# Patient Record
Sex: FEMALE | Race: WHITE | Employment: OTHER | URBAN - METROPOLITAN AREA
[De-identification: names, ages, dates, MRNs, and addresses within clinical notes are randomized per-mention and may not be internally consistent; named-entity substitution may affect disease eponyms.]

---

## 2017-01-05 ENCOUNTER — OFFICE VISIT (OUTPATIENT)
Dept: ORTHOPEDIC SURGERY | Age: 62
End: 2017-01-05

## 2017-01-05 VITALS
RESPIRATION RATE: 18 BRPM | OXYGEN SATURATION: 95 % | HEIGHT: 70 IN | DIASTOLIC BLOOD PRESSURE: 83 MMHG | TEMPERATURE: 97.7 F | BODY MASS INDEX: 31.07 KG/M2 | HEART RATE: 85 BPM | SYSTOLIC BLOOD PRESSURE: 142 MMHG | WEIGHT: 217 LBS

## 2017-01-05 DIAGNOSIS — M96.1 LUMBAR POST-LAMINECTOMY SYNDROME: Primary | ICD-10-CM

## 2017-01-05 RX ORDER — PREGABALIN 75 MG/1
CAPSULE ORAL
Qty: 360 CAP | Refills: 0 | Status: SHIPPED | OUTPATIENT
Start: 2017-01-05 | End: 2017-02-13 | Stop reason: ALTCHOICE

## 2017-01-05 RX ORDER — TRAMADOL HYDROCHLORIDE 50 MG/1
TABLET ORAL
Qty: 360 TAB | Refills: 0 | Status: SHIPPED | OUTPATIENT
Start: 2017-01-05 | End: 2017-02-13 | Stop reason: ALTCHOICE

## 2017-01-05 NOTE — PATIENT INSTRUCTIONS
Back Pain: Care Instructions  Your Care Instructions    Back pain has many possible causes. It is often related to problems with muscles and ligaments of the back. It may also be related to problems with the nerves, discs, or bones of the back. Moving, lifting, standing, sitting, or sleeping in an awkward way can strain the back. Sometimes you don't notice the injury until later. Arthritis is another common cause of back pain. Although it may hurt a lot, back pain usually improves on its own within several weeks. Most people recover in 12 weeks or less. Using good home treatment and being careful not to stress your back can help you feel better sooner. Follow-up care is a key part of your treatment and safety. Be sure to make and go to all appointments, and call your doctor if you are having problems. Its also a good idea to know your test results and keep a list of the medicines you take. How can you care for yourself at home? · Sit or lie in positions that are most comfortable and reduce your pain. Try one of these positions when you lie down:  ¨ Lie on your back with your knees bent and supported by large pillows. ¨ Lie on the floor with your legs on the seat of a sofa or chair. Josephus Phlegm on your side with your knees and hips bent and a pillow between your legs. ¨ Lie on your stomach if it does not make pain worse. · Do not sit up in bed, and avoid soft couches and twisted positions. Bed rest can help relieve pain at first, but it delays healing. Avoid bed rest after the first day of back pain. · Change positions every 30 minutes. If you must sit for long periods of time, take breaks from sitting. Get up and walk around, or lie in a comfortable position. · Try using a heating pad on a low or medium setting for 15 to 20 minutes every 2 or 3 hours. Try a warm shower in place of one session with the heating pad. · You can also try an ice pack for 10 to 15 minutes every 2 to 3 hours.  Put a thin cloth between the ice pack and your skin. · Take pain medicines exactly as directed. ¨ If the doctor gave you a prescription medicine for pain, take it as prescribed. ¨ If you are not taking a prescription pain medicine, ask your doctor if you can take an over-the-counter medicine. · Take short walks several times a day. You can start with 5 to 10 minutes, 3 or 4 times a day, and work up to longer walks. Walk on level surfaces and avoid hills and stairs until your back is better. · Return to work and other activities as soon as you can. Continued rest without activity is usually not good for your back. · To prevent future back pain, do exercises to stretch and strengthen your back and stomach. Learn how to use good posture, safe lifting techniques, and proper body mechanics. When should you call for help? Call your doctor now or seek immediate medical care if:  · You have new or worsening numbness in your legs. · You have new or worsening weakness in your legs. (This could make it hard to stand up.)  · You lose control of your bladder or bowels. Watch closely for changes in your health, and be sure to contact your doctor if:  · Your pain gets worse. · You are not getting better after 2 weeks. Where can you learn more? Go to http://june-yasmeen.info/. Enter F548 in the search box to learn more about \"Back Pain: Care Instructions. \"  Current as of: May 23, 2016  Content Version: 11.1  © 5754-0805 [employer]. Care instructions adapted under license by [employer] (which disclaims liability or warranty for this information). If you have questions about a medical condition or this instruction, always ask your healthcare professional. [address] any warranty or liability for your use of this information.

## 2017-01-05 NOTE — PROGRESS NOTES
Chief complaint/History of Present Illness:  Chief Complaint   Patient presents with    Back Pain     2 month follow up     HPI  Humaira Cotto is a  57 y.o.  female      HISTORY OF PRESENT ILLNESS:   The patient comes in today almost four months status post her spinal cord stimulator permanent implantation. She is doing well. She states the stimulator covers both legs, especially since she got it reprogrammed. She is trying to wean off of her Tramadol and Lyrica. She is down to 150 mg twice a day of Lyrica. She was on it four times a day. She was down to Tramadol twice a day. However, when she has a lot of activity or walking a lot, she ends up having to take it more like two to four times a day. She denies fever or bowel or bladder dysfunction. Since we last saw her, she has been diagnosed with rheumatoid arthritis and is seeing Dr. Bernardo Ramon [phone]. She has been started on Methotrexate and Folic Acid. She is not really sure if her pain comes from that or from her back. She denies fever or bowel or bladder dysfunction. PHYSICAL EXAM:  Ms. Eloy Johnson is a 67-year-old female. She is alert and oriented. She has a full weight bearing, non-antalgic gait, with 5/5 strength of the bilateral lower extremities and negative straight leg raise. At the site of her trial spinal cord stimulator in her back, there is a stitch still in there. I did remove that without any incident. There was just a tiny amount of bleeding. We put a Band-Aid over it. ASSESSENT/PLAN:  This is a patient three and a half months out from her permanent spinal cord stimulator. She is doing well. The stimulator helps her dramatically. She has cut downm on her Lyrica. She will continue to cut down on it. We are going to give her 75 mg to take two tablets twice a day and wean off as she is able to. The Tramadol, we will give her enough to take two to four times a day. Her  is appropriate.   We will see her back in three months or sooner if needed. Review of systems:    Past Medical History   Diagnosis Date    Allergic rhinitis     Asthma     Back pain     Back pain     Cervical radiculopathy     Chronic sinusitis 5/15/2012    Chronic sinusitis with recurrent bronchitis     DNS (deviated nasal septum)     Empyema     Esophagitis     Foraminal stenosis of cervical region      C4-C5    GERD (gastroesophageal reflux disease)      Dr Herlinda Harding Hx MRSA infection 8/11/2014    Hypertension     Hypertriglyceridemia     Insulin resistance 4/9/2012    Left knee pain     Leg pain, bilateral     Menopause      Age 51    MRSA (methicillin resistant Staphylococcus aureus) infection 2008     left lung    Restless leg syndrome     Spinal stenosis of cervical region      C4-C5 and C5-C6    TMJ syndrome     Wears glasses      and contacts     Past Surgical History   Procedure Laterality Date    Hx other surgical  2007     thoracentesis    Hx other surgical  2008     chest tube    Hx other surgical  1997     TMJ surgery    Hx tubal ligation      Pr colonoscopy flx dx w/collj spec when pfrmd  6-18-08     normal/duntemann    Hx other surgical  2012     sinus surg 2012-Dr Pardo.  Hx orthopaedic  1/2015     bunion removal from right foot    Hx lumbar laminectomy  Nov 1995     [facility]    Hx lumbar laminectomy  Feb 1997     Rachel Capone    Hx cervical fusion  08/13/14     Social History     Social History    Marital status:      Spouse name: N/A    Number of children: N/A    Years of education: N/A     Occupational History    Not on file.      Social History Main Topics    Smoking status: Never Smoker    Smokeless tobacco: Never Used    Alcohol use 0.0 oz/week     0 Standard drinks or equivalent per week      Comment: rare use    Drug use: Yes     Special: Prescription, OTC    Sexual activity: Yes     Partners: Male     Other Topics Concern    Not on file     Social History Narrative Retired      Family History   Problem Relation Age of Onset    Hypertension Mother     Diabetes Mother    [facility] Arthritis-rheumatoid Sister     Other Sister      Lupus    Cancer Sister 35     CA, uterus    Other Brother      myocarditis    Heart Attack Brother     Heart Disease Brother     Heart Surgery Brother     Coronary Artery Disease Father     Hypertension Father     Cancer Sister 48     CA, breast    Breast Cancer Sister      Estrogen based    Diabetes Brother     Stroke Maternal Grandmother        Physical Exam:  Visit Vitals    /83    Pulse 85    Temp 97.7 °F (36.5 °C) (Oral)    Resp 18    Ht 5' 10\" (1.778 m)    Wt 217 lb (98.4 kg)    SpO2 95%    BMI 31.14 kg/m2     Pain Scale: 1/10     has been . reviewed and is appropriate    Pt has a consistent UDS in the record      Hilda was seen today for back pain. Diagnoses and all orders for this visit:    Lumbar post-laminectomy syndrome    Other orders  -     pregabalin (LYRICA) 75 mg capsule; 2 tabs po bid  Indications: NEUROPATHIC PAIN ASSOCIATED WITH SPINAL CORD INJURY  -     traMADol (ULTRAM) 50 mg tablet; 1 tab po bid to qid prn pain  Indications: PAIN            Follow-up Disposition:  Return in about 3 months (around 4/5/2017) for with [address] [place] [address].         We have informed [facility] to notify us for immediate appointment if she has any worsening neurogical symptoms or if an emergency situation presents, then call 911

## 2017-02-07 ENCOUNTER — TELEPHONE (OUTPATIENT)
Dept: INTERNAL MEDICINE CLINIC | Age: 62
End: 2017-02-07

## 2017-02-07 NOTE — TELEPHONE ENCOUNTER
Pt calling asking if she needs to have labs before upcoming visit?  If so can you enter and she will go to [facility]

## 2017-02-13 ENCOUNTER — OFFICE VISIT (OUTPATIENT)
Dept: INTERNAL MEDICINE CLINIC | Age: 62
End: 2017-02-13

## 2017-02-13 VITALS
TEMPERATURE: 98 F | OXYGEN SATURATION: 96 % | HEART RATE: 76 BPM | RESPIRATION RATE: 16 BRPM | DIASTOLIC BLOOD PRESSURE: 90 MMHG | HEIGHT: 70 IN | BODY MASS INDEX: 30.95 KG/M2 | SYSTOLIC BLOOD PRESSURE: 123 MMHG | WEIGHT: 216.2 LBS

## 2017-02-13 DIAGNOSIS — K21.9 GASTROESOPHAGEAL REFLUX DISEASE, ESOPHAGITIS PRESENCE NOT SPECIFIED: Chronic | ICD-10-CM

## 2017-02-13 DIAGNOSIS — R51.9 HEADACHE, UNSPECIFIED HEADACHE TYPE: Primary | ICD-10-CM

## 2017-02-13 DIAGNOSIS — R73.03 PREDIABETES: ICD-10-CM

## 2017-02-13 DIAGNOSIS — I10 ESSENTIAL HYPERTENSION: ICD-10-CM

## 2017-02-13 NOTE — PATIENT INSTRUCTIONS
There are no preventive care reminders to display for this patient. High Blood Pressure: Care Instructions  Your Care Instructions  If your blood pressure is usually above 140/90, you have high blood pressure, or hypertension. That means the top number is 140 or higher or the bottom number is 90 or higher, or both. Despite what a lot of people think, high blood pressure usually doesn't cause headaches or make you feel dizzy or lightheaded. It usually has no symptoms. But it does increase your risk for heart attack, stroke, and kidney or eye damage. The higher your blood pressure, the more your risk increases. Your doctor will give you a goal for your blood pressure. Your goal will be based on your health and your age. An example of a goal is to keep your blood pressure below 140/90. Lifestyle changes, such as eating healthy and being active, are always important to help lower blood pressure. You might also take medicine to reach your blood pressure goal.  Follow-up care is a key part of your treatment and safety. Be sure to make and go to all appointments, and call your doctor if you are having problems. It's also a good idea to know your test results and keep a list of the medicines you take. How can you care for yourself at home? Medical treatment  · If you stop taking your medicine, your blood pressure will go back up. You may take one or more types of medicine to lower your blood pressure. Be safe with medicines. Take your medicine exactly as prescribed. Call your doctor if you think you are having a problem with your medicine. · Talk to your doctor before you start taking aspirin every day. Aspirin can help certain people lower their risk of a heart attack or stroke. But taking aspirin isn't right for everyone, because it can cause serious bleeding. · See your doctor regularly. You may need to see the doctor more often at first or until your blood pressure comes down.   · If you are taking blood pressure medicine, talk to your doctor before you take decongestants or anti-inflammatory medicine, such as ibuprofen. Some of these medicines can raise blood pressure. · Learn how to check your blood pressure at home. Lifestyle changes  · Stay at a healthy weight. This is especially important if you put on weight around the waist. Losing even 10 pounds can help you lower your blood pressure. · If your doctor recommends it, get more exercise. Walking is a good choice. Bit by bit, increase the amount you walk every day. Try for at least 30 minutes on most days of the week. You also may want to swim, bike, or do other activities. · Avoid or limit alcohol. Talk to your doctor about whether you can drink any alcohol. · Try to limit how much sodium you eat to less than 2,300 milligrams (mg) a day. Your doctor may ask you to try to eat less than 1,500 mg a day. · Eat plenty of fruits (such as bananas and oranges), vegetables, legumes, whole grains, and low-fat dairy products. · Lower the amount of saturated fat in your diet. Saturated fat is found in animal products such as milk, cheese, and meat. Limiting these foods may help you lose weight and also lower your risk for heart disease. · Do not smoke. Smoking increases your risk for heart attack and stroke. If you need help quitting, talk to your doctor about stop-smoking programs and medicines. These can increase your chances of quitting for good. When should you call for help? Call 911 anytime you think you may need emergency care. This may mean having symptoms that suggest that your blood pressure is causing a serious heart or blood vessel problem. Your blood pressure may be over 180/110. For example, call 911 if:  · You have symptoms of a heart attack. These may include:  ¨ Chest pain or pressure, or a strange feeling in the chest.  ¨ Sweating. ¨ Shortness of breath. ¨ Nausea or vomiting.   ¨ Pain, pressure, or a strange feeling in the back, neck, jaw, or upper belly or in one or both shoulders or arms. ¨ Lightheadedness or sudden weakness. ¨ A fast or irregular heartbeat. · You have symptoms of a stroke. These may include:  ¨ Sudden numbness, tingling, weakness, or loss of movement in your face, arm, or leg, especially on only one side of your body. ¨ Sudden vision changes. ¨ Sudden trouble speaking. ¨ Sudden confusion or trouble understanding simple statements. ¨ Sudden problems with walking or balance. ¨ A sudden, severe headache that is different from past headaches. · You have severe back or belly pain. Do not wait until your blood pressure comes down on its own. Get help right away. Call your doctor now or seek immediate care if:  · Your blood pressure is much higher than normal (such as 180/110 or higher), but you don't have symptoms. · You think high blood pressure is causing symptoms, such as:  ¨ Severe headache. ¨ Blurry vision. Watch closely for changes in your health, and be sure to contact your doctor if:  · Your blood pressure measures 140/90 or higher at least 2 times. That means the top number is 140 or higher or the bottom number is 90 or higher, or both. · You think you may be having side effects from your blood pressure medicine. · Your blood pressure is usually normal, but it goes above normal at least 2 times. Where can you learn more? Go to http://june-yasmeen.info/. Enter R129 in the search box to learn more about \"High Blood Pressure: Care Instructions. \"  Current as of: August 8, 2016  Content Version: 11.1  © 1736-3052 [employer]. Care instructions adapted under license by [employer] (which disclaims liability or warranty for this information). If you have questions about a medical condition or this instruction, always ask your healthcare professional. [place] [address] any warranty or liability for your use of this information.

## 2017-02-13 NOTE — MR AVS SNAPSHOT
Visit Information Date & Time Provider Department Dept. Phone Encounter #  
 2/13/2017  9:30 AM Pamela Muñoz MD Internist of [address] [id number] Follow-up Instructions Return in about 3 months (around 5/13/2017) for BP check. Your Appointments 4/3/2017  9:50 AM  
Follow Up with Britta Green NP  
[facility] ([facility]) Appt Note: 3 month back fu $40  
 [address] [facility] [address]  
[phone]  
  
   
 [address] [facility] [address] 5/15/2017  9:00 AM  
Office Visit with Pamela Muñoz MD  
[facility] Appt Note: 3 mth f/u  
 [address] [facility] [place], [address] [address] [address]  
  
   
 [address] [facility], [address] [facility] Upcoming Health Maintenance Date Due  
 BREAST CANCER SCRN MAMMOGRAM 8/16/2017 COLONOSCOPY 6/18/2018 PAP AKA CERVICAL CYTOLOGY 7/7/2018 DTaP/Tdap/Td series (2 - Td) 7/28/2026 Allergies as of 2/13/2017  Review Complete On: 2/13/2017 By: Seth Bryan Severity Noted Reaction Type Reactions Sulfasalazine High 11/17/2016    Other (comments) Could not taste anything, lightheadedness Other Plant, Animal, Environmental    Unable to Consolidated Doc MOLD Tape [Adhesive]  07/31/2014    Other (comments) Blisters, use paper tape only Patient states tegaderm and paper tape are okay Current Immunizations  Reviewed on 7/28/2016 Name Date Hepatitis B Vaccine 1/1/2009 Influenza Vaccine 10/22/2015, 10/9/2014 Influenza Vaccine Whole 1/4/2013 Pneumococcal Polysaccharide (PPSV-23) 7/28/2016 Td 8/1/2011 Tdap 7/28/2016 Not reviewed this visit Vitals BP Pulse Temp Resp Height(growth percentile) Weight(growth percentile)  123/90 (BP 1 Location: Left arm, BP Patient Position: Sitting) 76 98 °F (36.7 °C) (Oral) 16 5' 10\" (1.778 m) 216 lb 3.2 oz (98.1 kg) SpO2 BMI OB Status Smoking Status 96% 31.02 kg/m2 Postmenopausal Never Smoker BMI and BSA Data Body Mass Index Body Surface Area 31.02 kg/m 2 2.2 m 2 Preferred Pharmacy Pharmacy Name Phone [facility] [address] [phone] Your Updated Medication List  
  
   
This list is accurate as of: 2/13/17 10:27 AM.  Always use your most recent med list.  
  
  
  
  
 albuterol 90 mcg/actuation inhaler Commonly known as:  PROVENTIL HFA, VENTOLIN HFA, PROAIR HFA Take 2 Puffs by inhalation every six (6) hours as needed. allergy injection  
by SubCUTAneous route every thirty (30) days. ascorbic acid (vitamin C) 500 mg tablet Commonly known as:  VITAMIN C Take 1 Tab by mouth daily. calcium carbonate 200 mg calcium (500 mg) Chew Commonly known as:  TUMS Take 1 Tab by mouth four (4) times daily. cholecalciferol (VITAMIN D3) 5,000 unit Tab tablet Commonly known as:  VITAMIN D3 Take 5,000 Units by mouth daily. clobetasol 0.05 % ointment Commonly known as:  Barbara Leventhal Apply  to affected area two (2) times a week. cyanocobalamin 1,000 mcg tablet Commonly known as:  VITAMIN B-12 Take 1 Tab by mouth daily. DEXILANT 60 mg Cpdb Generic drug:  Dexlansoprazole Take 1 Cap by mouth Daily (before dinner). ESTRACE 0.01 % (0.1 mg/gram) vaginal cream  
Generic drug:  estradiol  
insert 1/2 gram vaginally two times a week  
  
 ferrous sulfate 325 mg (65 mg iron) tablet Take 1 Tab by mouth Daily (before breakfast). folic acid 1 mg tablet Commonly known as:  FOLVITE  
take 1 tablet by mouth once daily  
  
 losartan 50 mg tablet Commonly known as:  COZAAR  
TAKE 1 TABLET DAILY  
  
 methotrexate 2.5 mg tablet Commonly known as:  Jessica Damme Take 2.5 mg by mouth. 5 tablets weekly multivitamin tablet Commonly known as:  ONE A DAY Take 1 Tab by mouth daily. Omega-3-DHA-EPA-Fish Oil 1,000 mg (120 mg-180 mg) Cap Take  by mouth daily. SINGULAIR 10 mg tablet Generic drug:  montelukast  
Take 10 mg by mouth daily. SYMBICORT 160-4.5 mcg/actuation HFA inhaler Generic drug:  budesonide-formoterol Take 2 Puffs by inhalation two (2) times a day. triamcinolone 55 mcg nasal inhaler Commonly known as:  NASACORT AQ  
2 Sprays by Both Nostrils route daily. Indications: PERENNIAL ALLERGIC RHINITIS  
  
 TYLENOL 325 mg tablet Generic drug:  acetaminophen Take  by mouth every four (4) hours as needed for Pain. ZyrTEC 10 mg Cap Generic drug:  Cetirizine Take 10 mg by mouth daily. Follow-up Instructions Return in about 3 months (around 5/13/2017) for BP check. Patient Instructions There are no preventive care reminders to display for this patient. High Blood Pressure: Care Instructions Your Care Instructions If your blood pressure is usually above 140/90, you have high blood pressure, or hypertension. That means the top number is 140 or higher or the bottom number is 90 or higher, or both. Despite what a lot of people think, high blood pressure usually doesn't cause headaches or make you feel dizzy or lightheaded. It usually has no symptoms. But it does increase your risk for heart attack, stroke, and kidney or eye damage. The higher your blood pressure, the more your risk increases. Your doctor will give you a goal for your blood pressure. Your goal will be based on your health and your age. An example of a goal is to keep your blood pressure below 140/90. Lifestyle changes, such as eating healthy and being active, are always important to help lower blood pressure. You might also take medicine to reach your blood pressure goal. 
Follow-up care is a key part of your treatment and safety.  Be sure to make and go to all appointments, and call your doctor if you are having problems. It's also a good idea to know your test results and keep a list of the medicines you take. How can you care for yourself at home? Medical treatment · If you stop taking your medicine, your blood pressure will go back up. You may take one or more types of medicine to lower your blood pressure. Be safe with medicines. Take your medicine exactly as prescribed. Call your doctor if you think you are having a problem with your medicine. · Talk to your doctor before you start taking aspirin every day. Aspirin can help certain people lower their risk of a heart attack or stroke. But taking aspirin isn't right for everyone, because it can cause serious bleeding. · See your doctor regularly. You may need to see the doctor more often at first or until your blood pressure comes down. · If you are taking blood pressure medicine, talk to your doctor before you take decongestants or anti-inflammatory medicine, such as ibuprofen. Some of these medicines can raise blood pressure. · Learn how to check your blood pressure at home. Lifestyle changes · Stay at a healthy weight. This is especially important if you put on weight around the waist. Losing even 10 pounds can help you lower your blood pressure. · If your doctor recommends it, get more exercise. Walking is a good choice. Bit by bit, increase the amount you walk every day. Try for at least 30 minutes on most days of the week. You also may want to swim, bike, or do other activities. · Avoid or limit alcohol. Talk to your doctor about whether you can drink any alcohol. · Try to limit how much sodium you eat to less than 2,300 milligrams (mg) a day. Your doctor may ask you to try to eat less than 1,500 mg a day. · Eat plenty of fruits (such as bananas and oranges), vegetables, legumes, whole grains, and low-fat dairy products. · Lower the amount of saturated fat in your diet. Saturated fat is found in animal products such as milk, cheese, and meat. Limiting these foods may help you lose weight and also lower your risk for heart disease. · Do not smoke. Smoking increases your risk for heart attack and stroke. If you need help quitting, talk to your doctor about stop-smoking programs and medicines. These can increase your chances of quitting for good. When should you call for help? Call 911 anytime you think you may need emergency care. This may mean having symptoms that suggest that your blood pressure is causing a serious heart or blood vessel problem. Your blood pressure may be over 180/110. For example, call 911 if: 
· You have symptoms of a heart attack. These may include: ¨ Chest pain or pressure, or a strange feeling in the chest. 
¨ Sweating. ¨ Shortness of breath. ¨ Nausea or vomiting. ¨ Pain, pressure, or a strange feeling in the back, neck, jaw, or upper belly or in one or both shoulders or arms. ¨ Lightheadedness or sudden weakness. ¨ A fast or irregular heartbeat. · You have symptoms of a stroke. These may include: 
¨ Sudden numbness, tingling, weakness, or loss of movement in your face, arm, or leg, especially on only one side of your body. ¨ Sudden vision changes. ¨ Sudden trouble speaking. ¨ Sudden confusion or trouble understanding simple statements. ¨ Sudden problems with walking or balance. ¨ A sudden, severe headache that is different from past headaches. · You have severe back or belly pain. Do not wait until your blood pressure comes down on its own. Get help right away. Call your doctor now or seek immediate care if: 
· Your blood pressure is much higher than normal (such as 180/110 or higher), but you don't have symptoms. · You think high blood pressure is causing symptoms, such as: ¨ Severe headache. ¨ Blurry vision.  
Watch closely for changes in your health, and be sure to contact your doctor if: 
· Your blood pressure measures 140/90 or higher at least 2 times. That means the top number is 140 or higher or the bottom number is 90 or higher, or both. · You think you may be having side effects from your blood pressure medicine. · Your blood pressure is usually normal, but it goes above normal at least 2 times. Where can you learn more? Go to http://june-yasmeen.info/. Enter U872 in the search box to learn more about \"High Blood Pressure: Care Instructions. \" Current as of: August 8, 2016 Content Version: 11.1 © 7004-6406 [employer]. Care instructions adapted under license by [employer] (which disclaims liability or warranty for this information). If you have questions about a medical condition or this instruction, always ask your healthcare professional. [address] any warranty or liability for your use of this information. Introducing \[facility]\"" & HEALTH SERVICES! Dear Marquita Apple: Thank you for requesting a [employer] account. Our records indicate that you already have an active [employer] account. You can access your account anytime at [email or web address] Did you know that you can access your hospital and ER discharge instructions at any time in [employer]? You can also review all of your test results from your hospital stay or ER visit. Additional Information If you have questions, please visit the Frequently Asked Questions section of the [employer] website at [email or web address] Remember, [employer] is NOT to be used for urgent needs. For medical emergencies, dial 911. Now available from your iPhone and Android! Please provide this summary of care documentation to your next provider. Your primary care clinician is listed as Jose Crawford. If you have any questions after today's visit, please call [phone].

## 2017-02-13 NOTE — PROGRESS NOTES
INTERNISTS [facility]:  2/15/2017, MRN: [id number]      Nicolette Wang is a 57 y.o. female and presents to clinic for Hypertension; GERD; and Labs (done 11-14-16 to discuss)    Subjective:   Pt is a 62yo female with h/o HTN, GERD (followed by ), DJD, lumbar post laminectomy syndrome s/p spinal cord stimulator surgery (followed by Roddy Cohen), cervical spinal stenosis, prediabetes, asthma (followed by , Pulmonology), HLD, chronic sinusitis, vitamin B12 deficiency, and rheumatoid arthritis (Followed by ), vitamin D deficiency. 1. HTN:   - Chronic, present >6 months   - On losartan, no refills are needed. No adverse side effects.   - BPs have been borderline. SBP is 90 today    BP Readings from Last 3 Encounters:   02/13/17 123/90   01/05/17 142/83   12/02/16 130/88       2. GERD:   - Chronic, present > 6 months   - On dexilant, no adverse side effects. No refills are needed. - Followed by     3.  Headache:   - Started last week   - Left sided neck/headache pain   - No vision/hearing changes   - Improved with NSAIDs OTC   - Pain is constant   - Pain radiates to her left eye   - No phonophobia/photophobia      Patient Active Problem List    Diagnosis Date Noted    Arthritis, lumbar spine 04/22/2016    Moderate persistent asthma without complication 12/47/6754    Lumbar radiculopathy 01/19/2016    Therapeutic drug monitoring 12/10/2015    Lumbar post-laminectomy syndrome 12/10/2015    S/P cervical spinal fusion 08/13/2014    Cervical stenosis of spine 08/11/2014    GERD (gastroesophageal reflux disease) 08/11/2014    Hx MRSA infection 08/11/2014    Prediabetes 06/30/2013    Asthma 12/28/2012     Class: Chronic    Hypertriglyceridemia 12/16/2012    DNS (deviated nasal septum)     Chronic sinusitis 05/15/2012    Hypercalcemia 04/09/2012    Insulin resistance 04/09/2012    HTN (hypertension) 06/28/2010    Iron deficiency anemia 06/28/2010    Vitamin B12 deficiency 06/28/2010    Vitamin D deficiency 06/28/2010       Current Outpatient Prescriptions   Medication Sig Dispense Refill    cyanocobalamin (VITAMIN B-12) 1,000 mcg tablet Take 1 Tab by mouth daily. 90 Tab 3    ferrous sulfate 325 mg (65 mg iron) tablet Take 1 Tab by mouth Daily (before breakfast). 90 Tab 3    ascorbic acid, vitamin C, (VITAMIN C) 500 mg tablet Take 1 Tab by mouth daily. (Patient taking differently: Take 1,000 mg by mouth daily.) 90 Tab 3    folic acid (FOLVITE) 1 mg tablet take 1 tablet by mouth once daily  0    methotrexate (RHEUMATREX) 2.5 mg tablet Take 2.5 mg by mouth. 5 tablets weekly      ESTRACE 0.01 % (0.1 mg/gram) vaginal cream insert 1/2 gram vaginally two times a week  0    acetaminophen (TYLENOL) 325 mg tablet Take  by mouth every four (4) hours as needed for Pain.  clobetasol (TEMOVATE) 0.05 % ointment Apply  to affected area two (2) times a week.  0    losartan (COZAAR) 50 mg tablet TAKE 1 TABLET DAILY 90 Tab 3    triamcinolone (NASACORT AQ) 55 mcg nasal inhaler 2 Sprays by Both Nostrils route daily. Indications: PERENNIAL ALLERGIC RHINITIS      cholecalciferol, VITAMIN D3, (VITAMIN D3) 5,000 unit tab tablet Take 5,000 Units by mouth daily.  Omega-3-DHA-EPA-Fish Oil 1,000 (120-180) mg cap Take  by mouth daily.  Dexlansoprazole (DEXILANT) 60 mg CpDM Take 1 Cap by mouth Daily (before dinner).  calcium carbonate (TUMS) 200 mg calcium (500 mg) Chew Take 1 Tab by mouth four (4) times daily.  multivitamin (ONE A DAY) tablet Take 1 Tab by mouth daily.  Cetirizine (ZYRTEC) 10 mg Cap Take 10 mg by mouth daily.  montelukast (SINGULAIR) 10 mg tablet Take 10 mg by mouth daily.  allergy injection by SubCUTAneous route every thirty (30) days.  budesonide-formoterol (SYMBICORT) 160-4.5 mcg/Actuation HFA inhaler Take 2 Puffs by inhalation two (2) times a day.       albuterol (PROVENTIL HFA, VENTOLIN HFA) 90 mcg/Actuation inhaler Take 2 Puffs by inhalation every six (6) hours as needed. Allergies   Allergen Reactions    Sulfasalazine Other (comments)     Could not taste anything, lightheadedness    Other Plant, Animal, Environmental Unable to [employer] Tape [Adhesive] Other (comments)     Blisters, use paper tape only  Patient states tegaderm and paper tape are okay       Past Medical History   Diagnosis Date    Allergic rhinitis     Asthma     Back pain     Back pain     Cervical radiculopathy     Chronic sinusitis 5/15/2012    Chronic sinusitis with recurrent bronchitis     DNS (deviated nasal septum)     Empyema     Esophagitis     Foraminal stenosis of cervical region      C4-C5    GERD (gastroesophageal reflux disease)      Dr Angel Olguin Hx MRSA infection 8/11/2014    Hypertension     Hypertriglyceridemia     Insulin resistance 4/9/2012    Left knee pain     Leg pain, bilateral     Menopause      Age 51    MRSA (methicillin resistant Staphylococcus aureus) infection 2008     left lung    Restless leg syndrome     Spinal stenosis of cervical region      C4-C5 and C5-C6    TMJ syndrome     Wears glasses      and contacts       Past Surgical History   Procedure Laterality Date    Hx other surgical  2007     thoracentesis    Hx other surgical  2008     chest tube    Hx other surgical  1997     TMJ surgery    Hx tubal ligation      Pr colonoscopy flx dx w/collj spec when pfrmd  6-18-08     normal/duntemann    Hx other surgical  2012     sinus surg 2012-Dr Pardo.     Hx orthopaedic  1/2015     bunion removal from right foot    Hx lumbar laminectomy  Nov 1995     [facility]    Hx lumbar laminectomy  Feb 1997     Chuck Nevarez    Hx cervical fusion  08/13/14       Family History   Problem Relation Age of Onset    Hypertension Mother     Diabetes Mother    [facility] Arthritis-rheumatoid Sister     Other Sister      Lupus    Cancer Sister 35     CA, uterus    Other Brother      myocarditis    Heart Attack Brother     Heart Disease Brother     Heart Surgery Brother     Coronary Artery Disease Father     Hypertension Father     Cancer Sister 48     CA, breast    Breast Cancer Sister      Estrogen based    Diabetes Brother     Stroke Maternal Grandmother        Social History   Substance Use Topics    Smoking status: Never Smoker    Smokeless tobacco: Never Used    Alcohol use 0.0 oz/week     0 Standard drinks or equivalent per week      Comment: rare use       ROS   Review of Systems   Constitutional: Negative for chills and fever. HENT: Negative for ear pain, hearing loss and sore throat. Eyes: Negative for blurred vision and pain. Respiratory: Negative for cough and shortness of breath. Cardiovascular: Negative for chest pain. Gastrointestinal: Negative for abdominal pain. Genitourinary: Negative for dysuria. Musculoskeletal: Positive for neck pain. Negative for joint pain and myalgias. Skin: Negative for rash. Neurological: Positive for tingling (chronic, unchanged) and headaches. Negative for focal weakness. Endo/Heme/Allergies: Positive for environmental allergies. Does not bruise/bleed easily. Psychiatric/Behavioral: Negative for substance abuse. Objective     Vitals:    02/13/17 0948   BP: 123/90   Pulse: 76   Resp: 16   Temp: 98 °F (36.7 °C)   TempSrc: Oral   SpO2: 96%   Weight: 216 lb 3.2 oz (98.1 kg)   Height: 5' 10\" (1.778 m)   PainSc:   2   PainLoc: Head       Physical Exam   Constitutional: She is oriented to person, place, and time and well-developed, well-nourished, and in no distress. HENT:   Head: Normocephalic and atraumatic. Right Ear: External ear normal.   Left Ear: External ear normal.   Nose: Nose normal.   Mouth/Throat: Oropharynx is clear and moist. No oropharyngeal exudate. Base of pt's skull is slightly TTP   Eyes: Conjunctivae and EOM are normal. Pupils are equal, round, and reactive to light. Right eye exhibits no discharge. Left eye exhibits no discharge.  No scleral icterus. Neck: Neck supple. Cardiovascular: Normal rate, regular rhythm, normal heart sounds and intact distal pulses. Exam reveals no gallop and no friction rub. No murmur heard. Pulmonary/Chest: Effort normal and breath sounds normal. No respiratory distress. She has no wheezes. She has no rales. Abdominal: Soft. Bowel sounds are normal. She exhibits no distension. There is no tenderness. There is no rebound and no guarding. Musculoskeletal: She exhibits no edema or tenderness (BUE are NTTP). Lymphadenopathy:     She has no cervical adenopathy. Neurological: She is alert and oriented to person, place, and time. She exhibits normal muscle tone. Gait normal.   Skin: Skin is warm and dry. No erythema. Psychiatric: Affect normal.   Nursing note and vitals reviewed. LABS   Data Review:   Lab Results   Component Value Date/Time    WBC 5.5 11/14/2016 07:33 AM    HGB 12.5 11/14/2016 07:33 AM    HCT 40.3 11/14/2016 07:33 AM    PLATELET 060 21/31/9778 07:33 AM    MCV 86.1 11/14/2016 07:33 AM       Lab Results   Component Value Date/Time    Sodium 145 11/14/2016 07:33 AM    Potassium 4.6 11/14/2016 07:33 AM    Chloride 111 11/14/2016 07:33 AM    CO2 27 11/14/2016 07:33 AM    Anion gap 7 11/14/2016 07:33 AM    Glucose 108 11/14/2016 07:33 AM    BUN 9 11/14/2016 07:33 AM    Creatinine 0.83 11/14/2016 07:33 AM    BUN/Creatinine ratio 11 11/14/2016 07:33 AM    GFR est AA >60 11/14/2016 07:33 AM    GFR est non-AA >60 11/14/2016 07:33 AM    Calcium 9.3 11/14/2016 07:33 AM       Lab Results   Component Value Date/Time    Cholesterol, total 144 11/14/2016 07:33 AM    HDL Cholesterol 40 11/14/2016 07:33 AM    LDL, calculated 80.2 11/14/2016 07:33 AM    VLDL, calculated 23.8 11/14/2016 07:33 AM    Triglyceride 119 11/14/2016 07:33 AM    CHOL/HDL Ratio 3.6 11/14/2016 07:33 AM       Lab Results   Component Value Date/Time    Hemoglobin A1c 5.8 11/14/2016 07:33 AM       Assessment/Plan:   1.  Essential hypertension: BP is borderline.  - C/w rx as prescribed. 2. Prediabetes  - Pt counseled on lifestyle modification. 3. Gastroesophageal reflux disease, esophagitis presence not specified  - C/w Dexilant    4. Headache, unspecified headache type  - Discussed referral to Neurology team but pt declined at this time. - Observation. There are no preventive care reminders to display for this patient. Lab review: labs are reviewed in the EHR    I have discussed the diagnosis with the patient and the intended plan as seen in the above orders. The patient has received an after-visit summary and questions were answered concerning future plans. I have discussed medication side effects and warnings with the patient as well. I have reviewed the plan of care with the patient, accepted their input and they are in agreement with the treatment goals. All questions were answered. The patient understands the plan of care. Handouts provided today with above information. Pt instructed if symptoms worsen to call the office or report to the ED for continued care. Greater than 50% of the visit time was spent in counseling and/or coordination of care. Follow-up Disposition:  Return in about 3 months (around 5/13/2017) for BP check.     Windle Babinski, MD

## 2017-02-13 NOTE — PROGRESS NOTES
Chief Complaint   Patient presents with    Hypertension    GERD    Labs     done 11-14-16 to discuss     1. Have you been to the ER, urgent care clinic since your last visit? Hospitalized since your last visit? No    2. Have you seen or consulted any other health care providers outside of the [address] [place] [address] since your last visit? Include any pap smears or colon screening.  No

## 2017-02-15 PROBLEM — M06.9 RHEUMATOID ARTHRITIS INVOLVING MULTIPLE SITES (HCC): Status: ACTIVE | Noted: 2017-02-15

## 2017-03-03 ENCOUNTER — HOSPITAL ENCOUNTER (EMERGENCY)
Age: 62
Discharge: HOME | End: 2017-03-03
Attending: EMERGENCY MEDICINE
Payer: COMMERCIAL

## 2017-03-03 VITALS
BODY MASS INDEX: 32.93 KG/M2 | WEIGHT: 230 LBS | OXYGEN SATURATION: 97 % | SYSTOLIC BLOOD PRESSURE: 135 MMHG | TEMPERATURE: 98.3 F | HEART RATE: 79 BPM | DIASTOLIC BLOOD PRESSURE: 84 MMHG | HEIGHT: 70 IN | RESPIRATION RATE: 18 BRPM

## 2017-03-03 DIAGNOSIS — S61.452A DOG BITE OF LEFT HAND, INITIAL ENCOUNTER: Primary | ICD-10-CM
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
## 2017-03-03 PROCEDURE — 90715 TDAP VACCINE 7 YRS/> IM: CPT | Performed by: PHYSICIAN ASSISTANT

## 2017-03-03 PROCEDURE — 74011250636 HC RX REV CODE- 250/636: Performed by: PHYSICIAN ASSISTANT

## 2017-03-03 PROCEDURE — 99281 EMR DPT VST MAYX REQ PHY/QHP: CPT

## 2017-03-03 PROCEDURE — 90471 IMMUNIZATION ADMIN: CPT

## 2017-03-03 RX ORDER — IBUPROFEN 800 MG/1
800 TABLET ORAL
Qty: 20 TAB | Refills: 0 | Status: SHIPPED | OUTPATIENT
Start: 2017-03-03 | End: 2017-03-10

## 2017-03-03 RX ORDER — AMOXICILLIN AND CLAVULANATE POTASSIUM 875; 125 MG/1; MG/1
1 TABLET, FILM COATED ORAL 2 TIMES DAILY
Qty: 14 TAB | Refills: 0 | Status: SHIPPED | OUTPATIENT
Start: 2017-03-03 | End: 2017-03-10

## 2017-03-03 RX ADMIN — TETANUS TOXOID, REDUCED DIPHTHERIA TOXOID AND ACELLULAR PERTUSSIS VACCINE, ADSORBED 0.5 ML: 5; 2.5; 8; 8; 2.5 SUSPENSION INTRAMUSCULAR at 16:21

## 2017-03-03 NOTE — ED NOTES
Jasbir Cervantes is a 57 y.o. female that was discharged in good condition. The patients diagnosis, condition and treatment were explained to  patient and aftercare instructions were given. The patient verbalized understanding. Patient armband removed and shredded.

## 2017-03-03 NOTE — DISCHARGE INSTRUCTIONS
Animal Bites: Care Instructions  Your Care Instructions  After an animal bite, the biggest concern is infection. The chance of infection depends on the type of animal that bit you, where on your body you were bitten, and your general health. Many animal bites are not closed with stitches, because this can increase the chance of infection. Your bite may take as little as 7 days or as long as several months to heal, depending on how bad it is. Taking good care of your wound at home will help it heal and reduce your chance of infection. The doctor has checked you carefully, but problems can develop later. If you notice any problems or new symptoms, get medical treatment right away. Follow-up care is a key part of your treatment and safety. Be sure to make and go to all appointments, and call your doctor if you are having problems. It's also a good idea to know your test results and keep a list of the medicines you take. How can you care for yourself at home? · If your doctor told you how to care for your wound, follow your doctor's instructions. If you did not get instructions, follow this general advice:  ¨ After 24 to 48 hours, gently wash the wound with clean water 2 times a day. Do not scrub or soak the wound. Don't use hydrogen peroxide or alcohol, which can slow healing. ¨ You may cover the wound with a thin layer of petroleum jelly, such as Vaseline, and a nonstick bandage. ¨ Apply more petroleum jelly and replace the bandage as needed. · After you shower, gently dry the wound with a clean towel. · If your doctor has closed the wound, cover the bandage with a plastic bag before you take a shower. · A small amount of skin redness and swelling around the wound edges and the stitches or staples is normal. Your wound may itch or feel irritated. Do not scratch or rub the wound.   · Ask your doctor if you can take an over-the-counter pain medicine, such as acetaminophen (Tylenol), ibuprofen (Advil, Motrin), or naproxen (Aleve). Read and follow all instructions on the label. · Do not take two or more pain medicines at the same time unless the doctor told you to. Many pain medicines have acetaminophen, which is Tylenol. Too much acetaminophen (Tylenol) can be harmful. · If your bite puts you at risk for rabies, you will get a series of shots over the next few weeks to prevent rabies. Your doctor will tell you when to get the shots. It is very important that you get the full cycle of shots. Follow your doctor's instructions exactly. · You may need a tetanus shot if you have not received one in the last 5 years. · If your doctor prescribed antibiotics, take them as directed. Do not stop taking them just because you feel better. You need to take the full course of antibiotics. When should you call for help? Call your doctor now or seek immediate medical care if:  · The skin near the bite turns cold or pale or it changes color. · You lose feeling in the area near the bite, or it feels numb or tingly. · You have trouble moving a limb near the bite. · You have symptoms of infection, such as:  ¨ Increased pain, swelling, warmth, or redness near the wound. ¨ Red streaks leading from the wound. ¨ Pus draining from the wound. ¨ A fever. · Blood soaks through the bandage. Oozing small amounts of blood is normal.  · Your pain is getting worse. Watch closely for changes in your health, and be sure to contact your doctor if you are not getting better as expected. Where can you learn more? Go to http://june-yasmeen.info/. Enter N554 in the search box to learn more about \"Animal Bites: Care Instructions. \"  Current as of: May 27, 2016  Content Version: 11.1  © 1043-1428 [employer]. Care instructions adapted under license by [employer] (which disclaims liability or warranty for this information).  If you have questions about a medical condition or this instruction, always ask your healthcare professional. [place] [address] any warranty or liability for your use of this information.

## 2017-03-03 NOTE — ED PROVIDER NOTES
Patient is a 57 y.o. female presenting with dog bite. The history is provided by the patient. Dog Bite    The incident occurred yesterday. The incident occurred at home. There is an injury to the left hand (Pt is right handed). The patient is experiencing no pain. It is unlikely that a foreign body is present. Pertinent negatives include no chest pain, no numbness, no visual disturbance, no abdominal pain, no nausea, no vomiting, no headaches, no inability to bear weight, no neck pain, no focal weakness, no decreased responsiveness, no light-headedness, no loss of consciousness, no seizures, no tingling, no weakness, no cough, no difficulty breathing and no memory loss. There have been no prior injuries to these areas. She is right-handed. Her tetanus status is out of date. Patient reports the bite was accidental while playing with her own dog yesterday, since then has noticed some mild redness and swelling around single puncture wound to top of left hand, denies numbness, tingling, pus drainage, bleeding, fever, or chills. Reports her dog is up to date on all vaccinations including rabies.       Past Medical History:   Diagnosis Date    Allergic rhinitis     Asthma     Back pain     Back pain     Cervical radiculopathy     Chronic sinusitis 5/15/2012    Chronic sinusitis with recurrent bronchitis     DNS (deviated nasal septum)     Empyema     Esophagitis     Foraminal stenosis of cervical region     C4-C5    GERD (gastroesophageal reflux disease)     Dr Enedina Reveles Hx MRSA infection 8/11/2014    Hypertension     Hypertriglyceridemia     Insulin resistance 4/9/2012    Left knee pain     Leg pain, bilateral     Menopause     Age 51    MRSA (methicillin resistant Staphylococcus aureus) infection 2008    left lung    Restless leg syndrome     Spinal stenosis of cervical region     C4-C5 and C5-C6    TMJ syndrome     Wears glasses     and contacts       Past Surgical History:   Procedure Laterality Date    HX CERVICAL FUSION  08/13/14    HX LUMBAR LAMINECTOMY  Nov 1995    [address]    HX LUMBAR LAMINECTOMY  Feb 1997    Shavon Chandler  1/2015    bunion removal from right foot    HX OTHER SURGICAL  2007    thoracentesis    HX OTHER SURGICAL  2008    chest tube    HX OTHER SURGICAL  1997    TMJ surgery    HX OTHER SURGICAL  2012    sinus surg 2012-Dr Benoit Ask.  HX TUBAL LIGATION      MI COLONOSCOPY FLX DX W/COLLJ SPEC WHEN PFRMD  6-18-08    normal/duntemann         Family History:   Problem Relation Age of Onset    Hypertension Mother    [facility] Diabetes Mother    [facility] Arthritis-rheumatoid Sister     Other Sister      Lupus    Cancer Sister 35     CA, uterus    Other Brother      myocarditis    Heart Attack Brother     Heart Disease Brother     Heart Surgery Brother     Coronary Artery Disease Father     Hypertension Father     Cancer Sister 48     CA, breast    Breast Cancer Sister      Estrogen based    Diabetes Brother     Stroke Maternal Grandmother        Social History     Social History    Marital status:      Spouse name: N/A    Number of children: N/A    Years of education: N/A     Occupational History    Not on file. Social History Main Topics    Smoking status: Never Smoker    Smokeless tobacco: Never Used    Alcohol use 0.0 oz/week     0 Standard drinks or equivalent per week      Comment: rare use    Drug use: Yes     Special: Prescription, OTC    Sexual activity: Yes     Partners: Male     Other Topics Concern    Not on file     Social History Narrative    Retired          ALLERGIES: Sulfasalazine; Other plant, animal, environmental; and Tape [adhesive]    Review of Systems   Constitutional: Negative for chills, decreased responsiveness and fever. HENT: Negative for ear pain, rhinorrhea and sore throat. Eyes: Negative for pain, redness and visual disturbance. Respiratory: Negative for cough and shortness of breath. Cardiovascular: Negative for chest pain. Gastrointestinal: Negative for abdominal pain, constipation, diarrhea, nausea and vomiting. Genitourinary: Negative for dysuria. Musculoskeletal: Negative for joint swelling, myalgias and neck pain. Skin: Positive for color change and wound. Neurological: Negative for dizziness, tingling, focal weakness, seizures, loss of consciousness, weakness, light-headedness, numbness and headaches. Psychiatric/Behavioral: Negative. Negative for memory loss. Vitals:    03/03/17 1456   BP: 135/84   Pulse: 79   Resp: 18   Temp: 98.3 °F (36.8 °C)   SpO2: 97%   Weight: 104.3 kg (230 lb)   Height: 5' 10\" (1.778 m)            Physical Exam   Constitutional: She is oriented to person, place, and time. She appears well-developed and well-nourished. HENT:   Head: Normocephalic and atraumatic. Right Ear: Tympanic membrane, external ear and ear canal normal.   Left Ear: Tympanic membrane, external ear and ear canal normal.   Nose: Nose normal.   Mouth/Throat: Oropharynx is clear and moist and mucous membranes are normal.   Eyes: Conjunctivae and EOM are normal. Pupils are equal, round, and reactive to light. Neck: Normal range of motion. Cardiovascular: Normal rate, regular rhythm and normal heart sounds. Pulmonary/Chest: Effort normal and breath sounds normal.   Abdominal: Soft. Bowel sounds are normal. There is no tenderness. Musculoskeletal: Normal range of motion. Left wrist: Normal.        Left hand: She exhibits laceration and swelling. She exhibits normal range of motion, no tenderness, no bony tenderness, normal two-point discrimination, normal capillary refill and no deformity. Normal sensation noted. Normal strength noted. Hands:  Small single puncture wound to top of left hand as noted with [id number] area of erythema and mild swelling, but no induration or fluctuance or TTP, no drainage or bleeding from wound.    Neurological: She is alert and oriented to person, place, and time. Skin: Skin is warm and dry. Psychiatric: She has a normal mood and affect. Her behavior is normal. Judgment and thought content normal.   Nursing note and vitals reviewed. MDM  Number of Diagnoses or Management Options  Dog bite of left hand, initial encounter: new and requires workup  Diagnosis management comments: IMPRESSION AND MEDICAL DECISION MAKING:    Based upon the patient's presentation with noted HPI and PE, along with the work up done in the emergency department, I believe that the patient is having a dog bite. The wound is not actively bleeding, or gaping, do not feel it needs loose closure with suture in the ED. Will clean wound, apply bacitracin and non stick dressing. No evidence of fluctuance, erythema, or induration. Tetanus booster given in ED. Patient reports his dog is up to date on rabies vaccination, do not need to administer rabies series at this time. Will discharge pt to home with antibiotic prophylaxis for dog bite, and pain medication. Pt given return warning signs and symptoms. DIAGNOSIS:  1. Dog bite. SPECIFIC PATIENT INSTRUCTIONS FROM THE PHYSICIAN WHO TREATED YOU IN THE ER TODAY:  1. Return to ED if worsening pain, redness, warmth, discharge, changes, or concerns. 2. Finish all antibiotics as directed. 3. You may wash the laceration site, but otherwise keep it dry. 4. Take motrin as prescribed as needed for pain. 5. Follow up with your PCP or the one provided in 2 days for a wound check, or return to this ED for wound check. Reviewed workup results, any meds, and discharge instructions with patient and any family present. Answered all questions.          Amount and/or Complexity of Data Reviewed  Discussion of test results with the performing providers: yes  Decide to obtain previous medical records or to obtain history from someone other than the patient: yes  Obtain history from someone other than the patient: yes  Review and summarize past medical records: yes  Discuss the patient with other providers: yes    Risk of Complications, Morbidity, and/or Mortality  Presenting problems: low  Diagnostic procedures: low  Management options: low    Patient Progress  Patient progress: stable    ED Course       Procedures      Diagnosis:   1. Dog bite of left hand, initial encounter          Disposition: home    Follow-up Information     Follow up With Details Comments Contact Info    [facility] EMERGENCY DEPT  As needed, If symptoms worsen [address] Pinky Hernandez MD In 3 days  [address]/[place] [address]  [phone]            Patient's Medications   Start Taking    AMOXICILLIN-CLAVULANATE (AUGMENTIN) 875-125 MG PER TABLET    Take 1 Tab by mouth two (2) times a day for 7 days. Indications: DOG BITE WOUND    IBUPROFEN (MOTRIN) 800 MG TABLET    Take 1 Tab by mouth every six (6) hours as needed for Pain for up to 7 days. Continue Taking    ACETAMINOPHEN (TYLENOL) 325 MG TABLET    Take  by mouth every four (4) hours as needed for Pain. ALBUTEROL (PROVENTIL HFA, VENTOLIN HFA) 90 MCG/ACTUATION INHALER    Take 2 Puffs by inhalation every six (6) hours as needed. ALLERGY INJECTION    by SubCUTAneous route every thirty (30) days. ASCORBIC ACID, VITAMIN C, (VITAMIN C) 500 MG TABLET    Take 1 Tab by mouth daily. BUDESONIDE-FORMOTEROL (SYMBICORT) 160-4.5 MCG/ACTUATION HFA INHALER    Take 2 Puffs by inhalation two (2) times a day. CALCIUM CARBONATE (TUMS) 200 MG CALCIUM (500 MG) CHEW    Take 1 Tab by mouth four (4) times daily. CETIRIZINE (ZYRTEC) 10 MG CAP    Take 10 mg by mouth daily. CHOLECALCIFEROL, VITAMIN D3, (VITAMIN D3) 5,000 UNIT TAB TABLET    Take 5,000 Units by mouth daily. CLOBETASOL (TEMOVATE) 0.05 % OINTMENT    Apply  to affected area two (2) times a week.     CYANOCOBALAMIN (VITAMIN B-12) 1,000 MCG TABLET    Take 1 Tab by mouth daily. DEXLANSOPRAZOLE (DEXILANT) 60 MG CPDM    Take 1 Cap by mouth Daily (before dinner). ESTRACE 0.01 % (0.1 MG/GRAM) VAGINAL CREAM    insert 1/2 gram vaginally two times a week    FERROUS SULFATE 325 MG (65 MG IRON) TABLET    Take 1 Tab by mouth Daily (before breakfast). FOLIC ACID (FOLVITE) 1 MG TABLET    take 1 tablet by mouth once daily    LOSARTAN (COZAAR) 50 MG TABLET    TAKE 1 TABLET DAILY    METHOTREXATE (RHEUMATREX) 2.5 MG TABLET    Take 2.5 mg by mouth. 5 tablets weekly    MONTELUKAST (SINGULAIR) 10 MG TABLET    Take 10 mg by mouth daily. MULTIVITAMIN (ONE A DAY) TABLET    Take 1 Tab by mouth daily. OMEGA-3-DHA-EPA-FISH OIL 1,000 (120-180) MG CAP    Take  by mouth daily. TRIAMCINOLONE (NASACORT AQ) 55 MCG NASAL INHALER    2 Sprays by Both Nostrils route daily.  Indications: PERENNIAL ALLERGIC RHINITIS   These Medications have changed    No medications on file   Stop Taking    No medications on file

## 2017-04-03 ENCOUNTER — OFFICE VISIT (OUTPATIENT)
Dept: ORTHOPEDIC SURGERY | Age: 62
End: 2017-04-03

## 2017-04-03 VITALS
WEIGHT: 212 LBS | SYSTOLIC BLOOD PRESSURE: 152 MMHG | HEART RATE: 78 BPM | DIASTOLIC BLOOD PRESSURE: 96 MMHG | OXYGEN SATURATION: 99 % | BODY MASS INDEX: 30.35 KG/M2 | HEIGHT: 70 IN | RESPIRATION RATE: 18 BRPM

## 2017-04-03 DIAGNOSIS — M54.16 LUMBAR RADICULOPATHY: Primary | ICD-10-CM

## 2017-04-03 DIAGNOSIS — M47.816 ARTHRITIS, LUMBAR SPINE: ICD-10-CM

## 2017-04-03 RX ORDER — TRAMADOL HYDROCHLORIDE 50 MG/1
50 TABLET ORAL
COMMUNITY
End: 2017-04-27

## 2017-04-03 RX ORDER — PREGABALIN 75 MG/1
CAPSULE ORAL
COMMUNITY
Start: 2017-01-12 | End: 2017-04-27

## 2017-04-03 RX ORDER — CLOBETASOL PROPIONATE 0.05 MG/G
GEL TOPICAL
Refills: 0 | COMMUNITY
Start: 2017-03-06 | End: 2017-05-15 | Stop reason: ALTCHOICE

## 2017-04-03 RX ORDER — DEXAMETHASONE 0.5 MG/5ML
ELIXIR ORAL
Refills: 0 | COMMUNITY
Start: 2017-03-06 | End: 2017-05-15 | Stop reason: ALTCHOICE

## 2017-04-03 RX ORDER — PANTOPRAZOLE SODIUM 40 MG/1
40 GRANULE, DELAYED RELEASE ORAL
COMMUNITY
End: 2017-04-27 | Stop reason: ALTCHOICE

## 2017-04-03 RX ORDER — MOMETASONE FUROATE 50 UG/1
2 SPRAY, METERED NASAL
COMMUNITY
End: 2017-04-27

## 2017-04-03 RX ORDER — PREGABALIN 150 MG/1
150 CAPSULE ORAL
COMMUNITY
End: 2017-04-27

## 2017-04-03 RX ORDER — METHYLPREDNISOLONE 4 MG/1
TABLET ORAL
Refills: 0 | COMMUNITY
Start: 2017-03-31 | End: 2017-04-27

## 2017-04-03 RX ORDER — OXYCODONE AND ACETAMINOPHEN 7.5; 325 MG/1; MG/1
TABLET ORAL
Refills: 0 | COMMUNITY
Start: 2017-03-28 | End: 2017-05-15 | Stop reason: ALTCHOICE

## 2017-04-03 RX ORDER — ONDANSETRON 4 MG/1
TABLET, ORALLY DISINTEGRATING ORAL
Refills: 0 | COMMUNITY
Start: 2017-03-31 | End: 2017-05-15 | Stop reason: ALTCHOICE

## 2017-04-03 RX ORDER — AMOXICILLIN 500 MG/1
CAPSULE ORAL
Refills: 0 | COMMUNITY
Start: 2017-03-28 | End: 2017-04-27

## 2017-04-03 NOTE — PATIENT INSTRUCTIONS
Back Pain: Care Instructions  Your Care Instructions    Back pain has many possible causes. It is often related to problems with muscles and ligaments of the back. It may also be related to problems with the nerves, discs, or bones of the back. Moving, lifting, standing, sitting, or sleeping in an awkward way can strain the back. Sometimes you don't notice the injury until later. Arthritis is another common cause of back pain. Although it may hurt a lot, back pain usually improves on its own within several weeks. Most people recover in 12 weeks or less. Using good home treatment and being careful not to stress your back can help you feel better sooner. Follow-up care is a key part of your treatment and safety. Be sure to make and go to all appointments, and call your doctor if you are having problems. Its also a good idea to know your test results and keep a list of the medicines you take. How can you care for yourself at home? · Sit or lie in positions that are most comfortable and reduce your pain. Try one of these positions when you lie down:  ¨ Lie on your back with your knees bent and supported by large pillows. ¨ Lie on the floor with your legs on the seat of a sofa or chair. Gayl Feil on your side with your knees and hips bent and a pillow between your legs. ¨ Lie on your stomach if it does not make pain worse. · Do not sit up in bed, and avoid soft couches and twisted positions. Bed rest can help relieve pain at first, but it delays healing. Avoid bed rest after the first day of back pain. · Change positions every 30 minutes. If you must sit for long periods of time, take breaks from sitting. Get up and walk around, or lie in a comfortable position. · Try using a heating pad on a low or medium setting for 15 to 20 minutes every 2 or 3 hours. Try a warm shower in place of one session with the heating pad. · You can also try an ice pack for 10 to 15 minutes every 2 to 3 hours.  Put a thin cloth between the ice pack and your skin. · Take pain medicines exactly as directed. ¨ If the doctor gave you a prescription medicine for pain, take it as prescribed. ¨ If you are not taking a prescription pain medicine, ask your doctor if you can take an over-the-counter medicine. · Take short walks several times a day. You can start with 5 to 10 minutes, 3 or 4 times a day, and work up to longer walks. Walk on level surfaces and avoid hills and stairs until your back is better. · Return to work and other activities as soon as you can. Continued rest without activity is usually not good for your back. · To prevent future back pain, do exercises to stretch and strengthen your back and stomach. Learn how to use good posture, safe lifting techniques, and proper body mechanics. When should you call for help? Call your doctor now or seek immediate medical care if:  · You have new or worsening numbness in your legs. · You have new or worsening weakness in your legs. (This could make it hard to stand up.)  · You lose control of your bladder or bowels. Watch closely for changes in your health, and be sure to contact your doctor if:  · Your pain gets worse. · You are not getting better after 2 weeks. Where can you learn more? Go to http://june-yasmeen.info/. Enter N772 in the search box to learn more about \"Back Pain: Care Instructions. \"  Current as of: May 23, 2016  Content Version: 11.2  © 7381-1466 [employer]. Care instructions adapted under license by [employer] (which disclaims liability or warranty for this information). If you have questions about a medical condition or this instruction, always ask your healthcare professional. [address] any warranty or liability for your use of this information.

## 2017-04-03 NOTE — PROGRESS NOTES
Chief complaint/History of Present Illness:  Chief Complaint   Patient presents with    Back Pain     3 month follow up     HPI  Kayden Peña is a  57 y.o.  female      HISTORY OF PRESENT ILLNESS:  The patient comes in today for followup of her chronic low back pain. She had a spinal cord stimulator placed on September 19, 2016, that is doing well. It is covering both legs. She has had it reprogrammed since surgery. She feels like she is able to manually use the stimulator to control her pain. Since we last saw her, she has completely come off the Tramadol and the Lyrica. She does not feel like she needs it. However, last Thursday, she had oral surgery done and also had a little bump taken off her tongue, so it is very difficult to eat. She is getting a lot of pain from that. The dentist did give her a prescription for Norco, but she states she has not taken it the last few days. Since she has had the oral surgery, she has had to put her methotrexate on hold for her rheumatoid arthritis. Hopefully, once everything heals up, she will be able to go back to that remedy. PHYSICAL EXAM:  Ms. Maxine Grey is a 69-year-old female. She is alert and oriented. She has a full weight bearing, stiff but non-antalgic gait. She has 4/5 strength of her bilateral lower extremities and negative straight leg raise. ASSESSENT/PLAN:  This is a patient who had a spinal cord stimulator placed in September 2016. She is doing well with that. She is now off her Tramadol and Lyrica. She plans to stay off of that, so we will just see her back on an as-needed basis.         Review of systems:    Past Medical History:   Diagnosis Date    Allergic rhinitis     Asthma     Back pain     Back pain     Cervical radiculopathy     Chronic sinusitis 5/15/2012    Chronic sinusitis with recurrent bronchitis     DNS (deviated nasal septum)     Empyema     Esophagitis     Foraminal stenosis of cervical region C4-C5    GERD (gastroesophageal reflux disease)     Dr Marcel Tejeda Hx MRSA infection 8/11/2014    Hypertension     Hypertriglyceridemia     Insulin resistance 4/9/2012    Left knee pain     Leg pain, bilateral     Menopause     Age 51    MRSA (methicillin resistant Staphylococcus aureus) infection 2008    left lung    Restless leg syndrome     Spinal stenosis of cervical region     C4-C5 and C5-C6    TMJ syndrome     Wears glasses     and contacts     Past Surgical History:   Procedure Laterality Date    HX CERVICAL FUSION  08/13/14    HX LUMBAR LAMINECTOMY  Nov 1995    [facility]    HX LUMBAR LAMINECTOMY  Feb 1997    Lizbet Betters  1/2015    bunion removal from right foot    HX OTHER SURGICAL  2007    thoracentesis    HX OTHER SURGICAL  2008    chest tube    HX OTHER SURGICAL  1997    TMJ surgery    HX OTHER SURGICAL  2012    sinus surg 2012-Dr Liane Jeff.  HX TUBAL LIGATION      SC COLONOSCOPY FLX DX W/COLLJ SPEC WHEN PFRMD  6-18-08    normal/duntemann     Social History     Social History    Marital status:      Spouse name: N/A    Number of children: N/A    Years of education: N/A     Occupational History    Not on file.      Social History Main Topics    Smoking status: Never Smoker    Smokeless tobacco: Never Used    Alcohol use 0.0 oz/week     0 Standard drinks or equivalent per week      Comment: rare use    Drug use: Yes     Special: Prescription, OTC    Sexual activity: Yes     Partners: Male     Other Topics Concern    Not on file     Social History Narrative    Retired      Family History   Problem Relation Age of Onset    Hypertension Mother     Diabetes Mother    [facility] Arthritis-rheumatoid Sister     Other Sister      Lupus    Cancer Sister 35     CA, uterus    Other Brother      myocarditis    Heart Attack Brother     Heart Disease Brother     Heart Surgery Brother     Coronary Artery Disease Father     Hypertension Father     Cancer Sister 48     CA, breast    Breast Cancer Sister      Estrogen based    Diabetes Brother     Stroke Maternal Grandmother        Physical Exam:  Visit Vitals    BP (!) 152/96    Pulse 78    Resp 18    Ht 5' 10\" (1.778 m)    Wt 212 lb (96.2 kg)    SpO2 99%    BMI 30.42 kg/m2     Pain Scale: 3/10     has been . reviewed and is appropriate        Hilda was seen today for back pain. Diagnoses and all orders for this visit:    Lumbar radiculopathy    Arthritis, lumbar spine            Follow-up Disposition:  Return if symptoms worsen or fail to improve.         We have informed HARISHyl Halsted to notify us for immediate appointment if she has any worsening neurogical symptoms or if an emergency situation presents, then call [phone]

## 2017-04-24 DIAGNOSIS — I10 ESSENTIAL HYPERTENSION WITH GOAL BLOOD PRESSURE LESS THAN 140/90: ICD-10-CM

## 2017-04-24 RX ORDER — LOSARTAN POTASSIUM 50 MG/1
TABLET ORAL
Qty: 90 TAB | Refills: 3 | Status: SHIPPED | OUTPATIENT
Start: 2017-04-24 | End: 2017-05-15 | Stop reason: SDUPTHER

## 2017-04-27 ENCOUNTER — OFFICE VISIT (OUTPATIENT)
Dept: PULMONOLOGY | Age: 62
End: 2017-04-27

## 2017-04-27 VITALS
DIASTOLIC BLOOD PRESSURE: 90 MMHG | RESPIRATION RATE: 16 BRPM | BODY MASS INDEX: 30.64 KG/M2 | WEIGHT: 214 LBS | OXYGEN SATURATION: 97 % | SYSTOLIC BLOOD PRESSURE: 144 MMHG | TEMPERATURE: 99 F | HEART RATE: 73 BPM | HEIGHT: 70 IN

## 2017-04-27 DIAGNOSIS — J45.20 MILD INTERMITTENT ASTHMA WITHOUT COMPLICATION: Primary | ICD-10-CM

## 2017-04-27 DIAGNOSIS — K21.00 GASTROESOPHAGEAL REFLUX DISEASE WITH ESOPHAGITIS: ICD-10-CM

## 2017-04-27 DIAGNOSIS — J30.1 NON-SEASONAL ALLERGIC RHINITIS DUE TO POLLEN: ICD-10-CM

## 2017-04-27 NOTE — MR AVS SNAPSHOT
Visit Information Date & Time Provider Department Dept. Phone Encounter #  
 4/27/2017  9:30 AM Betzaida Guerra MD [facility] Cooper Carcamo [id number] Follow-up Instructions Return in about 6 months (around 10/27/2017). Your Appointments 5/15/2017  9:00 AM  
Office Visit with Emanuel Decker MD  
Internist of [address] [place] [address] Appt Note: 3 mth f/u  
 [address] [facility], [address] [address] [address] [place]  
  
   
 [address], [address]  
  
    
 8/3/2017 10:00 AM  
ANNUAL with Deangelo Roque MD  
[facility] OB [facility] ([facility]) Appt Note: annual/ mamm. [address], [place] [address] [facility] [address]  
[phone]  
  
   
 [address], [address] [facility] Upcoming Health Maintenance Date Due  
 BREAST CANCER SCRN MAMMOGRAM 8/16/2017 COLONOSCOPY 6/18/2018 PAP AKA CERVICAL CYTOLOGY 7/7/2018 DTaP/Tdap/Td series (2 - Td) 3/3/2027 Allergies as of 4/27/2017  Review Complete On: 4/27/2017 By: Anyi Tate RN Severity Noted Reaction Type Reactions Sulfasalazine High 11/17/2016    Other (comments) Could not taste anything, lightheadedness Other Plant, Animal, Environmental    Unable to Consolidated Doc MOLD Tape [Adhesive]  07/31/2014    Other (comments) Blisters, use paper tape only Patient states tegaderm and paper tape are okay Current Immunizations  Reviewed on 7/28/2016 Name Date Hepatitis B Vaccine 1/1/2009 Influenza Vaccine 10/22/2015, 10/9/2014 Influenza Vaccine Whole 1/4/2013 Pneumococcal Polysaccharide (PPSV-23) 7/28/2016 Td 8/1/2011 Tdap 3/3/2017  4:21 PM, 7/28/2016 Not reviewed this visit Vitals BP Pulse Temp Resp Height(growth percentile) Weight(growth percentile)  144/90 (BP 1 Location: Left arm, BP Patient Position: Sitting) 73 99 °F (37.2 °C) (Oral) 16 5' 10\" (1.778 m) 214 lb (97.1 kg) SpO2 BMI OB Status Smoking Status 97% 30.71 kg/m2 Postmenopausal Never Smoker Vitals History BMI and BSA Data Body Mass Index Body Surface Area 30.71 kg/m 2 2.19 m 2 Preferred Pharmacy Pharmacy Name Phone [facility] [address], [address] [place] [phone] Your Updated Medication List  
  
   
This list is accurate as of: 4/27/17  9:58 AM.  Always use your most recent med list.  
  
  
  
  
 albuterol 90 mcg/actuation inhaler Commonly known as:  PROVENTIL HFA, VENTOLIN HFA, PROAIR HFA Take 2 Puffs by inhalation every six (6) hours as needed. allergy injection  
by SubCUTAneous route every thirty (30) days. ascorbic acid (vitamin C) 500 mg tablet Commonly known as:  VITAMIN C Take 1 Tab by mouth daily. calcium carbonate 200 mg calcium (500 mg) Chew Commonly known as:  TUMS Take 1 Tab by mouth four (4) times daily. * clobetasol 0.05 % ointment Commonly known as:  Shari Riedel Apply  to affected area two (2) times a week. * clobetasol 0.05 % topical gel Commonly known as:  TEMOVATE  
  
 cyanocobalamin 1,000 mcg tablet Commonly known as:  VITAMIN B-12 Take 1 Tab by mouth daily. dexamethasone 0.5 mg/5 mL elixir Commonly known as:  DECADRON DEXILANT 60 mg Cpdb Generic drug:  Dexlansoprazole Take 1 Cap by mouth Daily (before dinner). ESTRACE 0.01 % (0.1 mg/gram) vaginal cream  
Generic drug:  estradiol  
insert 1/2 gram vaginally two times a week  
  
 ferrous sulfate 325 mg (65 mg iron) tablet Take 1 Tab by mouth Daily (before breakfast). folic acid 1 mg tablet Commonly known as:  FOLVITE  
take 1 tablet by mouth once daily  
  
 losartan 50 mg tablet Commonly known as:  COZAAR  
TAKE 1 TABLET DAILY  
  
 methotrexate 2.5 mg tablet Commonly known as:  Shellia Millet Take 2.5 mg by mouth. 5 tablets weekly  
  
 ondansetron 4 mg disintegrating tablet Commonly known as:  ZOFRAN ODT  
dissolve 1 tablet ON TONGUE every 6 hours if needed for nausea  
  
 oxyCODONE-acetaminophen 7.5-325 mg per tablet Commonly known as:  PERCOCET 7.5  
take 1 tablet by mouth every 4 to 6 hours if needed for pain SINGULAIR 10 mg tablet Generic drug:  montelukast  
Take 10 mg by mouth daily. SYMBICORT 160-4.5 mcg/actuation HFA inhaler Generic drug:  budesonide-formoterol Take 2 Puffs by inhalation two (2) times a day. triamcinolone 55 mcg nasal inhaler Commonly known as:  NASACORT AQ  
2 Sprays by Both Nostrils route daily. Indications: PERENNIAL ALLERGIC RHINITIS  
  
 TYLENOL 325 mg tablet Generic drug:  acetaminophen Take  by mouth every four (4) hours as needed for Pain. ZyrTEC 10 mg Cap Generic drug:  Cetirizine Take 10 mg by mouth daily. * Notice: This list has 2 medication(s) that are the same as other medications prescribed for you. Read the directions carefully, and ask your doctor or other care provider to review them with you. Follow-up Instructions Return in about 6 months (around 10/27/2017). Introducing [facility]! Dear Maria C Martinez: Thank you for requesting a Touchmedia account. Our records indicate that you already have an active Touchmedia account. You can access your account anytime at [email or web address] Did you know that you can access your hospital and ER discharge instructions at any time in Touchmedia? You can also review all of your test results from your hospital stay or ER visit. Additional Information If you have questions, please visit the Frequently Asked Questions section of the Touchmedia website at [email or web address] Remember, Touchmedia is NOT to be used for urgent needs. For medical emergencies, dial 911. Now available from your iPhone and Android! Please provide this summary of care documentation to your next provider. Your primary care clinician is listed as Leatha Coreas. If you have any questions after today's visit, please call [phone].

## 2017-04-27 NOTE — PROGRESS NOTES
[facility]   Pulmonary, Critical Care, and Sleep Medicine     Reason for Evaluation: follow up asthma    C/o increased SOB with walking recently. Has been of her methotrexate for oral surgery. Denies wheezing. Occasional sneezing. Feels better overall with no recent set backs. No cough, congestion or wheezing. She denied any hemoptysis, change in her appetite or weight. Has not needed any prednisone tapers. No Er visits    Allergies:  No known drug allergies. Current Medications:    1. Cetirizine 10 mg one tablet by mouth as needed. 2.  Ibuprofen over-the-counter as needed. 3.  Tramadol 50 mg every six hours as needed. 4.  Albuterol metered dose inhaler two puffs every six hours as needed. 5.  Symbicort 160/4.5 two puffs twice a day. 6.  Vitamin D one tablet daily. 7.  Cozaar 50 mg daily. 8.  Singulair 10 mg daily. 9.  Protonix 40 mg daily. 10.  Lyrica 150 mg capsule four times daily. Physical Examination:     Vital Signs:  Blood pressure 124/90, temperature 99.2, heart rate 71, respiratory rate 18, O2 saturation 98%,   HEENT:  Normocephalic, atraumatic. Pupils equal, round, reactive to light and accommodation. Sclerae white. Conjunctivae pale. Oral exam shows no thrush, but slightly erythematous pharyngeal mucous membranes without any exudate or mass. Nasal mucous membranes are without any erythema or edema. Neck:  Thick, supple, no JVD, normal thyroid, no adenopathy, no other masses palpable. Cardiovascular:  S1, S2, regular rate and rhythm without any murmurs, rubs or gallops. Chest:  Equal air entry without wheezing. There is no dullness to percussion. No tenderness on palpation. No rash on inspection   Abdomen:  Obese, bowel sounds normoactive, soft, nontender without any organomegaly. Tympanic to percussion. Extremities:  Without any clubbing, cyanosis or edema. No calf tenderness on palpation. Skin:  Dry, warm to touch.   No rash on inspection. Impression:     Asthma- better controlled. With no exacerbations over past  3 + years after sinus surgery. SOB- multifactorial  Recurrent sinusitis  She had in the interval nasal septal deviation repair surgery to correct any anatomical defect causing her frequent sinus and respiratory infections. Esophageal stricture with reflux    Recommendations: The patient will continue her albuterol, Symbicort and Singulair medications . Commended on compliance and excellent control  Health maintenance- will receive Influenza vaccination in fall  Environmental control  Periodic PFT, imaging. Education provided.   Florence Silverio MD

## 2017-05-15 ENCOUNTER — OFFICE VISIT (OUTPATIENT)
Dept: INTERNAL MEDICINE CLINIC | Age: 62
End: 2017-05-15

## 2017-05-15 VITALS
HEIGHT: 70 IN | DIASTOLIC BLOOD PRESSURE: 83 MMHG | SYSTOLIC BLOOD PRESSURE: 125 MMHG | WEIGHT: 211.2 LBS | HEART RATE: 93 BPM | BODY MASS INDEX: 30.24 KG/M2 | OXYGEN SATURATION: 96 % | RESPIRATION RATE: 18 BRPM | TEMPERATURE: 98 F

## 2017-05-15 DIAGNOSIS — R73.02 IMPAIRED GLUCOSE TOLERANCE: Primary | ICD-10-CM

## 2017-05-15 DIAGNOSIS — E78.1 HYPERTRIGLYCERIDEMIA: ICD-10-CM

## 2017-05-15 DIAGNOSIS — M06.9 RHEUMATOID ARTHRITIS INVOLVING MULTIPLE SITES, UNSPECIFIED RHEUMATOID FACTOR PRESENCE: ICD-10-CM

## 2017-05-15 DIAGNOSIS — J45.40 MODERATE PERSISTENT ASTHMA WITHOUT COMPLICATION: ICD-10-CM

## 2017-05-15 DIAGNOSIS — D50.9 IRON DEFICIENCY ANEMIA, UNSPECIFIED IRON DEFICIENCY ANEMIA TYPE: ICD-10-CM

## 2017-05-15 DIAGNOSIS — I10 ESSENTIAL HYPERTENSION WITH GOAL BLOOD PRESSURE LESS THAN 140/90: ICD-10-CM

## 2017-05-15 RX ORDER — LOSARTAN POTASSIUM 50 MG/1
TABLET ORAL
Qty: 90 TAB | Refills: 3 | Status: SHIPPED | COMMUNITY
Start: 2017-05-15 | End: 2017-05-15 | Stop reason: SDUPTHER

## 2017-05-15 RX ORDER — IBUPROFEN 200 MG
400 TABLET ORAL
COMMUNITY
End: 2017-08-07

## 2017-05-15 RX ORDER — LOSARTAN POTASSIUM 50 MG/1
TABLET ORAL
Qty: 30 TAB | Refills: 0 | Status: SHIPPED | OUTPATIENT
Start: 2017-05-15 | End: 2019-05-31 | Stop reason: SDUPTHER

## 2017-05-15 NOTE — PROGRESS NOTES
INTERNISTS OF [facility]:  5/16/2017, MRN: [id number]      Nicole Oconnor is a 57 y.o. female and presents to clinic for Hypertension and Medication Refill (mail order and a 30 day supply for local pharmacy refill printed)    Subjective:   Pt is a 62yo female with h/o HTN, GERD (followed by ), DJD, lumbar post laminectomy syndrome s/p spinal cord stimulator surgery (followed by Sylvie Valentin), cervical spinal stenosis, prediabetes, asthma (followed by Sanju Angulo, Pulmonology), HLD, chronic sinusitis, vitamin B12 deficiency, and rheumatoid arthritis (Followed by ), vitamin D deficiency. 1.  Hypertension: This is a chronic condition, present over 6 months. Patient takes losartan. She needs a refill on this medication. She has no adverse side effects in taking this medication. Patient is due for a urine protein screen next month. 2.  Hyperlipidemia and prediabetes: The patient will be due for a cholesterol and A1c check next month. She is on a restricted diet. She does not exercise regularly. 3.  Rheumatoid arthritis: Patient is followed regularly by Dr. Ezequiel Palacios. She gets her labs done every 3 months looking at her kidney function, liver function, and complete blood count. Patient states that her joints are much less painful after restarting her methotrexate. She had to stop it since her last appointment due to having a dental procedure done. 4.  Anemia: Patient has history of B12 and iron deficiency. She takes iron regularly. She tried to stop this in between appointments but started having dizziness. She restarted taking the iron. She reports no adverse side effects in taking iron. She has no shortness of breath. 5.  Asthma: The patient saw Dr. Ye Hyman since her last appointment. She reports improvement in her shortness of breath that she has been walking her dog, that she associates with her underlying mild asthma.   She no longer has to take her albuterol inhaler prior to walking her dog. She has seasonal allergies and occasionally will have some nasal congestion. No cough, sore throat, fever, or chills. The patient takes Symbicort as prescribed. She reports no adverse side effects from this medication. She rarely uses her albuterol inhaler since her last appointment. Patient Active Problem List    Diagnosis Date Noted    Rheumatoid arthritis involving multiple sites 02/15/2017    Arthritis, lumbar spine 04/22/2016    Moderate persistent asthma without complication 96/72/5647    Lumbar radiculopathy 01/19/2016    Lumbar post-laminectomy syndrome 12/10/2015    S/P cervical spinal fusion 08/13/2014    Cervical stenosis of spine 08/11/2014    GERD (gastroesophageal reflux disease) 08/11/2014    Hx MRSA infection 08/11/2014    Impaired glucose tolerance 06/30/2013    Asthma 12/28/2012     Class: Chronic    Hypertriglyceridemia 12/16/2012    DNS (deviated nasal septum)     Chronic sinusitis 05/15/2012    Hypercalcemia 04/09/2012    HTN (hypertension) 06/28/2010    Iron deficiency anemia 06/28/2010    Vitamin B12 deficiency 06/28/2010    Vitamin D deficiency 06/28/2010       Current Outpatient Prescriptions   Medication Sig Dispense Refill    ibuprofen (MOTRIN) 200 mg tablet Take 400 mg by mouth every six (6) hours as needed for Pain.  losartan (COZAAR) 50 mg tablet TAKE 1 TABLET DAILY 30 Tab 0    cyanocobalamin (VITAMIN B-12) 1,000 mcg tablet Take 1 Tab by mouth daily. 90 Tab 3    ferrous sulfate 325 mg (65 mg iron) tablet Take 1 Tab by mouth Daily (before breakfast). 90 Tab 3    ascorbic acid, vitamin C, (VITAMIN C) 500 mg tablet Take 1 Tab by mouth daily. (Patient taking differently: Take 1,000 mg by mouth daily.) 90 Tab 3    folic acid (FOLVITE) 1 mg tablet take 1 tablet by mouth once daily  0    methotrexate (RHEUMATREX) 2.5 mg tablet Take 2.5 mg by mouth.  5 tablets weekly      ESTRACE 0.01 % (0.1 mg/gram) vaginal cream insert 1/2 gram vaginally two times a week  0    acetaminophen (TYLENOL) 325 mg tablet Take  by mouth every four (4) hours as needed for Pain.  clobetasol (TEMOVATE) 0.05 % ointment Apply  to affected area two (2) times a week.  0    triamcinolone (NASACORT AQ) 55 mcg nasal inhaler 2 Sprays by Both Nostrils route daily. Indications: PERENNIAL ALLERGIC RHINITIS      Dexlansoprazole (DEXILANT) 60 mg CpDM Take 1 Cap by mouth Daily (before dinner).  calcium carbonate (TUMS) 200 mg calcium (500 mg) Chew Take 1 Tab by mouth four (4) times daily.  Cetirizine (ZYRTEC) 10 mg Cap Take 10 mg by mouth daily.  montelukast (SINGULAIR) 10 mg tablet Take 10 mg by mouth daily.  allergy injection by SubCUTAneous route every thirty (30) days.  budesonide-formoterol (SYMBICORT) 160-4.5 mcg/Actuation HFA inhaler Take 2 Puffs by inhalation two (2) times a day.  albuterol (PROVENTIL HFA, VENTOLIN HFA) 90 mcg/Actuation inhaler Take 2 Puffs by inhalation every six (6) hours as needed.          Allergies   Allergen Reactions    Sulfasalazine Other (comments)     Could not taste anything, lightheadedness    Adhesive Tape-Silicones Other (comments)    Other Plant, Animal, Environmental Unable to Obtain     MOLD    Tape [Adhesive] Other (comments)     Blisters, use paper tape only  Patient states tegaderm and paper tape are okay       Past Medical History:   Diagnosis Date    Allergic rhinitis     Asthma     Back pain     Back pain     Cervical radiculopathy     Chronic sinusitis 5/15/2012    Chronic sinusitis with recurrent bronchitis     DNS (deviated nasal septum)     Empyema     Esophagitis     Foraminal stenosis of cervical region     C4-C5    GERD (gastroesophageal reflux disease)     Dr Jamil Stark Hx MRSA infection 8/11/2014    Hypertension     Hypertriglyceridemia     Insulin resistance 4/9/2012    Left knee pain     Leg pain, bilateral     Menopause     Age 51    MRSA (methicillin resistant Staphylococcus aureus) infection 2008    left lung    Restless leg syndrome     Spinal stenosis of cervical region     C4-C5 and C5-C6    TMJ syndrome     Wears glasses     and contacts       Past Surgical History:   Procedure Laterality Date    HX CERVICAL FUSION  08/13/14    HX LUMBAR LAMINECTOMY  Nov 1995    [facility]    HX LUMBAR LAMINECTOMY  Feb 1997    [facility]  1/2015    bunion removal from right foot    HX OTHER SURGICAL  2007    thoracentesis    HX OTHER SURGICAL  2008    chest tube    HX OTHER SURGICAL  1997    TMJ surgery    HX OTHER SURGICAL  2012    sinus surg 2012-Dr Karen Sahu.  HX TUBAL LIGATION      NM COLONOSCOPY FLX DX W/COLLJ SPEC WHEN PFRMD  6-18-08    normal/duntemann       Family History   Problem Relation Age of Onset    Hypertension Mother    [facility] Diabetes Mother    [facility] Arthritis-rheumatoid Sister     Other Sister      Lupus    Cancer Sister 35     [place], uterus    Other Brother      myocarditis    Heart Attack Brother     Heart Disease Brother     Heart Surgery Brother     Coronary Artery Disease Father     Hypertension Father     Cancer Sister 48     [place], breast    Breast Cancer Sister      Estrogen based    Diabetes Brother     Stroke Maternal Grandmother        Social History   Substance Use Topics    Smoking status: Never Smoker    Smokeless tobacco: Never Used    Alcohol use 0.0 oz/week     0 Standard drinks or equivalent per week      Comment: rare use       ROS   Review of Systems   Constitutional: Negative for chills and fever. HENT: Negative for ear pain and sore throat. Eyes: Negative for blurred vision and pain. Respiratory: Negative for cough and shortness of breath (no SOB, even with walking her dog - as her asthma has improved since her last apt). Cardiovascular: Negative for chest pain. Gastrointestinal: Negative for abdominal pain. Genitourinary: Negative for dysuria. Musculoskeletal: Positive for joint pain.  Negative for myalgias. Skin: Negative for rash. Neurological: Negative for focal weakness and headaches. Endo/Heme/Allergies: Does not bruise/bleed easily. Psychiatric/Behavioral: Negative for substance abuse. Objective     Vitals:    05/15/17 0905   BP: 125/83   Pulse: 93   Resp: 18   Temp: 98 °F (36.7 °C)   TempSrc: Oral   SpO2: 96%   Weight: 211 lb 3.2 oz (95.8 kg)   Height: 5' 10\" (1.778 m)   PainSc:   2   PainLoc: Back       Physical Exam   Constitutional: She is oriented to person, place, and time and well-developed, well-nourished, and in no distress. HENT:   Head: Normocephalic and atraumatic. Right Ear: External ear normal.   Left Ear: External ear normal.   Nose: Nose normal.   Mouth/Throat: Oropharynx is clear and moist. No oropharyngeal exudate. Clear TMs   Eyes: Conjunctivae and EOM are normal. Pupils are equal, round, and reactive to light. Right eye exhibits no discharge. Left eye exhibits no discharge. No scleral icterus. Neck: Neck supple. Cardiovascular: Normal rate, regular rhythm, normal heart sounds and intact distal pulses. Exam reveals no gallop and no friction rub. No murmur heard. Pulmonary/Chest: Effort normal and breath sounds normal. No respiratory distress. She has no wheezes. She has no rales. Abdominal: Soft. Bowel sounds are normal. She exhibits no distension. There is no tenderness. There is no rebound and no guarding. No hepatomegaly   Musculoskeletal: She exhibits no edema or tenderness (BUE are NTTP). Lymphadenopathy:     She has no cervical adenopathy. Neurological: She is alert and oriented to person, place, and time. She exhibits normal muscle tone. Gait normal.   Skin: Skin is warm and dry. No erythema. Psychiatric: Affect normal.   Nursing note and vitals reviewed.       LABS   Data Review:   Lab Results   Component Value Date/Time    WBC 5.5 11/14/2016 07:33 AM    HGB 12.5 11/14/2016 07:33 AM    HCT 40.3 11/14/2016 07:33 AM    PLATELET 486 82/74/5311 07:33 AM    MCV 86.1 11/14/2016 07:33 AM       Lab Results   Component Value Date/Time    Sodium 145 11/14/2016 07:33 AM    Potassium 4.6 11/14/2016 07:33 AM    Chloride 111 11/14/2016 07:33 AM    CO2 27 11/14/2016 07:33 AM    Anion gap 7 11/14/2016 07:33 AM    Glucose 108 11/14/2016 07:33 AM    BUN 9 11/14/2016 07:33 AM    Creatinine 0.83 11/14/2016 07:33 AM    BUN/Creatinine ratio 11 11/14/2016 07:33 AM    GFR est AA >60 11/14/2016 07:33 AM    GFR est non-AA >60 11/14/2016 07:33 AM    Calcium 9.3 11/14/2016 07:33 AM       Lab Results   Component Value Date/Time    Cholesterol, total 144 11/14/2016 07:33 AM    HDL Cholesterol 40 11/14/2016 07:33 AM    LDL, calculated 80.2 11/14/2016 07:33 AM    VLDL, calculated 23.8 11/14/2016 07:33 AM    Triglyceride 119 11/14/2016 07:33 AM    CHOL/HDL Ratio 3.6 11/14/2016 07:33 AM       Lab Results   Component Value Date/Time    Hemoglobin A1c 5.8 11/14/2016 07:33 AM       Assessment/Plan:   1. Essential hypertension with goal blood pressure less than 140/90: BP is stable. -Refilling her losartan.  -We will check an A1c, BMP, urine protein screen. ORDERS:  - losartan (COZAAR) 50 mg tablet; TAKE 1 TABLET DAILY  Dispense: 30 Tab; Refill: 0  - HEMOGLOBIN A1C W/O EAG; Future  - METABOLIC PANEL, BASIC; Future  - MICROALBUMIN, UR, RAND W/ MICROALBUMIN/CREA RATIO; Future    2. Prediabetes and HLD:   -I encouraged patient to continue with lifestyle modification.  -We will check an A1c, lipid panel, and a BMP    ORDERS:  - HEMOGLOBIN A1C W/O EAG; Future  - METABOLIC PANEL, BASIC; Future    3. Rheumatoid Arthritis:   -I encouraged patient to continue following up with Dr. Monica Ferrell for treatment of underlying rheumatoid arthritis. -I discussed the need to continue with regular lab visit with Dr. Wilver Paula office to assess her kidney, liver, and blood counts, while on methotrexate.     4.  Health maintenance:  -Patient will be due for her next mammogram.  She has an appointment with the gynecologist in early August.  At that appointment, her mammogram can be ordered. 5.  Asthma: Stable. -Continue with medications as prescribed. Observation. 6.  Anemia: On methotrexate and folic acid rx. On iron rx for h/o iron deficiency.   -Continue with iron, vitamin C, and folic acid. Will monitor CBCs with her rheumatologist        Health Maintenance Due   Topic Date Due    BREAST CANCER SCRN MAMMOGRAM  08/16/2017         Lab review: orders written for new lab studies as appropriate; see orders. Labs in the EHR were reviewed. I have discussed the diagnosis with the patient and the intended plan as seen in the above orders. The patient has received an after-visit summary and questions were answered concerning future plans. I have discussed medication side effects and warnings with the patient as well. I have reviewed the plan of care with the patient, accepted their input and they are in agreement with the treatment goals. All questions were answered. The patient understands the plan of care. Handouts provided today with above information. Pt instructed if symptoms worsen to call the office or report to the ED for continued care. Greater than 50% of the visit time was spent in counseling and/or coordination of care. Follow-up Disposition:  Return in about 6 months (around 11/15/2017) for BP check.     Christo Jones MD

## 2017-05-15 NOTE — PATIENT INSTRUCTIONS

## 2017-05-15 NOTE — PROGRESS NOTES
Chief Complaint   Patient presents with    Hypertension    Medication Refill     mail order and a 30 day supply for local pharmacy refill printed     1. Have you been to the ER, urgent care clinic since your last visit? Hospitalized since your last visit? No    2. Have you seen or consulted any other health care providers outside of the [address] [place] [address] since your last visit? Include any pap smears or colon screening.  No

## 2017-05-15 NOTE — MR AVS SNAPSHOT
Visit Information Date & Time Provider Department Dept. Phone Encounter #  
 5/15/2017  9:00 AM Lance Cooper MD Internist of [address] [id number] Follow-up Instructions Return in about 6 months (around 11/15/2017) for BP check. Your Appointments 8/3/2017 10:00 AM  
ANNUAL with Jorge Sneed MD  
[facility] ([facility]) Appt Note: annual/ mamm. [address], [place] [address] [facility]  
  
   
 [address], [address] [address] [facility]  
  
    
 11/16/2017  9:00 AM  
Office Visit with Lance Cooper MD  
Internist of [address] [place] [address]) Appt Note: 6 month f/u  
 [address] [facility], [address] [address] [address] [facility]  
  
   
 [address], [address] Upcoming Health Maintenance Date Due  
 BREAST CANCER SCRN MAMMOGRAM 8/16/2017 COLONOSCOPY 6/18/2018 PAP AKA CERVICAL CYTOLOGY 7/7/2018 DTaP/Tdap/Td series (2 - Td) 3/3/2027 Allergies as of 5/15/2017  Review Complete On: 5/15/2017 By: Jalyn Feeling Severity Noted Reaction Type Reactions Sulfasalazine High 11/17/2016    Other (comments) Could not taste anything, lightheadedness Adhesive Tape-silicones  75/50/6640    Other (comments) Other Plant, Animal, Environmental    Unable to Consolidated Doc MOLD Tape [Adhesive]  07/31/2014    Other (comments) Blisters, use paper tape only Patient states tegaderm and paper tape are okay Current Immunizations  Reviewed on 7/28/2016 Name Date Hepatitis B Vaccine 1/1/2009 Influenza Vaccine 10/22/2015, 10/9/2014 Influenza Vaccine Whole 1/4/2013 Pneumococcal Polysaccharide (PPSV-23) 7/28/2016 Td 8/1/2011 Tdap 3/3/2017  4:21 PM, 7/28/2016 Not reviewed this visit You Were Diagnosed With   
  
 Codes Comments Prediabetes    -  Primary ICD-10-CM: R73.03 
ICD-9-CM: 790.29 Essential hypertension with goal blood pressure less than 140/90     ICD-10-CM: I10 
ICD-9-CM: 401.9 Vitals BP Pulse Temp Resp Height(growth percentile) Weight(growth percentile) 125/83 (BP 1 Location: Left arm, BP Patient Position: Sitting) 93 98 °F (36.7 °C) (Oral) 18 5' 10\" (1.778 m) 211 lb 3.2 oz (95.8 kg) SpO2 BMI OB Status Smoking Status 96% 30.3 kg/m2 Postmenopausal Never Smoker BMI and BSA Data Body Mass Index Body Surface Area  
 30.3 kg/m 2 2.18 m 2 Preferred Pharmacy Pharmacy Name Phone [facility] [address] [facility], [address] [facility] [phone] Your Updated Medication List  
  
   
This list is accurate as of: 5/15/17  9:34 AM.  Always use your most recent med list.  
  
  
  
  
 albuterol 90 mcg/actuation inhaler Commonly known as:  PROVENTIL HFA, VENTOLIN HFA, PROAIR HFA Take 2 Puffs by inhalation every six (6) hours as needed. allergy injection  
by SubCUTAneous route every thirty (30) days. ascorbic acid (vitamin C) 500 mg tablet Commonly known as:  VITAMIN C Take 1 Tab by mouth daily. calcium carbonate 200 mg calcium (500 mg) Chew Commonly known as:  TUMS Take 1 Tab by mouth four (4) times daily. clobetasol 0.05 % ointment Commonly known as:  Leonia Postin Apply  to affected area two (2) times a week. cyanocobalamin 1,000 mcg tablet Commonly known as:  VITAMIN B-12 Take 1 Tab by mouth daily. DEXILANT 60 mg Cpdb Generic drug:  Dexlansoprazole Take 1 Cap by mouth Daily (before dinner). ESTRACE 0.01 % (0.1 mg/gram) vaginal cream  
Generic drug:  estradiol  
insert 1/2 gram vaginally two times a week  
  
 ferrous sulfate 325 mg (65 mg iron) tablet Take 1 Tab by mouth Daily (before breakfast). folic acid 1 mg tablet Commonly known as:  FOLVITE  
take 1 tablet by mouth once daily  
  
 ibuprofen 200 mg tablet Commonly known as:  MOTRIN  
 Take 400 mg by mouth every six (6) hours as needed for Pain.  
  
 losartan 50 mg tablet Commonly known as:  COZAAR  
TAKE 1 TABLET DAILY  
  
 methotrexate 2.5 mg tablet Commonly known as:  Gillian Toña Take 2.5 mg by mouth. 5 tablets weekly SINGULAIR 10 mg tablet Generic drug:  montelukast  
Take 10 mg by mouth daily. SYMBICORT 160-4.5 mcg/actuation HFA inhaler Generic drug:  budesonide-formoterol Take 2 Puffs by inhalation two (2) times a day. triamcinolone 55 mcg nasal inhaler Commonly known as:  NASACORT AQ  
2 Sprays by Both Nostrils route daily. Indications: PERENNIAL ALLERGIC RHINITIS  
  
 TYLENOL 325 mg tablet Generic drug:  acetaminophen Take  by mouth every four (4) hours as needed for Pain. ZyrTEC 10 mg Cap Generic drug:  Cetirizine Take 10 mg by mouth daily. Prescriptions Sent to Pharmacy Refills  
 losartan (COZAAR) 50 mg tablet 0 Sig: TAKE 1 TABLET DAILY Class: Normal  
 Pharmacy: [address] #: [phone] Follow-up Instructions Return in about 6 months (around 11/15/2017) for BP check. To-Do List   
 Around 06/15/2017 Lab:  HEMOGLOBIN A1C W/O EAG Around 06/15/2017 Lab:  METABOLIC PANEL, BASIC   
  
 06/15/2017 Lab:  MICROALBUMIN, UR, RAND W/ MICROALBUMIN/CREA RATIO Patient Instructions High Blood Pressure: Care Instructions Your Care Instructions If your blood pressure is usually above 140/90, you have high blood pressure, or hypertension. That means the top number is 140 or higher or the bottom number is 90 or higher, or both. Despite what a lot of people think, high blood pressure usually doesn't cause headaches or make you feel dizzy or lightheaded. It usually has no symptoms. But it does increase your risk for heart attack, stroke, and kidney or eye damage.  The higher your blood pressure, the more your risk increases. Your doctor will give you a goal for your blood pressure. Your goal will be based on your health and your age. An example of a goal is to keep your blood pressure below 140/90. Lifestyle changes, such as eating healthy and being active, are always important to help lower blood pressure. You might also take medicine to reach your blood pressure goal. 
Follow-up care is a key part of your treatment and safety. Be sure to make and go to all appointments, and call your doctor if you are having problems. It's also a good idea to know your test results and keep a list of the medicines you take. How can you care for yourself at home? Medical treatment · If you stop taking your medicine, your blood pressure will go back up. You may take one or more types of medicine to lower your blood pressure. Be safe with medicines. Take your medicine exactly as prescribed. Call your doctor if you think you are having a problem with your medicine. · Talk to your doctor before you start taking aspirin every day. Aspirin can help certain people lower their risk of a heart attack or stroke. But taking aspirin isn't right for everyone, because it can cause serious bleeding. · See your doctor regularly. You may need to see the doctor more often at first or until your blood pressure comes down. · If you are taking blood pressure medicine, talk to your doctor before you take decongestants or anti-inflammatory medicine, such as ibuprofen. Some of these medicines can raise blood pressure. · Learn how to check your blood pressure at home. Lifestyle changes · Stay at a healthy weight. This is especially important if you put on weight around the waist. Losing even 10 pounds can help you lower your blood pressure. · If your doctor recommends it, get more exercise. Walking is a good choice. Bit by bit, increase the amount you walk every day. Try for at least 30 minutes on most days of the week.  You also may want to swim, bike, or do other activities. · Avoid or limit alcohol. Talk to your doctor about whether you can drink any alcohol. · Try to limit how much sodium you eat to less than 2,300 milligrams (mg) a day. Your doctor may ask you to try to eat less than 1,500 mg a day. · Eat plenty of fruits (such as bananas and oranges), vegetables, legumes, whole grains, and low-fat dairy products. · Lower the amount of saturated fat in your diet. Saturated fat is found in animal products such as milk, cheese, and meat. Limiting these foods may help you lose weight and also lower your risk for heart disease. · Do not smoke. Smoking increases your risk for heart attack and stroke. If you need help quitting, talk to your doctor about stop-smoking programs and medicines. These can increase your chances of quitting for good. When should you call for help? Call 911 anytime you think you may need emergency care. This may mean having symptoms that suggest that your blood pressure is causing a serious heart or blood vessel problem. Your blood pressure may be over 180/110. For example, call 911 if: 
· You have symptoms of a heart attack. These may include: ¨ Chest pain or pressure, or a strange feeling in the chest. 
¨ Sweating. ¨ Shortness of breath. ¨ Nausea or vomiting. ¨ Pain, pressure, or a strange feeling in the back, neck, jaw, or upper belly or in one or both shoulders or arms. ¨ Lightheadedness or sudden weakness. ¨ A fast or irregular heartbeat. · You have symptoms of a stroke. These may include: 
¨ Sudden numbness, tingling, weakness, or loss of movement in your face, arm, or leg, especially on only one side of your body. ¨ Sudden vision changes. ¨ Sudden trouble speaking. ¨ Sudden confusion or trouble understanding simple statements. ¨ Sudden problems with walking or balance. ¨ A sudden, severe headache that is different from past headaches. · You have severe back or belly pain. Do not wait until your blood pressure comes down on its own. Get help right away. Call your doctor now or seek immediate care if: 
· Your blood pressure is much higher than normal (such as 180/110 or higher), but you don't have symptoms. · You think high blood pressure is causing symptoms, such as: ¨ Severe headache. ¨ Blurry vision. Watch closely for changes in your health, and be sure to contact your doctor if: 
· Your blood pressure measures 140/90 or higher at least 2 times. That means the top number is 140 or higher or the bottom number is 90 or higher, or both. · You think you may be having side effects from your blood pressure medicine. · Your blood pressure is usually normal, but it goes above normal at least 2 times. Where can you learn more? Go to http://june-yasmeen.info/. Enter L451 in the search box to learn more about \"High Blood Pressure: Care Instructions. \" Current as of: August 8, 2016 Content Version: 11.2 © 8933-5321 [employer]. Care instructions adapted under license by [employer] (which disclaims liability or warranty for this information). If you have questions about a medical condition or this instruction, always ask your healthcare professional. [place] [address] any warranty or liability for your use of this information. Health Maintenance Due Topic Date Due  
 BREAST CANCER SCRN MAMMOGRAM  08/16/2017 Introducing [facility]! Dear Nathan Juarez: Thank you for requesting a [employer] account. Our records indicate that you already have an active [employer] account. You can access your account anytime at [email or web address] Did you know that you can access your hospital and ER discharge instructions at any time in [employer]? You can also review all of your test results from your hospital stay or ER visit. Additional Information If you have questions, please visit the Frequently Asked Questions section of the [employer] website at [email or web address] Remember, [employer] is NOT to be used for urgent needs. For medical emergencies, dial 911. Now available from your iPhone and Android! Please provide this summary of care documentation to your next provider. Your primary care clinician is listed as Larry [place]. If you have any questions after today's visit, please call [phone].

## 2017-06-27 ENCOUNTER — HOSPITAL ENCOUNTER (OUTPATIENT)
Dept: LAB | Age: 62
Discharge: HOME | End: 2017-06-27
Payer: COMMERCIAL

## 2017-06-27 DIAGNOSIS — E78.1 HYPERTRIGLYCERIDEMIA: ICD-10-CM

## 2017-06-27 DIAGNOSIS — I10 ESSENTIAL HYPERTENSION WITH GOAL BLOOD PRESSURE LESS THAN 140/90: ICD-10-CM

## 2017-06-27 DIAGNOSIS — R73.02 IMPAIRED GLUCOSE TOLERANCE: ICD-10-CM

## 2017-06-27 LAB
ANION GAP BLD CALC-SCNC: 9 MMOL/L (ref 3–18)
BUN SERPL-MCNC: 14 MG/DL (ref 7–18)
BUN/CREAT SERPL: 18 (ref 12–20)
CALCIUM SERPL-MCNC: 9.5 MG/DL (ref 8.5–10.1)
CHLORIDE SERPL-SCNC: 108 MMOL/L (ref 100–108)
CHOLEST SERPL-MCNC: 160 MG/DL
CO2 SERPL-SCNC: 24 MMOL/L (ref 21–32)
CREAT SERPL-MCNC: 0.8 MG/DL (ref 0.6–1.3)
GLUCOSE SERPL-MCNC: 113 MG/DL (ref 74–99)
HBA1C MFR BLD: 5.7 % (ref 4.2–5.6)
HDLC SERPL-MCNC: 40 MG/DL (ref 40–60)
HDLC SERPL: 4 {RATIO} (ref 0–5)
LDLC SERPL CALC-MCNC: 89 MG/DL (ref 0–100)
LIPID PROFILE,FLP: ABNORMAL
POTASSIUM SERPL-SCNC: 4.4 MMOL/L (ref 3.5–5.5)
SODIUM SERPL-SCNC: 141 MMOL/L (ref 136–145)
TRIGL SERPL-MCNC: 155 MG/DL (ref ?–150)
VLDLC SERPL CALC-MCNC: 31 MG/DL

## 2017-06-27 PROCEDURE — 83036 HEMOGLOBIN GLYCOSYLATED A1C: CPT | Performed by: INTERNAL MEDICINE

## 2017-06-27 PROCEDURE — 36415 COLL VENOUS BLD VENIPUNCTURE: CPT | Performed by: INTERNAL MEDICINE

## 2017-06-27 PROCEDURE — 80048 BASIC METABOLIC PNL TOTAL CA: CPT | Performed by: INTERNAL MEDICINE

## 2017-06-27 PROCEDURE — 80061 LIPID PANEL: CPT | Performed by: INTERNAL MEDICINE

## 2017-06-28 ENCOUNTER — HOSPITAL ENCOUNTER (OUTPATIENT)
Dept: LAB | Age: 62
Discharge: HOME | End: 2017-06-28
Payer: COMMERCIAL

## 2017-06-28 DIAGNOSIS — I10 ESSENTIAL HYPERTENSION WITH GOAL BLOOD PRESSURE LESS THAN 140/90: ICD-10-CM

## 2017-06-28 PROCEDURE — 82043 UR ALBUMIN QUANTITATIVE: CPT | Performed by: INTERNAL MEDICINE

## 2017-06-29 LAB
CREAT UR-MCNC: 107.97 MG/DL (ref 30–125)
MICROALBUMIN UR-MCNC: 0.2 MG/DL (ref 0–3)
MICROALBUMIN/CREAT UR-RTO: 2 MG/G (ref 0–30)

## 2017-07-25 ENCOUNTER — HOSPITAL ENCOUNTER (OUTPATIENT)
Dept: LAB | Age: 62
Discharge: HOME | End: 2017-07-25
Payer: COMMERCIAL

## 2017-07-25 LAB
ALT SERPL-CCNC: 28 U/L (ref 13–56)
AST SERPL W P-5'-P-CCNC: 19 U/L (ref 15–37)
BASOPHILS # BLD AUTO: 0 K/UL (ref 0–0.06)
BASOPHILS # BLD: 0 % (ref 0–2)
CREAT SERPL-MCNC: 0.8 MG/DL (ref 0.6–1.3)
CRP SERPL-MCNC: 0.6 MG/DL (ref 0–0.3)
DIFFERENTIAL METHOD BLD: ABNORMAL
EOSINOPHIL # BLD: 0.3 K/UL (ref 0–0.4)
EOSINOPHIL NFR BLD: 3 % (ref 0–5)
ERYTHROCYTE [DISTWIDTH] IN BLOOD BY AUTOMATED COUNT: 14.5 % (ref 11.6–14.5)
ERYTHROCYTE [SEDIMENTATION RATE] IN BLOOD: 11 MM/HR (ref 0–30)
HCT VFR BLD AUTO: 42.3 % (ref 35–45)
HGB BLD-MCNC: 13.7 G/DL (ref 12–16)
LYMPHOCYTES # BLD AUTO: 18 % (ref 21–52)
LYMPHOCYTES # BLD: 1.4 K/UL (ref 0.9–3.6)
MCH RBC QN AUTO: 30.4 PG (ref 24–34)
MCHC RBC AUTO-ENTMCNC: 32.4 G/DL (ref 31–37)
MCV RBC AUTO: 93.8 FL (ref 74–97)
MONOCYTES # BLD: 0.4 K/UL (ref 0.05–1.2)
MONOCYTES NFR BLD AUTO: 5 % (ref 3–10)
NEUTS SEG # BLD: 5.7 K/UL (ref 1.8–8)
NEUTS SEG NFR BLD AUTO: 74 % (ref 40–73)
PLATELET # BLD AUTO: 265 K/UL (ref 135–420)
PMV BLD AUTO: 9.7 FL (ref 9.2–11.8)
RBC # BLD AUTO: 4.51 M/UL (ref 4.2–5.3)
WBC # BLD AUTO: 7.8 K/UL (ref 4.6–13.2)

## 2017-07-25 PROCEDURE — 82565 ASSAY OF CREATININE: CPT | Performed by: INTERNAL MEDICINE

## 2017-07-25 PROCEDURE — 85652 RBC SED RATE AUTOMATED: CPT | Performed by: INTERNAL MEDICINE

## 2017-07-25 PROCEDURE — 84450 TRANSFERASE (AST) (SGOT): CPT | Performed by: INTERNAL MEDICINE

## 2017-07-25 PROCEDURE — 86140 C-REACTIVE PROTEIN: CPT | Performed by: INTERNAL MEDICINE

## 2017-07-25 PROCEDURE — 84460 ALANINE AMINO (ALT) (SGPT): CPT | Performed by: INTERNAL MEDICINE

## 2017-07-25 PROCEDURE — 85025 COMPLETE CBC W/AUTO DIFF WBC: CPT | Performed by: INTERNAL MEDICINE

## 2017-07-25 PROCEDURE — 36415 COLL VENOUS BLD VENIPUNCTURE: CPT | Performed by: INTERNAL MEDICINE

## 2017-08-07 ENCOUNTER — OFFICE VISIT (OUTPATIENT)
Dept: OBGYN CLINIC | Age: 62
End: 2017-08-07

## 2017-08-07 ENCOUNTER — HOSPITAL ENCOUNTER (OUTPATIENT)
Dept: LAB | Age: 62
Discharge: HOME | End: 2017-08-07
Payer: COMMERCIAL

## 2017-08-07 VITALS
HEART RATE: 90 BPM | BODY MASS INDEX: 29.2 KG/M2 | HEIGHT: 70 IN | WEIGHT: 204 LBS | SYSTOLIC BLOOD PRESSURE: 135 MMHG | DIASTOLIC BLOOD PRESSURE: 82 MMHG

## 2017-08-07 DIAGNOSIS — Z01.419 WELL WOMAN EXAM WITH ROUTINE GYNECOLOGICAL EXAM: Primary | ICD-10-CM

## 2017-08-07 DIAGNOSIS — L43.9 LICHEN PLANUS: ICD-10-CM

## 2017-08-07 DIAGNOSIS — L90.0 LICHEN SCLEROSUS ET ATROPHICUS: ICD-10-CM

## 2017-08-07 DIAGNOSIS — N95.2 ATROPHIC VAGINITIS: ICD-10-CM

## 2017-08-07 PROCEDURE — 87624 HPV HI-RISK TYP POOLED RSLT: CPT | Performed by: OBSTETRICS & GYNECOLOGY

## 2017-08-07 PROCEDURE — 88175 CYTOPATH C/V AUTO FLUID REDO: CPT | Performed by: OBSTETRICS & GYNECOLOGY

## 2017-08-07 RX ORDER — LOSARTAN POTASSIUM 50 MG/1
50 TABLET ORAL
COMMUNITY
End: 2017-08-07 | Stop reason: SDUPTHER

## 2017-08-07 RX ORDER — ESTRADIOL 0.1 MG/G
CREAM VAGINAL
Qty: 42.5 G | Refills: 2 | Status: SHIPPED | OUTPATIENT
Start: 2017-08-07 | End: 2018-08-14 | Stop reason: SDUPTHER

## 2017-08-07 RX ORDER — MONTELUKAST SODIUM 10 MG/1
10 TABLET ORAL
COMMUNITY
End: 2017-08-07 | Stop reason: SDUPTHER

## 2017-08-07 RX ORDER — ALBUTEROL SULFATE 90 UG/1
2 AEROSOL, METERED RESPIRATORY (INHALATION)
COMMUNITY
End: 2017-08-07 | Stop reason: SDUPTHER

## 2017-08-07 RX ORDER — BUDESONIDE AND FORMOTEROL FUMARATE DIHYDRATE 160; 4.5 UG/1; UG/1
2 AEROSOL RESPIRATORY (INHALATION)
COMMUNITY
End: 2017-08-07 | Stop reason: SDUPTHER

## 2017-08-07 RX ORDER — CLOBETASOL PROPIONATE 0.5 MG/G
OINTMENT TOPICAL 2 TIMES WEEKLY
Qty: 15 G | Refills: 1 | Status: SHIPPED | OUTPATIENT
Start: 2017-08-07 | End: 2018-06-10 | Stop reason: SDUPTHER

## 2017-08-07 RX ORDER — CETIRIZINE HCL 10 MG
10 TABLET ORAL
COMMUNITY
End: 2017-08-07 | Stop reason: SDUPTHER

## 2017-08-07 NOTE — MR AVS SNAPSHOT
Visit Information Date & Time Provider Department Dept. Phone Encounter #  
 8/7/2017 11:30 AM Susanne Dallas Frigus [id number] Follow-up Instructions Return as needed. Your Appointments 11/16/2017  9:00 AM  
Office Visit with Billie Trammell MD  
Internist of [address] [place] [address]) Appt Note: 6 month f/u  
 [address] [facility], [address] [id number] [address]  
  
   
 [address], [place] Upcoming Health Maintenance Date Due  
 BREAST CANCER SCRN MAMMOGRAM 8/16/2017 COLONOSCOPY 6/18/2018 PAP AKA CERVICAL CYTOLOGY 7/7/2018 Allergies as of 8/7/2017  Review Complete On: 8/7/2017 By: Susanne Dallas MD  
  
 Severity Noted Reaction Type Reactions Sulfasalazine High 11/17/2016    Other (comments) Could not taste anything, lightheadedness Adhesive Tape-silicones  40/87/3814    Other (comments) Other Plant, Animal, Environmental    Unable to Consolidated Doc MOLD Tape [Adhesive]  07/31/2014    Other (comments) Blisters, use paper tape only Patient states tegaderm and paper tape are okay Current Immunizations  Reviewed on 7/28/2016 Name Date Hepatitis B Vaccine 1/1/2009 Influenza Vaccine 10/22/2015, 10/9/2014 Influenza Vaccine Whole 1/4/2013 Pneumococcal Polysaccharide (PPSV-23) 7/28/2016 Td 8/1/2011 Tdap 3/3/2017  4:21 PM, 7/28/2016 Not reviewed this visit You Were Diagnosed With   
  
 Codes Comments Well woman exam with routine gynecological exam    -  Primary ICD-10-CM: Z99.017 ICD-9-CM: V72.31 Lichen sclerosus et atrophicus     ICD-10-CM: L90.0 ICD-9-CM: 701.0 Lichen planus     CHD-71-AI: L43.9 ICD-9-CM: 697.0 Atrophic vaginitis     ICD-10-CM: N95.2 ICD-9-CM: 627.3 Vitals BP Pulse Height(growth percentile) Weight(growth percentile) BMI OB Status 135/82 (BP 1 Location: Left arm, BP Patient Position: Sitting) 90 5' 10\" (1.778 m) 204 lb (92.5 kg) 29.27 kg/m2 Postmenopausal  
 Smoking Status Never Smoker BMI and BSA Data Body Mass Index Body Surface Area  
 29.27 kg/m 2 2.14 m 2 Preferred Pharmacy Pharmacy Name Phone  [address] [phone] Your Updated Medication List  
  
   
This list is accurate as of: 8/7/17 12:09 PM.  Always use your most recent med list.  
  
  
  
  
 albuterol 90 mcg/actuation inhaler Commonly known as:  PROVENTIL HFA, VENTOLIN HFA, PROAIR HFA Take 2 Puffs by inhalation every six (6) hours as needed. allergy injection  
by SubCUTAneous route every thirty (30) days. ascorbic acid (vitamin C) 500 mg tablet Commonly known as:  VITAMIN C Take 1 Tab by mouth daily. calcium carbonate 200 mg calcium (500 mg) Chew Commonly known as:  TUMS Take 1 Tab by mouth four (4) times daily. clobetasol 0.05 % ointment Commonly known as:  Carlronal Turcios Apply  to affected area two (2) times a week. cyanocobalamin 1,000 mcg tablet Commonly known as:  VITAMIN B-12 Take 1 Tab by mouth daily. DEXILANT 60 mg Cpdb Generic drug:  Dexlansoprazole Take 1 Cap by mouth Daily (before dinner). ESTRACE 0.01 % (0.1 mg/gram) vaginal cream  
Generic drug:  estradiol Place 1 gm in the vagina twice weekly  
  
 ferrous sulfate 325 mg (65 mg iron) tablet Take 1 Tab by mouth Daily (before breakfast). folic acid 1 mg tablet Commonly known as:  FOLVITE  
take 1 tablet by mouth once daily  
  
 losartan 50 mg tablet Commonly known as:  COZAAR  
TAKE 1 TABLET DAILY  
  
 methotrexate 2.5 mg tablet Commonly known as:  Norma Amrit Take 2.5 mg by mouth. 5 tablets weekly SINGULAIR 10 mg tablet Generic drug:  montelukast  
Take 10 mg by mouth daily. SYMBICORT 160-4.5 mcg/actuation HFA inhaler Generic drug:  budesonide-formoterol Take 2 Puffs by inhalation two (2) times a day. triamcinolone 55 mcg nasal inhaler Commonly known as:  NASACORT AQ  
2 Sprays by Both Nostrils route daily. Indications: PERENNIAL ALLERGIC RHINITIS  
  
 TYLENOL 325 mg tablet Generic drug:  acetaminophen Take  by mouth every four (4) hours as needed for Pain. ZyrTEC 10 mg Cap Generic drug:  Cetirizine Take 10 mg by mouth daily. Prescriptions Sent to Pharmacy Refills  
 clobetasol (TEMOVATE) 0.05 % ointment 1 Sig: Apply  to affected area two (2) times a week. Class: Normal  
 Pharmacy: [facility] [address] Ph #: [phone] Route: Topical  
 ESTRACE 0.01 % (0.1 mg/gram) vaginal cream 2 Sig: Place 1 gm in the vagina twice weekly Class: Normal  
 Pharmacy: [facility] [address] Ph #: [phone] Follow-up Instructions Return as needed. To-Do List   
 08/23/2017 11:15 AM  
  Appointment with ANDREIA QUINTANA 2 at [address] [place] [address] ([phone]) OUTSIDE FILMS  - Any outside films related to the study being scheduled should be brought with you on the day of the exam.  If this cannot be done there may be a delay in the reading of the study. MEDICATIONS  - Patient must bring a complete list of all medications currently taking to include prescriptions, over-the-counter meds, herbals, vitamins & any dietary supplements  GENERAL INSTRUCTIONS  - On the day of your exam do not use any bath powder, deodorant or lotions on the armpit area. -Tenderness of breasts may cause an increase of discomfort during procedure. If you are experiencing breast tenderness on the day of your appointment and would like to reschedule, please call [phone]. Patient Instructions Atrophic Vaginitis: Care Instructions Your Care Instructions Atrophic vaginitis is an irritation of the vagina. It's caused by thinning tissues and less moisture in the vaginal walls. It often happens during menopause when hormone levels change. Surgery to remove the ovaries also can cause it. Your doctor may do tests to rule out other causes. And you may get tests to measure your hormone levels. The problem is most often treated with the hormone estrogen. It comes in a cream, tablets, or a soft plastic ring that is placed in the vagina. Follow-up care is a key part of your treatment and safety. Be sure to make and go to all appointments, and call your doctor if you are having problems. It's also a good idea to know your test results and keep a list of the medicines you take. How can you care for yourself at home? · Use a water-based lubricant for your vagina if sex is dry or painful. Examples are Astroglide, Wet Lubricant Gel, and K-Y Jelly. · Talk with your doctor about using low-dose vaginal estrogen. It treats dryness and thinning tissue. · Do not douche. · Having sex improves blood flow to the vagina. This helps keep your tissue healthy. · Wipe from front to back after you use the toilet. This stops the spread of bacteria to your vagina. When should you call for help? Watch closely for changes in your health, and be sure to contact your doctor if: 
· You have unexpected vaginal bleeding. · You do not get better as expected. Where can you learn more? Go to http://june-yasmeen.info/. Enter R330 in the search box to learn more about \"Atrophic Vaginitis: Care Instructions. \" Current as of: October 13, 2016 Content Version: 11.3 © 4756-8009 [employer]. Care instructions adapted under license by [employer] (which disclaims liability or warranty for this information).  If you have questions about a medical condition or this instruction, always ask your healthcare professional. [employer] disclaims any warranty or liability for your use of this information. Lichen Sclerosus: Care Instructions Your Care Instructions Lichen sclerosus is a long-term (chronic) skin problem that causes thin, wrinkled white patches. The patches are itchy and painful. If the skin tears, bright red or purple spots may appear. In most cases, it occurs on the skin of the anus (the opening where stool leaves the body), the vulva (the area around the vagina), and the tip of the penis in men who haven't been circumcised. Doctors aren't sure what causes lichen sclerosus. It isn't caused by an infection, and it's not contagious. You can't spread it to others. If the skin patches are on the anus, vulva, or penis, they may need to be treated. If these areas aren't treated, the skin can thicken and scar. This can narrow the openings to the vagina and anus. The foreskin over the penis may tighten and shrink. If this happens, going to the bathroom and having sex can be painful. Lichen sclerosus is usually treated with strong prescription cream or ointment. The medicine stops the inflammation, but the scarring of the skin might not completely go away. Men with scarring from advanced cases on the tip of the penis may have surgery to remove the foreskin. Skin patches on any other part of the body usually go away on their own without treatment. You may have a small increased risk of skin cancer on the affected area. Your doctor will examine the skin at least once a year. Follow-up care is a key part of your treatment and safety. Be sure to make and go to all appointments, and call your doctor if you are having problems. It's also a good idea to know your test results and keep a list of the medicines you take. How can you care for yourself at home? · Be safe with medicines. If your doctor prescribed a cream, apply it exactly as directed. Call your doctor if you think you are having a problem with your medicine. · Put cold, wet cloths on the area to reduce itching. · Wear loose-fitting clothes. Avoid nylon and other fabric that holds moisture close to the skin. This may allow an infection to start. · If your doctor told you to use nonprescription moisturizing cream on your skin, read and follow the directions on the label. Care tips for women · Do not douche, unless your doctor tells you to. · Avoid hot baths. Don't use soaps or bath products to wash the area around your vulva. Rinse with water only, and gently pat the area dry. Care tips for men · Keep your penis clean. If you haven't been circumcised, gently pull the foreskin back to wash your penis with warm water. Make sure your penis is dry before you get dressed. When should you call for help? Call your doctor now or seek immediate medical care if: 
· You have symptoms of infection, such as: 
¨ Increased pain, swelling, warmth, or redness. ¨ Red streaks leading from the area. ¨ Pus draining from the area. ¨ A fever. Watch closely for changes in your health, and be sure to contact your doctor if: · The affected area grows or changes. · You do not get better as expected. Where can you learn more? Go to http://june-yasmeen.info/. Enter N001 in the search box to learn more about \"Lichen Sclerosus: Care Instructions. \" Current as of: October 13, 2016 Content Version: 11.3 © 2090-4126 [employer]. Care instructions adapted under license by [employer] (which disclaims liability or warranty for this information). If you have questions about a medical condition or this instruction, always ask your healthcare professional. [place] [address] any warranty or liability for your use of this information. Well Visit, Women 48 to 72: Care Instructions Your Care Instructions Physical exams can help you stay healthy.  Your doctor has checked your overall health and may have suggested ways to take good care of yourself. He or she also may have recommended tests. At home, you can help prevent illness with healthy eating, regular exercise, and other steps. Follow-up care is a key part of your treatment and safety. Be sure to make and go to all appointments, and call your doctor if you are having problems. It's also a good idea to know your test results and keep a list of the medicines you take. How can you care for yourself at home? · Reach and stay at a healthy weight. This will lower your risk for many problems, such as obesity, diabetes, heart disease, and high blood pressure. · Get at least 30 minutes of exercise on most days of the week. Walking is a good choice. You also may want to do other activities, such as running, swimming, cycling, or playing tennis or team sports. · Do not smoke. Smoking can make health problems worse. If you need help quitting, talk to your doctor about stop-smoking programs and medicines. These can increase your chances of quitting for good. · Protect your skin from too much sun. When you're outdoors from 10 a.m. to 4 p.m., stay in the shade or cover up with clothing and a hat with a wide brim. Wear sunglasses that block UV rays. Even when it's cloudy, put broad-spectrum sunscreen (SPF 30 or higher) on any exposed skin. · See a dentist one or two times a year for checkups and to have your teeth cleaned. · Wear a seat belt in the car. · Limit alcohol to 1 drink a day. Too much alcohol can cause health problems. Follow your doctor's advice about when to have certain tests. These tests can spot problems early. · Cholesterol. Your doctor will tell you how often to have this done based on your age, family history, or other things that can increase your risk for heart attack and stroke. · Blood pressure.  Have your blood pressure checked during a routine doctor visit. Your doctor will tell you how often to check your blood pressure based on your age, your blood pressure results, and other factors. · Mammogram. Ask your doctor how often you should have a mammogram, which is an X-ray of your breasts. A mammogram can spot breast cancer before it can be felt and when it is easiest to treat. · Pap test and pelvic exam. Ask your doctor how often you should have a Pap test. You may not need to have a Pap test as often as you used to. · Vision. Have your eyes checked every year or two or as often as your doctor suggests. Some experts recommend that you have yearly exams for glaucoma and other age-related eye problems starting at age 48. · Hearing. Tell your doctor if you notice any change in your hearing. You can have tests to find out how well you hear. · Diabetes. Ask your doctor whether you should have tests for diabetes. · Colon cancer. You should begin tests for colon cancer at age 48. You may have one of several tests. Your doctor will tell you how often to have tests based on your age and risk. Risks include whether you already had a precancerous polyp removed from your colon or whether your parents, sisters and brothers, or children have had colon cancer. · Thyroid disease. Talk to your doctor about whether to have your thyroid checked as part of a regular physical exam. Women have an increased chance of a thyroid problem. · Osteoporosis. You should begin tests for bone density at age 72. If you are younger than 72, ask your doctor whether you have factors that may increase your risk for this disease. You may want to have this test before age 72. · Heart attack and stroke risk. At least every 4 to 6 years, you should have your risk for heart attack and stroke assessed. Your doctor uses factors such as your age, blood pressure, cholesterol, and whether you smoke or have diabetes to show what your risk for a heart attack or stroke is over the next 10 years. When should you call for help? Watch closely for changes in your health, and be sure to contact your doctor if you have any problems or symptoms that concern you. Where can you learn more? Go to http://june-yasmeen.info/. Enter D165 in the search box to learn more about \"Well Visit, Women 50 to 72: Care Instructions. \" Current as of: July 19, 2016 Content Version: 11.3 © 8078-4761 [employer]. Care instructions adapted under license by [employer] (which disclaims liability or warranty for this information). If you have questions about a medical condition or this instruction, always ask your healthcare professional. [address] any warranty or liability for your use of this information. Introducing [address]! Dear Fartun Kim: Thank you for requesting a [employer] account. Our records indicate that you already have an active [employer] account. You can access your account anytime at [email or web address] Did you know that you can access your hospital and ER discharge instructions at any time in [employer]? You can also review all of your test results from your hospital stay or ER visit. Additional Information If you have questions, please visit the Frequently Asked Questions section of the [employer] website at [email or web address] Remember, [employer] is NOT to be used for urgent needs. For medical emergencies, dial 911. Now available from your iPhone and Android! Please provide this summary of care documentation to your next provider. Your primary care clinician is listed as David Swann. If you have any questions after today's visit, please call [phone].
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
## 2017-08-07 NOTE — PATIENT INSTRUCTIONS
Atrophic Vaginitis: Care Instructions  Your Care Instructions    Atrophic vaginitis is an irritation of the vagina. It's caused by thinning tissues and less moisture in the vaginal walls. It often happens during menopause when hormone levels change. Surgery to remove the ovaries also can cause it. Your doctor may do tests to rule out other causes. And you may get tests to measure your hormone levels. The problem is most often treated with the hormone estrogen. It comes in a cream, tablets, or a soft plastic ring that is placed in the vagina. Follow-up care is a key part of your treatment and safety. Be sure to make and go to all appointments, and call your doctor if you are having problems. It's also a good idea to know your test results and keep a list of the medicines you take. How can you care for yourself at home? · Use a water-based lubricant for your vagina if sex is dry or painful. Examples are Astroglide, Wet Lubricant Gel, and K-Y Jelly. · Talk with your doctor about using low-dose vaginal estrogen. It treats dryness and thinning tissue. · Do not douche. · Having sex improves blood flow to the vagina. This helps keep your tissue healthy. · Wipe from front to back after you use the toilet. This stops the spread of bacteria to your vagina. When should you call for help? Watch closely for changes in your health, and be sure to contact your doctor if:  · You have unexpected vaginal bleeding. · You do not get better as expected. Where can you learn more? Go to http://june-yasmeen.info/. Enter R330 in the search box to learn more about \"Atrophic Vaginitis: Care Instructions. \"  Current as of: October 13, 2016  Content Version: 11.3  © 8641-1909 [employer]. Care instructions adapted under license by [employer] (which disclaims liability or warranty for this information).  If you have questions about a medical condition or this instruction, always ask your healthcare professional. [address] any warranty or liability for your use of this information. Lichen Sclerosus: Care Instructions  Your Care Instructions  Lichen sclerosus is a long-term (chronic) skin problem that causes thin, wrinkled white patches. The patches are itchy and painful. If the skin tears, bright red or purple spots may appear. In most cases, it occurs on the skin of the anus (the opening where stool leaves the body), the vulva (the area around the vagina), and the tip of the penis in men who haven't been circumcised. Doctors aren't sure what causes lichen sclerosus. It isn't caused by an infection, and it's not contagious. You can't spread it to others. If the skin patches are on the anus, vulva, or penis, they may need to be treated. If these areas aren't treated, the skin can thicken and scar. This can narrow the openings to the vagina and anus. The foreskin over the penis may tighten and shrink. If this happens, going to the bathroom and having sex can be painful. Lichen sclerosus is usually treated with strong prescription cream or ointment. The medicine stops the inflammation, but the scarring of the skin might not completely go away. Men with scarring from advanced cases on the tip of the penis may have surgery to remove the foreskin. Skin patches on any other part of the body usually go away on their own without treatment. You may have a small increased risk of skin cancer on the affected area. Your doctor will examine the skin at least once a year. Follow-up care is a key part of your treatment and safety. Be sure to make and go to all appointments, and call your doctor if you are having problems. It's also a good idea to know your test results and keep a list of the medicines you take. How can you care for yourself at home? · Be safe with medicines. If your doctor prescribed a cream, apply it exactly as directed.  Call your doctor if you think you are having a problem with your medicine. · Put cold, wet cloths on the area to reduce itching. · Wear loose-fitting clothes. Avoid nylon and other fabric that holds moisture close to the skin. This may allow an infection to start. · If your doctor told you to use nonprescription moisturizing cream on your skin, read and follow the directions on the label. Care tips for women  · Do not douche, unless your doctor tells you to. · Avoid hot baths. Don't use soaps or bath products to wash the area around your vulva. Rinse with water only, and gently pat the area dry. Care tips for men  · Keep your penis clean. If you haven't been circumcised, gently pull the foreskin back to wash your penis with warm water. Make sure your penis is dry before you get dressed. When should you call for help? Call your doctor now or seek immediate medical care if:  · You have symptoms of infection, such as:  ¨ Increased pain, swelling, warmth, or redness. ¨ Red streaks leading from the area. ¨ Pus draining from the area. ¨ A fever. Watch closely for changes in your health, and be sure to contact your doctor if:  · The affected area grows or changes. · You do not get better as expected. Where can you learn more? Go to http://june-yasmeen.info/. Enter V936 in the search box to learn more about \"Lichen Sclerosus: Care Instructions. \"  Current as of: October 13, 2016  Content Version: 11.3  © 8240-6876 [employer]. Care instructions adapted under license by [employer] (which disclaims liability or warranty for this information). If you have questions about a medical condition or this instruction, always ask your healthcare professional. [place] [address] any warranty or liability for your use of this information. Well Visit, Women 48 to 72: Care Instructions  Your Care Instructions  Physical exams can help you stay healthy.  Your doctor has checked your overall health and may have suggested ways to take good care of yourself. He or she also may have recommended tests. At home, you can help prevent illness with healthy eating, regular exercise, and other steps. Follow-up care is a key part of your treatment and safety. Be sure to make and go to all appointments, and call your doctor if you are having problems. It's also a good idea to know your test results and keep a list of the medicines you take. How can you care for yourself at home? · Reach and stay at a healthy weight. This will lower your risk for many problems, such as obesity, diabetes, heart disease, and high blood pressure. · Get at least 30 minutes of exercise on most days of the week. Walking is a good choice. You also may want to do other activities, such as running, swimming, cycling, or playing tennis or team sports. · Do not smoke. Smoking can make health problems worse. If you need help quitting, talk to your doctor about stop-smoking programs and medicines. These can increase your chances of quitting for good. · Protect your skin from too much sun. When you're outdoors from 10 a.m. to 4 p.m., stay in the shade or cover up with clothing and a hat with a wide brim. Wear sunglasses that block UV rays. Even when it's cloudy, put broad-spectrum sunscreen (SPF 30 or higher) on any exposed skin. · See a dentist one or two times a year for checkups and to have your teeth cleaned. · Wear a seat belt in the car. · Limit alcohol to 1 drink a day. Too much alcohol can cause health problems. Follow your doctor's advice about when to have certain tests. These tests can spot problems early. · Cholesterol. Your doctor will tell you how often to have this done based on your age, family history, or other things that can increase your risk for heart attack and stroke. · Blood pressure. Have your blood pressure checked during a routine doctor visit.  Your doctor will tell you how often to check your blood pressure based on your age, your blood pressure results, and other factors. · Mammogram. Ask your doctor how often you should have a mammogram, which is an X-ray of your breasts. A mammogram can spot breast cancer before it can be felt and when it is easiest to treat. · Pap test and pelvic exam. Ask your doctor how often you should have a Pap test. You may not need to have a Pap test as often as you used to. · Vision. Have your eyes checked every year or two or as often as your doctor suggests. Some experts recommend that you have yearly exams for glaucoma and other age-related eye problems starting at age 48. · Hearing. Tell your doctor if you notice any change in your hearing. You can have tests to find out how well you hear. · Diabetes. Ask your doctor whether you should have tests for diabetes. · Colon cancer. You should begin tests for colon cancer at age 48. You may have one of several tests. Your doctor will tell you how often to have tests based on your age and risk. Risks include whether you already had a precancerous polyp removed from your colon or whether your parents, sisters and brothers, or children have had colon cancer. · Thyroid disease. Talk to your doctor about whether to have your thyroid checked as part of a regular physical exam. Women have an increased chance of a thyroid problem. · Osteoporosis. You should begin tests for bone density at age 72. If you are younger than 72, ask your doctor whether you have factors that may increase your risk for this disease. You may want to have this test before age 72. · Heart attack and stroke risk. At least every 4 to 6 years, you should have your risk for heart attack and stroke assessed. Your doctor uses factors such as your age, blood pressure, cholesterol, and whether you smoke or have diabetes to show what your risk for a heart attack or stroke is over the next 10 years. When should you call for help?   Watch closely for changes in your health, and be sure to contact your doctor if you have any problems or symptoms that concern you. Where can you learn more? Go to http://june-yasmeen.info/. Enter B689 in the search box to learn more about \"Well Visit, Women 50 to 72: Care Instructions. \"  Current as of: July 19, 2016  Content Version: 11.3  © 2592-2670 [employer]. Care instructions adapted under license by [employer] (which disclaims liability or warranty for this information). If you have questions about a medical condition or this instruction, always ask your healthcare professional. [place] [address] any warranty or liability for your use of this information.

## 2017-08-23 ENCOUNTER — HOSPITAL ENCOUNTER (OUTPATIENT)
Dept: MAMMOGRAPHY | Age: 62
Discharge: HOME | End: 2017-08-23
Attending: INTERNAL MEDICINE
Payer: COMMERCIAL

## 2017-08-23 DIAGNOSIS — Z12.31 VISIT FOR SCREENING MAMMOGRAM: ICD-10-CM

## 2017-08-23 PROCEDURE — 77063 BREAST TOMOSYNTHESIS BI: CPT

## 2017-11-16 ENCOUNTER — OFFICE VISIT (OUTPATIENT)
Dept: INTERNAL MEDICINE CLINIC | Age: 62
End: 2017-11-16

## 2017-11-16 VITALS
OXYGEN SATURATION: 99 % | SYSTOLIC BLOOD PRESSURE: 125 MMHG | BODY MASS INDEX: 29.41 KG/M2 | TEMPERATURE: 96.8 F | DIASTOLIC BLOOD PRESSURE: 83 MMHG | HEIGHT: 70 IN | WEIGHT: 205.4 LBS | HEART RATE: 83 BPM | RESPIRATION RATE: 18 BRPM

## 2017-11-16 DIAGNOSIS — R73.02 IMPAIRED GLUCOSE TOLERANCE: ICD-10-CM

## 2017-11-16 DIAGNOSIS — D50.9 IRON DEFICIENCY ANEMIA, UNSPECIFIED IRON DEFICIENCY ANEMIA TYPE: ICD-10-CM

## 2017-11-16 DIAGNOSIS — M06.9 RHEUMATOID ARTHRITIS INVOLVING MULTIPLE SITES, UNSPECIFIED RHEUMATOID FACTOR PRESENCE: ICD-10-CM

## 2017-11-16 DIAGNOSIS — E55.9 VITAMIN D DEFICIENCY: ICD-10-CM

## 2017-11-16 DIAGNOSIS — I10 ESSENTIAL HYPERTENSION: Primary | ICD-10-CM

## 2017-11-16 DIAGNOSIS — E78.1 HYPERTRIGLYCERIDEMIA: ICD-10-CM

## 2017-11-16 DIAGNOSIS — E53.8 VITAMIN B12 DEFICIENCY: ICD-10-CM

## 2017-11-16 PROBLEM — K22.70 BARRETT ESOPHAGUS: Status: ACTIVE | Noted: 2017-11-16

## 2017-11-16 RX ORDER — METHOTREXATE 20 MG/.4ML
20 INJECTION, SOLUTION SUBCUTANEOUS
COMMUNITY
Start: 2017-10-30 | End: 2018-08-14

## 2017-11-16 NOTE — PROGRESS NOTES
INTERNISTS OF [facility]:  11/16/2017, MRN: [id number]      Reesa Blizzard is a 60 y.o. female and presents to clinic for Hypertension (follow up); Cholesterol Problem (follow up); and Blood sugar problem (Pre Diabetes follow up)    Subjective:   Pt is a 58yo female with h/o HTN, GERD with Green's esophagus (followed by ), DJD, lumbar post laminectomy syndrome s/p spinal cord stimulator surgery (followed by Ben Banegas), cervical spinal stenosis, prediabetes, asthma (followed by , Pulmonology), HLD, chronic sinusitis, vitamin B12 deficiency, and rheumatoid arthritis (Followed by ), vitamin D deficiency. 1.  Hypertension and Obesity: This is a chronic condition, present for at least 6 months. Patient takes losartan and has not had a refill on this medication. She reports no adverse side effects of taking this medication. Her blood pressure today is 125/83. She admits to walking her dog 5 times a week. He tries to remain active. Her weight has been steadily decreasing. It is 205lbs today. The patient states that she is off tramadol and Lyrica. She reports that stopping these medications helped her to lose weight. The patient has a history of prediabetes and hypertriglyceridemia. Interestingly enough, her last triglyceride level was less than 200 earlier this year. Wt Readings from Last 3 Encounters:   11/16/17 205 lb 6.4 oz (93.2 kg)   08/07/17 204 lb (92.5 kg)   05/15/17 211 lb 3.2 oz (95.8 kg)     2. Rheumatoid arthritis and GERD: This is a chronic condition, present for at least 6 months. She was on generic methotrexate but was placed on branded Rasuvo to decrease presumed associated GERD symptoms. She was diagnosed with Green's esophagus in between appointments. She takes Dexilant faithfully. She reports no indigestion symptoms on this medication.   She is followed regularly by the GI team.    3.  Iron deficiency and Vitamin B12 anemia: Patient has a history of iron deficiency anemia for over 6 months. She takes iron supplementation with vitamin C. No vaginal bleeding. No hematochezia or melena. The patient is on methotrexate branded as described above. She is on folic acid. She also takes B12 supplementation given a long-standing history of B12 deficiency (over 3 months). 4.  Health maintenance: The patient also has a history of vitamin D deficiency. Patient Active Problem List    Diagnosis Date Noted    Rheumatoid arthritis involving multiple sites 02/15/2017    Arthritis, lumbar spine ([facility] [address] 04/22/2016    Moderate persistent asthma without complication 15/66/6888    Lumbar post-laminectomy syndrome 12/10/2015    S/P cervical spinal fusion 08/13/2014    Cervical stenosis of spine 08/11/2014    GERD (gastroesophageal reflux disease) 08/11/2014    Hx MRSA infection 08/11/2014    Impaired glucose tolerance 06/30/2013    Asthma 12/28/2012     Class: Chronic    Hypertriglyceridemia 12/16/2012    DNS (deviated nasal septum)     Chronic sinusitis 05/15/2012    HTN (hypertension) 06/28/2010    Iron deficiency anemia 06/28/2010    Vitamin B12 deficiency 06/28/2010    Vitamin D deficiency 06/28/2010       Current Outpatient Prescriptions   Medication Sig Dispense Refill    RASUVO, PF, 20 mg/0.4 mL atIn 20 mg by Injection route every seven (7) days.  clobetasol (TEMOVATE) 0.05 % ointment Apply  to affected area two (2) times a week. 15 g 1    ESTRACE 0.01 % (0.1 mg/gram) vaginal cream Place 1 gm in the vagina twice weekly 42.5 g 2    losartan (COZAAR) 50 mg tablet TAKE 1 TABLET DAILY 30 Tab 0    cyanocobalamin (VITAMIN B-12) 1,000 mcg tablet Take 1 Tab by mouth daily. 90 Tab 3    ferrous sulfate 325 mg (65 mg iron) tablet Take 1 Tab by mouth Daily (before breakfast). 90 Tab 3    ascorbic acid, vitamin C, (VITAMIN C) 500 mg tablet Take 1 Tab by mouth daily.  (Patient taking differently: Take 1,000 mg by mouth daily.) 90 Tab 3    folic acid (FOLVITE) 1 mg tablet take 1 tablet by mouth once daily  0    acetaminophen (TYLENOL) 325 mg tablet Take  by mouth every four (4) hours as needed for Pain.  triamcinolone (NASACORT AQ) 55 mcg nasal inhaler 2 Sprays by Both Nostrils route daily. Indications: PERENNIAL ALLERGIC RHINITIS      Dexlansoprazole (DEXILANT) 60 mg CpDM Take 1 Cap by mouth Daily (before dinner).  calcium carbonate (TUMS) 200 mg calcium (500 mg) Chew Take 1 Tab by mouth four (4) times daily.  Cetirizine (ZYRTEC) 10 mg Cap Take 10 mg by mouth daily.  montelukast (SINGULAIR) 10 mg tablet Take 10 mg by mouth daily.  allergy injection by SubCUTAneous route every thirty (30) days.  budesonide-formoterol (SYMBICORT) 160-4.5 mcg/Actuation HFA inhaler Take 2 Puffs by inhalation two (2) times a day.  albuterol (PROVENTIL HFA, VENTOLIN HFA) 90 mcg/Actuation inhaler Take 2 Puffs by inhalation every six (6) hours as needed.          Allergies   Allergen Reactions    Sulfasalazine Other (comments)     Could not taste anything, lightheadedness    Adhesive Tape-Silicones Other (comments)    Other Plant, Animal, Environmental Unable to Obtain     MOLD    Tape [Adhesive] Other (comments)     Blisters, use paper tape only  Patient states tegaderm and paper tape are okay       Past Medical History:   Diagnosis Date    Allergic rhinitis     Asthma     Back pain     Green's esophagus with esophagitis     Cervical radiculopathy     Chronic sinusitis 5/15/2012    Chronic sinusitis with recurrent bronchitis     DNS (deviated nasal septum)     Empyema     Foraminal stenosis of cervical region     C4-C5    GERD (gastroesophageal reflux disease)     Dr Schulz Ham    Hx MRSA infection 8/11/2014    Hypertension     Hypertriglyceridemia     Insulin resistance 4/9/2012    Left knee pain     Leg pain, bilateral     Lichen planus     Menopause     Age 51    MRSA (methicillin resistant Staphylococcus aureus) infection 2008    left lung    Restless leg syndrome     Rheumatoid arthritis ([facility] [address]     Spinal stenosis of cervical region     C4-C5 and C5-C6    TMJ syndrome     Wears glasses     and contacts       Past Surgical History:   Procedure Laterality Date    HX CERVICAL FUSION  08/13/14    HX LUMBAR LAMINECTOMY  Nov 1995    [facility]    HX LUMBAR LAMINECTOMY  Feb 1997    Ettie Dimes  1/2015    bunion removal from right foot    HX OTHER SURGICAL  2007    thoracentesis    HX OTHER SURGICAL  2008    chest tube    HX OTHER SURGICAL  1997    TMJ surgery    HX OTHER SURGICAL  2012    sinus surg 2012-Dr Leena Arreola.  HX TUBAL LIGATION      DE COLONOSCOPY FLX DX W/COLLJ SPEC WHEN PFRMD  6-18-08    normal/duntemann       Family History   Problem Relation Age of Onset    Hypertension Mother    Velta Ganser Diabetes Mother    Velta Ganser Arthritis-rheumatoid Sister     Other Sister      Lupus    Cancer Sister 35     CA, uterus    Other Brother      myocarditis    Heart Attack Brother     Heart Disease Brother     Heart Surgery Brother     Coronary Artery Disease Father     Hypertension Father     Cancer Sister 48     CA, breast    Breast Cancer Sister      Estrogen based    Diabetes Brother     Stroke Maternal Grandmother        Social History   Substance Use Topics    Smoking status: Never Smoker    Smokeless tobacco: Never Used    Alcohol use 0.0 oz/week     0 Standard drinks or equivalent per week      Comment: 0-6 per year       ROS   Review of Systems   Constitutional: Positive for weight loss. Negative for chills and fever. HENT: Negative for ear pain and sore throat. Eyes: Negative for blurred vision and pain. Respiratory: Negative for cough and shortness of breath. Cardiovascular: Negative for chest pain. Gastrointestinal: Negative for abdominal pain, blood in stool and melena. Genitourinary: Negative for dysuria and hematuria. Musculoskeletal: Positive for joint pain. Negative for myalgias. Skin: Negative for rash. Neurological: Negative for tingling, focal weakness and headaches. Endo/Heme/Allergies: Does not bruise/bleed easily. Psychiatric/Behavioral: Negative for substance abuse. Objective     Vitals:    11/16/17 0913   BP: 125/83   Pulse: 83   Resp: 18   Temp: 96.8 °F (36 °C)   TempSrc: Oral   SpO2: 99%   Weight: 205 lb 6.4 oz (93.2 kg)   Height: 5' 10\" (1.778 m)   PainSc:   3   PainLoc: Hip       Physical Exam   Constitutional: She is oriented to person, place, and time and well-developed, well-nourished, and in no distress. HENT:   Head: Normocephalic and atraumatic. Right Ear: External ear normal.   Left Ear: External ear normal.   Nose: Nose normal.   Mouth/Throat: Oropharynx is clear and moist. No oropharyngeal exudate. Clear TMs   Eyes: Conjunctivae and EOM are normal. Pupils are equal, round, and reactive to light. Right eye exhibits no discharge. Left eye exhibits no discharge. No scleral icterus. Neck: Neck supple. Cardiovascular: Normal rate, regular rhythm, normal heart sounds and intact distal pulses. Exam reveals no gallop and no friction rub. No murmur heard. Pulmonary/Chest: Effort normal and breath sounds normal. No respiratory distress. She has no wheezes. She has no rales. Abdominal: Soft. Bowel sounds are normal. She exhibits no distension. There is no tenderness. There is no rebound and no guarding. No hepatomegaly   Musculoskeletal: She exhibits no edema or tenderness (BUE are NTTP). No effusions are present   Lymphadenopathy:     She has no cervical adenopathy. Neurological: She is alert and oriented to person, place, and time. She exhibits normal muscle tone. Gait normal.   Skin: Skin is warm and dry. No erythema. Psychiatric: Affect normal.   Nursing note and vitals reviewed.       LABS   Data Review:   Lab Results   Component Value Date/Time    WBC 7.8 07/25/2017 10:49 AM    HGB 13.7 07/25/2017 10:49 AM    HCT 42.3 07/25/2017 10:49 AM PLATELET 462 11/69/4607 10:49 AM    MCV 93.8 07/25/2017 10:49 AM       Lab Results   Component Value Date/Time    Sodium 141 06/27/2017 08:34 AM    Potassium 4.4 06/27/2017 08:34 AM    Chloride 108 06/27/2017 08:34 AM    CO2 24 06/27/2017 08:34 AM    Anion gap 9 06/27/2017 08:34 AM    Glucose 113 06/27/2017 08:34 AM    BUN 14 06/27/2017 08:34 AM    Creatinine 0.80 07/25/2017 10:49 AM    BUN/Creatinine ratio 18 06/27/2017 08:34 AM    GFR est AA >60 07/25/2017 10:49 AM    GFR est non-AA >60 07/25/2017 10:49 AM    Calcium 9.5 06/27/2017 08:34 AM       Lab Results   Component Value Date/Time    Cholesterol, total 160 06/27/2017 08:34 AM    HDL Cholesterol 40 06/27/2017 08:34 AM    LDL, calculated 89 06/27/2017 08:34 AM    VLDL, calculated 31 06/27/2017 08:34 AM    Triglyceride 155 06/27/2017 08:34 AM    CHOL/HDL Ratio 4.0 06/27/2017 08:34 AM       Lab Results   Component Value Date/Time    Hemoglobin A1c 5.7 06/27/2017 08:34 AM       Assessment/Plan:   1. Essential hypertension, Prediabetes, and H/o Hypertriglyceridemia: Weight is declining with lifestyle changes and medication de-escalation.   -We will check a CMP, urine protein screen, A1c, and a lipid panel in 6 months.  -I encouraged patient to continue with lifestyle modification measures. She was given a [employer] handout on healthy ways to lose weight. I will recheck her weight at her follow-up appointment. I encouraged her to maintain a heart healthy low-fat diet and to incorporate more exercise into her daily routine.  -Continue with medication as prescribed. ORDERS:  - METABOLIC PANEL, COMPREHENSIVE; Future  - MICROALBUMIN, UR, RAND W/ MICROALBUMIN/CREA RATIO; Future  - HEMOGLOBIN A1C W/O EAG; Future  - LIPID PANEL; Future    2. Iron deficiency anemia: On iron rx.   -Continue with iron supplementation.  -Checking a CBC and iron labs in 6 months. ORDERS:  - CBC WITH AUTOMATED DIFF; Future  - IRON PROFILE; Future  - FERRITIN; Future    3. Vitamin B12 deficiency  -Checking a CBC and a B12 level in 6 months. Continue with supplementation. ORDERS:  - CBC WITH AUTOMATED DIFF; Future  - VITAMIN B12; Future    4. Vitamin D deficiency:   -Checking a CMP and a vitamin D level in 6 months. ORDERS:  - METABOLIC PANEL, COMPREHENSIVE; Future  - VITAMIN D, 25 HYDROXY; Future    5. Rheumatoid arthritis involving multiple sites: Stable. -Checking a CBC and a CMP. -Continue with medications as prescribed by the rheumatology team.    ORDERS:  - METABOLIC PANEL, COMPREHENSIVE; Future  - CBC WITH AUTOMATED DIFF; Future    6. GERD: Stable. +Has a history of Green's esophagus.  -Continue with Dexilant. Continue to follow-up with the GI team      There are no preventive care reminders to display for this patient. Lab review: labs are reviewed in the EHR and ordered as mentioned above    I have discussed the diagnosis with the patient and the intended plan as seen in the above orders. The patient has received an after-visit summary and questions were answered concerning future plans. I have discussed medication side effects and warnings with the patient as well. I have reviewed the plan of care with the patient, accepted their input and they are in agreement with the treatment goals. All questions were answered. The patient understands the plan of care. Handouts provided today with above information. Pt instructed if symptoms worsen to call the office or report to the ED for continued care. Greater than 50% of the visit time was spent in counseling and/or coordination of care. Follow-up Disposition:  Return in about 6 months (around 5/16/2018), or if symptoms worsen or fail to improve.     Linsey Evans MD

## 2017-11-16 NOTE — PATIENT INSTRUCTIONS
There are no preventive care reminders to display for this patient. Body Mass Index: Care Instructions  Your Care Instructions    Body mass index (BMI) can help you see if your weight is raising your risk for health problems. It uses a formula to compare how much you weigh with how tall you are. · A BMI lower than 18.5 is considered underweight. · A BMI between 18.5 and 24.9 is considered healthy. · A BMI between 25 and 29.9 is considered overweight. A BMI of 30 or higher is considered obese. If your BMI is in the normal range, it means that you have a lower risk for weight-related health problems. If your BMI is in the overweight or obese range, you may be at increased risk for weight-related health problems, such as high blood pressure, heart disease, stroke, arthritis or joint pain, and diabetes. If your BMI is in the underweight range, you may be at increased risk for health problems such as fatigue, lower protection (immunity) against illness, muscle loss, bone loss, hair loss, and hormone problems. BMI is just one measure of your risk for weight-related health problems. You may be at higher risk for health problems if you are not active, you eat an unhealthy diet, or you drink too much alcohol or use tobacco products. Follow-up care is a key part of your treatment and safety. Be sure to make and go to all appointments, and call your doctor if you are having problems. It's also a good idea to know your test results and keep a list of the medicines you take. How can you care for yourself at home? · Practice healthy eating habits. This includes eating plenty of fruits, vegetables, whole grains, lean protein, and low-fat dairy. · If your doctor recommends it, get more exercise. Walking is a good choice. Bit by bit, increase the amount you walk every day. Try for at least 30 minutes on most days of the week. · Do not smoke. Smoking can increase your risk for health problems.  If you need help quitting, talk to your doctor about stop-smoking programs and medicines. These can increase your chances of quitting for good. · Limit alcohol to 2 drinks a day for men and 1 drink a day for women. Too much alcohol can cause health problems. If you have a BMI higher than 25  · Your doctor may do other tests to check your risk for weight-related health problems. This may include measuring the distance around your waist. A waist measurement of more than 40 inches in men or 35 inches in women can increase the risk of weight-related health problems. · Talk with your doctor about steps you can take to stay healthy or improve your health. You may need to make lifestyle changes to lose weight and stay healthy, such as changing your diet and getting regular exercise. If you have a BMI lower than 18.5  · Your doctor may do other tests to check your risk for health problems. · Talk with your doctor about steps you can take to stay healthy or improve your health. You may need to make lifestyle changes to gain or maintain weight and stay healthy, such as getting more healthy foods in your diet and doing exercises to build muscle. Where can you learn more? Go to http://june-yasmeen.info/. Enter S176 in the search box to learn more about \"Body Mass Index: Care Instructions. \"  Current as of: October 13, 2016  Content Version: 11.4  © 1623-5594 [employer]. Care instructions adapted under license by [employer] (which disclaims liability or warranty for this information). If you have questions about a medical condition or this instruction, always ask your healthcare professional. [place] [address] any warranty or liability for your use of this information.

## 2017-11-16 NOTE — MR AVS SNAPSHOT
Visit Information Date & Time Provider Department Dept. Phone Encounter #  
 11/16/2017  9:00 AM Elsie Isabel MD [facility] [phone] [id number] Follow-up Instructions Return in about 6 months (around 5/16/2018), or if symptoms worsen or fail to improve. Your Appointments 12/1/2017 11:45 AM  
Follow Up with Maureen Miller MD  
[address] ([facility]) Appt Note: FU FROM 4/27/17  
 [address] [place] [address], [address] [address]  
[phone]  
  
   
 [address] [place] [address], [address] [facility] [address] [place] [address] 5/10/2018  8:35 AM  
LAB with [facility] NURSE VISIT [facility] ([facility]) Appt Note: lab  
 [address], [address] [address] [address] [place]  
  
   
 [address], [address] 5/17/2018  8:30 AM  
Office Visit with Elsie Isabel MD  
[facility] [address]) Appt Note: 6 month f/u  
 [address] [facility], [address] [address] [address] [place]  
  
   
 [address], [address] Upcoming Health Maintenance Date Due COLONOSCOPY 6/18/2018 BREAST CANCER SCRN MAMMOGRAM 8/23/2018 PAP AKA CERVICAL CYTOLOGY 8/7/2020 DTaP/Tdap/Td series (2 - Td) 3/3/2027 Allergies as of 11/16/2017  Review Complete On: 11/16/2017 By: Elsie Isabel MD  
  
 Severity Noted Reaction Type Reactions Sulfasalazine High 11/17/2016    Other (comments) Could not taste anything, lightheadedness Adhesive Tape-silicones  38/99/8145    Other (comments) Other Plant, Animal, Environmental    Unable to Consolidated Doc MOLD Tape [Adhesive]  07/31/2014    Other (comments) Blisters, use paper tape only Patient states tegaderm and paper tape are okay Current Immunizations  Reviewed on 7/28/2016 Name Date Hepatitis B Vaccine 1/1/2009 Influenza High Dose Vaccine PF 10/3/2017 Influenza Vaccine 10/22/2015, 10/9/2014 Influenza Vaccine Whole 1/4/2013 Pneumococcal Polysaccharide (PPSV-23) 7/28/2016 Td 8/1/2011 Tdap 3/3/2017  4:21 PM, 7/28/2016 Not reviewed this visit Vitals BP Pulse Temp Resp Height(growth percentile) Weight(growth percentile) 125/83 (BP 1 Location: Left arm, BP Patient Position: Sitting) 83 96.8 °F (36 °C) (Oral) 18 5' 10\" (1.778 m) 205 lb 6.4 oz (93.2 kg) SpO2 BMI OB Status Smoking Status 99% 29.47 kg/m2 Postmenopausal Never Smoker BMI and BSA Data Body Mass Index Body Surface Area  
 29.47 kg/m 2 2.15 m 2 Preferred Pharmacy Pharmacy Name Phone  [address] [phone] Your Updated Medication List  
  
   
This list is accurate as of: 11/16/17  9:46 AM.  Always use your most recent med list.  
  
  
  
  
 albuterol 90 mcg/actuation inhaler Commonly known as:  PROVENTIL HFA, VENTOLIN HFA, PROAIR HFA Take 2 Puffs by inhalation every six (6) hours as needed. allergy injection  
by SubCUTAneous route every thirty (30) days. ascorbic acid (vitamin C) 500 mg tablet Commonly known as:  VITAMIN C Take 1 Tab by mouth daily. calcium carbonate 200 mg calcium (500 mg) Chew Commonly known as:  TUMS Take 1 Tab by mouth four (4) times daily. clobetasol 0.05 % ointment Commonly known as:  Clari Sciara Apply  to affected area two (2) times a week. cyanocobalamin 1,000 mcg tablet Commonly known as:  VITAMIN B-12 Take 1 Tab by mouth daily. DEXILANT 60 mg Cpdb Generic drug:  Dexlansoprazole Take 1 Cap by mouth Daily (before dinner). ESTRACE 0.01 % (0.1 mg/gram) vaginal cream  
Generic drug:  estradiol Place 1 gm in the vagina twice weekly  
  
 ferrous sulfate 325 mg (65 mg iron) tablet Take 1 Tab by mouth Daily (before breakfast). folic acid 1 mg tablet Commonly known as:  FOLVITE  
take 1 tablet by mouth once daily  
  
 losartan 50 mg tablet Commonly known as:  COZAAR  
TAKE 1 TABLET DAILY RASUVO (PF) 20 mg/0.4 mL Atin Generic drug:  methotrexate (PF) 20 mg by Injection route every seven (7) days. SINGULAIR 10 mg tablet Generic drug:  montelukast  
Take 10 mg by mouth daily. SYMBICORT 160-4.5 mcg/actuation Hfaa Generic drug:  budesonide-formoterol Take 2 Puffs by inhalation two (2) times a day. triamcinolone 55 mcg nasal inhaler Commonly known as:  NASACORT AQ  
2 Sprays by Both Nostrils route daily. Indications: PERENNIAL ALLERGIC RHINITIS  
  
 TYLENOL 325 mg tablet Generic drug:  acetaminophen Take  by mouth every four (4) hours as needed for Pain. ZyrTEC 10 mg Cap Generic drug:  Cetirizine Take 10 mg by mouth daily. Follow-up Instructions Return in about 6 months (around 5/16/2018), or if symptoms worsen or fail to improve. Patient Instructions There are no preventive care reminders to display for this patient. Body Mass Index: Care Instructions Your Care Instructions Body mass index (BMI) can help you see if your weight is raising your risk for health problems. It uses a formula to compare how much you weigh with how tall you are. · A BMI lower than 18.5 is considered underweight. · A BMI between 18.5 and 24.9 is considered healthy. · A BMI between 25 and 29.9 is considered overweight. A BMI of 30 or higher is considered obese. If your BMI is in the normal range, it means that you have a lower risk for weight-related health problems. If your BMI is in the overweight or obese range, you may be at increased risk for weight-related health problems, such as high blood pressure, heart disease, stroke, arthritis or joint pain, and diabetes.  If your BMI is in the underweight range, you may be at increased risk for health problems such as fatigue, lower protection (immunity) against illness, muscle loss, bone loss, hair loss, and hormone problems. BMI is just one measure of your risk for weight-related health problems. You may be at higher risk for health problems if you are not active, you eat an unhealthy diet, or you drink too much alcohol or use tobacco products. Follow-up care is a key part of your treatment and safety. Be sure to make and go to all appointments, and call your doctor if you are having problems. It's also a good idea to know your test results and keep a list of the medicines you take. How can you care for yourself at home? · Practice healthy eating habits. This includes eating plenty of fruits, vegetables, whole grains, lean protein, and low-fat dairy. · If your doctor recommends it, get more exercise. Walking is a good choice. Bit by bit, increase the amount you walk every day. Try for at least 30 minutes on most days of the week. · Do not smoke. Smoking can increase your risk for health problems. If you need help quitting, talk to your doctor about stop-smoking programs and medicines. These can increase your chances of quitting for good. · Limit alcohol to 2 drinks a day for men and 1 drink a day for women. Too much alcohol can cause health problems. If you have a BMI higher than 25 · Your doctor may do other tests to check your risk for weight-related health problems. This may include measuring the distance around your waist. A waist measurement of more than 40 inches in men or 35 inches in women can increase the risk of weight-related health problems. · Talk with your doctor about steps you can take to stay healthy or improve your health. You may need to make lifestyle changes to lose weight and stay healthy, such as changing your diet and getting regular exercise. If you have a BMI lower than 18.5 · Your doctor may do other tests to check your risk for health problems.  
· Talk with your doctor about steps you can take to stay healthy or improve your health. You may need to make lifestyle changes to gain or maintain weight and stay healthy, such as getting more healthy foods in your diet and doing exercises to build muscle. Where can you learn more? Go to http://june-yasmeen.info/. Enter S176 in the search box to learn more about \"Body Mass Index: Care Instructions. \" Current as of: October 13, 2016 Content Version: 11.4 © 5944-0138 [employer]. Care instructions adapted under license by [employer] (which disclaims liability or warranty for this information). If you have questions about a medical condition or this instruction, always ask your healthcare professional. [address] any warranty or liability for your use of this information. Introducing [facility]! Dear Yassine Fulton: Thank you for requesting a weave energy account. Our records indicate that you already have an active weave energy account. You can access your account anytime at [email or web address] Did you know that you can access your hospital and ER discharge instructions at any time in weave energy? You can also review all of your test results from your hospital stay or ER visit. Additional Information If you have questions, please visit the Frequently Asked Questions section of the weave energy website at [email or web address] Remember, weave energy is NOT to be used for urgent needs. For medical emergencies, dial 911. Now available from your iPhone and Android! Please provide this summary of care documentation to your next provider. Your primary care clinician is listed as Gilmar Snider. If you have any questions after today's visit, please call [phone].

## 2017-11-16 NOTE — PROGRESS NOTES
Chief Complaint   Patient presents with    Hypertension     follow up    Cholesterol Problem     follow up    Blood sugar problem     Pre Diabetes follow up     1. Have you been to the ER, urgent care clinic since your last visit? Hospitalized since your last visit? No    2. Have you seen or consulted any other health care providers outside of the [address] [place] [address] since your last visit? Include any pap smears or colon screening.  No

## 2017-12-01 ENCOUNTER — OFFICE VISIT (OUTPATIENT)
Dept: PULMONOLOGY | Age: 62
End: 2017-12-01

## 2017-12-01 VITALS
SYSTOLIC BLOOD PRESSURE: 132 MMHG | HEART RATE: 71 BPM | RESPIRATION RATE: 16 BRPM | OXYGEN SATURATION: 97 % | HEIGHT: 70 IN | BODY MASS INDEX: 28.92 KG/M2 | TEMPERATURE: 98.8 F | WEIGHT: 202 LBS | DIASTOLIC BLOOD PRESSURE: 82 MMHG

## 2017-12-01 DIAGNOSIS — J30.1 CHRONIC SEASONAL ALLERGIC RHINITIS DUE TO POLLEN: ICD-10-CM

## 2017-12-01 DIAGNOSIS — J45.20 MILD INTERMITTENT ASTHMA WITHOUT COMPLICATION: Primary | ICD-10-CM

## 2017-12-01 DIAGNOSIS — K22.2 GERD WITH STRICTURE: ICD-10-CM

## 2017-12-01 DIAGNOSIS — K21.9 GERD WITH STRICTURE: ICD-10-CM

## 2017-12-01 RX ORDER — CHOLECALCIFEROL (VITAMIN D3) 125 MCG
220 CAPSULE ORAL
COMMUNITY
End: 2020-02-04

## 2017-12-01 NOTE — PROGRESS NOTES
[facility]   Pulmonary, Critical Care, and Sleep Medicine     Reason for Evaluation: follow up asthma  12/01/17   Feels good. No increase in any symptoms since last visit. Continues to use her Singulair, Symbicort  On Dexilant for Green's esophagus  Off Methotrexate and now on injectable biologic therapy ( RASUVO) for RA. Denies wheezing. Occasional sneezing. Feels better overall with no recent set backs. No cough, congestion or wheezing. She denied any hemoptysis, change in her appetite or weight. Has not needed any prednisone tapers. No Er visits    Allergies:  No known drug allergies. Current Outpatient Prescriptions:     naproxen sodium (ALEVE) 220 mg cap, Take  by mouth., Disp: , Rfl:     RASUVO, PF, 20 mg/0.4 mL atIn, 20 mg by Injection route every seven (7) days. , Disp: , Rfl:     clobetasol (TEMOVATE) 0.05 % ointment, Apply  to affected area two (2) times a week., Disp: 15 g, Rfl: 1    ESTRACE 0.01 % (0.1 mg/gram) vaginal cream, Place 1 gm in the vagina twice weekly, Disp: 42.5 g, Rfl: 2    losartan (COZAAR) 50 mg tablet, TAKE 1 TABLET DAILY, Disp: 30 Tab, Rfl: 0    cyanocobalamin (VITAMIN B-12) 1,000 mcg tablet, Take 1 Tab by mouth daily. , Disp: 90 Tab, Rfl: 3    ferrous sulfate 325 mg (65 mg iron) tablet, Take 1 Tab by mouth Daily (before breakfast). , Disp: 90 Tab, Rfl: 3    ascorbic acid, vitamin C, (VITAMIN C) 500 mg tablet, Take 1 Tab by mouth daily. (Patient taking differently: Take 1,000 mg by mouth daily.), Disp: 90 Tab, Rfl: 3    folic acid (FOLVITE) 1 mg tablet, take 1 tablet by mouth once daily, Disp: , Rfl: 0    acetaminophen (TYLENOL) 325 mg tablet, Take  by mouth every four (4) hours as needed for Pain., Disp: , Rfl:     triamcinolone (NASACORT AQ) 55 mcg nasal inhaler, 2 Sprays by Both Nostrils route daily.  Indications: PERENNIAL ALLERGIC RHINITIS, Disp: , Rfl:     Dexlansoprazole (DEXILANT) 60 mg CpDM, Take 1 Cap by mouth Daily (before dinner). , Disp: , Rfl:     Cetirizine (ZYRTEC) 10 mg Cap, Take 10 mg by mouth daily. , Disp: , Rfl:     montelukast (SINGULAIR) 10 mg tablet, Take 10 mg by mouth daily. , Disp: , Rfl:     budesonide-formoterol (SYMBICORT) 160-4.5 mcg/Actuation HFA inhaler, Take 2 Puffs by inhalation two (2) times a day., Disp: , Rfl:     albuterol (PROVENTIL HFA, VENTOLIN HFA) 90 mcg/Actuation inhaler, Take 2 Puffs by inhalation every six (6) hours as needed. , Disp: , Rfl:     calcium carbonate (TUMS) 200 mg calcium (500 mg) Chew, Take 1 Tab by mouth four (4) times daily. , Disp: , Rfl:     allergy injection, by SubCUTAneous route every thirty (30) days. , Disp: , Rfl:        Physical Examination:       HEENT:  Normocephalic, atraumatic. Pupils equal, round, reactive to light and accommodation. Sclerae white. Conjunctivae pale. Oral exam shows no thrush, but slightly erythematous pharyngeal mucous membranes without any exudate or mass. Nasal mucous membranes are without any erythema or edema. Neck:  Thick, supple, no JVD, normal thyroid, no adenopathy, no other masses palpable. Cardiovascular:  S1, S2, regular rate and rhythm without any murmurs, rubs or gallops. Chest:  Equal air entry without wheezing. There is no dullness to percussion. No tenderness on palpation. No rash on inspection   Abdomen:  Obese, bowel sounds normoactive, soft, nontender without any organomegaly. Tympanic to percussion. Extremities:  Without any clubbing, cyanosis or edema. No calf tenderness on palpation. Skin:  Dry, warm to touch. No rash on inspection. Impression:     Asthma- better controlled. With no exacerbations over past  3 + years after sinus surgery. SOB- multifactorial  Recurrent sinusitis  She had in the interval nasal septal deviation repair surgery to correct any anatomical defect causing her frequent sinus and respiratory infections. Esophageal stricture with reflux.  Now diagnosed with Green's esophagus. On Dexilent  Rheumatoid arthritis- rheumatology following    Recommendations: The patient will continue her albuterol, Symbicort and Singulair medications . Continue trigger control- PPI for Green's, allergy immunotherapy and controller meds  Commended on compliance and excellent control  Health maintenance- up to date on vaccines  Environmental control  Periodic PFT, imaging. Education provided. Follow up in 6 months  .         Carley Oakley MD

## 2017-12-01 NOTE — MR AVS SNAPSHOT
Visit Information Date & Time Provider Department Dept. Phone Encounter #  
 12/1/2017 11:45 AM Shilo Lopez MD [facility] [id number] Follow-up Instructions Return in about 6 months (around 6/1/2018). Your Appointments 5/10/2018  8:35 AM  
LAB with [facility] NURSE VISIT [facility] [address] [place] [address] ([address]) Appt Note: lab  
 [address], [address] [address] [address] [place]  
  
   
 [address], [address] 5/17/2018  8:30 AM  
Office Visit with Sandeep Galeano MD  
[facility] of [address] [place] [address]) Appt Note: 6 month f/u  
 [address] [facility], [address] [address] [address] [place]  
  
   
 [address], [address] Upcoming Health Maintenance Date Due COLONOSCOPY 6/18/2018 BREAST CANCER SCRN MAMMOGRAM 8/23/2018 PAP AKA CERVICAL CYTOLOGY 8/7/2020 DTaP/Tdap/Td series (2 - Td) 3/3/2027 Allergies as of 12/1/2017  Review Complete On: 12/1/2017 By: Shilo Lopez MD  
  
 Severity Noted Reaction Type Reactions Sulfasalazine High 11/17/2016    Other (comments) Could not taste anything, lightheadedness Adhesive Tape-silicones  26/60/2651    Other (comments) Other Plant, Animal, Environmental    Unable to Consolidated Doc MOLD Tape [Adhesive]  07/31/2014    Other (comments) Blisters, use paper tape only Patient states tegaderm and paper tape are okay Current Immunizations  Reviewed on 7/28/2016 Name Date Hepatitis B Vaccine 1/1/2009 Influenza High Dose Vaccine PF 10/3/2017 Influenza Vaccine 10/22/2015, 10/9/2014 Influenza Vaccine Whole 1/4/2013 Pneumococcal Polysaccharide (PPSV-23) 7/28/2016 Td 8/1/2011 Tdap 3/3/2017  4:21 PM, 7/28/2016 Not reviewed this visit Vitals BP Pulse Temp Resp Height(growth percentile) Weight(growth percentile) 132/82 (BP 1 Location: Left arm, BP Patient Position: Sitting) 71 98.8 °F (37.1 °C) (Oral) 16 5' 10\" (1.778 m) 202 lb (91.6 kg) SpO2 BMI OB Status Smoking Status 97% 28.98 kg/m2 Postmenopausal Never Smoker Vitals History BMI and BSA Data Body Mass Index Body Surface Area  
 28.98 kg/m 2 2.13 m 2 Preferred Pharmacy Pharmacy Name Phone  [address] [phone] Your Updated Medication List  
  
   
This list is accurate as of: 12/1/17 12:27 PM.  Always use your most recent med list.  
  
  
  
  
 albuterol 90 mcg/actuation inhaler Commonly known as:  PROVENTIL HFA, VENTOLIN HFA, PROAIR HFA Take 2 Puffs by inhalation every six (6) hours as needed. ALEVE 220 mg Cap Generic drug:  naproxen sodium Take  by mouth. allergy injection  
by SubCUTAneous route every thirty (30) days. ascorbic acid (vitamin C) 500 mg tablet Commonly known as:  VITAMIN C Take 1 Tab by mouth daily. calcium carbonate 200 mg calcium (500 mg) Chew Commonly known as:  TUMS Take 1 Tab by mouth four (4) times daily. clobetasol 0.05 % ointment Commonly known as:  Lilibeth Jack Apply  to affected area two (2) times a week. cyanocobalamin 1,000 mcg tablet Commonly known as:  VITAMIN B-12 Take 1 Tab by mouth daily. DEXILANT 60 mg Cpdb Generic drug:  Dexlansoprazole Take 1 Cap by mouth Daily (before dinner). ESTRACE 0.01 % (0.1 mg/gram) vaginal cream  
Generic drug:  estradiol Place 1 gm in the vagina twice weekly  
  
 ferrous sulfate 325 mg (65 mg iron) tablet Take 1 Tab by mouth Daily (before breakfast). folic acid 1 mg tablet Commonly known as:  FOLVITE  
take 1 tablet by mouth once daily  
  
 losartan 50 mg tablet Commonly known as:  COZAAR  
TAKE 1 TABLET DAILY RASUVO (PF) 20 mg/0.4 mL Atin Generic drug:  methotrexate (PF) 20 mg by Injection route every seven (7) days. SINGULAIR 10 mg tablet Generic drug:  montelukast  
Take 10 mg by mouth daily. SYMBICORT 160-4.5 mcg/actuation Hfaa Generic drug:  budesonide-formoterol Take 2 Puffs by inhalation two (2) times a day. triamcinolone 55 mcg nasal inhaler Commonly known as:  NASACORT AQ  
2 Sprays by Both Nostrils route daily. Indications: PERENNIAL ALLERGIC RHINITIS  
  
 TYLENOL 325 mg tablet Generic drug:  acetaminophen Take  by mouth every four (4) hours as needed for Pain. ZyrTEC 10 mg Cap Generic drug:  Cetirizine Take 10 mg by mouth daily. Follow-up Instructions Return in about 6 months (around 6/1/2018). Introducing [facility]! Dear Parul Wall: Thank you for requesting a [employer] account. Our records indicate that you already have an active [employer] account. You can access your account anytime at [email or web address] [email or web address] Did you know that you can access your hospital and ER discharge instructions at any time in [employer]? You can also review all of your test results from your hospital stay or ER visit. Additional Information If you have questions, please visit the Frequently Asked Questions section of the [employer] website at [email or web address]/. Remember, [employer] is NOT to be used for urgent needs. For medical emergencies, dial 911. Now available from your iPhone and Android! Please provide this summary of care documentation to your next provider. Your primary care clinician is listed as Scot Leiva. If you have any questions after today's visit, please call [phone].

## 2017-12-21 ENCOUNTER — OFFICE VISIT (OUTPATIENT)
Dept: PULMONOLOGY | Age: 62
End: 2017-12-21

## 2017-12-21 ENCOUNTER — TELEPHONE (OUTPATIENT)
Dept: PULMONOLOGY | Age: 62
End: 2017-12-21

## 2017-12-21 VITALS
TEMPERATURE: 99.3 F | DIASTOLIC BLOOD PRESSURE: 76 MMHG | HEIGHT: 70 IN | OXYGEN SATURATION: 95 % | RESPIRATION RATE: 16 BRPM | HEART RATE: 87 BPM | SYSTOLIC BLOOD PRESSURE: 140 MMHG | WEIGHT: 202 LBS | BODY MASS INDEX: 28.92 KG/M2

## 2017-12-21 DIAGNOSIS — J45.20 MILD INTERMITTENT ASTHMA WITHOUT COMPLICATION: ICD-10-CM

## 2017-12-21 DIAGNOSIS — J06.9 VIRAL UPPER RESPIRATORY TRACT INFECTION: Primary | ICD-10-CM

## 2017-12-21 RX ORDER — PREDNISONE 10 MG/1
TABLET ORAL
Qty: 18 TAB | Refills: 0 | Status: SHIPPED | OUTPATIENT
Start: 2017-12-21 | End: 2018-05-17 | Stop reason: ALTCHOICE

## 2017-12-21 RX ORDER — AMOXICILLIN AND CLAVULANATE POTASSIUM 875; 125 MG/1; MG/1
1 TABLET, FILM COATED ORAL 2 TIMES DAILY
Qty: 20 TAB | Refills: 0 | Status: SHIPPED | OUTPATIENT
Start: 2017-12-21 | End: 2018-05-03

## 2017-12-21 NOTE — TELEPHONE ENCOUNTER
Pt c/o cough, congestion, HA, temp 100.4. Pt states she has a hx of sinus inf that turn into bronchitis.  Pt scheduled for sick visit today

## 2017-12-21 NOTE — PROGRESS NOTES
1. Have you been to the ER, urgent care clinic since your last visit? Hospitalized since your last visit? No    2. Have you seen or consulted any other health care providers outside of the [address] [place] [address] since your last visit? Include any pap smears or colon screening.  No

## 2017-12-21 NOTE — PROGRESS NOTES
[facility]   Pulmonary, Critical Care, and Sleep Medicine     Reason for Evaluation: follow up asthma  12/21/17   Got sick with Sorethroat, headache, runny nose and fever x2 days. Spouse was sick with similar complaints last week. Continues to use her Singulair, Symbicort  On Dexilant for Green's esophagus  Off Methotrexate and now on injectable biologic therapy ( RASUVO) for RA. Denies wheezing. Occasional sneezing. Feels better overall with no recent set backs. No cough, congestion or wheezing. She denied any hemoptysis, change in her appetite or weight. Has not needed any prednisone tapers. No Er visits    Allergies:  No known drug allergies. Current Outpatient Prescriptions:     naproxen sodium (ALEVE) 220 mg cap, Take  by mouth., Disp: , Rfl:     RASUVO, PF, 20 mg/0.4 mL atIn, 20 mg by Injection route every seven (7) days. , Disp: , Rfl:     clobetasol (TEMOVATE) 0.05 % ointment, Apply  to affected area two (2) times a week., Disp: 15 g, Rfl: 1    ESTRACE 0.01 % (0.1 mg/gram) vaginal cream, Place 1 gm in the vagina twice weekly, Disp: 42.5 g, Rfl: 2    cyanocobalamin (VITAMIN B-12) 1,000 mcg tablet, Take 1 Tab by mouth daily. , Disp: 90 Tab, Rfl: 3    ferrous sulfate 325 mg (65 mg iron) tablet, Take 1 Tab by mouth Daily (before breakfast). , Disp: 90 Tab, Rfl: 3    ascorbic acid, vitamin C, (VITAMIN C) 500 mg tablet, Take 1 Tab by mouth daily. (Patient taking differently: Take 1,000 mg by mouth daily.), Disp: 90 Tab, Rfl: 3    folic acid (FOLVITE) 1 mg tablet, take 1 tablet by mouth once daily, Disp: , Rfl: 0    acetaminophen (TYLENOL) 325 mg tablet, Take  by mouth every four (4) hours as needed for Pain., Disp: , Rfl:     triamcinolone (NASACORT AQ) 55 mcg nasal inhaler, 2 Sprays by Both Nostrils route daily. Indications: PERENNIAL ALLERGIC RHINITIS, Disp: , Rfl:     Dexlansoprazole (DEXILANT) 60 mg CpDM, Take 1 Cap by mouth Daily (before dinner). , Disp: , Rfl:    calcium carbonate (TUMS) 200 mg calcium (500 mg) Chew, Take 1 Tab by mouth four (4) times daily. , Disp: , Rfl:     Cetirizine (ZYRTEC) 10 mg Cap, Take 10 mg by mouth daily. , Disp: , Rfl:     montelukast (SINGULAIR) 10 mg tablet, Take 10 mg by mouth daily. , Disp: , Rfl:     allergy injection, by SubCUTAneous route every thirty (30) days. , Disp: , Rfl:     budesonide-formoterol (SYMBICORT) 160-4.5 mcg/Actuation HFA inhaler, Take 2 Puffs by inhalation two (2) times a day., Disp: , Rfl:     albuterol (PROVENTIL HFA, VENTOLIN HFA) 90 mcg/Actuation inhaler, Take 2 Puffs by inhalation every six (6) hours as needed. , Disp: , Rfl:     losartan (COZAAR) 50 mg tablet, TAKE 1 TABLET DAILY, Disp: 30 Tab, Rfl: 0       Physical Examination:       HEENT:    Normocephalic, atraumatic. Pupils equal, round, reactive to light and accommodation. Sclerae white. Conjunctivae pale. Oral exam shows no thrush, but slightly erythematous pharyngeal mucous membranes without any exudate or mass. Nasal mucous membranes are without any erythema or edema. Neck:  Thick, supple, no JVD, normal thyroid, no adenopathy, no other masses palpable. Cardiovascular:  S1, S2, regular rate and rhythm without any murmurs, rubs or gallops. Chest:  Equal air entry without wheezing. There is no dullness to percussion. No tenderness on palpation. No rash on inspection   Abdomen:  Obese, bowel sounds normoactive, soft, nontender without any organomegaly. Tympanic to percussion. Extremities:  Without any clubbing, cyanosis or edema. No calf tenderness on palpation. Skin:  Dry, warm to touch. No rash on inspection. Impression:   Acute URI with fever, sore throat and  Headaches, also symptoms of coryza. Asthma- better controlled. With no exacerbations over past  3 + years after sinus surgery.   SOB- multifactorial  Recurrent sinusitis  She had in the interval nasal septal deviation repair surgery to correct any anatomical defect causing her frequent sinus and respiratory infections. Esophageal stricture with reflux. Now diagnosed with Green's esophagus. On Dexilent  Rheumatoid arthritis- rheumatology following    Recommendations:    Acute interventions- hydration, tylenol for fever and asthma action plan. Prednisone taper and Augmentin prescription provide if symptoms worsen  The patient will continue her albuterol, Symbicort and Singulair medications . Continue trigger control- PPI for Green's, allergy immunotherapy and controller meds  Commended on compliance and excellent control  Health maintenance- up to date on vaccines  Environmental control  Periodic PFT, imaging. Education provided. Follow up in 6 months  .         Delroy Aldana MD

## 2018-01-12 RX ORDER — ASCORBIC ACID 500 MG
1000 TABLET ORAL DAILY
Qty: 180 TAB | Refills: 1 | Status: SHIPPED | OUTPATIENT
Start: 2018-01-12 | End: 2018-10-18 | Stop reason: SDUPTHER

## 2018-03-02 RX ORDER — LANOLIN ALCOHOL/MO/W.PET/CERES
CREAM (GRAM) TOPICAL
Qty: 90 TAB | Refills: 3 | Status: SHIPPED | OUTPATIENT
Start: 2018-03-02 | End: 2019-03-26 | Stop reason: SDUPTHER

## 2018-04-07 DIAGNOSIS — I10 ESSENTIAL HYPERTENSION WITH GOAL BLOOD PRESSURE LESS THAN 140/90: ICD-10-CM

## 2018-04-09 RX ORDER — LOSARTAN POTASSIUM 50 MG/1
TABLET ORAL
Qty: 90 TAB | Refills: 3 | Status: SHIPPED | OUTPATIENT
Start: 2018-04-09 | End: 2018-05-17 | Stop reason: SDUPTHER

## 2018-05-03 ENCOUNTER — OFFICE VISIT (OUTPATIENT)
Dept: ORTHOPEDIC SURGERY | Age: 63
End: 2018-05-03

## 2018-05-03 VITALS
HEART RATE: 79 BPM | DIASTOLIC BLOOD PRESSURE: 80 MMHG | BODY MASS INDEX: 28.37 KG/M2 | RESPIRATION RATE: 16 BRPM | SYSTOLIC BLOOD PRESSURE: 117 MMHG | HEIGHT: 70 IN | WEIGHT: 198.2 LBS | OXYGEN SATURATION: 98 % | TEMPERATURE: 97.5 F

## 2018-05-03 DIAGNOSIS — M25.561 RIGHT KNEE PAIN, UNSPECIFIED CHRONICITY: Primary | ICD-10-CM

## 2018-05-03 DIAGNOSIS — M17.11 PRIMARY OSTEOARTHRITIS OF RIGHT KNEE: ICD-10-CM

## 2018-05-03 RX ORDER — BETAMETHASONE SODIUM PHOSPHATE AND BETAMETHASONE ACETATE 3; 3 MG/ML; MG/ML
6 INJECTION, SUSPENSION INTRA-ARTICULAR; INTRALESIONAL; INTRAMUSCULAR; SOFT TISSUE ONCE
Qty: 1 ML | Refills: 0
Start: 2018-05-03 | End: 2018-05-03

## 2018-05-03 RX ORDER — IBUPROFEN 200 MG
200 TABLET ORAL
COMMUNITY
End: 2020-02-04

## 2018-05-03 NOTE — PROGRESS NOTES
Patient: Emily Mustafa                MRN: [id number]       SSN: [id number]  YOB: 1955        AGE: 60 y.o. SEX: female  Body mass index is 28.44 kg/(m^2). PCP: Marti Antonio MD  05/03/18  HISTORY: I had the pleasure of reviewing Ms. Franklin Bauer. It has been a number of years. She has known rheumatoid arthritis and arthritis of the right hip, as well as the right knee, both knees, actually. For the last three weeks, the right knee has been quite sore. She was on a trip up to [place]. It was worse afterwards. She has become fairly knock-kneed, i.e. valgus. She has pain at night and pain with activities of daily living. She does take steroids from time to time for her lung issues. PHYSICAL EXAMINATION:  On examination today, she is walking with an antalgic gait mainly owing to the right knee but also a little bit to the right hip. She is in a good 20° of valgus or so and a couple degrees fixed flexion deformity. The quadriceps are intact. The calf is nontender. Rukhsana's sign is negative. She actually flexes her knee fairly well. The left hip rotates well. The right hip is moderately stiff, which reproduces some groin discomfort. There is fairly significant neuropathy in a stocking distribution distally. The calf is nontender. Rukhsana's sign is negative. RADIOGRAPHS:  Review of her x-rays, AP, tunnel, lateral, and skyline of the knees, confirms very severe end-staged arthritis really of both knees, a little worse on the right than on the left. X-ray of the hips shows moderately advanced arthritis. IMPRESSION:  My overall impression is severe rheumatoid arthritis, especially of the right knee and right hip. PROCEDURE:  Under aseptic conditions and after informed, written consent with a time out, the right knee was injected with 1 cc of the Celestone preparation, i.e. 6 mg, which was well tolerated.     PLAN:  She is going to return to see us in a few weeks time. We will take an AP of the pelvis at that time. I think she is heading towards a knee replacement when she would like, and we may be able to get away with an intraarticular injection for the right hip depending on her symptomatology. It has been an absolute pleasure to share in her care. She is a very tough lady. REVIEW OF SYSTEMS:      CON: negative for weight loss, fever  EYE: negative for double vision  ENT: negative for hoarseness  RS:   negative for Tb  GI:    negative for blood in stool  :  negative for blood in urine  Other systems reviewed and noted below.           Past Medical History:   Diagnosis Date    Allergic rhinitis     Asthma     Back pain     Green's esophagus with esophagitis     Cervical radiculopathy     Chronic sinusitis 5/15/2012    Chronic sinusitis with recurrent bronchitis     DNS (deviated nasal septum)     Empyema     Foraminal stenosis of cervical region     C4-C5    GERD (gastroesophageal reflux disease)     Dr Cinthya Downs Hx MRSA infection 8/11/2014    Hypertension     Hypertriglyceridemia     Insulin resistance 4/9/2012    Left knee pain     Leg pain, bilateral     Lichen planus     Menopause     Age 51    MRSA (methicillin resistant Staphylococcus aureus) infection 2008    left lung    Restless leg syndrome     Rheumatoid arthritis ([address]     Spinal stenosis of cervical region     C4-C5 and C5-C6    TMJ syndrome     Wears glasses     and contacts       Family History   Problem Relation Age of Onset    Hypertension Mother    Lydia Luis Diabetes Mother    Lydia Luis Arthritis-rheumatoid Sister     Other Sister      Lupus    Cancer Sister 35     CA, uterus    Other Brother      myocarditis    Heart Attack Brother     Heart Disease Brother     Heart Surgery Brother     Coronary Artery Disease Father     Hypertension Father     Cancer Sister 48     CA, breast    Breast Cancer Sister      Estrogen based    Diabetes Brother     Stroke Maternal Grandmother        Current Outpatient Prescriptions   Medication Sig Dispense Refill    ibuprofen (MOTRIN) 200 mg tablet Take  by mouth.  losartan (COZAAR) 50 mg tablet TAKE 1 TABLET DAILY 90 Tab 3    ferrous sulfate 325 mg (65 mg iron) tablet take 1 tablet by mouth once daily before BREAKFAST 90 Tab 3    cyanocobalamin 1,000 mcg tablet take 1 tablet by mouth once daily 90 Tab 3    ascorbic acid, vitamin C, (VITAMIN C) 500 mg tablet Take 2 Tabs by mouth daily. 180 Tab 1    predniSONE (DELTASONE) 10 mg tablet 30 mg po dailyx 3 days,20 mg po daily x 3 days,10 mg po daily x 3 days 18 Tab 0    naproxen sodium (ALEVE) 220 mg cap Take  by mouth.  RASUVO, PF, 20 mg/0.4 mL atIn 20 mg by Injection route every seven (7) days.  clobetasol (TEMOVATE) 0.05 % ointment Apply  to affected area two (2) times a week. 15 g 1    ESTRACE 0.01 % (0.1 mg/gram) vaginal cream Place 1 gm in the vagina twice weekly 42.5 g 2    losartan (COZAAR) 50 mg tablet TAKE 1 TABLET DAILY 30 Tab 0    folic acid (FOLVITE) 1 mg tablet take 1 tablet by mouth once daily  0    acetaminophen (TYLENOL) 325 mg tablet Take  by mouth every four (4) hours as needed for Pain.  triamcinolone (NASACORT AQ) 55 mcg nasal inhaler 2 Sprays by Both Nostrils route daily. Indications: PERENNIAL ALLERGIC RHINITIS      Dexlansoprazole (DEXILANT) 60 mg CpDM Take 1 Cap by mouth Daily (before dinner).  calcium carbonate (TUMS) 200 mg calcium (500 mg) Chew Take 1 Tab by mouth four (4) times daily.  Cetirizine (ZYRTEC) 10 mg Cap Take 10 mg by mouth daily.  montelukast (SINGULAIR) 10 mg tablet Take 10 mg by mouth daily.  allergy injection by SubCUTAneous route every thirty (30) days.  budesonide-formoterol (SYMBICORT) 160-4.5 mcg/Actuation HFA inhaler Take 2 Puffs by inhalation two (2) times a day.       albuterol (PROVENTIL HFA, VENTOLIN HFA) 90 mcg/Actuation inhaler Take 2 Puffs by inhalation every six (6) hours as needed. Allergies   Allergen Reactions    Sulfasalazine Other (comments)     Could not taste anything, lightheadedness    Adhesive Tape-Silicones Other (comments)    Other Plant, Animal, Environmental Unable to [address] Other (comments)     Blisters, use paper tape only  Patient states tegaderm and paper tape are okay       Past Surgical History:   Procedure Laterality Date    HX CERVICAL FUSION  08/13/14    HX LUMBAR LAMINECTOMY  Nov 1995    [facility]    HX LUMBAR LAMINECTOMY  Feb 1997    Halie Perry  1/2015    bunion removal from right foot    HX OTHER SURGICAL  2007    thoracentesis    HX OTHER SURGICAL  2008    chest tube    HX OTHER SURGICAL  1997    TMJ surgery    HX OTHER SURGICAL  2012    sinus surg 2012-Dr Latoya Grossman.  HX TUBAL LIGATION      CO COLONOSCOPY FLX DX W/COLLJ SPEC WHEN PFRMD  6-18-08    normal/duntemann       Social History     Social History    Marital status:      Spouse name: N/A    Number of children: N/A    Years of education: N/A     Occupational History    Not on file. Social History Main Topics    Smoking status: Never Smoker    Smokeless tobacco: Never Used    Alcohol use 0.0 oz/week     0 Standard drinks or equivalent per week      Comment: 0-6 per year    Drug use: Yes     Special: Prescription, OTC    Sexual activity: Yes     Partners: Male     Other Topics Concern    Not on file     Social History Narrative    Retired        Visit Vitals    /80    Pulse 79    Temp 97.5 °F (36.4 °C) (Oral)    Resp 16    Ht 5' 10\" (1.778 m)    Wt 198 lb 3.2 oz (89.9 kg)    SpO2 98%    BMI 28.44 kg/m2         PHYSICAL EXAMINATION:  GENERAL: Alert and oriented x3, in no acute distress, well-developed, well-nourished, afebrile. HEART: No JVD. EYES: No scleral icterus   NECK: No significant lymphadenopathy   LUNGS: No respiratory compromise or indrawing  ABDOMEN: Soft, non-tender, non-distended. Electronically signed by:  Fabiola Quinn MD

## 2018-05-10 ENCOUNTER — HOSPITAL ENCOUNTER (OUTPATIENT)
Dept: LAB | Age: 63
Discharge: HOME | End: 2018-05-10
Payer: COMMERCIAL

## 2018-05-10 LAB
ALBUMIN SERPL-MCNC: 4 G/DL (ref 3.4–5)
ALBUMIN/GLOB SERPL: 1.2 {RATIO} (ref 0.8–1.7)
ALP SERPL-CCNC: 89 U/L (ref 45–117)
ALT SERPL-CCNC: 20 U/L (ref 13–56)
ANION GAP SERPL CALC-SCNC: 9 MMOL/L (ref 3–18)
AST SERPL-CCNC: 16 U/L (ref 15–37)
BASOPHILS # BLD: 0 K/UL (ref 0–0.06)
BASOPHILS NFR BLD: 0 % (ref 0–2)
BILIRUB SERPL-MCNC: 0.5 MG/DL (ref 0.2–1)
BUN SERPL-MCNC: 12 MG/DL (ref 7–18)
BUN/CREAT SERPL: 17 (ref 12–20)
CALCIUM SERPL-MCNC: 9 MG/DL (ref 8.5–10.1)
CHLORIDE SERPL-SCNC: 111 MMOL/L (ref 100–108)
CHOLEST SERPL-MCNC: 153 MG/DL
CO2 SERPL-SCNC: 24 MMOL/L (ref 21–32)
CREAT SERPL-MCNC: 0.7 MG/DL (ref 0.6–1.3)
DIFFERENTIAL METHOD BLD: ABNORMAL
EOSINOPHIL # BLD: 0.2 K/UL (ref 0–0.4)
EOSINOPHIL NFR BLD: 3 % (ref 0–5)
ERYTHROCYTE [DISTWIDTH] IN BLOOD BY AUTOMATED COUNT: 14.8 % (ref 11.6–14.5)
FERRITIN SERPL-MCNC: 50 NG/ML (ref 8–388)
GLOBULIN SER CALC-MCNC: 3.3 G/DL (ref 2–4)
GLUCOSE SERPL-MCNC: 110 MG/DL (ref 74–99)
HBA1C MFR BLD: 5.6 % (ref 4.2–5.6)
HCT VFR BLD AUTO: 45.3 % (ref 35–45)
HDLC SERPL-MCNC: 43 MG/DL (ref 40–60)
HDLC SERPL: 3.6 {RATIO} (ref 0–5)
HGB BLD-MCNC: 14.2 G/DL (ref 12–16)
IRON SATN MFR SERPL: 18 %
IRON SERPL-MCNC: 60 UG/DL (ref 50–175)
LDLC SERPL CALC-MCNC: 78.8 MG/DL (ref 0–100)
LIPID PROFILE,FLP: ABNORMAL
LYMPHOCYTES # BLD: 1.2 K/UL (ref 0.9–3.6)
LYMPHOCYTES NFR BLD: 17 % (ref 21–52)
MCH RBC QN AUTO: 30.4 PG (ref 24–34)
MCHC RBC AUTO-ENTMCNC: 31.3 G/DL (ref 31–37)
MCV RBC AUTO: 97 FL (ref 74–97)
MONOCYTES # BLD: 0.6 K/UL (ref 0.05–1.2)
MONOCYTES NFR BLD: 9 % (ref 3–10)
NEUTS SEG # BLD: 4.9 K/UL (ref 1.8–8)
NEUTS SEG NFR BLD: 71 % (ref 40–73)
PLATELET # BLD AUTO: 272 K/UL (ref 135–420)
PMV BLD AUTO: 10 FL (ref 9.2–11.8)
POTASSIUM SERPL-SCNC: 4.1 MMOL/L (ref 3.5–5.5)
PROT SERPL-MCNC: 7.3 G/DL (ref 6.4–8.2)
RBC # BLD AUTO: 4.67 M/UL (ref 4.2–5.3)
SODIUM SERPL-SCNC: 144 MMOL/L (ref 136–145)
TIBC SERPL-MCNC: 338 UG/DL (ref 250–450)
TRIGL SERPL-MCNC: 156 MG/DL (ref ?–150)
VIT B12 SERPL-MCNC: 649 PG/ML (ref 211–911)
VLDLC SERPL CALC-MCNC: 31.2 MG/DL
WBC # BLD AUTO: 7 K/UL (ref 4.6–13.2)

## 2018-05-10 PROCEDURE — 82728 ASSAY OF FERRITIN: CPT | Performed by: INTERNAL MEDICINE

## 2018-05-10 PROCEDURE — 85025 COMPLETE CBC W/AUTO DIFF WBC: CPT | Performed by: INTERNAL MEDICINE

## 2018-05-10 PROCEDURE — 82306 VITAMIN D 25 HYDROXY: CPT | Performed by: INTERNAL MEDICINE

## 2018-05-10 PROCEDURE — 82607 VITAMIN B-12: CPT | Performed by: INTERNAL MEDICINE

## 2018-05-10 PROCEDURE — 80061 LIPID PANEL: CPT | Performed by: INTERNAL MEDICINE

## 2018-05-10 PROCEDURE — 83540 ASSAY OF IRON: CPT | Performed by: INTERNAL MEDICINE

## 2018-05-10 PROCEDURE — 82043 UR ALBUMIN QUANTITATIVE: CPT | Performed by: INTERNAL MEDICINE

## 2018-05-10 PROCEDURE — 36415 COLL VENOUS BLD VENIPUNCTURE: CPT | Performed by: INTERNAL MEDICINE

## 2018-05-10 PROCEDURE — 80053 COMPREHEN METABOLIC PANEL: CPT | Performed by: INTERNAL MEDICINE

## 2018-05-10 PROCEDURE — 83036 HEMOGLOBIN GLYCOSYLATED A1C: CPT | Performed by: INTERNAL MEDICINE

## 2018-05-11 LAB
25(OH)D3 SERPL-MCNC: 20 NG/ML (ref 30–100)
CREAT UR-MCNC: 106.09 MG/DL (ref 30–125)
MICROALBUMIN UR-MCNC: 0.5 MG/DL (ref 0–3)
MICROALBUMIN/CREAT UR-RTO: 5 MG/G (ref 0–30)

## 2018-05-16 NOTE — PROGRESS NOTES
Her vitamin D level is low at 20. Her 10 year CVD risk per the ACC/AHA risk assessment is 4.9%. Statin rx is thus not warranted. Meanwhile, her kidney and liver function are normal. There is no evidence of diabetes/prediabetes! Her B12 level is normal. Her CBC does not show any significant abnormalities. I will discuss this with her at her f/u apt tomorrow.     Dr. Rosita Bardales  [facility] [address], [address] [place] [address] Str.  Phone: [phone]  Fax: [phone]

## 2018-05-17 ENCOUNTER — DOCUMENTATION ONLY (OUTPATIENT)
Dept: INTERNAL MEDICINE CLINIC | Age: 63
End: 2018-05-17

## 2018-05-17 ENCOUNTER — HOSPITAL ENCOUNTER (OUTPATIENT)
Dept: GENERAL RADIOLOGY | Age: 63
Discharge: HOME | End: 2018-05-17
Payer: COMMERCIAL

## 2018-05-17 ENCOUNTER — OFFICE VISIT (OUTPATIENT)
Dept: INTERNAL MEDICINE CLINIC | Age: 63
End: 2018-05-17

## 2018-05-17 VITALS
SYSTOLIC BLOOD PRESSURE: 124 MMHG | HEIGHT: 70 IN | HEART RATE: 87 BPM | OXYGEN SATURATION: 94 % | RESPIRATION RATE: 14 BRPM | WEIGHT: 195.8 LBS | BODY MASS INDEX: 28.03 KG/M2 | DIASTOLIC BLOOD PRESSURE: 87 MMHG | TEMPERATURE: 98 F

## 2018-05-17 DIAGNOSIS — M25.511 ACUTE PAIN OF RIGHT SHOULDER: Primary | ICD-10-CM

## 2018-05-17 DIAGNOSIS — Z12.31 ENCOUNTER FOR SCREENING MAMMOGRAM FOR BREAST CANCER: ICD-10-CM

## 2018-05-17 DIAGNOSIS — M48.02 CERVICAL STENOSIS OF SPINE: ICD-10-CM

## 2018-05-17 DIAGNOSIS — I10 ESSENTIAL HYPERTENSION: ICD-10-CM

## 2018-05-17 DIAGNOSIS — E78.1 HYPERTRIGLYCERIDEMIA: ICD-10-CM

## 2018-05-17 DIAGNOSIS — M06.9 RHEUMATOID ARTHRITIS INVOLVING MULTIPLE SITES, UNSPECIFIED RHEUMATOID FACTOR PRESENCE: ICD-10-CM

## 2018-05-17 DIAGNOSIS — M25.511 ACUTE PAIN OF RIGHT SHOULDER: ICD-10-CM

## 2018-05-17 DIAGNOSIS — E55.9 VITAMIN D DEFICIENCY: ICD-10-CM

## 2018-05-17 DIAGNOSIS — R73.02 IMPAIRED GLUCOSE TOLERANCE: ICD-10-CM

## 2018-05-17 PROBLEM — L90.0 LICHEN SCLEROSUS ET ATROPHICUS: Status: ACTIVE | Noted: 2018-05-17

## 2018-05-17 PROCEDURE — 73030 X-RAY EXAM OF SHOULDER: CPT

## 2018-05-17 PROCEDURE — 72050 X-RAY EXAM NECK SPINE 4/5VWS: CPT

## 2018-05-17 RX ORDER — METHYLPREDNISOLONE 4 MG/1
TABLET ORAL
Qty: 1 DOSE PACK | Refills: 0 | Status: SHIPPED | OUTPATIENT
Start: 2018-05-17 | End: 2018-08-14

## 2018-05-17 RX ORDER — ERGOCALCIFEROL 1.25 MG/1
50000 CAPSULE ORAL
Qty: 12 CAP | Refills: 1 | Status: CANCELLED | OUTPATIENT
Start: 2018-05-17

## 2018-05-17 NOTE — PROGRESS NOTES
[facility] OF [facility]:  5/17/2018, MRN: [id number]      Juliette Chiang is a 60 y.o. female and presents to clinic for Hypertension (6 month follow up); Cholesterol Problem (follow up); Labs (done 5-10-18 to discuss); and Arm Pain (x 6 weeks right arm pain)    Subjective:   Pt is a 60yo female with h/o HTN, GERD with Green's esophagus (followed by ), DJD, lumbar post laminectomy syndrome s/p spinal cord stimulator surgery (followed by Zelda Morales), cervical spinal stenosis, prediabetes, asthma (followed by , Pulmonology), HLD, lichens sclerosus, chronic sinusitis, vitamin B12 deficiency, and rheumatoid arthritis (Followed by ), vitamin D deficiency. 1. Health maintenance:  -Her colonoscopy will be due after July 18, 2018.  -Her mammogram will be due after August 23, 2018. 2.  Vitamin D deficiency: Her most recent vitamin D level is 20. She is taking over-the-counter vitamin D.    3.  Hypertension: Present for at least 6 months. On losartan. She reports no adverse side effects of taking this medication. Her BP is 124/87 today. Her weight is 195lbs. 4.  Rheumatoid arthritis, Right Shoulder Pain, and Neck Pain: She is followed by the rheumatology team and takes Rasuvo/methotrexate. She is also on folic acid. She reports pain in the following jts: right shoulder pain, neck pain, and hand/wrist jts. She reports a 6 wk h/o right shoulder pain. Pain is brought on with ROM along her right shoulder. No trauma hx. No alleviating factors are known. No relief with ibuprofen. Pain can be so severe that it will wake her up from sleep. She reports some pain along her right neck. Sometimes the pain will radiate down her RUE. She has a history of cervical disc disease and cervical spinal stenosis. She is followed in the past by Dr. Ramiro Goodson. Her last imaging studies have been done of her cervical spine with MRI at least 3 years ago.   She occasionally has paresthesias along her right upper extremity. Paresthesias are off and on. Occasionally she will drop objects. 5.  Asthma and Allergic Rhinitis: Present for at least a year. Followed by Dr. Patrick Delcid with the pulmonology team.  She also has underlying allergic rhinitis, present for at least a year. She is on Singulair, Nasacort, Zyrtec, and Symbicort, and albuterol as needed. She reports no adverse effects of taking his medications. She is using her albuterol rarely. No tobacco use. Patient Active Problem List    Diagnosis Date Noted    Lichen sclerosus et atrophicus 05/17/2018    Green esophagus 11/16/2017    Rheumatoid arthritis involving multiple sites 02/15/2017    Arthritis, lumbar spine ([facility] [address] 04/22/2016    Moderate persistent asthma without complication 56/98/2204    Lumbar post-laminectomy syndrome 12/10/2015    S/P cervical spinal fusion 08/13/2014    Cervical stenosis of spine 08/11/2014    GERD (gastroesophageal reflux disease) 08/11/2014    Hx MRSA infection 08/11/2014    Impaired glucose tolerance 06/30/2013    Asthma 12/28/2012     Class: Chronic    Hypertriglyceridemia 12/16/2012    DNS (deviated nasal septum)     Chronic sinusitis 05/15/2012    HTN (hypertension) 06/28/2010    Iron deficiency anemia 06/28/2010    Vitamin B12 deficiency 06/28/2010    Vitamin D deficiency 06/28/2010       Current Outpatient Prescriptions   Medication Sig Dispense Refill    methylPREDNISolone (MEDROL DOSEPACK) 4 mg tablet Take as directed by the package instructions 1 Dose Pack 0    ibuprofen (MOTRIN) 200 mg tablet Take  by mouth.  ferrous sulfate 325 mg (65 mg iron) tablet take 1 tablet by mouth once daily before BREAKFAST 90 Tab 3    cyanocobalamin 1,000 mcg tablet take 1 tablet by mouth once daily 90 Tab 3    ascorbic acid, vitamin C, (VITAMIN C) 500 mg tablet Take 2 Tabs by mouth daily. 180 Tab 1    naproxen sodium (ALEVE) 220 mg cap Take 220 mg by mouth daily as needed.       RASUVO, PF, 20 mg/0.4 mL atIn 20 mg by Injection route every seven (7) days.  clobetasol (TEMOVATE) 0.05 % ointment Apply  to affected area two (2) times a week. 15 g 1    ESTRACE 0.01 % (0.1 mg/gram) vaginal cream Place 1 gm in the vagina twice weekly 42.5 g 2    losartan (COZAAR) 50 mg tablet TAKE 1 TABLET DAILY 30 Tab 0    folic acid (FOLVITE) 1 mg tablet take 1 tablet by mouth once daily  0    acetaminophen (TYLENOL) 325 mg tablet Take  by mouth every four (4) hours as needed for Pain.  triamcinolone (NASACORT AQ) 55 mcg nasal inhaler 2 Sprays by Both Nostrils route daily. Indications: PERENNIAL ALLERGIC RHINITIS      Dexlansoprazole (DEXILANT) 60 mg CpDM Take 1 Cap by mouth Daily (before dinner).  calcium carbonate (TUMS) 200 mg calcium (500 mg) Chew Take 1 Tab by mouth four (4) times daily.  Cetirizine (ZYRTEC) 10 mg Cap Take 10 mg by mouth daily.  montelukast (SINGULAIR) 10 mg tablet Take 10 mg by mouth daily.  allergy injection by SubCUTAneous route every thirty (30) days.  budesonide-formoterol (SYMBICORT) 160-4.5 mcg/Actuation HFA inhaler Take 2 Puffs by inhalation two (2) times a day.  albuterol (PROVENTIL HFA, VENTOLIN HFA) 90 mcg/Actuation inhaler Take 2 Puffs by inhalation every six (6) hours as needed.          Allergies   Allergen Reactions    Sulfasalazine Other (comments)     Could not taste anything, lightheadedness    Adhesive Tape-Silicones Other (comments)    Other Plant, Animal, Environmental Unable to Obtain     MOLD    Tape [Adhesive] Other (comments)     Blisters, use paper tape only  Patient states tegaderm and paper tape are okay       Past Medical History:   Diagnosis Date    Allergic rhinitis     Asthma     Back pain     Green's esophagus with esophagitis     Cervical radiculopathy     Chronic sinusitis 5/15/2012    Chronic sinusitis with recurrent bronchitis     DNS (deviated nasal septum)     Empyema     Foraminal stenosis of cervical region     C4-C5    GERD (gastroesophageal reflux disease)     Dr Amarilis Whitten Hx MRSA infection 8/11/2014    Hypertension     Hypertriglyceridemia     Insulin resistance 4/9/2012    Left knee pain     Leg pain, bilateral     Lichen planus     Menopause     Age 51    MRSA (methicillin resistant Staphylococcus aureus) infection 2008    left lung    Restless leg syndrome     Rheumatoid arthritis ([address]     Spinal stenosis of cervical region     C4-C5 and C5-C6    TMJ syndrome     Wears glasses     and contacts       Past Surgical History:   Procedure Laterality Date    HX CERVICAL FUSION  08/13/14    HX LUMBAR LAMINECTOMY  Nov 1995    [facility]    HX LUMBAR LAMINECTOMY  Feb 1997    [facility]  1/2015    bunion removal from right foot    HX OTHER SURGICAL  2007    thoracentesis    HX OTHER SURGICAL  2008    chest tube    HX OTHER SURGICAL  1997    TMJ surgery    HX OTHER SURGICAL  2012    sinus surg 2012-Dr Leonidas Marin.  HX TUBAL LIGATION      DC COLONOSCOPY FLX DX W/COLLJ SPEC WHEN PFRMD  6-18-08    normal/duntemann       Family History   Problem Relation Age of Onset    Hypertension Mother    [facility] Diabetes Mother    [facility] Arthritis-rheumatoid Sister     Other Sister      Lupus    Cancer Sister 35     [place], uterus    Other Brother      myocarditis    Heart Attack Brother     Heart Disease Brother     Heart Surgery Brother     Coronary Artery Disease Father     Hypertension Father     Cancer Sister 48     [place], breast    Breast Cancer Sister      Estrogen based    Diabetes Brother     Stroke Maternal Grandmother        Social History   Substance Use Topics    Smoking status: Never Smoker    Smokeless tobacco: Never Used    Alcohol use 0.0 oz/week     0 Standard drinks or equivalent per week      Comment: 0-6 per year       ROS   Review of Systems   Constitutional: Negative for chills and fever. HENT: Negative for ear pain and sore throat. Eyes: Negative for blurred vision and pain.    Respiratory: Negative for cough and shortness of breath. Cardiovascular: Negative for chest pain. Gastrointestinal: Negative for abdominal pain, blood in stool and melena. Genitourinary: Negative for dysuria and hematuria. Musculoskeletal: Positive for joint pain, myalgias and neck pain. Skin: Negative for rash. Neurological: Positive for tingling. Negative for focal weakness and headaches. Endo/Heme/Allergies: Does not bruise/bleed easily. Psychiatric/Behavioral: Negative for substance abuse. Objective     Vitals:    05/17/18 0830   BP: 124/87   Pulse: 87   Resp: 14   Temp: 98 °F (36.7 °C)   TempSrc: Oral   SpO2: 94%   Weight: 195 lb 12.8 oz (88.8 kg)   Height: 5' 10\" (1.778 m)   PainSc:   3   PainLoc: Back       Physical Exam   Constitutional: She is oriented to person, place, and time and well-developed, well-nourished, and in no distress. HENT:   Head: Normocephalic and atraumatic. Right Ear: External ear normal.   Left Ear: External ear normal.   Nose: Nose normal.   Mouth/Throat: Oropharynx is clear and moist. No oropharyngeal exudate. Eyes: Conjunctivae and EOM are normal. Pupils are equal, round, and reactive to light. Right eye exhibits no discharge. Left eye exhibits no discharge. No scleral icterus. Neck: Neck supple. Cardiovascular: Normal rate, regular rhythm, normal heart sounds and intact distal pulses. Exam reveals no gallop and no friction rub. No murmur heard. Pulmonary/Chest: Effort normal and breath sounds normal. No respiratory distress. She has no wheezes. She has no rales. Abdominal: Soft. Bowel sounds are normal. She exhibits no distension. There is no tenderness. There is no rebound and no guarding. Musculoskeletal: She exhibits no edema (BUE) or tenderness (BUE are NTTP except along b/l first digit MCP jts, b/l wrist jts). She has some discomfort with some of the range of motion exercises along her right shoulder.   She has some tenderness palpation along her trapezius muscle on the right side. Her spinous processes along her cervical spine are nontender to palpation. Lymphadenopathy:     She has no cervical adenopathy. Neurological: She is alert and oriented to person, place, and time. She exhibits normal muscle tone. Gait normal.   5 out of 5 bilateral  strength. Skin: Skin is warm and dry. No erythema. Psychiatric: Affect normal.   Nursing note and vitals reviewed. LABS   Data Review:   Lab Results   Component Value Date/Time    WBC 7.0 05/10/2018 09:16 AM    HGB 14.2 05/10/2018 09:16 AM    HCT 45.3 (H) 05/10/2018 09:16 AM    PLATELET 615 66/25/7080 09:16 AM    MCV 97.0 05/10/2018 09:16 AM       Lab Results   Component Value Date/Time    Sodium 144 05/10/2018 09:16 AM    Potassium 4.1 05/10/2018 09:16 AM    Chloride 111 (H) 05/10/2018 09:16 AM    CO2 24 05/10/2018 09:16 AM    Anion gap 9 05/10/2018 09:16 AM    Glucose 110 (H) 05/10/2018 09:16 AM    BUN 12 05/10/2018 09:16 AM    Creatinine 0.70 05/10/2018 09:16 AM    BUN/Creatinine ratio 17 05/10/2018 09:16 AM    GFR est AA >60 05/10/2018 09:16 AM    GFR est non-AA >60 05/10/2018 09:16 AM    Calcium 9.0 05/10/2018 09:16 AM       Lab Results   Component Value Date/Time    Cholesterol, total 153 05/10/2018 09:16 AM    HDL Cholesterol 43 05/10/2018 09:16 AM    LDL, calculated 78.8 05/10/2018 09:16 AM    VLDL, calculated 31.2 05/10/2018 09:16 AM    Triglyceride 156 (H) 05/10/2018 09:16 AM    CHOL/HDL Ratio 3.6 05/10/2018 09:16 AM       Lab Results   Component Value Date/Time    Hemoglobin A1c 5.6 05/10/2018 09:16 AM       Assessment/Plan:   1. Impaired glucose tolerance, HLD, and HTN: Stable. -Continue with medication as prescribed.  -I encouraged her to maintain a heart healthy diet, limiting her caloric intake as a means of reducing her weight and cardiovascular risk. I will recheck her weight at her follow-up appointment.     2. Vitamin D deficiency: Level is 20.  -The patient does not want to take prescription strength vitamin D.  Prescription strength vitamin D made her feel constipated in the past.  She will try over-the-counter vitamin D.    3. Acute pain of right shoulder: Pain is likely multifactorial. She likely has a rotator cuff tendinopathy but no complete tear (given her good ROM on PE today). She also likely has DJD. There is no effusion present so I do not suspect that her RA is causing the majority of her pain symptoms here. +H/o RA. -Ordering a right shoulder x-ray series.  -Placing a referral to orthopedics. Pt may likely benefit from a steroid injection  - C/w Rheumatology team f/u  -Ordering a small course of steroids for multiple issues, including her history of cervical spinal stenosis. ORDERS:  - XR SHOULDER RT AP/LAT MIN 2 V; Future  - REFERRAL TO ORTHOPEDIC SURGERY  - methylPREDNISolone (MEDROL DOSEPACK) 4 mg tablet; Take as directed by the package instructions  Dispense: 1 Dose Pack; Refill: 0    4. Cervical stenosis of spine Hx: S/p fusion surgery.  -I encouraged her to schedule an appointment with Neo Evans.  -Ordering a cervical spine series.  -Ordering a small course of steroids for symptomatic relief. ORDERS:  - XR SPINE CERV 4 OR 5 V; Future  - methylPREDNISolone (MEDROL DOSEPACK) 4 mg tablet; Take as directed by the package instructions  Dispense: 1 Dose Pack; Refill: 0    5. Health Maintenance:  -Ordering a mammogram to screen for breast cancer.  -The patient declined getting a colonoscopy until her follow-up appointment in 6 months. ORDERS:  - [facility] MAMMO BI SCREENING INCL CAD; Future      Health Maintenance Due   Topic Date Due    BREAST CANCER SCRN MAMMOGRAM  08/23/2018     Lab review: labs are reviewed in the EHR    I have discussed the diagnosis with the patient and the intended plan as seen in the above orders. The patient has received an after-visit summary and questions were answered concerning future plans.   I have discussed medication side effects and warnings with the patient as well. I have reviewed the plan of care with the patient, accepted their input and they are in agreement with the treatment goals. All questions were answered. The patient understands the plan of care. Handouts provided today with above information. Pt instructed if symptoms worsen to call the office or report to the ED for continued care. Greater than 50% of the visit time was spent in counseling and/or coordination of care. Follow-up Disposition:  Return in about 6 months (around 11/17/2018) for BP check.     Lisette Rowan MD

## 2018-05-17 NOTE — PROGRESS NOTES
Chief Complaint   Patient presents with    Hypertension     6 month follow up    Cholesterol Problem     follow up    Labs     done 5-10-18 to discuss    Arm Pain     x 6 weeks right arm pain     1. Have you been to the ER, urgent care clinic since your last visit? Hospitalized since your last visit? No    2. Have you seen or consulted any other health care providers outside of the [facility] since your last visit? Include any pap smears or colon screening.  No

## 2018-05-17 NOTE — PATIENT INSTRUCTIONS
Health Maintenance Due   Topic Date Due    COLONOSCOPY  06/18/2018    BREAST CANCER SCRN MAMMOGRAM  08/23/2018

## 2018-05-17 NOTE — MR AVS SNAPSHOT
[address] [facility] [address] 
 
 
 [address], [address] [facility] [address] 
[phone] Patient: Yue Saunders MRN: [id number] :1955 Visit Information Date & Time Provider Department Dept. Phone Encounter #  
 2018  8:30 AM Mee Casey MD [facility] [phone] [id number] Follow-up Instructions Return in about 6 months (around 2018) for BP check. Your Appointments 2018  8:15 AM  
Follow Up with Noel Hall MD  
[facility] Appt Note: rt knee 4wk fu  
 [address], [address] [facility] [address]  
[phone] [address] [facility], [address]  
  
    
 2018  8:00 AM  
Office Visit with Mee Casey MD  
[facility]) Appt Note: 6 month f/u  
 [address] [facility], [address] [address] [address]  
  
   
 [address], [address] Upcoming Health Maintenance Date Due COLONOSCOPY 2018 BREAST CANCER SCRN MAMMOGRAM 2018 PAP AKA CERVICAL CYTOLOGY 2020 DTaP/Tdap/Td series (2 - Td) 3/3/2027 Allergies as of 2018  Review Complete On: 2018 By: Basia John Severity Noted Reaction Type Reactions Sulfasalazine High 2016    Other (comments) Could not taste anything, lightheadedness Adhesive Tape-silicones      Other (comments) Other Plant, Animal, Environmental    Unable to Consolidated Doc MOLD Tape [Adhesive]  2014    Other (comments) Blisters, use paper tape only Patient states tegaderm and paper tape are okay Current Immunizations  Reviewed on 2016 Name Date Hepatitis B Vaccine 2009 Influenza High Dose Vaccine PF 10/3/2017 Influenza Vaccine 10/22/2015, 10/9/2014 Influenza Vaccine Whole 2013 Pneumococcal Polysaccharide (PPSV-23) 7/28/2016 Td 8/1/2011 Tdap 3/3/2017  4:21 PM, 7/28/2016 Not reviewed this visit You Were Diagnosed With   
  
 Codes Comments Essential hypertension    -  Primary ICD-10-CM: I10 
ICD-9-CM: 401.9 Impaired glucose tolerance     ICD-10-CM: R73.02 
ICD-9-CM: 790.22 Hypertriglyceridemia     ICD-10-CM: E78.1 ICD-9-CM: 272.1 Vitamin D deficiency     ICD-10-CM: E55.9 ICD-9-CM: 268.9 Acute pain of right shoulder     ICD-10-CM: M25.511 ICD-9-CM: 719.41 Cervical stenosis of spine     ICD-10-CM: M48.02 
ICD-9-CM: 723.0 Encounter for screening mammogram for breast cancer     ICD-10-CM: Z12.31 
ICD-9-CM: V76.12 Rheumatoid arthritis involving multiple sites, unspecified rheumatoid factor presence ([facility]     ICD-10-CM: M06.9 ICD-9-CM: 714.0 Vitals BP Pulse Temp Resp Height(growth percentile) Weight(growth percentile) 124/87 (BP 1 Location: Left arm, BP Patient Position: Sitting) 87 98 °F (36.7 °C) (Oral) 14 5' 10\" (1.778 m) 195 lb 12.8 oz (88.8 kg) SpO2 BMI OB Status Smoking Status 94% 28.09 kg/m2 Postmenopausal Never Smoker BMI and BSA Data Body Mass Index Body Surface Area 28.09 kg/m 2 2.09 m 2 Preferred Pharmacy Pharmacy Name Phone [facility], [address] [place] [address] [phone] Your Updated Medication List  
  
   
This list is accurate as of 5/17/18  9:04 AM.  Always use your most recent med list.  
  
  
  
  
 albuterol 90 mcg/actuation inhaler Commonly known as:  PROVENTIL HFA, VENTOLIN HFA, PROAIR HFA Take 2 Puffs by inhalation every six (6) hours as needed. ALEVE 220 mg Cap Generic drug:  naproxen sodium Take 220 mg by mouth daily as needed. allergy injection  
by SubCUTAneous route every thirty (30) days. ascorbic acid (vitamin C) 500 mg tablet Commonly known as:  VITAMIN C Take 2 Tabs by mouth daily. calcium carbonate 200 mg calcium (500 mg) Chew Commonly known as:  TUMS Take 1 Tab by mouth four (4) times daily. clobetasol 0.05 % ointment Commonly known as:  Brenda Bharath Apply  to affected area two (2) times a week. cyanocobalamin 1,000 mcg tablet  
take 1 tablet by mouth once daily DEXILANT 60 mg Cpdb Generic drug:  Dexlansoprazole Take 1 Cap by mouth Daily (before dinner). ESTRACE 0.01 % (0.1 mg/gram) vaginal cream  
Generic drug:  estradiol Place 1 gm in the vagina twice weekly  
  
 ferrous sulfate 325 mg (65 mg iron) tablet  
take 1 tablet by mouth once daily before BREAKFAST  
  
 folic acid 1 mg tablet Commonly known as:  FOLVITE  
take 1 tablet by mouth once daily  
  
 ibuprofen 200 mg tablet Commonly known as:  MOTRIN Take  by mouth.  
  
 losartan 50 mg tablet Commonly known as:  COZAAR  
TAKE 1 TABLET DAILY  
  
 methylPREDNISolone 4 mg tablet Commonly known as:  Malini Dumont Take as directed by the package instructions RASUVO (PF) 20 mg/0.4 mL Atin Generic drug:  methotrexate (PF) 20 mg by Injection route every seven (7) days. SINGULAIR 10 mg tablet Generic drug:  montelukast  
Take 10 mg by mouth daily. SYMBICORT 160-4.5 mcg/actuation Hfaa Generic drug:  budesonide-formoterol Take 2 Puffs by inhalation two (2) times a day. triamcinolone 55 mcg nasal inhaler Commonly known as:  NASACORT AQ  
2 Sprays by Both Nostrils route daily. Indications: PERENNIAL ALLERGIC RHINITIS  
  
 TYLENOL 325 mg tablet Generic drug:  acetaminophen Take  by mouth every four (4) hours as needed for Pain. ZyrTEC 10 mg Cap Generic drug:  Cetirizine Take 10 mg by mouth daily. Prescriptions Sent to Pharmacy Refills  
 methylPREDNISolone (MEDROL DOSEPACK) 4 mg tablet 0 Sig: Take as directed by the package instructions  Class: Normal  
 Pharmacy: [id number], [address] [facility] #: [phone] We Performed the Following REFERRAL TO ORTHOPEDIC SURGERY [REF62 Custom] Follow-up Instructions Return in about 6 months (around 11/17/2018) for BP check. To-Do List   
 05/17/2018 Imaging:  XR SHOULDER RT AP/LAT MIN 2 V   
  
 05/17/2018 Imaging:  XR SPINE CERV 4 OR 5 V   
  
 08/27/2018 Imaging:  [facility] MAMMO BI SCREENING INCL CAD Referral Information Referral ID Referred By Referred To  
  
 [id number] Cely Meredith MD   
   [address] [facility] [address] Phone: [phone] Fax: [phone] Visits Status Start Date End Date 1 New Request 5/17/18 5/17/19 If your referral has a status of pending review or denied, additional information will be sent to support the outcome of this decision. Patient Instructions Health Maintenance Due Topic Date Due  
 COLONOSCOPY  06/18/2018  BREAST CANCER SCRN MAMMOGRAM  08/23/2018 [facility]! Dear Irene Foster: Thank you for requesting a [employer] account. Our records indicate that you already have an active [employer] account. You can access your account anytime at [email or web address] Did you know that you can access your hospital and ER discharge instructions at any time in [employer]? You can also review all of your test results from your hospital stay or ER visit. Additional Information If you have questions, please visit the Frequently Asked Questions section of the [employer] website at [email or web address] Remember, [employer] is NOT to be used for urgent needs. For medical emergencies, dial 911. Now available from your iPhone and Android! Please provide this summary of care documentation to your next provider. Your primary care clinician is listed as Murphy Yee. If you have any questions after today's visit, please call [phone].

## 2018-05-18 NOTE — PROGRESS NOTES
Please let her know that her xrays show underlying facet arthropathy along her cervical spine but such findings are not consistent with the source of her pain reported at her apt yesterday. Her shoulder xray shows severe right AC joint arthritis and this is likely the source of her pain that she reported yesterday. She needs to f/u with [facility], who I referred her to yesterday for evaluation of right shoulder arthritis.     Dr. Chelsey Zuniga  [facility] [address], [address] [place] [address] Str.  Phone: [phone]  Fax: [phone]

## 2018-05-29 ENCOUNTER — OFFICE VISIT (OUTPATIENT)
Dept: ORTHOPEDIC SURGERY | Age: 63
End: 2018-05-29

## 2018-05-29 VITALS
RESPIRATION RATE: 16 BRPM | DIASTOLIC BLOOD PRESSURE: 83 MMHG | WEIGHT: 197.2 LBS | OXYGEN SATURATION: 96 % | HEIGHT: 70 IN | TEMPERATURE: 96.8 F | HEART RATE: 96 BPM | SYSTOLIC BLOOD PRESSURE: 138 MMHG | BODY MASS INDEX: 28.23 KG/M2

## 2018-05-29 DIAGNOSIS — M54.2 NECK PAIN: ICD-10-CM

## 2018-05-29 DIAGNOSIS — M54.12 CERVICAL RADICULOPATHY: ICD-10-CM

## 2018-05-29 DIAGNOSIS — M75.51 SUBACROMIAL BURSITIS OF RIGHT SHOULDER JOINT: Primary | ICD-10-CM

## 2018-05-29 DIAGNOSIS — M50.90 CERVICAL DISC DISEASE: ICD-10-CM

## 2018-05-29 RX ORDER — METHOTREXATE 25 MG/ML
INJECTION INTRA-ARTERIAL; INTRAMUSCULAR; INTRATHECAL; INTRAVENOUS
COMMUNITY
End: 2019-05-31 | Stop reason: SDUPTHER

## 2018-05-29 RX ORDER — METAXALONE 800 MG/1
800 TABLET ORAL 3 TIMES DAILY
Qty: 60 TAB | Refills: 0 | Status: SHIPPED | OUTPATIENT
Start: 2018-05-29 | End: 2018-08-14

## 2018-05-29 RX ORDER — TRIAMCINOLONE ACETONIDE 40 MG/ML
40 INJECTION, SUSPENSION INTRA-ARTICULAR; INTRAMUSCULAR ONCE
Qty: 1 ML | Refills: 0
Start: 2018-05-29 | End: 2018-05-29

## 2018-05-29 NOTE — PROGRESS NOTES
Ly Frias  1955   Chief Complaint   Patient presents with    Shoulder Pain     R SHOULDER PAIN         HISTORY OF PRESENT ILLNESS  Ly Frias is a 60 y.o. female who presents today for evaluation of right shoulder pain. Patient was referred by Dr. Hema Rodriguez. she rates her pain 1/10 today. Pain has been present for about one month. H/o of neck surgery by Dr. Lj Teague in August 2017. Cannot recall an injury. Describes pain radiating down her arm. She states that she has pain with pulling the covers over herself in the bicep area. Patient describes the pain as aching and radiating that is Intermittent in nature. Symptoms are worse with certain movements of the arm, vacuuming, Activity and is better with  Rest. Associated symptoms include numbness, tingling. Since problem started, it: has worsened. Pain does wake patient up at night. Has taken no meds for the problem. She has RA and takes methotrexate. Has tried following treatments: Injections:NO; Brace:NO;  Therapy:NO; Cane/Crutch:NO       Allergies   Allergen Reactions    Sulfasalazine Other (comments)     Could not taste anything, lightheadedness    Adhesive Tape-Silicones Other (comments)    Other Plant, Animal, Environmental Unable to Obtain     MOLD    Tape [Adhesive] Other (comments)     Blisters, use paper tape only  Patient states tegaderm and paper tape are okay        Past Medical History:   Diagnosis Date    Allergic rhinitis     Asthma     Back pain     Green's esophagus with esophagitis     Cervical radiculopathy     Chronic sinusitis 5/15/2012    Chronic sinusitis with recurrent bronchitis     DNS (deviated nasal septum)     Empyema     Foraminal stenosis of cervical region     C4-C5    GERD (gastroesophageal reflux disease)     Dr Radha Rios    Hx MRSA infection 8/11/2014    Hypertension     Hypertriglyceridemia     Insulin resistance 4/9/2012    Left knee pain     Leg pain, bilateral     Lichen planus     Menopause     Age 51    MRSA (methicillin resistant Staphylococcus aureus) infection 2008    left lung    Restless leg syndrome     Rheumatoid arthritis ([address]     Spinal stenosis of cervical region     C4-C5 and C5-C6    TMJ syndrome     Wears glasses     and contacts      Social History     Social History    Marital status:      Spouse name: N/A    Number of children: N/A    Years of education: N/A     Occupational History    Not on file. Social History Main Topics    Smoking status: Never Smoker    Smokeless tobacco: Never Used    Alcohol use 0.0 oz/week     0 Standard drinks or equivalent per week      Comment: 0-6 per year    Drug use: Yes     Special: Prescription, OTC    Sexual activity: Yes     Partners: Male     Other Topics Concern    Not on file     Social History Narrative    Retired       Past Surgical History:   Procedure Laterality Date    HX CERVICAL FUSION  08/13/14    HX LUMBAR LAMINECTOMY  Nov 1995    [facility]    HX LUMBAR LAMINECTOMY  Feb 1997    [facility]  1/2015    bunion removal from right foot    HX OTHER SURGICAL  2007    thoracentesis    HX OTHER SURGICAL  2008    chest tube    HX OTHER SURGICAL  1997    TMJ surgery    HX OTHER SURGICAL  2012    sinus surg 2012-Dr Lizeth Gaspar.     HX TUBAL LIGATION      [place] COLONOSCOPY FLX DX W/COLLJ SPEC WHEN PFRMD  6-18-08    normal/duntemann      Family History   Problem Relation Age of Onset    Hypertension Mother    Aide Davis Diabetes Mother    Aide Davis Arthritis-rheumatoid Sister     Other Sister      Lupus    Cancer Sister 35     CA, uterus    Other Brother      myocarditis    Heart Attack Brother     Heart Disease Brother     Heart Surgery Brother     Coronary Artery Disease Father     Hypertension Father     Cancer Sister 48     CA, breast    Breast Cancer Sister      Estrogen based    Diabetes Brother     Stroke Maternal Grandmother       Current Outpatient Prescriptions Medication Sig    methotrexate, PF, 25 mg/mL injection once.  metaxalone (SKELAXIN) 800 mg tablet Take 1 Tab by mouth three (3) times daily.  triamcinolone acetonide (KENALOG) 40 mg/mL injection 1 mL by IntraMUSCular route once for 1 dose.  ibuprofen (MOTRIN) 200 mg tablet Take  by mouth.  ferrous sulfate 325 mg (65 mg iron) tablet take 1 tablet by mouth once daily before BREAKFAST    cyanocobalamin 1,000 mcg tablet take 1 tablet by mouth once daily    ascorbic acid, vitamin C, (VITAMIN C) 500 mg tablet Take 2 Tabs by mouth daily.  naproxen sodium (ALEVE) 220 mg cap Take 220 mg by mouth daily as needed.  clobetasol (TEMOVATE) 0.05 % ointment Apply  to affected area two (2) times a week.  ESTRACE 0.01 % (0.1 mg/gram) vaginal cream Place 1 gm in the vagina twice weekly    losartan (COZAAR) 50 mg tablet TAKE 1 TABLET DAILY    folic acid (FOLVITE) 1 mg tablet take 1 tablet by mouth once daily    triamcinolone (NASACORT AQ) 55 mcg nasal inhaler 2 Sprays by Both Nostrils route daily. Indications: PERENNIAL ALLERGIC RHINITIS    Dexlansoprazole (DEXILANT) 60 mg CpDM Take 1 Cap by mouth Daily (before dinner).  calcium carbonate (TUMS) 200 mg calcium (500 mg) Chew Take 1 Tab by mouth four (4) times daily.  Cetirizine (ZYRTEC) 10 mg Cap Take 10 mg by mouth daily.  montelukast (SINGULAIR) 10 mg tablet Take 10 mg by mouth daily.  allergy injection by SubCUTAneous route every thirty (30) days.  budesonide-formoterol (SYMBICORT) 160-4.5 mcg/Actuation HFA inhaler Take 2 Puffs by inhalation two (2) times a day.  albuterol (PROVENTIL HFA, VENTOLIN HFA) 90 mcg/Actuation inhaler Take 2 Puffs by inhalation every six (6) hours as needed.  methylPREDNISolone (MEDROL DOSEPACK) 4 mg tablet Take as directed by the package instructions    RASUVO, PF, 20 mg/0.4 mL atIn 20 mg by Injection route every seven (7) days.     acetaminophen (TYLENOL) 325 mg tablet Take  by mouth every four (4) hours as needed for Pain. No current facility-administered medications for this visit. REVIEW OF SYSTEM   Patient denies: Weight loss, Fever/Chills, HA, Visual changes, Fatigue, Chest pain, SOB, Abdominal pain, N/V/D/C, Blood in stool or urine, Edema. Pertinent positive as above in HPI. All others were negative    PHYSICAL EXAM:   Visit Vitals    /83    Pulse 96    Temp 96.8 °F (36 °C) (Oral)    Resp 16    Ht 5' 10\" (1.778 m)    Wt 197 lb 3.2 oz (89.4 kg)    SpO2 96%    BMI 28.3 kg/m2     The patient is a well-developed, well-nourished female   in no acute distress. The patient is alert and oriented times three. The patient is alert and oriented times three. Mood and affect are normal.  LYMPHATIC: lymph nodes are not enlarged and are within normal limits  SKIN: normal in color and non tender to palpation. There are no bruises or abrasions noted. NEUROLOGICAL: Motor sensory exam is within normal limits. Reflexes are equal bilaterally. There is normal sensation to pinprick and light touch  MUSCULOSKELETAL:  Examination Right shoulder   Skin Intact   AC joint tenderness -   Biceps tenderness -   Forward flexion/Elevation    Active abduction    Glenohumeral abduction 90   External rotation ROM 45   Internal rotation ROM 30   Apprehension -   Willards Relocation -   Jerk -   Load and Shift -   Obriens -   Speeds -   Impingement sign +   Supraspinatus/Empty Can -, 5/5   External Rotation Strength -, 5/5   Lift Off/Belly Press -, 5/5   Neurovascular Intact       PROCEDURE: Right shoulder Injection with Ultrasound Guidance      After sterile prep, 6 cc of Xylocaine and 1 cc of Kenalog were injected into the right shoulder. Ultrasound images captured using [employer] Ultrasound machine and scanned into patient's chart.        [facility] - [facility]  OFFICE PROCEDURE PROGRESS NOTE        Chart reviewed for the following:  Krystal Kunz M.D, have reviewed the History, Physical and updated the Allergic reactions for Kevin Hernandez performed immediately prior to start of procedure:  IFrances M.D, have performed the following reviews on [employer] prior to the start of the procedure:            * Patient was identified by name and date of birth   * Agreement on procedure being performed was verified  * Risks and Benefits explained to the patient  * Procedure site verified and marked as necessary  * Patient was positioned for comfort  * Consent was signed and verified     Time: 8:47 AM     Date of procedure: 5/29/2018    Procedure performed by:  Frances Moy M.D    Provider assisted by: (see medication administration)    How tolerated by patient: tolerated the procedure well with no complications    Comments: none      IMAGING:   XR of the right shoulder dated 5/17/18 was reviewed and read:   IMPRESSION:  1. No acute pathology appreciated in the right shoulder. 2.  Mild glenohumeral osteoarthritis and moderate to severe right AC joint  osteoarthritis. XR of the cervical spine dated 5/17/18 was reviewed and read: Impression:  1. Completed cervical fusion spanning C4-C5-C6 segment with anteriorly located  fusion devices. 2.  Degenerative changes in the form of facet arthropathy. Facet arthropathy is  most pronounced on the left side at C3-4 producing foraminal narrowing. Mild  uncovertebral osteophyte on the left side at C3. IMPRESSION:      ICD-10-CM ICD-9-CM    1. Subacromial bursitis M75.50 726.19 TRIAMCINOLONE ACETONIDE INJ      triamcinolone acetonide (KENALOG) 40 mg/mL injection      US GUIDE INJ/ASP/ARTHRO LG JNT/BURSA   2.  Neck pain M54.2 723.1 metaxalone (SKELAXIN) 800 mg tablet      REFERRAL TO SPINE SURGERY   3. Cervical radiculopathy M54.12 723.4 metaxalone (SKELAXIN) 800 mg tablet      REFERRAL TO SPINE SURGERY   4. Cervical disc disease M50.90 722.91 metaxalone (SKELAXIN) 800 mg tablet      REFERRAL TO SPINE SURGERY        PLAN:  1. Patient's symptoms appear to be coming from her neck with a shoulder component. Patient would like a copy of her office note sent to Dr. Alicia Leyva. Risk factors include: htn  2. No ultrasound exam indicated today  3. Yes cortisone injection indicated today R SHOULDER, US  4. No Physical/Occupational Therapy indicated today  5. No diagnostic test indicated today:   6. No durable medical equipment indicated today  7. Yes referral indicated today [place]  8. Yes medications indicated today: SKELAXIN  9. No Narcotic indicated today       RTC 4 weeks  Follow-up Disposition: Not on File  Office note will be sent to referring provider. (Dr. Margaret Freitas and Dr. Alicia Leyva)    Scribed by [facility]) as dictated by Iron Guerra MD    I, Dr. Iron Guerra, confirm that all documentation is accurate.     Iron Guerra M.D.   [facility]

## 2018-05-30 ENCOUNTER — OFFICE VISIT (OUTPATIENT)
Dept: ORTHOPEDIC SURGERY | Age: 63
End: 2018-05-30

## 2018-05-30 ENCOUNTER — TELEPHONE (OUTPATIENT)
Dept: ORTHOPEDIC SURGERY | Age: 63
End: 2018-05-30

## 2018-05-30 VITALS
WEIGHT: 195 LBS | DIASTOLIC BLOOD PRESSURE: 81 MMHG | HEIGHT: 70 IN | BODY MASS INDEX: 27.92 KG/M2 | OXYGEN SATURATION: 97 % | HEART RATE: 70 BPM | SYSTOLIC BLOOD PRESSURE: 141 MMHG

## 2018-05-30 DIAGNOSIS — M54.12 RIGHT CERVICAL RADICULOPATHY: Primary | ICD-10-CM

## 2018-05-30 RX ORDER — GABAPENTIN 300 MG/1
CAPSULE ORAL
Qty: 60 CAP | Refills: 1 | Status: SHIPPED | OUTPATIENT
Start: 2018-05-30 | End: 2018-08-14

## 2018-05-30 NOTE — TELEPHONE ENCOUNTER
Patient checked with her ins and she can go any where for the CT Scan. She wants to go to [facility].  Please call patient to schedule the date of the CT at [phone]

## 2018-05-30 NOTE — PATIENT INSTRUCTIONS
Pinched Nerve in the Neck: Care Instructions  Your Care Instructions  A pinched nerve in the neck happens when a vertebra or disc in the upper part of your spine is damaged. This damage can happen because of an injury. Or it can just happen with age. The changes caused by the damage may put pressure on a nearby nerve root, pinching it. This causes symptoms such as sharp pain in your neck, shoulder, arm, hand, or back. You may also have tingling or numbness. Sometimes it makes your arm weaker. The symptoms are usually worse when you turn your head or strain your neck. For many people, the symptoms get better over time and finally go away. Early treatment usually includes medicines for pain and swelling. Sometimes physical therapy and special exercises may help. Follow-up care is a key part of your treatment and safety. Be sure to make and go to all appointments, and call your doctor if you are having problems. It's also a good idea to know your test results and keep a list of the medicines you take. How can you care for yourself at home? · Be safe with medicines. Read and follow all instructions on the label. ¨ If the doctor gave you a prescription medicine for pain, take it as prescribed. ¨ If you are not taking a prescription pain medicine, ask your doctor if you can take an over-the-counter medicine. · Try using a heating pad on a low or medium setting for 15 to 20 minutes every 2 or 3 hours. Try a warm shower in place of one session with the heating pad. You can also buy single-use heat wraps that last up to 8 hours. · You can also try an ice pack for 10 to 15 minutes every 2 to 3 hours. There isn't strong evidence that either heat or ice will help. But you can try them to see if they help you. · Don't spend too long in one position. Take short breaks to move around and change positions. · Wear a seat belt and shoulder harness when you are in a car.   · Sleep with a pillow under your head and neck that keeps your neck straight. · If you were given a neck brace (cervical collar) to limit neck motion, wear it as instructed for as many days as your doctor tells you to. Do not wear it longer than you were told to. Wearing a brace for too long can lead to neck stiffness and can weaken the neck muscles. · Follow your doctor's instructions for gentle neck-stretching exercises. · Do not smoke. Smoking can slow healing of your discs. If you need help quitting, talk to your doctor about stop-smoking programs and medicines. These can increase your chances of quitting for good. · Avoid strenuous work or exercise until your doctor says it is okay. When should you call for help? Call 911 anytime you think you may need emergency care. For example, call if:  ? · You are unable to move an arm or a leg at all. ?Call your doctor now or seek immediate medical care if:  ? · You have new or worse symptoms in your arms, legs, chest, belly, or buttocks. Symptoms may include:  ¨ Numbness or tingling. ¨ Weakness. ¨ Pain. ? · You lose bladder or bowel control. ? Watch closely for changes in your health, and be sure to contact your doctor if:  ? · You are not getting better as expected. Where can you learn more? Go to http://june-yasmeen.info/. Enter N847 in the search box to learn more about \"Pinched Nerve in the Neck: Care Instructions. \"  Current as of: March 21, 2017  Content Version: 11.4  © 4386-6817 [employer]. Care instructions adapted under license by [employer] (which disclaims liability or warranty for this information). If you have questions about a medical condition or this instruction, always ask your healthcare professional. [address] any warranty or liability for your use of this information.

## 2018-05-30 NOTE — PROGRESS NOTES
Nisreen Gallardo [facility] [address].  [address], [address]  [facility], [address]  Phone: [phone]  Fax: [phone]        Rip Watson  : 1955  PCP: Mary Kate Gutierrez MD    PROGRESS NOTE      ASSESSMENT AND PLAN    Diagnoses and all orders for this visit:    1. Right cervical radiculopathy  -     gabapentin (NEURONTIN) 300 mg capsule; Take 1 to 2 tabs po QHS as needed for nerve pain  -     CT SPINE CERV WO CONT; Future    1. Advised to continue HEP. 2. Cervical spine MRI. 3. Trial of Gabapentin. 4. Given information on cervical radiculopathy. Follow-up Disposition:  Return for MRI/CT f/u. HISTORY OF PRESENT ILLNESS  Hilda Parsons is a 60 y.o. female. Pt was last seen in the office by CHULA Lopez 2017 for back pain. She has been doing well with her back pain ad SCS-placed  for lumbar neuritis. She had one incident when her battery was not charged but is paying more attention to this now. Pt reports pain does not radiate into the BLE. She has some paresthesias in her feet. She was referred back to the office at the request of Dr. Mary White for RUE pain. She was seen by Dr. Mary White yesterday for her R shoulder pain but he felt she was having more nerve pain than shoulder pain. Hx ACDF C4/5/6  . She reports a month of duration of pain. She reports sudden onset of pain without injury. She states that when her pain started she was just walking through [address] with spouse. She does not get much neck pain but will get intermittent pain in the R side of her neck at times. This pain does not bother her much. She states that the last time she vacuumed, she experienced shooting pain into her entire RUE that night. She has numbness/diminished sensation in her hand. Her pain will interrupt her sleep at night. Pt denies saddle paresthesias. Pt states she has been using MDP with some relief. Had R shoulder cortisone injection by Dr. Mary White yesterday. She has not picked up Skelaxin from the pharmacy. Denies persistent fevers, chills, weight changes, neurogenic bowel or bladder symptoms. Pt denies recent ED visits or hospitalizations.  reviewed. Has SCS placed, does well with this. Is seeing Dr. Jean-Cladue Feliz for R knee OA. She notes that she has RA and has been off of Methotrexate. H/o of neck surgery by Dr. Lucero Lemon in 2014. XR of the cervical spine dated 5/17/18 at Dr. Jojo Kothari office:  Impression:  1.  Completed cervical fusion spanning C4-C5-C6 segment with anteriorly located  fusion devices. 2.  Degenerative changes in the form of facet arthropathy.  Facet arthropathy is  most pronounced on the left side at C3-4 producing foraminal narrowing.  Mild  uncovertebral osteophyte on the left side at C3. Pain Assessment  5/30/2018   Location of Pain Back   Location Modifiers -   Severity of Pain 2   Quality of Pain Aching; Sharp   Quality of Pain Comment -   Duration of Pain -   Frequency of Pain Intermittent   Aggravating Factors (No Data)   Aggravating Factors Comment pain is off and on    Limiting Behavior -   Relieving Factors (No Data)   Relieving Factors Comment pain medication   Result of Injury -       PAST MEDICAL HISTORY   Past Medical History:   Diagnosis Date    Allergic rhinitis     Asthma     Back pain     Green's esophagus with esophagitis     Cervical radiculopathy     Chronic sinusitis 5/15/2012    Chronic sinusitis with recurrent bronchitis     DNS (deviated nasal septum)     Empyema     Foraminal stenosis of cervical region     C4-C5    GERD (gastroesophageal reflux disease)     Dr Nik Stephens    Hx MRSA infection 8/11/2014    Hypertension     Hypertriglyceridemia     Insulin resistance 4/9/2012    Left knee pain     Leg pain, bilateral     Lichen planus     Menopause     Age 51    MRSA (methicillin resistant Staphylococcus aureus) infection 2008    left lung    Restless leg syndrome     Rheumatoid arthritis ([facility] [address]  Spinal cord stimulator status 2016    Spinal stenosis of cervical region     C4-C5 and C5-C6    TMJ syndrome     Wears glasses     and contacts       Past Surgical History:   Procedure Laterality Date    HX CERVICAL FUSION  08/13/14    HX LUMBAR LAMINECTOMY  Nov 1995    [facility]    HX LUMBAR LAMINECTOMY  Feb 1997    [facility]  1/2015    bunion removal from right foot    HX OTHER SURGICAL  2007    thoracentesis    HX OTHER SURGICAL  2008    chest tube    HX OTHER SURGICAL  1997    TMJ surgery    HX OTHER SURGICAL  2012    sinus surg 2012-Dr Jen Luis.  HX OTHER SURGICAL  2016    spinal cord stimulator place for lumbar neuritis, good benefit    HX TUBAL LIGATION      MS COLONOSCOPY FLX DX W/COLLJ SPEC WHEN PFRMD  6-18-08    normal/duntemann   . MEDICATIONS      Current Outpatient Prescriptions   Medication Sig Dispense Refill    gabapentin (NEURONTIN) 300 mg capsule Take 1 to 2 tabs po QHS as needed for nerve pain 60 Cap 1    methotrexate, PF, 25 mg/mL injection once.  metaxalone (SKELAXIN) 800 mg tablet Take 1 Tab by mouth three (3) times daily. 60 Tab 0    ibuprofen (MOTRIN) 200 mg tablet Take  by mouth.  ferrous sulfate 325 mg (65 mg iron) tablet take 1 tablet by mouth once daily before BREAKFAST 90 Tab 3    cyanocobalamin 1,000 mcg tablet take 1 tablet by mouth once daily 90 Tab 3    ascorbic acid, vitamin C, (VITAMIN C) 500 mg tablet Take 2 Tabs by mouth daily. 180 Tab 1    naproxen sodium (ALEVE) 220 mg cap Take 220 mg by mouth daily as needed.  RASUVO, PF, 20 mg/0.4 mL atIn 20 mg by Injection route every seven (7) days.  clobetasol (TEMOVATE) 0.05 % ointment Apply  to affected area two (2) times a week.  15 g 1    ESTRACE 0.01 % (0.1 mg/gram) vaginal cream Place 1 gm in the vagina twice weekly 42.5 g 2    losartan (COZAAR) 50 mg tablet TAKE 1 TABLET DAILY 30 Tab 0    folic acid (FOLVITE) 1 mg tablet take 1 tablet by mouth once daily  0    triamcinolone (NASACORT AQ) 55 mcg nasal inhaler 2 Sprays by Both Nostrils route daily. Indications: PERENNIAL ALLERGIC RHINITIS      Dexlansoprazole (DEXILANT) 60 mg CpDM Take 1 Cap by mouth Daily (before dinner).  calcium carbonate (TUMS) 200 mg calcium (500 mg) Chew Take 1 Tab by mouth four (4) times daily.  Cetirizine (ZYRTEC) 10 mg Cap Take 10 mg by mouth daily.  montelukast (SINGULAIR) 10 mg tablet Take 10 mg by mouth daily.  allergy injection by SubCUTAneous route every thirty (30) days.  budesonide-formoterol (SYMBICORT) 160-4.5 mcg/Actuation HFA inhaler Take 2 Puffs by inhalation two (2) times a day.  albuterol (PROVENTIL HFA, VENTOLIN HFA) 90 mcg/Actuation inhaler Take 2 Puffs by inhalation every six (6) hours as needed.  methylPREDNISolone (MEDROL DOSEPACK) 4 mg tablet Take as directed by the package instructions 1 Dose Pack 0    acetaminophen (TYLENOL) 325 mg tablet Take  by mouth every four (4) hours as needed for Pain. ALLERGIES    Allergies   Allergen Reactions    Sulfasalazine Other (comments)     Could not taste anything, lightheadedness    Adhesive Tape-Silicones Other (comments)    Other Plant, Animal, Environmental Unable to Obtain     MOLD    Tape [Adhesive] Other (comments)     Blisters, use paper tape only  Patient states tegaderm and paper tape are okay          SOCIAL HISTORY    Social History     Social History    Marital status:      Spouse name: N/A    Number of children: N/A    Years of education: N/A     Occupational History    Not on file.      Social History Main Topics    Smoking status: Never Smoker    Smokeless tobacco: Never Used    Alcohol use 0.0 oz/week     0 Standard drinks or equivalent per week      Comment: 0-6 per year    Drug use: Yes     Special: Prescription, OTC    Sexual activity: Yes     Partners: Male     Other Topics Concern    Not on file     Social History Narrative    Retired      Social History Narrative    Retired       Problem Relation Age of Onset    Hypertension Mother     Diabetes Mother    [facility] Arthritis-rheumatoid Sister     Other Sister      Lupus    Cancer Sister 35     CA, uterus    Other Brother      myocarditis    Heart Attack Brother     Heart Disease Brother     Heart Surgery Brother     Coronary Artery Disease Father     Hypertension Father     Cancer Sister 48     CA, breast    Breast Cancer Sister      Estrogen based    Diabetes Brother     Stroke Maternal Grandmother        REVIEW OF SYSTEMS  Review of Systems   Constitutional: Negative for chills, fever and weight loss. Respiratory: Negative for shortness of breath. Cardiovascular: Negative for chest pain. Gastrointestinal: Negative for constipation. Negative for fecal incontinence   Genitourinary: Negative for dysuria. Negative for urinary incontinence   Musculoskeletal:        Per HPI   Skin: Negative for rash. Neurological: Positive for tingling and focal weakness. Negative for dizziness, tremors and headaches. Endo/Heme/Allergies: Does not bruise/bleed easily. Psychiatric/Behavioral: The patient does not have insomnia. PHYSICAL EXAMINATION  Visit Vitals    /81    Pulse 70    Ht 5' 10\" (1.778 m)    Wt 195 lb (88.5 kg)    LMP  (Exact Date)    SpO2 97%    BMI 27.98 kg/m2         Accompanied by self. Constitutional:  Well developed, well nourished, in no acute distress. Psychiatric: Affect and mood are appropriate. Integumentary: No rashes or abrasions noted on exposed areas. Cardiovascular/Peripheral Vascular: Intact l pulses. No peripheral edema is noted. Lymphatic:  No evidence of lymphedema. No cervical lymphadenopathy. SPINE/MUSCULOSKELETAL EXAM    Cervical spine:  Neck is midline. Normal muscle tone. No focal atrophy is noted. Tenderness to palpation R medial scapular border and upper trapezius. Negative Spurling's sign. Negative Tinel's sign. Negative Romano's sign. Diminished sensation to light touch distally RUE. MOTOR:      Biceps  Triceps Deltoids Wrist Ext Wrist Flex Hand Intrin   Right +4/5 +4/5 +4/5 +4/5 +4/5 +4/5   Left +4/5 +4/5 +4/5 +4/5 +4/5 +4/5       Ambulation without assistive device. FWB. No clonus. Written by Courtney Lobo, as dictated by Jeanette Butcher MD.    I, Dr. Jeanette Butcher MD, confirm that all documentation is accurate. Ms. Radha Chaves may have a reminder for a \"due or due soon\" health maintenance. I have asked that she contact her primary care provider for follow-up on this health maintenance.

## 2018-05-30 NOTE — PROGRESS NOTES
Verbal order entered per Dr. Ian Hernández as documented on blue sheet:Gabapentin 300mg take 1 to 2 tabs po QHS prn nerve pain. Disp 60 with 1 refill.  MRI C-spine due to right UE radiculopathy x 2mos, hx sx, hx RA

## 2018-05-30 NOTE — TELEPHONE ENCOUNTER
Looks like Dr. Veronica Hall wants a CS MRI, give this one to Angelia. I know she doesn't get CC messages. Mary Ceja

## 2018-05-30 NOTE — MR AVS SNAPSHOT
[address] [facility] [address] [facility] [address] 
[phone] Patient: Delroy Hammans MRN: [id number] :1955 Visit Information Date & Time Provider Department Dept. Phone Encounter #  
 2018 10:50 AM Cristian Rodriguez MD  [facility] [phone] [id number] Follow-up Instructions Return for MRI/CT f/u. Your Appointments 2018  8:15 AM  
Follow Up with Ryan Lima MD  
[facility] - [address] ([address] [place] [address]) Appt Note: rt knee 4wk fu  
 [address], [facility] [address] [facility]  
[phone] [address] [facility]  
  
    
 2018  8:00 AM  
Follow Up with Raquel Hartman MD  
[address] [place] [address], [facility] - [facility] 1 ([address] [place] [address]) Appt Note: RIGHT SHOULDER AND RIGHT ARM PAIN  
 [address], [facility] 100 [address] [facility]  
[phone] [address] [facility]  
  
    
 2018  8:00 AM  
Office Visit with Jefferson Bustamante MD  
[facility] [address] [place] [address]) Appt Note: 6 month f/u  
 [address] [facility], [address] [address] [address]  
  
   
 [address], [facility] Upcoming Health Maintenance Date Due  
 BREAST CANCER SCRN MAMMOGRAM 2018 COLONOSCOPY 10/31/2018* PAP AKA CERVICAL CYTOLOGY 2020 DTaP/Tdap/Td series (2 - Td) 3/3/2027 *Topic was postponed. The date shown is not the original due date. Allergies as of 2018  Review Complete On: 2018 By: Angella Whitt LPN Severity Noted Reaction Type Reactions Sulfasalazine High 2016    Other (comments) Could not taste anything, lightheadedness Adhesive Tape-silicones      Other (comments) Other Plant, Animal, Environmental    Unable to Consolidated Doc MOLD Tape [Adhesive]  07/31/2014    Other (comments) Blisters, use paper tape only Patient states tegaderm and paper tape are okay Current Immunizations  Reviewed on 7/28/2016 Name Date Hepatitis B Vaccine 1/1/2009 Influenza High Dose Vaccine PF 10/3/2017 Influenza Vaccine 10/22/2015, 10/9/2014 Influenza Vaccine Whole 1/4/2013 Pneumococcal Polysaccharide (PPSV-23) 7/28/2016 Td 8/1/2011 Tdap 3/3/2017  4:21 PM, 7/28/2016 Not reviewed this visit You Were Diagnosed With   
  
 Codes Comments Right cervical radiculopathy    -  Primary ICD-10-CM: M54.12 
ICD-9-CM: 723.4 Vitals BP Pulse Height(growth percentile) Weight(growth percentile) LMP SpO2  
 141/81 70 5' 10\" (1.778 m) 195 lb (88.5 kg) (Exact Date) 97% BMI OB Status Smoking Status 27.98 kg/m2 Postmenopausal Never Smoker BMI and BSA Data Body Mass Index Body Surface Area  
 27.98 kg/m 2 2.09 m 2 Preferred Pharmacy Pharmacy Name Phone [facility] [address] [facility], [address] [facility] [phone] Your Updated Medication List  
  
   
This list is accurate as of 5/30/18 12:33 PM.  Always use your most recent med list.  
  
  
  
  
 albuterol 90 mcg/actuation inhaler Commonly known as:  PROVENTIL HFA, VENTOLIN HFA, PROAIR HFA Take 2 Puffs by inhalation every six (6) hours as needed. ALEVE 220 mg Cap Generic drug:  naproxen sodium Take 220 mg by mouth daily as needed. allergy injection  
by SubCUTAneous route every thirty (30) days. ascorbic acid (vitamin C) 500 mg tablet Commonly known as:  VITAMIN C Take 2 Tabs by mouth daily. calcium carbonate 200 mg calcium (500 mg) Chew Commonly known as:  TUMS Take 1 Tab by mouth four (4) times daily. clobetasol 0.05 % ointment Commonly known as:  Ulises Dang Apply  to affected area two (2) times a week. cyanocobalamin 1,000 mcg tablet  
take 1 tablet by mouth once daily DEXILANT 60 mg Cpdb Generic drug:  Dexlansoprazole Take 1 Cap by mouth Daily (before dinner). ESTRACE 0.01 % (0.1 mg/gram) vaginal cream  
Generic drug:  estradiol Place 1 gm in the vagina twice weekly  
  
 ferrous sulfate 325 mg (65 mg iron) tablet  
take 1 tablet by mouth once daily before BREAKFAST  
  
 folic acid 1 mg tablet Commonly known as:  FOLVITE  
take 1 tablet by mouth once daily  
  
 ibuprofen 200 mg tablet Commonly known as:  MOTRIN Take  by mouth.  
  
 losartan 50 mg tablet Commonly known as:  COZAAR  
TAKE 1 TABLET DAILY  
  
 metaxalone 800 mg tablet Commonly known as:  SKELAXIN Take 1 Tab by mouth three (3) times daily. methotrexate (PF) 25 mg/mL injection  
once. methylPREDNISolone 4 mg tablet Commonly known as:  Blinda Creed Take as directed by the package instructions RASUVO (PF) 20 mg/0.4 mL Atin Generic drug:  methotrexate (PF) 20 mg by Injection route every seven (7) days. SINGULAIR 10 mg tablet Generic drug:  montelukast  
Take 10 mg by mouth daily. SYMBICORT 160-4.5 mcg/actuation Hfaa Generic drug:  budesonide-formoterol Take 2 Puffs by inhalation two (2) times a day. triamcinolone 55 mcg nasal inhaler Commonly known as:  NASACORT AQ  
2 Sprays by Both Nostrils route daily. Indications: PERENNIAL ALLERGIC RHINITIS  
  
 TYLENOL 325 mg tablet Generic drug:  acetaminophen Take  by mouth every four (4) hours as needed for Pain. ZyrTEC 10 mg Cap Generic drug:  Cetirizine Take 10 mg by mouth daily. Follow-up Instructions Return for MRI/CT f/u. Introducing [facility]! Dear Fatmata Szymanski: Thank you for requesting a [employer] account. Our records indicate that you already have an active [employer] account.   You can access your account anytime at [email or web address]. [employer] Did you know that you can access your hospital and ER discharge instructions at any time in [employer]? You can also review all of your test results from your hospital stay or ER visit. Additional Information If you have questions, please visit the Frequently Asked Questions section of the [employer] website at [email or web address]. [employer] Remember, [employer] is NOT to be used for urgent needs. For medical emergencies, dial 911. Now available from your iPhone and Android! Please provide this summary of care documentation to your next provider. Your primary care clinician is listed as Rafael Menchaca. If you have any questions after today's visit, please call [phone].

## 2018-05-30 NOTE — TELEPHONE ENCOUNTER
MRI C-spine was entered under OV today with Dr. Joanne Genao. Order placed on referral coordinator Taita desk, to contact patient for scheduling. No further action necessary at this time.

## 2018-06-01 ENCOUNTER — OFFICE VISIT (OUTPATIENT)
Dept: ORTHOPEDIC SURGERY | Age: 63
End: 2018-06-01

## 2018-06-01 VITALS
SYSTOLIC BLOOD PRESSURE: 139 MMHG | OXYGEN SATURATION: 98 % | TEMPERATURE: 98.8 F | WEIGHT: 194 LBS | HEIGHT: 70 IN | DIASTOLIC BLOOD PRESSURE: 82 MMHG | HEART RATE: 96 BPM | RESPIRATION RATE: 16 BRPM | BODY MASS INDEX: 27.77 KG/M2

## 2018-06-01 DIAGNOSIS — M25.552 PAIN OF BOTH HIP JOINTS: Primary | ICD-10-CM

## 2018-06-01 DIAGNOSIS — M25.551 PAIN OF BOTH HIP JOINTS: Primary | ICD-10-CM

## 2018-06-01 NOTE — PROGRESS NOTES
Patient: Miguel Angel Plata                MRN: [id number]       SSN: [id number]  YOB: 1955        AGE: 60 y.o. SEX: female  Body mass index is 27.84 kg/(m^2). PCP: Alonzo Briseno MD  06/01/18    HISTORY:  Ms. Hamida Jara returns in follow-up. She has known rheumatoid arthritis and she has problems with the back as well, and both hips have moderately advanced arthritis. The right knee is worse than the left and she is in valgus, approximately almost  20 degrees, not quite scissoring. She is taking some trips and doing some home renovations, so timing for surgery is not right. The pain is moderate, aching in nature, some days worse than others. PHYSICAL EXAMINATION:  She stands in valgus, about 15 to 20 degrees and a mildly antalgic gait owing to that affected extremity. The hips were examined. She does have some stiffness with internal rotation, only about 10 degrees, after which it reproduces some groin pain but it is mild to moderate, a little bit worse on the right than on the left. The knee itself, a couple of degrees  of flexion deformity. She bends quite well. Calf nontender. Homans sign is negative. The low back is only mildly tender today. RADIOGRAPHS:  Review of  x-rays:  AP pelvis confirms moderately advanced arthritis. AP, tunnel, and skyline of the knees confirms severe end-stage arthritis, especially of the right knee. PLAN:  I think for her, the next operation for her joint should be the right knee, and having said that, we will keep a close eye on the hips as well. Certainly, if the arthritis of the hips advance, we can do the hip first but I think that her first surgery should be a knee replacement when the timing is correct. I have assured her there is no rush for surgery.   It is when she is feeling up to it but I would recommend it in the next year or so.    cc:  Dr. Geoffrey Mosley:      CON: negative for weight loss, fever  EYE: negative for double vision  ENT: negative for hoarseness  RS:   negative for Tb  GI:    negative for blood in stool  :  negative for blood in urine  Other systems reviewed and noted below. Past Medical History:   Diagnosis Date    Allergic rhinitis     Asthma     Back pain     Green's esophagus with esophagitis     Cervical radiculopathy     Chronic sinusitis 5/15/2012    Chronic sinusitis with recurrent bronchitis     DNS (deviated nasal septum)     Empyema     Foraminal stenosis of cervical region     C4-C5    GERD (gastroesophageal reflux disease)     Dr Sammy Morales Hx MRSA infection 8/11/2014    Hypertension     Hypertriglyceridemia     Insulin resistance 4/9/2012    Left knee pain     Leg pain, bilateral     Lichen planus     Menopause     Age 51    MRSA (methicillin resistant Staphylococcus aureus) infection 2008    left lung    Restless leg syndrome     Rheumatoid arthritis ([address]     Spinal cord stimulator status 2016    Spinal stenosis of cervical region     C4-C5 and C5-C6    TMJ syndrome     Wears glasses     and contacts       Family History   Problem Relation Age of Onset    Hypertension Mother    [facility] Diabetes Mother    [facility] Arthritis-rheumatoid Sister     Other Sister      Lupus    Cancer Sister 35     CA, uterus    Other Brother      myocarditis    Heart Attack Brother     Heart Disease Brother     Heart Surgery Brother     Coronary Artery Disease Father     Hypertension Father     Cancer Sister 48     CA, breast    Breast Cancer Sister      Estrogen based    Diabetes Brother     Stroke Maternal Grandmother        Current Outpatient Prescriptions   Medication Sig Dispense Refill    cholecalciferol, vitamin D3, (VITAMIN D3 PO) Take  by mouth.  gabapentin (NEURONTIN) 300 mg capsule Take 1 to 2 tabs po QHS as needed for nerve pain 60 Cap 1    methotrexate, PF, 25 mg/mL injection once.  ibuprofen (MOTRIN) 200 mg tablet Take  by mouth.  ferrous sulfate 325 mg (65 mg iron) tablet take 1 tablet by mouth once daily before BREAKFAST 90 Tab 3    cyanocobalamin 1,000 mcg tablet take 1 tablet by mouth once daily 90 Tab 3    ascorbic acid, vitamin C, (VITAMIN C) 500 mg tablet Take 2 Tabs by mouth daily. 180 Tab 1    naproxen sodium (ALEVE) 220 mg cap Take 220 mg by mouth daily as needed.  clobetasol (TEMOVATE) 0.05 % ointment Apply  to affected area two (2) times a week. 15 g 1    ESTRACE 0.01 % (0.1 mg/gram) vaginal cream Place 1 gm in the vagina twice weekly 42.5 g 2    losartan (COZAAR) 50 mg tablet TAKE 1 TABLET DAILY 30 Tab 0    folic acid (FOLVITE) 1 mg tablet take 1 tablet by mouth once daily  0    triamcinolone (NASACORT AQ) 55 mcg nasal inhaler 2 Sprays by Both Nostrils route daily. Indications: PERENNIAL ALLERGIC RHINITIS      Dexlansoprazole (DEXILANT) 60 mg CpDM Take 1 Cap by mouth Daily (before dinner).  Cetirizine (ZYRTEC) 10 mg Cap Take 10 mg by mouth daily.  montelukast (SINGULAIR) 10 mg tablet Take 10 mg by mouth daily.  allergy injection by SubCUTAneous route every thirty (30) days.  budesonide-formoterol (SYMBICORT) 160-4.5 mcg/Actuation HFA inhaler Take 2 Puffs by inhalation two (2) times a day.  albuterol (PROVENTIL HFA, VENTOLIN HFA) 90 mcg/Actuation inhaler Take 2 Puffs by inhalation every six (6) hours as needed.  metaxalone (SKELAXIN) 800 mg tablet Take 1 Tab by mouth three (3) times daily. 60 Tab 0    methylPREDNISolone (MEDROL DOSEPACK) 4 mg tablet Take as directed by the package instructions 1 Dose Pack 0    RASUVO, PF, 20 mg/0.4 mL atIn 20 mg by Injection route every seven (7) days.  acetaminophen (TYLENOL) 325 mg tablet Take  by mouth every four (4) hours as needed for Pain.  calcium carbonate (TUMS) 200 mg calcium (500 mg) Chew Take 1 Tab by mouth four (4) times daily.          Allergies   Allergen Reactions    Sulfasalazine Other (comments)     Could not taste anything, lightheadedness    Adhesive Tape-Silicones Other (comments)    Other Plant, Animal, Environmental Unable to Obtain     MOLD    Tape [Adhesive] Other (comments)     Blisters, use paper tape only  Patient states tegaderm and paper tape are okay       Past Surgical History:   Procedure Laterality Date    HX CERVICAL FUSION  08/13/14    HX LUMBAR LAMINECTOMY  Nov 1995    [facility]    HX LUMBAR LAMINECTOMY  Feb 1997    Aundrea Grow  1/2015    bunion removal from right foot    HX OTHER SURGICAL  2007    thoracentesis    HX OTHER SURGICAL  2008    chest tube    HX OTHER SURGICAL  1997    TMJ surgery    HX OTHER SURGICAL  2012    sinus surg 2012-Dr Juan Tillman.  HX OTHER SURGICAL  2016    spinal cord stimulator place for lumbar neuritis, good benefit    HX TUBAL LIGATION      AL COLONOSCOPY FLX DX W/COLLJ SPEC WHEN PFRMD  6-18-08    normal/duntemann       Social History     Social History    Marital status:      Spouse name: N/A    Number of children: N/A    Years of education: N/A     Occupational History    Not on file. Social History Main Topics    Smoking status: Never Smoker    Smokeless tobacco: Never Used    Alcohol use 0.0 oz/week     0 Standard drinks or equivalent per week      Comment: 0-6 per year    Drug use: Yes     Special: Prescription, OTC    Sexual activity: Yes     Partners: Male     Other Topics Concern    Not on file     Social History Narrative    Retired        Visit Vitals    /82    Pulse 96    Temp 98.8 °F (37.1 °C) (Oral)    Resp 16    Ht 5' 10\" (1.778 m)    Wt 194 lb (88 kg)    SpO2 98%    BMI 27.84 kg/m2         PHYSICAL EXAMINATION:  GENERAL: Alert and oriented x3, in no acute distress, well-developed, well-nourished, afebrile. HEART: No JVD. EYES: No scleral icterus   NECK: No significant lymphadenopathy   LUNGS: No respiratory compromise or indrawing  ABDOMEN: Soft, non-tender, non-distended.            Electronically signed by:  Erick Byers MD

## 2018-06-02 ENCOUNTER — HOSPITAL ENCOUNTER (OUTPATIENT)
Dept: CT IMAGING | Age: 63
Discharge: HOME | End: 2018-06-02
Attending: PHYSICAL MEDICINE & REHABILITATION
Payer: COMMERCIAL

## 2018-06-02 DIAGNOSIS — M54.12 RIGHT CERVICAL RADICULOPATHY: ICD-10-CM

## 2018-06-02 PROCEDURE — 72125 CT NECK SPINE W/O DYE: CPT

## 2018-06-19 ENCOUNTER — OFFICE VISIT (OUTPATIENT)
Dept: ORTHOPEDIC SURGERY | Age: 63
End: 2018-06-19

## 2018-06-19 VITALS
HEART RATE: 82 BPM | DIASTOLIC BLOOD PRESSURE: 79 MMHG | SYSTOLIC BLOOD PRESSURE: 124 MMHG | TEMPERATURE: 96.6 F | OXYGEN SATURATION: 98 % | RESPIRATION RATE: 16 BRPM | BODY MASS INDEX: 28.06 KG/M2 | WEIGHT: 196 LBS | HEIGHT: 70 IN

## 2018-06-19 DIAGNOSIS — M54.12 RIGHT CERVICAL RADICULOPATHY: Primary | ICD-10-CM

## 2018-06-19 NOTE — PROGRESS NOTES
Sanjuana Avila  1955   Chief Complaint   Patient presents with    Shoulder Pain     Right shoulder pain    Arm Pain     Right arm pain        HISTORY OF PRESENT ILLNESS  Sanjuana Avila is a 60 y.o. female who presents today for reevaluation of shoulder pain. Patient rates pain as 1/10 today. Patient states she feels a little better. Patient states night pain when using arm during day. Patient notes using the arm to bake aggravates the pain. The more active the patient is the worse the pain. The patient states achy pain in armpit, breast, shoulder blade. At last OV, patient had a cortisone injection which provided minimal relief. H/o of neck surgery by Dr. Stefan Tate in August 2017. Cannot recall an injury. Patient denies any fever, chills, chest pain, shortness of breath or calf pain. There are no new illness or injuries to report since last seen in the office. There are no changes to medications, allergies, family or social history. PHYSICAL EXAM:   Visit Vitals    /79 (BP 1 Location: Left arm, BP Patient Position: Sitting)    Pulse 82    Temp 96.6 °F (35.9 °C) (Oral)    Resp 16    Ht 5' 10\" (1.778 m)    Wt 196 lb (88.9 kg)    SpO2 98%    BMI 28.12 kg/m2     The patient is a well-developed, well-nourished female   in no acute distress. The patient is alert and oriented times three. The patient is alert and oriented times three. Mood and affect are normal.  LYMPHATIC: lymph nodes are not enlarged and are within normal limits  SKIN: normal in color and non tender to palpation. There are no bruises or abrasions noted. NEUROLOGICAL: Motor sensory exam is within normal limits. Reflexes are equal bilaterally.  There is normal sensation to pinprick and light touch  MUSCULOSKELETAL:  Examination Right shoulder   Skin Intact   AC joint tenderness -   Biceps tenderness -   Forward flexion/Elevation    Active abduction    Glenohumeral abduction 90   External rotation ROM 45   Internal rotation ROM 30   Apprehension -   Willards Relocation -   Jerk -   Load and Shift -   Obriens -   Speeds -   Impingement sign +   Supraspinatus/Empty Can -, 5/5   External Rotation Strength -, 5/5   Lift Off/Belly Press -, 5/5   Neurovascular Intact        IMAGING: CR of right shoulder dated 6/2/18 was reviewed and read: IMPRESSION:  1. Completed fusion across C4-C5-C6 segment. Intact surgical hardware. No  abnormal lucency around the hardware. 2.  Possible disc protrusions or disc-osteophyte protrusions at C2-3 and C3-4. Possible central canal narrowing at C3-4. Varying degrees of foraminal  narrowing. XR of the right shoulder dated 5/17/18 was reviewed and read:   IMPRESSION:  1.  No acute pathology appreciated in the right shoulder. 2.  Mild glenohumeral osteoarthritis and moderate to severe right AC joint  osteoarthritis.     XR of the cervical spine dated 5/17/18 was reviewed and read: Impression:  1.  Completed cervical fusion spanning C4-C5-C6 segment with anteriorly located  fusion devices. 2.  Degenerative changes in the form of facet arthropathy.  Facet arthropathy is  most pronounced on the left side at C3-4 producing foraminal narrowing.  Mild  uncovertebral osteophyte on the left side at C3. IMPRESSION:      ICD-10-CM ICD-9-CM    1. Right cervical radiculopathy M54.12 723.4         PLAN:   1. Patient received limited relief from the injection in the shoulder. I believe the pain is coming from the neck so I want the patient to see the spine center as soon as possible. Risk factors include: HTN  2. No ultrasound exam indicated today  3. No cortisone injection indicated today   4. No Physical/Occupational Therapy indicated today  5. No diagnostic test indicated today:   6. No durable medical equipment indicated today  7. No referral indicated today   8. No medications indicated today:   9.  No Narcotic indicated today       RTC prn  Follow-up Disposition: Not on File    Scribed by Teodora Remy ([facility]) as dictated by MD BETO Seals, Dr. Laura Singer, confirm that all documentation is accurate.     Laura Singer M.D.   [facility]

## 2018-06-20 ENCOUNTER — OFFICE VISIT (OUTPATIENT)
Dept: ORTHOPEDIC SURGERY | Age: 63
End: 2018-06-20

## 2018-06-20 VITALS
TEMPERATURE: 98.3 F | OXYGEN SATURATION: 98 % | HEART RATE: 81 BPM | BODY MASS INDEX: 28.06 KG/M2 | HEIGHT: 70 IN | SYSTOLIC BLOOD PRESSURE: 117 MMHG | DIASTOLIC BLOOD PRESSURE: 72 MMHG | WEIGHT: 196 LBS

## 2018-06-20 DIAGNOSIS — M19.011 ARTHRITIS OF RIGHT ACROMIOCLAVICULAR JOINT: ICD-10-CM

## 2018-06-20 DIAGNOSIS — Z98.1 HISTORY OF FUSION OF CERVICAL SPINE: ICD-10-CM

## 2018-06-20 DIAGNOSIS — M06.9 RHEUMATOID ARTHRITIS, INVOLVING UNSPECIFIED SITE, UNSPECIFIED RHEUMATOID FACTOR PRESENCE: ICD-10-CM

## 2018-06-20 NOTE — MR AVS SNAPSHOT
[address] [facility] [place] 
 
 
 [address] [facility] [address] 
[phone] Patient: Randolph Yo MRN: [id number] :1955 Visit Information Date & Time Provider Department Dept. Phone Encounter #  
 2018  9:15 AM Cristian Rodriguez MD [facility] [phone] [id number] Follow-up Instructions Return if symptoms worsen or fail to improve. Your Appointments 2018 10:00 AM  
ANNUAL with Burt Santos MD  
[facility] OB [facility]YN ([facility]) Appt Note: annual  
 [address] [place] [address] [facility] [address]  
[phone]  
  
   
 [address] [place] [address],[address] [address]  
  
    
 2018  7:30 AM  
Follow Up with Edwin Henderson MD  
[address] [place] [address], [address] [facility] - \[facility]\"" ([facility]) Appt Note: rt knee 3 mo fu  
 [address] [facility] [address], [address] [facility]  
[phone] [address] [place] [address], [facility] [address]  
  
    
 2018  9:25 AM  
LAB with [facility] NURSE VISIT [facility] [address] [place] [address] ([facility]) Appt Note: labs [address], [address] [place] [address] [address] [place]  
  
   
 [address], [address]  
  
    
 2018  8:00 AM  
Office Visit with Momo Garcia MD  
[facility] [address] [place] [address]) Appt Note: 6 month f/u  
 [address] [facility], [address] [place] [address] [address] [place]  
  
   
 [address], [address] Upcoming Health Maintenance Date Due  
 BREAST CANCER SCRN MAMMOGRAM 2018 COLONOSCOPY 10/31/2018* PAP AKA CERVICAL CYTOLOGY 2020 DTaP/Tdap/Td series (2 - Td) 3/3/2027 *Topic was postponed. The date shown is not the original due date. Allergies as of 2018  Review Complete On: 2018 By: Nicole De Jesus Severity Noted Reaction Type Reactions Sulfasalazine High 11/17/2016    Other (comments) Could not taste anything, lightheadedness Adhesive Tape-silicones  28/10/9441    Other (comments) Other Plant, Animal, Environmental    Unable to Consolidated Doc MOLD Tape [Adhesive]  07/31/2014    Other (comments) Blisters, use paper tape only Patient states tegaderm and paper tape are okay Current Immunizations  Reviewed on 7/28/2016 Name Date Hepatitis B Vaccine 1/1/2009 Influenza High Dose Vaccine PF 10/3/2017 Influenza Vaccine 10/22/2015, 10/9/2014 Influenza Vaccine Whole 1/4/2013 Pneumococcal Polysaccharide (PPSV-23) 7/28/2016 Td 8/1/2011 Tdap 3/3/2017  4:21 PM, 7/28/2016 Not reviewed this visit You Were Diagnosed With   
  
 Codes Comments Acute pain of both shoulders    -  Primary ICD-10-CM: M25.511, M25.512 ICD-9-CM: 719.41 Arthritis of right acromioclavicular joint     ICD-10-CM: M19.011 
ICD-9-CM: 716.91 History of fusion of cervical spine     ICD-10-CM: Z98.1 ICD-9-CM: V45.4 Rheumatoid arthritis, involving unspecified site, unspecified rheumatoid factor presence ([facility]     ICD-10-CM: M06.9 ICD-9-CM: 714.0 Vitals BP Pulse Temp Height(growth percentile) Weight(growth percentile) SpO2  
 117/72 81 98.3 °F (36.8 °C) (Oral) 5' 10\" (1.778 m) 196 lb (88.9 kg) 98% BMI OB Status Smoking Status 28.12 kg/m2 Postmenopausal Never Smoker BMI and BSA Data Body Mass Index Body Surface Area  
 28.12 kg/m 2 2.1 m 2 Preferred Pharmacy Pharmacy Name Phone  [address] [phone] Your Updated Medication List  
  
   
This list is accurate as of 6/20/18 10:15 AM.  Always use your most recent med list.  
  
  
  
  
 albuterol 90 mcg/actuation inhaler Commonly known as:  PROVENTIL HFA, VENTOLIN HFA, PROAIR HFA Take 2 Puffs by inhalation every six (6) hours as needed. ALEVE 220 mg Cap Generic drug:  naproxen sodium Take 220 mg by mouth daily as needed. allergy injection  
by SubCUTAneous route every thirty (30) days. ascorbic acid (vitamin C) 500 mg tablet Commonly known as:  VITAMIN C Take 2 Tabs by mouth daily. calcium carbonate 200 mg calcium (500 mg) Chew Commonly known as:  TUMS Take 1 Tab by mouth four (4) times daily. clobetasol 0.05 % ointment Commonly known as:  TEMOVATE  
APPLY TO THE AFFECTED AREA 2 TIMES A WEEK  
  
 cyanocobalamin 1,000 mcg tablet  
take 1 tablet by mouth once daily DEXILANT 60 mg Cpdb Generic drug:  Dexlansoprazole Take 1 Cap by mouth Daily (before dinner). ESTRACE 0.01 % (0.1 mg/gram) vaginal cream  
Generic drug:  estradiol Place 1 gm in the vagina twice weekly  
  
 ferrous sulfate 325 mg (65 mg iron) tablet  
take 1 tablet by mouth once daily before BREAKFAST  
  
 folic acid 1 mg tablet Commonly known as:  FOLVITE  
take 1 tablet by mouth once daily  
  
 gabapentin 300 mg capsule Commonly known as:  NEURONTIN Take 1 to 2 tabs po QHS as needed for nerve pain  
  
 ibuprofen 200 mg tablet Commonly known as:  MOTRIN Take  by mouth.  
  
 losartan 50 mg tablet Commonly known as:  COZAAR  
TAKE 1 TABLET DAILY  
  
 metaxalone 800 mg tablet Commonly known as:  SKELAXIN Take 1 Tab by mouth three (3) times daily. methotrexate (PF) 25 mg/mL injection  
once. methylPREDNISolone 4 mg tablet Commonly known as:  Colan Necessary Take as directed by the package instructions RASUVO (PF) 20 mg/0.4 mL Atin Generic drug:  methotrexate (PF) 20 mg by Injection route every seven (7) days. SINGULAIR 10 mg tablet Generic drug:  montelukast  
Take 10 mg by mouth daily. SYMBICORT 160-4.5 mcg/actuation Hfaa Generic drug:  budesonide-formoterol Take 2 Puffs by inhalation two (2) times a day. triamcinolone 55 mcg nasal inhaler Commonly known as:  NASACORT AQ  
2 Sprays by Both Nostrils route daily. Indications: PERENNIAL ALLERGIC RHINITIS  
  
 TYLENOL 325 mg tablet Generic drug:  acetaminophen Take  by mouth every four (4) hours as needed for Pain. VITAMIN D3 PO Take  by mouth. ZyrTEC 10 mg Cap Generic drug:  Cetirizine Take 10 mg by mouth daily. We Performed the Following REFERRAL TO PHYSICAL THERAPY [LBM69 Custom] Comments:  
 B/l shoulder pain, neck pain, eval for possible dry needling Follow-up Instructions Return if symptoms worsen or fail to improve. To-Do List   
 08/27/2018 8:30 AM  
  Appointment with ANDREIA QUINTANA at [address] [place] [address] ([phone]) PAYMENT  For Non-Medicare patients - $15.00 will be collected from you at the time of your exam.  You will be billed $35.00 from the reading [facility]. OUTSIDE FILMS  - Any outside films related to the study being scheduled should be brought with you on the day of the exam.  If this cannot be done there may be a delay in the reading of the study. MEDICATIONS  - Patient must bring a complete list of all medications currently taking to include prescriptions, over-the-counter meds, herbals, vitamins & any dietary supplements  GENERAL INSTRUCTIONS  - On the day of your exam do not use any bath powder, deodorant or lotions on the armpit area. -Tenderness of breasts may cause an increase of discomfort during procedure. If you are experiencing breast tenderness on the day of your appointment and would like to reschedule, please call [phone]. Referral Information Referral ID Referred By Referred To  
  
 [id number] Nicole AARON [facility]   
   [address] [facility], [address] [place], [address] [address] Phone: [phone] Visits Status Start Date End Date 1 New Request 6/20/18 6/20/19 If your referral has a status of pending review or denied, additional information will be sent to support the outcome of this decision. Patient Instructions Neck Pain: Care Instructions Your Care Instructions You can have neck pain anywhere from the bottom of your head to the top of your shoulders. It can spread to the upper back or arms. Injuries, painting a ceiling, sleeping with your neck twisted, staying in one position for too long, and many other activities can cause neck pain. Most neck pain gets better with home care. Your doctor may recommend medicine to relieve pain or relax your muscles. He or she may suggest exercise and physical therapy to increase flexibility and relieve stress. You may need to wear a special (cervical) collar to support your neck for a day or two. Follow-up care is a key part of your treatment and safety. Be sure to make and go to all appointments, and call your doctor if you are having problems. It's also a good idea to know your test results and keep a list of the medicines you take. How can you care for yourself at home? · Try using a heating pad on a low or medium setting for 15 to 20 minutes every 2 or 3 hours. Try a warm shower in place of one session with the heating pad. · You can also try an ice pack for 10 to 15 minutes every 2 to 3 hours. Put a thin cloth between the ice and your skin. · Take pain medicines exactly as directed. ¨ If the doctor gave you a prescription medicine for pain, take it as prescribed. ¨ If you are not taking a prescription pain medicine, ask your doctor if you can take an over-the-counter medicine. · If your doctor recommends a cervical collar, wear it exactly as directed. When should you call for help? Call your doctor now or seek immediate medical care if: 
? · You have new or worsening numbness in your arms, buttocks or legs. ? · You have new or worsening weakness in your arms or legs.  (This could make it hard to stand up.) ? · You lose control of your bladder or bowels. ? Watch closely for changes in your health, and be sure to contact your doctor if: 
? · Your neck pain is getting worse. ? · You are not getting better after 1 week. ? · You do not get better as expected. Where can you learn more? Go to http://june-yasmeen.info/. Enter [id number] in the search box to learn more about \"Neck Pain: Care Instructions. \" Current as of: March 21, 2017 Content Version: 11.4 © 4314-5241 [employer]. Care instructions adapted under license by [employer] (which disclaims liability or warranty for this information). If you have questions about a medical condition or this instruction, always ask your healthcare professional. [address] any warranty or liability for your use of this information. Introducing [facility]! Dear Zoila Post: Thank you for requesting a [employer] account. Our records indicate that you already have an active [employer] account. You can access your account anytime at [email or web address] Did you know that you can access your hospital and ER discharge instructions at any time in [employer]? You can also review all of your test results from your hospital stay or ER visit. Additional Information If you have questions, please visit the Frequently Asked Questions section of the [employer] website at [email or web address] Remember, [employer] is NOT to be used for urgent needs. For medical emergencies, dial 911. Now available from your iPhone and Android! Please provide this summary of care documentation to your next provider. Your primary care clinician is listed as Chelsey Zuniga. If you have any questions after today's visit, please call [phone].

## 2018-06-20 NOTE — PROGRESS NOTES
Verbal order entered per Dr. Beau Thompson as documented on blue sheet:  -physical therapy, b/l shoulder pain, neck pain, eval for possible dry needling

## 2018-06-20 NOTE — PATIENT INSTRUCTIONS
Neck Pain: Care Instructions  Your Care Instructions    You can have neck pain anywhere from the bottom of your head to the top of your shoulders. It can spread to the upper back or arms. Injuries, painting a ceiling, sleeping with your neck twisted, staying in one position for too long, and many other activities can cause neck pain. Most neck pain gets better with home care. Your doctor may recommend medicine to relieve pain or relax your muscles. He or she may suggest exercise and physical therapy to increase flexibility and relieve stress. You may need to wear a special (cervical) collar to support your neck for a day or two. Follow-up care is a key part of your treatment and safety. Be sure to make and go to all appointments, and call your doctor if you are having problems. It's also a good idea to know your test results and keep a list of the medicines you take. How can you care for yourself at home? · Try using a heating pad on a low or medium setting for 15 to 20 minutes every 2 or 3 hours. Try a warm shower in place of one session with the heating pad. · You can also try an ice pack for 10 to 15 minutes every 2 to 3 hours. Put a thin cloth between the ice and your skin. · Take pain medicines exactly as directed. ¨ If the doctor gave you a prescription medicine for pain, take it as prescribed. ¨ If you are not taking a prescription pain medicine, ask your doctor if you can take an over-the-counter medicine. · If your doctor recommends a cervical collar, wear it exactly as directed. When should you call for help? Call your doctor now or seek immediate medical care if:  ? · You have new or worsening numbness in your arms, buttocks or legs. ? · You have new or worsening weakness in your arms or legs. (This could make it hard to stand up.)   ? · You lose control of your bladder or bowels. ? Watch closely for changes in your health, and be sure to contact your doctor if:  ? · Your neck pain is getting worse. ? · You are not getting better after 1 week. ? · You do not get better as expected. Where can you learn more? Go to http://june-yasmeen.info/. Enter [id number] in the search box to learn more about \"Neck Pain: Care Instructions. \"  Current as of: March 21, 2017  Content Version: 11.4  © 3983-0974 [employer]. Care instructions adapted under license by [employer] (which disclaims liability or warranty for this information). If you have questions about a medical condition or this instruction, always ask your healthcare professional. [address] any warranty or liability for your use of this information.

## 2018-06-20 NOTE — PROGRESS NOTES
[facility] Gallardo [facility] [address]  [address], [address]  [place]  Phone: [phone]  Fax: [phone]        Antonietta Perez  : 1955  PCP: Dao Capone MD    PROGRESS NOTE      ASSESSMENT AND PLAN    Diagnoses and all orders for this visit:    1. Acute pain of both shoulders  -     REFERRAL TO PHYSICAL THERAPY    2. Arthritis of right acromioclavicular joint  -     REFERRAL TO PHYSICAL THERAPY    3. History of fusion of cervical spine  -     REFERRAL TO PHYSICAL THERAPY    4. Rheumatoid arthritis, involving unspecified site, unspecified rheumatoid factor presence (HCC)  -     REFERRAL TO PHYSICAL THERAPY    1. Advised to continue HEP. 2. Referral to physical therapy for neck and shoulder pain. Possible dry needling. 3. No indications for surgery at this time. 4. Avoid any overhead activities. 5. Given information on neck pain. Follow-up Disposition:  Return if symptoms worsen or fail to improve. HISTORY OF PRESENT ILLNESS  Hilda De Leon is a 60 y.o. female. RHD w/RA and prior ACDF. Pt presents to the office for a f/u visit for neck pain. Last visit pt was sent to have a cervical spine CT. Images reviewed with the pt. Deg changes, no terri stenosis, no pannus. She reports Gabapentin causes her somnolence. Pt continues to have neck pain. She states that she is having pain more into her shoulder. She does get pain into her RUE at times. This is more of an ache. She denies any rotator cuff issues. She does get some nocturnal paresthesias in the RUE. Pt does not have weakness in BUE. She is now back on routine Methotrexate. She was temporarily off of this for other medical issues. Denies persistent fevers, chills, weight changes, neurogenic bowel or bladder symptoms. Pt denies recent ED visits or hospitalizations.  reviewed. She is being seen by Dr. Cristina Mc for her shoulder and Dr. Markos Redding for her hips.  She did not have an injection in her shoulder last visit with Dr. Sal Arreola. She feels that when she does have injections in her shoulder, they do help her pain. She has not had dry needling. Pain Assessment  6/20/2018   Location of Pain Neck   Location Modifiers Superior   Severity of Pain 2   Quality of Pain Aching   Quality of Pain Comment itching   Duration of Pain A few hours   Frequency of Pain Intermittent   Aggravating Factors (No Data)   Aggravating Factors Comment abduction   Limiting Behavior -   Relieving Factors Rest;Ice   Relieving Factors Comment medication   Result of Injury -           CT Results (most recent):    Results from Hospital Encounter encounter on 06/02/18   CT SPINE CERV WO CONT   Narrative PROCEDURE:  CT Cervical Spine without Contrast    INDICATION:  Right-sided cervical radiculopathy for 2 months. History of  rheumatoid arthritis            COMPARISON:  None. TECHNIQUE:  Helical volumetric sections of the cervical spine are obtained  without IV or intrathecal contrast injection. Additional sagittal and coronal  reformatted images are also reviewed in bone window. - Radiation dose:  659 mGy-cm.  - Note:  Radiation dose optimization techniques are utilized as appropriate to  the exam, with combination of automated exposure control, adjustment of the mA  and/or kV according to patient's size (Including appropriate matching for  site-specific examinations), or use of iterative reconstruction technique. FINDINGS:     A completed cervical fusion is demonstrated spanning C4-C5-C6 with a bone plate  and apparent vertebral screws. Bone plug disc spacers at C4-5 and C5-6 appear  well incorporated with mature appearing bridging bones across the disc spaces. No abnormal lucency is demonstrated around the surgical hardware. There is suggestion of minimal decrease in disc height at C2-3 with small  posterior endplate osteophyte.   There is a suggestion of possible bulging or  protruding disc-osteophyte along the right paracentral aspect at C2-3. No  significant central canal stenosis. No significant foraminal narrowing. At C3-4, there is suggestion of protruding disc-osteophyte. Moderate left-sided  facet hypertrophy. The combination of these processes produce at least moderate  left neural foraminal narrowing. Mild central canal narrowing is also  suggested. The C4-5 and C5-6 fused level demonstrates no evidence for significant foraminal  narrowing. The C4-5 level demonstrates hypertrophied fused facet on the right  side. The C5-6 disc level shows broad bulging bony ridge, posterior centrally. Despite this, there is no convincing evidence for significant central canal  narrowing. Minimal foraminal narrowing on the right side at C4-5. Minimal  narrowing both sides at C5-6. A broad-based disc bulge is suggested at C6-7. No significant foraminal  narrowing. Right-sided mild facet hypertrophy. No significant central canal or foraminal stenosis is detected at C7-T1. Impression IMPRESSION:          1. Completed fusion across C4-C5-C6 segment. Intact surgical hardware. No  abnormal lucency around the hardware. 2.  Possible disc protrusions or disc-osteophyte protrusions at C2-3 and C3-4. Possible central canal narrowing at C3-4. Varying degrees of foraminal  narrowing.              PAST MEDICAL HISTORY   Past Medical History:   Diagnosis Date    Allergic rhinitis     Asthma     Back pain     Green's esophagus with esophagitis     Cervical radiculopathy     Chronic sinusitis 5/15/2012    Chronic sinusitis with recurrent bronchitis     DNS (deviated nasal septum)     Empyema     Foraminal stenosis of cervical region     C4-C5    GERD (gastroesophageal reflux disease)     Dr Phi Ortega Hx MRSA infection 8/11/2014    Hypertension     Hypertriglyceridemia     Insulin resistance 4/9/2012    Left knee pain     Leg pain, bilateral     Lichen planus  Menopause     Age 51    MRSA (methicillin resistant Staphylococcus aureus) infection 2008    left lung    Restless leg syndrome     Rheumatoid arthritis ([address]     Spinal cord stimulator status 2016    Spinal stenosis of cervical region     C4-C5 and C5-C6    TMJ syndrome     Wears glasses     and contacts       Past Surgical History:   Procedure Laterality Date    HX CERVICAL FUSION  08/13/14    HX LUMBAR LAMINECTOMY  Nov 1995    [facility]    HX LUMBAR LAMINECTOMY  Feb 1997    Iam Bonilla  1/2015    bunion removal from right foot    HX OTHER SURGICAL  2007    thoracentesis    HX OTHER SURGICAL  2008    chest tube    HX OTHER SURGICAL  1997    TMJ surgery    HX OTHER SURGICAL  2012    sinus surg 2012-Dr Wilder Olivares.  HX OTHER SURGICAL  2016    spinal cord stimulator place for lumbar neuritis, good benefit    HX TUBAL LIGATION      KS COLONOSCOPY FLX DX W/COLLJ SPEC WHEN PFRMD  6-18-08    normal/duntemann   . MEDICATIONS    Current Outpatient Prescriptions   Medication Sig Dispense Refill    clobetasol (TEMOVATE) 0.05 % ointment APPLY TO THE AFFECTED AREA 2 TIMES A WEEK 15 g 0    cholecalciferol, vitamin D3, (VITAMIN D3 PO) Take  by mouth.  gabapentin (NEURONTIN) 300 mg capsule Take 1 to 2 tabs po QHS as needed for nerve pain 60 Cap 1    methotrexate, PF, 25 mg/mL injection once.  metaxalone (SKELAXIN) 800 mg tablet Take 1 Tab by mouth three (3) times daily. 60 Tab 0    methylPREDNISolone (MEDROL DOSEPACK) 4 mg tablet Take as directed by the package instructions 1 Dose Pack 0    ibuprofen (MOTRIN) 200 mg tablet Take  by mouth.  ferrous sulfate 325 mg (65 mg iron) tablet take 1 tablet by mouth once daily before BREAKFAST 90 Tab 3    cyanocobalamin 1,000 mcg tablet take 1 tablet by mouth once daily 90 Tab 3    ascorbic acid, vitamin C, (VITAMIN C) 500 mg tablet Take 2 Tabs by mouth daily.  180 Tab 1    naproxen sodium (ALEVE) 220 mg cap Take 220 mg by mouth daily as needed.  RASUVO, PF, 20 mg/0.4 mL atIn 20 mg by Injection route every seven (7) days.  ESTRACE 0.01 % (0.1 mg/gram) vaginal cream Place 1 gm in the vagina twice weekly 42.5 g 2    losartan (COZAAR) 50 mg tablet TAKE 1 TABLET DAILY 30 Tab 0    folic acid (FOLVITE) 1 mg tablet take 1 tablet by mouth once daily  0    acetaminophen (TYLENOL) 325 mg tablet Take  by mouth every four (4) hours as needed for Pain.  triamcinolone (NASACORT AQ) 55 mcg nasal inhaler 2 Sprays by Both Nostrils route daily. Indications: PERENNIAL ALLERGIC RHINITIS      Dexlansoprazole (DEXILANT) 60 mg CpDM Take 1 Cap by mouth Daily (before dinner).  calcium carbonate (TUMS) 200 mg calcium (500 mg) Chew Take 1 Tab by mouth four (4) times daily.  Cetirizine (ZYRTEC) 10 mg Cap Take 10 mg by mouth daily.  montelukast (SINGULAIR) 10 mg tablet Take 10 mg by mouth daily.  allergy injection by SubCUTAneous route every thirty (30) days.  budesonide-formoterol (SYMBICORT) 160-4.5 mcg/Actuation HFA inhaler Take 2 Puffs by inhalation two (2) times a day.  albuterol (PROVENTIL HFA, VENTOLIN HFA) 90 mcg/Actuation inhaler Take 2 Puffs by inhalation every six (6) hours as needed. ALLERGIES  Allergies   Allergen Reactions    Sulfasalazine Other (comments)     Could not taste anything, lightheadedness    Adhesive Tape-Silicones Other (comments)    Other Plant, Animal, Environmental Unable to Obtain     MOLD    Tape [Adhesive] Other (comments)     Blisters, use paper tape only  Patient states tegaderm and paper tape are okay          SOCIAL HISTORY    Social History     Social History    Marital status:      Spouse name: N/A    Number of children: N/A    Years of education: N/A     Occupational History    Not on file.      Social History Main Topics    Smoking status: Never Smoker    Smokeless tobacco: Never Used    Alcohol use 0.0 oz/week     0 Standard drinks or equivalent per week Comment: 0-6 per year    Drug use: Yes     Special: Prescription, OTC    Sexual activity: Yes     Partners: Male     Other Topics Concern    Not on file     Social History Narrative    Retired        FAMILY HISTORY  Family History   Problem Relation Age of Onset    Hypertension Mother     Diabetes Mother    Parvin Duran Arthritis-rheumatoid Sister     Other Sister      Lupus    Cancer Sister 35     CA, uterus    Other Brother      myocarditis    Heart Attack Brother     Heart Disease Brother     Heart Surgery Brother     Coronary Artery Disease Father     Hypertension Father     Cancer Sister 48     CA, breast    Breast Cancer Sister      Estrogen based    Diabetes Brother     Stroke Maternal Grandmother        REVIEW OF SYSTEMS  Review of Systems   Constitutional: Negative for chills, fever and weight loss. Respiratory: Negative for shortness of breath. Cardiovascular: Negative for chest pain. Gastrointestinal: Negative for constipation. Negative for fecal incontinence   Genitourinary: Negative for dysuria. Negative for urinary incontinence   Musculoskeletal:        Per HPI   Skin: Negative for rash. Neurological: Positive for tingling. Negative for dizziness, tremors, focal weakness and headaches. Endo/Heme/Allergies: Does not bruise/bleed easily. Psychiatric/Behavioral: The patient does not have insomnia. PHYSICAL EXAMINATION  Visit Vitals    /72    Pulse 81    Temp 98.3 °F (36.8 °C) (Oral)    Ht 5' 10\" (1.778 m)    Wt 196 lb (88.9 kg)    SpO2 98%    BMI 28.12 kg/m2         Accompanied by self. Constitutional:  Well developed, well nourished, in no acute distress. Psychiatric: Affect and mood are appropriate. Integumentary: No rashes or abrasions noted on exposed areas. Cardiovascular/Peripheral Vascular: Intact l pulses. No peripheral edema is noted. Lymphatic:  No evidence of lymphedema. No cervical lymphadenopathy. SPINE/MUSCULOSKELETAL EXAM    Cervical spine:  Neck is midline. Normal muscle tone. No focal atrophy is noted. Pain with R internal rotation of the shoulder. R anterior shoulder joint tenderness. + R Drop arm test. Trigger points noted R medial scapular border and R upper trapezius. Negative Spurling's sign. Negative Tinel's sign. Negative Romano's sign. Sensation grossly intact to light touch. MOTOR:      Biceps  Triceps Deltoids Wrist Ext Wrist Flex Hand Intrin   Right +4/5 +4/5 +4/5 +4/5 +4/5 +4/5   Left +4/5 +4/5 +4/5 +4/5 +4/5 +4/5     Ambulation without assistive device. FWB. Written by Bushra Galvez, as dictated by Albert Villalpando MD.    I, Dr. Albert Villalpando MD, confirm that all documentation is accurate. Ms. [facility] may have a reminder for a \"due or due soon\" health maintenance. I have asked that she contact her primary care provider for follow-up on this health maintenance.

## 2018-06-28 ENCOUNTER — HOSPITAL ENCOUNTER (OUTPATIENT)
Dept: PHYSICAL THERAPY | Age: 63
Discharge: HOME | End: 2018-06-28
Payer: COMMERCIAL

## 2018-06-28 PROCEDURE — 97110 THERAPEUTIC EXERCISES: CPT

## 2018-06-28 PROCEDURE — 97161 PT EVAL LOW COMPLEX 20 MIN: CPT

## 2018-06-28 PROCEDURE — 97112 NEUROMUSCULAR REEDUCATION: CPT

## 2018-06-28 NOTE — PROGRESS NOTES
PT DAILY TREATMENT NOTE/CERVICAL EVAL3-16    Patient Name: Lila Bynum  Date:2018  : 1955  [x]  Patient  Verified  Payor: BLUE CROSS / Plan: [employer] [facility] [place] / Product Type: PPO /    In BGO  Out time:905  Total Treatment Time (min): 40  Total Timed Codes (min): 24  1:1 Treatment Time ( only): 40   Visit #: 1 of 10    Treatment Area: Pain in right shoulder [M25.511]  Pain in left shoulder [M25.512]  Cervicalgia [M54.2]    SUBJECTIVE  Pain Level (0-10 scale): 2  [x]constant []int59 / 69ermittent []improving []worsening []no change since onset    Any medication changes, allergies to medications, adverse drug reactions, diagnosis change, or new procedure performed?: [x] No    [] Yes (see summary sheet for update)  Subjective functional status/changes:     Subjective:      Pt c/o Right shoulder pain that prevents functional mobility and normal reaching to the front and to the side. CT scan was negative. Pt has a Hx of Cx fusion at level 4-5, Tingling in right hand at times    Chief Complaint/: Right upper arm pain with movement and reaching    Worse with:  Raising arm    Better with:  rest    Onset: Early April    Mechanism of Injury: None noted    PMHx/Surgical Hx: RA, OA, HTN, Asthnma, Spinal Cord Stimulator    PLOF: Able to reach up and do normal things with right arm    Goal: Some improvment    FOTO:  14 visits    What type of work do you do? Retired    Living Situation:      What type of daily activities/hobbies?  Walk the dog, Make crafts, coloring group, Weeding and gardening    Limitations to Activity/PLOF: lifting light items, picking up drinks out of the refrigerator      Current Health Status:  Good    Barriers: []pain []financial []time []transportation []other    Motivation: High    Substance use: []Alcohol []Tobacco []other:     FABQ Score: []low []elevate    Cognition: A & O x 3    Other:    Risk For Falls:   []No  []low []elevate     Balance:  Walking/Standing SLS        OBJECTIVE/EXAMINATION  - TTP (B) RTC muscles,   - Fwd head posture  - Cx ROM: Rot Right   Left , SB Right   Left, Flx 43   , Ext 55  - MMT Shoulder Flx 4 Ext 5-, ADD 5, ABD 3 with pain            18 min [x]Eval                  []Re-Eval       10 min Therapeutic Exercise:  [x] See flow sheet :   Rationale: increase ROM, increase strength and improve coordination to improve the patients ability to tolerate increased activity levels    14 min Neuromuscular Re-education:  []  See flow sheet : KT I-Strip for Right shoulder for spacing at the anterior deltoid/infraspinatus full strength, Y-Strip for [address] joint support    Rationale: increase ROM, increase strength, improve coordination and Inhibit tissue tightness  to improve the patients ability to tolerate increased activity levels          With   [x] TE   [] TA   [] neuro   [] other: Patient Education: [x] Review HEP    [] Progressed/Changed HEP based on:   [] positioning   [] body mechanics   [] transfers   [] heat/ice application    [] other:      Other Objective/Functional Measures:   - Pt demo understanding of HEP           Pain Level (0-10 scale) post treatment: 2    ASSESSMENT/Changes in Function:      Patient will continue to benefit from skilled PT services to modify and progress therapeutic interventions, address functional mobility deficits, address ROM deficits, address strength deficits, analyze and address soft tissue restrictions and analyze and cue movement patterns to attain remaining goals. []  See Plan of Care  []  See progress note/recertification  []  See Discharge Summary         Progress towards goals / Updated goals:  1. Pt will be compliant and independent with HEP in order to facilitate PT sessions and aid with self management   Eval Status:  Initiated   Current Status:  2. Pt to tolerate 30 min or more of TE and/or Interventions w/o increased s/s   Eval Status:  Initiated   Current Status:    1.   Pt will report 50% improvement or better with involvement to show a significant increase in function   Eval Status:  Initiated   Current Status:  2. Pt will demonstrate normal shoulder Flx/ABD w/o difficulty or pain at 90* to allow normal reaching when doing her normal weeding and gardening   Eval Status:  Pain at 90* Flx/ABD   Current Status:  3. Pt will have no painful arch with overhead reaching to allow her to wash her hair w/o pain     Eval Status:  Pain with reaching up   Current Status:  4. Pt will Pt will have increased strength Right Shoulder Flx/ABD to 4+/5 w/o pain to tolerate lifting drinks and light items out of the refrigerator w/little to no pain      Eval Status:  MMT Shoulder Flx 4 Ext 5-, ADD 5, ABD 3 with pain   Current Status:  5.   Pt will improve FOTO score to 69 in 10 visits to show significant improvement for progress to good shoulder function   Eval Status: 59   Current Status:      PLAN  [x]  Upgrade activities as tolerated     [x]  Continue plan of care  []  Update interventions per flow sheet       []  Discharge due to:_  []  Other:_      Kamari Gifford, PT 6/28/2018  8:26 AM

## 2018-06-28 NOTE — PROGRESS NOTES
In [address] [place] [address] Square  [address] [place] [address], [address] [place] [address], [address] [place], [place] [address]y [address]  [phone] fax      Plan of Care/ Statement of Necessity for Physical Therapy Services    Patient name: Murphy Phelps Start of Care: 2018   Referral source: Karolyn Arita MD : 1955    Medical Diagnosis: Pain in right shoulder [M25.511]  Pain in left shoulder [M25.512]  Cervicalgia [M54.2]   Onset Date:Early April    Treatment Diagnosis: Muscle tightness/weakness (B) Shoulder/UT   Prior Hospitalization: see medical history Provider#: [id number]   Medications: Verified on Patient summary List    Comorbidities: RA, OA, HTN, Asthnma, Spinal Cord Stimulator   Prior Level of Function: Able to reach up and do normal things with right arm     The Plan of Care and following information is based on the information from the initial evaluation. Assessment/ key information: Patient is a 58 y.o. female referred to PT with the above Dx. Patient seen today for c/o Right shoulder pain that prevents functional mobility and normal reaching to the front and to the side. CT scan shows mild central canal narrowing. Pt has a Hx of Cx fusion at level 4-6 with Tingling in right hand at times. Patient presents to PT with decreased strength, decreased flexibility, and decreased mobility of Right shoulder. She has a (+) Right Cristian's test.  Patient s/s appear to be consistent w/ diagnosis. Patient demonstrates the potential to make functional gains within a reasonable time frame and will benefit from skilled PT to address impairments and improve functional mobility and strength for an improved quality of life.   Fall Risk Assessment: Patient demonstrates no Fall Risk   Evaluation Complexity History MEDIUM  Complexity : 1-2 comorbidities / personal factors will impact the outcome/ POC ; Examination MEDIUM Complexity : 3 Standardized tests and measures addressing body structure, function, activity limitation and / or participation in recreation  ;Presentation LOW Complexity : Stable, uncomplicated  ;Clinical Decision Making MEDIUM Complexity : FOTO score of 26-74  Overall Complexity Rating: LOW   Problem List: pain affecting function, decrease ROM, decrease strength, decrease ADL/ functional abilitiies, decrease activity tolerance, decrease flexibility/ joint mobility, decrease transfer abilities and other FOTO = 59   Treatment Plan may include any combination of the following: Therapeutic exercise, Therapeutic activities, Neuromuscular re-education, Physical agent/modality, Gait/balance training, Manual therapy, Patient education, Self Care training and Other: Dry Needling  Patient / Family readiness to learn indicated by: asking questions, trying to perform skills and interest  Persons(s) to be included in education: patient (P)  Barriers to Learning/Limitations: None  Patient Goal (s): To have some improvement  Patient Self Reported Health Status: good  Rehabilitation Potential: good    Short Term Goals: To be accomplished in 5 treatments:  1. Pt will be compliant and independent with HEP in order to facilitate PT sessions and aid with self management                        Eval Status:  Initiated                        Current Status:  2. Pt to tolerate 30 min or more of TE and/or Interventions w/o increased s/s                        Eval Status:  Initiated                        Current Status:      Long Term Goals: To be accomplished in 10 treatments:  Progress towards goals / Updated goals:  1. Pt will report 50% improvement or better with involvement to show a significant increase in function                        Eval Status:  Initiated                        Current Status:  2.   Pt will demonstrate normal shoulder Flx/ABD w/o difficulty or pain at 90* to allow normal reaching when doing her normal weeding and gardening                        Eval Status:  Pain at 90* Flx/ABD Current Status:  3. Pt will have no painful arch with overhead reaching to allow her to wash her hair w/o pain                          Eval Status:  Pain with reaching up                        Current Status:  4. Pt will Pt will have increased strength Right Shoulder Flx/ABD to 4+/5 w/o pain to tolerate lifting drinks and light items out of the refrigerator w/little to no pain                           Eval Status:  MMT Shoulder Flx 4 Ext 5-, ADD 5, ABD 3 with pain                        Current Status:  5. Pt will improve FOTO score to 69 in 10 visits to show significant improvement for progress to good shoulder function                        Eval Status: 59                        Current Status:  Frequency / Duration: Patient to be seen 2-3 times per week for 10 treatments. Patient/ Caregiver education and instruction: Diagnosis, prognosis, self care, activity modification and exercises   [x]  Plan of care has been reviewed with YEISON Espinoza, PT 6/28/2018 8:22 AM  ________________________________________________________________________    I certify that the above Therapy Services are being furnished while the patient is under my care. I agree with the treatment plan and certify that this therapy is necessary.     [de-identified] Signature:____________________  Date:____________Time: _________    Please sign and return to [facility] [address] [place] [address], [address] [place] [address] [address]  [phone] fax

## 2018-06-29 ENCOUNTER — HOSPITAL ENCOUNTER (OUTPATIENT)
Dept: PHYSICAL THERAPY | Age: 63
Discharge: HOME | End: 2018-06-29
Payer: COMMERCIAL

## 2018-06-29 PROCEDURE — 97110 THERAPEUTIC EXERCISES: CPT

## 2018-06-29 PROCEDURE — 97140 MANUAL THERAPY 1/> REGIONS: CPT

## 2018-06-29 NOTE — PROGRESS NOTES
PT DAILY TREATMENT NOTE - [facility] 3-16    Patient Name: Soo Davila  Date:2018  : 1955  [x]  Patient  Verified  Payor: DIANA MARISOL / Plan: Jenny Hanson / Product Type: PPO /    In time:1033  Out time:1210  Total Treatment Time (min): 76  Total Timed Codes (min): 66  1:1 Treatment Time ( only): 64   Visit #: 2 of 10    Treatment Area: Pain in right shoulder [M25.511]  Pain in left shoulder [M25.512]  Cervicalgia [M54.2]    SUBJECTIVE  Pain Level (0-10 scale): 2-3  Any medication changes, allergies to medications, adverse drug reactions, diagnosis change, or new procedure performed?: [x] No    [] Yes (see summary sheet for update)  Subjective functional status/changes:   [] No changes reported  Did the exercises    OBJECTIVE  Modality rationale: decrease inflammation, decrease pain and increase tissue extensibility to improve the patients ability to tolerate increased activity levels   Min Type Additional Details    [] Estim:  []Unatt       []IFC  []Premod                        []Other:  []w/ice   []w/heat  Position:  Location:    [x] Estim: []Att    []TENS instruct  []NMES                    []Other:  []w/US   []w/ice   []w/heat  Position:  Location:    []  Traction: [] Cervical       []Lumbar                       [] Prone          []Supine                       []Intermittent   []Continuous Lbs:  [] before manual  [] after manual    []  Ultrasound: []Continuous   [] Pulsed                           []1MHz   []3MHz Location:  W/cm2:    []  Iontophoresis with dexamethasone         Location: [] Take home patch   [] In clinic   10 [x]  Ice     []  heat  []  Ice massage  []  Laser   []  Anodyne Position: Prone  Location: Upper thoracic, (B) UT/Cx region    []  Laser with stim  []  Other: Position:  Location:    []  Vasopneumatic Device Pressure:       [] lo [] med [] hi   Temperature: [] lo [] med [] hi   [x] Skin assessment post-treatment:  [x]intact []redness- no adverse reaction []redness - adverse reaction:     12 min Therapeutic Exercise:  [x] See flow sheet :   Rationale: increase ROM, increase strength and improve coordination to improve the patients ability to tolerate increased activity levels  54 min Manual Therapy:  Dry needling, STM/TPR   Rationale: decrease pain, increase ROM, increase tissue extensibility and decrease trigger points to tolerate increased activity levels    Dry Needling Procedure Note    Procedure: An intramuscular manual therapy (dry needling) and a neuro-muscular re-education treatment was done to deactivate myofascial trigger points with a 30 gauge filament needle under aseptic technique. Indications:  [x] Myofascial pain and dysfunction [x] Muscled spasms  [] Myalgia/myositis   [x] Muscle cramps  [x] Muscle imbalances  [] Temporomandibular Dysfunction  [] Other:    Chart reviewed for the following:  Ivana PÉREZ PT, have reviewed the medical history, summary list and precautions/contraindications for [employer].   TIME OUT performed immediately prior to start of procedure:  Ivana PÉREZ PT, have performed the following reviews on [facility] prior to the start of the session:      [x] Verified patient identification by name and date of birth    [x] Agreement on all muscles being treated was verified   [x] Purpose of dry needling, side effects, possible complications, risks and benefits were explained to the patient   [x] Procedure site(s) verified  [x] Patient was positioned for comfort and draped for privacy  [x] Informed Consent was signed (initial visit) and verified verbally (subsequent visits)  [x] Patient was instructed on the need to report the use of blood thinners and/or immunosuppressant medications  [x] How to respond to possible adverse effects of treatment  [x] Self treatment of post needling soreness: ice, heat (moist heat, heat wraps) and stretching  [x] Opportunity was given to ask any questions, all questions were answered            Time:10:50  Date of procedure: 6/29/2018  Treatment: The following muscles were treated today with intramuscular dry needling  [] Left [] Right Masster  [] Left [] Right Temporalis  [] Left [] Right Zygomaticus Major / Minor  [] Left [] Right Lateral Pterygoid  [] Left [] Right Medial Pterygoid  [] Left [] Right Digastric Post / Anterior Belly  [] Left [] Right Sternocleidomastoid  [] Left [] Right Scalene Anterior / Medial / Posterior  [] Left [] Right Extra Laryngeal Muscles  [x] Left [x] Right Upper Trapezius  [] Left [] Right Middle Trapezius  [] Left [] Right Lower Trapezius  [] Left [] Right Oblique Capitis Inferior  [] Left [] Right Splenius Capitis / Cervicis  [] Left [] Right Semispinalis: Capitis / Cervicis  [] Left [] Right Multifidi / Rotatores Cervicis / Thoracic  [] Left [] Right Longissimus Thoracis / Illiocostalis  [x] Left [x] Right Levator Scapulae  [] Left [x] Right Supraspinatus / Infraspinatus  [] Left [] Right Teres Major / Minor  [] Left [] Right Rhomboids / Serratus posterior superior  [] Left [] Right Pectoralis Major / Minor  [] Left [] Right Serratus Anterior  [] Left [] Right Latissimus Dorsi  [] Left [] Right Subscapularis  [] Left [] Right Coracobrachialis  [] Left [] Right Biceps Brachii  [] Left [x] Right Deltoid: Anterior / Medial / Posterior  [] Left [] Right Brachialis  [] Left [] Right Triceps  [] Left [] Right Brachioradialis  [] Left [] Right Extensor Carpi Radialis Brevis / Extensor Carpi Radialis Longus    [] Left [] Right  Extensor digitorum  [] Left [] Right Supinator / Pronator Teres  [] Left [] Right Flexor Carpi Radialis/ Flexor Carpi Ulnaris   [] Left [] Right  Flexor Digitorum Superficialis/ Flexor Digitorum Profundus  [] Left [] Right Flexor Pollicis Longus / Flexor Pollicis Brevis / Palmaris Longus  [] Left [] Right Abductor Pollicis Longus / Abductor Pollicis Brevis  [] Left [] Right Opponens Pollicis / Adductor Pollicis  [] Left [] Right Dorsal / Palmar Interossei / Lumbricalis  [] Left [] Right Abductor Digiti Minimi / Opponens Digiti Minimi    Patient's response to today's treatment:  [x] Latent Twitch Response     [x] Muscle relaxation [x] Pain Relief  [x] Post needling soreness    [x] without complications  [x] Increased Range of Motion   [] Decreased headaches    [] Decreased Tinnitus  [x] Other: Active Twitch Response     Performed by: Db Vázquez, PT          With   [x] TE   [] TA   [] neuro   [] other: Patient Education: [x] Review HEP    [] Progressed/Changed HEP based on:   [] positioning   [] body mechanics   [] transfers   [] heat/ice application    [] other:      Other Objective/Functional Measures:   - Good tolerance with first full treatment  - Significant amount of trigger points (B) UT  - - Right with substantial twitch response  - Pt reports a different pain after treatment  - - Pain possibly due to post needle soreness       Pain Level (0-10 scale) post treatment: 2-3    ASSESSMENT/Changes in Function:      Patient will continue to benefit from skilled PT services to modify and progress therapeutic interventions, address functional mobility deficits, address ROM deficits, address strength deficits, analyze and address soft tissue restrictions and analyze and cue movement patterns to attain remaining goals. []  See Plan of Care  []  See progress note/recertification  []  See Discharge Summary         Progress towards goals / Updated goals:  Short Term Goals: To be accomplished in 5 treatments:  1.  Pt will be compliant and independent with HEP in order to facilitate PT sessions and aid with self management                        Eval Status:  Initiated                        Current Status:  2.  Pt to tolerate 30 min or more of TE and/or Interventions w/o increased s/s                        Eval Status:  Initiated                        Current Status:      Long Term Goals: To be accomplished in 10 treatments:  1.  Pt will report 50% improvement or better with involvement to show a significant increase in function                        Eval Status:  Initiated                        Current Status:  2.  Pt will demonstrate normal shoulder Flx/ABD w/o difficulty or pain at 90* to allow normal reaching when doing her normal weeding and gardening                        Eval Status:  Pain at 90* Flx/ABD                        Current Status:  3.  Pt will have no painful arch with overhead reaching to allow her to wash her hair w/o pain                          Eval Status:  Pain with reaching up                        Current Status:  4.  Pt will Pt will have increased strength Right Shoulder Flx/ABD to 4+/5 w/o pain to tolerate lifting drinks and light items out of the refrigerator w/little to no pain                           Eval Status:  MMT Shoulder Flx 4 Ext 5-, ADD 5, ABD 3 with pain                        Current Status:  5.  Pt will improve FOTO score to 69 in 10 visits to show significant improvement for progress to good shoulder function                        Eval Status: 59                        Current Status:    PLAN  [x]  Upgrade activities as tolerated     [x]  Continue plan of care  []  Update interventions per flow sheet       []  Discharge due to:_  []  Other:_      Kamari Gifford PT 6/29/2018  10:29 AM    Future Appointments  Date Time Provider Cooper Dhillon   6/29/2018 10:30 AM Kamari Gifford PT [facility]   7/2/2018 8:30 AM Celestina Baker PTA [facility]   7/6/2018 11:00 AM Kamari Gifford PT [facility]   7/9/2018 8:30 AM Celestina Baker PTA [facility]   7/12/2018 7:30 AM Kamari Gifford PT [facility]   7/16/2018 8:30 AM Celestina Baker PTA [facility]   7/18/2018 8:00 AM Kamari Gifford PT [facility]   7/23/2018 8:30 AM Celestina Baker [facility]   7/25/2018 10:00 AM Kamari Gifford, PT [facility]   7/30/2018 8:30 AM Celestina Baker PTA [facility]   8/1/2018 8:00 AM Venkat WASHBURN Liane Fears [facility]   8/13/2018 10:00 AM MD Mike Dyson [place]   8/27/2018 8:30 AM [facility]  [facility]   8/31/2018 7:30 AM Disha Soto MD [place] [id number]   11/12/2018 9:25 AM I[facility] NURSE VISIT [facility] MIGDALIA SCHED   11/20/2018 8:00 AM Anny Gonzalez MD [facility]

## 2018-07-02 ENCOUNTER — HOSPITAL ENCOUNTER (OUTPATIENT)
Dept: PHYSICAL THERAPY | Age: 63
Discharge: HOME | End: 2018-07-02
Payer: COMMERCIAL

## 2018-07-02 PROCEDURE — 97110 THERAPEUTIC EXERCISES: CPT

## 2018-07-02 PROCEDURE — 97140 MANUAL THERAPY 1/> REGIONS: CPT

## 2018-07-05 ENCOUNTER — APPOINTMENT (OUTPATIENT)
Dept: PHYSICAL THERAPY | Age: 63
End: 2018-07-05
Payer: COMMERCIAL

## 2018-07-06 ENCOUNTER — HOSPITAL ENCOUNTER (OUTPATIENT)
Dept: PHYSICAL THERAPY | Age: 63
Discharge: HOME | End: 2018-07-06
Payer: COMMERCIAL

## 2018-07-06 PROCEDURE — 97140 MANUAL THERAPY 1/> REGIONS: CPT

## 2018-07-06 NOTE — PROGRESS NOTES
PT DAILY TREATMENT NOTE - [facility] 3-16    Patient Name: Reesa Blizzard  Date:2018  : 1955  [x]  Patient  Verified  Payor: BLUE CROSS / Plan: [employer] [facility] / Product Type: PPO /    In time:1100  Out time:1:10  Total Treatment Time (min): 69  Total Timed Codes (min): 69  1:1 Treatment Time ( only): 45   Visit #: 4 of 10    Treatment Area: Pain in right shoulder [M25.511]  Pain in left shoulder [M25.512]  Cervicalgia [M54.2]    SUBJECTIVE  Pain Level (0-10 scale): 2-3  Any medication changes, allergies to medications, adverse drug reactions, diagnosis change, or new procedure performed?: [x] No    [] Yes (see summary sheet for update)  Subjective functional status/changes:   [] No changes reported  Still a little sore, more in the right side    OBJECTIVE  24 min Therapeutic Exercise:  [x] See flow sheet :   Rationale: increase ROM, increase strength and improve coordination to improve the patients ability to perform increased ADL  45 min Manual Therapy:  Dry Needling   Rationale: decrease pain, increase ROM, increase tissue extensibility and decrease trigger points to perform increased ADL    Dry Needling Procedure Note    Procedure: An intramuscular manual therapy (dry needling) and a neuro-muscular re-education treatment was done to deactivate myofascial trigger points with a 30 gauge filament needle under aseptic technique. Indications:  [x] Myofascial pain and dysfunction [x] Muscled spasms  [] Myalgia/myositis   [x] Muscle cramps  [x] Muscle imbalances  [] Temporomandibular Dysfunction  [] Other:    Chart reviewed for the following:  Gray PÉREZ PT, have reviewed the medical history, summary list and precautions/contraindications for [employer].   TIME OUT performed immediately prior to start of procedure:  Gray PÉREZ PT, have performed the following reviews on [employer] prior to the start of the session:      [x] Verified patient identification by name and date of birth    [x] Agreement on all muscles being treated was verified   [x] Purpose of dry needling, side effects, possible complications, risks and benefits were explained to the patient   [x] Procedure site(s) verified  [x] Patient was positioned for comfort and draped for privacy  [x] Informed Consent was signed (initial visit) and verified verbally (subsequent visits)  [x] Patient was instructed on the need to report the use of blood thinners and/or immunosuppressant medications  [x] How to respond to possible adverse effects of treatment  [x] Self treatment of post needling soreness: ice, heat (moist heat, heat wraps) and stretching  [x] Opportunity was given to ask any questions, all questions were answered            Time: 1140  Date of procedure: 7/6/2018  Treatment: The following muscles were treated today with intramuscular dry needling  [] Left [] Right Masster  [] Left [] Right Temporalis  [] Left [] Right Zygomaticus Major / Minor  [] Left [] Right Lateral Pterygoid  [] Left [] Right Medial Pterygoid  [] Left [] Right Digastric Post / Anterior Belly  [] Left [] Right Sternocleidomastoid  [] Left [] Right Scalene Anterior / Medial / Posterior  [] Left [] Right Extra Laryngeal Muscles  [x] Left [x] Right Upper Trapezius  [] Left [] Right Middle Trapezius  [] Left [] Right Lower Trapezius  [] Left [] Right Oblique Capitis Inferior  [] Left [] Right Splenius Capitis / Cervicis  [] Left [] Right Semispinalis: Capitis / Cervicis  [] Left [] Right Multifidi / Rotatores Cervicis / Thoracic  [] Left [] Right Longissimus Thoracis / Illiocostalis  [x] Left [x] Right Levator Scapulae  [x] Left [x] Right Supraspinatus / Infraspinatus  [x] Left [x] Right Teres Major / Minor  [] Left [] Right Rhomboids / Serratus posterior superior  [] Left [] Right Pectoralis Major / Minor  [] Left [] Right Serratus Anterior  [] Left [] Right Latissimus Dorsi  [] Left [] Right Subscapularis  [] Left [] Right Coracobrachialis  [] Left [] Right Biceps Brachii  [] Left [x] Right Deltoid: Anterior / Medial / Posterior  [] Left [] Right Brachialis  [] Left [] Right Triceps  [] Left [] Right Brachioradialis  [] Left [] Right Extensor Carpi Radialis Brevis / Extensor Carpi Radialis Longus    [] Left [] Right  Extensor digitorum  [] Left [] Right Supinator / Pronator Teres  [] Left [] Right Flexor Carpi Radialis/ Flexor Carpi Ulnaris   [] Left [] Right  Flexor Digitorum Superficialis/ Flexor Digitorum Profundus  [] Left [] Right Flexor Pollicis Longus / Flexor Pollicis Brevis / Palmaris Longus  [] Left [] Right Abductor Pollicis Longus / Abductor Pollicis Brevis  [] Left [] Right Opponens Pollicis / Adductor Pollicis  [] Left [] Right Dorsal / Palmar Interossei / Lumbricalis  [] Left [] Right Abductor Digiti Minimi / Opponens Digiti Minimi    Patient's response to today's treatment:  [x] Latent Twitch Response     [x] Muscle relaxation [x] Pain Relief  [x] Post needling soreness    [x] without complications  [x] Increased Range of Motion   [] Decreased headaches    [] Decreased Tinnitus  [] Other:     Performed by: Gray Fallon, PT          With   [x] TE   [] TA   [] neuro   [x] other: Patient Education: [x] Review HEP    [] Progressed/Changed HEP based on:   [] positioning   [] body mechanics   [] transfers   [] heat/ice application    [x] other: Dry Needling     Other Objective/Functional Measures:   - Continued pain shoulder flx at 90* Right > Left   - Full Shoulder flx demonstrated (B)       Pain Level (0-10 scale) post treatment: 2    ASSESSMENT/Changes in Function:      Patient will continue to benefit from skilled PT services to modify and progress therapeutic interventions, address functional mobility deficits, address ROM deficits, address strength deficits, analyze and address soft tissue restrictions and analyze and cue movement patterns to attain remaining goals.      []  See Plan of Care  []  See progress note/recertification  []  See Discharge Summary         Progress towards goals / Updated goals:  1.  Pt will be compliant and independent with HEP in order to facilitate PT sessions and aid with self management                        Eval Status:  Initiated                        Current Status:  Pt reports compliance 7/6/18  2.  Pt to tolerate 30 min or more of TE and/or Interventions w/o increased s/s                        Eval Status:  Initiated                        Current Status: Met 7/6/18      1.  Pt will report 50% improvement or better with involvement to show a significant increase in function                        Eval Status:  Initiated                        Current Status:  2.  Pt will demonstrate normal shoulder Flx/ABD w/o difficulty or pain at 90* to allow normal reaching when doing her normal weeding and gardening                        Eval Status:  Pain at 90* Flx/ABD                        Current Status: Continued pain but at a lesser degree 7/6/18  3.  Pt will have no painful arch with overhead reaching to allow her to wash her hair w/o pain                          Eval Status:  Pain with reaching up                        Current Status: Progressing with decreased pain 7/6/18  4.  Pt will Pt will have increased strength Right Shoulder Flx/ABD to 4+/5 w/o pain to tolerate lifting drinks and light items out of the refrigerator w/little to no pain                           Eval Status:  MMT Shoulder Flx 4 Ext 5-, ADD 5, ABD 3 with pain                        Current Status:  5.  Pt will improve FOTO score to 69 in 10 visits to show significant improvement for progress to good shoulder function                        Eval Status: 59                        Current Status:  PLAN  [x]  Upgrade activities as tolerated     [x]  Continue plan of care  []  Update interventions per flow sheet       []  Discharge due to:_  []  Other:_      Db Vázquez, PT 7/6/2018  11:39 AM    Future Appointments  Date Time Provider Cooper Dhillon   7/9/2018 8:30 AM Martha Sinha, PTA [facility]   7/12/2018 7:30 AM Farnaz Zapien, PT [facility]   7/16/2018 8:30 AM Celestina Baker, PTA [facility]   7/18/2018 8:00 AM Farnaz Zapien, PT [facility]   7/23/2018 8:30 AM Celestina Baker, PTA [facility]   7/25/2018 10:00 AM [facility] PT [facility]   7/30/2018 8:30 AM Celestina Baker, PTA [facility]   8/1/2018 8:00 AM Farnaz Zapien, PT [facility]   8/13/2018 10:00 AM Santo Graham MD [facility] SCHED   8/27/2018 8:30 AM [facility] TYLER REYNALDO [facility]   8/31/2018 7:30 AM Disha Soto MD [facility] 69   11/12/2018 9:25 AM [facility] NURSE VISIT [facility] SCHED   11/20/2018 8:00 AM Anny Gonzalez MD [facility]

## 2018-07-09 ENCOUNTER — HOSPITAL ENCOUNTER (OUTPATIENT)
Dept: PHYSICAL THERAPY | Age: 63
Discharge: HOME | End: 2018-07-09
Payer: COMMERCIAL

## 2018-07-09 PROCEDURE — 97110 THERAPEUTIC EXERCISES: CPT

## 2018-07-09 PROCEDURE — 97140 MANUAL THERAPY 1/> REGIONS: CPT

## 2018-07-09 NOTE — PROGRESS NOTES
PT DAILY TREATMENT NOTE - [facility]     Patient Name: Dmitry Lujan  Date:2018  : 1955  [x]  Patient  Verified  Payor: BLUE CROSS / Plan: [facility] [place] / Product Type: PPO /    In time:8:40  Out time:9:22  Total Treatment Time (min): 28  Total Timed Codes (min): 28  1:1 Treatment Time ( only): 28  Visit #: 5 of 10    Treatment Area: Pain in right shoulder [M25.511]  Pain in left shoulder [M25.512]  Cervicalgia [M54.2]    SUBJECTIVE  Pain Level (0-10 scale): 1  Any medication changes, allergies to medications, adverse drug reactions, diagnosis change, or new procedure performed?: [x] No    [] Yes (see summary sheet for update)  Subjective functional status/changes:   [] No changes reported  Feeling  Better  Since  Needling.     OBJECTIVE    Modality rationale: decrease edema, decrease inflammation, decrease pain and increase tissue extensibility to improve the patients ability to perform ADL    Min Type Additional Details    [] Estim:  []Unatt       []IFC  []Premod                        []Other:  []w/ice   []w/heat  Position:  Location:    [] Estim: []Att    []TENS instruct  []NMES                    []Other:  []w/US   []w/ice   []w/heat  Position:  Location:    []  Traction: [] Cervical       []Lumbar                       [] Prone          []Supine                       []Intermittent   []Continuous Lbs:  [] before manual  [] after manual    []  Ultrasound: []Continuous   [] Pulsed                           []1MHz   []3MHz W/cm2:  Location:    []  Iontophoresis with dexamethasone         Location: [] Take home patch   [] In clinic    []  Ice     []  heat  []  Ice massage  []  Laser   []  Anodyne Position:  Location:    []  Laser with stim  []  Other:  Position:  Location:    []  Vasopneumatic Device Pressure:       [] lo [] med [] hi   Temperature: [] lo [] med [] hi   [x] Skin assessment post-treatment:  [x]intact []redness- no adverse reaction    []redness - adverse reaction: min []Eval                  []Re-Eval       18 min Therapeutic Exercise:  [x] See flow sheet :   Rationale: increase ROM and increase strength to improve the patients ability to perform ADL     min Therapeutic Activity:  []  See flow sheet :   Rationale:   to improve the patients ability to       min Neuromuscular Re-education:  []  See flow sheet :   Rationale:   to improve the patients ability to     10 min Manual Therapy:  STM/DTM  (B )UT (R)>(L)   Rationale: decrease pain, increase ROM, increase tissue extensibility and decrease edema  to perform ADL      min Gait Training:  ___ feet with ___ device on level surfaces with ___ level of assist   Rationale: With   [x] TE   [] TA   [] neuro   [] other: Patient Education: [x] Review HEP    [] Progressed/Changed HEP based on:   [] positioning   [] body mechanics   [] transfers   [] heat/ice application    [] other:      Other Objective/Functional Measures: FOTO: 60     Pain Level (0-10 scale) post treatment:     ASSESSMENT/Changes in Function: FOTO has improved  By  1 point. Fair  Response  To each there ex. Patient will continue to benefit from skilled PT services to address functional mobility deficits, address ROM deficits, address strength deficits, analyze and address soft tissue restrictions and instruct in home and community integration to attain remaining goals.      [x]  See Plan of Care  []  See progress note/recertification  []  See Discharge Summary         Progress towards goals / Updated goals:  1.  Pt will be compliant and independent with HEP in order to facilitate PT sessions and aid with self management                        Eval Status:  Initiated                        Current Status:  Pt reports compliance 7/6/18  2.  Pt to tolerate 30 min or more of TE and/or Interventions w/o increased s/s                        Eval Status:  Initiated                        BOIPZQZ Status: Met 7/6/18      1.  Pt will report 50% improvement or better with involvement to show a significant increase in function                        Eval Status:  Initiated                        Current Status:  2.  Pt will demonstrate normal shoulder Flx/ABD w/o difficulty or pain at 90* to allow normal reaching when doing her normal weeding and gardening                        Eval Status:  Pain at 90* Flx/ABD                        Current Status: Continued pain but at a lesser degree 7/6/18  3.  Pt will have no painful arch with overhead reaching to allow her to wash her hair w/o pain                          Eval Status:  Pain with reaching up                        Current Status: Progressing with decreased pain 7/6/18  4.  Pt will Pt will have increased strength Right Shoulder Flx/ABD to 4+/5 w/o pain to tolerate lifting drinks and light items out of the refrigerator w/little to no pain                           Eval Status:  MMT Shoulder Flx 4 Ext 5-, ADD 5, ABD 3 with pain                        Current Status:  5.  Pt will improve FOTO score to 69 in 10 visits to show significant improvement for progress to good shoulder function                        Eval Status: 59                        Current Status:    PLAN  []  Upgrade activities as tolerated     [x]  Continue plan of care  []  Update interventions per flow sheet       []  Discharge due to:_  []  Other:_      Celestina Baker PTA 7/9/2018  8:50 AM    Future Appointments  Date Time Provider Cooper Jacquie   7/12/2018 7:30 AM Heather Bowen PT [facility]   7/16/2018 8:30 AM Celestina Baker PTA [facility]   7/18/2018 8:00 AM Heather Bowen PT [facility]   7/23/2018 8:30 AM Celestina Baker PTA [facility]   7/25/2018 10:00 AM [facility] PT [facility] 1 [facility]   7/30/2018 8:30 AM Celestina Baker PTA [facility]   8/1/2018 8:00 AM Heather Bowen PT [facility]   8/13/2018 10:00 AM Can Bello MD [facility] MIGDAL[facility] SCHED   8/27/2018 8:30 AM [facility] T[facility] JACOBS  [facility]   8/31/2018 7:30 AM Merrick Esqueda WHEAT TOMMIE [facility] MD Jairo HARRISON 69   11/12/2018 9:25 AM [facility] NURSE VISIT [facility] MIGDALIA GUEVARA   11/20/2018 8:00 AM Kiley Jackson MD [facility]

## 2018-07-12 ENCOUNTER — HOSPITAL ENCOUNTER (OUTPATIENT)
Dept: PHYSICAL THERAPY | Age: 63
Discharge: HOME | End: 2018-07-12
Payer: COMMERCIAL

## 2018-07-12 PROCEDURE — 97110 THERAPEUTIC EXERCISES: CPT

## 2018-07-12 PROCEDURE — 97140 MANUAL THERAPY 1/> REGIONS: CPT

## 2018-07-12 NOTE — PROGRESS NOTES
PT DAILY TREATMENT NOTE - [facility] 3-16    Patient Name: Rudy Wheeler  Date:2018  : 1955  [x]  Patient  Verified  Payor: BLUE CROSS / [facility] / Product Type: PPO /    In [id number]  Out time: 0910  Total Treatment Time (min): 75  Total Timed Codes (min): 65  1:1 Treatment Time ( only): 54  Visit #: 6 of 10    Treatment Area: Pain in right shoulder [M25.511]  Pain in left shoulder [M25.512]  Cervicalgia [M54.2]    SUBJECTIVE  Pain Level (0-10 scale): 3  Any medication changes, allergies to medications, adverse drug reactions, diagnosis change, or new procedure performed?: [x] No    [] Yes (see summary sheet for update)  Subjective functional status/changes:   [] No changes reported  Did some work over the last couple of days and it hurst a little more    OBJECTIVE  29 min Therapeutic Exercise:  [x] See flow sheet :   Rationale: increase ROM, increase strength and improve coordination to improve the patients ability to perform increased ADL  36 min Manual Therapy:  Dry Needling   Rationale: decrease pain, increase ROM, increase tissue extensibility and decrease trigger points to perform increased ADL    Dry Needling Procedure Note    Procedure: An intramuscular manual therapy (dry needling) and a neuro-muscular re-education treatment was done to deactivate myofascial trigger points with a 30 gauge filament needle under aseptic technique. Indications:  [x] Myofascial pain and dysfunction [x] Muscled spasms  [] Myalgia/myositis   [x] Muscle cramps  [x] Muscle imbalances  [] Temporomandibular Dysfunction  [] Other:    Chart reviewed for the following:  I, Leoma Brittle, PT, have reviewed the medical history, summary list and precautions/contraindications for [facility].   TIME OUT performed immediately prior to start of procedure:  I, Leoma Brittle, PT, have performed the following reviews on [facility] prior to the start of the session:      [x] Verified patient identification by name and date of birth    [x] Agreement on all muscles being treated was verified   [x] Purpose of dry needling, side effects, possible complications, risks and benefits were explained to the patient   [x] Procedure site(s) verified  [x] Patient was positioned for comfort and draped for privacy  [x] Informed Consent was signed (initial visit) and verified verbally (subsequent visits)  [x] Patient was instructed on the need to report the use of blood thinners and/or immunosuppressant medications  [x] How to respond to possible adverse effects of treatment  [x] Self treatment of post needling soreness: ice, heat (moist heat, heat wraps) and stretching  [x] Opportunity was given to ask any questions, all questions were answered            Time: 3680  Date of procedure: 7/12/2018  Treatment: The following muscles were treated today with intramuscular dry needling  [] Left [] Right Masster  [] Left [] Right Temporalis  [] Left [] Right Zygomaticus Major / Minor  [] Left [] Right Lateral Pterygoid  [] Left [] Right Medial Pterygoid  [] Left [] Right Digastric Post / Anterior Belly  [] Left [] Right Sternocleidomastoid  [] Left [] Right Scalene Anterior / Medial / Posterior  [] Left [] Right Extra Laryngeal Muscles  [x] Left [x] Right Upper Trapezius  [] Left [] Right Middle Trapezius  [] Left [] Right Lower Trapezius  [] Left [] Right Oblique Capitis Inferior  [] Left [] Right Splenius Capitis / Cervicis  [] Left [] Right Semispinalis: Capitis / Cervicis  [] Left [] Right Multifidi / Rotatores Cervicis / Thoracic  [] Left [] Right Longissimus Thoracis / Illiocostalis  [x] Left [x] Right Levator Scapulae  [x] Left [x] Right Supraspinatus / Infraspinatus  [x] Left [x] Right Teres Major / Minor  [] Left [] Right Rhomboids / Serratus posterior superior  [] Left [] Right Pectoralis Major / Minor  [] Left [] Right Serratus Anterior  [] Left [] Right Latissimus Dorsi  [] Left [] Right Subscapularis  [] Left [] Right Coracobrachialis  [] Left [] Right Biceps Brachii  [] Left [x] Right Deltoid: Anterior / Medial / Posterior  [] Left [] Right Brachialis  [] Left [] Right Triceps  [] Left [] Right Brachioradialis  [] Left [] Right Extensor Carpi Radialis Brevis / Extensor Carpi Radialis Longus    [] Left [] Right  Extensor digitorum  [] Left [] Right Supinator / Pronator Teres  [] Left [] Right Flexor Carpi Radialis/ Flexor Carpi Ulnaris   [] Left [] Right  Flexor Digitorum Superficialis/ Flexor Digitorum Profundus  [] Left [] Right Flexor Pollicis Longus / Flexor Pollicis Brevis / Palmaris Longus  [] Left [] Right Abductor Pollicis Longus / Abductor Pollicis Brevis  [] Left [] Right Opponens Pollicis / Adductor Pollicis  [] Left [] Right Dorsal / Palmar Interossei / Lumbricalis  [] Left [] Right Abductor Digiti Minimi / Opponens Digiti Minimi    Patient's response to today's treatment:  [x] Latent Twitch Response     [x] Muscle relaxation [x] Pain Relief  [x] Post needling soreness    [x] without complications  [x] Increased Range of Motion   [] Decreased headaches    [] Decreased Tinnitus  [] Other:     Performed by: Luigi Willoughby, PT          With   [x] TE   [] TA   [] neuro   [x] other: Patient Education: [x] Review HEP    [] Progressed/Changed HEP based on:   [] positioning   [] body mechanics   [] transfers   [] heat/ice application    [x] other: Dry Needling     Other Objective/Functional Measures:   - Continued pain shoulder flx at 90* Right > Left   - Full Shoulder flx demonstrated (B)       Pain Level (0-10 scale) post treatment: 2    ASSESSMENT/Changes in Function:      Patient will continue to benefit from skilled PT services to modify and progress therapeutic interventions, address functional mobility deficits, address ROM deficits, address strength deficits, analyze and address soft tissue restrictions and analyze and cue movement patterns to attain remaining goals.      []  See Plan of Care  []  See progress note/recertification  []  See Discharge Summary         Progress towards goals / Updated goals:  1.  Pt will be compliant and independent with HEP in order to facilitate PT sessions and aid with self management                        Eval Status:  Initiated                        Current Status:  Pt reports compliance 7/6/18  2.  Pt to tolerate 30 min or more of TE and/or Interventions w/o increased s/s                        Eval Status:  Initiated                        Current Status: Met 7/6/18      1.  Pt will report 50% improvement or better with involvement to show a significant increase in function                        Eval Status:  Initiated                        Current Status:  2.  Pt will demonstrate normal shoulder Flx/ABD w/o difficulty or pain at 90* to allow normal reaching when doing her normal weeding and gardening                        Eval Status:  Pain at 90* Flx/ABD                        Current Status: Continued pain but at a lesser degree 7/6/18  3.  Pt will have no painful arch with overhead reaching to allow her to wash her hair w/o pain                          Eval Status:  Pain with reaching up                        Current Status: Progressing with decreased pain 7/6/18  4.  Pt will Pt will have increased strength Right Shoulder Flx/ABD to 4+/5 w/o pain to tolerate lifting drinks and light items out of the refrigerator w/little to no pain                           Eval Status:  MMT Shoulder Flx 4 Ext 5-, ADD 5, ABD 3 with pain                        Current Status:  5.  Pt will improve FOTO score to 69 in 10 visits to show significant improvement for progress to good shoulder function                        Eval Status: 59                        Current Status:  PLAN  [x]  Upgrade activities as tolerated     [x]  Continue plan of care  []  Update interventions per flow sheet       []  Discharge due to:_  []  Other:_      Cassandra Reyna, PT 7/12/2018  8:09 AM    Future Appointments  Date Time Provider Cooper Dhillon   7/16/2018 8:30 AM Celestina Baker, PTA [facility]   7/18/2018 8:00 AM Divina Espinoza, PT [facility]   7/23/2018 8:30 AM Celestina Baker, PTA [facility]   7/25/2018 10:00 AM [facility] PT [facility]   7/30/2018 8:30 AM Celestina Baker, PTA [facility]   8/1/2018 8:00 AM Divina Espinoza, PT [facility]   8/13/2018 10:00 AM Ran Ackerman MD [facility] SCHED   8/27/2018 8:30 AM [facility]O  [facility]   8/31/2018 7:30 AM Mary Blanton MD [place] [id number]   11/12/2018 9:25 AM [facility] NURSE VISIT [facility] SCHED   11/20/2018 8:00 AM Trice Mccall MD [facility]

## 2018-07-16 ENCOUNTER — HOSPITAL ENCOUNTER (OUTPATIENT)
Dept: PHYSICAL THERAPY | Age: 63
Discharge: HOME | End: 2018-07-16
Payer: COMMERCIAL

## 2018-07-16 PROCEDURE — 97110 THERAPEUTIC EXERCISES: CPT

## 2018-07-16 PROCEDURE — 97140 MANUAL THERAPY 1/> REGIONS: CPT

## 2018-07-16 NOTE — PROGRESS NOTES
PT DAILY TREATMENT NOTE - [facility]     Patient Name: Lito Mckeon  Date:2018  : 1955  [x]  Patient  Verified  Payor: BLUE CROSS / Plan: [employer] [facility] [place] / Product Type: PPO /    In time: 8:25  Out time:9:13  Total Treatment Time (min): 30  Total Timed Codes (min): 30  1:1 Treatment Time ( only): 30  Visit #: 7 of10    Treatment Area: Pain in right shoulder [M25.511]  Pain in left shoulder [M25.512]  Cervicalgia [M54.2]    SUBJECTIVE  Pain Level (0-10 scale): 1  Any medication changes, allergies to medications, adverse drug reactions, diagnosis change, or new procedure performed?: [x] No    [] Yes (see summary sheet for update)  Subjective functional status/changes:   [] No changes reported  I  Was  Watering  My  Plant  And my  Shoulder  Ached.     OBJECTIVE    Modality rationale: decrease edema, decrease inflammation, decrease pain and increase tissue extensibility to improve the patients ability to perform ADL    Min Type Additional Details    [] Estim:  []Unatt       []IFC  []Premod                        []Other:  []w/ice   []w/heat  Position:  Location:    [] Estim: []Att    []TENS instruct  []NMES                    []Other:  []w/US   []w/ice   []w/heat  Position:  Location:    []  Traction: [] Cervical       []Lumbar                       [] Prone          []Supine                       []Intermittent   []Continuous Lbs:  [] before manual  [] after manual    []  Ultrasound: []Continuous   [] Pulsed                           []1MHz   []3MHz W/cm2:  Location:    []  Iontophoresis with dexamethasone         Location: [] Take home patch   [] In clinic    []  Ice     []  heat  []  Ice massage  []  Laser   []  Anodyne Position:  Location:    []  Laser with stim  []  Other:  Position:  Location:    []  Vasopneumatic Device Pressure:       [] lo [] med [] hi   Temperature: [] lo [] med [] hi   [x] Skin assessment post-treatment:  [x]intact []redness- no adverse reaction    []redness - adverse reaction:      min []Eval                  []Re-Eval       20 min Therapeutic Exercise:  [x] See flow sheet :   Rationale: increase ROM and increase strength to improve the patients ability to perform ADL      min Therapeutic Activity:  []  See flow sheet :   Rationale:   to improve the patients ability to       min Neuromuscular Re-education:  []  See flow sheet :   Rationale:   to improve the patients ability to    10 min Manual Therapy:  STM/DTM (B) UT   Rationale: decrease pain, increase ROM, increase tissue extensibility and decrease edema  to perform ADL      min Gait Training:  ___ feet with ___ device on level surfaces with ___ level of assist   Rationale: With   [x] TE   [] TA   [] neuro   [] other: Patient Education: [x] Review HEP    [] Progressed/Changed HEP based on:   [] positioning   [] body mechanics   [] transfers   [] heat/ice application    [] other:      Other Objective/Functional Measures:  Tightness  Right  UT    Pain Level (0-10 scale) post treatment: .5    ASSESSMENT/Changes in Function:  Responded  Fairly  Well  To each there ex. Patient will continue to benefit from skilled PT services to address functional mobility deficits, address ROM deficits, address strength deficits, analyze and address soft tissue restrictions, analyze and cue movement patterns and instruct in home and community integration to attain remaining goals.      [x]  See Plan of Care  []  See progress note/recertification  []  See Discharge Summary         Progress towards goals / Updated goals:  1.  Pt will be compliant and independent with HEP in order to facilitate PT sessions and aid with self management                        Eval Status:  Initiated                        Current Status:  Pt reports compliance 7/6/18  2.  Pt to tolerate 30 min or more of TE and/or Interventions w/o increased s/s                        Eval Status:  Initiated                        Current Status: Met 7/6/18      1.  Pt will report 50% improvement or better with involvement to show a significant increase in function                        Eval Status:  Initiated                        LPPJJJU Status:  2.  Pt will demonstrate normal shoulder Flx/ABD w/o difficulty or pain at 90* to allow normal reaching when doing her normal weeding and gardening                        Eval Status:  Pain at 90* Flx/ABD                        Current Status: Continued pain but at a lesser degree 7/6/18  3.  Pt will have no painful arch with overhead reaching to allow her to wash her hair w/o pain                          Eval Status:  Pain with reaching up                        Current Status: Progressing with decreased pain 7/6/18  4.  Pt will Pt will have increased strength Right Shoulder Flx/ABD to 4+/5 w/o pain to tolerate lifting drinks and light items out of the refrigerator w/little to no pain                           Eval Status:  MMT Shoulder Flx 4 Ext 5-, ADD 5, ABD 3 with pain                        Current Status:  5.  Pt will improve FOTO score to 69 in 10 visits to show significant improvement for progress to good shoulder function                        Eval Status: 59                        Current Status:    PLAN  []  Upgrade activities as tolerated     [x]  Continue plan of care  []  Update interventions per flow sheet       []  Discharge due to:_  [x]  Other:_REASSESS MMT [place]      Celestina Baker PTA 7/16/2018  8:37 AM    Future Appointments  Date Time Provider Cooper Dhillon   7/18/2018 8:00 AM Farnaz Zapien, PT [facility]   7/23/2018 4:00 PM Celestina Baker PTA [facility]   7/25/2018 10:00 AM [facility]   7/30/2018 8:30 AM Celestina Baker PTA [facility]   8/1/2018 8:00 AM Farnaz Zapien PT [facility]   8/14/2018 2:30 PM Santo Graham MD [facility] SCHED   8/27/2018 8:30 AM [facility] REYNALDO [facility]   8/31/2018 7:30 AM [facility]   11/12/2018 9:25 AM [facility] NURSE VISIT [facility] MIGDALIA GUEVARA   11/20/2018 8:00 AM Laverne Franklin MD [facility]

## 2018-07-18 ENCOUNTER — HOSPITAL ENCOUNTER (OUTPATIENT)
Dept: PHYSICAL THERAPY | Age: 63
Discharge: HOME | End: 2018-07-18
Payer: COMMERCIAL

## 2018-07-18 PROCEDURE — 97110 THERAPEUTIC EXERCISES: CPT

## 2018-07-18 PROCEDURE — 97140 MANUAL THERAPY 1/> REGIONS: CPT

## 2018-07-18 NOTE — PROGRESS NOTES
PT DAILY TREATMENT NOTE - [employer] 3-16    Patient Name: Mandy Medel  Date:2018  : 1955  [x]  Patient  Verified  Payor: BLUE CROSS / Plan: [employer] [facility] [place] / Product Type: PPO /    In time:0800  Out time: 09  Total Treatment Time (min): 61  Total Timed Codes (min): 51  1:1 Treatment Time ( [address] only): 41  Visit #: 8 of 10    Treatment Area: Pain in right shoulder [M25.511]  Pain in left shoulder [M25.512]  Cervicalgia [M54.2]    SUBJECTIVE  Pain Level (0-10 scale): 3  Any medication changes, allergies to medications, adverse drug reactions, diagnosis change, or new procedure performed?: [x] No    [] Yes (see summary sheet for update)  Subjective functional status/changes:   [] No changes reported  Able to do more work with the right arm.   Pt reports 50-60% Improvement with overall involvement and it is not waking her up at 3 am.    OBJECTIVE  Modality rationale: decrease inflammation, decrease pain and increase tissue extensibility to improve the patients ability to tolerate increased activity levels   Min Type Additional Details    [] Estim:  []Unatt       []IFC  []Premod                        []Other:  []w/ice   []w/heat  Position:  Location:    [] Estim: []Att    []TENS instruct  []NMES                    []Other:  []w/US   []w/ice   []w/heat  Position:  Location:    []  Traction: [] Cervical       []Lumbar                       [] Prone          []Supine                       []Intermittent   []Continuous Lbs:  [] before manual  [] after manual    []  Ultrasound: []Continuous   [] Pulsed                           []1MHz   []3MHz Location:  W/cm2:    []  Iontophoresis with dexamethasone         Location: [] Take home patch   [] In clinic   10 [x]  Ice     []  heat  []  Ice massage  []  Laser   []  Anodyne Position: Prone  Location: Cx/Tx    []  Laser with stim  []  Other: Position:  Location:    []  Vasopneumatic Device Pressure:       [] lo [] med [] hi   Temperature: [] lo [] med [] hi   [] Skin assessment post-treatment:  []intact []redness- no adverse reaction    []redness - adverse reaction:     26 min Therapeutic Exercise:  [x] See flow sheet :   Rationale: increase ROM, increase strength and improve coordination to improve the patients ability to perform increased ADL  25 min Manual Therapy:  Dry Needling   Rationale: decrease pain, increase ROM, increase tissue extensibility and decrease trigger points to perform increased ADL    Dry Needling Procedure Note    Procedure: An intramuscular manual therapy (dry needling) and a neuro-muscular re-education treatment was done to deactivate myofascial trigger points with a 30 gauge filament needle under aseptic technique. Indications:  [x] Myofascial pain and dysfunction [x] Muscled spasms  [] Myalgia/myositis   [x] Muscle cramps  [x] Muscle imbalances  [] Temporomandibular Dysfunction  [] Other:    Chart reviewed for the following:  Bonnie PÉREZ, PT, have reviewed the medical history, summary list and precautions/contraindications for [employer].   TIME OUT performed immediately prior to start of procedure:  Bonnie PÉREZ PT, have performed the following reviews on [facility] prior to the start of the session:      [x] Verified patient identification by name and date of birth    [x] Agreement on all muscles being treated was verified   [x] Purpose of dry needling, side effects, possible complications, risks and benefits were explained to the patient   [x] Procedure site(s) verified  [x] Patient was positioned for comfort and draped for privacy  [x] Informed Consent was signed (initial visit) and verified verbally (subsequent visits)  [x] Patient was instructed on the need to report the use of blood thinners and/or immunosuppressant medications  [x] How to respond to possible adverse effects of treatment  [x] Self treatment of post needling soreness: ice, heat (moist heat, heat wraps) and stretching  [x] Opportunity was given to ask any questions, all questions were answered            Time: 0826  Date of procedure: 7/18/2018  Treatment: The following muscles were treated today with intramuscular dry needling  [] Left [] Right Masster  [] Left [] Right Temporalis  [] Left [] Right Zygomaticus Major / Minor  [] Left [] Right Lateral Pterygoid  [] Left [] Right Medial Pterygoid  [] Left [] Right Digastric Post / Anterior Belly  [] Left [] Right Sternocleidomastoid  [] Left [] Right Scalene Anterior / Medial / Posterior  [] Left [] Right Extra Laryngeal Muscles  [] Left [] Right Upper Trapezius  [] Left [] Right Middle Trapezius  [] Left [] Right Lower Trapezius  [] Left [] Right Oblique Capitis Inferior  [] Left [] Right Splenius Capitis / Cervicis  [] Left [] Right Semispinalis: Capitis / Cervicis  [] Left [] Right Multifidi / Rotatores Cervicis / Thoracic  [] Left [] Right Longissimus Thoracis / Illiocostalis  [] Left [] Right Levator Scapulae  [x] Left [x] Right Supraspinatus / Infraspinatus  [] Left [] Right Teres Major / Minor  [] Left [] Right Rhomboids / Serratus posterior superior  [] Left [] Right Pectoralis Major / Minor  [] Left [] Right Serratus Anterior  [] Left [] Right Latissimus Dorsi  [] Left [] Right Subscapularis  [] Left [] Right Coracobrachialis  [] Left [] Right Biceps Brachii  [] Left [] Right Deltoid: Anterior / Medial / Posterior  [] Left [] Right Brachialis  [] Left [] Right Triceps  [] Left [] Right Brachioradialis  [] Left [] Right Extensor Carpi Radialis Brevis / Extensor Carpi Radialis Longus    [] Left [] Right  Extensor digitorum  [] Left [] Right Supinator / Pronator Teres  [] Left [] Right Flexor Carpi Radialis/ Flexor Carpi Ulnaris   [] Left [] Right  Flexor Digitorum Superficialis/ Flexor Digitorum Profundus  [] Left [] Right Flexor Pollicis Longus / Flexor Pollicis Brevis / Palmaris Longus  [] Left [] Right Abductor Pollicis Longus / Abductor Pollicis Brevis  [] Left [] Right Opponens Pollicis / Adductor Pollicis  [] Left [] Right Dorsal / Palmar Interossei / Lumbricalis  [] Left [] Right Abductor Digiti Minimi / Opponens Digiti Minimi    Patient's response to today's treatment:  [x] Latent Twitch Response     [x] Muscle relaxation [x] Pain Relief  [x] Post needling soreness    [x] without complications  [x] Increased Range of Motion   [] Decreased headaches    [] Decreased Tinnitus  [] Other:     Performed by: Liane Sena, PT          With   [x] TE   [] TA   [] neuro   [x] other: Patient Education: [x] Review HEP    [] Progressed/Changed HEP based on:   [] positioning   [] body mechanics   [] transfers   [] heat/ice application    [x] other: Dry Needling     Other Objective/Functional Measures:   - Increased ease of motion  - Decreased TrP (B) UT  - Infraspinatus TrP w/pain referred to lateral arm with needling  - MMT w/some apprehension      AROM PROM Strength Pain? ?    Right Left Right Left Right Left yes   Shoulder Flx   3 5-      Shoulder Ext   4+ 5      Shoulder ABD   4 4+      Shoulder ADD   5- 5      Shoulder IR          Shoulder ER                 Pain Level (0-10 scale) post treatment: 1ASSESSMENT/Changes in Function:      Patient will continue to benefit from skilled PT services to modify and progress therapeutic interventions, address functional mobility deficits, address ROM deficits, address strength deficits, analyze and address soft tissue restrictions and analyze and cue movement patterns to attain remaining goals.      []  See Plan of Care  []  See progress note/recertification  []  See Discharge Summary         Progress towards goals / Updated goals:  1.  Pt will be compliant and independent with HEP in order to facilitate PT sessions and aid with self management                        Eval Status:  Initiated                        Current Status:  Pt reports compliance 7/6/18  2.  Pt to tolerate 30 min or more of TE and/or Interventions w/o increased s/s                        Eval Status:  Initiated                        MRWHKVM Status: Met 7/6/18      1.  Pt will report 50% improvement or better with involvement to show a significant increase in function                        Eval Status:  Initiated                        TPWSWSV Status:  2.  Pt will demonstrate normal shoulder Flx/ABD w/o difficulty or pain at 90* to allow normal reaching when doing her normal weeding and gardening                        Eval Status:  Pain at 90* Flx/ABD                        Current Status: Continued pain but at a lesser degree 7/18/18  3.  Pt will have no painful arch with overhead reaching to allow her to wash her hair w/o pain                          Eval Status:  Pain with reaching up                        Current Status: Progressing with decreased pain 7/6/18  4.  Pt will Pt will have increased strength Right Shoulder Flx/ABD to 4+/5 w/o pain to tolerate lifting drinks and light items out of the refrigerator w/little to no pain                           Eval Status:  MMT Shoulder Flx 4 Ext 5-, ADD 5, ABD 3 with pain                        Current Status: MMT Shoulder Flx 3+/pain Ext 4+, ADD 4+, ABD 4 7/18/19   5.  Pt will improve FOTO score to 69 in 10 visits to show significant improvement for progress to good shoulder function                        Eval Status: 59                        Current Status: Progressing at 60 7/9/18    PLAN  [x]  Upgrade activities as tolerated     [x]  Continue plan of care  []  Update interventions per flow sheet       []  Discharge due to:_  []  Other:_      Juvenal Santos, PT 7/18/2018  8:49 AM    Future Appointments  Date Time Provider Cooper Jacquie   7/23/2018 4:00 PM Celestina Menendez PTA [facility]   7/25/2018 10:00 AM [facility]   7/30/2018 8:30 AM Celestina Baker PTA [facility]   8/1/2018 8:00 AM Juvenal Santos PT [facility]   8/14/2018 2:30 PM MD Mike Morrison [place]   8/27/2018 8:30 AM [facility] REYNALDO  [facility]   [facility]/31/2018 7:30 [facility] [place] [facility]   11/12/2018 9:25 AM [facility] NURSE VISIT [facility] MIGDALIA SCHED   11/20/2018 8:00 AM Laverne Franklin MD [facility]

## 2018-07-23 ENCOUNTER — HOSPITAL ENCOUNTER (OUTPATIENT)
Dept: PHYSICAL THERAPY | Age: 63
Discharge: HOME | End: 2018-07-23
Payer: COMMERCIAL

## 2018-07-23 PROCEDURE — 97110 THERAPEUTIC EXERCISES: CPT

## 2018-07-23 NOTE — PROGRESS NOTES
PT DAILY TREATMENT NOTE - [facility]     Patient Name: Nicolette Wang  Date:2018  : 1955  [x]  Patient  Verified  Payor: BLUE CROSS / Plan: [employer] [facility] [place] / Product Type: PPO /    In time:3:55  Out time:4:32  Total Treatment Time (min): 27  Total Timed Codes (min): 27  1:1 Treatment Time ( only): 27  Visit #: 9 of 10    Treatment Area: Pain in right shoulder [M25.511]  Pain in left shoulder [M25.512]  Cervicalgia [M54.2]    SUBJECTIVE  Pain Level (0-10 scale): 0  Any medication changes, allergies to medications, adverse drug reactions, diagnosis change, or new procedure performed?: [x] No    [] Yes (see summary sheet for update)  Subjective functional status/changes:   [] No changes reported  What  Venkat  Did  Last  Time  Really  Helped.     OBJECTIVE    Modality rationale: decrease edema, decrease inflammation, decrease pain and increase tissue extensibility to improve the patients ability to perform ADL    Min Type Additional Details    [] Estim:  []Unatt       []IFC  []Premod                        []Other:  []w/ice   []w/heat  Position:  Location:    [] Estim: []Att    []TENS instruct  []NMES                    []Other:  []w/US   []w/ice   []w/heat  Position:  Location:    []  Traction: [] Cervical       []Lumbar                       [] Prone          []Supine                       []Intermittent   []Continuous Lbs:  [] before manual  [] after manual    []  Ultrasound: []Continuous   [] Pulsed                           []1MHz   []3MHz W/cm2:  Location:    []  Iontophoresis with dexamethasone         Location: [] Take home patch   [] In clinic    []  Ice     []  heat  []  Ice massage  []  Laser   []  Anodyne Position:  Location:    []  Laser with stim  []  Other:  Position:  Location:    []  Vasopneumatic Device Pressure:       [] lo [] med [] hi   Temperature: [] lo [] med [] hi   [x] Skin assessment post-treatment:  [x]intact []redness- no adverse reaction    []redness - adverse reaction:      min []Eval                  []Re-Eval       27 min Therapeutic Exercise:  [x] See flow sheet :   Rationale: increase ROM and increase strength to improve the patients ability to perform ADL     min Therapeutic Activity:  []  See flow sheet :   Rationale:   to improve the patients ability to       min Neuromuscular Re-education:  []  See flow sheet :   Rationale:   to improve the patients ability to      min Manual Therapy:     Rationale: decrease pain, increase ROM, increase tissue extensibility and decrease edema  to perform  ADL      min Gait Training:  ___ feet with ___ device on level surfaces with ___ level of assist   Rationale: With   [x] TE   [] TA   [] neuro   [] other: Patient Education: [x] Review HEP    [] Progressed/Changed HEP based on:   [] positioning   [] body mechanics   [] transfers   [] heat/ice application    [] other:      Other Objective/Functional Measures:  Completed  Each there ex  Fairly  Well. Pain Level (0-10 scale) post treatment:0    ASSESSMENT/Changes in Function: Benefited  With treatment. Patient will continue to benefit from skilled PT services to address functional mobility deficits, address ROM deficits, address strength deficits, analyze and address soft tissue restrictions, analyze and cue movement patterns and instruct in home and community integration to attain remaining goals.      [x]  See Plan of Care  []  See progress note/recertification  []  See Discharge Summary         Progress towards goals / Updated goals:  1.  Pt will be compliant and independent with HEP in order to facilitate PT sessions and aid with self management                        Eval Status:  Initiated                        Current Status:  Pt reports compliance 7/6/18  2.  Pt to tolerate 30 min or more of TE and/or Interventions w/o increased s/s                        Eval Status:  Initiated                        OSUMJQD Status: Met 7/6/18      1.  Pt will report 50% improvement or better with involvement to show a significant increase in function                        Eval Status:  Initiated                        Current Status:  2.  Pt will demonstrate normal shoulder Flx/ABD w/o difficulty or pain at 90* to allow normal reaching when doing her normal weeding and gardening                        Eval Status:  Pain at 90* Flx/ABD                        Current Status: Continued pain but at a lesser degree 7/18/18  3.  Pt will have no painful arch with overhead reaching to allow her to wash her hair w/o pain                          Eval Status:  Pain with reaching up                        Current Status: Progressing with decreased pain 7/6/18  4.  Pt will Pt will have increased strength Right Shoulder Flx/ABD to 4+/5 w/o pain to tolerate lifting drinks and light items out of the refrigerator w/little to no pain                           Eval Status:  MMT Shoulder Flx 4 Ext 5-, ADD 5, ABD 3 with pain                        Current Status: MMT Shoulder Flx 3+/pain Ext 4+, ADD 4+, ABD 4 7/18/19   5.  Pt will improve FOTO score to 69 in 10 visits to show significant improvement for progress to good shoulder function                        Eval Status: 59                        Current Status: Progressing at 60 7/9/18    PLAN  []  Upgrade activities as tolerated     []  Continue plan of care  []  Update interventions per flow sheet       []  Discharge due to:_  []  Other:_      Celestina Baker PTA 7/23/2018  4:26 PM    Future Appointments  Date Time Provider Cooper Dhillon   7/25/2018 10:00 AM [facility] [place] [id number] [facility]   7/30/2018 8:30 AM Celestina Baker PTA [facility]   8/1/2018 8:00 AM Louis Galindo PT [facility]   8/14/2018 2:30 PM Jose Colin MD [facility] MIGDALIA SCHED   8/27/2018 8:30 AM [facility]   8/31/2018 7:30 AM Quincy Aceves MD [facility] [place] [address]   11/12/2018 9:25 AM [facility] NURSE VISIT IO MIGDALIA SCHED   11/20/2018 8:00 AM Barbara VICKERS Estrada Sunshine MD [facility]

## 2018-07-30 ENCOUNTER — HOSPITAL ENCOUNTER (OUTPATIENT)
Dept: PHYSICAL THERAPY | Age: 63
Discharge: HOME | End: 2018-07-30
Payer: COMMERCIAL

## 2018-07-30 PROCEDURE — 97110 THERAPEUTIC EXERCISES: CPT

## 2018-07-30 PROCEDURE — 97140 MANUAL THERAPY 1/> REGIONS: CPT

## 2018-08-01 ENCOUNTER — HOSPITAL ENCOUNTER (OUTPATIENT)
Dept: PHYSICAL THERAPY | Age: 63
Discharge: HOME | End: 2018-08-01
Payer: COMMERCIAL

## 2018-08-01 PROCEDURE — 97110 THERAPEUTIC EXERCISES: CPT

## 2018-08-01 PROCEDURE — 97140 MANUAL THERAPY 1/> REGIONS: CPT

## 2018-08-01 NOTE — PROGRESS NOTES
PT DISCHARGE DAILY NOTE AND [id number]    Date:2018  Patient name: Paras Guallpa Start of Care: 2018   Referral source: Kale Giron MD : [id number]                          Medical Diagnosis: Pain in right shoulder [M25.511]  Pain in left shoulder [M25.512]  Cervicalgia [M54.2] Onset Date:Early April                          Treatment Diagnosis: Muscle tightness/weakness (B) Shoulder/UT   Prior Hospitalization: see medical history Provider#: [id number]   Medications: Verified on Patient summary List    Comorbidities: RA, OA, HTN, Asthnma, Spinal Cord Stimulator   Prior Level of Function: Able to reach up and do normal things with right arm      Visits from Start of Care: 11    Missed Visits: 0    Reporting Period : 18 to 18    [x]  Patient  Verified  Payor: BLUE CROSS / Plan: [employer] [facility] [employer] / Product Type: PPO /    In GIDS:0944  Out time:0909  Total Treatment Time (min): 61  Total Timed Codes (min): 61  1:1 Treatment Time ( only): 61   Visit #: 1 of 1     Treatment Area: Pain in right shoulder [M25.511]  Pain in left shoulder [M25.512]  Cervicalgia [M54.2]     SUBJECTIVE  Pain Level (0-10 scale): 0.5  Any medication changes, allergies to medications, adverse drug reactions, diagnosis change, or new procedure performed?: [x] No    [] Yes (see summary sheet for update)  Subjective functional status/changes:   [] No changes reported  Some pain at the right shoulder blade. Feeling better overall. The other pain and activity is much much better. Able to reach and lift better.   Reports 90-95% improvement.     OBJECTIVE  26 min Therapeutic Exercise:  [] See flow sheet : DC Assessment   Rationale: increase ROM, increase strength and improve coordination to improve the patients ability to perform increased ADL  35 min Manual Therapy:  See Dry Needling below   Rationale: decrease pain, increase ROM, increase tissue extensibility and decrease trigger points to Return to performing normal ADL     Dry Needling Procedure Note     Procedure: An intramuscular manual therapy (dry needling) and a neuro-muscular re-education treatment was done to deactivate myofascial trigger points with a 30 gauge filament needle under aseptic technique.     Indications:  [x] Myofascial pain and dysfunction                 [] Muscled spasms  [] Myalgia/myositis                                                              [] Muscle cramps  [] Muscle imbalances                                    [] Temporomandibular Dysfunction  [] Other:     Chart reviewed for the following:  ILico, PT, have reviewed the medical history, summary list and precautions/contraindications for [facility].   TIME OUT performed immediately prior to start of procedure:  Lico PÉREZ, PT, have performed the following reviews on Mily San prior to the start of the session:      [x] Verified patient identification by name and date of birth    [x] Agreement on all muscles being treated was verified   [x] Purpose of dry needling, side effects, possible complications, risks and benefits were explained to the patient   [x] Procedure site(s) verified  [x] Patient was positioned for comfort and draped for privacy  [x] Informed Consent was signed (initial visit) and verified verbally (subsequent visits)  [x] Patient was instructed on the need to report the use of blood thinners and/or immunosuppressant medications  [x] How to respond to possible adverse effects of treatment  [x] Self treatment of post needling soreness: ice, heat (moist heat, heat wraps) and stretching  [x] Opportunity was given to ask any questions, all questions were answered     Time: 0818  Date of procedure: 8/1/2018  Treatment: The following muscles were treated today with intramuscular dry needling  [] Left [] Right                 Masster  [] Left [] Right                 Temporalis  [] Left [] Right                 Zygomaticus Major / Minor  [] Left [] Right                 Lateral Pterygoid  [] Left [] Right                 Medial Pterygoid  [] Left [] Right                 Digastric Post / Anterior Belly  [] Left [] Right                 Sternocleidomastoid  [] Left [] Right                 Scalene Anterior / Medial / Posterior  [] Left [] Right                 Extra Laryngeal Muscles  [] Left [] Right                 Upper Trapezius  [] Left [] Right                 Middle Trapezius  [] Left [] Right                 Lower Trapezius  [] Left [] Right                 Oblique Capitis Inferior  [] Left [] Right                 Splenius Capitis / Cervicis  [] Left [] Right                 Semispinalis: Capitis / Cervicis  [] Left [] Right                 Multifidi / Rotatores Cervicis / Thoracic  [] Left [] Right                 Longissimus Thoracis / Illiocostalis  [] Left [] Right                 Levator Scapulae  [] Left [x] Right                 Supraspinatus / Infraspinatus  [] Left [] Right                 Teres Major / Minor  [] Left [] Right                 Rhomboids / Serratus posterior superior  [] Left [] Right                 Pectoralis Major / Minor  [] Left [] Right                 Serratus Anterior  [] Left [] Right                 Latissimus Dorsi  [] Left [] Right                 Subscapularis  [] Left [] Right                 Coracobrachialis  [] Left [] Right                 Biceps Brachii  [] Left [] Right                 Deltoid: Anterior / Medial / Posterior  [] Left [] Right                 Brachialis  [] Left [] Right                 Triceps  [] Left [] Right                 Brachioradialis  [] Left [] Right                 Extensor Carpi Radialis Brevis / Extensor Carpi Radialis Longus    [] Left [] Right             Extensor digitorum  [] Left [] Right                 Supinator / Pronator Teres  [] Left [] Right                 Flexor Carpi Radialis/ Flexor Carpi Ulnaris   [] Left [] Right                 Flexor Digitorum Superficialis/ Flexor Digitorum Profundus  [] Left [] Right                 Flexor Pollicis Longus / Flexor Pollicis Brevis / Palmaris Longus  [] Left [] Right                 Abductor Pollicis Longus / Abductor Pollicis Brevis  [] Left [] Right                 Opponens Pollicis / Adductor Pollicis  [] Left [] Right                 Dorsal / Palmar Interossei / Lumbricalis  [] Left [] Right                 Abductor Digiti Minimi / Opponens Digiti Minimi     Patient's response to today's treatment:  [x] Latent Twitch Response                                         [x] Muscle relaxation                [] Pain Relief  [] Post needling soreness                                          [] without complications  [] Increased Range of Motion             [] Decreased headaches                       [] Decreased Tinnitus  [] Other:      Performed by: Liane Sena PT     With   [x] TE   [] TA   [] neuro   [] other: Patient Education: [x] Review HEP    [] Progressed/Changed HEP based on:   [] positioning   [] body mechanics   [] transfers   [] heat/ice application    [] other:      Other Objective/Functional Measures:   - TTP with muscle tightness at right infraspinatus  - Right Shoulder AROM full  - MMT Right Shoulder Flx 4, ABD 4+, Ext 5 ADD 5  - FOTO = 71                   Pain Level (0-10 scale) post treatment: 0Summary of Care:  Progress towards goals / Updated goals:  1.  Pt will be compliant and independent with HEP in order to facilitate PT sessions and aid with self management                        Eval Status:  Initiated                        Current Status:  Pt reports compliance 7/6/18  2.  Pt to tolerate 30 min or more of TE and/or Interventions w/o increased s/s                        Eval Status:  Initiated                        Current Status: Met 7/6/18      1.  Pt will report 50% improvement or better with involvement to show a significant increase in function                        Eval Status:  Initiated                        ARTCAAS Status:  Met at 90-95%  2.  Pt will demonstrate normal shoulder Flx/ABD w/o difficulty or pain at 90* to allow normal reaching when doing her normal weeding and gardening                        Eval Status:  Pain at 90* Flx/ABD                        Current Status: Full ROM w/o pain in shoulder at 90* 8/1/18  3.  Pt will have no painful arch with overhead reaching to allow her to wash her hair w/o pain                          Eval Status:  Pain with reaching up                        Current Status: Met with no painful arch today 8/1/18  4.  Pt will Pt will have increased strength Right Shoulder Flx/ABD to 4+/5 w/o pain to tolerate lifting drinks and light items out of the refrigerator w/little to no pain                           Eval Status:  MMT Shoulder Flx 4 Ext 5-, ADD 5, ABD 3 with pain                        Current Status: Progressing at MMT Right Shoulder Flx 4, ABD 4+, Ext 5 ADD 5 8/1/18  5.  Pt will improve FOTO score to 69 in 10 visits to show significant improvement for progress to good shoulder function                        Eval Status: 59                        Current Status: Progressing at 64 8/1/18    ASSESSMENT/Changes in Function: Patient has shown good progress with this treatment program. Pain as of last visit was 0/10. Patient has shown decreased pain and increased strength and mobility. Patient reports 90-95% improvement with overall involvement. FOTO score is 64. All STG/LTGs achieved as identified above.     Fall Risk Assessment: Patient demonstrates no Fall Risk       Thank you for this referral!     PLAN  [x]Discontinue therapy: [x]Patient has reached or is progressing toward set goals      []Patient is non-compliant or has abdicated      []Due to lack of appreciable progress towards set goals    Xavi Poole, PT 8/1/2018  2:20 PM

## 2018-08-14 ENCOUNTER — OFFICE VISIT (OUTPATIENT)
Dept: OBGYN CLINIC | Age: 63
End: 2018-08-14

## 2018-08-14 VITALS
TEMPERATURE: 97.4 F | HEART RATE: 85 BPM | SYSTOLIC BLOOD PRESSURE: 141 MMHG | WEIGHT: 189 LBS | DIASTOLIC BLOOD PRESSURE: 88 MMHG | BODY MASS INDEX: 27.06 KG/M2 | HEIGHT: 70 IN | OXYGEN SATURATION: 98 %

## 2018-08-14 DIAGNOSIS — N95.2 ATROPHIC VAGINITIS: ICD-10-CM

## 2018-08-14 DIAGNOSIS — L43.9 LICHEN PLANUS: ICD-10-CM

## 2018-08-14 DIAGNOSIS — L90.0 LICHEN SCLEROSUS ET ATROPHICUS: ICD-10-CM

## 2018-08-14 DIAGNOSIS — Z01.419 WELL WOMAN EXAM WITH ROUTINE GYNECOLOGICAL EXAM: Primary | ICD-10-CM

## 2018-08-14 RX ORDER — ESTRADIOL 0.1 MG/G
CREAM VAGINAL
Qty: 42.5 G | Refills: 2 | Status: SHIPPED | OUTPATIENT
Start: 2018-08-14 | End: 2022-07-21

## 2018-08-14 RX ORDER — CLOBETASOL PROPIONATE 0.5 MG/G
OINTMENT TOPICAL
Qty: 45 G | Refills: 0 | Status: SHIPPED | OUTPATIENT
Start: 2018-08-16 | End: 2019-09-11 | Stop reason: SDUPTHER

## 2018-08-14 NOTE — PROGRESS NOTES
Name: Lizette Gayle MRN: [id number]    YOB: 1955  Age: 58 y.o.   Sex: female        Chief Complaint   Patient presents with    Annual Exam       HPI  Denies depression  Does eat a well balanced diet  Does exercise daily walking the dog  Denies domestic abuse  Last tdap   Mammogram scheduled   Colonoscopy 2018    OB History      Para Term  AB Living    3 2 2  1 2    SAB TAB Ectopic Molar Multiple Live Births    1             Obstetric Comments      Patient is postmenopausal.  History of sexually transmitted infections never  Last pap 2017, pap neg, HR HPV neg           History   Sexual Activity    Sexual activity: Yes    Partners: Male       Past Medical History:   Diagnosis Date    Adjacent segment disease C3/4 w/deg changes, poss stenosis, CT '18 2018    Allergic rhinitis     Asthma     Back pain     Green's esophagus with esophagitis     Cervical radiculopathy     Chronic sinusitis 5/15/2012    DNS (deviated nasal septum)     Empyema     Foraminal stenosis of cervical region     C4-C5    GERD (gastroesophageal reflux disease)     Dr Painting Elders Hx MRSA infection 2014    Hypertension     Hypertriglyceridemia     Insulin resistance 2012    Left knee pain     Lichen planus     Menopause     Age 51    MRSA (methicillin resistant Staphylococcus aureus) infection 2008    left lung    Restless leg syndrome     Rheumatoid arthritis ([address]     Spinal cord stimulator status 2016    Spinal stenosis of cervical region     C4-C5 and C5-C6    TMJ syndrome     Wears glasses     and contacts       Past Surgical History:   Procedure Laterality Date    HX CERVICAL FUSION  14    HX LUMBAR LAMINECTOMY  1995    [facility]    HX LUMBAR LAMINECTOMY  1997    [facility]bron  2015    bunion removal from right foot    HX OTHER SURGICAL      thoracentesis    HX OTHER SURGICAL      chest tube    HX OTHER SURGICAL 1997    TMJ surgery    HX OTHER SURGICAL  2012    sinus surg 2012-Dr Bobbi Biswas.  HX OTHER SURGICAL  2016    spinal cord stimulator place for lumbar neuritis, good benefit    HX TUBAL LIGATION      OH COLONOSCOPY FLX DX W/COLLJ SPEC WHEN PFRMD  6-18-08    normal/duntemann       Allergies   Allergen Reactions    Sulfasalazine Other (comments)     Could not taste anything, lightheadedness    Adhesive Tape-Silicones Other (comments)    Other Plant, Animal, Environmental Unable to Consolidated Doc     MOLD    Tape [Adhesive] Other (comments)     Blisters, use paper tape only  Patient states tegaderm and paper tape are okay       Current Outpatient Prescriptions on File Prior to Visit   Medication Sig Dispense Refill    clobetasol (TEMOVATE) 0.05 % ointment APPLY TO THE AFFECTED AREA 2 TIMES A WEEK 15 g 0    cholecalciferol, vitamin D3, (VITAMIN D3 PO) Take  by mouth.  methotrexate, PF, 25 mg/mL injection once.  ibuprofen (MOTRIN) 200 mg tablet Take  by mouth.  ferrous sulfate 325 mg (65 mg iron) tablet take 1 tablet by mouth once daily before BREAKFAST 90 Tab 3    cyanocobalamin 1,000 mcg tablet take 1 tablet by mouth once daily 90 Tab 3    ascorbic acid, vitamin C, (VITAMIN C) 500 mg tablet Take 2 Tabs by mouth daily. 180 Tab 1    naproxen sodium (ALEVE) 220 mg cap Take 220 mg by mouth daily as needed.  ESTRACE 0.01 % (0.1 mg/gram) vaginal cream Place 1 gm in the vagina twice weekly 42.5 g 2    losartan (COZAAR) 50 mg tablet TAKE 1 TABLET DAILY 30 Tab 0    folic acid (FOLVITE) 1 mg tablet take 1 tablet by mouth once daily  0    acetaminophen (TYLENOL) 325 mg tablet Take  by mouth every four (4) hours as needed for Pain.  triamcinolone (NASACORT AQ) 55 mcg nasal inhaler 2 Sprays by Both Nostrils route daily. Indications: PERENNIAL ALLERGIC RHINITIS      Dexlansoprazole (DEXILANT) 60 mg CpDM Take 1 Cap by mouth Daily (before dinner).       calcium carbonate (TUMS) 200 mg calcium (500 mg) Chew Take 1 Tab by mouth four (4) times daily.  Cetirizine (ZYRTEC) 10 mg Cap Take 10 mg by mouth daily.  montelukast (SINGULAIR) 10 mg tablet Take 10 mg by mouth daily.  allergy injection by SubCUTAneous route every thirty (30) days.  budesonide-formoterol (SYMBICORT) 160-4.5 mcg/Actuation HFA inhaler Take 2 Puffs by inhalation two (2) times a day.  albuterol (PROVENTIL HFA, VENTOLIN HFA) 90 mcg/Actuation inhaler Take 2 Puffs by inhalation every six (6) hours as needed. No current facility-administered medications on file prior to visit. Social History     Social History    Marital status:      Spouse name: N/A    Number of children: N/A    Years of education: N/A     Occupational History    Not on file. Social History Main Topics    Smoking status: Never Smoker    Smokeless tobacco: Never Used    Alcohol use 0.0 oz/week     0 Standard drinks or equivalent per week      Comment: 0-6 per year    Drug use: Yes     Special: Prescription, OTC    Sexual activity: Yes     Partners: Male     Other Topics Concern    Not on file     Social History Narrative    Retired        Family History   Problem Relation Age of Onset    Hypertension Mother     Diabetes Mother    [facility] Arthritis-rheumatoid Sister     Other Sister      Lupus    Cancer Sister 35     CA, uterus    Other Brother      myocarditis    Heart Attack Brother     Heart Disease Brother     Heart Surgery Brother     Coronary Artery Disease Father     Hypertension Father     Cancer Sister 48     CA, breast    Breast Cancer Sister      Estrogen based    Diabetes Brother     Stroke Maternal Grandmother        Review of Systems   Constitutional: Negative. Negative for chills and fever. HENT: Negative. Negative for congestion and sore throat. Respiratory: Negative. Negative for cough and shortness of breath. Cardiovascular: Negative. Negative for chest pain.    Gastrointestinal: Negative. Negative for abdominal pain, constipation, diarrhea, nausea and vomiting. Genitourinary: Negative. Negative for dysuria, frequency and urgency. Musculoskeletal: Negative. Negative for back pain and joint pain. Skin: Negative. Negative for itching and rash. Neurological: Negative. Negative for dizziness and headaches. Psychiatric/Behavioral: Negative. Negative for depression and suicidal ideas. All other systems reviewed and are negative. Visit Vitals    /88    Pulse 85    Temp 97.4 °F (36.3 °C) (Oral)    Ht 5' 10\" (1.778 m)    Wt 189 lb (85.7 kg)    SpO2 98%    BMI 27.12 kg/m2       GENERAL:  Well developed, well nourished, in no distress  NEURO/PSYCHE: Grossly intact, normal mood and affect  HEENT: Normal cephalic, atraumatic, good dentition, neck supple. No thyromegaly  BREASTS: breasts appear normal, no suspicious masses, no skin or nipple changes   CV: regular rate and rhythm  LUNGS: clear to auscultation bilaterally, no wheezes, rhonchi or rales, good air entry with normal effort  ABDOMEN: + BS, soft without tenderness, no guarding, rebound or masses  EXTREMITIES: no edema or erythema noted  SKIN:  Warm, dry, no lesions  LYMPHATICS: No supraclavicular, axillary or inguinal nodes noted    PELVIC EXAM:  LABIA MAJORA: no masses, tenderness or lesions  LABIA MINORA: absent on R, slight outline on L  CLITORIS: absent  URETHRA: normal appearing, no masses or tenderness  BLADDER: no fullness or tenderness  VAGINA: pink appearing vagina with physiologic discharge, no lesions   PERINEUM: hypopigmentation noted on R side  CERVIX: No CMT or lesions  UTERUS: small, mobile, nontender  ADNEXA: nontender and no masses      ICD-10-CM ICD-9-CM   1. Well woman exam with routine gynecological exam Z01.419 V72.31   2. Lichen sclerosus et atrophicus L90.0 701.0   3. Lichen planus O55.3 190.5   4. Atrophic vaginitis N95.2 627.3       1.  Well woman exam with routine gynecological exam  Reviewed diet, weight loss, exercise, and domestic abuse. Encouraged condom use every time. Mammogram, colonoscopy. Reviewed tetanus vaccine. All of her questions were discussed. 2. Lichen sclerosus et atrophicus  Stable, continue twice weekly testing  - clobetasol (TEMOVATE) 0.05 % ointment; Apply  to affected area two (2) days a week. Dispense: 45 g; Refill: 0    3. Lichen planus  Stable, no signs in the vulvar or vaginal area    4.  Atrophic vaginitis  Doing well, refilled  - ESTRACE 0.01 % (0.1 mg/gram) vaginal cream; Place 1 gm in the vagina twice weekly  Dispense: 42.5 g; Refill: 2      F/U PRN

## 2018-08-14 NOTE — PATIENT INSTRUCTIONS
Lichen Sclerosus: Care Instructions  Your Care Instructions  Lichen sclerosus is a long-term (chronic) skin problem that causes thin, wrinkled white patches. The patches are itchy and painful. If the skin tears, bright red or purple spots may appear. In most cases, it occurs on the skin of the anus (the opening where stool leaves the body), the vulva (the area around the vagina), and the tip of the penis in men who haven't been circumcised. Doctors aren't sure what causes lichen sclerosus. It isn't caused by an infection, and it's not contagious. You can't spread it to others. If the skin patches are on the anus, vulva, or penis, they may need to be treated. If these areas aren't treated, the skin can thicken and scar. This can narrow the openings to the vagina and anus. The foreskin over the penis may tighten and shrink. If this happens, going to the bathroom and having sex can be painful. Lichen sclerosus is usually treated with strong prescription cream or ointment. The medicine stops the inflammation, but the scarring of the skin might not completely go away. Men with scarring from advanced cases on the tip of the penis may have surgery to remove the foreskin. Skin patches on any other part of the body usually go away on their own without treatment. You may have a small increased risk of skin cancer on the affected area. Your doctor will examine the skin at least once a year. Follow-up care is a key part of your treatment and safety. Be sure to make and go to all appointments, and call your doctor if you are having problems. It's also a good idea to know your test results and keep a list of the medicines you take. How can you care for yourself at home? · Be safe with medicines. If your doctor prescribed a cream, apply it exactly as directed. Call your doctor if you think you are having a problem with your medicine. · Put cold, wet cloths on the area to reduce itching. · Wear loose-fitting clothes. Avoid nylon and other fabric that holds moisture close to the skin. This may allow an infection to start. · If your doctor told you to use nonprescription moisturizing cream on your skin, read and follow the directions on the label. Care tips for women  · Do not douche, unless your doctor tells you to. · Avoid hot baths. Don't use soaps or bath products to wash the area around your vulva. Rinse with water only, and gently pat the area dry. Care tips for men  · Keep your penis clean. If you haven't been circumcised, gently pull the foreskin back to wash your penis with warm water. Make sure your penis is dry before you get dressed. When should you call for help? Call your doctor now or seek immediate medical care if:    · You have symptoms of infection, such as:  ¨ Increased pain, swelling, warmth, or redness. ¨ Red streaks leading from the area. ¨ Pus draining from the area. ¨ A fever.    Watch closely for changes in your health, and be sure to contact your doctor if:    · The affected area grows or changes.     · You do not get better as expected. Where can you learn more? Go to http://june-yasmeen.info/. Enter A315 in the search box to learn more about \"Lichen Sclerosus: Care Instructions. \"  Current as of: October 5, 2017  Content Version: 11.7  © 8027-0229 [employer]. Care instructions adapted under license by [employer] (which disclaims liability or warranty for this information). If you have questions about a medical condition or this instruction, always ask your healthcare professional. [place] [address] any warranty or liability for your use of this information. Well Visit, Women 48 to 72: Care Instructions  Your Care Instructions    Physical exams can help you stay healthy. Your doctor has checked your overall health and may have suggested ways to take good care of yourself. He or she also may have recommended tests.  At home, you can help prevent illness with healthy eating, regular exercise, and other steps. Follow-up care is a key part of your treatment and safety. Be sure to make and go to all appointments, and call your doctor if you are having problems. It's also a good idea to know your test results and keep a list of the medicines you take. How can you care for yourself at home? · Reach and stay at a healthy weight. This will lower your risk for many problems, such as obesity, diabetes, heart disease, and high blood pressure. · Get at least 30 minutes of exercise on most days of the week. Walking is a good choice. You also may want to do other activities, such as running, swimming, cycling, or playing tennis or team sports. · Do not smoke. Smoking can make health problems worse. If you need help quitting, talk to your doctor about stop-smoking programs and medicines. These can increase your chances of quitting for good. · Protect your skin from too much sun. When you're outdoors from 10 a.m. to 4 p.m., stay in the shade or cover up with clothing and a hat with a wide brim. Wear sunglasses that block UV rays. Even when it's cloudy, put broad-spectrum sunscreen (SPF 30 or higher) on any exposed skin. · See a dentist one or two times a year for checkups and to have your teeth cleaned. · Wear a seat belt in the car. · Limit alcohol to 1 drink a day. Too much alcohol can cause health problems. Follow your doctor's advice about when to have certain tests. These tests can spot problems early. · Cholesterol. Your doctor will tell you how often to have this done based on your age, family history, or other things that can increase your risk for heart attack and stroke. · Blood pressure. Have your blood pressure checked during a routine doctor visit. Your doctor will tell you how often to check your blood pressure based on your age, your blood pressure results, and other factors.   · Mammogram. Ask your doctor how often you should have a mammogram, which is an X-ray of your breasts. A mammogram can spot breast cancer before it can be felt and when it is easiest to treat. · Pap test and pelvic exam. Ask your doctor how often you should have a Pap test. You may not need to have a Pap test as often as you used to. · Vision. Have your eyes checked every year or two or as often as your doctor suggests. Some experts recommend that you have yearly exams for glaucoma and other age-related eye problems starting at age 48. · Hearing. Tell your doctor if you notice any change in your hearing. You can have tests to find out how well you hear. · Diabetes. Ask your doctor whether you should have tests for diabetes. · Colon cancer. You should begin tests for colon cancer at age 48. You may have one of several tests. Your doctor will tell you how often to have tests based on your age and risk. Risks include whether you already had a precancerous polyp removed from your colon or whether your parents, sisters and brothers, or children have had colon cancer. · Thyroid disease. Talk to your doctor about whether to have your thyroid checked as part of a regular physical exam. Women have an increased chance of a thyroid problem. · Osteoporosis. You should begin tests for bone density at age 72. If you are younger than 72, ask your doctor whether you have factors that may increase your risk for this disease. You may want to have this test before age 72. · Heart attack and stroke risk. At least every 4 to 6 years, you should have your risk for heart attack and stroke assessed. Your doctor uses factors such as your age, blood pressure, cholesterol, and whether you smoke or have diabetes to show what your risk for a heart attack or stroke is over the next 10 years. When should you call for help? Watch closely for changes in your health, and be sure to contact your doctor if you have any problems or symptoms that concern you. Where can you learn more?   Go to http://monica.info/. Enter U558 in the search box to learn more about \"Well Visit, Women 50 to 72: Care Instructions. \"  Current as of: May 16, 2017  Content Version: 11.7  © 0096-3088 [employer]. Care instructions adapted under license by Net[employer] (which disclaims liability or warranty for this information). If you have questions about a medical condition or this instruction, always ask your healthcare professional. [place] [address] any warranty or liability for your use of this information.

## 2018-08-14 NOTE — MR AVS SNAPSHOT
Radha Kulkarni 
 
 
 . [facility] [address], [address] [facility] [address] 
[phone] Patient: Soo Davila MRN: [id number] :1955 Visit Information Date & Time Provider Department Dept. Phone Encounter #  
 2018  2:30 PM Sujata Cardozo MD [facility] [phone] Follow-up Instructions Return as needed. Your Appointments 2018  7:30 AM  
Follow Up with Tammi Castanead MD  
[facility] ([address]) Appt Note: rt knee 3 mo fu  
 [address] [facility], [address] [facility]  
[phone] [address] [place] [address], [address]  
  
    
 2018  9:25 AM  
LAB with [facility] NURSE VISIT [facility] ([address] [facility]) Appt Note: labs [address], [address] [place] [address] [address]  
  
   
 [address], [address]  
  
    
 2018  8:00 AM  
Office Visit with Xochilt Fonseca MD  
[facility] [address]) Appt Note: 6 month f/u  
 [address] [facility], [address] [address] [address]  
  
   
 [address], [address] Upcoming Health Maintenance Date Due  
 BREAST CANCER SCRN MAMMOGRAM 2018 PAP AKA CERVICAL CYTOLOGY 2020 COLONOSCOPY 2021 Allergies as of 2018  Review Complete On: 2018 By: Sujata Cardozo MD  
  
 Severity Noted Reaction Type Reactions Sulfasalazine High 2016    Other (comments) Could not taste anything, lightheadedness Adhesive Tape-silicones      Other (comments) Other Plant, Animal, Environmental    Unable to Consolidated Doc MOLD Tape [Adhesive]  2014    Other (comments) Blisters, use paper tape only Patient states tegaderm and paper tape are okay Current Immunizations  Reviewed on 2016 Name Date Hepatitis B Vaccine 1/1/2009 Influenza High Dose Vaccine PF 10/3/2017 Influenza Vaccine 10/22/2015, 10/9/2014 Influenza Vaccine Whole 1/4/2013 Pneumococcal Polysaccharide (PPSV-23) 7/28/2016 Td 8/1/2011 Tdap 3/3/2017  4:21 PM, 7/28/2016 Not reviewed this visit You Were Diagnosed With   
  
 Codes Comments Well woman exam with routine gynecological exam    -  Primary ICD-10-CM: H97.019 ICD-9-CM: V72.31 Lichen sclerosus et atrophicus     ICD-10-CM: L90.0 ICD-9-CM: 701.0 Lichen planus     ZSV-89-HW: L43.9 ICD-9-CM: 697.0 Atrophic vaginitis     ICD-10-CM: N95.2 ICD-9-CM: 627.3 Vitals BP Pulse Temp Height(growth percentile) Weight(growth percentile) SpO2  
 141/88 85 97.4 °F (36.3 °C) (Oral) 5' 10\" (1.778 m) 189 lb (85.7 kg) 98% BMI OB Status Smoking Status 27.12 kg/m2 Postmenopausal Never Smoker BMI and BSA Data Body Mass Index Body Surface Area  
 27.12 kg/m 2 2.06 m 2 Preferred Pharmacy Pharmacy Name Phone  [facility] [phone] Your Updated Medication List  
  
   
This list is accurate as of 8/14/18  3:02 PM.  Always use your most recent med list.  
  
  
  
  
 albuterol 90 mcg/actuation inhaler Commonly known as:  PROVENTIL HFA, VENTOLIN HFA, PROAIR HFA Take 2 Puffs by inhalation every six (6) hours as needed. ALEVE 220 mg Cap Generic drug:  naproxen sodium Take 220 mg by mouth daily as needed. allergy injection  
by SubCUTAneous route every thirty (30) days. ascorbic acid (vitamin C) 500 mg tablet Commonly known as:  VITAMIN C Take 2 Tabs by mouth daily. calcium carbonate 200 mg calcium (500 mg) Chew Commonly known as:  TUMS Take 1 Tab by mouth four (4) times daily. clobetasol 0.05 % ointment Commonly known as:  Marcha Sous Apply  to affected area two (2) days a week. Start taking on:  8/16/2018 cyanocobalamin 1,000 mcg tablet  
take 1 tablet by mouth once daily DEXILANT 60 mg Cpdb Generic drug:  Dexlansoprazole Take 1 Cap by mouth Daily (before dinner). ESTRACE 0.01 % (0.1 mg/gram) vaginal cream  
Generic drug:  estradiol Place 1 gm in the vagina twice weekly  
  
 ferrous sulfate 325 mg (65 mg iron) tablet  
take 1 tablet by mouth once daily before BREAKFAST  
  
 folic acid 1 mg tablet Commonly known as:  FOLVITE  
take 1 tablet by mouth once daily  
  
 ibuprofen 200 mg tablet Commonly known as:  MOTRIN Take  by mouth.  
  
 losartan 50 mg tablet Commonly known as:  COZAAR  
TAKE 1 TABLET DAILY  
  
 methotrexate (PF) 25 mg/mL injection  
once. SINGULAIR 10 mg tablet Generic drug:  montelukast  
Take 10 mg by mouth daily. SYMBICORT 160-4.5 mcg/actuation Hfaa Generic drug:  budesonide-formoterol Take 2 Puffs by inhalation two (2) times a day. triamcinolone 55 mcg nasal inhaler Commonly known as:  NASACORT AQ  
2 Sprays by Both Nostrils route daily. Indications: PERENNIAL ALLERGIC RHINITIS  
  
 TYLENOL 325 mg tablet Generic drug:  acetaminophen Take  by mouth every four (4) hours as needed for Pain. VITAMIN D3 PO Take  by mouth. ZyrTEC 10 mg Cap Generic drug:  Cetirizine Take 10 mg by mouth daily. Prescriptions Sent to Pharmacy Refills  
 clobetasol (TEMOVATE) 0.05 % ointment 0 Starting on: 8/16/2018 Sig: Apply  to affected area two (2) days a week. Class: Normal  
 Pharmacy: [address] Ph #: [phone] Route: Topical  
 ESTRACE 0.01 % (0.1 mg/gram) vaginal cream 2 Sig: Place 1 gm in the vagina twice weekly Class: Normal  
 Pharmacy: [place] [address] Ph #: [phone] Follow-up Instructions Return as needed.   
  
To-Do List   
 08/27/2018 8:30 AM  
 Appointment with ANDREIA QUINTANA at [address] [place] [address] ([phone]) PAYMENT  For Non-Medicare patients - $15.00 will be collected from you at the time of your exam.  You will be billed $35.00 from the reading [facility]. OUTSIDE FILMS  - Any outside films related to the study being scheduled should be brought with you on the day of the exam.  If this cannot be done there may be a delay in the reading of the study. MEDICATIONS  - Patient must bring a complete list of all medications currently taking to include prescriptions, over-the-counter meds, herbals, vitamins & any dietary supplements  GENERAL INSTRUCTIONS  - On the day of your exam do not use any bath powder, deodorant or lotions on the armpit area. -Tenderness of breasts may cause an increase of discomfort during procedure. If you are experiencing breast tenderness on the day of your appointment and would like to reschedule, please call [phone]. Patient Instructions Lichen Sclerosus: Care Instructions Your Care Instructions Lichen sclerosus is a long-term (chronic) skin problem that causes thin, wrinkled white patches. The patches are itchy and painful. If the skin tears, bright red or purple spots may appear. In most cases, it occurs on the skin of the anus (the opening where stool leaves the body), the vulva (the area around the vagina), and the tip of the penis in men who haven't been circumcised. Doctors aren't sure what causes lichen sclerosus. It isn't caused by an infection, and it's not contagious. You can't spread it to others. If the skin patches are on the anus, vulva, or penis, they may need to be treated. If these areas aren't treated, the skin can thicken and scar. This can narrow the openings to the vagina and anus. The foreskin over the penis may tighten and shrink. If this happens, going to the bathroom and having sex can be painful. Lichen sclerosus is usually treated with strong prescription cream or ointment. The medicine stops the inflammation, but the scarring of the skin might not completely go away. Men with scarring from advanced cases on the tip of the penis may have surgery to remove the foreskin. Skin patches on any other part of the body usually go away on their own without treatment. You may have a small increased risk of skin cancer on the affected area. Your doctor will examine the skin at least once a year. Follow-up care is a key part of your treatment and safety. Be sure to make and go to all appointments, and call your doctor if you are having problems. It's also a good idea to know your test results and keep a list of the medicines you take. How can you care for yourself at home? · Be safe with medicines. If your doctor prescribed a cream, apply it exactly as directed. Call your doctor if you think you are having a problem with your medicine. · Put cold, wet cloths on the area to reduce itching. · Wear loose-fitting clothes. Avoid nylon and other fabric that holds moisture close to the skin. This may allow an infection to start. · If your doctor told you to use nonprescription moisturizing cream on your skin, read and follow the directions on the label. Care tips for women · Do not douche, unless your doctor tells you to. · Avoid hot baths. Don't use soaps or bath products to wash the area around your vulva. Rinse with water only, and gently pat the area dry. Care tips for men · Keep your penis clean. If you haven't been circumcised, gently pull the foreskin back to wash your penis with warm water. Make sure your penis is dry before you get dressed. When should you call for help? Call your doctor now or seek immediate medical care if: 
  · You have symptoms of infection, such as: 
¨ Increased pain, swelling, warmth, or redness. ¨ Red streaks leading from the area. ¨ Pus draining from the area. ¨ A fever.  Watch closely for changes in your health, and be sure to contact your doctor if: 
  · The affected area grows or changes.  
  · You do not get better as expected. Where can you learn more? Go to http://june-yasmeen.info/. Enter V146 in the search box to learn more about \"Lichen Sclerosus: Care Instructions. \" Current as of: October 5, 2017 Content Version: 11.7 © 4480-0043 [employer]. Care instructions adapted under license by Ka[employer]in001 (which disclaims liability or warranty for this information). If you have questions about a medical condition or this instruction, always ask your healthcare professional. [place] [address] any warranty or liability for your use of this information. Well Visit, Women 48 to 72: Care Instructions Your Care Instructions Physical exams can help you stay healthy. Your doctor has checked your overall health and may have suggested ways to take good care of yourself. He or she also may have recommended tests. At home, you can help prevent illness with healthy eating, regular exercise, and other steps. Follow-up care is a key part of your treatment and safety. Be sure to make and go to all appointments, and call your doctor if you are having problems. It's also a good idea to know your test results and keep a list of the medicines you take. How can you care for yourself at home? · Reach and stay at a healthy weight. This will lower your risk for many problems, such as obesity, diabetes, heart disease, and high blood pressure. · Get at least 30 minutes of exercise on most days of the week. Walking is a good choice. You also may want to do other activities, such as running, swimming, cycling, or playing tennis or team sports. · Do not smoke. Smoking can make health problems worse. If you need help quitting, talk to your doctor about stop-smoking programs and medicines. These can increase your chances of quitting for good. · Protect your skin from too much sun. When you're outdoors from 10 a.m. to 4 p.m., stay in the shade or cover up with clothing and a hat with a wide brim. Wear sunglasses that block UV rays. Even when it's cloudy, put broad-spectrum sunscreen (SPF 30 or higher) on any exposed skin. · See a dentist one or two times a year for checkups and to have your teeth cleaned. · Wear a seat belt in the car. · Limit alcohol to 1 drink a day. Too much alcohol can cause health problems. Follow your doctor's advice about when to have certain tests. These tests can spot problems early. · Cholesterol. Your doctor will tell you how often to have this done based on your age, family history, or other things that can increase your risk for heart attack and stroke. · Blood pressure. Have your blood pressure checked during a routine doctor visit. Your doctor will tell you how often to check your blood pressure based on your age, your blood pressure results, and other factors. · Mammogram. Ask your doctor how often you should have a mammogram, which is an X-ray of your breasts. A mammogram can spot breast cancer before it can be felt and when it is easiest to treat. · Pap test and pelvic exam. Ask your doctor how often you should have a Pap test. You may not need to have a Pap test as often as you used to. · Vision. Have your eyes checked every year or two or as often as your doctor suggests. Some experts recommend that you have yearly exams for glaucoma and other age-related eye problems starting at age 48. · Hearing. Tell your doctor if you notice any change in your hearing. You can have tests to find out how well you hear. · Diabetes. Ask your doctor whether you should have tests for diabetes. · Colon cancer. You should begin tests for colon cancer at age 48. You may have one of several tests.  Your doctor will tell you how often to have tests based on your age and risk. Risks include whether you already had a precancerous polyp removed from your colon or whether your parents, sisters and brothers, or children have had colon cancer. · Thyroid disease. Talk to your doctor about whether to have your thyroid checked as part of a regular physical exam. Women have an increased chance of a thyroid problem. · Osteoporosis. You should begin tests for bone density at age 72. If you are younger than 72, ask your doctor whether you have factors that may increase your risk for this disease. You may want to have this test before age 72. · Heart attack and stroke risk. At least every 4 to 6 years, you should have your risk for heart attack and stroke assessed. Your doctor uses factors such as your age, blood pressure, cholesterol, and whether you smoke or have diabetes to show what your risk for a heart attack or stroke is over the next 10 years. When should you call for help? Watch closely for changes in your health, and be sure to contact your doctor if you have any problems or symptoms that concern you. Where can you learn more? Go to http://june-yasmeen.info/. Enter F742 in the search box to learn more about \"Well Visit, Women 50 to 72: Care Instructions. \" Current as of: May 16, 2017 Content Version: 11.7 © 4481-9657 [employer]orporated. Care instructions adapted under license by Twiigg (which disclaims liability or warranty for this information). If you have questions about a medical condition or this instruction, always ask your healthcare professional. [place] [address] any warranty or liability for your use of this information. Introducing [facility]! Dear Kale Lauren: Thank you for requesting a SpectraRep account. Our records indicate that you already have an active SpectraRep account. You can access your account anytime at [email or web address] Did you know that you can access your hospital and ER discharge instructions at any time in [facility]? You can also review all of your test results from your hospital stay or ER visit. Additional Information If you have questions, please visit the Frequently Asked Questions section of the [employer] website at [email or web address]/. Remember, [employer] is NOT to be used for urgent needs. For medical emergencies, dial 911. Now available from your iPhone and Android! Please provide this summary of care documentation to your next provider. Your primary care clinician is listed as Barbara Ramirez. If you have any questions after today's visit, please call [phone].

## 2018-08-16 ENCOUNTER — TELEPHONE (OUTPATIENT)
Dept: OBGYN CLINIC | Age: 63
End: 2018-08-16

## 2018-08-16 NOTE — TELEPHONE ENCOUNTER
[facility] called asking that the prescription for Estrace be changed to Estradol Vaginal Cream 0.01% for a savings of $125.00.    Dr. Mikala Morocho will be made aware of the request.  This medication has a co-pay of $15.00    [facility] Record # [id number]  7 [phone]

## 2018-08-27 ENCOUNTER — HOSPITAL ENCOUNTER (OUTPATIENT)
Dept: MAMMOGRAPHY | Age: 63
Discharge: HOME | End: 2018-08-27
Attending: INTERNAL MEDICINE
Payer: COMMERCIAL

## 2018-08-27 ENCOUNTER — TELEPHONE (OUTPATIENT)
Dept: INTERNAL MEDICINE CLINIC | Age: 63
End: 2018-08-27

## 2018-08-27 DIAGNOSIS — Z12.31 VISIT FOR SCREENING MAMMOGRAM: ICD-10-CM

## 2018-08-27 PROCEDURE — 77063 BREAST TOMOSYNTHESIS BI: CPT

## 2018-08-27 NOTE — TELEPHONE ENCOUNTER
----- Message from Annamaria Marion MD sent at 8/27/2018 10:30 AM EDT -----  Please let her know that her mammogram shows no suspicious findings for breast cancer.      Dr. Blondie Koyanagi  [facility] [address].  Phone: [phone]  Fax: [phone]

## 2018-09-18 ENCOUNTER — OFFICE VISIT (OUTPATIENT)
Dept: ORTHOPEDIC SURGERY | Facility: CLINIC | Age: 63
End: 2018-09-18

## 2018-09-18 VITALS
DIASTOLIC BLOOD PRESSURE: 87 MMHG | HEART RATE: 75 BPM | OXYGEN SATURATION: 96 % | HEIGHT: 70 IN | TEMPERATURE: 98.3 F | WEIGHT: 189 LBS | BODY MASS INDEX: 27.06 KG/M2 | SYSTOLIC BLOOD PRESSURE: 148 MMHG

## 2018-09-18 DIAGNOSIS — M17.11 PRIMARY OSTEOARTHRITIS OF RIGHT KNEE: ICD-10-CM

## 2018-09-18 DIAGNOSIS — G89.29 CHRONIC RIGHT SHOULDER PAIN: Primary | ICD-10-CM

## 2018-09-18 DIAGNOSIS — M19.011 ARTHRITIS OF RIGHT ACROMIOCLAVICULAR JOINT: ICD-10-CM

## 2018-09-18 DIAGNOSIS — M25.511 CHRONIC RIGHT SHOULDER PAIN: Primary | ICD-10-CM

## 2018-09-18 DIAGNOSIS — M70.61 TROCHANTERIC BURSITIS, RIGHT HIP: ICD-10-CM

## 2018-09-18 RX ORDER — BETAMETHASONE SODIUM PHOSPHATE AND BETAMETHASONE ACETATE 3; 3 MG/ML; MG/ML
6 INJECTION, SUSPENSION INTRA-ARTICULAR; INTRALESIONAL; INTRAMUSCULAR; SOFT TISSUE ONCE
Qty: 1 ML | Refills: 0
Start: 2018-09-18 | End: 2018-09-18
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
## 2018-09-27 ENCOUNTER — HOSPITAL ENCOUNTER (OUTPATIENT)
Dept: GENERAL RADIOLOGY | Age: 63
Discharge: HOME | End: 2018-09-27
Attending: ORTHOPAEDIC SURGERY
Payer: COMMERCIAL

## 2018-09-27 ENCOUNTER — HOSPITAL ENCOUNTER (OUTPATIENT)
Dept: CT IMAGING | Age: 63
Discharge: HOME | End: 2018-09-27
Attending: ORTHOPAEDIC SURGERY
Payer: COMMERCIAL

## 2018-09-27 DIAGNOSIS — M25.511 CHRONIC RIGHT SHOULDER PAIN: ICD-10-CM

## 2018-09-27 DIAGNOSIS — G89.29 CHRONIC RIGHT SHOULDER PAIN: ICD-10-CM

## 2018-09-27 DIAGNOSIS — M19.011 ARTHRITIS OF RIGHT ACROMIOCLAVICULAR JOINT: ICD-10-CM

## 2018-09-27 PROCEDURE — 77030014113 XR INJ SHOULDER RT PRE CT/MRI ARTHRO

## 2018-09-27 PROCEDURE — 73201 CT UPPER EXTREMITY W/DYE: CPT

## 2018-09-27 PROCEDURE — 74011636320 HC RX REV CODE- 636/320: Performed by: ORTHOPAEDIC SURGERY

## 2018-09-27 PROCEDURE — 74011250636 HC RX REV CODE- 250/636: Performed by: ORTHOPAEDIC SURGERY

## 2018-09-27 PROCEDURE — 74011000250 HC RX REV CODE- 250: Performed by: ORTHOPAEDIC SURGERY

## 2018-09-27 RX ORDER — LIDOCAINE HYDROCHLORIDE 10 MG/ML
30 INJECTION, SOLUTION EPIDURAL; INFILTRATION; INTRACAUDAL; PERINEURAL
Status: COMPLETED | OUTPATIENT
Start: 2018-09-27 | End: 2018-09-27

## 2018-09-27 RX ORDER — SODIUM CHLORIDE 9 MG/ML
20 INJECTION INTRAMUSCULAR; INTRAVENOUS; SUBCUTANEOUS
Status: COMPLETED | OUTPATIENT
Start: 2018-09-27 | End: 2018-09-27

## 2018-09-27 RX ADMIN — LIDOCAINE HYDROCHLORIDE 10 ML: 10 INJECTION, SOLUTION EPIDURAL; INFILTRATION; INTRACAUDAL; PERINEURAL at 13:00

## 2018-09-27 RX ADMIN — SODIUM CHLORIDE 10 ML: 9 INJECTION, SOLUTION INTRAMUSCULAR; INTRAVENOUS; SUBCUTANEOUS at 13:00

## 2018-09-27 RX ADMIN — IOPAMIDOL 12 ML: 408 INJECTION, SOLUTION INTRATHECAL at 13:00

## 2018-10-08 ENCOUNTER — TELEPHONE (OUTPATIENT)
Dept: ORTHOPEDIC SURGERY | Facility: CLINIC | Age: 63
End: 2018-10-08

## 2018-10-08 NOTE — TELEPHONE ENCOUNTER
Pt is calling to find out if she should make a MRI follow up with Dr. Deja Talavera or if Dr. Deja Talavera would like to refer her to some one else. Please advise pt at [phone].

## 2018-10-08 NOTE — TELEPHONE ENCOUNTER
Called and spoke to patient. Advised that Reena Soto would like her to see Dr Jailene Sin. Call transferred to [facility] at the  to schedule appt.

## 2018-10-15 ENCOUNTER — OFFICE VISIT (OUTPATIENT)
Dept: ORTHOPEDIC SURGERY | Facility: CLINIC | Age: 63
End: 2018-10-15

## 2018-10-15 VITALS
SYSTOLIC BLOOD PRESSURE: 131 MMHG | OXYGEN SATURATION: 99 % | HEART RATE: 74 BPM | HEIGHT: 70 IN | WEIGHT: 186 LBS | TEMPERATURE: 96.5 F | DIASTOLIC BLOOD PRESSURE: 65 MMHG | RESPIRATION RATE: 16 BRPM | BODY MASS INDEX: 26.63 KG/M2

## 2018-10-15 DIAGNOSIS — G89.29 CHRONIC RIGHT SHOULDER PAIN: Primary | ICD-10-CM

## 2018-10-15 DIAGNOSIS — M19.011 PRIMARY OSTEOARTHRITIS OF RIGHT SHOULDER: ICD-10-CM

## 2018-10-15 DIAGNOSIS — M75.121 COMPLETE TEAR OF RIGHT ROTATOR CUFF: ICD-10-CM

## 2018-10-15 DIAGNOSIS — M25.511 CHRONIC RIGHT SHOULDER PAIN: Primary | ICD-10-CM

## 2018-10-15 NOTE — PROGRESS NOTES
Patient: Gill Habermann                MRN: [id number]       SSN: [id number]  YOB: 1955        AGE: 58 y.o. SEX: female  Body mass index is 26.69 kg/(m^2). PCP: Nadia Licea MD  10/15/18    Chief Complaint: Right shoulder pain    HISTORY OF PRESENT ILLNESS:  Hilda is a 41-year-old right-hand-dominant female who comes in today with right shoulder pain. Pain for the last 6 months. No known  injury. The shoulder pain is 2/10 on the pain scale. She has tried physical therapy as well as an injection into the [facility] joint which were unsuccessful in alleviating her pain. She also has been seen by the spine service for further workup of her neck as she has a history of the cervical spine fusion. They ordered a CT arthrogram as she cannot have MRIs of the right shoulder which revealed a full-thickness rotator cuff tear. She saw Dr. Max Guerra who referred her to see me. She has pain when she reaches above overhead. The pain radiates in the subdeltoid region. No complaints of numbness or tingling.         Past Medical History:   Diagnosis Date    Adjacent segment disease C3/4 w/deg changes, poss stenosis, CT '18 6/22/2018    Allergic rhinitis     Asthma     Back pain     Green's esophagus with esophagitis     Cervical radiculopathy     Chronic sinusitis 5/15/2012    DNS (deviated nasal septum)     Empyema     Foraminal stenosis of cervical region     C4-C5    GERD (gastroesophageal reflux disease)     Dr Ana Packer    Hx MRSA infection 8/11/2014    Hypertension     Hypertriglyceridemia     Insulin resistance 4/9/2012    Left knee pain     Lichen planus     Menopause     Age 51    MRSA (methicillin resistant Staphylococcus aureus) infection 2008    left lung    Restless leg syndrome     Rheumatoid arthritis ([address]     Spinal cord stimulator status 2016    Spinal stenosis of cervical region     C4-C5 and C5-C6    TMJ syndrome     Wears glasses     and contacts Family History   Problem Relation Age of Onset    Hypertension Mother    [facility] Diabetes Mother    [facility] Arthritis-rheumatoid Sister     Other Sister      Lupus    Cancer Sister 35     CA, uterus    Other Brother      myocarditis    Heart Attack Brother     Heart Disease Brother     Heart Surgery Brother     Coronary Artery Disease Father     Hypertension Father     Cancer Sister 48     CA, breast    Breast Cancer Sister      Estrogen based    Diabetes Brother     Stroke Maternal Grandmother        Current Outpatient Prescriptions   Medication Sig Dispense Refill    clobetasol (TEMOVATE) 0.05 % ointment Apply  to affected area two (2) days a week. 45 g 0    ESTRACE 0.01 % (0.1 mg/gram) vaginal cream Place 1 gm in the vagina twice weekly 42.5 g 2    cholecalciferol, vitamin D3, (VITAMIN D3 PO) Take  by mouth.  methotrexate, PF, 25 mg/mL injection once.  ibuprofen (MOTRIN) 200 mg tablet Take  by mouth.  ferrous sulfate 325 mg (65 mg iron) tablet take 1 tablet by mouth once daily before BREAKFAST 90 Tab 3    cyanocobalamin 1,000 mcg tablet take 1 tablet by mouth once daily 90 Tab 3    ascorbic acid, vitamin C, (VITAMIN C) 500 mg tablet Take 2 Tabs by mouth daily. 180 Tab 1    naproxen sodium (ALEVE) 220 mg cap Take 220 mg by mouth daily as needed.  losartan (COZAAR) 50 mg tablet TAKE 1 TABLET DAILY 30 Tab 0    acetaminophen (TYLENOL) 325 mg tablet Take  by mouth every four (4) hours as needed for Pain.  triamcinolone (NASACORT AQ) 55 mcg nasal inhaler 2 Sprays by Both Nostrils route daily. Indications: PERENNIAL ALLERGIC RHINITIS      Dexlansoprazole (DEXILANT) 60 mg CpDM Take 1 Cap by mouth Daily (before dinner).  calcium carbonate (TUMS) 200 mg calcium (500 mg) Chew Take 1 Tab by mouth four (4) times daily.  Cetirizine (ZYRTEC) 10 mg Cap Take 10 mg by mouth daily.  montelukast (SINGULAIR) 10 mg tablet Take 10 mg by mouth daily.       allergy injection by SubCUTAneous route every thirty (30) days.  budesonide-formoterol (SYMBICORT) 160-4.5 mcg/Actuation HFA inhaler Take 2 Puffs by inhalation two (2) times a day.  albuterol (PROVENTIL HFA, VENTOLIN HFA) 90 mcg/Actuation inhaler Take 2 Puffs by inhalation every six (6) hours as needed.  folic acid (FOLVITE) 1 mg tablet take 1 tablet by mouth once daily  0       Allergies   Allergen Reactions    Sulfasalazine Other (comments)     Could not taste anything, lightheadedness    Adhesive Tape-Silicones Other (comments)    Other Plant, Animal, Environmental Unable to Obtain     MOLD    Tape [Adhesive] Other (comments)     Blisters, use paper tape only  Patient states tegaderm and paper tape are okay       Past Surgical History:   Procedure Laterality Date    HX CERVICAL FUSION  08/13/14    HX LUMBAR LAMINECTOMY  Nov 1995    [facility]    HX LUMBAR LAMINECTOMY  Feb 1997    Tamia Dangelo  1/2015    bunion removal from right foot    HX OTHER SURGICAL  2007    thoracentesis    HX OTHER SURGICAL  2008    chest tube    HX OTHER SURGICAL  1997    TMJ surgery    HX OTHER SURGICAL  2012    sinus surg 2012-Dr Rajat Roman.  HX OTHER SURGICAL  2016    spinal cord stimulator place for lumbar neuritis, good benefit    HX TUBAL LIGATION      [place] COLONOSCOPY FLX DX W/COLLJ SPEC WHEN PFRMD  6-18-08    normal/duntemann       Social History     Social History    Marital status:      Spouse name: N/A    Number of children: N/A    Years of education: N/A     Occupational History    Not on file.      Social History Main Topics    Smoking status: Never Smoker    Smokeless tobacco: Never Used    Alcohol use 0.0 oz/week     0 Standard drinks or equivalent per week      Comment: 0-6 per year    Drug use: Yes     Special: Prescription, OTC    Sexual activity: Yes     Partners: Male     Other Topics Concern    Not on file     Social History Narrative    Retired        REVIEW OF SYSTEMS:      CON: negative for recent weight loss/gain, fever, or chills  EYE: negative for double or blurry vision  ENT: negative for hoarseness  RS:   negative for cough, URI, SOB  CV:  negative for chest pain, palpitations  GI:    negative for blood in stool, nausea/vomiting  :  negative for blood in urine  MS: As per HPI  Other systems reviewed and noted below. PHYSICAL EXAMINATION:  Visit Vitals    /65 (BP 1 Location: Left arm, BP Patient Position: Sitting)    Pulse 74    Temp 96.5 °F (35.8 °C) (Oral)    Resp 16    Ht 5' 10\" (1.778 m)    Wt 186 lb (84.4 kg)    SpO2 99%    BMI 26.69 kg/m2     Body mass index is 26.69 kg/(m^2). GENERAL: Alert and oriented x3, in no acute distress, well-developed, well-nourished. HEENT: Normocephalic, atraumatic. RESP: Non labored breathing with equal chest rise on inspiration. CV: Well perfused extremities. No cyanosis or clubbing noted. ABDOMEN: Soft, non-tender, non-distended. PHYSICAL EXAMINATION:  Physical exam of the right shoulder with full active shoulder range of motion. No warmth, erythema, or effusion. Tender to palpation over the [facility] joint. Pain with cross body adduction. Pain and weakness with supraspinatus rotator cuff testing. No weakness with belly press or resisted external rotation with the arm at the side. Pain with bicep stress testing. Neurovascularly intact distally. IMAGING:  X-rays of the right shoulder taken in the office today show AC joint arthrosis. No significant arthritic changes of the glenohumeral joint are noted on imaging today. CT arthrogram of the right shoulder reveals a full-thickness supraspinatus rotator cuff tear with retraction. It is not a perfect study for it, but minimal atrophy is noted. ASSESSMENT AND PLAN:  Madi Dugan is a 69-year-old female with a right shoulder rotator cuff tear. I discussed with her treatment options. She tried physical therapy.   She has tried an injection which was unsuccessful in alleviating her pain. I think she is a good candidate for an arthroscopic rotator cuff repair with [facility] joint resection. I discussed this with her today at length. She would like to consider the surgery in the next few months. We gave her the information of the office to set up the surgery. She will contact us when she is ready to schedule.           Electronically signed by: Shell Nguyen MD

## 2018-10-18 RX ORDER — ACETAMINOPHEN 160 MG/5ML
SUSPENSION, ORAL (FINAL DOSE FORM) ORAL
Qty: 180 TAB | Refills: 1 | Status: SHIPPED | OUTPATIENT
Start: 2018-10-18 | End: 2019-04-20 | Stop reason: SDUPTHER

## 2018-11-15 ENCOUNTER — LAB ONLY (OUTPATIENT)
Dept: INTERNAL MEDICINE CLINIC | Age: 63
End: 2018-11-15

## 2018-11-15 ENCOUNTER — HOSPITAL ENCOUNTER (OUTPATIENT)
Dept: LAB | Age: 63
Discharge: HOME | End: 2018-11-15
Payer: COMMERCIAL

## 2018-11-15 DIAGNOSIS — D50.9 IRON DEFICIENCY ANEMIA, UNSPECIFIED IRON DEFICIENCY ANEMIA TYPE: ICD-10-CM

## 2018-11-15 DIAGNOSIS — E53.8 VITAMIN B12 DEFICIENCY: ICD-10-CM

## 2018-11-15 DIAGNOSIS — E55.9 VITAMIN D DEFICIENCY: ICD-10-CM

## 2018-11-15 DIAGNOSIS — M06.9 RHEUMATOID ARTHRITIS INVOLVING MULTIPLE SITES, UNSPECIFIED RHEUMATOID FACTOR PRESENCE: ICD-10-CM

## 2018-11-15 DIAGNOSIS — E78.1 HYPERTRIGLYCERIDEMIA: ICD-10-CM

## 2018-11-15 DIAGNOSIS — I10 ESSENTIAL HYPERTENSION: ICD-10-CM

## 2018-11-15 DIAGNOSIS — E53.8 VITAMIN B12 DEFICIENCY: Primary | ICD-10-CM

## 2018-11-15 LAB
ALBUMIN SERPL-MCNC: 4 G/DL (ref 3.4–5)
ALBUMIN/GLOB SERPL: 1.3 {RATIO} (ref 0.8–1.7)
ALP SERPL-CCNC: 81 U/L (ref 45–117)
ALT SERPL-CCNC: 30 U/L (ref 13–56)
ANION GAP SERPL CALC-SCNC: 8 MMOL/L (ref 3–18)
AST SERPL-CCNC: 25 U/L (ref 15–37)
BASOPHILS # BLD: 0.1 K/UL (ref 0–0.1)
BASOPHILS NFR BLD: 1 % (ref 0–2)
BILIRUB SERPL-MCNC: 0.6 MG/DL (ref 0.2–1)
BUN SERPL-MCNC: 13 MG/DL (ref 7–18)
BUN/CREAT SERPL: 16 (ref 12–20)
CALCIUM SERPL-MCNC: 8.8 MG/DL (ref 8.5–10.1)
CHLORIDE SERPL-SCNC: 111 MMOL/L (ref 100–108)
CHOLEST SERPL-MCNC: 149 MG/DL
CO2 SERPL-SCNC: 23 MMOL/L (ref 21–32)
CREAT SERPL-MCNC: 0.8 MG/DL (ref 0.6–1.3)
DIFFERENTIAL METHOD BLD: NORMAL
EOSINOPHIL # BLD: 0.2 K/UL (ref 0–0.4)
EOSINOPHIL NFR BLD: 3 % (ref 0–5)
ERYTHROCYTE [DISTWIDTH] IN BLOOD BY AUTOMATED COUNT: 14.5 % (ref 11.6–14.5)
EST. AVERAGE GLUCOSE BLD GHB EST-MCNC: 120 MG/DL
GLOBULIN SER CALC-MCNC: 3.1 G/DL (ref 2–4)
GLUCOSE SERPL-MCNC: 85 MG/DL (ref 74–99)
HBA1C MFR BLD: 5.8 % (ref 4.2–5.6)
HCT VFR BLD AUTO: 42.7 % (ref 35–45)
HDLC SERPL-MCNC: 47 MG/DL (ref 40–60)
HDLC SERPL: 3.2 {RATIO} (ref 0–5)
HGB BLD-MCNC: 13.6 G/DL (ref 12–16)
IRON SATN MFR SERPL: 19 %
IRON SERPL-MCNC: 60 UG/DL (ref 50–175)
LDLC SERPL CALC-MCNC: 86.2 MG/DL (ref 0–100)
LIPID PROFILE,FLP: NORMAL
LYMPHOCYTES # BLD: 1.4 K/UL (ref 0.9–3.6)
LYMPHOCYTES NFR BLD: 24 % (ref 21–52)
MCH RBC QN AUTO: 30 PG (ref 24–34)
MCHC RBC AUTO-ENTMCNC: 31.9 G/DL (ref 31–37)
MCV RBC AUTO: 94.3 FL (ref 74–97)
MONOCYTES # BLD: 0.5 K/UL (ref 0.05–1.2)
MONOCYTES NFR BLD: 8 % (ref 3–10)
NEUTS SEG # BLD: 3.8 K/UL (ref 1.8–8)
NEUTS SEG NFR BLD: 64 % (ref 40–73)
PLATELET # BLD AUTO: 281 K/UL (ref 135–420)
PMV BLD AUTO: 9.5 FL (ref 9.2–11.8)
POTASSIUM SERPL-SCNC: 4.1 MMOL/L (ref 3.5–5.5)
PROT SERPL-MCNC: 7.1 G/DL (ref 6.4–8.2)
RBC # BLD AUTO: 4.53 M/UL (ref 4.2–5.3)
SODIUM SERPL-SCNC: 142 MMOL/L (ref 136–145)
TIBC SERPL-MCNC: 312 UG/DL (ref 250–450)
TRIGL SERPL-MCNC: 79 MG/DL (ref ?–150)
VIT B12 SERPL-MCNC: 906 PG/ML (ref 211–911)
VLDLC SERPL CALC-MCNC: 15.8 MG/DL
WBC # BLD AUTO: 6 K/UL (ref 4.6–13.2)

## 2018-11-15 PROCEDURE — 85025 COMPLETE CBC W/AUTO DIFF WBC: CPT

## 2018-11-15 PROCEDURE — 82607 VITAMIN B-12: CPT

## 2018-11-15 PROCEDURE — 80053 COMPREHEN METABOLIC PANEL: CPT

## 2018-11-15 PROCEDURE — 82306 VITAMIN D 25 HYDROXY: CPT

## 2018-11-15 PROCEDURE — 83036 HEMOGLOBIN GLYCOSYLATED A1C: CPT

## 2018-11-15 PROCEDURE — 80061 LIPID PANEL: CPT

## 2018-11-15 PROCEDURE — 82043 UR ALBUMIN QUANTITATIVE: CPT

## 2018-11-15 PROCEDURE — 83540 ASSAY OF IRON: CPT

## 2018-11-15 PROCEDURE — 36415 COLL VENOUS BLD VENIPUNCTURE: CPT

## 2018-11-16 ENCOUNTER — TELEPHONE (OUTPATIENT)
Dept: INTERNAL MEDICINE CLINIC | Age: 63
End: 2018-11-16

## 2018-11-16 LAB
25(OH)D3 SERPL-MCNC: 34.6 NG/ML (ref 30–100)
CREAT UR-MCNC: 173 MG/DL (ref 30–125)
MICROALBUMIN UR-MCNC: 1.6 MG/DL (ref 0–3)
MICROALBUMIN/CREAT UR-RTO: 9 MG/G (ref 0–30)

## 2018-11-16 NOTE — PROGRESS NOTES
Please let her know that her labs show no evidence of kidney disease. Her liver labs are normal. Her blood counts are normal. Her B12 level and iron labs are normal. Her vitamin D level is normal. Her A1C is up again to 5.8 - c/w known prediabetes. Her cholesterol is only 149. Her triglycerides are down to 79. Her HDL is 47. Her LDL is slightly higher at 86. She should c/w all rx as prescribed.     Dr. Cathy Cervantes  [facility] [address].  Phone: [phone]  Fax: [phone]

## 2018-11-16 NOTE — PROGRESS NOTES
I will let her know at her upcoming apt that her blood counts, liver function, and renal function labs are normal. Her A1C is up again to 5.8 - c/w known prediabetes. Her cholesterol is only 149. Her triglycerides are down to 79. Her HDL is 47. Her LDL is slightly higher at 86.  Her B12 level is normal.     Dr. Dylon Mena  [facility], [address] [place] [address], [address] [place] [address] Str.  Phone: [phone]  Fax: [phone]

## 2018-11-16 NOTE — TELEPHONE ENCOUNTER
----- Message from Emmett Barthel, MD sent at 11/16/2018 10:06 AM EST -----  Please let her know that her labs show no evidence of kidney disease. Her liver labs are normal. Her blood counts are normal. Her B12 level and iron labs are normal. Her vitamin D level is normal. Her A1C is up again to 5.8 - c/w known prediabetes. Her cholesterol is only 149. Her triglycerides are down to 79. Her HDL is 47. Her LDL is slightly higher at 86. She should c/w all rx as prescribed.     Dr. Phyllis Velasquez  [facility], [address] [place] [address], [facility]  Phone: [phone]  Fax: [phone]

## 2018-11-28 ENCOUNTER — OFFICE VISIT (OUTPATIENT)
Dept: INTERNAL MEDICINE CLINIC | Age: 63
End: 2018-11-28

## 2018-11-28 VITALS
TEMPERATURE: 97 F | BODY MASS INDEX: 26.11 KG/M2 | DIASTOLIC BLOOD PRESSURE: 85 MMHG | RESPIRATION RATE: 12 BRPM | HEIGHT: 70 IN | OXYGEN SATURATION: 98 % | WEIGHT: 182.4 LBS | HEART RATE: 69 BPM | SYSTOLIC BLOOD PRESSURE: 143 MMHG

## 2018-11-28 DIAGNOSIS — I10 ESSENTIAL HYPERTENSION: ICD-10-CM

## 2018-11-28 DIAGNOSIS — M06.9 RHEUMATOID ARTHRITIS INVOLVING MULTIPLE SITES, UNSPECIFIED RHEUMATOID FACTOR PRESENCE: ICD-10-CM

## 2018-11-28 DIAGNOSIS — R73.02 IMPAIRED GLUCOSE TOLERANCE: Primary | ICD-10-CM
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
## 2018-11-28 NOTE — PATIENT INSTRUCTIONS

## 2018-11-28 NOTE — PROGRESS NOTES
Kaycee Mike presents today for   Chief Complaint   Patient presents with    Follow-up    Hypertension    Labs     done on 11-15-18              Depression Screening:  PHQ over the last two weeks 10/15/2018   Little interest or pleasure in doing things Not at all   Feeling down, depressed, irritable, or hopeless Not at all   Total Score PHQ 2 0       Learning Assessment:  Learning Assessment 8/14/2018   PRIMARY LEARNER Patient   HIGHEST LEVEL OF EDUCATION - PRIMARY LEARNER  > 4 YEARS [address] LEARNER -   CO-LEARNER CAREGIVER -   PRIMARY LANGUAGE ENGLISH   LEARNER PREFERENCE PRIMARY OTHER (COMMENT)     -     -     -     -   ANSWERED BY patient     -   RELATIONSHIP SELF       Abuse Screening:  Abuse Screening Questionnaire 8/14/2018   Do you ever feel afraid of your partner? N   Are you in a relationship with someone who physically or mentally threatens you? N   Is it safe for you to go home? Y       Fall Risk  Fall Risk Assessment, last 12 mths 5/12/2015   Able to walk? Yes   Fall in past 12 months? Yes   Fall with injury? Yes   Number of falls in past 12 months 1   Fall Risk Score 2           Coordination of Care:  1. Have you been to the ER, urgent care clinic since your last visit? Hospitalized since your last visit? See provider note    2. Have you seen or consulted any other health care providers outside of the [address] [place] [address] since your last visit? Include any pap smears or colon screening. See provider note      Advance Directive:  1. Do you have an advance directive in place?  Patient Reply:YES        Per patient no changes to their ACP contact NO.

## 2019-01-10 DIAGNOSIS — M75.121 COMPLETE TEAR OF RIGHT ROTATOR CUFF: Primary | ICD-10-CM

## 2019-01-10 DIAGNOSIS — M19.011 PRIMARY OSTEOARTHRITIS OF RIGHT SHOULDER: ICD-10-CM

## 2019-01-10 DIAGNOSIS — Z01.812 PRE-OPERATIVE LABORATORY EXAMINATION: ICD-10-CM

## 2019-01-10 DIAGNOSIS — Z01.810 PREOP CARDIOVASCULAR EXAM: ICD-10-CM

## 2019-01-11 ENCOUNTER — HOSPITAL ENCOUNTER (OUTPATIENT)
Dept: LAB | Age: 64
Discharge: HOME | End: 2019-01-11
Payer: COMMERCIAL

## 2019-01-11 DIAGNOSIS — M75.121 COMPLETE TEAR OF RIGHT ROTATOR CUFF: ICD-10-CM

## 2019-01-11 DIAGNOSIS — M19.011 PRIMARY OSTEOARTHRITIS OF RIGHT SHOULDER: ICD-10-CM

## 2019-01-11 DIAGNOSIS — Z01.812 PRE-OPERATIVE LABORATORY EXAMINATION: ICD-10-CM

## 2019-01-11 DIAGNOSIS — Z01.810 PREOP CARDIOVASCULAR EXAM: ICD-10-CM

## 2019-01-11 LAB
ALBUMIN SERPL-MCNC: 4.2 G/DL (ref 3.4–5)
ALBUMIN/GLOB SERPL: 1.2 {RATIO} (ref 0.8–1.7)
ALP SERPL-CCNC: 95 U/L (ref 45–117)
ALT SERPL-CCNC: 23 U/L (ref 13–56)
ANION GAP SERPL CALC-SCNC: 7 MMOL/L (ref 3–18)
AST SERPL-CCNC: 12 U/L (ref 15–37)
BASOPHILS # BLD: 0 K/UL (ref 0–0.1)
BASOPHILS NFR BLD: 1 % (ref 0–2)
BILIRUB SERPL-MCNC: 0.6 MG/DL (ref 0.2–1)
BUN SERPL-MCNC: 15 MG/DL (ref 7–18)
BUN/CREAT SERPL: 21 (ref 12–20)
CALCIUM SERPL-MCNC: 8.7 MG/DL (ref 8.5–10.1)
CHLORIDE SERPL-SCNC: 109 MMOL/L (ref 100–108)
CO2 SERPL-SCNC: 27 MMOL/L (ref 21–32)
CREAT SERPL-MCNC: 0.72 MG/DL (ref 0.6–1.3)
DIFFERENTIAL METHOD BLD: ABNORMAL
EOSINOPHIL # BLD: 0.3 K/UL (ref 0–0.4)
EOSINOPHIL NFR BLD: 4 % (ref 0–5)
ERYTHROCYTE [DISTWIDTH] IN BLOOD BY AUTOMATED COUNT: 15.1 % (ref 11.6–14.5)
GLOBULIN SER CALC-MCNC: 3.4 G/DL (ref 2–4)
GLUCOSE SERPL-MCNC: 100 MG/DL (ref 74–99)
HCT VFR BLD AUTO: 44.7 % (ref 35–45)
HGB BLD-MCNC: 14.2 G/DL (ref 12–16)
LYMPHOCYTES # BLD: 1.4 K/UL (ref 0.9–3.6)
LYMPHOCYTES NFR BLD: 20 % (ref 21–52)
MCH RBC QN AUTO: 29.6 PG (ref 24–34)
MCHC RBC AUTO-ENTMCNC: 31.8 G/DL (ref 31–37)
MCV RBC AUTO: 93.1 FL (ref 74–97)
MONOCYTES # BLD: 0.7 K/UL (ref 0.05–1.2)
MONOCYTES NFR BLD: 10 % (ref 3–10)
NEUTS SEG # BLD: 4.4 K/UL (ref 1.8–8)
NEUTS SEG NFR BLD: 65 % (ref 40–73)
PLATELET # BLD AUTO: 263 K/UL (ref 135–420)
PMV BLD AUTO: 9.6 FL (ref 9.2–11.8)
POTASSIUM SERPL-SCNC: 4.1 MMOL/L (ref 3.5–5.5)
PROT SERPL-MCNC: 7.6 G/DL (ref 6.4–8.2)
RBC # BLD AUTO: 4.8 M/UL (ref 4.2–5.3)
SODIUM SERPL-SCNC: 143 MMOL/L (ref 136–145)
WBC # BLD AUTO: 6.7 K/UL (ref 4.6–13.2)

## 2019-01-11 PROCEDURE — 36415 COLL VENOUS BLD VENIPUNCTURE: CPT

## 2019-01-11 PROCEDURE — 85025 COMPLETE CBC W/AUTO DIFF WBC: CPT

## 2019-01-11 PROCEDURE — 80053 COMPREHEN METABOLIC PANEL: CPT

## 2019-01-11 PROCEDURE — 93005 ELECTROCARDIOGRAM TRACING: CPT

## 2019-01-12 LAB
ATRIAL RATE: 72 BPM
CALCULATED P AXIS, ECG09: 63 DEGREES
CALCULATED R AXIS, ECG10: -8 DEGREES
CALCULATED T AXIS, ECG11: 38 DEGREES
DIAGNOSIS, 93000: NORMAL
P-R INTERVAL, ECG05: 132 MS
Q-T INTERVAL, ECG07: 408 MS
QRS DURATION, ECG06: 92 MS
QTC CALCULATION (BEZET), ECG08: 446 MS
VENTRICULAR RATE, ECG03: 72 BPM

## 2019-01-15 ENCOUNTER — OFFICE VISIT (OUTPATIENT)
Dept: ORTHOPEDIC SURGERY | Facility: CLINIC | Age: 64
End: 2019-01-15

## 2019-01-15 VITALS
WEIGHT: 183.2 LBS | HEIGHT: 70 IN | OXYGEN SATURATION: 99 % | DIASTOLIC BLOOD PRESSURE: 74 MMHG | TEMPERATURE: 96.4 F | SYSTOLIC BLOOD PRESSURE: 132 MMHG | RESPIRATION RATE: 18 BRPM | BODY MASS INDEX: 26.23 KG/M2 | HEART RATE: 73 BPM

## 2019-01-15 DIAGNOSIS — G89.29 CHRONIC RIGHT SHOULDER PAIN: Primary | ICD-10-CM

## 2019-01-15 DIAGNOSIS — M75.121 COMPLETE TEAR OF RIGHT ROTATOR CUFF: ICD-10-CM

## 2019-01-15 DIAGNOSIS — M25.511 CHRONIC RIGHT SHOULDER PAIN: Primary | ICD-10-CM

## 2019-01-15 DIAGNOSIS — Z01.818 PRE-OP EXAM: ICD-10-CM

## 2019-01-15 NOTE — PROGRESS NOTES
HISTORY AND PHYSICAL          Patient: Angela Jennings                MRN: [id number]       SSN: [id number]  YOB: 1955          AGE: 58 y.o. SEX: female      Patient scheduled for:  RIGHT SHOULDER ARTHROSCOPIC ROTATOR CUFF REPAIR, DISTAL CLAVICLE RESECTION    Surgeon: Eryn Oconnor MD    ANESTHESIA TYPE:  General    HISTORY:     The patient was seen in the office today for a preoperative history and physical for an upcoming above listed surgery. The patient is a pleasant 58 y.o. female who has a history of right shoulder pain. Pain for the last 6 months. No known  injury. The shoulder pain is 2/10 on the pain scale. She has tried physical therapy as well as an injection into the [facility] joint which were unsuccessful in alleviating her pain. She also has been seen by the spine service for further workup of her neck as she has a history of the cervical spine fusion. They ordered a CT arthrogram as she cannot have MRIs of the right shoulder which revealed a full-thickness rotator cuff tear. She saw Dr. Claude Rogers who referred her to see me. She has pain when she reaches above overhead. The pain radiates in the subdeltoid region. No complaints of numbness or tingling. Due to the current findings, affected activity of daily living and continued pain and discomfort, surgical intervention is indicated. The alternatives, risks, and complications, including but not limited to infection, blood loss, need for blood transfusion, neurovascular damage, ry-incisional numbness, subcutaneous hematoma, bone fracture, anesthetic complications, DVT, PE, death, RSD, postoperative stiffness and pain, possible surgical scar, delayed healing and nonhealing, reflexive sympathetic dystrophy, damage to blood vessels and nerves, need for more surgery, MI, and stroke,  failure of hardware, gait disturbances,have been discussed. The patient understands and wishes to proceed with surgery.      PAST MEDICAL HISTORY:     Past Medical History:   Diagnosis Date    Adjacent segment disease C3/4 w/deg changes, poss stenosis, CT '18 6/22/2018    Allergic rhinitis     Asthma     Back pain     Green's esophagus with esophagitis     Cervical radiculopathy     Chronic sinusitis 5/15/2012    DNS (deviated nasal septum)     Empyema     Foraminal stenosis of cervical region     C4-C5    GERD (gastroesophageal reflux disease)     Dr Arias Mehoopany    Hx MRSA infection 8/11/2014    Hypertension     Hypertriglyceridemia     Insulin resistance 4/9/2012    Left knee pain     Lichen planus     Menopause     Age 51    MRSA (methicillin resistant Staphylococcus aureus) infection 2008    left lung    Restless leg syndrome     Rheumatoid arthritis ([facility] [address]     Spinal cord stimulator status 2016    Spinal stenosis of cervical region     C4-C5 and C5-C6    TMJ syndrome     Wears glasses     and contacts       CURRENT MEDICATIONS:     Current Outpatient Medications   Medication Sig Dispense Refill    VITAMIN C 500 mg tablet take 2 tablets by mouth daily 180 Tab 1    clobetasol (TEMOVATE) 0.05 % ointment Apply  to affected area two (2) days a week. 45 g 0    ESTRACE 0.01 % (0.1 mg/gram) vaginal cream Place 1 gm in the vagina twice weekly 42.5 g 2    cholecalciferol, vitamin D3, (VITAMIN D3 PO) Take  by mouth.  ibuprofen (MOTRIN) 200 mg tablet Take  by mouth.  ferrous sulfate 325 mg (65 mg iron) tablet take 1 tablet by mouth once daily before BREAKFAST 90 Tab 3    cyanocobalamin 1,000 mcg tablet take 1 tablet by mouth once daily 90 Tab 3    losartan (COZAAR) 50 mg tablet TAKE 1 TABLET DAILY 30 Tab 0    folic acid (FOLVITE) 1 mg tablet take 1 tablet by mouth once daily  0    acetaminophen (TYLENOL) 325 mg tablet Take  by mouth every four (4) hours as needed for Pain.  triamcinolone (NASACORT AQ) 55 mcg nasal inhaler 2 Sprays by Both Nostrils route daily.  Indications: PERENNIAL ALLERGIC RHINITIS  Dexlansoprazole (DEXILANT) 60 mg CpDM Take 1 Cap by mouth Daily (before dinner).  calcium carbonate (TUMS) 200 mg calcium (500 mg) Chew Take 1 Tab by mouth four (4) times daily.  Cetirizine (ZYRTEC) 10 mg Cap Take 10 mg by mouth daily.  allergy injection by SubCUTAneous route every thirty (30) days.  budesonide-formoterol (SYMBICORT) 160-4.5 mcg/Actuation HFA inhaler Take 2 Puffs by inhalation two (2) times a day.  methotrexate, PF, 25 mg/mL injection once.  naproxen sodium (ALEVE) 220 mg cap Take 220 mg by mouth daily as needed.  montelukast (SINGULAIR) 10 mg tablet Take 10 mg by mouth daily.  albuterol (PROVENTIL HFA, VENTOLIN HFA) 90 mcg/Actuation inhaler Take 2 Puffs by inhalation every six (6) hours as needed. ALLERGIES:     Allergies   Allergen Reactions    Sulfasalazine Other (comments)     Could not taste anything, lightheadedness    Adhesive Tape-Silicones Other (comments)    Other Plant, Animal, Environmental Unable to [address] Other (comments)     Blisters, use paper tape only  Patient states tegaderm and paper tape are okay         SURGICAL HISTORY:     Past Surgical History:   Procedure Laterality Date    HX CERVICAL FUSION  08/13/14    HX LUMBAR LAMINECTOMY  Nov 1995    [facility]    HX LUMBAR LAMINECTOMY  Feb 1997    [facility]  1/2015    bunion removal from right foot    HX OTHER SURGICAL  2007    thoracentesis    HX OTHER SURGICAL  2008    chest tube    HX OTHER SURGICAL  1997    TMJ surgery    HX OTHER SURGICAL  2012    sinus surg 2012-Dr Latoya Grossman.     HX OTHER SURGICAL  2016    spinal cord stimulator place for lumbar neuritis, good benefit    HX TUBAL LIGATION      MI COLONOSCOPY FLX DX W/COLLJ SPEC WHEN PFRMD  6-18-08    normal/duntemann       SOCIAL HISTORY:     Social History     Socioeconomic History    Marital status:      Spouse name: Not on file    Number of children: Not on file    Years of education: Not on file    Highest education level: Not on file   Tobacco Use    Smoking status: Never Smoker    Smokeless tobacco: Never Used   Substance and Sexual Activity    Alcohol use: Yes     Alcohol/week: 0.0 oz     Comment: 0-6 per year    Drug use: Yes     Types: Prescription, OTC    Sexual activity: Yes     Partners: Male   Social History Narrative    Retired        FAMILY HISTORY:     Family History   Problem Relation Age of Onset    Hypertension Mother     Diabetes Mother    Eirene.[place] Arthritis-rheumatoid Sister     Other Sister         Lupus    Cancer Sister 35        CA, uterus    Other Brother         myocarditis    Heart Attack Brother     Heart Disease Brother     Heart Surgery Brother     Coronary Artery Disease Father     Hypertension Father     Cancer Sister 48        CA, breast    Breast Cancer Sister         Estrogen based    Diabetes Brother     Stroke Maternal Grandmother        REVIEW OF SYSTEMS:     Negative for fevers, chills, chest pain, shortness of breath, weight loss, recent illness     General: Negative for fever and chills. No unexpected change in weight. Denies fatigue. No change in appetite. Skin: Negative for rash or itching. HEENT: Negative for congestion, sore throat, neck pain and neck stiffness. No change in vision or hearing. Hasn't noted any enlarged lymph nodes in the neck. Cardiovascular:  Negative for chest pain and palpitations. Has not noted pedal edema. Respiratory: Negative for cough, colds, sinus, hemoptysis, shortness of breath and wheezing. Gastrointestinal: Negative for nausea and vomiting, rectal bleeding, coffee ground emesis, abdominal pain, diarrhea and constipation. Genitourinary: Negative for dysuria, frequency urgency, or burning on micturition. No flank pain, no foul smelling urine, no difficulty with initiating urination. Hematological: Negative for bleeding or easy bruising.   Musculoskeletal: Negative  for arthralgias, back pain or neck pain. Neurological: Negative for dizziness, seizures or syncopal episodes. Denies headaches. Endocrine: Denies excessive thirst.  No heat/cold intolerance. Psychiatric: Negative for depression or insomnia. PHYSICAL EXAMINATION:     VITALS:   Visit Vitals  /74   Pulse 73   Temp 96.4 °F (35.8 °C) (Oral)   Resp 18   Ht 5' 10\" (1.778 m)   Wt 183 lb 3.2 oz (83.1 kg)   SpO2 99%   BMI 26.29 kg/m²     GEN:  Well developed, well nourished 58 y.o. female in no acute distress. HEENT: Normocephalic and atraumatic. Eyes: Conjunctivae and EOM are normal.Pupils are equal, round, and reactive to light. External ear normal appearance, external nose normal appearing. Mouth/Throat: Oropharynx is clear and moist, able to handle oral secretions w/out difficulty, airway patent  NECK: Supple. Normal ROM, No lymphadenopathy. Trachea is midline. No bruising, swelling or deformity  RESP: Clear to auscultation bilaterally. No wheezes, rales, rhonchi. Normal effort and breath sounds. No respiratory distress  CARDIO: Normal rate, regular rhythm and normal heart sounds. No MGR. ABDOMEN: Soft, non-tender, non-distended, normoactive bowel sounds in all four quadrants. There is no tenderness. There is no rebound and no guarding. BACK: No CVA or spinal tenderness  BREAST:  Deferred  PELVIC:    Deferred   RECTAL:  Deferred   :           Deferred  EXTREMITIES: EXAMINATION OF: right shoulder  right shoulder with full active shoulder range of motion. No warmth, erythema, or effusion. Tender to [facility]ation [facility]. Pain with cross body adduction. Pain and weakness with supraspinatus rotator cuff testing. No weakness with belly press or resisted external rotation with the arm at the side. Pain with bicep stress testing. Neurovascularly intact distally. NEUROVASCULAR: Sensation intact to light touch and strength grossly intact and symmetrical. No nystagmus.  Positive distal pulses and capillary refill. DVT ASSESSMENT:  There is not  calf tenderness. No evidence of DVT seen on physical exam.  MOTOR: In tact  PSYCH: Alert an oriented to person, place and time. Mood, memory, affect, behavior and judgment normal       RADIOGRAPHS & DIAGNOSTIC STUDIES:     MRI/xray reveals :     X-rays of the right shoulder taken in the office today show AC joint arthrosis. No significant arthritic changes of the glenohumeral joint are noted on imaging today.     CT arthrogram of the right shoulder reveals a full-thickness supraspinatus rotator cuff tear with retraction. It is not a perfect study for it, but minimal atrophy is noted. LABS:       @  CBC:   Lab Results   Component Value Date/Time    WBC 6.7 01/11/2019 07:44 AM    RBC 4.80 01/11/2019 07:44 AM    HGB 14.2 01/11/2019 07:44 AM    HCT 44.7 01/11/2019 07:44 AM    PLATELET 833 42/15/1386 07:44 AM    and CMP:   Lab Results   Component Value Date/Time    Glucose 100 (H) 01/11/2019 07:44 AM    Sodium 143 01/11/2019 07:44 AM    Potassium 4.1 01/11/2019 07:44 AM    Chloride 109 (H) 01/11/2019 07:44 AM    CO2 27 01/11/2019 07:44 AM    BUN 15 01/11/2019 07:44 AM    Creatinine 0.72 01/11/2019 07:44 AM    Calcium 8.7 01/11/2019 07:44 AM    Anion gap 7 01/11/2019 07:44 AM    BUN/Creatinine ratio 21 (H) 01/11/2019 07:44 AM    Alk. phosphatase 95 01/11/2019 07:44 AM    Protein, total 7.6 01/11/2019 07:44 AM    Albumin 4.2 01/11/2019 07:44 AM    Globulin 3.4 01/11/2019 07:44 AM    A-G Ratio 1.2 01/11/2019 07:44 AM   @    Preoperative labs were reviewed and are substantially within normal limits   EKG:     Normal sinus rhythm   Normal ECG   When compared with ECG of 22-AUG-2016 13:14,   No significant change was found       ASSESSMENT:       Encounter Diagnoses   Name Primary?     Chronic right shoulder pain Yes    Complete tear of right rotator cuff     Pre-op exam        PLAN:     Again, the alternatives, risks, and complications, as well as expected outcome were discussed. The patient understands and agrees to proceed with RIGHT SHOULDER ARTHROSCOPIC ROTATOR CUFF REPAIR, DISTAL CLAVICLE RESECTION. Patient given orders listed below:    No orders of the defined types were placed in this encounter.         Darrin Travis PA-C  1/15/2019  8:28 AM

## 2019-02-12 ENCOUNTER — OFFICE VISIT (OUTPATIENT)
Dept: ORTHOPEDIC SURGERY | Facility: CLINIC | Age: 64
End: 2019-02-12

## 2019-02-12 VITALS
WEIGHT: 183.8 LBS | SYSTOLIC BLOOD PRESSURE: 138 MMHG | TEMPERATURE: 97.4 F | RESPIRATION RATE: 18 BRPM | DIASTOLIC BLOOD PRESSURE: 81 MMHG | HEIGHT: 70 IN | OXYGEN SATURATION: 97 % | BODY MASS INDEX: 26.31 KG/M2 | HEART RATE: 89 BPM

## 2019-02-12 DIAGNOSIS — Z98.890 STATUS POST ARTHROSCOPY OF RIGHT SHOULDER: ICD-10-CM

## 2019-02-12 DIAGNOSIS — M19.011 ARTHRITIS OF RIGHT ACROMIOCLAVICULAR JOINT: ICD-10-CM

## 2019-02-12 DIAGNOSIS — M75.121 COMPLETE TEAR OF RIGHT ROTATOR CUFF: Primary | ICD-10-CM

## 2019-02-12 NOTE — PROGRESS NOTES
Please let her know that her mammogram shows no suspicious findings for breast cancer.      Dr. Freddy Babin  [facility], [address] [place] [address], [address] [place] [address] Str.  Phone: [phone]  Fax: [phone] room air

## 2019-02-12 NOTE — PROGRESS NOTES
Kamlesh Sheth  1955     HISTORY OF PRESENT ILLNESS  Hilda Amaral is a 58 y.o. female who presents today for evaluation s/p Right shoulder arthroscopic rotator cuff repair and distal clavicle excision on 1/31/19. Patient has not been going to PT. Describes pain as a 0/10. Has been taking ibuprofen for pain. She is doing well, she has a little soreness of her upper arm. Patient denies any fever, chills, chest pain, shortness of breath or calf pain. There are no new illness or injuries to report since last seen in the office. PHYSICAL EXAM:   Visit Vitals  /81   Pulse 89   Temp 97.4 °F (36.3 °C) (Oral)   Resp 18   Ht 5' 10\" (1.778 m)   Wt 183 lb 12.8 oz (83.4 kg)   SpO2 97%   BMI 26.37 kg/m²      The patient is a well-developed, well-nourished female in no acute distress. The patient is alert and oriented times three. The patient appears to be well groomed. Mood and affect are normal.  ORTHOPEDIC EXAM of right shoulder:  Inspection: swelling not present,  Bruising present  Incision, clean, dry, intact, sutures in place  Passive glenohumeral abduction 0-20 degrees in the sling  Stability: Stable  Strength: n/a  2+ distal pulses    IMPRESSION:  S/P Right shoulder arthroscopic rotator cuff repair and distal clavicle excision    PLAN:   Pt doing well post operatively  Incisions cleaned. Sutures removed at steri strips applied  Surgery was discussed at length today. Continue wearing sling until she is 4 weeks post op. At next visit we will d/c sling and give her a couple PROM exercises to do at home for 4 weeks. Stressed to patient that nothing causes an increase in pain.   RTC 2-3 weeks    Patient seen and evaluated by Dr. Hodan Garcia today who agrees with treatment plan    [facility] [address] and Spine Specialist

## 2019-02-20 DIAGNOSIS — I10 ESSENTIAL HYPERTENSION WITH GOAL BLOOD PRESSURE LESS THAN 140/90: ICD-10-CM

## 2019-02-21 RX ORDER — LOSARTAN POTASSIUM 50 MG/1
TABLET ORAL
Qty: 90 TAB | Refills: 3 | Status: SHIPPED | OUTPATIENT
Start: 2019-02-21 | End: 2020-09-30

## 2019-02-26 ENCOUNTER — OFFICE VISIT (OUTPATIENT)
Dept: ORTHOPEDIC SURGERY | Facility: CLINIC | Age: 64
End: 2019-02-26

## 2019-02-26 DIAGNOSIS — Z98.890 STATUS POST ARTHROSCOPY OF RIGHT SHOULDER: ICD-10-CM

## 2019-02-26 DIAGNOSIS — M75.121 COMPLETE TEAR OF RIGHT ROTATOR CUFF: Primary | ICD-10-CM

## 2019-02-26 NOTE — PROGRESS NOTES
Juliette Chiang  1955     HISTORY OF PRESENT ILLNESS  Hilda Coombs is a 58 y.o. female who presents today for evaluation s/p Right shoulder arthroscopic rotator cuff repair and distal clavicle excision on 1/31/19. Patient has not been going to PT. Describes pain as a 0/10. Has been taking ibuprofen for pain. She is doing well today. She has been compliant with her sling. Patient denies any fever, chills, chest pain, shortness of breath or calf pain. There are no new illness or injuries to report since last seen in the office. PHYSICAL EXAM:   There were no vitals taken for this visit. The patient is a well-developed, well-nourished female in no acute distress. The patient is alert and oriented times three. The patient appears to be well groomed. Mood and affect are normal.  ORTHOPEDIC EXAM of right shoulder:  Inspection: swelling not present,  Bruising present  Incision, clean, dry, intact, sutures in place  Passive glenohumeral abduction 0-40 degrees in the sling, 70 FF passive  Stability: Stable  Strength: n/a  2+ distal pulses    IMPRESSION:  S/P Right shoulder arthroscopic rotator cuff repair and distal clavicle excision    PLAN:   Pt doing well post operatively  Will D/C her sling on Friday. Pt given PROM exercises to work on at home. If she is stiff in 4 weeks we will do formal PT to work on ROM. Pt not given a refill of pain medication today. Stressed to patient that nothing causes an increase in pain.   RTC 4 weeks      ANNY Taveras [address] and Spine Specialist

## 2019-03-26 ENCOUNTER — OFFICE VISIT (OUTPATIENT)
Dept: ORTHOPEDIC SURGERY | Facility: CLINIC | Age: 64
End: 2019-03-26

## 2019-03-26 VITALS
DIASTOLIC BLOOD PRESSURE: 77 MMHG | BODY MASS INDEX: 26.69 KG/M2 | RESPIRATION RATE: 18 BRPM | OXYGEN SATURATION: 97 % | HEART RATE: 71 BPM | WEIGHT: 186.4 LBS | SYSTOLIC BLOOD PRESSURE: 130 MMHG | TEMPERATURE: 96.1 F | HEIGHT: 70 IN

## 2019-03-26 DIAGNOSIS — M75.121 COMPLETE TEAR OF RIGHT ROTATOR CUFF: Primary | ICD-10-CM

## 2019-03-26 DIAGNOSIS — Z98.890 STATUS POST ARTHROSCOPY OF RIGHT SHOULDER: ICD-10-CM

## 2019-03-26 NOTE — PROGRESS NOTES
Randolph Yo  1955     HISTORY OF PRESENT ILLNESS  Hilda Valerio is a 58 y.o. female who presents today for evaluation s/p Right shoulder arthroscopic rotator cuff repair and distal clavicle excision on 1/31/19. Patient has not been going to PT. Describes pain as a 0/10. Has been taking ibuprofen for pain. She is doing well today. She has been compliant with her exercises. Patient denies any fever, chills, chest pain, shortness of breath or calf pain. There are no new illness or injuries to report since last seen in the office. PHYSICAL EXAM:   Visit Vitals  /77   Pulse 71   Temp 96.1 °F (35.6 °C) (Oral)   Resp 18   Ht 5' 10\" (1.778 m)   Wt 186 lb 6.4 oz (84.6 kg)   SpO2 97%   BMI 26.75 kg/m²      The patient is a well-developed, well-nourished female in no acute distress. The patient is alert and oriented times three. The patient appears to be well groomed. Mood and affect are normal.  ORTHOPEDIC EXAM of right shoulder:  Inspection: swelling not present,  Bruising not present  Incision, clean, dry, intact, sutures in place  Passive glenohumeral abduction 0-80 degrees in the sling, 170 FF, 20 ER passive  Stability: Stable  Strength: n/a  2+ distal pulses    IMPRESSION:  S/P Right shoulder arthroscopic rotator cuff repair and distal clavicle excision    PLAN:   Pt doing well post operatively  Will start some PT working on ROM per protocol, her ROM looks great today. Pt not given a refill of pain medication today. Stressed to patient that nothing causes an increase in pain.   RTC 4 weeks    Patient seen and evaluated by Dr. Eleonora Kamara today who agrees with treatment plan      [facility] [address] and Spine Specialist

## 2019-03-28 RX ORDER — LANOLIN ALCOHOL/MO/W.PET/CERES
CREAM (GRAM) TOPICAL
Qty: 90 TAB | Refills: 3 | Status: SHIPPED | OUTPATIENT
Start: 2019-03-28 | End: 2020-04-03

## 2019-03-28 NOTE — ED TRIAGE NOTES
Pt c/o puncture wound on left hand area, pt claimed that she was playing with her dog and accidentally got punctured by the dog's tooth. Color consistent with ethnicity/race, warm, dry intact, resilient.

## 2019-04-02 ENCOUNTER — HOSPITAL ENCOUNTER (OUTPATIENT)
Dept: PHYSICAL THERAPY | Age: 64
Discharge: HOME | End: 2019-04-02
Payer: COMMERCIAL

## 2019-04-02 PROCEDURE — 97110 THERAPEUTIC EXERCISES: CPT

## 2019-04-02 PROCEDURE — 97162 PT EVAL MOD COMPLEX 30 MIN: CPT

## 2019-04-02 PROCEDURE — 97140 MANUAL THERAPY 1/> REGIONS: CPT

## 2019-04-02 NOTE — PROGRESS NOTES
[address]  [address] [place] [address], [address] [place] [address], [address] [place], [place] [address]  [phone] fax      Plan of Care/ Statement of Necessity for Physical Therapy Services    Patient name: Ema Dumont Start of Care: 2019   Referral source: Paul Meadows MD : 1955    Medical Diagnosis: S/P shoulder surgery [Z98.890]  Payor: BLUE CROSS / Plan:  [facility] / Product Type: PPO /  Onset Dates/p 19    Treatment Diagnosis: s/p right RTC repair and distal clavicle excision    Prior Hospitalization: see medical history Provider#: [id number]   Medications: Verified on Patient summary List    Comorbidities: Allergies, Arthritis, Asthma, Back pain, GI disease, HA, HTN, RA- diagnosed 3 years ago, Prior surgery-  Spinal Cord Stimulator- 2016, cervical fusion: 2014, Oral surgery 3x: , Bunionectomy- 2015, No   Prior Level of Function:  Right hand dominant. Patient was able to use right arm prior to surgery. Managed household independently and likes to garden/yardwork. The Plan of Care and following information is based on the information from the initial evaluation. Assessment/ key information:  Patient presents with right shoulder pain s/p right RTC repair and distal clavicle excision on 19. Patient denies any post-op complications. Patient denies an injury that led to surgery, just wear and tear over the years. Patient stated she has not had any formal PT since the surgery, but has been doing exercises at home provided by MD. Patient describes right shoulder pain as an intermittent sharp pain that is located at anterior shoulder and at upper arm. Patient stated that the sharp pain doesn't last very long. Patient denies numbness/tingling down arm.  Patient exhibits decreased right shoulder PROM with tightness noted at end feel, decreased scapular mobility, and increased tightness along right UT/levator scap and along vertebral border of scap. Patient would benefit from skilled PT to address above deficits and assist with return to PLOF. Evaluation Complexity History MEDIUM  Complexity : 1-2 comorbidities / personal factors will impact the outcome/ POC ; Examination MEDIUM Complexity : 3 Standardized tests and measures addressing body structure, function, activity limitation and / or participation in recreation  ;Presentation MEDIUM Complexity : Evolving with changing characteristics  ; Clinical Decision Making MEDIUM Complexity : FOTO score of 26-74  Overall Complexity Rating: MEDIUM  Problem List: pain affecting function, decrease ROM, decrease strength, decrease ADL/ functional abilitiies, decrease activity tolerance, decrease flexibility/ joint mobility and decrease transfer abilities   Treatment Plan may include any combination of the following: Therapeutic exercise, Therapeutic activities, Neuromuscular re-education, Physical agent/modality, Manual therapy, Patient education and Functional mobility training  Patient / Family readiness to learn indicated by: asking questions, trying to perform skills and interest  Persons(s) to be included in education: patient (P)  Barriers to Learning/Limitations: None  Patient Goal (s): regain full use of my arm and no pain  Patient Self Reported Health Status: good  Rehabilitation Potential: good    Short Term Goals: To be accomplished in 1 weeks:   1. Patient will be ind and compliant with HEP 1-2x/day to increase ease with ADLs. 2. Patient will improve right shoulder PROM to [facility] to progress to next phase of rehabilitation. Long Term Goals: To be accomplished in 4 weeks:   1. Patient will improve FOTO to at least 64 to demonstrate functional improvement. 2. Patient will improve right shoulder AAROM flex to 120deg, Once cleared by MD to progress to [facility], to increase ease with overhead activities.     3. Patient will improve right shoulder AROM flex and scaption to 115deg, once cleared by MD to progress to AROM, to increase with self care. Frequency / Duration: Patient to be seen 2 times per week for 4 weeks. Patient/ Caregiver education and instruction: Diagnosis, prognosis, exercises   [x]  Plan of care has been reviewed with YEISON Lorenz, PT 4/2/2019 10:07 AM  ________________________________________________________________________    I certify that the above Therapy Services are being furnished while the patient is under my care. I agree with the treatment plan and certify that this therapy is necessary.     Physician's Signature:____________Date:_________TIME:________    [employer], Date and Time must be completed for valid certification **      Please sign and return to In [address] [place] [address], [address] [place] [address], [address] [facility] [address]  [phone] fax

## 2019-04-02 NOTE — PROGRESS NOTES
PT DAILY TREATMENT NOTE/SHOULDER EVAL 10-18    Patient Name: Mila Simpson  Date:2019  : 1955  [x]  Patient  Verified  Payor: BLUE CROSS / Plan: [facility] / Product Type: PPO /    In time: 10:07  Out time:11:04  Total Treatment Time (min): 57  Visit #: 1 of 8    Medicare/BCBS Only   Total Timed Codes (min):  25 1:1 Treatment Time:  62       Treatment Area: S/P shoulder surgery [Z98.890]    SUBJECTIVE  Pain Level (0-10 scale): 1/10   []constant [x]intermittent []improving []worsening []no change since onset    Any medication changes, allergies to medications, adverse drug reactions, diagnosis change, or new procedure performed?: [x] No    [] Yes (see summary sheet for update)  Subjective functional status/changes:     PLOF: Right hand dominant. Patient was able to use right arm prior to surgery. Managed household independently and likes to garden/yardwork. Limitations to PLOF: decreased ROM, decreased strength   Mechanism of Injury: Patient presents with right shoulder pain s/p right RTC repair and distal clavicle excision on 19. Patient denies any post-op complications. Patient denies injury that led to surgery, just wear and tear over the years. Patient stated she has not had any formal PT since the surgery, but has been doing exercises at home given to her by the MD. Exercises include laying on her back and using left arm to hold and raise right arm, and ER at neutral with wand. Patient stated she hasn't worn a sling since 1 month following the surgery per MD ok. Patient stated that she is using her arm, but keeping her elbow at the side, she is not doing any reaching. Current symptoms/Complaints: Patient describes right shoulder pain as an intermittent sharp pain that is located at anterior shoulder and at upper arm. Patient stated that the sharp pain doesn't last very long. Patient denies numbness/tingling down arm.    Previous Treatment/Compliance: previous PT last year   PMHx/Surgical Hx: Spinal Cord Stimulator- Sept 2016, RA- diagnosed 3 years ago, cervical fusion: August 2014, Oral surgery 3x: 2016, Bunionectomy- Jan 2015, No pacemaker, no cardiac issues, no CA  Work Hx: not currently working. Retired. FABQ Score: []low []elevate  Cognition: A & O x 2    Other:    OBJECTIVE/EXAMINATION  Domestic Life: lives with    Activity/Recreational Limitations: limited by mobility and decreased strength   Mobility: limited       32 min [x]Eval                  []Re-Eval       15 min Therapeutic Exercise:  [x] See flow sheet : HEP creation and review    Rationale: increase ROM to improve the patients ability to perform ADLs. 10 min Manual Therapy:  Gentle STM to right UT/levator scap/mid trap; Gentle Scap mobs, PROM into Flex, Scaption, ER at 45deg in plane of scapula (maintained PROM within painfree range)    Rationale: decrease pain, increase ROM and increase tissue extensibility to increase ease with ADLs. With   [] TE   [] TA   [] neuro   [] other: Patient Education: [x] Review HEP    [] Progressed/Changed HEP based on:   [] positioning   [] body mechanics   [] transfers   [] heat/ice application    [] other:      Other Objective/Functional Measures:     Physical Therapy Evaluation - Shoulder    Posture: [] Poor    [x] Fair    [] Good    Describe: Forward head and rounded shoulders     ROM:  [] Unable to assess at this time                                           AROM                                                              PROM   Left Right  Left Right   Flexion NT  Flexion  130deg   Extension   Extension     Scaption/ABD   Scaptin/ABD  133deg   ER @ 0 Degrees   ER @ 45 Degrees  45deg   ER @ 90 Degrees   ER @ 90 Degrees     IR @ 90 Degrees   IR @ 90 Degrees       End Feel / Painful Arc: mild tightness noted at end feel. Patient reported mild discomfort at end range of motion so therapist stopped as soon as patient expressed beginning of discomfort.  No overpressure was applied. Strength:   [x] Unable to assess at this time                                                                            L (1-5) R (1-5) Pain   Flexors   [] Yes   [] No   Abductors   [] Yes   [] No   External Rotators   [] Yes   [] No   Internal Rotators   [] Yes   [] No   Supraspinatus   [] Yes   [] No   Serratus Anterior   [] Yes   [] No   Lower Trapezius   [] Yes   [] No   Elbow Flexion   [] Yes   [] No   Elbow Extension   [] Yes   [] No       Scapulohumoral Control / Rhythm: unable to assess   Able to eccentrically lower with good control? Left: [] Yes   [] No     Right: [] Yes   [] No    Accessory Motions:    Palpation  [] Min  [] Mod  [] Severe    Location: tightness located at Right UT/levator scap, and along vertebral border of scap. Incisions observed and appear to be healing well. [] Min  [] Mod  [] Severe    Location:  [] Min  [] Mod  [] Severe    Location:      Other Tests / Comments:        Pain Level (0-10 scale) post treatment: 1/10     ASSESSMENT/Changes in Function: See POC. Advised patient to perform HEP gently and to stop if pain increases. Therapist maintained PROM within a painfree range and no overpressure was applied. Therapist discussed with patient importance of maintaining precautions and protecting right shoulder. Patient reported no increase in pain at end of the session. Patient will continue to benefit from skilled PT services to modify and progress therapeutic interventions, address functional mobility deficits, address ROM deficits, address strength deficits, analyze and address soft tissue restrictions, analyze and cue movement patterns and assess and modify postural abnormalities to attain remaining goals. [x]  See Plan of Care  []  See progress note/recertification  []  See Discharge Summary         Progress towards goals / Updated goals:  Short Term Goals:  To be accomplished in 1 weeks:               1. Patient will be ind and compliant with HEP 1-2x/day to increase ease with ADLs. Eval: HEP established                2. Patient will improve right shoulder PROM to [facility] to progress to next phase of rehabilitation. Eval: right shoulder PROM Flex:130deg  Scap: 133deg, ER (at 45deg in plane of scapula): 45deg  Long Term Goals: To be accomplished in 4 weeks:               1. Patient will improve FOTO to at least 64 to demonstrate functional improvement. Eval: FOTO: 49               2. Patient will improve right shoulder AAROM flex to 120deg, Once cleared by MD to progress to [facility], to increase ease with overhead activities. Eval: Not assessed                 3. Patient will improve right shoulder AROM flex and scaption to 115deg, once cleared by MD to progress to AROM, to increase with self care.     Eval: Not assessed     PLAN  []  Upgrade activities as tolerated     [x]  Continue plan of care  []  Update interventions per flow sheet       []  Discharge due to:_  []  Other:_      Mamie Matos, PT 4/2/2019  10:08 AM

## 2019-04-05 ENCOUNTER — HOSPITAL ENCOUNTER (OUTPATIENT)
Dept: PHYSICAL THERAPY | Age: 64
Discharge: HOME | End: 2019-04-05
Payer: COMMERCIAL

## 2019-04-05 PROCEDURE — 97110 THERAPEUTIC EXERCISES: CPT

## 2019-04-05 PROCEDURE — 97140 MANUAL THERAPY 1/> REGIONS: CPT

## 2019-04-08 ENCOUNTER — HOSPITAL ENCOUNTER (OUTPATIENT)
Dept: PHYSICAL THERAPY | Age: 64
Discharge: HOME | End: 2019-04-08
Payer: COMMERCIAL

## 2019-04-08 PROCEDURE — 97110 THERAPEUTIC EXERCISES: CPT

## 2019-04-08 PROCEDURE — 97140 MANUAL THERAPY 1/> REGIONS: CPT

## 2019-04-08 NOTE — PROGRESS NOTES
PT DAILY TREATMENT NOTE 10-18    Patient Name: Jasbir Cervantes  Date:2019  : 1955  [x]  Patient  Verified  Payor: BLUE CROSS / Plan: [facility] / Product Type: PPO /    In time:126  Out time:210  Total Treatment Time (min): 36  Visit #: 3 of 8    Medicare/BCBS Only   Total Timed Codes (min):  26 1:1 Treatment Time:  26       Treatment Area: Right shoulder pain [M25.511]  S/P shoulder surgery [Z98.890]    SUBJECTIVE  Pain Level (0-10 scale): 2  Any medication changes, allergies to medications, adverse drug reactions, diagnosis change, or new procedure performed?: [x] No    [] Yes (see summary sheet for update)  Subjective functional status/changes:   [] No changes reported  It's about a 2.  I wore the sling for a month then he gave me some passive exercises    OBJECTIVE    Modality rationale: decrease pain and increase tissue extensibility to improve the patients ability to aid with increase tolerance to ADLS and activities   Min Type Additional Details    [] Estim:  []Unatt       []IFC  []Premod                        []Other:  []w/ice   []w/heat  Position:  Location:    [] Estim: []Att    []TENS instruct  []NMES                    []Other:  []w/US   []w/ice   []w/heat  Position:  Location:    []  Traction: [] Cervical       []Lumbar                       [] Prone          []Supine                       []Intermittent   []Continuous Lbs:  [] before manual  [] after manual    []  Ultrasound: []Continuous   [] Pulsed                           []1MHz   []3MHz W/cm2:  Location:    []  Iontophoresis with dexamethasone         Location: [] Take home patch   [] In clinic   10 [x]  Ice   post  []  heat  []  Ice massage  []  Laser   []  Anodyne Position:reclined  Location:right shoulder    []  Laser with stim  []  Other:  Position:  Location:    []  Vasopneumatic Device Pressure:       [] lo [] med [] hi   Temperature: [] lo [] med [] hi   [] Skin assessment post-treatment:  []intact []redness- no adverse reaction    []redness - adverse reaction:      min []Eval                  []Re-Eval       16 min Therapeutic Exercise:  [x] See flow sheet :   Rationale: increase ROM and improve coordination to improve the patients ability to aid with increase tolerance to ADLS and activities     min Therapeutic Activity:  []  See flow sheet :   Rationale:   to improve the patients ability to       min Neuromuscular Re-education:  []  See flow sheet :   Rationale:   to improve the patients ability to     10 min Manual Therapy:  Gentle LAD, Oscill, PROM all planes with gentle overstretch with ER   Rationale: decrease pain, increase ROM and increase tissue extensibility to aid with increase tolerance to ADLS and activities     min Gait Training:  ___ feet with ___ device on level surfaces with ___ level of assist   Rationale: With   [] TE   [] TA   [] neuro   [] other: Patient Education: [x] Review HEP    [] Progressed/Changed HEP based on:   [] positioning   [] body mechanics   [] transfers   [] heat/ice application    [x] other: increase hold time for wand ER stretch in N/ slight abd     Other Objective/Functional Measures: VC exercises and technique     Pain Level (0-10 scale) post treatment: 1    ASSESSMENT/Changes in Function: tolerated well. Patient will continue to benefit from skilled PT services to modify and progress therapeutic interventions, address functional mobility deficits, address ROM deficits, address strength deficits, analyze and address soft tissue restrictions, analyze and cue movement patterns, analyze and modify body mechanics/ergonomics, assess and modify postural abnormalities and instruct in home and community integration to attain remaining goals. [x]  See Plan of Care  []  See progress note/recertification  []  See Discharge Summary         Progress towards goals / Updated goals:   Short Term Goals: To be accomplished in 1 weeks:               1.  Patient will be ind and compliant with HEP 1-2x/day to increase ease with ADLs. CURRENT progressing 4/8/19               2. Patient will improve right shoulder PROM to [facility] to progress to next phase of rehabilitation. CURRENT progressing 4/8/19  Long Term Goals: To be accomplished in 4 weeks:               1. Patient will improve FOTO to at least 64 to demonstrate functional improvement. CURRENT               2. Patient will improve right shoulder AAROM flex to 120deg, Once cleared by MD to progress to [facility], to increase ease with overhead activities.     CURRENT               3. Patient will improve right shoulder AROM flex and scaption to 115deg, once cleared by MD to progress to AROM, to increase with self care.     CURRENT           PLAN  [x]  Upgrade activities as tolerated     [x]  Continue plan of care  []  Update interventions per flow sheet       []  Discharge due to:_  []  Other:_      Aleksandr Woodard PT 4/8/2019  1:31 PM    Future Appointments   Date Time Provider Cooper Dhillon   4/10/2019  9:30 AM Mima Gerard, PT [facility]   4/15/2019  2:30 PM Mima Gerard, PT [facility]   4/17/2019  8:30 AM Mima Gerard, PT [facility]   4/22/2019  2:00 PM Mima Gerard, PT [facility]   4/24/2019  8:40 AM Shanice De La Torre DO [facility]   4/24/2019  2:00 PM Mima Gerard, PT [facility]   4/29/2019  8:30 AM Mima Gerard, PT [facility]   5/1/2019  8:30 AM Mima Gerard, PT [facility]   5/13/2019 10:00 AM Chantale Smith MD [address]   5/31/2019  9:00 AM Adi Stone MD [facility]

## 2019-04-10 ENCOUNTER — HOSPITAL ENCOUNTER (OUTPATIENT)
Dept: PHYSICAL THERAPY | Age: 64
Discharge: HOME | End: 2019-04-10
Payer: COMMERCIAL

## 2019-04-10 PROCEDURE — 97110 THERAPEUTIC EXERCISES: CPT

## 2019-04-10 PROCEDURE — 97140 MANUAL THERAPY 1/> REGIONS: CPT

## 2019-04-10 NOTE — PROGRESS NOTES
PT DAILY TREATMENT NOTE 10-18    Patient Name: Parisa Benito  Date:4/10/2019  : 1955  [x]  Patient  Verified  Payor: BLUE CROSS / Plan: [employer] [facility] / Product Type: PPO /    In time:9:25  Out time:9:50  Total Treatment Time (min): 25  Visit #: 4 of 8    Medicare/BCBS Only   Total Timed Codes (min):  25 1:1 Treatment Time:  25       Treatment Area: Right shoulder pain [M25.511]  S/P shoulder surgery [Z98.890]    SUBJECTIVE  Pain Level (0-10 scale): 1  Any medication changes, allergies to medications, adverse drug reactions, diagnosis change, or new procedure performed?: [x] No    [] Yes (see summary sheet for update)  Subjective functional status/changes:   [] No changes reported  States it is feeling good today, she is doing her HEP regulalry     OBJECTIVE        15 min Therapeutic Exercise:  [x] See flow sheet :   Rationale: increase ROM, increase strength, improve coordination and increase proprioception to improve the patients ability to perform daily household chores. 10 min Manual Therapy:  PROM with stretch flexion/ abd/ IR/ ER, LAD with oscillation    Rationale: decrease pain, increase ROM and increase tissue extensibility to assist with ADLs      With   [] TE   [] TA   [] neuro   [] other: Patient Education: [x] Review HEP    [] Progressed/Changed HEP based on:   [] positioning   [] body mechanics   [] transfers   [] heat/ice application    [] other:      Other Objective/Functional Measures:   Able to perform all therex well with no difficulties/ pain  PROM ~120 flexion     Pain Level (0-10 scale) post treatment: 0    ASSESSMENT/Changes in Function: Patient continues to show improvements with signs/ symptoms however still demonstrates a decrease in strength, impaired ROM, and an increase in pain with functional activities.        Patient will continue to benefit from skilled PT services to modify and progress therapeutic interventions, address functional mobility deficits, address strength deficits, analyze and cue movement patterns and analyze and modify body mechanics/ergonomics to attain remaining goals. [x]  See Plan of Care  []  See progress note/recertification  []  See Discharge Summary         Progress towards goals / Updated goals:   Short Term Goals: To be accomplished in 1 weeks:               1. Patient will be ind and compliant with HEP 1-2x/day to increase ease with ADLs. CURRENT progressing 4/8/19               2. Patient will improve right shoulder PROM to [facility] to progress to next phase of rehabilitation. CURRENT progressing 4/8/19  Long Term Goals: To be accomplished in 4 weeks:               1. Patient will improve FOTO to at least 64 to demonstrate functional improvement. CURRENT               2. Patient will improve right shoulder AAROM flex to 120deg, Once cleared by MD to progress to [facility], to increase ease with overhead activities.                CURRENT               3. Patient will improve right shoulder AROM flex and scaption to 115deg, once cleared by MD to progress to AROM, to increase with self care.               CURRENT        PLAN  [x]  Upgrade activities as tolerated     [x]  Continue plan of care  []  Update interventions per flow sheet       []  Discharge due to:_  []  Other:_      Finn Dennison PT 4/10/2019  8:00 AM    Future Appointments   Date Time Provider Cooper Dhillon   4/10/2019  9:30 AM Geoffrey Asp, PT [facility]   4/15/2019  2:30 PM Geoffrey Asp, PT [facility]   4/17/2019  8:30 AM Geoffrey Asp, PT [facility]   4/22/2019  2:00 PM Geoffrey Asp, PT [facility]   4/24/2019  8:40 AM Lin Shaffer DO [facility]   4/24/2019  2:00 PM Geoffrey Asp, PT [facility]   4/29/2019  8:30 AM Geoffrey Asp, PT [facility]   5/1/2019  8:30 AM Geoffrey Asp, PT [facility]   5/31/2019  9:00 AM Joesph Mustafa MD [facility] [address]   6/4/2019  1:00 PM Clementine Boeck, MD [facility] [address]

## 2019-04-15 ENCOUNTER — HOSPITAL ENCOUNTER (OUTPATIENT)
Dept: PHYSICAL THERAPY | Age: 64
Discharge: HOME | End: 2019-04-15
Payer: COMMERCIAL

## 2019-04-15 PROCEDURE — 97140 MANUAL THERAPY 1/> REGIONS: CPT

## 2019-04-15 PROCEDURE — 97110 THERAPEUTIC EXERCISES: CPT

## 2019-04-15 NOTE — PROGRESS NOTES
PT DAILY TREATMENT NOTE 10-18    Patient Name: Paras Guallpa  Date:4/15/2019  : 1955  [x]  Patient  Verified  Payor: BLUE CROSS / Plan: [facility] / Product Type: PPO /    In time:2:30  Out time:2:53  Total Treatment Time (min): 23  Visit #: 5 of 8    Medicare/BCBS Only   Total Timed Codes (min):  23 1:1 Treatment Time:  23       Treatment Area: Right shoulder pain [M25.511]  S/P shoulder surgery [Z98.890]    SUBJECTIVE  Pain Level (0-10 scale): 1-2  Any medication changes, allergies to medications, adverse drug reactions, diagnosis change, or new procedure performed?: [x] No    [] Yes (see summary sheet for update)  Subjective functional status/changes:   [] No changes reported  States it is most painful at night     OBJECTIVE        13 min Therapeutic Exercise:  [x] See flow sheet :   Rationale: increase strength, improve coordination, improve balance and increase proprioception to improve the patients ability to assist with return to daily household chores. 10 min Manual Therapy:  PROM with gentle overpressure flexion/ abd/ IR/ ER, LAD with oscillation    Rationale: decrease pain, increase ROM and increase tissue extensibility to assist with tolerance to ADLs              With   [] TE   [] TA   [] neuro   [] other: Patient Education: [x] Review HEP    [] Progressed/Changed HEP based on:   [] positioning   [] body mechanics   [] transfers   [] heat/ice application    [] other:      Other Objective/Functional Measures:   Able to perform all therex well with no changes in symptoms  Added 1# to wrist curls/ wrist extension with arm supported with no pain/ discomfort     Pain Level (0-10 scale) post treatment: 0    ASSESSMENT/Changes in Function: Patient continues to show improvements with signs/ symptoms however still demonstrates a decrease in strength, impaired ROM,  and an increase in pain with functional activities.        Patient will continue to benefit from skilled PT services to modify and progress therapeutic interventions, address functional mobility deficits, address ROM deficits, address strength deficits, analyze and cue movement patterns and analyze and modify body mechanics/ergonomics to attain remaining goals. [x]  See Plan of Care  []  See progress note/recertification  []  See Discharge Summary         Progress towards goals / Updated goals:  Short Term Goals: To be accomplished in 1 weeks:               1. Patient will be ind and compliant with HEP 1-2x/day to increase ease with ADLs.             CURRENT progressing 4/8/19               2. Patient will improve right shoulder PROM to [facility] to progress to next phase of rehabilitation.               CURRENT progressing 4/8/19  Long Term Goals: To be accomplished in 4 weeks:               1. Patient will improve FOTO to at least 64 to demonstrate functional improvement.              CURRENT               2. Patient will improve right shoulder AAROM flex to 120deg, Once cleared by MD to progress to [facility], to increase ease with overhead activities.                CURRENT               3. Patient will improve right shoulder AROM flex and scaption to 115deg, once cleared by MD to progress to AROM, to increase with self care.               CURRENT      PLAN  [x]  Upgrade activities as tolerated     [x]  Continue plan of care  []  Update interventions per flow sheet       []  Discharge due to:_  []  Other:_      Cindy Sandhu PT 4/15/2019  7:44 AM    Future Appointments   Date Time Provider Cooper Dhillon   4/15/2019  2:30 PM [facility]   4/17/2019  8:30 AM ROBERT Johnson [facility]   4/22/2019  2:00 PM ROBERT JohnsonPTNY [facility]   4/24/2019  8:40 AM Alexis Calloway DO [facility]   4/24/2019  2:00 PM ROBERT Johnson [facility]   4/29/2019  8:30 AM ROBERT JohnsonPTNY [facility]   5/1/2019  8:30 AM ROBERT JohnsonPTNY [facility]   5/31/2019  9:00 AM MD [address] [facility]   6/4/2019  1:00 PM Chantale Smith MD [address]

## 2019-04-17 ENCOUNTER — HOSPITAL ENCOUNTER (OUTPATIENT)
Dept: PHYSICAL THERAPY | Age: 64
Discharge: HOME | End: 2019-04-17
Payer: COMMERCIAL

## 2019-04-17 PROCEDURE — 97110 THERAPEUTIC EXERCISES: CPT

## 2019-04-17 PROCEDURE — 97140 MANUAL THERAPY 1/> REGIONS: CPT

## 2019-04-17 NOTE — PROGRESS NOTES
PT DAILY TREATMENT NOTE 10-18    Patient Name: Jasbir Cervantes  Date:2019  : 1955  [x]  Patient  Verified  Payor: BLUE CROSS / Plan: [employer] [facility] [place] [place] / Product Type: PPO /    In time:8:29  Out time:8:55  Total Treatment Time (min): 26  Visit #: 6 of 8    Medicare/BCBS Only   Total Timed Codes (min):  26 1:1 Treatment Time:         Treatment Area: Right shoulder pain [M25.511]  S/P shoulder surgery [Z98.890]    SUBJECTIVE  Pain Level (0-10 scale): 2  Any medication changes, allergies to medications, adverse drug reactions, diagnosis change, or new procedure performed?: [x] No    [] Yes (see summary sheet for update)  Subjective functional status/changes:   [] No changes reported  States she had less pain the other day but was more sore when she woke up this morning    OBJECTIVE      16 min Therapeutic Exercise:  [x] See flow sheet :   Rationale: increase ROM, increase strength, improve coordination and increase proprioception to improve the patients ability to perform daily household chores. 10 min Manual Therapy:  PROM with gentle overpressure flexion, abd, IR, ER, LAD with oscillation    Rationale: decrease pain, increase ROM and increase tissue extensibility to assist with tolerance to ADLs              With   [] TE   [] TA   [] neuro   [] other: Patient Education: [x] Review HEP    [] Progressed/Changed HEP based on:   [] positioning   [] body mechanics   [] transfers   [] heat/ice application    [] other:      Other Objective/Functional Measures:   Able to perform all therex well, no changes in symptoms  Added weights to elbow flexion and wrist therex with no changes in symptoms (arm supported in lap)     Pain Level (0-10 scale) post treatment: 0    ASSESSMENT/Changes in Function: Patient continues to show improvements with signs/ symptoms however still demonstrates a decrease in strength, impaired ROM, and an increase in pain with functional activities.        Patient will continue to benefit from skilled PT services to modify and progress therapeutic interventions, address functional mobility deficits, address strength deficits, analyze and cue movement patterns and analyze and modify body mechanics/ergonomics to attain remaining goals. [x]  See Plan of Care  []  See progress note/recertification  []  See Discharge Summary         Progress towards goals / Updated goals:  Short Term Goals: To be accomplished in 1 weeks:               1. Patient will be ind and compliant with HEP 1-2x/day to increase ease with ADLs.             CURRENT progressing 4/8/19               2. Patient will improve right shoulder PROM to [facility] to progress to next phase of rehabilitation.               CURRENT progressing 4/8/19  Long Term Goals: To be accomplished in 4 weeks:               1. Patient will improve FOTO to at least 64 to demonstrate functional improvement.              CURRENT               2. Patient will improve right shoulder AAROM flex to 120deg, Once cleared by MD to progress to [facility], to increase ease with overhead activities.                CURRENT               3. Patient will improve right shoulder AROM flex and scaption to 115deg, once cleared by MD to progress to AROM, to increase with self care.               CURRENT        PLAN  [x]  Upgrade activities as tolerated     [x]  Continue plan of care  []  Update interventions per flow sheet       []  Discharge due to:_  []  Other:_      Gabi Gonzalez PT 4/17/2019  8:11 AM    Future Appointments   Date Time Provider Cooper Dhillon   4/17/2019  8:30 AM Lucian Prude, PT [facility]   4/22/2019  2:00 PM Lucian Prude, PT [facility]   4/24/2019  8:40 AM DO RADU [place] M[facility]AL[facility]ED   4/24/2019  2:00 PM Lucian Prude, PT [facility]   4/29/2019  8:30 AM Lucian Prude, PT [facility]   5/1/2019  8:30 AM Lucian Prude, PT [facility]   5/31/2019  9:00 AM Elijah Crespo MD [facility]   6/4/2019  1:00 PM Bronwyn Gottron, MD [address]

## 2019-04-20 RX ORDER — ACETAMINOPHEN 160 MG/5ML
SUSPENSION, ORAL (FINAL DOSE FORM) ORAL
Qty: 180 TAB | Refills: 1 | Status: SHIPPED | OUTPATIENT
Start: 2019-04-20 | End: 2019-11-02 | Stop reason: SDUPTHER

## 2019-04-22 ENCOUNTER — HOSPITAL ENCOUNTER (OUTPATIENT)
Dept: PHYSICAL THERAPY | Age: 64
Discharge: HOME | End: 2019-04-22
Payer: COMMERCIAL

## 2019-04-22 PROCEDURE — 97140 MANUAL THERAPY 1/> REGIONS: CPT

## 2019-04-22 PROCEDURE — 97110 THERAPEUTIC EXERCISES: CPT

## 2019-04-22 NOTE — PROGRESS NOTES
In [address] [place] [address], [address] [place] [address], [address] [place], [place] [address]y [address]  [phone] fax    Progress Note  Patient name: Venkata Johnson Start of Care: 2019   Referral source: Eliud Lincoln MD : 1955                Medical Diagnosis: S/P shoulder surgery [Z98.890]  Payor: BLUE CROSS / Plan: [employer] [facility] [place] / Product Type: PPO /  Onset Dates/p 19                Treatment Diagnosis: s/p right RTC repair and distal clavicle excision    Prior Hospitalization: see medical history Provider#: [id number]   Medications: Verified on Patient summary List    Comorbidities: Allergies, Arthritis, Asthma, Back pain, GI disease, HA, HTN, RA- diagnosed 3 years ago, Prior surgery-  Spinal Cord Stimulator- 2016, cervical fusion: 2014, Oral surgery 3x: , Bunionectomy- 2015, No   Prior Level of Function:  Right hand dominant. Patient was able to use right arm prior to surgery. Managed household independently and likes to garden/yardwork.     Visits from Start of Care: 7    Missed Visits: 0    Established Goals:         Excellent           Good         Limited           None  [] Increased ROM   [x]  []  []  []  [] Increased Strength  []  [x]  []  []  [] Increased Mobility  []  [x]  []  []   [] Decreased Pain   []  [x]  []  []      Key Functional Changes:   Updated Goals: :  Short Term Goals: To be accomplished in 1 weeks:  1. Patient will be ind and compliant with HEP 1-2x/day to increase ease with ADLs.             CURRENT MET               2. Patient will improve right shoulder PROM to [facility] to progress to next phase of rehabilitation.               CURRENT Progressing flexion WNL, abduction ~130, IR/ER WNL  Long Term Goals: To be accomplished in 20 treatments:  1. Patient will improve FOTO to at least 64 to demonstrate functional improvement.              CURRENT  2.  Patient will improve right shoulder AAROM flex to 120deg, Once cleared by MD to progress to [facility], to increase ease with overhead activities.             CURRENT Plan to progress once cleared by MD  3. Patient will improve right shoulder AROM flex and scaption to 115deg, once cleared by MD to progress to AROM, to increase with self care.               CURRENT Plan to progress once cleared by MD      ASSESSMENT/RECOMMENDATIONS:  Patient has shown significant improvements with strength, activity tolerance and pain control since beginning therapy. She is able to perform all therex well with proper form and tolerates manual therapy with no changes in symptoms. Patient will be 12 weeks post op on 4/25/19, plan to progress to AAROM/AROM once cleared by MD.    [x]Continue therapy per initial plan/protocol at a frequency of  2 x per week for 12 additional treatments to original POC. (20 total)  []Continue therapy with the following recommended changes:_____________________      _____________________________________________________________________  []Discontinue therapy progressing towards or have reached established goals  []Discontinue therapy due to lack of appreciable progress towards goals  []Discontinue therapy due to lack of attendance or compliance  []Await Physician's recommendations/decisions regarding therapy  []Other:________________________________________________________________    Thank you for this referral.   Monika Ferreira, PT 4/22/2019 2:31 PM  NOTE TO PHYSICIAN:  PLEASE COMPLETE THE ORDERS BELOW AND   FAX TO [facility]: [phone]  If you are unable to process this request in 24 hours please contact our office: [phone]    []  I have read the above report and request that my patient continue as recommended.   []  I have read the above report and request that my patient continue therapy with the following changes/special instructions:________________________________________  []I have read the above report and request that my patient be discharged from therapy.     [de-identified] Signature:____________Date:_________TIME:________    [facility], Date and Time must be completed for valid certification **

## 2019-04-22 NOTE — PROGRESS NOTES
PT DAILY TREATMENT NOTE 10-18    Patient Name: Olga Lidia Evans  Date:2019  : 1955  [x]  Patient  Verified  Payor: BLUE CROSS / Plan: [employer] [facility] / Product Type: PPO /    In time:2:00  Out time:2:26  Total Treatment Time (min): 26  Visit #: 7 of 8    Medicare/BCBS Only   Total Timed Codes (min):  26 1:1 Treatment Time:  26       Treatment Area: Right shoulder pain [M25.511]  S/P shoulder surgery [Z98.890]    SUBJECTIVE  Pain Level (0-10 scale): 1  Any medication changes, allergies to medications, adverse drug reactions, diagnosis change, or new procedure performed?: [x] No    [] Yes (see summary sheet for update)  Subjective functional status/changes:   [] No changes reported  States she has been doing well, not much pain. OBJECTIVE        16 min Therapeutic Exercise:  [x] See flow sheet :   Rationale: increase ROM, increase strength and increase proprioception to improve the patients ability to perform daily household chores. 10 min Manual Therapy:  PROM with overpressure flexion, abd, IR, ER, LAD with oscillation    Rationale: decrease pain, increase ROM and increase tissue extensibility to assist with overhead tasks such as cleaning/ cooking      With   [] TE   [] TA   [] neuro   [] other: Patient Education: [x] Review HEP    [] Progressed/Changed HEP based on:   [] positioning   [] body mechanics   [] transfers   [] heat/ice application    [] other:      Other Objective/Functional Measures:   Able to perform all therex well and with proper form  Plan to progress to AAROM/ AROM next session per protocol      Pain Level (0-10 scale) post treatment: 0    ASSESSMENT/Changes in Function: Patient continues to show improvements with signs/ symptoms however still demonstrates a decrease in strength, impaired ROM,  and an increase in pain with functional activities.        Patient will continue to benefit from skilled PT services to modify and progress therapeutic interventions, address functional mobility deficits, address strength deficits, analyze and cue movement patterns and analyze and modify body mechanics/ergonomics to attain remaining goals. [x]  See Plan of Care  []  See progress note/recertification  []  See Discharge Summary         Progress towards goals / Updated goals:  Short Term Goals: To be accomplished in 1 weeks:               1. Patient will be ind and compliant with HEP 1-2x/day to increase ease with ADLs.             CURRENT progressing 4/8/19               2. Patient will improve right shoulder PROM to [facility] to progress to next phase of rehabilitation.               CURRENT progressing 4/8/19  Long Term Goals: To be accomplished in 4 weeks:               1. Patient will improve FOTO to at least 64 to demonstrate functional improvement.              CURRENT               2. Patient will improve right shoulder AAROM flex to 120deg, Once cleared by MD to progress to [facility], to increase ease with overhead activities.                CURRENT               3. Patient will improve right shoulder AROM flex and scaption to 115deg, once cleared by MD to progress to AROM, to increase with self care.               CURRENT        PLAN  [x]  Upgrade activities as tolerated     [x]  Continue plan of care  []  Update interventions per flow sheet       []  Discharge due to:_  []  Other:_      Bartolo Sahni PT 4/22/2019  7:35 AM    Future Appointments   Date Time Provider Cooper Dhillon   4/22/2019  2:00 PM Maria E Vega PT [facility]   4/24/2019  8:40 AM Eun Morel DO [facility]   4/24/2019  2:00 PM Maria E Vega PT [facility]   4/29/2019  8:30 AM Maria E Vega PT [facility]   5/1/2019  8:30 AM Maria E Vega PT [facility]   5/31/2019  9:00 AM Beverly Chang MD [facility]   6/4/2019  1:00 PM Adithya Ames MD [address]

## 2019-04-23 ENCOUNTER — OFFICE VISIT (OUTPATIENT)
Dept: ORTHOPEDIC SURGERY | Facility: CLINIC | Age: 64
End: 2019-04-23

## 2019-04-23 VITALS
HEART RATE: 77 BPM | WEIGHT: 180 LBS | HEIGHT: 70 IN | SYSTOLIC BLOOD PRESSURE: 135 MMHG | OXYGEN SATURATION: 98 % | BODY MASS INDEX: 25.77 KG/M2 | DIASTOLIC BLOOD PRESSURE: 75 MMHG | RESPIRATION RATE: 16 BRPM | TEMPERATURE: 97.3 F

## 2019-04-23 DIAGNOSIS — M75.121 NONTRAUMATIC COMPLETE TEAR OF RIGHT ROTATOR CUFF: Primary | ICD-10-CM

## 2019-04-23 DIAGNOSIS — Z98.890 STATUS POST ARTHROSCOPY OF RIGHT SHOULDER: ICD-10-CM

## 2019-04-23 NOTE — PROGRESS NOTES
Jasbir Cervantes  1955     HISTORY OF PRESENT ILLNESS  Arlette Wynema Schirmer is a 58 y.o. female who presents today for evaluation s/p Right shoulder arthroscopic rotator cuff repair and distal clavicle excision on 1/31/19. Patient has been going to PT. Describes pain as a 2/10. Has been taking ibuprofen for pain. She is doing well today. She has been compliant with her exercises. Patient denies any fever, chills, chest pain, shortness of breath or calf pain. There are no new illness or injuries to report since last seen in the office. PHYSICAL EXAM:   Visit Vitals  /75 (BP 1 Location: Left arm, BP Patient Position: Sitting)   Pulse 77   Temp 97.3 °F (36.3 °C) (Oral)   Resp 16   Ht 5' 10\" (1.778 m)   Wt 180 lb (81.6 kg)   SpO2 98%   BMI 25.83 kg/m²      The patient is a well-developed, well-nourished female in no acute distress. The patient is alert and oriented times three. The patient appears to be well groomed. Mood and affect are normal.  ORTHOPEDIC EXAM of right shoulder:  Inspection: swelling not present,  Bruising not present  Incision well healed  Passive glenohumeral abduction 0-90 degrees in the sling, 180 FF, 40 ER passive  Stability: Stable  Strength: n/a  2+ distal pulses    IMPRESSION:  S/P Right shoulder arthroscopic rotator cuff repair and distal clavicle excision    PLAN:   Pt doing well post operatively  Will continue with PT may advance to AAROM and AROM per Dr. Tony Mendoza protocol. Pt not given a refill of pain medication today. Stressed to patient that nothing causes an increase in pain.   RTC 4 weeks    Patient seen and evaluated by Dr. Homer Teague today who agrees with treatment plan      [facility] [address] and Sp[facility] Specialist

## 2019-04-24 ENCOUNTER — HOSPITAL ENCOUNTER (OUTPATIENT)
Dept: PHYSICAL THERAPY | Age: 64
Discharge: HOME | End: 2019-04-24
Payer: COMMERCIAL

## 2019-04-24 PROCEDURE — 97110 THERAPEUTIC EXERCISES: CPT

## 2019-04-24 PROCEDURE — 97140 MANUAL THERAPY 1/> REGIONS: CPT

## 2019-04-24 NOTE — PROGRESS NOTES
PT DAILY TREATMENT NOTE 10-18    Patient Name: Olga Lidia Evans  Date:2019  : 1955  [x]  Patient  Verified  Payor: Elizabeth Barnard / Plan: [address] [facility] [place] / Product Type: PPO /    In time:2:00  Out time:2:34  Total Treatment Time (min): 34  Visit #: 8 of 20    Medicare/BCBS Only   Total Timed Codes (min):  34 1:1 Treatment Time:  34       Treatment Area: Right shoulder pain [M25.511]  S/P shoulder surgery [Z98.890]    SUBJECTIVE  Pain Level (0-10 scale): 1  Any medication changes, allergies to medications, adverse drug reactions, diagnosis change, or new procedure performed?: [x] No    [] Yes (see summary sheet for update)  Subjective functional status/changes:   [] No changes reported  She went to the doctor and reports it went well, allowed to perform AAROM/AROM as tolerated    OBJECTIVE        24 min Therapeutic Exercise:  [x] See flow sheet :   Rationale: increase ROM, increase strength and improve coordination to improve the patients ability to perform daily household chores. 10 min Manual Therapy:  PROM with gentle overpressure flexion, abd, IR, ER, LAD with oscillation   Rationale: decrease pain, increase ROM and increase tissue extensibility to assist with overhead tasks such as cleaning cabinets               With   [] TE   [] TA   [] neuro   [] other: Patient Education: [x] Review HEP    [] Progressed/Changed HEP based on:   [] positioning   [] body mechanics   [] transfers   [] heat/ice application    [] other:      Other Objective/Functional Measures:   Able to perform all therex well, no changes in symptoms  Added therex per flow sheet with proper form, minimal scapular compensation noted     Pain Level (0-10 scale) post treatment: \"sore    ASSESSMENT/Changes in Function: Patient continues to show improvements with signs/ symptoms however still demonstrates a decrease in strength, impaired ROM, and an increase in pain with functional activities.         Patient will continue to benefit from skilled PT services to modify and progress therapeutic interventions, address functional mobility deficits, address strength deficits, analyze and cue movement patterns and analyze and modify body mechanics/ergonomics to attain remaining goals. [x]  See Plan of Care  []  See progress note/recertification  []  See Discharge Summary         Progress towards goals / Updated goals:  Short Term Goals: To be accomplished in 1 weeks:  1. Patient will be ind and compliant with HEP 1-2x/day to increase ease with ADLs.             CURRENT MET               2. Patient will improve right shoulder PROM to [facility] to progress to next phase of rehabilitation.               CURRENT Progressing flexion WNL, abduction ~130, IR/ER WNL  Long Term Goals: To be accomplished in 20 treatments:  1. Patient will improve FOTO to at least 64 to demonstrate functional improvement.              CURRENT  2. Patient will improve right shoulder AAROM flex to 120deg, Once cleared by MD to progress to [facility], to increase ease with overhead activities.             CURRENT Plan to progress once cleared by MD  3.  Patient will improve right shoulder AROM flex and scaption to 115deg, once cleared by MD to progress to AROM, to increase with self care.               CURRENT Plan to progress once cleared by MD        PLAN  [x]  Upgrade activities as tolerated     [x]  Continue plan of care  []  Update interventions per flow sheet       []  Discharge due to:_  []  Other:_      Kit Hoff PT 4/24/2019  8:10 AM    Future Appointments   Date Time Provider Cooper Navarroisti   4/24/2019  2:00 PM Buddy Sweeney, PT [facility]   4/29/2019  8:30 AM Buddy Sweeney, PT [facility]   5/1/2019  8:30 AM Buddy Sweeney, PT [facility]   5/31/2019  9:00 AM Thiago Quinonez MD [facility]   6/4/2019  8:30 AM [facility]   6/4/2019  1:00 PM Rito Hernandez MD [address]

## 2019-04-29 ENCOUNTER — HOSPITAL ENCOUNTER (OUTPATIENT)
Dept: PHYSICAL THERAPY | Age: 64
Discharge: HOME | End: 2019-04-29
Payer: COMMERCIAL

## 2019-04-29 PROCEDURE — 97110 THERAPEUTIC EXERCISES: CPT

## 2019-04-29 PROCEDURE — 97140 MANUAL THERAPY 1/> REGIONS: CPT

## 2019-04-29 NOTE — PROGRESS NOTES
PT DAILY TREATMENT NOTE 10-18    Patient Name: Lila Bynum  Date:2019  : 1955  [x]  Patient  Verified  Payor: BLUE CROSS / Plan: [employer] [facility] / Product Type: PPO /    In time:8:30  Out time:9:04  Total Treatment Time (min): 34  Visit #: 9 of 20    Medicare/BCBS Only   Total Timed Codes (min):  34 1:1 Treatment Time:  34       Treatment Area: Right shoulder pain [M25.511]  S/P shoulder surgery [Z98.890]    SUBJECTIVE  Pain Level (0-10 scale): 0  Any medication changes, allergies to medications, adverse drug reactions, diagnosis change, or new procedure performed?: [x] No    [] Yes (see summary sheet for update)  Subjective functional status/changes:   [] No changes reported  States she is doing better, no longer has pain while sleeping and has been able to use her arm more comfortably for light activities    OBJECTIVE          24 min Therapeutic Exercise:  [x] See flow sheet :   Rationale: increase ROM, increase strength and improve coordination to improve the patients ability to assist with [place]el[place] to ADLs      10 min Manual Therapy:  PROM with overpressure flexion, abd, IR, ER, LAD with oscillation    Rationale: decrease pain, increase ROM and increase tissue extensibility to assist with reaching overhead to grab dishes              With   [] TE   [] TA   [] neuro   [] other: Patient Education: [x] Review HEP    [] Progressed/Changed HEP based on:   [] positioning   [] body mechanics   [] transfers   [] heat/ice application    [] other:      Other Objective/Functional Measures:   Able to perform all therex well with proper form  Demonstrates UT compensation with shoulder abduction AROM        Pain Level (0-10 scale) post treatment: 0    ASSESSMENT/Changes in Function: Patient continues to show improvements with signs/ symptoms however still demonstrates a decrease in strength, impaired ROM, and an increase in pain with functional activities.        Patient will continue to benefit from skilled PT services to modify and progress therapeutic interventions, address functional mobility deficits, address strength deficits, analyze and cue movement patterns and analyze and modify body mechanics/ergonomics to attain remaining goals. [x]  See Plan of Care  []  See progress note/recertification  []  See Discharge Summary         Progress towards goals / Updated goals:  Short Term Goals: To be accomplished in 1 weeks:  1. Patient will be ind and compliant with HEP 1-2x/day to increase ease with ADLs.             CURRENT MET               2. Patient will improve right shoulder PROM to [facility] to progress to next phase of rehabilitation.               CURRENT Progressing flexion WNL, abduction ~130, IR/ER WNL  Long Term Goals: To be accomplished in 20 treatments:  1. Patient will improve FOTO to at least 64 to demonstrate functional improvement.              CURRENT  2. Patient will improve right shoulder AAROM flex to 120deg, Once cleared by MD to progress to [facility], to increase ease with overhead activities.                CURRENT Plan to progress once cleared by MD  3. Patient will improve right shoulder AROM flex and scaption to 115deg, once cleared by MD to progress to AROM, to increase with self care.               CURRENT Plan to progress once cleared by MD           PLAN  [x]  Upgrade activities as tolerated     [x]  Continue plan of care  []  Update interventions per flow sheet       []  Discharge due to:_  []  Other:_      Monika Ferreira PT 4/29/2019  7:39 AM    Future Appointments   Date Time Provider Cooper Dhillon   4/29/2019  8:30 AM Mariangel Flores PT [facility]   5/1/2019  8:30 AM Mariangel Flores PT [facility]   5/6/2019  1:00 PM Salvador Fenton PTA [facility]   5/8/2019  9:00 AM Salvador Fenton PTA [facility]   5/13/2019  1:30 PM Salvador Fenton PTA [facility]   5/15/2019  9:00 AM Celestina Baker PTA [facility]   5/28/2019 10:00 AM Mariangel Flores PT [facility] 5/30/2019  8:00 AM Yadi Lovett, PT [facility]   5/31/2019  9:00 AM Yenny Garnica MD [facility]   6/4/2019  8:30 AM RICCI [facility]   6/4/2019  1:00 PM Marco Antonio Taveras MD [address]

## 2019-05-01 ENCOUNTER — HOSPITAL ENCOUNTER (OUTPATIENT)
Dept: PHYSICAL THERAPY | Age: 64
Discharge: HOME | End: 2019-05-01
Payer: COMMERCIAL

## 2019-05-01 PROCEDURE — 97110 THERAPEUTIC EXERCISES: CPT

## 2019-05-01 PROCEDURE — 97140 MANUAL THERAPY 1/> REGIONS: CPT

## 2019-05-01 NOTE — PROGRESS NOTES
PT DAILY TREATMENT NOTE 10-18    Patient Name: Jaqueline Grace  Date:2019  : 1955  [x]  Patient  Verified  Payor: BLUE CROSS / Plan: [employer] [facility] / Product Type: PPO /    In time:8:28  Out time:9:03  Total Treatment Time (min): 35  Visit #: 10 of 20    Medicare/BCBS Only   Total Timed Codes (min):  35 1:1 Treatment Time:  35       Treatment Area: Right shoulder pain [M25.511]  S/P shoulder surgery [Z98.890]    SUBJECTIVE  Pain Level (0-10 scale): 0  Any medication changes, allergies to medications, adverse drug reactions, diagnosis change, or new procedure performed?: [x] No    [] Yes (see summary sheet for update)  Subjective functional status/changes:   [] No changes reported  States she has been noticing herself using her right arm more often for tasks such as eating and brushing hair    OBJECTIVE            25 min Therapeutic Exercise:  [x] See flow sheet :   Rationale: increase ROM, increase strength and increase proprioception to improve the patients ability to perform daily household chores. 10 min Manual Therapy:  PROM with gentle overpressure flexion/ abd/ IR/ ER, GH joint mobs for abd, LAD with oscillation    Rationale: decrease pain, increase ROM and increase tissue extensibility to assist with tolerance to ADLs       With   [] TE   [] TA   [] neuro   [] other: Patient Education: [x] Review HEP    [] Progressed/Changed HEP based on:   [] positioning   [] body mechanics   [] transfers   [] heat/ice application    [] other:      Other Objective/Functional Measures:   Increased AROM/AAROM noted with all therex this session - UT compensation noted with active abduction during eng range motions       Pain Level (0-10 scale) post treatment: 0    ASSESSMENT/Changes in Function: Patient continues to show improvements with signs/ symptoms however still demonstrates a decrease in strength, impaired ROM,  and an increase in pain with functional activities.        Patient will continue to benefit from skilled PT services to modify and progress therapeutic interventions, address functional mobility deficits, address strength deficits, analyze and cue movement patterns and analyze and modify body mechanics/ergonomics to attain remaining goals. [x]  See Plan of Care  []  See progress note/recertification  []  See Discharge Summary         Progress towards goals / Updated goals:  Short Term Goals: To be accomplished in 1 weeks:  1. Patient will be ind and compliant with HEP 1-2x/day to increase ease with ADLs.             CURRENT MET               2. Patient will improve right shoulder PROM to [facility] to progress to next phase of rehabilitation.               CURRENT Progressing flexion WNL, abduction ~130, IR/ER WNL  Long Term Goals: To be accomplished in 20 treatments:  1. Patient will improve FOTO to at least 64 to demonstrate functional improvement.              CURRENT  2. Patient will improve right shoulder AAROM flex to 120deg, Once cleared by MD to progress to [facility], to increase ease with overhead activities.                CURRENT Plan to progress once cleared by MD  3. Patient will improve right shoulder AROM flex and scaption to 115deg, once cleared by MD to progress to AROM, to increase with self care.               CURRENT Plan to progress once cleared by MD        PLAN  [x]  Upgrade activities as tolerated     [x]  Continue plan of care  []  Update interventions per flow sheet       []  Discharge due to:_  []  Other:_      Yulisa Lindsay, PT 5/1/2019  8:15 AM    Future Appointments   Date Time Provider [place]   5/1/2019  8:30 AM Jazmin Peres, PT [facility]   5/6/2019  1:00 PM Alex Ramon PTA [facility]   5/8/2019  9:00 AM Celestina Baker PTA [facility]   5/13/2019  1:30 PM Alex Ramon PTA [facility]   5/15/2019  9:00 AM [facility]estina Baker [facility]   5/28/2019 10:00 AM Jazmin Peres PT [facility]   5/30/2019  8:00 AM Jazmin Peres, PT [facility]   5/31/2019  9:00 AM Aakash Orantes MD [facility]   6/4/2019  8:30 AM RICCI Barron [facility]   6/4/2019  1:00 PM Rogerio Abdullahi MD [address]

## 2019-05-06 ENCOUNTER — HOSPITAL ENCOUNTER (OUTPATIENT)
Dept: PHYSICAL THERAPY | Age: 64
Discharge: HOME | End: 2019-05-06
Payer: COMMERCIAL

## 2019-05-06 PROCEDURE — 97110 THERAPEUTIC EXERCISES: CPT

## 2019-05-06 PROCEDURE — 97140 MANUAL THERAPY 1/> REGIONS: CPT

## 2019-05-06 NOTE — PROGRESS NOTES
PT DAILY TREATMENT NOTE 10-18    Patient Name: Brianda Walters  Date:2019  : 1955  [x]  Patient  Verified  Payor: BLUE CROSS / Plan: [employer] [facility] / Product Type: PPO /    In time: 1:03 Out time:1:39  Total Treatment Time (min): 36  Visit #: 11 of 20    Medicare/BCBS Only   Total Timed Codes (min):  36 1:1 Treatment Time:  36       Treatment Area: Right shoulder pain [M25.511]  S/P shoulder surgery [Z98.890]    SUBJECTIVE  Pain Level (0-10 scale): 1  Any medication changes, allergies to medications, adverse drug reactions, diagnosis change, or new procedure performed?: [x] No    [] Yes (see summary sheet for update)  Subjective functional status/changes:   [] No changes reported  Not  Really  Any  Pain.     OBJECTIVE    Modality rationale: decrease edema, decrease inflammation, decrease pain and increase tissue extensibility to improve the patients ability to perform ADL    Min Type Additional Details    [] Estim:  []Unatt       []IFC  []Premod                        []Other:  []w/ice   []w/heat  Position:  Location:    [] Estim: []Att    []TENS instruct  []NMES                    []Other:  []w/US   []w/ice   []w/heat  Position:  Location:    []  Traction: [] Cervical       []Lumbar                       [] Prone          []Supine                       []Intermittent   []Continuous Lbs:  [] before manual  [] after manual    []  Ultrasound: []Continuous   [] Pulsed                           []1MHz   []3MHz W/cm2:  Location:    []  Iontophoresis with dexamethasone         Location: [] Take home patch   [] In clinic    []  Ice     []  heat  []  Ice massage  []  Laser   []  Anodyne Position:  Location:    []  Laser with stim  []  Other:  Position:  Location:    []  Vasopneumatic Device Pressure:       [] lo [] med [] hi   Temperature: [] lo [] med [] hi   [x] Skin assessment post-treatment:  [x]intact []redness- no adverse reaction    []redness - adverse reaction:      min []Eval []Re-Eval       26 min Therapeutic Exercise:  [] See flow sheet :   Rationale: increase ROM and increase strength to improve the patients ability to perform ADL     min Therapeutic Activity:  []  See flow sheet :   Rationale:   to improve the patients ability to       min Neuromuscular Re-education:  []  See flow sheet :   Rationale:   to improve the patients ability to     10 min Manual Therapy:  [address] [facility] [employer]  Mob  With distraction   Rationale: decrease pain, increase ROM, increase tissue extensibility and decrease edema  to perform ADL      min Gait Training:  ___ feet with ___ device on level surfaces with ___ level of assist   Rationale: With   [x] TE   [] TA   [] neuro   [] other: Patient Education: [x] Review HEP    [] Progressed/Changed HEP based on:   [] positioning   [] body mechanics   [] transfers   [] heat/ice application    [] other:      Other Objective/Functional Measures: increased  Rep  Per flow  Sheet/  Minimal  Tightness  ER     Pain Level (0-10 scale) post treatment: 0    ASSESSMENT/Changes in Function: Overall  Fair  Response to  Each there ex. Patient will continue to benefit from skilled PT services to address functional mobility deficits, address ROM deficits, address strength deficits, analyze and address soft tissue restrictions, analyze and cue movement patterns and instruct in home and community integration to attain remaining goals. [x]  See Plan of Care  []  See progress note/recertification  []  See Discharge Summary         Progress towards goals / Updated goals:  Short Term Goals: To be accomplished in 1 weeks:  1. Patient will be ind and compliant with HEP 1-2x/day to increase ease with ADLs.             CURRENT MET               2. Patient will improve right shoulder PROM to [facility] to progress to next phase of rehabilitation.               CURRENT Progressing flexion WNL, abduction ~130, IR/ER WNL  Long Term Goals: To be accomplished in 20 treatments:  1.  Patient will improve FOTO to at least 64 to demonstrate functional improvement.              CURRENT  2. Patient will improve right shoulder AAROM flex to 120deg, Once cleared by MD to progress to [facility], to increase ease with overhead activities.                CURRENT Plan to progress once cleared by MD  3. Patient will improve right shoulder AROM flex and scaption to 115deg, once cleared by MD to progress to AROM, to increase with self care.               CURRENT Plan to progress once cleared by MD        PLAN  []  Upgrade activities as tolerated     [x]  Continue plan of care  []  Update interventions per flow sheet       []  Discharge due to:_  []  Other:_      Sd Cuadra, YEISON 5/6/2019  1:07 PM    Future Appointments   Date Time Provider [place]   5/8/2019  9:00 AM Stone [place] Heading, PTA [facility]   5/13/2019  1:30 PM [place] Heading, PTA [facility]   5/15/2019  9:00 AM [place] Heading, PTA [facility]   5/17/2019 10:15 AM Christine Steinberg MD [facility]   5/28/2019 10:00 AM Ayana Monday, PT [facility]   5/30/2019  8:00 AM Ayana Monday, PT [facility]   5/31/2019  9:00 AM Amy Rodriguez MD [facility] [address]   6/4/2019  8:30 AM RICCI Schilling [facility]   6/4/2019  1:00 PM Socorro Cano MD [address]

## 2019-05-08 ENCOUNTER — HOSPITAL ENCOUNTER (OUTPATIENT)
Dept: PHYSICAL THERAPY | Age: 64
Discharge: HOME | End: 2019-05-08
Payer: COMMERCIAL

## 2019-05-08 PROCEDURE — 97140 MANUAL THERAPY 1/> REGIONS: CPT

## 2019-05-08 PROCEDURE — 97110 THERAPEUTIC EXERCISES: CPT

## 2019-05-08 NOTE — PROGRESS NOTES
PT DAILY TREATMENT NOTE 10-18    Patient Name: Jonny Baca  Date:2019  : 1955  [x]  Patient  Verified  Payor: BLUE CROSS / Plan: [employer] [facility] [place] / Product Type: PPO /    In time:8:59   Out time:9:51  Total Treatment Time (min): 46  Visit #: 12 of 20    Medicare/BCBS Only   Total Timed Codes (min):  36 1:1 Treatment Time:  36       Treatment Area: Right shoulder pain [M25.511]  S/P shoulder surgery [Z98.890]    SUBJECTIVE  Pain Level (0-10 scale): 2-3  Any medication changes, allergies to medications, adverse drug reactions, diagnosis change, or new procedure performed?: [x] No    [] Yes (see summary sheet for update)  Subjective functional status/changes:   [] No changes reported  Sore.     OBJECTIVE    Modality rationale: decrease edema, decrease inflammation, decrease pain and increase tissue extensibility to improve the patients ability to perform ADL    Min Type Additional Details    [] Estim:  []Unatt       []IFC  []Premod                        []Other:  []w/ice   []w/heat  Position:  Location:    [] Estim: []Att    []TENS instruct  []NMES                    []Other:  []w/US   []w/ice   []w/heat  Position:  Location:    []  Traction: [] Cervical       []Lumbar                       [] Prone          []Supine                       []Intermittent   []Continuous Lbs:  [] before manual  [] after manual    []  Ultrasound: []Continuous   [] Pulsed                           []1MHz   []3MHz W/cm2:  Location:    []  Iontophoresis with dexamethasone         Location: [] Take home patch   [] In clinic   10 [x]  Ice  post   []  heat  []  Ice massage  []  Laser   []  Anodyne Position:supine  Location:right  shoulder    []  Laser with stim  []  Other:  Position:  Location:    []  Vasopneumatic Device Pressure:       [] lo [] med [] hi   Temperature: [] lo [] med [] hi   [x] Skin assessment post-treatment:  [x]intact []redness- no adverse reaction    []redness - adverse reaction:      min []Eval                  []Re-Eval       26 min Therapeutic Exercise:  [x] See flow sheet :   Rationale: increase ROM and increase strength to improve the patients ability to perform ADL      min Therapeutic Activity:  []  See flow sheet :   Rationale:   to improve the patients ability to       min Neuromuscular Re-education:  []  See flow sheet :   Rationale:   to improve the patients ability to     10 min Manual Therapy:  [facility] [employer]  Mob with distraction   Rationale: decrease pain, increase ROM, increase tissue extensibility and decrease edema  to perform ADL     min Gait Training:  ___ feet with ___ device on level surfaces with ___ level of assist   Rationale: With   [x] TE   [] TA   [] neuro   [] other: Patient Education: [x] Review HEP    [] Progressed/Changed HEP based on:   [] positioning   [] body mechanics   [] transfers   [] heat/ice application    [] other:      Other Objective/Functional Measures:  Experienced  Minimal  tingling  At  Right  UE  During there ex. Pain Level (0-10 scale) post treatment: 0-1    ASSESSMENT/Changes in Function: Overall  Fair  Response  To each there ex. Patient will continue to benefit from skilled PT services to address functional mobility deficits, address ROM deficits, address strength deficits, analyze and address soft tissue restrictions, analyze and cue movement patterns and instruct in home and community integration to attain remaining goals. [x]  See Plan of Care  []  See progress note/recertification  []  See Discharge Summary         Progress towards goals / Updated goals:  Short Term Goals: To be accomplished in 1 weeks:  1. Patient will be ind and compliant with HEP 1-2x/day to increase ease with ADLs.             CURRENT MET               2.  Patient will improve right shoulder PROM to [facility] to progress to next phase of rehabilitation.               CURRENT Progressing flexion WNL, abduction ~130, IR/ER WNL  Long Term Goals: To be accomplished in 20 treatments:  1. Patient will improve FOTO to at least 64 to demonstrate functional improvement.              CURRENT  2. Patient will improve right shoulder AAROM flex to 120deg, Once cleared by MD to progress to [facility], to increase ease with overhead activities.                CURRENT Plan to progress once cleared by MD  3. Patient will improve right shoulder AROM flex and scaption to 115deg, once cleared by MD to progress to AROM, to increase with self care.               CURRENT Plan to progress once cleared by MD        PLAN  [x]  Upgrade activities as tolerated     []  Continue plan of care  []  Update interventions per flow sheet       []  Discharge due to:_  [x]  Other:_ REASSESS ROM  NV     Celestina Baker, YEISON 5/8/2019  8:53 AM    Future Appointments   Date Time Provider Cooper Dhillon   5/8/2019  9:00 AM Ivelisse Tomlinson PTA [facility]   5/13/2019  1:30 PM Ivelisse Tomlinson PTA [facility]   5/15/2019  9:00 AM Ivelisse Tomlinson PTA [facility]   5/17/2019 10:15 AM Sushma Salmon MD [facility]   5/28/2019 10:00 AM Dvaid Vitale, PT [facility]   5/30/2019  8:00 AM David Vitale, PT [facility]   5/31/2019  9:00 AM Daron Dye MD [facility] [address]   6/4/2019  8:30 AM RICCI Joseph [facility] MIG[facility]AL[facility] SCHED   6/4/2019  1:00 PM Andria Booth MD [address]

## 2019-05-13 ENCOUNTER — HOSPITAL ENCOUNTER (OUTPATIENT)
Dept: PHYSICAL THERAPY | Age: 64
Discharge: HOME | End: 2019-05-13
Payer: COMMERCIAL

## 2019-05-13 PROCEDURE — 97140 MANUAL THERAPY 1/> REGIONS: CPT

## 2019-05-13 PROCEDURE — 97110 THERAPEUTIC EXERCISES: CPT

## 2019-05-13 NOTE — PROGRESS NOTES
PT DAILY TREATMENT NOTE 10-18    Patient Name: Olga Lidia Evans  Date:2019  : 1955  [x]  Patient  Verified  Payor: BLUE CROSS / Plan: [employer] / Product Type: PPO /    In time: 1:35 Out time:2:16  Total Treatment Time (min): 37  Visit #: 13 of 20    Medicare/BCBS Only   Total Timed Codes (min):  37 1:1 Treatment Time:  37       Treatment Area: Right shoulder pain [M25.511]  S/P shoulder surgery [Z98.890]    SUBJECTIVE  Pain Level (0-10 scale): 1  Any medication changes, allergies to medications, adverse drug reactions, diagnosis change, or new procedure performed?: [x] No    [] Yes (see summary sheet for update)  Subjective functional status/changes:   [] No changes reported  Better, Not  Waking  Me  Up  At  Night.     OBJECTIVE    Modality rationale: decrease edema, decrease inflammation, decrease pain and increase tissue extensibility to improve the patients ability to perform ADL    Min Type Additional Details    [] Estim:  []Unatt       []IFC  []Premod                        []Other:  []w/ice   []w/heat  Position:  Location:    [] Estim: []Att    []TENS instruct  []NMES                    []Other:  []w/US   []w/ice   []w/heat  Position:  Location:    []  Traction: [] Cervical       []Lumbar                       [] Prone          []Supine                       []Intermittent   []Continuous Lbs:  [] before manual  [] after manual    []  Ultrasound: []Continuous   [] Pulsed                           []1MHz   []3MHz W/cm2:  Location:    []  Iontophoresis with dexamethasone         Location: [] Take home patch   [] In clinic    []  Ice     []  heat  []  Ice massage  []  Laser   []  Anodyne Position:  Location:    []  Laser with stim  []  Other:  Position:  Location:    []  Vasopneumatic Device Pressure:       [] lo [] med [] hi   Temperature: [] lo [] med [] hi   [x] Skin assessment post-treatment:  [x]intact []redness- no adverse reaction    []redness - adverse reaction:      min []Eval                  []Re-Eval       25 min Therapeutic Exercise:  [x] See flow sheet :   Rationale: increase ROM and increase strength to improve the patients ability to perform ADL     min Therapeutic Activity:  []  See flow sheet :   Rationale:   to improve the patients ability to       min Neuromuscular Re-education:  []  See flow sheet :   Rationale:   to improve the patients ability to     12 min Manual Therapy:  [facility] [employer]  Mob  With distraction   Rationale: decrease pain, increase ROM, increase tissue extensibility and decrease edema  to perform ADL      min Gait Training:  ___ feet with ___ device on level surfaces with ___ level of assist   Rationale: With   [x] TE   [] TA   [] neuro   [] other: Patient Education: [x] Review HEP    [] Progressed/Changed HEP based on:   [] positioning   [] body mechanics   [] transfers   [] heat/ice application    [x] other: Reviewed  Sleepers  stretch     Other Objective/Functional Measures: tight  End  Feel  ER     Pain Level (0-10 scale) post treatment: 0    ASSESSMENT/Changes in Function: completed   Each there ex  Fairly  Well. Patient will continue to benefit from skilled PT services to address functional mobility deficits, address ROM deficits, address strength deficits, analyze and address soft tissue restrictions and analyze and cue movement patterns to attain remaining goals. [x]  See Plan of Care  []  See progress note/recertification  []  See Discharge Summary         Progress towards goals / Updated goals:  Short Term Goals: To be accomplished in 1 weeks:  1. Patient will be ind and compliant with HEP 1-2x/day to increase ease with ADLs.             CURRENT MET               2. Patient will improve right shoulder PROM to [facility] to progress to next phase of rehabilitation.               CURRENT Progressing flexion WNL, abduction ~130, IR/ER WNL  Long Term Goals: To be accomplished in 20 treatments:  1.  Patient will improve FOTO to at least 64 to demonstrate functional improvement.              CURRENT  2. Patient will improve right shoulder AAROM flex to 120deg, Once cleared by MD to progress to [facility], to increase ease with overhead activities.                CURRENT Plan to progress once cleared by MD  3. Patient will improve right shoulder AROM flex and scaption to 115deg, once cleared by MD to progress to AROM, to increase with self care.               CURRENT Plan to progress once cleared by MD        PLAN  []  Upgrade activities as tolerated     []  Continue plan of care  []  Update interventions per flow sheet       []  Discharge due to:_  [x]  Other:_REASSESS ROM  NV      Celestina Baker PTA 5/13/2019  1:39 PM    Future Appointments   Date Time Provider Cooper Jacquie   5/15/2019  9:00 AM Darlene Jara PTA [facility]   5/17/2019 10:15 AM Norman Gould MD [place] [id number]   5/28/2019 10:00 AM Buddy Sweeney PT [facility]   5/30/2019  8:00 AM Buddy Sweeney PT [facility]   5/31/2019  9:00 AM Thiago Quinonez MD [facility]   6/4/2019  8:30 AM RICCI Shah [facility]   6/4/2019  1:00 PM Rito Hernandez MD [address]

## 2019-05-15 ENCOUNTER — HOSPITAL ENCOUNTER (OUTPATIENT)
Dept: PHYSICAL THERAPY | Age: 64
Discharge: HOME | End: 2019-05-15
Payer: COMMERCIAL

## 2019-05-15 PROCEDURE — 97110 THERAPEUTIC EXERCISES: CPT

## 2019-05-15 PROCEDURE — 97140 MANUAL THERAPY 1/> REGIONS: CPT

## 2019-05-15 NOTE — PROGRESS NOTES
PT DAILY TREATMENT NOTE 10-18    Patient Name: Venkata Johnson  Date:5/15/2019  : 1955  [x]  Patient  Verified  Payor: BLUE CROSS / Plan: [employer] [facility] [place] / Product Type: PPO /    In time:9:01  Out time:9:38  Total Treatment Time (min):34  Visit #: 14 of 20    Medicare/BCBS Only   Total Timed Codes (min):  34 1:1 Treatment Time:  34       Treatment Area: Right shoulder pain [M25.511]  S/P shoulder surgery [Z98.890]    SUBJECTIVE  Pain Level (0-10 scale): 1  Any medication changes, allergies to medications, adverse drug reactions, diagnosis change, or new procedure performed?: [x] No    [] Yes (see summary sheet for update)  Subjective functional status/changes:   [] No changes reported  Actually  I  Slept  Well  Last  Night.     OBJECTIVE    Modality rationale: decrease edema to improve the patients ability to perform ADL   Min Type Additional Details    [] Estim:  []Unatt       []IFC  []Premod                        []Other:  []w/ice   []w/heat  Position:  Location:    [] Estim: []Att    []TENS instruct  []NMES                    []Other:  []w/US   []w/ice   []w/heat  Position:  Location:    []  Traction: [] Cervical       []Lumbar                       [] Prone          []Supine                       []Intermittent   []Continuous Lbs:  [] before manual  [] after manual    []  Ultrasound: []Continuous   [] Pulsed                           []1MHz   []3MHz W/cm2:  Location:    []  Iontophoresis with dexamethasone         Location: [] Take home patch   [] In clinic    []  Ice     []  heat  []  Ice massage  []  Laser   []  Anodyne Position:  Location:    []  Laser with stim  []  Other:  Position:  Location:    []  Vasopneumatic Device Pressure:       [] lo [] med [] hi   Temperature: [] lo [] med [] hi   [x] Skin assessment post-treatment:  [x]intact []redness- no adverse reaction    []redness - adverse reaction:      min []Eval                  []Re-Eval       24 min Therapeutic Exercise:  [] See flow sheet :   Rationale: increase ROM to improve the patients ability to perform ADL      min Therapeutic Activity:  []  See flow sheet :   Rationale:   to improve the patients ability to       min Neuromuscular Re-education:  []  See flow sheet :   Rationale:   to improve the patients ability to     10 min Manual Therapy:  [facility]  [employer]  With distraction   Rationale: decrease pain, increase ROM, increase tissue extensibility and decrease edema  to perform ADL     min Gait Training:  ___ feet with ___ device on level surfaces with ___ level of assist   Rationale: With   [x] TE   [] TA   [] neuro   [] other: Patient Education: [x] Review HEP    [] Progressed/Changed HEP based on:   [] positioning   [] body mechanics   [] transfers   [] heat/ice application    [] other:      Other Objective/Functional Measures: Minimal  Tightness  Full  shoulder  flex     Pain Level (0-10 scale) post treatment: 0    ASSESSMENT/Changes in Function: Completed   Each there ex  Fairly  Well. Patient will continue to benefit from skilled PT services to address functional mobility deficits, address ROM deficits, address strength deficits, analyze and address soft tissue restrictions, analyze and cue movement patterns and instruct in home and community integration to attain remaining goals. [x]  See Plan of Care  []  See progress note/recertification  []  See Discharge Summary         Progress towards goals / Updated goals:   Short Term Goals: To be accomplished in 1 weeks:  1. Patient will be ind and compliant with HEP 1-2x/day to increase ease with ADLs.             CURRENT MET               2. Patient will improve right shoulder PROM to [facility] to progress to next phase of rehabilitation.               CURRENT Progressing flexion WNL, abduction ~130, IR/ER WNL  Long Term Goals: To be accomplished in 20 treatments:  1. Patient will improve FOTO to at least 64 to demonstrate functional improvement.              CURRENT  2. Patient will improve right shoulder AAROM flex to 120deg, Once cleared by MD to progress to [facility], to increase ease with overhead activities.                CURRENT full  ROM   Flex/ABD  5/15/19  3. Patient will improve right shoulder AROM flex and scaption to 115deg, once cleared by MD to progress to AROM, to increase with self care.               CURRENT Plan to progress once cleared by MD           PLAN        PLAN  []  Upgrade activities as tolerated     [x]  Continue plan of care  []  Update interventions per flow sheet       []  Discharge due to:_  []  Other:_      Chu Ricks, PTA 5/15/2019  9:07 AM    Future Appointments   Date Time Provider [facility]   5/17/2019 10:15 AM Gabriel Alanis MD [id number] [facility]   5/28/2019 10:00 AM Vivian Gross, PT [facility]   5/30/2019  8:00 AM Vivian Gross PT [facility]   5/31/2019  9:00 AM Ivelisse Abbott MD [facility]   6/4/2019  8:30 AM RICCI Hdz [facility]   6/4/2019  1:00 PM Kenna Faulkner MD [address]

## 2019-05-17 ENCOUNTER — OFFICE VISIT (OUTPATIENT)
Dept: ORTHOPEDIC SURGERY | Age: 64
End: 2019-05-17

## 2019-05-17 VITALS
WEIGHT: 180 LBS | HEART RATE: 98 BPM | OXYGEN SATURATION: 98 % | HEIGHT: 70 IN | TEMPERATURE: 97.6 F | BODY MASS INDEX: 25.77 KG/M2 | DIASTOLIC BLOOD PRESSURE: 84 MMHG | SYSTOLIC BLOOD PRESSURE: 134 MMHG

## 2019-05-17 DIAGNOSIS — M06.9 RHEUMATOID ARTHRITIS, INVOLVING UNSPECIFIED SITE, UNSPECIFIED RHEUMATOID FACTOR PRESENCE: ICD-10-CM

## 2019-05-17 DIAGNOSIS — M25.551 HIP PAIN, BILATERAL: ICD-10-CM

## 2019-05-17 DIAGNOSIS — M17.11 PRIMARY OSTEOARTHRITIS OF RIGHT KNEE: Primary | ICD-10-CM

## 2019-05-17 DIAGNOSIS — M25.561 CHRONIC PAIN OF RIGHT KNEE: ICD-10-CM

## 2019-05-17 DIAGNOSIS — G89.29 CHRONIC PAIN OF RIGHT KNEE: ICD-10-CM

## 2019-05-17 DIAGNOSIS — M25.552 HIP PAIN, BILATERAL: ICD-10-CM

## 2019-05-17 RX ORDER — BETAMETHASONE SODIUM PHOSPHATE AND BETAMETHASONE ACETATE 3; 3 MG/ML; MG/ML
6 INJECTION, SUSPENSION INTRA-ARTICULAR; INTRALESIONAL; INTRAMUSCULAR; SOFT TISSUE ONCE
Qty: 1 ML | Refills: 0
Start: 2019-05-17 | End: 2019-05-17

## 2019-05-17 NOTE — PROGRESS NOTES
Patient: Jasbir Cervantes                MRN: [id number]       SSN: [id number]  YOB: 1955        AGE: 58 y.o. SEX: female  Body mass index is 25.83 kg/m². PCP: Elijah Crespo MD  05/17/19    HISTORY:  The patient would like a right knee injection today. The pain is severe. She is also having increasing right hip pain. We obtained x-ray that shows advancing arthritis of the right hip but not severely so. She had a spinal cord stimulator. Shoulder is moving well. She wanted to talk about surgery. I had a lengthy discussion regarding risks and benefits but she does not want surgery, so we will hold off on that. I would be very happy to manage her non-operatively as long as she would like. PHYSICAL EXAMINATION:  Examination shows some stiffness of the right hip. The right knee is about 15 degrees valgus with a couple degree fixed flexion deformity. RADIOGRAPHS:   X-rays confirm severe end-stage arthritis of the right knee with ______ pattern. PLAN:  She will return 3 months. We can repeat the injection. I would be delighted to repair the knee when she would like. Alternatively, she is welcome to have injections every 3 months until she is ready for surgery. CC:  Prince Cunha ________, MD             REVIEW OF SYSTEMS:      CON: negative for weight loss, fever  EYE: negative for double vision  ENT: negative for hoarseness  RS:   negative for Tb  GI:    negative for blood in stool  :  negative for blood in urine  Other systems reviewed and noted below.           Past Medical History:   Diagnosis Date    Adjacent segment disease C3/4 w/deg changes, poss stenosis, CT '18 6/22/2018    Allergic rhinitis     Asthma     Back pain     Green's esophagus with esophagitis     Cervical radiculopathy     Chronic sinusitis 5/15/2012    DNS (deviated nasal septum)     Empyema     Foraminal stenosis of cervical region     C4-C5    GERD (gastroesophageal reflux disease)     Dr Delio Elise    Hx MRSA infection 8/11/2014    Hypertension     Hypertriglyceridemia     Insulin resistance 4/9/2012    Left knee pain     Lichen planus     Menopause     Age 51    MRSA (methicillin resistant Staphylococcus aureus) infection 2008    left lung    Restless leg syndrome     Rheumatoid arthritis ([facility] [address]     Spinal cord stimulator status 2016    Spinal stenosis of cervical region     C4-C5 and C5-C6    TMJ syndrome     Wears glasses     and contacts       Family History   Problem Relation Age of Onset    Hypertension Mother    Ribeiro Diabetes Mother    Ribeiro Arthritis-rheumatoid Sister     Other Sister         Lupus    Cancer Sister 35        CA, uterus    Other Brother         myocarditis    Heart Attack Brother     Heart Disease Brother     Heart Surgery Brother     Coronary Artery Disease Father     Hypertension Father     Cancer Sister 48        CA, breast    Breast Cancer Sister         Estrogen based    Diabetes Brother     Stroke Maternal Grandmother        Current Outpatient Medications   Medication Sig Dispense Refill    ferrous sulfate 325 mg (65 mg iron) tablet take 1 tablet by mouth once daily BEFORE BREAKFAST 90 Tab 1    VITAMIN C 500 mg tablet take 2 tablets by mouth daily 180 Tab 1    cyanocobalamin 1,000 mcg tablet take 1 tablet by mouth once daily 90 Tab 3    clobetasol (TEMOVATE) 0.05 % ointment Apply  to affected area two (2) days a week. 45 g 0    ESTRACE 0.01 % (0.1 mg/gram) vaginal cream Place 1 gm in the vagina twice weekly 42.5 g 2    cholecalciferol, vitamin D3, (VITAMIN D3 PO) Take  by mouth.  methotrexate, PF, 25 mg/mL injection once.  ibuprofen (MOTRIN) 200 mg tablet Take  by mouth.  naproxen sodium (ALEVE) 220 mg cap Take 220 mg by mouth daily as needed.       losartan (COZAAR) 50 mg tablet TAKE 1 TABLET DAILY 30 Tab 0    folic acid (FOLVITE) 1 mg tablet take 1 tablet by mouth once daily  0    acetaminophen (TYLENOL) 325 mg tablet Take  by mouth every four (4) hours as needed for Pain.  triamcinolone (NASACORT AQ) 55 mcg nasal inhaler 2 Sprays by Both Nostrils route daily. Indications: PERENNIAL ALLERGIC RHINITIS      Dexlansoprazole (DEXILANT) 60 mg CpDM Take 1 Cap by mouth Daily (before dinner).  calcium carbonate (TUMS) 200 mg calcium (500 mg) Chew Take 1 Tab by mouth four (4) times daily.  Cetirizine (ZYRTEC) 10 mg Cap Take 10 mg by mouth daily.  montelukast (SINGULAIR) 10 mg tablet Take 10 mg by mouth daily.  allergy injection by SubCUTAneous route every thirty (30) days.  budesonide-formoterol (SYMBICORT) 160-4.5 mcg/Actuation HFA inhaler Take 2 Puffs by inhalation two (2) times a day.  albuterol (PROVENTIL HFA, VENTOLIN HFA) 90 mcg/Actuation inhaler Take 2 Puffs by inhalation every six (6) hours as needed.  losartan (COZAAR) 50 mg tablet TAKE 1 TABLET DAILY 90 Tab 3       Allergies   Allergen Reactions    Sulfasalazine Other (comments)     Could not taste anything, lightheadedness    Adhesive Tape-Silicones Other (comments)    Other Plant, Animal, Environmental Unable to Obtain     MOLD    Tape [Adhesive] Other (comments)     Blisters, use paper tape only  Patient states tegaderm and paper tape are okay       Past Surgical History:   Procedure Laterality Date    HX CERVICAL FUSION  08/13/14    HX LUMBAR LAMINECTOMY  Nov 1995    [facility]    HX LUMBAR LAMINECTOMY  Feb 1997    Haylie Looney  1/2015    bunion removal from right foot    HX OTHER SURGICAL  2007    thoracentesis    HX OTHER SURGICAL  2008    chest tube    HX OTHER SURGICAL  1997    TMJ surgery    HX OTHER SURGICAL  2012    sinus surg 2012-Dr Vishal Morales.     HX OTHER SURGICAL  2016    spinal cord stimulator place for lumbar neuritis, good benefit    HX TUBAL LIGATION      KS ANESTH,SURGERY OF SHOULDER  01/31/2019    KS COLONOSCOPY FLX DX W/COLLJ SPEC WHEN PFRMD  6-18-08    normal/duntemann       Social History     Socioeconomic History    Marital status:      Spouse name: Not on file    Number of children: Not on file    Years of education: Not on file    Highest education level: Not on file   Occupational History    Not on file   Social Needs    Financial resource strain: Not on file    Food insecurity:     Worry: Not on file     Inability: Not on file    Transportation needs:     Medical: Not on file     Non-medical: Not on file   Tobacco Use    Smoking status: Never Smoker    Smokeless tobacco: Never Used   Substance and Sexual Activity    Alcohol use: Yes     Alcohol/week: 0.0 oz     Comment: 0-6 per year    Drug use: Yes     Types: Prescription, OTC    Sexual activity: Yes     Partners: Male   Lifestyle    Physical activity:     Days per week: Not on file     Minutes per session: Not on file    Stress: Not on file   Relationships    Social connections:     Talks on phone: Not on file     Gets together: Not on file     Attends Tenriism service: Not on file     Active member of club or organization: Not on file     Attends meetings of clubs or organizations: Not on file     Relationship status: Not on file    Intimate partner violence:     Fear of current or ex partner: Not on file     Emotionally abused: Not on file     Physically abused: Not on file     Forced sexual activity: Not on file   Other Topics Concern    Not on file   Social History Narrative    Retired        Visit Vitals  /84   Pulse 98   Temp 97.6 °F (36.4 °C) (Oral)   Ht 5' 10\" (1.778 m)   Wt 180 lb (81.6 kg)   SpO2 98%   BMI 25.83 kg/m²         PHYSICAL EXAMINATION:  GENERAL: Alert and oriented x3, in no acute distress, well-developed, well-nourished, afebrile. HEART: No JVD. EYES: No scleral icterus   NECK: No significant lymphadenopathy   LUNGS: No respiratory compromise or indrawing  ABDOMEN: Soft, non-tender, non-distended. Electronically signed by:  Minoo Pichardo MD

## 2019-05-28 ENCOUNTER — HOSPITAL ENCOUNTER (OUTPATIENT)
Dept: PHYSICAL THERAPY | Age: 64
Discharge: HOME | End: 2019-05-28
Payer: COMMERCIAL

## 2019-05-28 PROCEDURE — 97140 MANUAL THERAPY 1/> REGIONS: CPT

## 2019-05-28 PROCEDURE — 97110 THERAPEUTIC EXERCISES: CPT

## 2019-05-30 ENCOUNTER — HOSPITAL ENCOUNTER (OUTPATIENT)
Dept: PHYSICAL THERAPY | Age: 64
Discharge: HOME | End: 2019-05-30
Payer: COMMERCIAL

## 2019-05-30 PROCEDURE — 97110 THERAPEUTIC EXERCISES: CPT

## 2019-05-30 PROCEDURE — 97140 MANUAL THERAPY 1/> REGIONS: CPT

## 2019-05-30 NOTE — PROGRESS NOTES
In [address] [place] [address] Square  [address] [place] [address], [address] [place] [address], [address] [place], [place] [address]y [address]  [phone] fax    Progress Note  Patient name: Hilda Joseph Start of Care: 4/2/2019   Referral Alfredo Keita MD ONT: 9/66/4998                Medical Diagnosis: S/P shoulder surgery [Z98.890]  Payor: BLUE CROSS / Plan: [employer] [place], [place] [address]way / Product Type: PPO /  Onset Dates/p 01/31/19                Treatment Diagnosis: s/p right RTC repair and distal clavicle excision    Prior Hospitalization: see medical history Provider#: [id number]   Medications: Verified on Patient summary List    Comorbidities: Allergies, Arthritis, Asthma, Back pain, GI disease, HA, HTN, RA- diagnosed 3 years ago, Prior surgery-  Spinal Cord Stimulator- Sept 2016, cervical fusion: August 2014, Oral surgery 3x: 2016, Bunionectomy- Jan 2015, No   Prior Level of Function:  Right hand dominant. Patient was able to use right arm prior to surgery. Managed household independently and likes to garden/yardwork.     Visits from Start of Care: 16    Missed Visits: 0    Established Goals:         Excellent           Good         Limited           None  [] Increased ROM   []  [x]  []  []  [] Increased Strength  []  [x]  []  []  [] Increased Mobility  []  [x]  []  []   [] Decreased Pain   [x]  []  []  []      Key Functional Changes:   Short Term Goals: To be accomplished in 1 weeks:  1. Patient will be ind and compliant with HEP 1-2x/day to increase ease with ADLs.             CURRENT MET               2. Patient will improve right shoulder PROM to [facility] to progress to next phase of rehabilitation.               CURRENT Progressing flexion WNL, abduction ~130, IR/ER WNL  Long Term Goals: To be accomplished in 20 treatments:  1. Patient will improve FOTO to at least 64 to demonstrate functional improvement.              CURRENT MET 64  2.  Patient will improve right shoulder AAROM flex to 120deg, Once cleared by MD to progress to [facility], to increase ease with overhead activities.             CURRENT full  ROM   Flex/ABD  5/15/19  3. Patient will improve right shoulder AROM flex and scaption to 115deg, once cleared by MD to progress to AROM, to increase with self care.               CURRENT:  MET Right shoulder AROM: flex: 143deg, scap: 133deg (5/28/19)         Updated Goals:   Long Term Goals: To be accomplished in 26 treatments:  1. Patient will report 0-1/10 pain on average to aide with increase in activity tolerance and performance within therapy.                         Eval:                         Current:    2. Patient will increase right shoulder strength to 4+/5 - 5/5 to enable participation in daily functional activities.                       Eval:                         Current:    3. Patient will report at least 50% improvement to allow ease with ADLs and household chores.                         Eval:                        Current:    4. Patient will increase AROM right shoulder to WNL in order to allow full return to work demands. PN: Flexion/ scaption ~160-170 degrees    Current:          ASSESSMENT/RECOMMENDATIONS:  Patient has shown significant improvements with strength, activity tolerance and pain control since beginning therapy. She has met or is progressing towards all set goals as shown above. Patient has minimal pain throughout her daily functional routine, however continues to have difficulties with overhead tasks and lifting objects. Recommend continuation of PT to address remaining strength and mobility deficits at this time. Plan to progress per protocol with strengthening as tolerated. [x]Continue therapy per initial plan/protocol at a frequency of  2 x per week for 6 additional treatments to original POC (26 total).   []Continue therapy with the following recommended changes:_____________________      _____________________________________________________________________  []Discontinue therapy progressing towards or have reached established goals  []Discontinue therapy due to lack of appreciable progress towards goals  []Discontinue therapy due to lack of attendance or compliance  []Await Physician's recommendations/decisions regarding therapy  []Other:________________________________________________________________    Thank you for this referral.   Susan Johnson, PT 5/30/2019 7:02 AM  NOTE TO PHYSICIAN:  Leeanna [address]   FAX TO [facility]: [phone]  If you are unable to process this request in 24 hours please contact our office: [phone]    []  I have read the above report and request that my patient continue as recommended. []  I have read the above report and request that my patient continue therapy with the following changes/special instructions:________________________________________  []I have read the above report and request that my patient be discharged from therapy.     [de-identified] Signature:____________Date:_________TIME:________    [employer], Date and Time must be completed for valid certification **

## 2019-05-30 NOTE — PROGRESS NOTES
PT DAILY TREATMENT NOTE 10-18    Patient Name: Amada Oakes  Date:2019  : 1955  [x]  Patient  Verified  Payor: BLUE CROSS / Plan: [employer] [facility] / Product Type: PPO /    In time:8:03  Out time:8:36  Total Treatment Time (min): 33  Visit #: 16 of 20    Medicare/BCBS Only   Total Timed Codes (min):  33 1:1 Treatment Time:  25       Treatment Area: Right shoulder pain [M25.511]  S/P shoulder surgery [Z98.890]    SUBJECTIVE  Pain Level (0-10 scale): 0  Any medication changes, allergies to medications, adverse drug reactions, diagnosis change, or new procedure performed?: [x] No    [] Yes (see summary sheet for update)  Subjective functional status/changes:   [] No changes reported  States it is starting to feel really good, no real pain throughout the day. OBJECTIVE        25 min Therapeutic Exercise:  [x] See flow sheet :   Rationale: increase ROM, increase strength, improve coordination and increase proprioception to improve the patients ability to perform daily household chores. 8 min Manual Therapy:  PROM with gentle overpressure, flexion/ abd/ IR/ ER, GH joint mobs , LAD with oscillation    Rationale: decrease pain, increase ROM and increase tissue extensibility to assist with overhead tasks such as cleaning in cabinets              With   [] TE   [] TA   [] neuro   [] other: Patient Education: [x] Review HEP    [] Progressed/Changed HEP based on:   [] positioning   [] body mechanics   [] transfers   [] heat/ice application    [] other:      Other Objective/Functional Measures:   Able to perform all therex well with no changes in symptoms   AROM ~170 flexion/ scaption     Pain Level (0-10 scale) post treatment: 0    ASSESSMENT/Changes in Function: Patient continues to show improvements with signs/ symptoms however still demonstrates a decrease in strength, impaired ROM,  and an increase in pain with functional activities.        Patient will continue to benefit from skilled PT services to modify and progress therapeutic interventions, address functional mobility deficits, address ROM deficits, address strength deficits, analyze and cue movement patterns and analyze and modify body mechanics/ergonomics to attain remaining goals. [x]  See Plan of Care  []  See progress note/recertification  []  See Discharge Summary         Progress towards goals / Updated goals:  Short Term Goals: To be accomplished in 1 weeks:  1. Patient will be ind and compliant with HEP 1-2x/day to increase ease with ADLs.             CURRENT MET               2. Patient will improve right shoulder PROM to [facility] to progress to next phase of rehabilitation.               CURRENT Progressing flexion WNL, abduction ~130, IR/ER WNL  Long Term Goals: To be accomplished in 20 treatments:  1. Patient will improve FOTO to at least 64 to demonstrate functional improvement.              CURRENT  2. Patient will improve right shoulder AAROM flex to 120deg, Once cleared by MD to progress to [facility], to increase ease with overhead activities.                CURRENT full  ROM   Flex/ABD  5/15/19  3. Patient will improve right shoulder AROM flex and scaption to 115deg, once cleared by MD to progress to AROM, to increase with self care.               CURRENT:  MET Right shoulder AROM: flex: 143deg, scap: 133deg (5/28/19)                 PLAN  [x]  Upgrade activities as tolerated     [x]  Continue plan of care  []  Update interventions per flow sheet       []  Discharge due to:_  []  Other:_      Christy Washington, PT 5/30/2019  7:01 AM    Future Appointments   Date Time Provider [place]   5/30/2019  8:00 AM Felipe Ferguson PT [facility]   5/31/2019  9:00 AM Armida Reed MD [facility] [address]   6/3/2019 10:30 AM Felipe Ferguson PT [facility]   6/4/2019  8:30 AM RICCI Cadet [facility] SCHED   6/4/2019  1:00 PM Bronwyn Gottron, MD [address]   6/5/2019  9:30 AM Greg Hernandez PTA [facility] 6/10/2019  9:00 AM Martin Ellington PTA [facility]   6/12/2019  9:30 AM Martin Ellington PTA [facility]   8/23/2019 10:15 AM Jonas Carlisle MD [place] [id number]

## 2019-05-31 ENCOUNTER — OFFICE VISIT (OUTPATIENT)
Dept: INTERNAL MEDICINE CLINIC | Age: 64
End: 2019-05-31

## 2019-05-31 VITALS
HEIGHT: 70 IN | DIASTOLIC BLOOD PRESSURE: 78 MMHG | WEIGHT: 186.2 LBS | RESPIRATION RATE: 12 BRPM | HEART RATE: 83 BPM | BODY MASS INDEX: 26.66 KG/M2 | TEMPERATURE: 97.9 F | OXYGEN SATURATION: 98 % | SYSTOLIC BLOOD PRESSURE: 115 MMHG

## 2019-05-31 DIAGNOSIS — Z12.31 ENCOUNTER FOR SCREENING MAMMOGRAM FOR BREAST CANCER: ICD-10-CM

## 2019-05-31 DIAGNOSIS — R73.02 IMPAIRED GLUCOSE TOLERANCE: ICD-10-CM

## 2019-05-31 DIAGNOSIS — M06.9 RHEUMATOID ARTHRITIS INVOLVING MULTIPLE SITES, UNSPECIFIED RHEUMATOID FACTOR PRESENCE: ICD-10-CM

## 2019-05-31 DIAGNOSIS — E55.9 VITAMIN D DEFICIENCY: ICD-10-CM

## 2019-05-31 DIAGNOSIS — E53.8 VITAMIN B12 DEFICIENCY: ICD-10-CM

## 2019-05-31 DIAGNOSIS — J45.40 MODERATE PERSISTENT ASTHMA WITHOUT COMPLICATION: ICD-10-CM

## 2019-05-31 DIAGNOSIS — I10 ESSENTIAL HYPERTENSION: Primary | ICD-10-CM

## 2019-05-31 RX ORDER — METHOTREXATE 25 MG/ML
INJECTION, SOLUTION INTRA-ARTERIAL; INTRAMUSCULAR; INTRAVENOUS
COMMUNITY
Start: 2019-05-07 | End: 2020-02-04

## 2019-05-31 NOTE — PROGRESS NOTES
INTERNISTS OF [facility]:  5/31/2019, MRN: [id number]      Lila Bynum is a 58 y.o. female and presents to clinic for Hypertension (follow up  ROOM 2) and Blood sugar problem (follow up )    Subjective:   Pt is a 55yo female with h/o HTN, GERD with Green's esophagus (followed by ), DJD, lumbar post laminectomy syndrome s/p spinal cord stimulator surgery (followed by Juancarlos Nava), cervical spinal stenosis, prediabetes, asthma (followed by Abdias Peres, Pulmonology), HLD, lichens sclerosus, chronic sinusitis, vitamin B12 deficiency, and rheumatoid arthritis (Followed by ), vitamin D deficiency.     1. RA and OA:  Present for yrs. She is followed by Joseph Cazares and Rheumatology. She has chronic knee and hip pain. Her Orthopedic team wants her to have knee replacmeent surgery. Left hand/wrist pain is also present - which she attributes to OA and RA. S/p right shoulder rotator cuff repair surgery. She has good ROM along the affected jt. S/p PT. She has chronic lower back pain but pain is now at her baseline. No paresthesias. .     2. Asthma: Present for yrs. She used albuterol only once since her last apt. She is scheduled to see Pulmonology next wk. No SOB/cough sx. She continues to take Symbicort. 3. HTN: Present for >6 months. On losartan. No adverse side effects from this rx. No refills are needed. BP is 115/78. Her diet is unchanged. Her weight is 186lbs. No drug or tobacco use. 4. Vitamin Deficiency Hx: She has a h/o vitamin D and B12 deficiencies. She takes supplementation appropriately.            Patient Active Problem List    Diagnosis Date Noted    Adjacent segment disease C3/4 w/deg changes, poss stenosis, CT '18 06/22/2018    Arthritis of right acromioclavicular joint 06/20/2018    Right cervical radiculopathy 31/18/9170    Lichen sclerosus et atrophicus 05/17/2018    Green esophagus 11/16/2017    Rheumatoid arthritis involving multiple sites 02/15/2017    Arthritis, lumbar spine 04/22/2016    Moderate persistent asthma without complication 66/95/4149    Lumbar post-laminectomy syndrome 12/10/2015    S/P cervical spinal fusion 08/13/2014    Cervical stenosis of spine 08/11/2014    GERD (gastroesophageal reflux disease) 08/11/2014    Hx MRSA infection 08/11/2014    Impaired glucose tolerance 06/30/2013    Asthma 12/28/2012     Class: Chronic    Hypertriglyceridemia 12/16/2012    DNS (deviated nasal septum)     Chronic sinusitis 05/15/2012    HTN (hypertension) 06/28/2010    Iron deficiency anemia 06/28/2010    Vitamin B12 deficiency 06/28/2010    Vitamin D deficiency 06/28/2010       Current Outpatient Medications   Medication Sig Dispense Refill    methotrexate 25 mg/mL chemo injection by IntraMUSCular route every seven (7) days.  ferrous sulfate 325 mg (65 mg iron) tablet take 1 tablet by mouth once daily BEFORE BREAKFAST 90 Tab 1    VITAMIN C 500 mg tablet take 2 tablets by mouth daily 180 Tab 1    cyanocobalamin 1,000 mcg tablet take 1 tablet by mouth once daily 90 Tab 3    losartan (COZAAR) 50 mg tablet TAKE 1 TABLET DAILY 90 Tab 3    clobetasol (TEMOVATE) 0.05 % ointment Apply  to affected area two (2) days a week. 45 g 0    ESTRACE 0.01 % (0.1 mg/gram) vaginal cream Place 1 gm in the vagina twice weekly 42.5 g 2    cholecalciferol, vitamin D3, (VITAMIN D3 PO) Take  by mouth daily.  ibuprofen (MOTRIN) 200 mg tablet Take  by mouth.  naproxen sodium (ALEVE) 220 mg cap Take 220 mg by mouth daily as needed.  folic acid (FOLVITE) 1 mg tablet take 1 tablet by mouth once daily  0    acetaminophen (TYLENOL) 325 mg tablet Take  by mouth every four (4) hours as needed for Pain.  triamcinolone (NASACORT AQ) 55 mcg nasal inhaler 2 Sprays by Both Nostrils route daily. Indications: PERENNIAL ALLERGIC RHINITIS      Dexlansoprazole (DEXILANT) 60 mg CpDM Take 1 Cap by mouth Daily (before dinner).       calcium carbonate (TUMS) 200 mg calcium (500 mg) Chew Take 1 Tab by mouth four (4) times daily.  Cetirizine (ZYRTEC) 10 mg Cap Take 10 mg by mouth daily.  montelukast (SINGULAIR) 10 mg tablet Take 10 mg by mouth daily.  allergy injection by SubCUTAneous route every thirty (30) days.  budesonide-formoterol (SYMBICORT) 160-4.5 mcg/Actuation HFA inhaler Take 2 Puffs by inhalation two (2) times a day.  albuterol (PROVENTIL HFA, VENTOLIN HFA) 90 mcg/Actuation inhaler Take 2 Puffs by inhalation every six (6) hours as needed.          Allergies   Allergen Reactions    Adhesive Tape-Silicones Other (comments)    Other Plant, Animal, Environmental Unable to Obtain     MOLD    Sulfasalazine Other (comments)     Could not taste anything, lightheadedness    Tape [Adhesive] Other (comments)     Blisters, use paper tape only  Patient states tegaderm and paper tape are okay       Past Medical History:   Diagnosis Date    Adjacent segment disease C3/4 w/deg changes, poss stenosis, CT '18 6/22/2018    Allergic rhinitis     Asthma     Back pain     Green's esophagus with esophagitis     Cervical radiculopathy     Chronic sinusitis 5/15/2012    DNS (deviated nasal septum)     Empyema     Foraminal stenosis of cervical region     C4-C5    GERD (gastroesophageal reflux disease)     Dr Salinas Stable    Hx MRSA infection 8/11/2014    Hypertension     Hypertriglyceridemia     Insulin resistance 4/9/2012    Left knee pain     Lichen planus     Menopause     Age 51    MRSA (methicillin resistant Staphylococcus aureus) infection 2008    left lung    Restless leg syndrome     Rheumatoid arthritis ([facility] [address]     Spinal cord stimulator status 2016    Spinal stenosis of cervical region     C4-C5 and C5-C6    TMJ syndrome     Wears glasses     and contacts       Past Surgical History:   Procedure Laterality Date    HX CERVICAL FUSION  08/13/14    HX LUMBAR LAMINECTOMY  Nov 1995    [facility]    HX LUMBAR LAMINECTOMY  Feb 1997    [facility] 1/2015    bunion removal from right foot    HX OTHER SURGICAL  2007    thoracentesis    HX OTHER SURGICAL  2008    chest tube    HX OTHER SURGICAL  1997    TMJ surgery    HX OTHER SURGICAL  2012    sinus surg 2012-Dr Keny Mahmood.  HX OTHER SURGICAL  2016    spinal cord stimulator place for lumbar neuritis, good benefit    HX TUBAL LIGATION      NV ANESTH,SURGERY OF SHOULDER  01/31/2019    NV COLONOSCOPY FLX DX W/COLLJ SPEC WHEN PFRMD  6-18-08    normal/duntemann       Family History   Problem Relation Age of Onset    Hypertension Mother     Diabetes Mother    [facility] Arthritis-rheumatoid Sister     Other Sister         Lupus    Cancer Sister 35        [place], uterus    Other Brother         myocarditis    Heart Attack Brother     Heart Disease Brother     Heart Surgery Brother     Coronary Artery Disease Father     Hypertension Father     Cancer Sister 48        [place], breast    Breast Cancer Sister         Estrogen based    Diabetes Brother     Stroke Maternal Grandmother        Social History     Tobacco Use    Smoking status: Never Smoker    Smokeless tobacco: Never Used   Substance Use Topics    Alcohol use: Yes     Alcohol/week: 0.0 oz     Comment: 0-6 per year       ROS   Review of Systems   Constitutional: Negative for chills, fever and weight loss. HENT: Negative for ear pain and sore throat. Eyes: Negative for blurred vision and pain. Respiratory: Negative for cough and shortness of breath. Cardiovascular: Negative for chest pain. Gastrointestinal: Negative for abdominal pain, blood in stool and melena. Genitourinary: Negative for dysuria and hematuria. Musculoskeletal: Positive for back pain and joint pain. Negative for myalgias. Skin: Negative for rash. Neurological: Negative for focal weakness and headaches. Endo/Heme/Allergies: Does not bruise/bleed easily. Psychiatric/Behavioral: Negative for substance abuse.        Objective     Vitals:    05/31/19 0859   BP: 115/78 Pulse: 83   Resp: 12   Temp: 97.9 °F (36.6 °C)   TempSrc: Oral   SpO2: 98%   Weight: 186 lb 3.2 oz (84.5 kg)   Height: 5' 10\" (1.778 m)   PainSc:   1   PainLoc: Shoulder       Physical Exam   Constitutional: She is oriented to person, place, and time and well-developed, well-nourished, and in no distress. HENT:   Head: Normocephalic and atraumatic. Right Ear: External ear normal.   Left Ear: External ear normal.   Nose: Nose normal.   Mouth/Throat: Oropharynx is clear and moist. No oropharyngeal exudate. Eyes: Conjunctivae and EOM are normal. Right eye exhibits no discharge. Left eye exhibits no discharge. No scleral icterus. Neck: Neck supple. Cardiovascular: Normal rate, regular rhythm, normal heart sounds and intact distal pulses. Exam reveals no gallop and no friction rub. No murmur heard. Pulmonary/Chest: Effort normal and breath sounds normal. No respiratory distress. She has no wheezes. She has no rales. Abdominal: Soft. Bowel sounds are normal. She exhibits no distension. There is no tenderness. There is no rebound and no guarding. Musculoskeletal: She exhibits no edema or tenderness (Bue). She has good range of motion along bilateral shoulder joints. No effusions are present. Lymphadenopathy:     She has no cervical adenopathy. Neurological: She is alert and oriented to person, place, and time. She exhibits normal muscle tone. Gait normal.   Skin: Skin is warm and dry. No erythema. Psychiatric: Affect normal.   Nursing note and vitals reviewed.       LABS   Data Review:   Lab Results   Component Value Date/Time    WBC 6.7 01/11/2019 07:44 AM    HGB 14.2 01/11/2019 07:44 AM    HCT 44.7 01/11/2019 07:44 AM    PLATELET 558 79/33/0017 07:44 AM    MCV 93.1 01/11/2019 07:44 AM       Lab Results   Component Value Date/Time    Sodium 143 01/11/2019 07:44 AM    Potassium 4.1 01/11/2019 07:44 AM    Chloride 109 (H) 01/11/2019 07:44 AM    CO2 27 01/11/2019 07:44 AM    Anion gap 7 01/11/2019 07:44 AM    Glucose 100 (H) 01/11/2019 07:44 AM    BUN 15 01/11/2019 07:44 AM    Creatinine 0.72 01/11/2019 07:44 AM    BUN/Creatinine ratio 21 (H) 01/11/2019 07:44 AM    GFR est AA >60 01/11/2019 07:44 AM    GFR est non-AA >60 01/11/2019 07:44 AM    Calcium 8.7 01/11/2019 07:44 AM       Lab Results   Component Value Date/Time    Cholesterol, total 149 11/15/2018 10:50 AM    HDL Cholesterol 47 11/15/2018 10:50 AM    LDL, calculated 86.2 11/15/2018 10:50 AM    VLDL, calculated 15.8 11/15/2018 10:50 AM    Triglyceride 79 11/15/2018 10:50 AM    CHOL/HDL Ratio 3.2 11/15/2018 10:50 AM       Lab Results   Component Value Date/Time    Hemoglobin A1c 5.8 (H) 11/15/2018 10:50 AM       Assessment/Plan:   1. Health Maintenance:  - Ordering a mammogram for breast cancer screening. ORDERS:  - TYLER 3D REYNALDO W MAMMO BI SCREENING INCL CAD; Future    2. Rheumatoid Arthritis and OA:  -Continue to follow-up with rheumatology and orthopedics. -Activity as tolerated  -Continue with Rx as prescribed.  -Checking a CMP and a CBC just before her follow-up appointment. 3.  Hypertension: Stable. +H/o Prediabetes. -Continue with Rx as prescribed.  -Checking a urine protein screen, CMP, lipid panel, and A1c  - I encouraged her to reduce her weight by lowering her caloric intake and processed food intake. I will recheck her weight at her follow-up appointment. 4.  Asthma: Stable. -Continue with Rx as prescribed. -Continue follow-up with Pulmonology     5. Vitamin deficiencies:   -Continue with supplementation.  -Checking vitamin D and B12 levels. Health Maintenance Due   Topic Date Due    Shingrix Vaccine Age 49> (1 of 2) 06/21/2004    Bone Densitometry (Dexa) Screening  06/21/2019    BREAST CANCER SCRN MAMMOGRAM  08/27/2019     Lab review: labs are reviewed in the EHR    I have discussed the diagnosis with the patient and the intended plan as seen in the above orders.   The patient has received an after-visit summary and questions were answered concerning future plans. I have discussed medication side effects and warnings with the patient as well. I have reviewed the plan of care with the patient, accepted their input and they are in agreement with the treatment goals. All questions were answered. The patient understands the plan of care. Handouts provided today with above information. Pt instructed if symptoms worsen to call the office or report to the ED for continued care. Greater than 50% of the visit time was spent in counseling and/or coordination of care. Voice recognition was used to generate this report, which may have resulted in some phonetic based errors in grammar and contents. Even though attempts were made to correct all the mistakes, some may have been missed, and remained in the body of the document. Follow-up and Dispositions    · Return in about 6 months (around 11/30/2019).          Ru Perez MD

## 2019-05-31 NOTE — PROGRESS NOTES
Chief Complaint   Patient presents with    Hypertension     follow up  ROOM 2    Blood sugar problem     follow up        1. Have you been to the ER, urgent care clinic since your last visit? Hospitalized since your last visit? No    2. Have you seen or consulted any other health care providers outside of the [address] [place] [address] since your last visit? Include any pap smears or colon screening.  No      Health Maintenance Due   Topic Date Due    Shingrix Vaccine Age 49> (1 of 2) 06/21/2004    Bone Densitometry (Dexa) Screening  06/21/2019    BREAST CANCER SCRN MAMMOGRAM  08/27/2019

## 2019-05-31 NOTE — PATIENT INSTRUCTIONS
Health Maintenance Due   Topic Date Due    Shingrix Vaccine Age 49> (1 of 2) 06/21/2004    Bone Densitometry (Dexa) Screening  06/21/2019    BREAST CANCER SCRN MAMMOGRAM  08/27/2019          Body Mass Index: Care Instructions  Your Care Instructions    Body mass index (BMI) can help you see if your weight is raising your risk for health problems. It uses a formula to compare how much you weigh with how tall you are. · A BMI lower than 18.5 is considered underweight. · A BMI between 18.5 and 24.9 is considered healthy. · A BMI between 25 and 29.9 is considered overweight. A BMI of 30 or higher is considered obese. If your BMI is in the normal range, it means that you have a lower risk for weight-related health problems. If your BMI is in the overweight or obese range, you may be at increased risk for weight-related health problems, such as high blood pressure, heart disease, stroke, arthritis or joint pain, and diabetes. If your BMI is in the underweight range, you may be at increased risk for health problems such as fatigue, lower protection (immunity) against illness, muscle loss, bone loss, hair loss, and hormone problems. BMI is just one measure of your risk for weight-related health problems. You may be at higher risk for health problems if you are not active, you eat an unhealthy diet, or you drink too much alcohol or use tobacco products. Follow-up care is a key part of your treatment and safety. Be sure to make and go to all appointments, and call your doctor if you are having problems. It's also a good idea to know your test results and keep a list of the medicines you take. How can you care for yourself at home? · Practice healthy eating habits. This includes eating plenty of fruits, vegetables, whole grains, lean protein, and low-fat dairy. · If your doctor recommends it, get more exercise. Walking is a good choice. Bit by bit, increase the amount you walk every day.  Try for at least 30 minutes on most days of the week. · Do not smoke. Smoking can increase your risk for health problems. If you need help quitting, talk to your doctor about stop-smoking programs and medicines. These can increase your chances of quitting for good. · Limit alcohol to 2 drinks a day for men and 1 drink a day for women. Too much alcohol can cause health problems. If you have a BMI higher than 25  · Your doctor may do other tests to check your risk for weight-related health problems. This may include measuring the distance around your waist. A waist measurement of more than 40 inches in men or 35 inches in women can increase the risk of weight-related health problems. · Talk with your doctor about steps you can take to stay healthy or improve your health. You may need to make lifestyle changes to lose weight and stay healthy, such as changing your diet and getting regular exercise. If you have a BMI lower than 18.5  · Your doctor may do other tests to check your risk for health problems. · Talk with your doctor about steps you can take to stay healthy or improve your health. You may need to make lifestyle changes to gain or maintain weight and stay healthy, such as getting more healthy foods in your diet and doing exercises to build muscle. Where can you learn more? Go to http://june-yasmeen.info/. Enter S176 in the search box to learn more about \"Body Mass Index: Care Instructions. \"  Current as of: June 25, 2018  Content Version: 11.9  © 4588-3508 [employer]. Care instructions adapted under license by [employer] (which disclaims liability or warranty for this information). If you have questions about a medical condition or this instruction, always ask your healthcare professional. [address] any warranty or liability for your use of this information.

## 2019-06-03 ENCOUNTER — HOSPITAL ENCOUNTER (OUTPATIENT)
Dept: PHYSICAL THERAPY | Age: 64
Discharge: HOME | End: 2019-06-03
Payer: MEDICARE

## 2019-06-03 PROCEDURE — 97110 THERAPEUTIC EXERCISES: CPT

## 2019-06-03 PROCEDURE — 97140 MANUAL THERAPY 1/> REGIONS: CPT

## 2019-06-03 NOTE — PROGRESS NOTES
PT DAILY TREATMENT NOTE 10-18    Patient Name: Amada Okaes  Date:6/3/2019  : 1955  [x]  Patient  Verified  Payor: BLUE CROSS / Plan: [employer] [facility] [place] / Product Type: PPO /    In time:10:24  Out time:11:00  Total Treatment Time (min): 36  Visit #: 17 of 26    Medicare/BCBS Only   Total Timed Codes (min):  36 1:1 Treatment Time:  36       Treatment Area: Right shoulder pain [M25.511]  S/P shoulder surgery [Z98.890]    SUBJECTIVE  Pain Level (0-10 scale): 0  Any medication changes, allergies to medications, adverse drug reactions, diagnosis change, or new procedure performed?: [x] No    [] Yes (see summary sheet for update)  Subjective functional status/changes:   [] No changes reported  States she is doing well, has doctors appointment tomorrow, no pain today     OBJECTIVE         26 min Therapeutic Exercise:  [x] See flow sheet :   Rationale: increase strength, improve coordination and increase proprioception to improve the patients ability to perform daily household chores. 10 min Manual Therapy:  PROM with overpressure stretch flexion/ abd/ IR/ ER, LAD with oscillation, GH joint mobs   Rationale: decrease pain, increase ROM and increase tissue extensibility to assist with tolerance to ADLs         With   [] TE   [] TA   [] neuro   [] other: Patient Education: [x] Review HEP    [] Progressed/Changed HEP based on:   [] positioning   [] body mechanics   [] transfers   [] heat/ice application    [] other:      Other Objective/Functional Measures:   AROM WNL with notable UT compensation in abduction  After cueing to prevent back movement with AROM 4 way patient able to correct      Pain Level (0-10 scale) post treatment: 0    ASSESSMENT/Changes in Function: Patient continues to show improvements with signs/ symptoms however still demonstrates a decrease in strength, impaired ROM,  and an increase in pain with functional activities.        Patient will continue to benefit from skilled PT services to modify and progress therapeutic interventions, address functional mobility deficits, address ROM deficits, address strength deficits, analyze and cue movement patterns and analyze and modify body mechanics/ergonomics to attain remaining goals. [x]  See Plan of Care  []  See progress note/recertification  []  See Discharge Summary         Progress towards goals / Updated goals:  Long Term Goals: To be accomplished in 26 treatments:  1. Patient will report 0-1/10 pain on average to aide with increase in activity tolerance and performance within therapy.                         Eval:                         Current:    2. Patient will increase right shoulder strength to 4+/5 - 5/5 to enable participation in daily functional activities.                       Eval:                         Current:    3. Patient will report at least 50% improvement to allow ease with ADLs and household chores.                         Eval:                        Current:    4. Patient will increase AROM right shoulder to WNL in order to allow full return to work demands.                            PN: Flexion/ scaption ~160-170 degrees                          Current:         PLAN  [x]  Upgrade activities as tolerated     [x]  Continue plan of care  []  Update interventions per flow sheet       []  Discharge due to:_  []  Other:_      Eliza Porter, PT 6/3/2019  7:35 AM    Future Appointments   Date Time Provider [place]   6/3/2019 10:30 AM Abbi Vital, ROBERT [facility]   6/4/2019  8:30 AM RICCI Beltran [facility] MIGDALIA SCHED   6/4/2019  1:00 PM Bria Agudelo MD [address]   6/5/2019  9:30 AM Darshana Allen, PTA [facility]   6/10/2019  9:00 AM Darshana Redford, PTA [facility]   6/12/2019  9:30 AM Darshanaisai Allen, PTA [facility]   8/23/2019 10:15 AM Pilo Alegria MD [facility]   11/22/2019  8:55 AM [facility] NURSE VISIT [facility] MIGDALIA SCHED   12/3/2019  9:00 AM Miguel King MD [facility] [address]

## 2019-06-04 ENCOUNTER — OFFICE VISIT (OUTPATIENT)
Dept: PULMONOLOGY | Age: 64
End: 2019-06-04

## 2019-06-04 ENCOUNTER — OFFICE VISIT (OUTPATIENT)
Dept: ORTHOPEDIC SURGERY | Facility: CLINIC | Age: 64
End: 2019-06-04

## 2019-06-04 VITALS
OXYGEN SATURATION: 98 % | DIASTOLIC BLOOD PRESSURE: 65 MMHG | TEMPERATURE: 96.5 F | BODY MASS INDEX: 26.71 KG/M2 | WEIGHT: 186.6 LBS | RESPIRATION RATE: 16 BRPM | HEIGHT: 70 IN | SYSTOLIC BLOOD PRESSURE: 103 MMHG | HEART RATE: 74 BPM

## 2019-06-04 VITALS
TEMPERATURE: 97.7 F | DIASTOLIC BLOOD PRESSURE: 64 MMHG | HEIGHT: 70 IN | BODY MASS INDEX: 26.51 KG/M2 | RESPIRATION RATE: 18 BRPM | WEIGHT: 185.2 LBS | HEART RATE: 75 BPM | SYSTOLIC BLOOD PRESSURE: 122 MMHG | OXYGEN SATURATION: 96 %

## 2019-06-04 DIAGNOSIS — J32.9 CHRONIC SINUSITIS, UNSPECIFIED LOCATION: ICD-10-CM

## 2019-06-04 DIAGNOSIS — M06.9 RHEUMATOID ARTHRITIS INVOLVING MULTIPLE SITES, UNSPECIFIED RHEUMATOID FACTOR PRESENCE: ICD-10-CM

## 2019-06-04 DIAGNOSIS — J45.20 MILD INTERMITTENT ASTHMA WITHOUT COMPLICATION: Primary | Chronic | ICD-10-CM

## 2019-06-04 DIAGNOSIS — K22.70 BARRETT'S ESOPHAGUS WITHOUT DYSPLASIA: ICD-10-CM

## 2019-06-04 DIAGNOSIS — M75.121 NONTRAUMATIC COMPLETE TEAR OF RIGHT ROTATOR CUFF: Primary | ICD-10-CM

## 2019-06-04 DIAGNOSIS — Z98.890 STATUS POST ARTHROSCOPY OF RIGHT SHOULDER: ICD-10-CM

## 2019-06-04 NOTE — PROGRESS NOTES
[facility]   Pulmonary, Critical Care, and Sleep Medicine     Reason for Evaluation: follow up asthma    06/04/19   She has done well since her last visit and has not had any exacerbations    Continues to use her Singulair, Symbicort  Not needing much of rescue inhalers  On Dexilant for Green's esophagus  Back on methotrexate -injectable for RA. Denies wheezing. Occasional sneezing. Feels better overall with no recent set backs. No cough, congestion or wheezing. She denied any hemoptysis, change in her appetite or weight. Has not needed any prednisone tapers. No Er visits    Allergies:  No known drug allergies. Current Outpatient Medications:     methotrexate 25 mg/mL chemo injection, by IntraMUSCular route every seven (7) days. , Disp: , Rfl:     ferrous sulfate 325 mg (65 mg iron) tablet, take 1 tablet by mouth once daily BEFORE BREAKFAST, Disp: 90 Tab, Rfl: 1    VITAMIN C 500 mg tablet, take 2 tablets by mouth daily, Disp: 180 Tab, Rfl: 1    cyanocobalamin 1,000 mcg tablet, take 1 tablet by mouth once daily, Disp: 90 Tab, Rfl: 3    losartan (COZAAR) 50 mg tablet, TAKE 1 TABLET DAILY, Disp: 90 Tab, Rfl: 3    clobetasol (TEMOVATE) 0.05 % ointment, Apply  to affected area two (2) days a week., Disp: 45 g, Rfl: 0    ESTRACE 0.01 % (0.1 mg/gram) vaginal cream, Place 1 gm in the vagina twice weekly, Disp: 42.5 g, Rfl: 2    cholecalciferol, vitamin D3, (VITAMIN D3 PO), Take 1 Tab by mouth daily. , Disp: , Rfl:     ibuprofen (MOTRIN) 200 mg tablet, Take 200 mg by mouth every eight (8) hours as needed. , Disp: , Rfl:     naproxen sodium (ALEVE) 220 mg cap, Take 220 mg by mouth daily as needed. , Disp: , Rfl:     folic acid (FOLVITE) 1 mg tablet, take 1 tablet by mouth once daily, Disp: , Rfl: 0    acetaminophen (TYLENOL) 325 mg tablet, Take 325 mg by mouth every four (4) hours as needed for Pain., Disp: , Rfl:     triamcinolone (NASACORT AQ) 55 mcg nasal inhaler, 2 Sprays by Both Nostrils route daily. Indications: PERENNIAL ALLERGIC RHINITIS, Disp: , Rfl:     Dexlansoprazole (DEXILANT) 60 mg CpDM, Take 1 Cap by mouth Daily (before dinner). , Disp: , Rfl:     calcium carbonate (TUMS) 200 mg calcium (500 mg) Chew, Take 1 Tab by mouth four (4) times daily. , Disp: , Rfl:     Cetirizine (ZYRTEC) 10 mg Cap, Take 10 mg by mouth daily. , Disp: , Rfl:     montelukast (SINGULAIR) 10 mg tablet, Take 10 mg by mouth daily. , Disp: , Rfl:     allergy injection, by SubCUTAneous route every thirty (30) days. , Disp: , Rfl:     budesonide-formoterol (SYMBICORT) 160-4.5 mcg/Actuation HFA inhaler, Take 2 Puffs by inhalation two (2) times a day., Disp: , Rfl:     albuterol (PROVENTIL HFA, VENTOLIN HFA) 90 mcg/Actuation inhaler, Take 2 Puffs by inhalation every six (6) hours as needed. , Disp: , Rfl:        Physical Examination:       HEENT:    Normocephalic, atraumatic. Pupils equal, round, reactive to light and accommodation. Sclerae white. Conjunctivae pale. Oral exam shows no thrush,  without any exudate or mass. Nasal mucous membranes are without any erythema or edema. Neck:  Thick, supple, no JVD, normal thyroid, no adenopathy, no other masses palpable. Cardiovascular:  S1, S2, regular rate and rhythm without any murmurs, rubs or gallops. Chest:  Equal air entry without wheezing. There is no dullness to percussion. No tenderness on palpation. No rash on inspection   Abdomen:  Obese, bowel sounds normoactive, soft, nontender without any organomegaly. Tympanic to percussion. Extremities:  Without any clubbing, cyanosis or edema. No calf tenderness on palpation. Skin:  Dry, warm to touch. No rash on inspection. Impression:     Asthma- better controlled. With no exacerbations over past  3 + years after sinus surgery.   SOB- multifactorial  Recurrent sinusitis  She had in the interval nasal septal deviation repair surgery to correct any anatomical defect causing her frequent sinus and respiratory infections. Esophageal stricture with reflux. Now diagnosed with Green's esophagus. On Dexilent  Rheumatoid arthritis- rheumatology following    Recommendations:      Continue current maintenance therapy-Singulair and Symbicort  The patient will continue her as needed albuterol,    Continue trigger control- PPI for Green's, allergy immunotherapy and controller meds  Commended on compliance and excellent control  Health maintenance- up to date on vaccines  Environmental control  Periodic PFT, imaging. Education provided. Follow up in 6 months  .         Funmi Guzman MD

## 2019-06-04 NOTE — PROGRESS NOTES
Asia Peterson presents today for   Chief Complaint   Patient presents with    Asthma     follow up from 12/21/2017       Is someone accompanying this pt? No    Is the patient using any DME equipment during OV? No    -DME Company N/A    Depression Screening:  3 most recent PHQ Screens 6/4/2019   PHQ Not Done -   Little interest or pleasure in doing things Not at all   Feeling down, depressed, irritable, or hopeless Not at all   Total Score PHQ 2 0       Learning Assessment:  Learning Assessment 5/31/2019   PRIMARY LEARNER Patient   HIGHEST LEVEL OF EDUCATION - PRIMARY LEARNER  > 4 YEARS OF COLLEGE   BARRIERS PRIMARY LEARNER NONE   CO-LEARNER CAREGIVER No   PRIMARY LANGUAGE ENGLISH   LEARNER PREFERENCE PRIMARY DEMONSTRATION     -     -     -     -   ANSWERED BY patient     -   RELATIONSHIP SELF       Abuse Screening:  Abuse Screening Questionnaire 5/31/2019   Do you ever feel afraid of your partner? N   Are you in a relationship with someone who physically or mentally threatens you? N   Is it safe for you to go home? Y       Fall Risk  Fall Risk Assessment, last 12 mths 6/4/2019   Able to walk? Yes   Fall in past 12 months? No   Fall with injury? -   Number of falls in past 12 months -   Fall Risk Score -         Coordination of Care:  1. Have you been to the ER, urgent care clinic since your last visit? Hospitalized since your last visit? Yes. January 31, 2019-rotator cuff surgery    2. Have you seen or consulted any other health care providers outside of the [address] [place] [address] since your last visit? Include any pap smears or colon screening. Yes. Dr. Micheal Hassan, rheumatologist, Dr. Jeffory Homans, Leslie Juarez, Allergist    Medication variance in dosage/sig per patient as follows: None.

## 2019-06-04 NOTE — PROGRESS NOTES
Paras Guallpa  1955     HISTORY OF PRESENT ILLNESS  Hilda Bazan is a 58 y.o. female who presents today for evaluation s/p Right shoulder arthroscopic rotator cuff repair and distal clavicle excision on 1/31/19. Patient has been going to PT. Describes pain as a 1/10. Has been taking ibuprofen for pain. She is doing well today. She has been compliant with her exercises. Patient denies any fever, chills, chest pain, shortness of breath or calf pain. There are no new illness or injuries to report since last seen in the office. PHYSICAL EXAM:   Visit Vitals  /65   Pulse 74   Temp 96.5 °F (35.8 °C) (Oral)   Resp 16   Ht 5' 10\" (1.778 m)   Wt 186 lb 9.6 oz (84.6 kg)   SpO2 98%   BMI 26.77 kg/m²      The patient is a well-developed, well-nourished female in no acute distress. The patient is alert and oriented times three. The patient appears to be well groomed. Mood and affect are normal.  ORTHOPEDIC EXAM of right shoulder:  Inspection: swelling not present,  Bruising not present  Incision well healed  Passive glenohumeral abduction 0-90 degrees, 180 FF, 40 ER passive, 180 Active FF, IR to mid thoracic  Stability: Stable  Strength: 5/5  2+ distal pulses    IMPRESSION:  S/P Right shoulder arthroscopic rotator cuff repair and distal clavicle excision    PLAN:   Pt doing well post operatively  Will continue with PT working on ROM and strengthening. She looks great today. Pt not given a refill of pain medication today. Stressed to patient that nothing causes an increase in pain.   RTC 6 weeks      ANNY Ware [facility]

## 2019-06-05 ENCOUNTER — HOSPITAL ENCOUNTER (OUTPATIENT)
Dept: PHYSICAL THERAPY | Age: 64
Discharge: HOME | End: 2019-06-05
Payer: MEDICARE

## 2019-06-05 PROCEDURE — 97110 THERAPEUTIC EXERCISES: CPT

## 2019-06-05 PROCEDURE — 97140 MANUAL THERAPY 1/> REGIONS: CPT

## 2019-06-05 NOTE — PROGRESS NOTES
PT DAILY TREATMENT NOTE 10-18    Patient Name: Lila Bynum  Date:2019  : 1955  [x]  Patient  Verified  Payor: BLUE CROSS / Plan: [employer] [facility] [place] / Product Type: PPO /    In time: 9:30  Out time:10:23  Total Treatment Time (min): 51  Visit #: 18 of 26    Medicare/BCBS Only   Total Timed Codes (min):  41 1:1 Treatment Time:  41       Treatment Area: Right shoulder pain [M25.511]  S/P shoulder surgery [Z98.890]    SUBJECTIVE  Pain Level (0-10 scale): .5  Any medication changes, allergies to medications, adverse drug reactions, diagnosis change, or new procedure performed?: [x] No    [] Yes (see summary sheet for update)  Subjective functional status/changes:   [] No changes reported  It  Ached  Last  Night.     OBJECTIVE    Modality rationale: decrease edema, decrease inflammation, decrease pain and increase tissue extensibility to improve the patients ability to perform ADL   Min Type Additional Details    [] Estim:  []Unatt       []IFC  []Premod                        []Other:  []w/ice   []w/heat  Position:  Location:    [] Estim: []Att    []TENS instruct  []NMES                    []Other:  []w/US   []w/ice   []w/heat  Position:  Location:    []  Traction: [] Cervical       []Lumbar                       [] Prone          []Supine                       []Intermittent   []Continuous Lbs:  [] before manual  [] after manual    []  Ultrasound: []Continuous   [] Pulsed                           []1MHz   []3MHz W/cm2:  Location:    []  Iontophoresis with dexamethasone         Location: [] Take home patch   [] In clinic   10 [x]  Ice   post  []  heat  []  Ice massage  []  Laser   []  Anodyne Position:long  sit  Location:right  shoulder    []  Laser with stim  []  Other:  Position:  Location:    []  Vasopneumatic Device Pressure:       [] lo [] med [] hi   Temperature: [] lo [] med [] hi   [x] Skin assessment post-treatment:  [x]intact []redness- no adverse reaction    []redness - adverse reaction:      min []Eval                  []Re-Eval       31 min Therapeutic Exercise:  [x] See flow sheet :   Rationale: increase ROM and increase strength to improve the patients ability to perform ADL      min Therapeutic Activity:  []  See flow sheet :   Rationale:   to improve the patients ability to       min Neuromuscular Re-education:  []  See flow sheet :   Rationale:   to improve the patients ability to     10 min Manual Therapy:  [facility]  Mob with distraction   Rationale: decrease pain, increase ROM and increase tissue extensibility to perform ADL     min Gait Training:  ___ feet with ___ device on level surfaces with ___ level of assist   Rationale: With   [x] TE   [] TA   [] neuro   [] other: Patient Education: [x] Review HEP    [] Progressed/Changed HEP based on:   [] positioning   [] body mechanics   [] transfers   [] heat/ice application    [] other:      Other Objective/Functional Measures: Demo  Weakness  With [place] ER   Good  Shoulder  Mobility  With manual    Pain Level (0-10 scale) post treatment:0     ASSESSMENT/Changes in Function: Fair  Response  To  Remaining  There ex. Patient will continue to benefit from skilled PT services to address functional mobility deficits, address ROM deficits, address strength deficits, analyze and address soft tissue restrictions, analyze and cue movement patterns and instruct in home and community integration to attain remaining goals. [x]  See Plan of Care  []  See progress note/recertification  []  See Discharge Summary         Progress towards goals / Updated goals:  Long Term Goals: To be accomplished in 26 treatments:  1. Patient will report 0-1/10 pain on average to aide with increase in activity tolerance and performance within therapy.                         Eval:                         Current:  progressing  6/5/19  2.  Patient will increase right shoulder strength to 4+/5 - 5/5 to enable participation in daily functional activities.                       Eval:                         Current:    3.  Patient will report at least 50% improvement to allow ease with ADLs and household chores.                         Eval:                        Current:    4. Patient will increase AROM right shoulder to Natalee Edel order to allow full return to work demands.                           PN: Flexion/ scaption ~160-170 degrees                          Current:         PLAN  []  Upgrade activities as tolerated     [x]  Continue plan of care  []  Update interventions per flow sheet       []  Discharge due to:_  []  Other:_      Monique Tran PTA 6/5/2019  9:21 AM    Future Appointments   Date Time Provider Cooper Dhillon   6/5/2019  9:30 AM Sacha Ards, PTA [facility]   6/10/2019  9:00 AM Celestina Baker PTA [facility]   6/12/2019  9:30 AM Sacha Vega, PTA [facility]   7/16/2019  9:45 AM RICCI Glynn [facility] MIGDALIA SCHED   8/23/2019 10:15 AM Zaria Medley MD [facility]   11/22/2019  8:55 AM IOC NURSE VISIT IO MIGDALIA SCHED   12/3/2019  9:00 AM Betty Ferrera MD [address]

## 2019-06-10 ENCOUNTER — HOSPITAL ENCOUNTER (OUTPATIENT)
Dept: PHYSICAL THERAPY | Age: 64
Discharge: HOME | End: 2019-06-10
Payer: MEDICARE

## 2019-06-10 PROCEDURE — 97110 THERAPEUTIC EXERCISES: CPT

## 2019-06-10 PROCEDURE — 97140 MANUAL THERAPY 1/> REGIONS: CPT

## 2019-06-10 NOTE — PROGRESS NOTES
PT DAILY TREATMENT NOTE 10-18    Patient Name: Jasbir Cervantes  Date:6/10/2019  : 1955  [x]  Patient  Verified  Payor: [place] MED[place]ARE / Plan: [place] MEDICARE PART A & B / Product Type: Medicare /    In time: 8:49 Out time:9:24  Total Treatment Time (min): 38  Visit #: 19 of 26    Medicare/BCBS Only   Total Timed Codes (min):  38 1:1 Treatment Time:  38       Treatment Area: Right shoulder pain [M25.511]  S/P shoulder surgery [Z98.890]    SUBJECTIVE  Pain Level (0-10 scale): 1  Any medication changes, allergies to medications, adverse drug reactions, diagnosis change, or new procedure performed?: [x] No    [] Yes (see summary sheet for update)  Subjective functional status/changes:   [] No changes reported  \"Its  Sore  This  Morning. \"    OBJECTIVE    Modality rationale: decrease edema, decrease inflammation, decrease pain and increase tissue extensibility to improve the patients ability to perform ADL    Min Type Additional Details    [] Estim:  []Unatt       []IFC  []Premod                        []Other:  []w/ice   []w/heat  Position:  Location:    [] Estim: []Att    []TENS instruct  []NMES                    []Other:  []w/US   []w/ice   []w/heat  Position:  Location:    []  Traction: [] Cervical       []Lumbar                       [] Prone          []Supine                       []Intermittent   []Continuous Lbs:  [] before manual  [] after manual    []  Ultrasound: []Continuous   [] Pulsed                           []1MHz   []3MHz W/cm2:  Location:    []  Iontophoresis with dexamethasone         Location: [] Take home patch   [] In clinic    []  Ice     []  heat  []  Ice massage  []  Laser   []  Anodyne Position:  Location:    []  Laser with stim  []  Other:  Position:  Location:    []  Vasopneumatic Device Pressure:       [] lo [] med [] hi   Temperature: [] lo [] med [] hi   [x] Skin assessment post-treatment:  [x]intact []redness- no adverse reaction    []redness - adverse reaction:      min []Eval []Re-Eval       23 min Therapeutic Exercise:  [x] See flow sheet :   Rationale: increase ROM and increase strength to improve the patients ability to perform ADL     min Therapeutic Activity:  []  See flow sheet :   Rationale:   to improve the patients ability to       min Neuromuscular Re-education:  []  See flow sheet :   Rationale:   to improve the patients ability to     15 min Manual Therapy:  [facility]  Mob /CF  rig tUT   Rationale: decrease pain, increase ROM, increase tissue extensibility and decrease edema  to perform ADL      min Gait Training:  ___ feet with ___ device on level surfaces with ___ level of assist   Rationale: With   [x] TE   [] TA   [] neuro   [] other: Patient Education: [x] Review HEP    [] Progressed/Changed HEP based on:   [] positioning   [] body mechanics   [] transfers   [] heat/ice application    [] other:      Other Objective/Functional Measures:  Minimal  Tightness ER/  DC  Pulleys/wrist there ex    Pain Level (0-10 scale) post treatment: 0    ASSESSMENT/Changes in Function: Fair  Response  To each there ex. Patient will continue to benefit from skilled PT services to address functional mobility deficits, address ROM deficits, address strength deficits, analyze and address soft tissue restrictions, analyze and cue movement patterns and instruct in home and community integration to attain remaining goals. [x]  See Plan of Care  []  See progress note/recertification  []  See Discharge Summary         Progress towards goals / Updated goals:  Long Term Goals: To be accomplished in 26 treatments:  1. Patient will report 0-1/10 pain on average to aide with increase in activity tolerance and performance within therapy.                         Eval:                         Current:  progressing  6/5/19  2. Patient will increase right shoulder strength to 4+/5 - 5/5 to enable participation in daily functional activities.                          Eval:                       Current:    3.  Patient will report at least 50% improvement to allow ease with ADLs and household chores.                         Eval:                        Current:    4. Patient will increase AROM right shoulder to Chu Parkinson order to allow full return to work demands.                           PN: Flexion/ scaption ~160-170 degrees                          Current:         PLAN  []  Upgrade activities as tolerated     [x]  Continue plan of care  []  Update interventions per flow sheet       []  Discharge due to:_  [x]  Other:_ REASSESS ROM/MMT NV     Celestina Baker PTA 6/10/2019  8:51 AM    Future Appointments   Date Time Provider Cooper Dhillon   6/10/2019  9:00 AM Sebas Alexandra PTA [facility]   6/12/2019  9:30 AM Celestina Baker, PTA [facility]   6/17/2019  9:00 AM Celestina Baker, PTA [facility]   6/19/2019  4:00 PM Karson Marsh, PT [facility]   6/24/2019  8:00 AM Karson Marsh, PT [facility]   6/27/2019  8:30 AM Karson Marsh, PT [facility]   7/1/2019  8:00 AM Karson Marsh, PT [facility]   7/3/2019  2:30 PM Karson Marsh, PT [facility]   7/16/2019  9:45 AM RICCI Saha [facility] MIGDALIA SCHED   8/23/2019 10:15 AM MD [facility]   11/22/2019  8:55 AM [facility] NURSE VISIT [facility] MIGDALIA SCHED   12/3/2019  9:00 AM Adalid Noriega MD [facility]

## 2019-06-12 ENCOUNTER — HOSPITAL ENCOUNTER (OUTPATIENT)
Dept: PHYSICAL THERAPY | Age: 64
Discharge: HOME | End: 2019-06-12
Payer: MEDICARE

## 2019-06-12 PROCEDURE — 97140 MANUAL THERAPY 1/> REGIONS: CPT

## 2019-06-12 PROCEDURE — 97110 THERAPEUTIC EXERCISES: CPT

## 2019-06-12 NOTE — PROGRESS NOTES
PT DAILY TREATMENT NOTE 10-18    Patient Name: Brianda Walters  Date:2019  : 1955  [x]  Patient  Verified  Payor: [place] MEDICARE / Plan: [place] MEDICARE PART A & B / Product Type: Medicare /    In time:9:29  Out time: 10:12  Total Treatment Time (min): 45  Visit #: 20 of 26    Medicare/BCBS Only   Total Timed Codes (min):  35 1:1 Treatment Time:  35       Treatment Area: Right shoulder pain [M25.511]  S/P shoulder surgery [Z98.890]    SUBJECTIVE  Pain Level (0-10 scale): .5-1  Any medication changes, allergies to medications, adverse drug reactions, diagnosis change, or new procedure performed?: [x] No    [] Yes (see summary sheet for update)  Subjective functional status/changes:   [] No changes reported  \"  Not  Really  Too much pain. \"    OBJECTIVE    Modality rationale: decrease edema, decrease inflammation, decrease pain and increase tissue extensibility to improve the patients ability to perform ADL    Min Type Additional Details    [] Estim:  []Unatt       []IFC  []Premod                        []Other:  []w/ice   []w/heat  Position:  Location:    [] Estim: []Att    []TENS instruct  []NMES                    []Other:  []w/US   []w/ice   []w/heat  Position:  Location:    []  Traction: [] Cervical       []Lumbar                       [] Prone          []Supine                       []Intermittent   []Continuous Lbs:  [] before manual  [] after manual    []  Ultrasound: []Continuous   [] Pulsed                           []1MHz   []3MHz W/cm2:  Location:    []  Iontophoresis with dexamethasone         Location: [] Take home patch   [] In clinic   10 [x]  Ice post    []  heat  []  Ice massage  []  Laser   []  Anodyne Position: long  sit  Location:right  shoulder    []  Laser with stim  []  Other:  Position:  Location:    []  Vasopneumatic Device Pressure:       [] lo [] med [] hi   Temperature: [] lo [] med [] hi   [x] Skin assessment post-treatment:  [x]intact []redness- no adverse reaction []redness - adverse reaction:      min []Eval                  []Re-Eval       23 min Therapeutic Exercise:  [x] See flow sheet :   Rationale: increase ROM and increase strength to improve the patients ability to perform ADL      min Therapeutic Activity:  []  See flow sheet :   Rationale:   to improve the patients ability to       min Neuromuscular Re-education:  []  See flow sheet :   Rationale:   to improve the patients ability to     12 min Manual Therapy:  [address] [facility] [address]  [address] with distraction   Rationale: decrease pain, increase ROM, increase tissue extensibility and decrease edema  to perform ADL     min Gait Training:  ___ feet with ___ device on level surfaces with ___ level of assist   Rationale: With   [x] TE   [] TA   [] neuro   [] other: Patient Education: [x] Review HEP    [] Progressed/Changed HEP based on:   [] positioning   [] body mechanics   [] transfers   [] heat/ice application    [] other:      Other Objective/Functional Measures: tightness  ER    Pain Level (0-10 scale) post treatment: 0    ASSESSMENT/Changes in Function: discussed  With pt on performing  Towel  Stretch. Overall  Fair  Response  To each there ex. Patient will continue to benefit from skilled PT services to address functional mobility deficits, address ROM deficits, address strength deficits, analyze and address soft tissue restrictions, analyze and cue movement patterns and address imbalance/dizziness to attain remaining goals. [x]  See Plan of Care  []  See progress note/recertification  []  See Discharge Summary         Progress towards goals / Updated goals:  Long Term Goals: To be accomplished in 26 treatments:  1. Patient will report 0-1/10 pain on average to aide with increase in activity tolerance and performance within therapy.                         Eval:                         Current:  progressing  6/5/19  2.  Patient will increase right shoulder strength to 4+/5 - 5/5 to enable participation in daily functional activities.                       Eval:                         Current:    3.  Patient will report at least 50% improvement to allow ease with ADLs and household chores.                         Eval:                        Current:    4. Patient will increase AROM right shoulder to WNL in order to allow full return to work demands.                           PN: Flexion/ scaption ~160-170 degrees                          Current:         PLAN  []  Upgrade activities as tolerated     [x]  Continue plan of care  []  Update interventions per flow sheet       []  Discharge due to:_  []  Other:_      Celestina Baker PTA 6/12/2019  9:33 AM    Future Appointments   Date Time Provider Cooper Dhillon   6/17/2019  9:00 AM Celestina Baker PTA [facility]   6/19/2019  4:00 PM Gen Jacobs, PT [facility]   6/24/2019  8:00 AM Gen Jacobs, PT [facility]   6/27/2019  8:30 AM Gen Jacobs, PT [facility]   7/1/2019  8:00 AM Santana Fitzpatrick, PTA [facility]   7/3/2019  2:30 PM Santo Montes, PT [facility]   7/16/2019  9:45 AM RICCI Kwok [facility] MIGDALIA SCHED   8/23/2019 10:15 AM Irvin Osullivan MD [facility] [address]   8/30/2019  8:30 AM HBV TYLER REYNALDO RM [facility]   11/22/2019  8:55 AM IOC NURSE VISIT IO MIGDALIA SCHED   12/3/2019  9:00 AM Momo Rodriguez MD [facility]

## 2019-06-17 ENCOUNTER — HOSPITAL ENCOUNTER (OUTPATIENT)
Dept: PHYSICAL THERAPY | Age: 64
Discharge: HOME | End: 2019-06-17
Payer: MEDICARE

## 2019-06-17 PROCEDURE — 97110 THERAPEUTIC EXERCISES: CPT

## 2019-06-17 PROCEDURE — 97140 MANUAL THERAPY 1/> REGIONS: CPT

## 2019-06-17 NOTE — PROGRESS NOTES
PT DAILY TREATMENT NOTE 10-18    Patient Name: Rudy Wheeler  Date:2019  : 1955  [x]  Patient  Verified  Payor: Kali Gonzalez / Plan: VA MEDICARE PART A & B / Product Type: Medicare /    In time  9:00  Out time:9:29  Total Treatment Time (min):  Visit #: 21 of 26    Medicare/BCBS Only   Total Timed Codes (min):  29 1:1 Treatment Time:  29       Treatment Area: Right shoulder pain [M25.511]  S/P shoulder surgery [Z98.890]    SUBJECTIVE  Pain Level (0-10 scale): 0  Any medication changes, allergies to medications, adverse drug reactions, diagnosis change, or new procedure performed?: [x] No    [] Yes (see summary sheet for update)  Subjective functional status/changes:   [] No changes reported  \" No pain  Right  Now. \"    OBJECTIVE    Modality rationale: decrease edema, decrease inflammation, decrease pain and increase tissue extensibility to improve the patients ability to perform ADL    Min Type Additional Details    [] Estim:  []Unatt       []IFC  []Premod                        []Other:  []w/ice   []w/heat  Position:  Location:    [] Estim: []Att    []TENS instruct  []NMES                    []Other:  []w/US   []w/ice   []w/heat  Position:  Location:    []  Traction: [] Cervical       []Lumbar                       [] Prone          []Supine                       []Intermittent   []Continuous Lbs:  [] before manual  [] after manual    []  Ultrasound: []Continuous   [] Pulsed                           []1MHz   []3MHz W/cm2:  Location:    []  Iontophoresis with dexamethasone         Location: [] Take home patch   [] In clinic    []  Ice     []  heat  []  Ice massage  []  Laser   []  Anodyne Position:  Location:    []  Laser with stim  []  Other:  Position:  Location:    []  Vasopneumatic Device Pressure:       [] lo [] med [] hi   Temperature: [] lo [] med [] hi   [x] Skin assessment post-treatment:  [x]intact []redness- no adverse reaction    []redness - adverse reaction:      min []Eval []Re-Eval       21 min Therapeutic Exercise:  [x] See flow sheet :   Rationale: increase ROM and increase strength to improve the patients ability to perform ADL      min Therapeutic Activity:  []  See flow sheet :   Rationale:  to improve the patients ability to       min Neuromuscular Re-education:  []  See flow sheet :   Rationale:   to improve the patients ability to     8 min Manual Therapy:  [address] [facility] [address]  [address]  ER/IR   Rationale: decrease pain, increase ROM, increase tissue extensibility and decrease edema  to perform ADL     min Gait Training:  ___ feet with ___ device on level surfaces with ___ level of assist   Rationale: With   [x] TE   [] TA   [] neuro   [] other: Patient Education: [x] Review HEP    [] Progressed/Changed HEP based on:   [] positioning   [] body mechanics   [] transfers   [] heat/ice application    [] other:      Other Objective/Functional Measures:  AROM  Flex 170    ABD    160    Pain Level (0-10 scale) post treatment: 0    ASSESSMENT/Changes in Function:  Improved  In AROM  Since  Initial  eval.    Patient will continue to benefit from skilled PT services to address functional mobility deficits, address ROM deficits, address strength deficits, analyze and address soft tissue restrictions, analyze and cue movement patterns and instruct in home and community integration to attain remaining goals. [x]  See Plan of Care  []  See progress note/recertification  []  See Discharge Summary         Progress towards goals / Updated goals:  Long Term Goals: To be accomplished in 26 treatments:  1. Patient will report 0-1/10 pain on average to aide with increase in activity tolerance and performance within therapy.                         Eval:                         Current: 1  X day  6/5/19  2. Patient will increase right shoulder strength to 4+/5 - 5/5 to enable participation in daily functional activities.                       Eval:                         Current:    3. Patient will report at least 50% improvement to allow ease with ADLs and household chores.                         Eval:                        Current:    4. Patient will increase AROM right shoulder to [place] order to allow full return to work demands.                           PN: Flexion/ scaption ~160-170 degrees                          Current:  flex  170  abd  160   6/17/19        PLAN  []  Upgrade activities as tolerated     [x]  Continue plan of care  []  Update interventions per flow sheet       []  Discharge due to:_  [x]  Other:_ REASSESS MMT / issue  [address] PTA 6/17/2019  9:02 AM    Future Appointments   Date Time Provider Cooper Jacquie   6/19/2019  4:00 PM Earnest Soto, PT [facility]   6/24/2019  8:00 AM Earnest Soto, PT [facility]   6/27/2019  8:30 AM Earnest Soto, PT [facility]   7/1/2019  8:00 AM Diamond Hatfield, PTA [facility]   7/3/2019  2:30 PM Bernardo Fulton, PT [facility]   7/16/2019  9:45 AM RICCI Moody [facility] MIGDALIA SCHED   8/23/2019 10:15 [facility] [place] [facility]   8/30/2019  8:30 AM HBV TYLER REYNALDO RM [facility] HBV   11/22/2019  8:55 AM IOC NURSE VISIT [facility] MIGDALIA SCHED   12/3/2019  9:00 AM Memo Hancock MD [facility]

## 2019-06-19 ENCOUNTER — HOSPITAL ENCOUNTER (OUTPATIENT)
Dept: PHYSICAL THERAPY | Age: 64
Discharge: HOME | End: 2019-06-19
Payer: MEDICARE

## 2019-06-19 PROCEDURE — 97530 THERAPEUTIC ACTIVITIES: CPT

## 2019-06-19 PROCEDURE — 97110 THERAPEUTIC EXERCISES: CPT

## 2019-06-19 NOTE — PROGRESS NOTES
PT DAILY TREATMENT NOTE 10-18    Patient Name: Jaqueline Grace  Date:2019  : 1955  [x]  Patient  Verified  Payor: VA MEDICARE / Plan: VA MEDICARE PART A & B / Product Type: Medicare /    In time:4:01  Out time:4:32  Total Treatment Time (min): 31  Visit #: 22 of 26    Medicare/BCBS Only   Total Timed Codes (min):  31 1:1 Treatment Time:  31       Treatment Area: Right shoulder pain [M25.511]  S/P shoulder surgery [Z98.890]    SUBJECTIVE  Pain Level (0-10 scale): 0  Any medication changes, allergies to medications, adverse drug reactions, diagnosis change, or new procedure performed?: [x] No    [] Yes (see summary sheet for update)  Subjective functional status/changes:   [] No changes reported  States she is feeling no pain, after one session of therapy she used 4 # and reports that it was too heavy and she was sore    OBJECTIVE            16 min Therapeutic Exercise:  [x] See flow sheet :   Rationale: increase strength, improve coordination and increase proprioception to improve the patients ability to perform daily household chores.      10 min Therapeutic Activity:  [x]  See flow sheet :   Rationale: increase strength, improve coordination and increase proprioception  to improve the patients ability to assist with tolerance to ADLs         5 min Manual Therapy:  PROM with gentle overpressure flexion/ abd/ IR/ ER LAD with oscillation    Rationale: decrease pain, increase ROM and increase tissue extensibility to assist with overhead tasks such as cooking/ cleaning      With   [] TE   [] TA   [] neuro   [] other: Patient Education: [x] Review HEP    [] Progressed/Changed HEP based on:   [] positioning   [] body mechanics   [] transfers   [] heat/ice application    [] other:      Other Objective/Functional Measures:   Able to perform all therex per flow sheet  Requires cueing to prevent UT compensation with active shoulder abd/ flexion and scaption     Pain Level (0-10 scale) post treatment: 0    ASSESSMENT/Changes in Function: Patient continues to show improvements with signs/ symptoms however still demonstrates a decrease in strength, impaired ROM,  and an increase in pain with functional activities. Patient will continue to benefit from skilled PT services to modify and progress therapeutic interventions, address functional mobility deficits, address strength deficits, analyze and cue movement patterns and analyze and modify body mechanics/ergonomics to attain remaining goals. [x]  See Plan of Care  []  See progress note/recertification  []  See Discharge Summary         Progress towards goals / Updated goals:  Long Term Goals: To be accomplished in 26 treatments:  1. Patient will report 0-1/10 pain on average to aide with increase in activity tolerance and performance within therapy.                         Eval:                         Current: 1  X day  6/5/19  2. Patient will increase right shoulder strength to 4+/5 - 5/5 to enable participation in daily functional activities.                       Eval:                         Current:    3.  Patient will report at least 50% improvement to allow ease with ADLs and household chores.                         Eval:                        Current:    4. Patient will increase AROM right shoulder to [place] order to allow full return to work demands.                           PN: Flexion/ scaption ~160-170 degrees                          Current:  flex  170  abd  160   6/17/19           PLAN  [x]  Upgrade activities as tolerated     [x]  Continue plan of care  []  Update interventions per flow sheet       []  Discharge due to:_  []  Other:_      Christy Washington, PT 6/19/2019  1:07 PM    Future Appointments   Date Time Provider Cooper Dhillon   6/19/2019  4:00 PM Felipe Ferguson PT [facility]   6/24/2019  8:00 AM Felipe Ferguson PT [facility]   6/27/2019  8:30 AM Felipe Ferguson PT [facility]   7/1/2019  8:00 AM Celestina Baker, PTA [facility]   7/3/2019  2:30 PM Anders Flannery PT [facility]   7/16/2019  9:45 AM RICCI Bolivar [facility] MIGDALIA SCHED   8/23/2019 10:15 AM Marquis Oscar MD [facility] [address]   8/30/2019  8:30 AM [facility] REYNALDO  [facility] HBV   11/22/2019  8:55 AM IOC NURSE VISIT IO MIGDALIA SCHED   12/3/2019  9:00 AM Robby Cueva MD [facility]

## 2019-06-24 ENCOUNTER — HOSPITAL ENCOUNTER (OUTPATIENT)
Dept: PHYSICAL THERAPY | Age: 64
Discharge: HOME | End: 2019-06-24
Payer: MEDICARE

## 2019-06-24 PROCEDURE — 97110 THERAPEUTIC EXERCISES: CPT

## 2019-06-24 NOTE — PROGRESS NOTES
PT DAILY TREATMENT NOTE 10-18    Patient Name: Brianda Walters  Date:2019  : 1955  [x]  Patient  Verified  Payor: VA MEDICARE / Plan: VA MEDICARE PART A & B / Product Type: Medicare /    In time:7:59  Out time:8:25  Total Treatment Time (min): 26  Visit #: 23 of 26    Medicare/BCBS Only   Total Timed Codes (min):  26 1:1 Treatment Time:  26       Treatment Area: Right shoulder pain [M25.511]  S/P shoulder surgery [Z98.890]    SUBJECTIVE  Pain Level (0-10 scale): \"sore\"  Any medication changes, allergies to medications, adverse drug reactions, diagnosis change, or new procedure performed?: [x] No    [] Yes (see summary sheet for update)  Subjective functional status/changes:   [] No changes reported  States she was with her grandson over the weekend and was lifting him a lot, is now sore    OBJECTIVE          26 min Therapeutic Exercise:  [x] See flow sheet :   Rationale: increase strength, improve coordination and increase proprioception to improve the patients ability to perform daily household chores. With   [] TE   [] TA   [] neuro   [] other: Patient Education: [x] Review HEP    [] Progressed/Changed HEP based on:   [] positioning   [] body mechanics   [] transfers   [] heat/ice application    [] other:      Other Objective/Functional Measures:   Increased reps throughout exercises with proper form - reports of fatigue after all exercises completed  AROM full with UT compensation at end range abduction      Pain Level (0-10 scale) post treatment: 0    ASSESSMENT/Changes in Function: Patient continues to show improvements with signs/ symptoms however still demonstrates a decrease in strength, impaired ROM, and an increase in pain with functional activities.        Patient will continue to benefit from skilled PT services to modify and progress therapeutic interventions, address functional mobility deficits, address strength deficits, analyze and cue movement patterns and analyze and modify body mechanics/ergonomics to attain remaining goals. [x]  See Plan of Care  []  See progress note/recertification  []  See Discharge Summary         Progress towards goals / Updated goals:  Long Term Goals: To be accomplished in 26 treatments:  1. Patient will report 0-1/10 pain on average to aide with increase in activity tolerance and performance within therapy.                         Eval:                         Current: 1  X day  6/5/19  2. Patient will increase right shoulder strength to 4+/5 - 5/5 to enable participation in daily functional activities.                       Eval:                         Current:    3.  Patient will report at least 50% improvement to allow ease with ADLs and household chores.                         Eval:                        Current:    4. Patient will increase AROM right shoulder to [place] order to allow full return to work demands.                           PN: Flexion/ scaption ~160-170 degrees                          Current:  flex  170  abd  160   6/17/19        PLAN  [x]  Upgrade activities as tolerated     [x]  Continue plan of care  []  Update interventions per flow sheet       []  Discharge due to:_  []  Other:_      Jose Redding, PT 6/24/2019  7:37 AM    Future Appointments   Date Time Provider Cooper Dhillon   6/24/2019  8:00 AM Russel Thompson PT [facility]   6/27/2019  8:30 AM Russel Thompson PT [facility]   7/1/2019  8:00 AM Celestina Baker, PTA [facility]   7/3/2019  2:30 PM Bharati Higgins PT [facility]   7/16/2019  9:45 AM RICCI [facility] MIGDALIA SCHED   8/23/2019 10:15 AM MD Jairo Mcdowell [address]   8/30/2019  8:30 AM HBV TYLER REYNALDO RM [facility] HBV   11/22/2019  8:55 AM IOC NURSE VISIT IO MIGDALIA SCHED   12/3/2019  9:00 AM Jarred Leonard MD [facility]

## 2019-06-27 ENCOUNTER — HOSPITAL ENCOUNTER (OUTPATIENT)
Dept: PHYSICAL THERAPY | Age: 64
Discharge: HOME | End: 2019-06-27
Payer: MEDICARE

## 2019-06-27 PROCEDURE — 97110 THERAPEUTIC EXERCISES: CPT

## 2019-06-27 NOTE — PROGRESS NOTES
PT DAILY TREATMENT NOTE 10-18    Patient Name: Tg Delgado  Date:2019  : 1955  [x]  Patient  Verified  Payor: VA MEDICARE / Plan: VA MEDICARE PART A & B / Product Type: Medicare /    In time:8:30  Out time:9:00  Total Treatment Time (min): 30  Visit #: 24 of 26    Medicare/BCBS Only   Total Timed Codes (min):  30 1:1 Treatment Time:  30       Treatment Area: Right shoulder pain [M25.511]  S/P shoulder surgery [Z98.890]    SUBJECTIVE  Pain Level (0-10 scale): \"sore\"  Any medication changes, allergies to medications, adverse drug reactions, diagnosis change, or new procedure performed?: [x] No    [] Yes (see summary sheet for update)  Subjective functional status/changes:   [] No changes reported  States she has finally gotten back to normal life    OBJECTIVE        20 min Therapeutic Exercise:  [x] See flow sheet :   Rationale: increase strength, improve coordination and increase proprioception to improve the patients ability to perform daily household chores. 10 min Therapeutic Activity:  [x]  See flow sheet :   Rationale: increase strength, improve coordination and increase proprioception  to improve the patients ability to assist with tolerance to ADLs               With   [] TE   [] TA   [] neuro   [] other: Patient Education: [x] Review HEP    [] Progressed/Changed HEP based on:   [] positioning   [] body mechanics   [] transfers   [] heat/ice application    [] other:      Other Objective/Functional Measures:   Able to perform all therex per flow sheet- no difficulties throughout  Plan to D/C on visit 26     Pain Level (0-10 scale) post treatment: 0    ASSESSMENT/Changes in Function: Patient continues to show improvements with signs/ symptoms however still demonstrates a decrease in strength, impaired ROM, and an increase in pain with functional activities.        Patient will continue to benefit from skilled PT services to modify and progress therapeutic interventions, address functional mobility deficits, address strength deficits, analyze and cue movement patterns and analyze and modify body mechanics/ergonomics to attain remaining goals. [x]  See Plan of Care  []  See progress note/recertification  []  See Discharge Summary         Progress towards goals / Updated goals:  Long Term Goals: To be accomplished in 26 treatments:  1. Patient will report 0-1/10 pain on average to aide with increase in activity tolerance and performance within therapy.                         Eval:                         Current: 1  X day  6/5/19  2. Patient will increase right shoulder strength to 4+/5 - 5/5 to enable participation in daily functional activities.                       Eval:                         Current:    3.  Patient will report at least 50% improvement to allow ease with ADLs and household chores.                         Eval:                        Current:    4. Patient will increase AROM right shoulder to [address] [place] order to allow full return to work demands.                           PN: Flexion/ scaption ~160-170 degrees                          Current:  flex  170  abd  160   6/17/19           PLAN  [x]  Upgrade activities as tolerated     [x]  Continue plan of care  []  Update interventions per flow sheet       []  Discharge due to:_  []  Other:_      Zoltan Haney, PT 6/27/2019  7:33 AM    Future Appointments   Date Time Provider Cooper Jacquie   6/27/2019  8:30 AM Nasreen Amezcua, PT [facility]   7/1/2019  8:00 AM Celestina Baker, PTA [facility]   7/3/2019  2:30 PM Mikki Andrew, PT [facility]   7/16/2019  9:45 AM RICCI Robison [facility] MIGDALIA SCHED   8/23/2019 10:15 AM MD Jairo Troncoso [address]   8/30/2019  8:30 AM HBV TYLER REYNALDO RM [facility] HBV   11/22/2019  8:55 AM IOC NURSE VISIT [facility] MIGDALIA SCHED   12/3/2019  9:00 AM Dawna Guy MD [facility]

## 2019-06-27 NOTE — PROGRESS NOTES
[address], [address] [place] [address], [address] [place], [place] [address]y [address]  [phone] fax    Progress Note  Patient name: Hilda Cornejo Start of Care: 4/2/2019   Referral Sarina Perdue MD AFP: 1/50/7524                Medical Diagnosis: S/P shoulder surgery [Z98.890]  Payor: BLUE CROSS / Plan: VChar[employer] [place] [address] [place] / Product Type: PPO /  Onset Dates/p 01/31/19                Treatment Diagnosis: s/p right RTC repair and distal clavicle excision    Prior Hospitalization: see medical history Provider#: [id number]   Medications: Verified on Patient summary List    Comorbidities: Allergies, Arthritis, Asthma, Back pain, GI disease, HA, HTN, RA- diagnosed 3 years ago, Prior surgery-  Spinal Cord Stimulator- Sept 2016, cervical fusion: August 2014, Oral surgery 3x: 2016, Bunionectomy- Jan 2015, No   Prior Level of Function:  Right hand dominant. Patient was able to use right arm prior to surgery. Managed household independently and likes to garden/yardwork.     Visits from Start of Care: 24    Missed Visits: 0    Established Goals:         Excellent           Good         Limited           None  [] Increased ROM   [x]  []  []  []  [] Increased Strength  [x]  []  []  []  [] Increased Mobility  [x]  []  []  []   [] Decreased Pain   [x]  []  []  []      Key Functional Changes:   Updated Goals:  Long Term Goals: To be accomplished in 26 treatments:  1. Patient will report 0-1/10 pain on average to aide with increase in activity tolerance and performance within therapy.                         Eval:                         Current: MET daily  2. Patient will increase right shoulder strength to 4+/5 - 5/5 to enable participation in daily functional activities.                       Eval:                         Current: Progressing gradually increasing weights throughout therex as able  3.  Patient will report at least 50% improvement to allow ease with ADLs and household chores.                         Eval:                        VEVLJTQ:  Progressing reports significant improvements, no % given  4. Patient will increase AROM right shoulder to WNL in order to allow full return to work demands.                           PN: Flexion/ scaption ~160-170 degrees                          Current:  Progressing flex  170  abd  160 - compensation with abduction      ASSESSMENT/RECOMMENDATIONS:  Patient has shown improvements with strength, activity tolerance, AROM and pain control since beginning therapy. She has began gradual strengthening exercise with good tolerance/ proper form. Recommend continuation of PT at this time to address remaining deficits. [x]Continue therapy per initial plan/protocol at a frequency of  2 x per week for remaining 2 treatments from original [address] (26 total)  []Continue therapy with the following recommended changes:_____________________      _____________________________________________________________________  []Discontinue therapy progressing towards or have reached established goals  []Discontinue therapy due to lack of appreciable progress towards goals  []Discontinue therapy due to lack of attendance or compliance  []Await Physician's recommendations/decisions regarding therapy  []Other:________________________________________________________________    Thank you for this referral.   Leonora Parry, PT 6/27/2019 7:53 AM  NOTE TO PHYSICIAN:  PLEASE COMPLETE THE ORDERS BELOW AND   FAX TO [facility]: [phone]  If you are unable to process this request in 24 hours please contact our office: [phone]    []  I have read the above report and request that my patient continue as recommended.   []  I have read the above report and request that my patient continue therapy with the following changes/special instructions:________________________________________  []I have read the above report and request that my patient be discharged from therapy.     [de-identified] Signature:____________Date:_________TIME:________    [facility], Date and Time must be completed for valid certification **

## 2019-07-01 ENCOUNTER — HOSPITAL ENCOUNTER (OUTPATIENT)
Dept: PHYSICAL THERAPY | Age: 64
Discharge: HOME | End: 2019-07-01
Payer: MEDICARE

## 2019-07-01 PROCEDURE — 97110 THERAPEUTIC EXERCISES: CPT

## 2019-07-01 NOTE — PROGRESS NOTES
PT DAILY TREATMENT NOTE 10-18    Patient Name: Eleazar Mitchell  Date:2019  : 1955  [x]  Patient  Verified  Payor: VA MEDICARE / Plan: VA MEDICARE PART A & B / Product Type: Medicare /    In time: 8:00  Out time:8:33  Total Treatment Time (min):   Visit #: 25 of 26    Medicare/BCBS Only   Total Timed Codes (min):  33 1:1 Treatment Time:  33       Treatment Area: Right shoulder pain [M25.511]  S/P shoulder surgery [Z98.890]    SUBJECTIVE  Pain Level (0-10 scale): 0  Any medication changes, allergies to medications, adverse drug reactions, diagnosis change, or new procedure performed?: [x] No    [] Yes (see summary sheet for update)  Subjective functional status/changes:   [] No changes reported  \"  I  Think I met  All  My  Goals. \"    OBJECTIVE    Modality rationale: decrease edema, decrease inflammation, decrease pain and increase tissue extensibility to improve the patients ability to perform  ADL   Min Type Additional Details    [] Estim:  []Unatt       []IFC  []Premod                        []Other:  []w/ice   []w/heat  Position:  Location:    [] Estim: []Att    []TENS instruct  []NMES                    []Other:  []w/US   []w/ice   []w/heat  Position:  Location:    []  Traction: [] Cervical       []Lumbar                       [] Prone          []Supine                       []Intermittent   []Continuous Lbs:  [] before manual  [] after manual    []  Ultrasound: []Continuous   [] Pulsed                           []1MHz   []3MHz W/cm2:  Location:    []  Iontophoresis with dexamethasone         Location: [] Take home patch   [] In clinic    []  Ice     []  heat  []  Ice massage  []  Laser   []  Anodyne Position:  Location:    []  Laser with stim  []  Other:  Position:  Location:    []  Vasopneumatic Device Pressure:       [] lo [] med [] hi   Temperature: [] lo [] med [] hi   [x] Skin assessment post-treatment:  [x]intact []redness- no adverse reaction    []redness - adverse reaction:      min []Eval                  []Re-Eval       33 min Therapeutic Exercise:  [x] See flow sheet :   Rationale: increase ROM and increase strength to improve the patients ability to perform ADL      min Therapeutic Activity:  []  See flow sheet :   Rationale:   to improve the patients ability to       min Neuromuscular Re-education:  []  See flow sheet :   Rationale:   to improve the patients ability to *     min Manual Therapy:     Rationale:  to      min Gait Training:  ___ feet with ___ device on level surfaces with ___ level of assist   Rationale: With   [x] TE   [] TA   [] neuro   [] other: Patient Education: [x] Review HEP    [] Progressed/Changed HEP based on:   [] positioning   [] body mechanics   [] transfers   [] heat/ice application    [x] other: REASSESS GOALS/DC PROCEDURE     Other Objective/Functional Measures: AROM  ~160-170 degrees    FOTO:  70    Pain Level (0-10 scale) post treatment:    ASSESSMENT/Changes in Function: Mrs. Foster  Reports  95%  Improvement. Pain level  On average 1/10. Patient will continue to benefit from skilled PT services to address functional mobility deficits, address ROM deficits, address strength deficits, analyze and address soft tissue restrictions and instruct in home and community integration to attain remaining goals. [x]  See Plan of Care  []  See progress note/recertification  []  See Discharge Summary         Progress towards goals / Updated goals:  Long Term Goals: To be accomplished in 26 treatments:  1. Patient will report 0-1/10 pain on average to aide with increase in activity tolerance and performance within therapy.                         Eval:                         Current: MET daily  2. Patient will increase right shoulder strength to 4+/5 - 5/5 to enable participation in daily functional activities.                       Eval:                         Current: Progressing gradually increasing weights throughout therex as able  3.  Patient will report at least 50% improvement to allow ease with ADLs and household chores.                         Eval:                        Current: met 95%  7/1/19  4.  Patient will increase AROM right shoulder to [place] [employer] order to allow full return to work demands.                           PN: Flexion/ scaption ~160-170 degrees                          Current:  Progressing flex  170  abd  160 - compensation with abduction        PLAN  []  Upgrade activities as tolerated     []  Continue plan of care  []  Update interventions per flow sheet       []  Discharge due to:_  [x]  Other:_ Viral Rogers PTA 7/1/2019  8:04 AM    Future Appointments   Date Time Provider Cooper Dhillon   7/3/2019  2:30 PM Ed Wang PT [facility]   7/16/2019  9:45 AM RICCI Hui [facility] MIGDALIA SCHED   8/23/2019 10:15 AM [facility]   8/30/2019  8:30 AM HBV TYLER REYNALDO RM [facility]   11/22/2019  8:55 AM IOC NURSE VISIT [facility] MIGDALIA SCHED   12/3/2019  9:00 AM Juan Tubbs MD [facility]

## 2019-07-03 ENCOUNTER — APPOINTMENT (OUTPATIENT)
Dept: PHYSICAL THERAPY | Age: 64
End: 2019-07-03
Payer: MEDICARE

## 2019-07-16 ENCOUNTER — OFFICE VISIT (OUTPATIENT)
Dept: ORTHOPEDIC SURGERY | Facility: CLINIC | Age: 64
End: 2019-07-16

## 2019-07-16 VITALS
SYSTOLIC BLOOD PRESSURE: 137 MMHG | OXYGEN SATURATION: 97 % | HEIGHT: 70 IN | HEART RATE: 71 BPM | WEIGHT: 190 LBS | BODY MASS INDEX: 27.2 KG/M2 | RESPIRATION RATE: 16 BRPM | DIASTOLIC BLOOD PRESSURE: 85 MMHG | TEMPERATURE: 97.1 F

## 2019-07-16 DIAGNOSIS — M75.121 NONTRAUMATIC COMPLETE TEAR OF RIGHT ROTATOR CUFF: Primary | ICD-10-CM

## 2019-07-16 DIAGNOSIS — Z98.890 STATUS POST ARTHROSCOPY OF RIGHT SHOULDER: ICD-10-CM

## 2019-07-16 NOTE — PROGRESS NOTES
Jennifer Trinidad  1955     HISTORY OF PRESENT ILLNESS  Hilda Lentz is a 71 y.o. female who presents today for evaluation s/p Right shoulder arthroscopic rotator cuff repair and distal clavicle excision on 1/31/19. Patient has been going to PT. Describes pain as a 0/10. Has been taking ibuprofen for pain. She is doing well today. She has been compliant with her exercises. Patient denies any fever, chills, chest pain, shortness of breath or calf pain. There are no new illness or injuries to report since last seen in the office. PHYSICAL EXAM:   Visit Vitals  /85 (BP 1 Location: Left arm, BP Patient Position: Sitting)   Pulse 71   Temp 97.1 °F (36.2 °C) (Oral)   Resp 16   Ht 5' 10\" (1.778 m)   Wt 190 lb (86.2 kg)   SpO2 97%   BMI 27.26 kg/m²      The patient is a well-developed, well-nourished female in no acute distress. The patient is alert and oriented times three. The patient appears to be well groomed. Mood and affect are normal.  ORTHOPEDIC EXAM of right shoulder:  Inspection: swelling not present,  Bruising not present  Incision well healed  Passive glenohumeral abduction 0-90 degrees, 180 FF, 40 ER passive, 180 Active FF, IR to mid thoracic  Stability: Stable  Strength: 5/5  2+ distal pulses    IMPRESSION:  S/P Right shoulder arthroscopic rotator cuff repair and distal clavicle excision    PLAN:   Pt doing well post operatively  She looks great today. She will continue her home exercises and gradually increasing her activities. Pt not given a refill of pain medication today.   RTC PRN    Patient seen and evaluated by Dr. Nitin Ramirez today who agrees with treatment plan      [facility]ist

## 2019-07-29 NOTE — PROGRESS NOTES
In [address] [place] [address] Square      [facility], [address] [place] [address], [address]  [phone] fax    Discharge Summary    Patient name: Hilda Leon Start of Care: 4/2/2019   Referral Franklin Damon MD [facility]: 8/31/1666                Medical Diagnosis: S/P shoulder surgery [Z98.890]  Payor: BLUE CROSS / Plan: [employer] [place], [place] [address]way / Product Type: PPO /  Onset Dates/p 01/31/19                Treatment Diagnosis: s/p right RTC repair and distal clavicle excision    Prior Hospitalization: see medical history Provider#: [id number]   Medications: Verified on Patient summary List    Comorbidities: Allergies, Arthritis, Asthma, Back pain, GI disease, HA, HTN, RA- diagnosed 3 years ago, Prior surgery-  Spinal Cord Stimulator- Sept 2016, cervical fusion: August 2014, Oral surgery 3x: 2016, Bunionectomy- Jan 2015, No   Prior Level of Function:  Right hand dominant. Patient was able to use right arm prior to surgery. Managed household independently and likes to garden/yardwork.     Visits from Start of Care: 25    Missed Visits: 1  Reporting Period : 04/02/19 to 7/1/19    Summary of Care:  [address] be accomplished in 26 treatments:  1. Patient will report 0-1/10 pain on average to aide with increase in activity tolerance and performance within therapy.                         Eval:                         Current: MET daily  2. Patient will increase right shoulder strength to 4+/5 - 5/5 to enable participation in daily functional activities.                       Eval:                         Current: Progressing gradually increasing weights throughout therex as able  3. Patient will report at least 50% improvement to allow ease with ADLs and household chores.                         Eval:                        Current: met 95%  7/1/19  4. Patient will increase AROM right shoulder to WNL in order to allow full return to work demands.                         PN: Flexion/ scaption ~160-170 degrees                          Current:  Progressing flex  170  abd  160 - compensation with abduction    ASSESSMENT/RECOMMENDATIONS:  [x]Discontinue therapy progressing towards or have reached established goals  []Discontinue therapy due to lack of appreciable progress towards goals  []Discontinue therapy due to lack of attendance or compliance  []Other:     Thank you for this referral.     Sage Johnson, PT 7/29/2019 2:13 PM

## 2019-08-23 ENCOUNTER — OFFICE VISIT (OUTPATIENT)
Dept: ORTHOPEDIC SURGERY | Age: 64
End: 2019-08-23

## 2019-08-23 VITALS
DIASTOLIC BLOOD PRESSURE: 83 MMHG | TEMPERATURE: 97.7 F | WEIGHT: 190.8 LBS | HEART RATE: 75 BPM | RESPIRATION RATE: 15 BRPM | SYSTOLIC BLOOD PRESSURE: 145 MMHG | BODY MASS INDEX: 27.32 KG/M2 | OXYGEN SATURATION: 96 % | HEIGHT: 70 IN

## 2019-08-23 DIAGNOSIS — M16.11 PRIMARY OSTEOARTHRITIS OF RIGHT HIP: ICD-10-CM

## 2019-08-23 DIAGNOSIS — M17.11 PRIMARY OSTEOARTHRITIS OF RIGHT KNEE: Primary | ICD-10-CM

## 2019-08-23 DIAGNOSIS — Z78.0 POSTMENOPAUSE: ICD-10-CM

## 2019-08-23 NOTE — PROGRESS NOTES
1. Have you been to the ER, urgent care clinic since your last visit? Hospitalized since your last visit? No    2. Have you seen or consulted any other health care providers outside of the [address] [place] [address] since your last visit? Include any pap smears or colon screening.  No

## 2019-08-23 NOTE — PROGRESS NOTES
Patient: Kory Umanzor                MRN: [id number]       SSN: [id number]  YOB: 1955        AGE: 71 y.o. SEX: female  Body mass index is 27.38 kg/m². PCP: Natacha Moya MD  08/23/19    HISTORY:  I had the pleasure of reviewing the patient today. She is fed up and frustrated with her right knee. It is really driving her crazy. She also has fairly advanced arthritis involving the right hip, and we will get an intraarticular injection. The pain is moderate and some days severe. She has known rheumatoid. The patient is very well controlled. She just cannot walk the way she used to. She wants to spend time with her grandchildren and cannot do it. She is limited by a 5 minute level walking tolerance. Stairs are difficult. She has pain night and pain with activities of daily living. The instability of the right knee is also troublesome for her as well. She has gone into fairly severe valgus as well with it. A 12-point review of systems performed today. Not too much in the way of morning stiffness. All pertinent positives documented. All other systems were reviewed and are otherwise negative. PHYSICAL EXAMINATION:  On the examination today, she walks with an antalgic gait owing to the right side. She has positive movie theatre sign when she first arises. The right hip is moderately stiff but not terrible, and the knee itself is quite unstable and quite stiff. Calf nontender. Homans sign is negative. No cyanosis, peripheral edema or clubbing. PLAN:  She would like to have her right knee replaced first.  I think this is appropriate. We will arrange for an intraarticular injection in the right hip. Risks and benefits including, but not limited to, infection, DVT, pulmonary embolism, anesthetic complications, blood loss requiring transfusion, as well as instability, chronic pain and the importance of bending and straightening the knee prior to surgery. It has been a pleasure to share in her care. She is a very nice lady. I want to thank you for allowing me to share in her care. CC:  Estrella Murray MD             REVIEW OF SYSTEMS:      CON: negative for weight loss, fever  EYE: negative for double vision  ENT: negative for hoarseness  RS:   negative for Tb  GI:    negative for blood in stool  :  negative for blood in urine  Other systems reviewed and noted below.           Past Medical History:   Diagnosis Date    Adjacent segment disease C3/4 w/deg changes, poss stenosis, CT '18 6/22/2018    Allergic rhinitis     Asthma     Back pain     Green's esophagus with esophagitis     Cervical radiculopathy     Chronic sinusitis 5/15/2012    DNS (deviated nasal septum)     Empyema     Foraminal stenosis of cervical region     C4-C5    GERD (gastroesophageal reflux disease)     Dr Dennie Jericho    Hx MRSA infection 8/11/2014    Hypertension     Hypertriglyceridemia     Insulin resistance 4/9/2012    Left knee pain     Lichen planus     Menopause     Age 51    MRSA (methicillin resistant Staphylococcus aureus) infection 2008    left lung    Restless leg syndrome     Rheumatoid arthritis ([facility] [address]     Spinal cord stimulator status 2016    Spinal stenosis of cervical region     C4-C5 and C5-C6    TMJ syndrome     Wears glasses     and contacts       Family History   Problem Relation Age of Onset    Hypertension Mother    [facility] Diabetes Mother    [facility] Arthritis-rheumatoid Sister     Other Sister         Lupus    Cancer Sister 35        [place], uterus    Other Brother         myocarditis    Heart Attack Brother     Heart Disease Brother     Heart Surgery Brother     Coronary Artery Disease Father     Hypertension Father     Cancer Sister 48        [place], breast    Breast Cancer Sister         Estrogen based    Diabetes Brother     Stroke Maternal Grandmother        Current Outpatient Medications   Medication Sig Dispense Refill    methotrexate 25 mg/mL chemo injection by IntraMUSCular route every seven (7) days.  ferrous sulfate 325 mg (65 mg iron) tablet take 1 tablet by mouth once daily BEFORE BREAKFAST 90 Tab 1    VITAMIN C 500 mg tablet take 2 tablets by mouth daily 180 Tab 1    cyanocobalamin 1,000 mcg tablet take 1 tablet by mouth once daily 90 Tab 3    losartan (COZAAR) 50 mg tablet TAKE 1 TABLET DAILY 90 Tab 3    clobetasol (TEMOVATE) 0.05 % ointment Apply  to affected area two (2) days a week. 45 g 0    ESTRACE 0.01 % (0.1 mg/gram) vaginal cream Place 1 gm in the vagina twice weekly 42.5 g 2    cholecalciferol, vitamin D3, (VITAMIN D3 PO) Take 1 Tab by mouth daily.  ibuprofen (MOTRIN) 200 mg tablet Take 200 mg by mouth every eight (8) hours as needed.  naproxen sodium (ALEVE) 220 mg cap Take 220 mg by mouth daily as needed.  folic acid (FOLVITE) 1 mg tablet take 1 tablet by mouth once daily  0    acetaminophen (TYLENOL) 325 mg tablet Take 325 mg by mouth every four (4) hours as needed for Pain.  triamcinolone (NASACORT AQ) 55 mcg nasal inhaler 2 Sprays by Both Nostrils route daily. Indications: PERENNIAL ALLERGIC RHINITIS      Dexlansoprazole (DEXILANT) 60 mg CpDM Take 1 Cap by mouth Daily (before dinner).  calcium carbonate (TUMS) 200 mg calcium (500 mg) Chew Take 1 Tab by mouth four (4) times daily.  Cetirizine (ZYRTEC) 10 mg Cap Take 10 mg by mouth daily.  montelukast (SINGULAIR) 10 mg tablet Take 10 mg by mouth daily.  allergy injection by SubCUTAneous route every thirty (30) days.  budesonide-formoterol (SYMBICORT) 160-4.5 mcg/Actuation HFA inhaler Take 2 Puffs by inhalation two (2) times a day.  albuterol (PROVENTIL HFA, VENTOLIN HFA) 90 mcg/Actuation inhaler Take 2 Puffs by inhalation every six (6) hours as needed.          Allergies   Allergen Reactions    Adhesive Tape-Silicones Other (comments)    Other Plant, Animal, Environmental Unable to Obtain     MOLD    Sulfasalazine Other (comments)     Could not taste anything, lightheadedness    Tape [Adhesive] Other (comments)     Blisters, use paper tape only  Patient states tegaderm and paper tape are okay       Past Surgical History:   Procedure Laterality Date    HX CERVICAL FUSION  08/13/14    HX LUMBAR LAMINECTOMY  Nov 1995    [facility]    HX LUMBAR LAMINECTOMY  Feb 1997    [facility] [place] [facility]  1/2015    bunion removal from right foot    HX OTHER SURGICAL  2007    thoracentesis    HX OTHER SURGICAL  2008    chest tube    HX OTHER SURGICAL  1997    TMJ surgery    HX OTHER SURGICAL  2012    sinus surg 2012-Dr Boogie Diver.  HX OTHER SURGICAL  2016    spinal cord stimulator place for lumbar neuritis, good benefit    HX ROTATOR CUFF REPAIR Right 01/2019    HX TUBAL LIGATION      [place] ANESTH,SURGERY OF SHOULDER  01/31/2019    [place] COLONOSCOPY FLX DX W/COLLJ SPEC WHEN PFRMD  6-18-08    normal/duntemann       Social History     Socioeconomic History    Marital status:      Spouse name: Not on file    Number of children: Not on file    Years of education: Not on file    Highest education level: Not on file   Occupational History    Not on file   Social Needs    Financial resource strain: Not on file    Food insecurity:     Worry: Not on file     Inability: Not on file    Transportation needs:     Medical: Not on file     Non-medical: Not on file   Tobacco Use    Smoking status: Never Smoker    Smokeless tobacco: Never Used   Substance and Sexual Activity    Alcohol use:  Yes     Alcohol/week: 0.0 standard drinks     Comment: 0-6 per year    Drug use: Yes     Types: Prescription, OTC    Sexual activity: Yes     Partners: Male   Lifestyle    Physical activity:     Days per week: Not on file     Minutes per session: Not on file    Stress: Not on file   Relationships    Social connections:     Talks on phone: Not on file     Gets together: Not on file     Attends [employer] service: Not on file     Active member of club or organization: Not on file     Attends meetings of clubs or organizations: Not on file     Relationship status: Not on file    Intimate partner violence:     Fear of current or ex partner: Not on file     Emotionally abused: Not on file     Physically abused: Not on file     Forced sexual activity: Not on file   Other Topics Concern    Not on file   Social History Narrative    Retired        Visit Vitals  /83   Pulse 75   Temp 97.7 °F (36.5 °C) (Oral)   Resp 15   Ht 5' 10\" (1.778 m)   Wt 190 lb 12.8 oz (86.5 kg)   SpO2 96%   BMI 27.38 kg/m²         PHYSICAL EXAMINATION:  GENERAL: Alert and oriented x3, in no acute distress, well-developed, well-nourished, afebrile. HEART: No JVD. EYES: No scleral icterus   NECK: No significant lymphadenopathy   LUNGS: No respiratory compromise or indrawing  ABDOMEN: Soft, non-tender, non-distended. Electronically signed by:  Deborah Boo MD

## 2019-08-30 ENCOUNTER — HOSPITAL ENCOUNTER (OUTPATIENT)
Dept: MAMMOGRAPHY | Age: 64
Discharge: HOME | End: 2019-08-30
Attending: INTERNAL MEDICINE
Payer: MEDICARE

## 2019-08-30 DIAGNOSIS — Z12.31 ENCOUNTER FOR SCREENING MAMMOGRAM FOR BREAST CANCER: ICD-10-CM

## 2019-08-30 PROCEDURE — 77063 BREAST TOMOSYNTHESIS BI: CPT

## 2019-09-05 ENCOUNTER — HOSPITAL ENCOUNTER (OUTPATIENT)
Dept: GENERAL RADIOLOGY | Age: 64
Discharge: HOME | End: 2019-09-05
Attending: ORTHOPAEDIC SURGERY
Payer: MEDICARE

## 2019-09-05 DIAGNOSIS — M16.11 PRIMARY OSTEOARTHRITIS OF RIGHT HIP: ICD-10-CM

## 2019-09-05 PROCEDURE — 77002 NEEDLE LOCALIZATION BY XRAY: CPT

## 2019-09-05 PROCEDURE — 74011636320 HC RX REV CODE- 636/320: Performed by: ORTHOPAEDIC SURGERY

## 2019-09-05 PROCEDURE — 74011250636 HC RX REV CODE- 250/636: Performed by: ORTHOPAEDIC SURGERY

## 2019-09-05 RX ORDER — LIDOCAINE HYDROCHLORIDE 10 MG/ML
30 INJECTION, SOLUTION EPIDURAL; INFILTRATION; INTRACAUDAL; PERINEURAL
Status: COMPLETED | OUTPATIENT
Start: 2019-09-05 | End: 2019-09-05

## 2019-09-05 RX ORDER — METHYLPREDNISOLONE ACETATE 40 MG/ML
40 INJECTION, SUSPENSION INTRA-ARTICULAR; INTRALESIONAL; INTRAMUSCULAR; SOFT TISSUE
Status: COMPLETED | OUTPATIENT
Start: 2019-09-05 | End: 2019-09-05

## 2019-09-05 RX ADMIN — METHYLPREDNISOLONE ACETATE 40 MG: 40 INJECTION, SUSPENSION INTRA-ARTICULAR; INTRALESIONAL; INTRAMUSCULAR; SOFT TISSUE at 14:01

## 2019-09-05 RX ADMIN — IOPAMIDOL 5 ML: 408 INJECTION, SOLUTION INTRATHECAL at 14:00

## 2019-09-05 RX ADMIN — LIDOCAINE HYDROCHLORIDE 5 ML: 10 INJECTION, SOLUTION EPIDURAL; INFILTRATION; INTRACAUDAL; PERINEURAL at 14:00

## 2019-09-11 ENCOUNTER — OFFICE VISIT (OUTPATIENT)
Dept: OBGYN CLINIC | Age: 64
End: 2019-09-11

## 2019-09-11 VITALS
WEIGHT: 186.2 LBS | OXYGEN SATURATION: 97 % | BODY MASS INDEX: 26.66 KG/M2 | HEART RATE: 83 BPM | DIASTOLIC BLOOD PRESSURE: 74 MMHG | SYSTOLIC BLOOD PRESSURE: 117 MMHG | HEIGHT: 70 IN | RESPIRATION RATE: 20 BRPM

## 2019-09-11 DIAGNOSIS — L90.0 LICHEN SCLEROSUS ET ATROPHICUS: ICD-10-CM

## 2019-09-11 RX ORDER — CLOBETASOL PROPIONATE 0.5 MG/G
OINTMENT TOPICAL
Qty: 45 G | Refills: 3 | Status: SHIPPED | OUTPATIENT
Start: 2019-09-12 | End: 2020-12-02 | Stop reason: SDUPTHER

## 2019-09-11 NOTE — PROGRESS NOTES
Subjective:   71 y.o. female for Well Woman Check. She has no complaints today. She needs a refill on Clobetasol for lichen sclerosis. No LMP recorded. Patient is postmenopausal.    Social History: single partner, contraception - post menopausal status. Pertinent past medical hstory: no history of HTN, DVT, CAD, DM, liver disease, migraines or smoking. Patient Active Problem List   Diagnosis Code    HTN (hypertension) I10    Iron deficiency anemia D50.9    Vitamin B12 deficiency E53.8    Vitamin D deficiency E55.9    Chronic sinusitis J32.9    DNS (deviated nasal septum) J34.2    Hypertriglyceridemia E78.1    Asthma J45.909    Impaired glucose tolerance R73.02    Cervical stenosis of spine M48.02    GERD (gastroesophageal reflux disease) K21.9    Hx MRSA infection Z86.14    S/P cervical spinal fusion Z98.1    Lumbar post-laminectomy syndrome M96.1    Moderate persistent asthma without complication W23.54    Arthritis, lumbar spine M47.816    Rheumatoid arthritis involving multiple sites M06.9    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 45 g 3    methotrexate 25 mg/mL chemo injection by IntraMUSCular route every seven (7) days.       ferrous sulfate 325 mg (65 mg iron) tablet take 1 tablet by mouth once daily BEFORE BREAKFAST 90 Tab 1    VITAMIN C 500 mg tablet take 2 tablets by mouth daily 180 Tab 1    cyanocobalamin 1,000 mcg tablet take 1 tablet by mouth once daily 90 Tab 3    losartan (COZAAR) 50 mg tablet TAKE 1 TABLET DAILY 90 Tab 3    ESTRACE 0.01 % (0.1 mg/gram) vaginal cream Place 1 gm in the vagina twice weekly 42.5 g 2    cholecalciferol, vitamin D3, (VITAMIN D3 PO) Take 1 Tab by mouth daily.  ibuprofen (MOTRIN) 200 mg tablet Take 200 mg by mouth every eight (8) hours as needed.  naproxen sodium (ALEVE) 220 mg cap Take 220 mg by mouth daily as needed.  folic acid (FOLVITE) 1 mg tablet take 1 tablet by mouth once daily  0    acetaminophen (TYLENOL) 325 mg tablet Take 325 mg by mouth every four (4) hours as needed for Pain.  triamcinolone (NASACORT AQ) 55 mcg nasal inhaler 2 Sprays by Both Nostrils route daily. Indications: PERENNIAL ALLERGIC RHINITIS      Dexlansoprazole (DEXILANT) 60 mg CpDM Take 1 Cap by mouth Daily (before dinner).  calcium carbonate (TUMS) 200 mg calcium (500 mg) Chew Take 1 Tab by mouth four (4) times daily.  Cetirizine (ZYRTEC) 10 mg Cap Take 10 mg by mouth daily.  montelukast (SINGULAIR) 10 mg tablet Take 10 mg by mouth daily.  allergy injection by SubCUTAneous route every thirty (30) days.  budesonide-formoterol (SYMBICORT) 160-4.5 mcg/Actuation HFA inhaler Take 2 Puffs by inhalation two (2) times a day.  albuterol (PROVENTIL HFA, VENTOLIN HFA) 90 mcg/Actuation inhaler Take 2 Puffs by inhalation every six (6) hours as needed.        Allergies   Allergen Reactions    Adhesive Tape-Silicones Other (comments)    Other Plant, Animal, Environmental Unable to Obtain     MOLD    Sulfasalazine Other (comments)     Could not taste anything, lightheadedness    Tape [Adhesive] Other (comments)     Blisters, use paper tape only  Patient states tegaderm and paper tape are okay     Past Medical History:   Diagnosis Date    Adjacent segment disease C3/4 w/deg changes, poss stenosis, CT '18 6/22/2018    Allergic rhinitis     Asthma     Back pain     Green's esophagus with esophagitis     Cervical radiculopathy     Chronic sinusitis 5/15/2012    DNS (deviated nasal septum)     Empyema     Foraminal stenosis of cervical region     C4-C5    GERD (gastroesophageal reflux disease)     Dr Way Skill Hx MRSA infection 8/11/2014    Hypertension     Hypertriglyceridemia     Insulin resistance 4/9/2012    Left knee pain     Lichen planus     Menopause     Age 51    MRSA (methicillin resistant Staphylococcus aureus) infection 2008    left lung    Restless leg syndrome     Rheumatoid arthritis ([address]     Spinal cord stimulator status 2016    Spinal stenosis of cervical region     C4-C5 and C5-C6    TMJ syndrome     Wears glasses     and contacts     Past Surgical History:   Procedure Laterality Date    HX CERVICAL FUSION  08/13/14    HX LUMBAR LAMINECTOMY  Nov 1995    [facility]    HX LUMBAR LAMINECTOMY  Feb 1997    [facility]  1/2015    bunion removal from right foot    HX OTHER SURGICAL  2007    thoracentesis    HX OTHER SURGICAL  2008    chest tube    HX OTHER SURGICAL  1997    TMJ surgery    HX OTHER SURGICAL  2012    sinus surg 2012-Dr Arsh Kerns.     HX OTHER SURGICAL  2016    spinal cord stimulator place for lumbar neuritis, good benefit    HX ROTATOR CUFF REPAIR Right 01/2019    HX TUBAL LIGATION      [place] ANESTH,SURGERY OF SHOULDER  01/31/2019    [place] COLONOSCOPY FLX DX W/COLLJ SPEC WHEN PFRMD  6-18-08    normal/duntemann     Family History   Problem Relation Age of Onset    Hypertension Mother     Diabetes Mother    Nicole Bimjeffy Arthritis-rheumatoid Sister     Other Sister         Lupus    Cancer Sister 35        CA, uterus    Other Brother         myocarditis    Heart Attack Brother     Heart Disease Brother     Heart Surgery Brother     Coronary Artery Disease Father     Hypertension Father     Cancer Sister 48        CA, breast    Breast Cancer Sister         Estrogen based    Diabetes Brother     Stroke Maternal Grandmother      Social History     Tobacco Use    Smoking status: Never Smoker    Smokeless tobacco: Never Used   Substance Use Topics    Alcohol use: Never     Alcohol/week: 0.0 standard drinks     Frequency: Never     Comment: 0-6 per year        ROS:  Feeling well. No dyspnea or chest pain on exertion. No abdominal pain, change in bowel habits, black or bloody stools. No urinary tract symptoms. GYN ROS: no breast pain or new or enlarging lumps on self exam. No neurological complaints. Objective:     Visit Vitals  /74   Pulse 83   Resp 20   Ht 5' 10\" (1.778 m)   Wt 186 lb 3.2 oz (84.5 kg)   SpO2 97%   BMI 26.72 kg/m²     The patient appears well, alert, oriented x 3, in no distress. ENT normal.  Neck supple. No adenopathy or thyromegaly. LAKHWINDER. Lungs are clear, good air entry, no wheezes, rhonchi or rales. S1 and S2 normal, no murmurs, regular rate and rhythm. Abdomen soft without tenderness, guarding, mass or organomegaly. Extremities show no edema, normal peripheral pulses. Neurological is normal, no focal findings. BREAST EXAM: breasts appear normal, no suspicious masses, no skin or nipple changes or axillary nodes    PELVIC EXAM: VULVA: Noted loss of architecture. , VAGINA: normal appearing vagina with normal color and discharge, no lesions, CERVIX: normal appearing cervix without discharge or lesions, UTERUS: uterus is normal size, shape, consistency and nontender, ADNEXA: normal adnexa in size, nontender and no masses    Assessment/Plan:   well woman  pap smear up to date. DEXA scan ordered. Recommend completion. counseled on breast self exam, menopause and osteoporosis  return annually or prn    [id number]    1. Lichen sclerosus et atrophicus L90.0 701.0 clobetasol (TEMOVATE) 0.05 % ointment   .

## 2019-09-11 NOTE — PROGRESS NOTES
1. Have you been to the ER, urgent care clinic since your last visit? Hospitalized since your last visit? No    2. Have you seen or consulted any other health care providers outside of the [address] [place] [address] since your last visit? Include any pap smears or colon screening.  No

## 2019-10-02 ENCOUNTER — HOSPITAL ENCOUNTER (OUTPATIENT)
Dept: BONE DENSITY | Age: 64
Discharge: HOME | End: 2019-10-02
Attending: ORTHOPAEDIC SURGERY
Payer: MEDICARE

## 2019-10-02 DIAGNOSIS — Z78.0 POSTMENOPAUSE: ICD-10-CM

## 2019-10-02 PROCEDURE — 77080 DXA BONE DENSITY AXIAL: CPT

## 2019-10-07 DIAGNOSIS — M17.11 PRIMARY OSTEOARTHRITIS OF RIGHT KNEE: ICD-10-CM

## 2019-10-07 DIAGNOSIS — Z01.818 PREOP EXAMINATION: Primary | ICD-10-CM

## 2019-10-08 ENCOUNTER — TELEPHONE (OUTPATIENT)
Dept: INTERNAL MEDICINE CLINIC | Age: 64
End: 2019-10-08

## 2019-10-08 NOTE — TELEPHONE ENCOUNTER
Dr. Cristina Talley office asking for a med clearance on Nov 6,7 or 8th. Pt having a right total knee replacement. Is there a time you can see her?

## 2019-10-09 PROBLEM — M54.12 RIGHT CERVICAL RADICULOPATHY: Status: RESOLVED | Noted: 2018-05-30 | Resolved: 2019-10-09

## 2019-10-09 PROBLEM — M19.011 ARTHRITIS OF RIGHT ACROMIOCLAVICULAR JOINT: Status: RESOLVED | Noted: 2018-06-20 | Resolved: 2019-10-09

## 2019-10-09 NOTE — PROGRESS NOTES
Please let her know that her bone density study shows evidence of osteopenia. Please fax these results to her rheumatologist for review. She can continue to take vitamin D. She should have a follow-up study in 2 years.     Dr. Mitchell Ward  [facility] [address], [address]  Phone: [phone]  Fax: [phone]

## 2019-10-09 NOTE — TELEPHONE ENCOUNTER
Please add her to my schedule for November 6 at 1:00pm if she can make it in at that time for a preop clearance.     Dr. Nubia Lopez  [facility], [address] [place] [address], [facility] [address]  Phone: [phone]  Fax: [phone]

## 2019-10-10 ENCOUNTER — OFFICE VISIT (OUTPATIENT)
Dept: ORTHOPEDIC SURGERY | Facility: CLINIC | Age: 64
End: 2019-10-10

## 2019-10-10 ENCOUNTER — TELEPHONE (OUTPATIENT)
Dept: INTERNAL MEDICINE CLINIC | Age: 64
End: 2019-10-10

## 2019-10-10 VITALS
HEIGHT: 70 IN | DIASTOLIC BLOOD PRESSURE: 80 MMHG | OXYGEN SATURATION: 98 % | TEMPERATURE: 97.7 F | HEART RATE: 71 BPM | WEIGHT: 187.6 LBS | SYSTOLIC BLOOD PRESSURE: 119 MMHG | RESPIRATION RATE: 18 BRPM | BODY MASS INDEX: 26.86 KG/M2

## 2019-10-10 DIAGNOSIS — M17.11 PRIMARY OSTEOARTHRITIS OF RIGHT KNEE: Primary | ICD-10-CM

## 2019-10-10 DIAGNOSIS — M85.859 OSTEOPENIA OF NECK OF FEMUR, UNSPECIFIED LATERALITY: ICD-10-CM

## 2019-10-10 NOTE — PROGRESS NOTES
Patient: Gloria Nelson                MRN: [id number]       SSN: [id number]  YOB: 1955        AGE: 71 y.o. SEX: female  Body mass index is 26.92 kg/m². PCP: Archie Cuadra MD  10/10/19    HISTORY OF PRESENT ILLNESS:  I had the pleasure of reviewing Ms. Meg Hua today. As you know, she is dealing with rheumatoid arthritis a sick child. They are receiving some chemotherapy and also known endstage arthritis of the knees and also of the right hip. She is also complaining of some thumb pain as well, a little worse on the right than on the left. Sometimes it is difficult to open a jam jar. She is looking forward to having the knee replacement done on the right side. She is known valgus. We did an intraarticular injection for the right hip that has actually helped quite a bit. REVIEW OF SYSTEMS:  A 12-point review of systems, I think she has lost a little bit of weight. Pertinent positives updated on the chart. All other systems reviewed and are negative. PHYSICAL EXAMINATION:  On examination, she is a tough lady and actually walks fairly well with only a mildly antalgic gait and a positive movie-theater sign when she first arises from a chair with a fairly good gait pattern. She has arthritis at the base of the thumbs. Her metacarpal phalangeal joints actually do not look too bad today. There is no too much in the way of swelling or ulnar deviation. The left hip rotates fairly well. The right hip is very stiff with only a jog of rotation, which reproduces her groin discomfort. The knees themselves are in valgus. Both feet are warm and well perfused. No footdrop. RADIOGRAPHS:  Review of the x-rays confirm endstage arthritis really of both knees and the right hip. The right has moderately advanced arthritis right hip, severe both knees. IMPRESSION:  Risks and benefits again described regarding total joint replacement, and she elects to proceed.   Her bone density scan shows osteopenia, but not osteoporosis. I think it is safe to proceed. Eventually we will have her see Endocrinology as well. CC:  Medina Cohn M.D. REVIEW OF SYSTEMS:      CON: negative for weight loss, fever  EYE: negative for double vision  ENT: negative for hoarseness  RS:   negative for Tb  GI:    negative for blood in stool  :  negative for blood in urine  Other systems reviewed and noted below.           Past Medical History:   Diagnosis Date    Adjacent segment disease C3/4 w/deg changes, poss stenosis, CT '18 6/22/2018    Allergic rhinitis     Asthma     Back pain     Green's esophagus with esophagitis     Cervical radiculopathy     Chronic sinusitis 5/15/2012    DNS (deviated nasal septum)     Empyema     Foraminal stenosis of cervical region     C4-C5    GERD (gastroesophageal reflux disease)     Dr Keny Baird    Hx MRSA infection 8/11/2014    Hypertension     Hypertriglyceridemia     Insulin resistance 4/9/2012    Left knee pain     Lichen planus     Menopause     Age 51    MRSA (methicillin resistant Staphylococcus aureus) infection 2008    left lung    Restless leg syndrome     Rheumatoid arthritis ([facility] [address]     Spinal cord stimulator status 2016    Spinal stenosis of cervical region     C4-C5 and C5-C6    TMJ syndrome     Wears glasses     and contacts       Family History   Problem Relation Age of Onset    Hypertension Mother    Dafne Doe Diabetes Mother    Dafne Doe Arthritis-rheumatoid Sister     Other Sister         Lupus    Cancer Sister 35        CA, uterus    Other Brother         myocarditis    Heart Attack Brother     Heart Disease Brother     Heart Surgery Brother     Coronary Artery Disease Father     Hypertension Father     Cancer Sister 48        CA, breast    Breast Cancer Sister         Estrogen based    Diabetes Brother     Stroke Maternal Grandmother        Current Outpatient Medications   Medication Sig Dispense Refill    clobetasol (TEMOVATE) 0.05 % ointment Apply  to affected area two (2) days a week. 45 g 3    methotrexate 25 mg/mL chemo injection by IntraMUSCular route every seven (7) days.  ferrous sulfate 325 mg (65 mg iron) tablet take 1 tablet by mouth once daily BEFORE BREAKFAST 90 Tab 1    VITAMIN C 500 mg tablet take 2 tablets by mouth daily 180 Tab 1    cyanocobalamin 1,000 mcg tablet take 1 tablet by mouth once daily 90 Tab 3    losartan (COZAAR) 50 mg tablet TAKE 1 TABLET DAILY 90 Tab 3    ESTRACE 0.01 % (0.1 mg/gram) vaginal cream Place 1 gm in the vagina twice weekly 42.5 g 2    cholecalciferol, vitamin D3, (VITAMIN D3 PO) Take 1 Tab by mouth daily.  ibuprofen (MOTRIN) 200 mg tablet Take 200 mg by mouth every eight (8) hours as needed.  naproxen sodium (ALEVE) 220 mg cap Take 220 mg by mouth daily as needed.  folic acid (FOLVITE) 1 mg tablet take 1 tablet by mouth once daily  0    acetaminophen (TYLENOL) 325 mg tablet Take 325 mg by mouth every four (4) hours as needed for Pain.  triamcinolone (NASACORT AQ) 55 mcg nasal inhaler 2 Sprays by Both Nostrils route daily. Indications: PERENNIAL ALLERGIC RHINITIS      Dexlansoprazole (DEXILANT) 60 mg CpDM Take 1 Cap by mouth Daily (before dinner).  calcium carbonate (TUMS) 200 mg calcium (500 mg) Chew Take 1 Tab by mouth four (4) times daily.  Cetirizine (ZYRTEC) 10 mg Cap Take 10 mg by mouth daily.  montelukast (SINGULAIR) 10 mg tablet Take 10 mg by mouth daily.  allergy injection by SubCUTAneous route every thirty (30) days.  budesonide-formoterol (SYMBICORT) 160-4.5 mcg/Actuation HFA inhaler Take 2 Puffs by inhalation two (2) times a day.  albuterol (PROVENTIL HFA, VENTOLIN HFA) 90 mcg/Actuation inhaler Take 2 Puffs by inhalation every six (6) hours as needed.          Allergies   Allergen Reactions    Adhesive Tape-Silicones Other (comments)    Other Plant, Animal, Environmental Unable to [employer] Sulfasalazine Other (comments)     Could not taste anything, lightheadedness    Tape [Adhesive] Other (comments)     Blisters, use paper tape only  Patient states tegaderm and paper tape are okay       Past Surgical History:   Procedure Laterality Date    HX CERVICAL FUSION  08/13/14    HX LUMBAR LAMINECTOMY  Nov 1995    [facility]    HX LUMBAR LAMINECTOMY  Feb 1997    [facility]  1/2015    bunion removal from right foot    HX OTHER SURGICAL  2007    thoracentesis    HX OTHER SURGICAL  2008    chest tube    HX OTHER SURGICAL  1997    TMJ surgery    HX OTHER SURGICAL  2012    sinus surg 2012-Dr Bisi Oneill.     HX OTHER SURGICAL  2016    spinal cord stimulator place for lumbar neuritis, good benefit    HX ROTATOR CUFF REPAIR Right 01/2019    HX TUBAL LIGATION      [place] ANESTH,SURGERY OF SHOULDER  01/31/2019    NE COLONOSCOPY FLX DX W/COLLJ SPEC WHEN PFRMD  6-18-08    normal/duntemann       Social History     Socioeconomic History    Marital status:      Spouse name: Not on file    Number of children: Not on file    Years of education: Not on file    Highest education level: Not on file   Occupational History    Not on file   Social Needs    Financial resource strain: Not on file    Food insecurity:     Worry: Not on file     Inability: Not on file    Transportation needs:     Medical: Not on file     Non-medical: Not on file   Tobacco Use    Smoking status: Never Smoker    Smokeless tobacco: Never Used   Substance and Sexual Activity    Alcohol use: Never     Alcohol/week: 0.0 standard drinks     Frequency: Never     Comment: 0-6 per year    Drug use: Yes     Types: Prescription, OTC    Sexual activity: Yes     Partners: Male   Lifestyle    Physical activity:     Days per week: Not on file     Minutes per session: Not on file    Stress: Not on file   Relationships    Social connections:     Talks on phone: Not on file     Gets together: Not on file     Attends Rastafarian service: Not on file     Active member of club or organization: Not on file     Attends meetings of clubs or organizations: Not on file     Relationship status: Not on file    Intimate partner violence:     Fear of current or ex partner: Not on file     Emotionally abused: Not on file     Physically abused: Not on file     Forced sexual activity: Not on file   Other Topics Concern    Not on file   Social History Narrative    Retired        Visit Vitals  /80   Pulse 71   Temp 97.7 °F (36.5 °C) (Oral)   Resp 18   Ht 5' 10\" (1.778 m)   Wt 187 lb 9.6 oz (85.1 kg)   SpO2 98%   BMI 26.92 kg/m²         PHYSICAL EXAMINATION:  GENERAL: Alert and oriented x3, in no acute distress, well-developed, well-nourished, afebrile. HEART: No JVD. EYES: No scleral icterus   NECK: No significant lymphadenopathy   LUNGS: No respiratory compromise or indrawing  ABDOMEN: Soft, non-tender, non-distended. Electronically signed by:  Ricardo Wiseman MD

## 2019-10-10 NOTE — TELEPHONE ENCOUNTER
----- Message from Hal Alberto MD sent at 10/9/2019  4:29 PM EDT -----  Please let her know that her bone density study shows evidence of osteopenia. Please fax these results to her rheumatologist for review. She can continue to take vitamin D. She should have a follow-up study in 2 years.     Dr. Viky Guillaume  [facility] [address], [address] [place] [address] Str.  Phone: [phone]  Fax: [phone]

## 2019-10-11 NOTE — TELEPHONE ENCOUNTER
Chief Complaint   Patient presents with    Results     dexa     10-11-19 Patient reached and 2 identifiers were used: Full Name, and Date of Birth verified. The patient was informed, and understands the results will be faxed over to her Rheumatologist Dr Selam Emanuel fax [phone]/ phone [phone] today, confirmation received. The patient has no more questions at this time.

## 2019-10-24 ENCOUNTER — HOSPITAL ENCOUNTER (OUTPATIENT)
Dept: GENERAL RADIOLOGY | Age: 64
Discharge: HOME | End: 2019-10-24
Payer: MEDICARE

## 2019-10-24 DIAGNOSIS — M17.11 PRIMARY OSTEOARTHRITIS OF RIGHT KNEE: ICD-10-CM

## 2019-10-24 DIAGNOSIS — Z01.818 PREOP EXAMINATION: ICD-10-CM

## 2019-10-24 DIAGNOSIS — M79.675 CHRONIC PAIN OF TOE, LEFT: ICD-10-CM

## 2019-10-24 DIAGNOSIS — G89.29 CHRONIC PAIN OF TOE, LEFT: ICD-10-CM

## 2019-10-24 PROCEDURE — 73630 X-RAY EXAM OF FOOT: CPT

## 2019-11-02 RX ORDER — ACETAMINOPHEN 160 MG/5ML
SUSPENSION, ORAL (FINAL DOSE FORM) ORAL
Qty: 180 TAB | Refills: 1 | Status: SHIPPED | OUTPATIENT
Start: 2019-11-02 | End: 2019-12-03

## 2019-11-12 RX ORDER — LANOLIN ALCOHOL/MO/W.PET/CERES
CREAM (GRAM) TOPICAL
Qty: 90 TAB | Refills: 1 | Status: SHIPPED | OUTPATIENT
Start: 2019-11-12 | End: 2020-02-25

## 2019-11-22 ENCOUNTER — APPOINTMENT (OUTPATIENT)
Dept: INTERNAL MEDICINE CLINIC | Age: 64
End: 2019-11-22

## 2019-11-22 ENCOUNTER — HOSPITAL ENCOUNTER (OUTPATIENT)
Dept: LAB | Age: 64
Discharge: HOME | End: 2019-11-22
Payer: MEDICARE

## 2019-11-22 DIAGNOSIS — R73.02 IMPAIRED GLUCOSE TOLERANCE: ICD-10-CM

## 2019-11-22 DIAGNOSIS — M06.9 RHEUMATOID ARTHRITIS INVOLVING MULTIPLE SITES, UNSPECIFIED RHEUMATOID FACTOR PRESENCE: ICD-10-CM

## 2019-11-22 DIAGNOSIS — E55.9 VITAMIN D DEFICIENCY: ICD-10-CM

## 2019-11-22 DIAGNOSIS — I10 ESSENTIAL HYPERTENSION: ICD-10-CM

## 2019-11-22 DIAGNOSIS — E53.8 VITAMIN B12 DEFICIENCY: ICD-10-CM

## 2019-11-22 LAB
ALBUMIN SERPL-MCNC: 4.3 G/DL (ref 3.4–5)
ALBUMIN/GLOB SERPL: 1.2 {RATIO} (ref 0.8–1.7)
ALP SERPL-CCNC: 105 U/L (ref 45–117)
ALT SERPL-CCNC: 33 U/L (ref 13–56)
ANION GAP SERPL CALC-SCNC: 3 MMOL/L (ref 3–18)
AST SERPL-CCNC: 19 U/L (ref 10–38)
BASOPHILS # BLD: 0 K/UL (ref 0–0.1)
BASOPHILS NFR BLD: 1 % (ref 0–2)
BILIRUB SERPL-MCNC: 0.6 MG/DL (ref 0.2–1)
BUN SERPL-MCNC: 15 MG/DL (ref 7–18)
BUN/CREAT SERPL: 19 (ref 12–20)
CALCIUM SERPL-MCNC: 9.9 MG/DL (ref 8.5–10.1)
CHLORIDE SERPL-SCNC: 107 MMOL/L (ref 100–111)
CO2 SERPL-SCNC: 30 MMOL/L (ref 21–32)
CREAT SERPL-MCNC: 0.77 MG/DL (ref 0.6–1.3)
DIFFERENTIAL METHOD BLD: ABNORMAL
EOSINOPHIL # BLD: 0.2 K/UL (ref 0–0.4)
EOSINOPHIL NFR BLD: 3 % (ref 0–5)
ERYTHROCYTE [DISTWIDTH] IN BLOOD BY AUTOMATED COUNT: 14.8 % (ref 11.6–14.5)
GLOBULIN SER CALC-MCNC: 3.6 G/DL (ref 2–4)
GLUCOSE SERPL-MCNC: 101 MG/DL (ref 74–99)
HCT VFR BLD AUTO: 46.2 % (ref 35–45)
HGB BLD-MCNC: 14.2 G/DL (ref 12–16)
LYMPHOCYTES # BLD: 1.3 K/UL (ref 0.9–3.6)
LYMPHOCYTES NFR BLD: 22 % (ref 21–52)
MCH RBC QN AUTO: 31 PG (ref 24–34)
MCHC RBC AUTO-ENTMCNC: 30.7 G/DL (ref 31–37)
MCV RBC AUTO: 100.9 FL (ref 74–97)
MONOCYTES # BLD: 0.4 K/UL (ref 0.05–1.2)
MONOCYTES NFR BLD: 6 % (ref 3–10)
NEUTS SEG # BLD: 4.2 K/UL (ref 1.8–8)
NEUTS SEG NFR BLD: 68 % (ref 40–73)
PLATELET # BLD AUTO: 299 K/UL (ref 135–420)
PMV BLD AUTO: 9.6 FL (ref 9.2–11.8)
POTASSIUM SERPL-SCNC: 4.4 MMOL/L (ref 3.5–5.5)
PROT SERPL-MCNC: 7.9 G/DL (ref 6.4–8.2)
RBC # BLD AUTO: 4.58 M/UL (ref 4.2–5.3)
SODIUM SERPL-SCNC: 140 MMOL/L (ref 136–145)
WBC # BLD AUTO: 6.1 K/UL (ref 4.6–13.2)

## 2019-11-22 PROCEDURE — 82043 UR ALBUMIN QUANTITATIVE: CPT

## 2019-11-22 PROCEDURE — 82607 VITAMIN B-12: CPT

## 2019-11-22 PROCEDURE — 82306 VITAMIN D 25 HYDROXY: CPT

## 2019-11-22 PROCEDURE — 83036 HEMOGLOBIN GLYCOSYLATED A1C: CPT

## 2019-11-22 PROCEDURE — 36415 COLL VENOUS BLD VENIPUNCTURE: CPT

## 2019-11-22 PROCEDURE — 80053 COMPREHEN METABOLIC PANEL: CPT

## 2019-11-22 PROCEDURE — 85025 COMPLETE CBC W/AUTO DIFF WBC: CPT

## 2019-11-22 PROCEDURE — 80061 LIPID PANEL: CPT

## 2019-11-23 LAB
25(OH)D3 SERPL-MCNC: 38.8 NG/ML (ref 30–100)
CHOLEST SERPL-MCNC: 175 MG/DL
CREAT UR-MCNC: 87 MG/DL (ref 30–125)
HBA1C MFR BLD: 5.4 % (ref 4.2–5.6)
HDLC SERPL-MCNC: 42 MG/DL (ref 40–60)
HDLC SERPL: 4.2 {RATIO} (ref 0–5)
LDLC SERPL CALC-MCNC: 110.4 MG/DL (ref 0–100)
LIPID PROFILE,FLP: ABNORMAL
MICROALBUMIN UR-MCNC: 0.84 MG/DL (ref 0–3)
MICROALBUMIN/CREAT UR-RTO: 10 MG/G (ref 0–30)
TRIGL SERPL-MCNC: 113 MG/DL (ref ?–150)
VIT B12 SERPL-MCNC: 745 PG/ML (ref 211–911)
VLDLC SERPL CALC-MCNC: 22.6 MG/DL

## 2019-11-23 NOTE — PROGRESS NOTES
I will discuss results with her at her upcoming appointment. Her vitamin D level is normal at 38. Her total cholesterol is 175. Her triglycerides are 113. Her HDL is 42. Her LDL is up to 110 from 86 a year ago. Her B12 level is normal.  Her A1c is normal at 5.4.  5.8 a year ago. Kidney and liver function labs are unremarkable. Her CBC does not show any significant abnormalities.     Dr. Diana Alvarenga  [facility] [address], [address] [place] [address] Str.  Phone: [phone]  Fax: [phone]

## 2019-12-03 ENCOUNTER — OFFICE VISIT (OUTPATIENT)
Dept: INTERNAL MEDICINE CLINIC | Age: 64
End: 2019-12-03

## 2019-12-03 VITALS
DIASTOLIC BLOOD PRESSURE: 80 MMHG | HEART RATE: 85 BPM | WEIGHT: 190 LBS | RESPIRATION RATE: 15 BRPM | TEMPERATURE: 98.4 F | SYSTOLIC BLOOD PRESSURE: 127 MMHG | BODY MASS INDEX: 27.2 KG/M2 | HEIGHT: 70 IN | OXYGEN SATURATION: 97 %

## 2019-12-03 DIAGNOSIS — I10 ESSENTIAL HYPERTENSION: Primary | ICD-10-CM

## 2019-12-03 DIAGNOSIS — M25.561 CHRONIC PAIN OF RIGHT KNEE: ICD-10-CM

## 2019-12-03 DIAGNOSIS — K21.9 GASTROESOPHAGEAL REFLUX DISEASE, ESOPHAGITIS PRESENCE NOT SPECIFIED: ICD-10-CM

## 2019-12-03 DIAGNOSIS — M06.9 RHEUMATOID ARTHRITIS INVOLVING MULTIPLE SITES, UNSPECIFIED RHEUMATOID FACTOR PRESENCE: ICD-10-CM

## 2019-12-03 DIAGNOSIS — E78.1 HYPERTRIGLYCERIDEMIA: ICD-10-CM

## 2019-12-03 DIAGNOSIS — J45.40 MODERATE PERSISTENT ASTHMA WITHOUT COMPLICATION: ICD-10-CM

## 2019-12-03 DIAGNOSIS — E55.9 VITAMIN D DEFICIENCY: ICD-10-CM

## 2019-12-03 DIAGNOSIS — G89.29 CHRONIC PAIN OF RIGHT KNEE: ICD-10-CM

## 2019-12-03 DIAGNOSIS — R73.02 IMPAIRED GLUCOSE TOLERANCE: ICD-10-CM

## 2019-12-03 RX ORDER — ASCORBIC ACID 500 MG
500 TABLET ORAL DAILY
Qty: 90 TAB | Refills: 3 | Status: SHIPPED | OUTPATIENT
Start: 2019-12-03

## 2019-12-03 NOTE — PROGRESS NOTES
INTERNISTS OF [facility]:  12/3/2019, MRN: [id number]      Walker Lopez is a 71 y.o. female and presents to clinic for Hypertension (6 month follow up)    Subjective:   Pt is a 71 female with h/o HTN, GERD with Green's esophagus (followed by ), DJD, lumbar post laminectomy syndrome s/p spinal cord stimulator surgery (followed by Myriam Moe), cervical spinal stenosis, prediabetes (resolved), asthma (followed by John Vines, Pulmonology), HLD, lichens sclerosus, chronic sinusitis, vitamin B12 deficiency, and rheumatoid arthritis (Followed by ), vitamin D deficiency.     1. HTN: BP is 127/80 today. Her most recent labs show normal renal function. She is taking losartan. No adverse side effects or take this medication. 2. HLD: Present for over 6 months. She is not on any cholesterol-lowering medication. Her most recent labs show: Her total cholesterol is 175. Her triglycerides are 113. Her HDL is 42. Her LDL is up to 110 from 86 a year ago. She is not dieting regularly. Her A1C was measured and down to 5.4. It was 5.8 a year ago. 3. Vitamin D Deficiency: Her most recent level is 38. She takes over-the-counter vitamin D.    4. GERD: On Dexilant. No adverse side effects or take this medication. Sx are well controlled but occasionally she will have some heartburn sx when she eats certain foods. She takes tums OTC prn and sx are well controlled with this rx prn.     5. RA: On methotrexate. No adverse side effects of taking this medication. She reports pain along the following jts: right hip/knee/ankle. She has improvement in her pain sx with use of OTC omega 3 supplements. Sx are worse at night after cooking or doing yardwork during the day. Cold weather also worsens her pain. 6. Asthma: Present for over 6 months. Using Symbicort. She is also on Advair and continues to get allergy shots. Using Nasacort for allergy sx. She uses her albuterol on avg: rarely.   Cold weather and exertion triggers her sx. Since her last appointment, she met with Pulmonology. Per the last note, she is to follow-up with him this month. She sees  every 3 months. 7. Right Knee Pain: Since her last appointment, she met with Dr. Monalisa Schroeder who plans to do a right knee replacement given persistent worsening chronic right knee pain. Her surgery is scheduled for February 2020. Patient Active Problem List    Diagnosis Date Noted    Adjacent segment disease C3/4 w/deg changes, poss stenosis, CT '18 69/04/8006    Lichen sclerosus et atrophicus 05/17/2018    Green esophagus 11/16/2017    Rheumatoid arthritis involving multiple sites 02/15/2017    Arthritis, lumbar spine 04/22/2016    Moderate persistent asthma without complication 87/79/1057    Lumbar post-laminectomy syndrome 12/10/2015    S/P cervical spinal fusion 08/13/2014    Cervical stenosis of spine 08/11/2014    GERD (gastroesophageal reflux disease) 08/11/2014    Hx MRSA infection 08/11/2014    Impaired glucose tolerance 06/30/2013    Asthma 12/28/2012     Class: Chronic    Hypertriglyceridemia 12/16/2012    DNS (deviated nasal septum)     HTN (hypertension) 06/28/2010    Iron deficiency anemia 06/28/2010    Vitamin B12 deficiency 06/28/2010    Vitamin D deficiency 06/28/2010       Current Outpatient Medications   Medication Sig Dispense Refill    krill/om-3/dha/epa/phospho/ast (MAXIMUM RED KRILL OMEGA-3 PO) Take  by mouth.  ferrous sulfate 325 mg (65 mg iron) tablet take 1 tablet by mouth once daily before breakfast 90 Tab 1    VITAMIN C 500 mg tablet take 2 tablets by mouth once daily 180 Tab 1    clobetasol (TEMOVATE) 0.05 % ointment Apply  to affected area two (2) days a week. 45 g 3    methotrexate 25 mg/mL chemo injection by IntraMUSCular route every seven (7) days.       cyanocobalamin 1,000 mcg tablet take 1 tablet by mouth once daily 90 Tab 3    losartan (COZAAR) 50 mg tablet TAKE 1 TABLET DAILY 90 Tab 3    ESTRACE 0.01 % (0.1 mg/gram) vaginal cream Place 1 gm in the vagina twice weekly 42.5 g 2    cholecalciferol, vitamin D3, (VITAMIN D3 PO) Take 1 Tab by mouth daily.  ibuprofen (MOTRIN) 200 mg tablet Take 200 mg by mouth every eight (8) hours as needed.  naproxen sodium (ALEVE) 220 mg cap Take 220 mg by mouth daily as needed.  folic acid (FOLVITE) 1 mg tablet take 1 tablet by mouth once daily  0    acetaminophen (TYLENOL) 325 mg tablet Take 325 mg by mouth every four (4) hours as needed for Pain.  triamcinolone (NASACORT AQ) 55 mcg nasal inhaler 2 Sprays by Both Nostrils route daily. Indications: PERENNIAL ALLERGIC RHINITIS      Dexlansoprazole (DEXILANT) 60 mg CpDM Take 1 Cap by mouth Daily (before dinner).  calcium carbonate (TUMS) 200 mg calcium (500 mg) Chew Take 1 Tab by mouth four (4) times daily.  Cetirizine (ZYRTEC) 10 mg Cap Take 10 mg by mouth daily.  montelukast (SINGULAIR) 10 mg tablet Take 10 mg by mouth daily.  allergy injection by SubCUTAneous route every thirty (30) days.  budesonide-formoterol (SYMBICORT) 160-4.5 mcg/Actuation HFA inhaler Take 2 Puffs by inhalation two (2) times a day.  albuterol (PROVENTIL HFA, VENTOLIN HFA) 90 mcg/Actuation inhaler Take 2 Puffs by inhalation every six (6) hours as needed.          Allergies   Allergen Reactions    Adhesive Tape-Silicones Other (comments)    Other Plant, Animal, Environmental Unable to Obtain     MOLD    Sulfasalazine Other (comments)     Could not taste anything, lightheadedness    Tape [Adhesive] Other (comments)     Blisters, use paper tape only  Patient states tegaderm and paper tape are okay       Past Medical History:   Diagnosis Date    Adjacent segment disease C3/4 w/deg changes, poss stenosis, CT '18 6/22/2018    Allergic rhinitis     Asthma     Back pain     Green's esophagus with esophagitis     Cervical radiculopathy     Chronic sinusitis 5/15/2012    DNS (deviated nasal septum)     Empyema     Foraminal stenosis of cervical region     C4-C5    GERD (gastroesophageal reflux disease)     Dr Johana Sorto Hx MRSA infection 8/11/2014    Hypertension     Hypertriglyceridemia     Insulin resistance 4/9/2012    Left knee pain     Lichen planus     Menopause     Age 51    MRSA (methicillin resistant Staphylococcus aureus) infection 2008    left lung    Restless leg syndrome     Rheumatoid arthritis ([address]     Spinal cord stimulator status 2016    Spinal stenosis of cervical region     C4-C5 and C5-C6    TMJ syndrome     Wears glasses     and contacts       Past Surgical History:   Procedure Laterality Date    HX CERVICAL FUSION  08/13/14    HX LUMBAR LAMINECTOMY  Nov 1995    [facility]    HX LUMBAR LAMINECTOMY  Feb 1997    [facility]  1/2015    bunion removal from right foot    HX OTHER SURGICAL  2007    thoracentesis    HX OTHER SURGICAL  2008    chest tube    HX OTHER SURGICAL  1997    TMJ surgery    HX OTHER SURGICAL  2012    sinus surg 2012-Dr Latrice Lee.     HX OTHER SURGICAL  2016    spinal cord stimulator place for lumbar neuritis, good benefit    HX ROTATOR CUFF REPAIR Right 01/2019    HX TUBAL LIGATION      SD ANESTH,SURGERY OF SHOULDER  01/31/2019    SD COLONOSCOPY FLX DX W/COLLJ SPEC WHEN PFRMD  6-18-08    normal/duntemann       Family History   Problem Relation Age of Onset    Hypertension Mother     Diabetes Mother    24 \[facility] [place] [facility]\"" Arthritis-rheumatoid Sister     Other Sister         Lupus    Cancer Sister 35        [place], uterus    Other Brother         myocarditis    Heart Attack Brother     Heart Disease Brother     Heart Surgery Brother     Coronary Artery Disease Father     Hypertension Father     Cancer Sister 48        [place], breast    Breast Cancer Sister         Estrogen based    Diabetes Brother     Stroke Maternal Grandmother        Social History     Tobacco Use    Smoking status: Never Smoker    Smokeless tobacco: Never Used   Substance Use Topics    Alcohol use: Never     Alcohol/week: 0.0 standard drinks     Frequency: Never     Comment: 0-6 per year       ROS   Review of Systems   Constitutional: Negative for chills, fever and weight loss. HENT: Negative for ear pain and sore throat. Eyes: Negative for blurred vision and pain. Respiratory: Negative for cough and shortness of breath. Cardiovascular: Negative for chest pain. Gastrointestinal: Negative for abdominal pain. Genitourinary: Negative for dysuria and hematuria. Musculoskeletal: Positive for joint pain (right knee/hip/ankle pain). Negative for myalgias. Skin: Negative for rash. Neurological: Negative for tingling, focal weakness and headaches. Endo/Heme/Allergies: Does not bruise/bleed easily. Psychiatric/Behavioral: Negative for substance abuse. Objective     Vitals:    12/03/19 0859   BP: 127/80   Pulse: 85   Resp: 15   Temp: 98.4 °F (36.9 °C)   TempSrc: Oral   SpO2: 97%   Weight: 190 lb (86.2 kg)   Height: 5' 10\" (1.778 m)   PainSc:   0 - No pain       Physical Exam  Vitals signs and nursing note reviewed. HENT:      Head: Normocephalic and atraumatic. Right Ear: External ear normal.      Left Ear: External ear normal.      Nose: Nose normal.      Mouth/Throat:      Pharynx: No oropharyngeal exudate. Eyes:      General: No scleral icterus. Right eye: No discharge. Left eye: No discharge. Conjunctiva/sclera: Conjunctivae normal.   Neck:      Musculoskeletal: Neck supple. Cardiovascular:      Rate and Rhythm: Normal rate and regular rhythm. Heart sounds: Normal heart sounds. No murmur. No friction rub. No gallop. Pulmonary:      Effort: Pulmonary effort is normal. No respiratory distress. Breath sounds: Normal breath sounds. No wheezing or rales. Chest:      Chest wall: No tenderness. Abdominal:      General: Bowel sounds are normal. There is no distension. Palpations: Abdomen is soft. There is no mass. Tenderness: There is no tenderness. There is no guarding or rebound. Musculoskeletal:         General: No tenderness (Bue except along her wrists). Lymphadenopathy:      Cervical: No cervical adenopathy. Skin:     General: Skin is warm and dry. Findings: No erythema. Neurological:      Mental Status: She is alert and oriented to person, place, and time. Motor: No abnormal muscle tone. Gait: Gait is intact. Psychiatric:         Mood and Affect: Mood and affect normal.         LABS   Data Review:   Lab Results   Component Value Date/Time    WBC 6.1 11/22/2019 08:59 AM    HGB 14.2 11/22/2019 08:59 AM    HCT 46.2 (H) 11/22/2019 08:59 AM    PLATELET 739 14/90/2251 08:59 AM    .9 (H) 11/22/2019 08:59 AM       Lab Results   Component Value Date/Time    Sodium 140 11/22/2019 08:59 AM    Potassium 4.4 11/22/2019 08:59 AM    Chloride 107 11/22/2019 08:59 AM    CO2 30 11/22/2019 08:59 AM    Anion gap 3 11/22/2019 08:59 AM    Glucose 101 (H) 11/22/2019 08:59 AM    BUN 15 11/22/2019 08:59 AM    Creatinine 0.77 11/22/2019 08:59 AM    BUN/Creatinine ratio 19 11/22/2019 08:59 AM    GFR est AA >60 11/22/2019 08:59 AM    GFR est non-AA >60 11/22/2019 08:59 AM    Calcium 9.9 11/22/2019 08:59 AM       Lab Results   Component Value Date/Time    Cholesterol, total 175 11/22/2019 08:59 AM    HDL Cholesterol 42 11/22/2019 08:59 AM    LDL, calculated 110.4 (H) 11/22/2019 08:59 AM    VLDL, calculated 22.6 11/22/2019 08:59 AM    Triglyceride 113 11/22/2019 08:59 AM    CHOL/HDL Ratio 4.2 11/22/2019 08:59 AM       Lab Results   Component Value Date/Time    Hemoglobin A1c 5.4 11/22/2019 08:59 AM       Assessment/Plan:   1. Rheumatoid arthritis:  -Continue with Rx for Rheumatology recommendations. Continue to follow-up with them. 2.  Hypertension: Stable. -Continue with Rx as prescribed. -Return to clinic for recheck.     3. Vitamin D Deficiency: Her most recent level is normal.  -Continue with over-the-counter vitamin D.    4. HLD: LDL is up a little. -I encouraged her to exercise regularly and to limit her carbs/sweets. She admits to eating a lot of candy recently.  -I will recheck her weight at her follow-up appoint. 5.  GERD: Stable. -Continue with Rx as prescribed. Modification measures. 6.  Asthma: Stable. -Continue with Rx as prescribed. -Continue with immunotherapy with Dr. Ghanshyam Monroe. 7.  Right Knee Pain:  -Return to clinic next month for a preoperative clearance. Health Maintenance Due   Topic Date Due    MEDICARE YEARLY EXAM  06/04/2019    GLAUCOMA SCREENING Q2Y  06/21/2019     Lab review: labs are reviewed in the EHR    I have discussed the diagnosis with the patient and the intended plan as seen in the above orders. The patient has received an after-visit summary and questions were answered concerning future plans. I have discussed medication side effects and warnings with the patient as well. I have reviewed the plan of care with the patient, accepted their input and they are in agreement with the treatment goals. All questions were answered. The patient understands the plan of care. Handouts provided today with above information. Pt instructed if symptoms worsen to call the office or report to the ED for continued care. Greater than 50% of the visit time was spent in counseling and/or coordination of care. Voice recognition was used to generate this report, which may have resulted in some phonetic based errors in grammar and contents. Even though attempts were made to correct all the mistakes, some may have been missed, and remained in the body of the document. Follow-up and Dispositions    · Return for BP check.          Hal Alberto MD

## 2019-12-03 NOTE — PATIENT INSTRUCTIONS
Body Mass Index: Care Instructions  Your Care Instructions    Body mass index (BMI) can help you see if your weight is raising your risk for health problems. It uses a formula to compare how much you weigh with how tall you are. · A BMI lower than 18.5 is considered underweight. · A BMI between 18.5 and 24.9 is considered healthy. · A BMI between 25 and 29.9 is considered overweight. A BMI of 30 or higher is considered obese. If your BMI is in the normal range, it means that you have a lower risk for weight-related health problems. If your BMI is in the overweight or obese range, you may be at increased risk for weight-related health problems, such as high blood pressure, heart disease, stroke, arthritis or joint pain, and diabetes. If your BMI is in the underweight range, you may be at increased risk for health problems such as fatigue, lower protection (immunity) against illness, muscle loss, bone loss, hair loss, and hormone problems. BMI is just one measure of your risk for weight-related health problems. You may be at higher risk for health problems if you are not active, you eat an unhealthy diet, or you drink too much alcohol or use tobacco products. Follow-up care is a key part of your treatment and safety. Be sure to make and go to all appointments, and call your doctor if you are having problems. It's also a good idea to know your test results and keep a list of the medicines you take. How can you care for yourself at home? · Practice healthy eating habits. This includes eating plenty of fruits, vegetables, whole grains, lean protein, and low-fat dairy. · If your doctor recommends it, get more exercise. Walking is a good choice. Bit by bit, increase the amount you walk every day. Try for at least 30 minutes on most days of the week. · Do not smoke. Smoking can increase your risk for health problems. If you need help quitting, talk to your doctor about stop-smoking programs and medicines. These can increase your chances of quitting for good. · Limit alcohol to 2 drinks a day for men and 1 drink a day for women. Too much alcohol can cause health problems. If you have a BMI higher than 25  · Your doctor may do other tests to check your risk for weight-related health problems. This may include measuring the distance around your waist. A waist measurement of more than 40 inches in men or 35 inches in women can increase the risk of weight-related health problems. · Talk with your doctor about steps you can take to stay healthy or improve your health. You may need to make lifestyle changes to lose weight and stay healthy, such as changing your diet and getting regular exercise. If you have a BMI lower than 18.5  · Your doctor may do other tests to check your risk for health problems. · Talk with your doctor about steps you can take to stay healthy or improve your health. You may need to make lifestyle changes to gain or maintain weight and stay healthy, such as getting more healthy foods in your diet and doing exercises to build muscle. Where can you learn more? Go to http://june-yasmeen.info/. Enter S176 in the search box to learn more about \"Body Mass Index: Care Instructions. \"  Current as of: March 28, 2019  Content Version: 12.2  © 0866-4904 [employer]. Care instructions adapted under license by [employer] (which disclaims liability or warranty for this information). If you have questions about a medical condition or this instruction, always ask your healthcare professional. [address] any warranty or liability for your use of this information.

## 2019-12-03 NOTE — PROGRESS NOTES
Mario Gee presents today for   Chief Complaint   Patient presents with    Hypertension     6 month follow up              Depression Screening:  3 most recent PHQ Screens 10/10/2019   PHQ Not Done Patient Decline   Little interest or pleasure in doing things -   Feeling down, depressed, irritable, or hopeless -   Total Score PHQ 2 -       Learning Assessment:  Learning Assessment 5/31/2019   PRIMARY LEARNER Patient   HIGHEST LEVEL OF EDUCATION - PRIMARY LEARNER  > 4 YEARS OF COLLEGE   BARRIERS PRIMARY LEARNER NONE   CO-LEARNER CAREGIVER No   PRIMARY LANGUAGE ENGLISH   LEARNER PREFERENCE PRIMARY DEMONSTRATION     -     -     -     -   ANSWERED BY patient     -   RELATIONSHIP SELF       Abuse Screening:  Abuse Screening Questionnaire 5/31/2019   Do you ever feel afraid of your partner? N   Are you in a relationship with someone who physically or mentally threatens you? N   Is it safe for you to go home? Y       Fall Risk  Fall Risk Assessment, last 12 mths 10/10/2019   Able to walk? Yes   Fall in past 12 months? No   Fall with injury? -   Number of falls in past 12 months -   Fall Risk Score -           Coordination of Care:  1. Have you been to the ER, urgent care clinic since your last visit? Hospitalized since your last visit? no    2. Have you seen or consulted any other health care providers outside of the [address] [place] [address] since your last visit? Include any pap smears or colon screening. No      Advance Directive:  1. Do you have an advance directive in place?  Patient Reply:YES    2.  Per patient no changes to their ACP contact NO.

## 2019-12-10 DIAGNOSIS — I10 ESSENTIAL HYPERTENSION WITH GOAL BLOOD PRESSURE LESS THAN 140/90: ICD-10-CM

## 2019-12-11 RX ORDER — LOSARTAN POTASSIUM 50 MG/1
TABLET ORAL
Qty: 90 TAB | Refills: 3 | Status: SHIPPED | OUTPATIENT
Start: 2019-12-11 | End: 2020-02-06 | Stop reason: SDUPTHER

## 2020-01-20 ENCOUNTER — HOSPITAL ENCOUNTER (OUTPATIENT)
Dept: GENERAL RADIOLOGY | Age: 65
Discharge: HOME | End: 2020-01-20
Payer: MEDICARE

## 2020-01-20 ENCOUNTER — HOSPITAL ENCOUNTER (OUTPATIENT)
Dept: PREADMISSION TESTING | Age: 65
Discharge: HOME | End: 2020-01-20
Payer: MEDICARE

## 2020-01-20 DIAGNOSIS — M17.11 PRIMARY OSTEOARTHRITIS OF RIGHT KNEE: ICD-10-CM

## 2020-01-20 DIAGNOSIS — Z01.818 PREOP EXAMINATION: ICD-10-CM

## 2020-01-20 LAB
ABO + RH BLD: NORMAL
ALBUMIN SERPL-MCNC: 3.8 G/DL (ref 3.4–5)
ANION GAP SERPL CALC-SCNC: 6 MMOL/L (ref 3–18)
APPEARANCE UR: CLEAR
APTT PPP: 30.9 SEC (ref 23–36.4)
ATRIAL RATE: 70 BPM
BACTERIA URNS QL MICRO: ABNORMAL /HPF
BASOPHILS # BLD: 0 K/UL (ref 0–0.1)
BASOPHILS NFR BLD: 1 % (ref 0–2)
BILIRUB UR QL: NEGATIVE
BLOOD GROUP ANTIBODIES SERPL: NORMAL
BUN SERPL-MCNC: 13 MG/DL (ref 7–18)
BUN/CREAT SERPL: 20 (ref 12–20)
CALCIUM SERPL-MCNC: 9.1 MG/DL (ref 8.5–10.1)
CALCULATED P AXIS, ECG09: 65 DEGREES
CALCULATED R AXIS, ECG10: -2 DEGREES
CALCULATED T AXIS, ECG11: 25 DEGREES
CHLORIDE SERPL-SCNC: 109 MMOL/L (ref 100–111)
CO2 SERPL-SCNC: 27 MMOL/L (ref 21–32)
COLOR UR: YELLOW
CREAT SERPL-MCNC: 0.65 MG/DL (ref 0.6–1.3)
DIAGNOSIS, 93000: NORMAL
DIFFERENTIAL METHOD BLD: ABNORMAL
EOSINOPHIL # BLD: 0.2 K/UL (ref 0–0.4)
EOSINOPHIL NFR BLD: 4 % (ref 0–5)
EPITH CASTS URNS QL MICRO: ABNORMAL /LPF (ref 0–5)
ERYTHROCYTE [DISTWIDTH] IN BLOOD BY AUTOMATED COUNT: 14.7 % (ref 11.6–14.5)
EST. AVERAGE GLUCOSE BLD GHB EST-MCNC: 97 MG/DL
GLUCOSE SERPL-MCNC: 66 MG/DL (ref 74–99)
GLUCOSE UR STRIP.AUTO-MCNC: NEGATIVE MG/DL
HBA1C MFR BLD: 5 % (ref 4.2–5.6)
HCT VFR BLD AUTO: 41.6 % (ref 35–45)
HGB BLD-MCNC: 13.3 G/DL (ref 12–16)
HGB UR QL STRIP: NEGATIVE
INR PPP: 0.9 (ref 0.8–1.2)
KETONES UR QL STRIP.AUTO: NEGATIVE MG/DL
LEUKOCYTE ESTERASE UR QL STRIP.AUTO: ABNORMAL
LYMPHOCYTES # BLD: 0.9 K/UL (ref 0.9–3.6)
LYMPHOCYTES NFR BLD: 15 % (ref 21–52)
MCH RBC QN AUTO: 30.4 PG (ref 24–34)
MCHC RBC AUTO-ENTMCNC: 32 G/DL (ref 31–37)
MCV RBC AUTO: 95.2 FL (ref 74–97)
MONOCYTES # BLD: 0.3 K/UL (ref 0.05–1.2)
MONOCYTES NFR BLD: 5 % (ref 3–10)
NEUTS SEG # BLD: 4.4 K/UL (ref 1.8–8)
NEUTS SEG NFR BLD: 75 % (ref 40–73)
NITRITE UR QL STRIP.AUTO: NEGATIVE
P-R INTERVAL, ECG05: 136 MS
PH UR STRIP: 5.5 [PH] (ref 5–8)
PLATELET # BLD AUTO: 271 K/UL (ref 135–420)
PMV BLD AUTO: 9.4 FL (ref 9.2–11.8)
POTASSIUM SERPL-SCNC: 4.1 MMOL/L (ref 3.5–5.5)
PROT UR STRIP-MCNC: NEGATIVE MG/DL
PROTHROMBIN TIME: 12.4 SEC (ref 11.5–15.2)
Q-T INTERVAL, ECG07: 388 MS
QRS DURATION, ECG06: 84 MS
QTC CALCULATION (BEZET), ECG08: 419 MS
RBC # BLD AUTO: 4.37 M/UL (ref 4.2–5.3)
RBC #/AREA URNS HPF: NEGATIVE /HPF (ref 0–5)
SODIUM SERPL-SCNC: 142 MMOL/L (ref 136–145)
SP GR UR REFRACTOMETRY: 1.01 (ref 1–1.03)
SPECIMEN EXP DATE BLD: NORMAL
UROBILINOGEN UR QL STRIP.AUTO: 0.2 EU/DL (ref 0.2–1)
VENTRICULAR RATE, ECG03: 70 BPM
WBC # BLD AUTO: 5.7 K/UL (ref 4.6–13.2)
WBC URNS QL MICRO: ABNORMAL /HPF (ref 0–4)

## 2020-01-20 PROCEDURE — 87086 URINE CULTURE/COLONY COUNT: CPT

## 2020-01-20 PROCEDURE — 36415 COLL VENOUS BLD VENIPUNCTURE: CPT

## 2020-01-20 PROCEDURE — 85730 THROMBOPLASTIN TIME PARTIAL: CPT

## 2020-01-20 PROCEDURE — 85025 COMPLETE CBC W/AUTO DIFF WBC: CPT

## 2020-01-20 PROCEDURE — 71046 X-RAY EXAM CHEST 2 VIEWS: CPT

## 2020-01-20 PROCEDURE — 81001 URINALYSIS AUTO W/SCOPE: CPT

## 2020-01-20 PROCEDURE — 86900 BLOOD TYPING SEROLOGIC ABO: CPT

## 2020-01-20 PROCEDURE — 83036 HEMOGLOBIN GLYCOSYLATED A1C: CPT

## 2020-01-20 PROCEDURE — 80048 BASIC METABOLIC PNL TOTAL CA: CPT

## 2020-01-20 PROCEDURE — 82040 ASSAY OF SERUM ALBUMIN: CPT

## 2020-01-20 PROCEDURE — 93005 ELECTROCARDIOGRAM TRACING: CPT

## 2020-01-20 PROCEDURE — 85610 PROTHROMBIN TIME: CPT

## 2020-01-20 NOTE — PROGRESS NOTES
Alfreda Bhaveshs has decided with their surgeon to have a joint replacement to improve mobility and decrease pain. Joint Replacement Pre-Operative class was attended 1/20/2020. Topics discussed included surgery preparation, what to expect the day of surgery, medications (to include a multimodal approach to pain control and limiting narcotics), nutrition, glycemic control, respiratory therapy, physical and occupational therapy, and discharge planning. Discussed the importance of using these alternative pain management methods with the goal of using less opioid use after surgery and at home. A patient education notebook was provided and the opportunity was given to ask questions. The phone number of the Orthopaedic  was provided for any future questions or concerns.

## 2020-01-20 NOTE — PERIOP NOTES
Marty Willoughby was here today for her PAT appointment. Health assessment was completed and instructions given regarding NPO status, medications, Hibiclens washes, and removal of all jewelry and/or body piercing. Instructed patient not to remove the red Blood Bank armband that was placed on their arm when the Type and Screen was drawn. Instructed to bring walker to the hospital on the day of surgery so it can be properly adjusted by the physical therapist.  Randolph Godoy was given to ask questions and all questions were answered. Understanding of instructions was verbalized.

## 2020-01-21 LAB
BACTERIA SPEC CULT: NORMAL
SERVICE CMNT-IMP: NORMAL

## 2020-01-22 RX ORDER — NITROFURANTOIN 25; 75 MG/1; MG/1
100 CAPSULE ORAL 2 TIMES DAILY
Qty: 10 CAP | Refills: 0 | Status: SHIPPED | OUTPATIENT
Start: 2020-01-22 | End: 2020-02-06 | Stop reason: ALTCHOICE

## 2020-01-27 ENCOUNTER — OFFICE VISIT (OUTPATIENT)
Dept: ORTHOPEDIC SURGERY | Facility: CLINIC | Age: 65
End: 2020-01-27

## 2020-01-27 DIAGNOSIS — M17.11 OSTEOARTHRITIS OF RIGHT KNEE, UNSPECIFIED OSTEOARTHRITIS TYPE: Primary | ICD-10-CM

## 2020-01-29 ENCOUNTER — OFFICE VISIT (OUTPATIENT)
Dept: ORTHOPEDIC SURGERY | Facility: CLINIC | Age: 65
End: 2020-01-29

## 2020-01-29 VITALS
BODY MASS INDEX: 26.26 KG/M2 | HEIGHT: 70 IN | TEMPERATURE: 95.9 F | SYSTOLIC BLOOD PRESSURE: 123 MMHG | HEART RATE: 76 BPM | WEIGHT: 183.4 LBS | OXYGEN SATURATION: 99 % | DIASTOLIC BLOOD PRESSURE: 75 MMHG | RESPIRATION RATE: 18 BRPM

## 2020-01-29 DIAGNOSIS — Z01.818 PRE-OP EXAM: Primary | ICD-10-CM

## 2020-01-29 DIAGNOSIS — M17.11 OSTEOARTHRITIS OF RIGHT KNEE, UNSPECIFIED OSTEOARTHRITIS TYPE: ICD-10-CM

## 2020-01-29 RX ORDER — PREGABALIN 25 MG/1
75 CAPSULE ORAL ONCE
Status: CANCELLED | OUTPATIENT
Start: 2020-01-29 | End: 2020-01-29

## 2020-01-29 RX ORDER — ACETAMINOPHEN 325 MG/1
1000 TABLET ORAL ONCE
Status: CANCELLED | OUTPATIENT
Start: 2020-01-29 | End: 2020-01-29

## 2020-01-29 RX ORDER — WARFARIN 1 MG/1
10 TABLET ORAL ONCE
Status: CANCELLED | OUTPATIENT
Start: 2020-01-29 | End: 2020-01-29

## 2020-01-29 NOTE — H&P (VIEW-ONLY)
[address] [facility], [address] [place] [address] 
[phone] HISTORY & PHYSICAL Patient: Claudette Cruz                MRN: [id number]       SSN: [id number] YOB: 1955        AGE: 71 y.o. SEX: female Body mass index is 26.32 kg/m². PCP: No Bassett MD 
01/29/20 CC: right knee end stage OA Problem List Items Addressed This Visit None Visit Diagnoses Pre-op exam    -  Primary Osteoarthritis of right knee, unspecified osteoarthritis type HPI:  The patient is a pleasant 71 y.o. whom has end stage OA of their Right knee and has failed conservative treatment including but not limited to NSAIDS, cortisone injections, viscosupplementation, PT, and pain medicine. Due to the current findings and affected activities of daily living, surgical intervention is indicated. The alternatives, risks, complications, as well as expected outcome were discussed. These include but are not limited to infection, blood loss, need for blood transfusion, neurovascular damage, DVT, PE,  post-op stiffness and pain, leg length discrepancy, dislocation, anesthetic complications, prothesis longevity, need for more surgery, MI, stroke, and even death. The patient understands and wishes to proceed with surgery. Past Medical History:  
Diagnosis Date  Adjacent segment disease C3/4 w/deg changes, poss stenosis, CT '18 6/22/2018  Allergic rhinitis  Asthma  Back pain  Green's esophagus with esophagitis  Cervical radiculopathy  Chronic sinusitis 5/15/2012  DNS (deviated nasal septum)  Empyema  Foraminal stenosis of cervical region C4-C5  GERD (gastroesophageal reflux disease) Dr Parrish Scriver  Hx MRSA infection 8/11/2014  Hypertension  Hypertriglyceridemia  Insulin resistance 4/9/2012  Left knee pain  Lichen planus  Menopause Age 51  
 MRSA (methicillin resistant Staphylococcus aureus) infection 2008  
 left lung  Nausea & vomiting  Restless leg syndrome  Rheumatoid arthritis ([facility] [address]  Spinal cord stimulator status 2016  Spinal stenosis of cervical region C4-C5 and C5-C6  TMJ syndrome  Wears glasses   
 and contacts Current Outpatient Medications:  
  ascorbic acid, vitamin C, (VITAMIN C) 500 mg tablet, Take 1 Tab by mouth daily. , Disp: 90 Tab, Rfl: 3 
  ferrous sulfate 325 mg (65 mg iron) tablet, take 1 tablet by mouth once daily before breakfast, Disp: 90 Tab, Rfl: 1   clobetasol (TEMOVATE) 0.05 % ointment, Apply  to affected area two (2) days a week., Disp: 45 g, Rfl: 3   cyanocobalamin 1,000 mcg tablet, take 1 tablet by mouth once daily, Disp: 90 Tab, Rfl: 3 
  losartan (COZAAR) 50 mg tablet, TAKE 1 TABLET DAILY, Disp: 90 Tab, Rfl: 3 
  ESTRACE 0.01 % (0.1 mg/gram) vaginal cream, Place 1 gm in the vagina twice weekly, Disp: 42.5 g, Rfl: 2   cholecalciferol, vitamin D3, (VITAMIN D3 PO), Take 1 Tab by mouth daily. , Disp: , Rfl:  
  folic acid (FOLVITE) 1 mg tablet, take 1 tablet by mouth once daily, Disp: , Rfl: 0 
  triamcinolone (NASACORT AQ) 55 mcg nasal inhaler, 2 Sprays by Both Nostrils route daily. Indications: PERENNIAL ALLERGIC RHINITIS, Disp: , Rfl:  
  Dexlansoprazole (DEXILANT) 60 mg CpDM, Take 1 Cap by mouth Daily (before dinner). , Disp: , Rfl:  
  calcium carbonate (TUMS) 200 mg calcium (500 mg) Chew, Take 1 Tab by mouth four (4) times daily. , Disp: , Rfl:  
  Cetirizine (ZYRTEC) 10 mg Cap, Take 10 mg by mouth daily. , Disp: , Rfl:  
  montelukast (SINGULAIR) 10 mg tablet, Take 10 mg by mouth daily. , Disp: , Rfl:  
  allergy injection, by SubCUTAneous route every thirty (30) days. , Disp: , Rfl:  
  budesonide-formoterol (SYMBICORT) 160-4.5 mcg/Actuation HFA inhaler, Take 2 Puffs by inhalation two (2) times a day., Disp: , Rfl:  
   albuterol (PROVENTIL HFA, VENTOLIN HFA) 90 mcg/Actuation inhaler, Take 2 Puffs by inhalation every six (6) hours as needed. , Disp: , Rfl:  
  nitrofurantoin, macrocrystal-monohydrate, (MACROBID) 100 mg capsule, Take 1 Cap by mouth two (2) times a day., Disp: 10 Cap, Rfl: 0 
  losartan (COZAAR) 50 mg tablet, TAKE 1 TABLET DAILY, Disp: 90 Tab, Rfl: 3 
  krill/om-3/dha/epa/phospho/ast (MAXIMUM RED KRILL OMEGA-3 PO), Take  by mouth., Disp: , Rfl:  
  methotrexate 25 mg/mL chemo injection, by IntraMUSCular route every seven (7) days. , Disp: , Rfl:  
  ibuprofen (MOTRIN) 200 mg tablet, Take 200 mg by mouth every eight (8) hours as needed. , Disp: , Rfl:  
  naproxen sodium (ALEVE) 220 mg cap, Take 220 mg by mouth daily as needed. , Disp: , Rfl:  
  acetaminophen (TYLENOL) 325 mg tablet, Take 325 mg by mouth every four (4) hours as needed for Pain., Disp: , Rfl:  
 
Allergies Allergen Reactions  Adhesive Tape-Silicones Other (comments)  Other Plant, Animal, Environmental Unable to Consolidated Doc MOLD  Sulfasalazine Other (comments) Could not taste anything, lightheadedness  Tape [Adhesive] Other (comments) Blisters, use paper tape only Patient states tegaderm and paper tape are okay Social History Socioeconomic History  Marital status:  Spouse name: Not on file  Number of children: Not on file  Years of education: Not on file  Highest education level: Not on file Occupational History  Not on file Social Needs  Financial resource strain: Not on file  Food insecurity:  
  Worry: Not on file Inability: Not on file  Transportation needs:  
  Medical: Not on file Non-medical: Not on file Tobacco Use  Smoking status: Never Smoker  Smokeless tobacco: Never Used Substance and Sexual Activity  Alcohol use: Never Alcohol/week: 0.0 standard drinks Frequency: Never Comment: 0-6 per year  Drug use:  Yes  
 Types: Prescription, OTC  Sexual activity: Yes  
  Partners: Male Lifestyle  Physical activity:  
  Days per week: Not on file Minutes per session: Not on file  Stress: Not on file Relationships  Social connections:  
  Talks on phone: Not on file Gets together: Not on file Attends Cheondoism service: Not on file Active member of club or organization: Not on file Attends meetings of clubs or organizations: Not on file Relationship status: Not on file  Intimate partner violence:  
  Fear of current or ex partner: Not on file Emotionally abused: Not on file Physically abused: Not on file Forced sexual activity: Not on file Other Topics Concern  Not on file Social History Narrative Retired  Past Surgical History:  
Procedure Laterality Date  HX CERVICAL FUSION  08/13/14  HX HEENT TMJ surgery Antoin[place] Border LUMBAR LAMINECTOMY  Nov 1995 [address] [place] Border [facility]UMBAR [facility]   HX ORTHOPAEDIC  1/2015  
 bunion removal from right foot  HX OTHER SURGICAL  2007 thoracentesis  HX OTHER SURGICAL  2008  
 chest tube [address] TMJ surgery  HX OTHER SURGICAL  2012  
 sinus surg 2012-Dr Monika Do.  HX OTHER SURGICAL  2016  
 spinal cord stimulator place for lumbar neuritis, good benefit  HX ROTATOR CUFF REPAIR Right 01/2019  HX TUBAL LIGATION    
 AK ANESTH,SURGERY OF SHOULDER  01/31/2019  AK COLONOSCOPY FLX DX W/COLLJ SPEC WHEN PFRMD  6-18-08  
 normal/duntemann Family History:  Non-contributory. PE: 
Visit Vitals /75 Pulse 76 Temp 95.9 °F (35.5 °C) (Oral) Resp 18 Ht 5' 10\" (1.778 m) Wt 183 lb 6.4 oz (83.2 kg) SpO2 99% BMI 26.32 kg/m² A&O X3, NAD, well develop, well nourished Heart: S1-S2, rrr 
Lungs: CTA bilat Abd: soft, nt, nt, + bs in all quadrants Ext:  Pos distal pulses to DP, PT 
 
 
X-ray: right knee shows end stage OA 
 
 Labs: labs were reviewed and wnl.  ua pos, treated with macrobid A:  Right  knee end stage OA 
 
P:  At this point we will move forward with surgery. Again, the alternatives, risks, complications, as well as expected outcome were discussed and the patient wishes to proceed with surgery. Pt has been instructed to stop aspirin, nsaids, rheumatologic medications and blood thinners. They have also been instructed to continue on any heart and bp meds and to take them the morning of surgery with sips of water. The patient will require in-patient admission. Admission as an in-patient is reasonable and necessary due to increased risk of surgery due to the factors indicated as well as the possible need for prolonged in-hospital or skilled post-acute care in order to improve this patient's functional ability. Angel Chun

## 2020-01-29 NOTE — PATIENT INSTRUCTIONS
Patient: Asia Peterson                MRN: [id number]       SSN: [id number]  YOB: 1955        AGE: 71 y.o. SEX: female  Body mass index is 26.32 kg/m². 01/29/20    DO:  1:  Sit with the leg out straight 5-10 min every hour while awake. Keep the toes pointed       up towards the carina. 2.  Bend your knee 5-10 min every hour. Bend it to the point of pain and hold it for 5-10       Min. 3.  ICE your knee 20 min every hour    DO NOT:    1. Do not place anything under your knee to prop it up  2. Do not sit in the recliner chair.

## 2020-01-29 NOTE — H&P
[address] [facility], [address] [facility]  [phone]           HISTORY & PHYSICAL      Patient: Mandy Medel                MRN: [id number]       SSN: [id number]  YOB: 1955        AGE: 71 y.o. SEX: female  Body mass index is 26.32 kg/m². PCP: Sumaya Rizo MD  01/29/20      CC: right knee end stage OA  Problem List Items Addressed This Visit     None      Visit Diagnoses     Pre-op exam    -  Primary    Osteoarthritis of right knee, unspecified osteoarthritis type                HPI:  The patient is a pleasant 71 y.o. whom has end stage OA of their Right knee and has failed conservative treatment including but not limited to NSAIDS, cortisone injections, viscosupplementation, PT, and pain medicine. Due to the current findings and affected activities of daily living, surgical intervention is indicated. The alternatives, risks, complications, as well as expected outcome were discussed. These include but are not limited to infection, blood loss, need for blood transfusion, neurovascular damage, DVT, PE,  post-op stiffness and pain, leg length discrepancy, dislocation, anesthetic complications, prothesis longevity, need for more surgery, MI, stroke, and even death. The patient understands and wishes to proceed with surgery.       Past Medical History:   Diagnosis Date    Adjacent segment disease C3/4 w/deg changes, poss stenosis, CT '18 6/22/2018    Allergic rhinitis     Asthma     Back pain     Green's esophagus with esophagitis     Cervical radiculopathy     Chronic sinusitis 5/15/2012    DNS (deviated nasal septum)     Empyema     Foraminal stenosis of cervical region     C4-C5    GERD (gastroesophageal reflux disease)     Dr Albertina Steve    Hx MRSA infection 8/11/2014    Hypertension     Hypertriglyceridemia     Insulin resistance 4/9/2012    Left knee pain     Lichen planus     Menopause     Age 51  MRSA (methicillin resistant Staphylococcus aureus) infection 2008    left lung    Nausea & vomiting     Restless leg syndrome     Rheumat[facility] arthritis ([facility] [address]     Spinal cord stimulator status 2016    Spinal stenosis of cervical region     C4-C5 and C5-C6    TMJ syndrome     Wears glasses     and contacts         Current Outpatient Medications:     ascorbic acid, vitamin C, (VITAMIN C) 500 mg tablet, Take 1 Tab by mouth daily. , Disp: 90 Tab, Rfl: 3    ferrous sulfate 325 mg (65 mg iron) tablet, take 1 tablet by mouth once daily before breakfast, Disp: 90 Tab, Rfl: 1    clobetasol (TEMOVATE) 0.05 % ointment, Apply  to affected area two (2) days a week., Disp: 45 g, Rfl: 3    cyanocobalamin 1,000 mcg tablet, take 1 tablet by mouth once daily, Disp: 90 Tab, Rfl: 3    losartan (COZAAR) 50 mg tablet, TAKE 1 TABLET DAILY, Disp: 90 Tab, Rfl: 3    ESTRACE 0.01 % (0.1 mg/gram) vaginal cream, Place 1 gm in the vagina twice weekly, Disp: 42.5 g, Rfl: 2    cholecalciferol, vitamin D3, (VITAMIN D3 PO), Take 1 Tab by mouth daily. , Disp: , Rfl:     folic acid (FOLVITE) 1 mg tablet, take 1 tablet by mouth once daily, Disp: , Rfl: 0    triamcinolone (NASACORT AQ) 55 mcg nasal inhaler, 2 Sprays by Both Nostrils route daily. Indications: PERENNIAL ALLERGIC RHINITIS, Disp: , Rfl:     Dexlansoprazole (DEXILANT) 60 mg CpDM, Take 1 Cap by mouth Daily (before dinner). , Disp: , Rfl:     calcium carbonate (TUMS) 200 mg calcium (500 mg) Chew, Take 1 Tab by mouth four (4) times daily. , Disp: , Rfl:     Cetirizine (ZYRTEC) 10 mg Cap, Take 10 mg by mouth daily. , Disp: , Rfl:     montelukast (SINGULAIR) 10 mg tablet, Take 10 mg by mouth daily. , Disp: , Rfl:     allergy injection, by SubCUTAneous route every thirty (30) days. , Disp: , Rfl:     budesonide-formoterol (SYMBICORT) 160-4.5 mcg/Actuation HFA inhaler, Take 2 Puffs by inhalation two (2) times a day., Disp: , Rfl:     albuterol (PROVENTIL HFA, VENTOLIN HFA) 90 mcg/Actuation inhaler, Take 2 Puffs by inhalation every six (6) hours as needed. , Disp: , Rfl:     nitrofurantoin, macrocrystal-monohydrate, (MACROBID) 100 mg capsule, Take 1 Cap by mouth two (2) times a day., Disp: 10 Cap, Rfl: 0    losartan (COZAAR) 50 mg tablet, TAKE 1 TABLET DAILY, Disp: 90 Tab, Rfl: 3    krill/om-3/dha/epa/phospho/ast (MAXIMUM RED KRILL OMEGA-3 PO), Take  by mouth., Disp: , Rfl:     methotrexate 25 mg/mL chemo injection, by IntraMUSCular route every seven (7) days. , Disp: , Rfl:     ibuprofen (MOTRIN) 200 mg tablet, Take 200 mg by mouth every eight (8) hours as needed. , Disp: , Rfl:     naproxen sodium (ALEVE) 220 mg cap, Take 220 mg by mouth daily as needed. , Disp: , Rfl:     acetaminophen (TYLENOL) 325 mg tablet, Take 325 mg by mouth every four (4) hours as needed for Pain., Disp: , Rfl:     Allergies   Allergen Reactions    Adhesive Tape-Silicones Other (comments)    Other Plant, Animal, Environmental Unable to Obtain     MOLD    Sulfasalazine Other (comments)     Could not taste anything, lightheadedness    Tape [Adhesive] Other (comments)     Blisters, use paper tape only  Patient states tegaderm and paper tape are okay       Social History     Socioeconomic History    Marital status:      Spouse name: Not on file    Number of children: Not on file    Years of education: Not on file    Highest education level: Not on file   Occupational History    Not on file   Social Needs    Financial resource strain: Not on file    Food insecurity:     Worry: Not on file     Inability: Not on file    Transportation needs:     Medical: Not on file     Non-medical: Not on file   Tobacco Use    Smoking status: Never Smoker    Smokeless tobacco: Never Used   Substance and Sexual Activity    Alcohol use: Never     Alcohol/week: 0.0 standard drinks     Frequency: Never     Comment: 0-6 per year    Drug use: Yes     Types: Prescription, OTC    Sexual activity: Yes     Partners: Male   Lifestyle    Physical activity:     Days per week: Not on file     Minutes per session: Not on file    Stress: Not on file   Relationships    Social connections:     Talks on phone: Not on file     Gets together: Not on file     Attends Nondenominational service: Not on file     Active member of club or organization: Not on file     Attends meetings of clubs or organizations: Not on file     Relationship status: Not on file    Intimate partner violence:     Fear of current or ex partner: Not on file     Emotionally abused: Not on file     Physically abused: Not on file     Forced sexual activity: Not on file   Other Topics Concern    Not on file   Social History Narrative    Retired        Past Surgical History:   Procedure Laterality Date    HX CERVICAL FUSION  08/13/14   Scott Joshua      TMJ surgery    HX [facility]UMBAR [facility]AM[facility]  Nov [facility]    HX LUMBAR LAMINECTOMY  Feb 1997    [facility]  1/2015    bunion removal from right foot    HX OTHER SURGICAL  2007    thoracentesis    HX OTHER SURGICAL  2008    chest tube    HX OTHER SURGICAL  1997    TMJ surgery    HX OTHER SURGICAL  2012    sinus surg 2012-Dr Gil Britt.  HX OTHER SURGICAL  2016    spinal cord stimulator place for lumbar neuritis, good benefit    HX ROTATOR CUFF REPAIR Right 01/2019    HX TUBAL LIGATION      AL ANESTH,SURGERY OF SHOULDER  01/31/2019    AL COLONOSCOPY FLX DX W/COLLJ SPEC WHEN PFRMD  6-18-08    normal/duntemann       Family History:  Non-contributory.      PE:  Visit Vitals  /75   Pulse 76   Temp 95.9 °F (35.5 °C) (Oral)   Resp 18   Ht 5' 10\" (1.778 m)   Wt 183 lb 6.4 oz (83.2 kg)   SpO2 99%   BMI 26.32 kg/m²     A&O X3, NAD, well develop, well nourished  Heart: S1-S2, rrr  Lungs: CTA bilat  Abd: soft, nt, nt, + bs in all quadrants  Ext:  Pos distal pulses to DP, PT      X-ray: right knee shows end stage OA    Labs: labs were reviewed and wnl.  ua pos, treated with macrobid    A:  Right  knee end stage OA    P:  At this point we will move forward with surgery. Again, the alternatives, risks, complications, as well as expected outcome were discussed and the patient wishes to proceed with surgery. Pt has been instructed to stop aspirin, nsaids, rheumatologic medications and blood thinners. They have also been instructed to continue on any heart and bp meds and to take them the morning of surgery with sips of water. The patient will require in-patient admission. Admission as an in-patient is reasonable and necessary due to increased risk of surgery due to the factors indicated as well as the possible need for prolonged in-hospital or skilled post-acute care in order to improve this patient's functional ability.         Mary Cee

## 2020-01-30 ENCOUNTER — OFFICE VISIT (OUTPATIENT)
Dept: INTERNAL MEDICINE CLINIC | Age: 65
End: 2020-01-30

## 2020-01-30 VITALS
WEIGHT: 183 LBS | DIASTOLIC BLOOD PRESSURE: 71 MMHG | BODY MASS INDEX: 26.2 KG/M2 | HEART RATE: 82 BPM | TEMPERATURE: 98.1 F | RESPIRATION RATE: 14 BRPM | OXYGEN SATURATION: 96 % | HEIGHT: 70 IN | SYSTOLIC BLOOD PRESSURE: 111 MMHG

## 2020-01-30 DIAGNOSIS — M25.561 CHRONIC PAIN OF RIGHT KNEE: Primary | ICD-10-CM

## 2020-01-30 DIAGNOSIS — G89.29 CHRONIC PAIN OF RIGHT KNEE: Primary | ICD-10-CM

## 2020-01-30 NOTE — PROGRESS NOTES
[facility]NISTS OF [facility]:   Preoperative Evaluation    Date of Exam: 01/30/20    MRN: Janette Rowan  Is a 71 y.o.  female  who presents for preoperative evaluation and management. Chief Complaint   Patient presents with    Pre-op Exam     Scheduled for R TKA on 2-3-2020 with DR. Carlisle. All preop testing completed. Subjective:   Pt is a 71 female with h/o HTN, GERD with Green's esophagus (followed by ), DJD, lumbar post laminectomy syndrome s/p spinal cord stimulator surgery (followed by Alex Lopez), cervical spinal stenosis, prediabetes (resolved), asthma (followed by , Pulmonology), HLD, lichens sclerosus, chronic sinusitis, vitamin B12 deficiency, and rheumatoid arthritis (Followed by ), vitamin D deficiency.     Right Knee Pain: Followed by . Right knee pain worsened since her last apt. She is active doing aqua aerobics. Pain wakes her up from sleep. General Information:  Procedure/Surgery:right TKA  Date of Procedure/Surgery: 2/3/20  [facility]  Primary Physician: Chinmay Rangel MD  Surgery status: Elective  Surgery risk: Intermediate (head/neck, intraperitoneal, intrathoracic, orthopedic, and prostate    Cardiovascular Risk Factors:  1. Coronary revascularization within 5 years: no  2. Recurrent chest pain: no  3. Shortness of breath:  no  4. Recent coronary evaluation/stress test/angiogram:  no  5. Recent MI (less than 1 month ago):  no  6. Prior MI (by way of history or pathological waves):  no  7. Compensated CHF or h/o CHF:  no  8. Severe valvular disease:  no  9. Decompensated CHF:  no  10. High-grade atrioventricular block:  no  11. Arrhythmia:  no    11/29/16 Echocardiogram: Left ventricle: Size was normal. Systolic function was normal. Ejection fraction was estimated to be 65 %. There were no regional wall motion abnormalities.  Wall thickness was normal. Doppler parameters were consistent with abnormal left ventricular relaxation (grade 1 diastolic dysfunction). COMPARISONS: The previous study was not available for direct comparison. Comparison was made with the previous study of 16-Jan-2008. Other Risk Factors:  1. Diabetes hx:  no  2. H/o CVA:  no  3. Uncontrolled hypertension:  no  4, Advanced age:  yes  5. Low functional capacity:  no  6. Recent use of: No recent use of aspirin (ASA), NSAIDS or steroids  7. Tetanus up to date: tetanus status unknown to the patient  8. Anesthesia Complications: None  9. History of abnormal bleeding : None  10. History of Blood Transfusions: no  11. Health Care Directive or Living Will: yes  12.  Latex Allergy: no      Problem List:     Patient Active Problem List    Diagnosis Date Noted    Adjacent segment disease C3/4 w/deg changes, poss stenosis, CT '18 85/24/3356    Lichen sclerosus et atrophicus 05/17/2018    Green esophagus 11/16/2017    Rheumatoid arthritis involving multiple sites 02/15/2017    Arthritis, lumbar spine 04/22/2016    Moderate persistent asthma without complication 80/38/9272    Lumbar post-laminectomy syndrome 12/10/2015    S/P cervical spinal fusion 08/13/2014    Cervical stenosis of spine 08/11/2014    GERD (gastroesophageal reflux disease) 08/11/2014    Hx MRSA infection 08/11/2014    Impaired glucose tolerance 06/30/2013    Hypertriglyceridemia 12/16/2012    DNS (deviated nasal septum)     HTN (hypertension) 06/28/2010    Iron deficiency anemia 06/28/2010    Vitamin B12 deficiency 06/28/2010    Vitamin D deficiency 06/28/2010     Medical History:     Past Medical History:   Diagnosis Date    Adjacent segment disease C3/4 w/deg changes, poss stenosis, CT '18 6/22/2018    Allergic rhinitis     Anemia 2008    intermittant since then    Asthma     Back pain     Green's esophagus with esophagitis     Cervical radiculopathy     Chronic sinusitis 5/15/2012    DNS (deviated nasal septum)     Empyema  Foraminal stenosis of cervical region     C4-C5    GERD (gastroesophageal reflux disease)     Dr Jules Rash Hx MRSA infection 8/11/2014    Hypertension     Hypertriglyceridemia     Insulin resistance 4/9/2012    Left knee pain     Lichen planus     Menopause     Age 51    MRSA (methicillin resistant Staphylococcus aureus) infection 2008    left lung    Nausea & vomiting     Restless leg syndrome     Rheumatoid arthritis ([facility] [address]     Spinal cord stimulator status 2016    Spinal stenosis of cervical region     C4-C5 and C5-C6    TMJ syndrome     Wears glasses     and contacts     Allergies: Allergies   Allergen Reactions    Adhesive Tape-Silicones Other (comments)    Other Plant, Animal, Environmental Unable to Obtain     MOLD    Sulfasalazine Other (comments)     Could not taste anything, lightheadedness    Tape [Adhesive] Other (comments)     Blisters, use paper tape only  Patient states tegaderm and paper tape are okay      Medications:     Current Outpatient Medications   Medication Sig    ascorbic acid, vitamin C, (VITAMIN C) 500 mg tablet Take 1 Tab by mouth daily.  ferrous sulfate 325 mg (65 mg iron) tablet take 1 tablet by mouth once daily before breakfast    clobetasol (TEMOVATE) 0.05 % ointment Apply  to affected area two (2) days a week.  cyanocobalamin 1,000 mcg tablet take 1 tablet by mouth once daily    losartan (COZAAR) 50 mg tablet TAKE 1 TABLET DAILY    ESTRACE 0.01 % (0.1 mg/gram) vaginal cream Place 1 gm in the vagina twice weekly    cholecalciferol, vitamin D3, (VITAMIN D3 PO) Take 1 Tab by mouth daily.  folic acid (FOLVITE) 1 mg tablet take 1 tablet by mouth once daily    acetaminophen (TYLENOL) 325 mg tablet Take 325 mg by mouth every four (4) hours as needed for Pain.  triamcinolone (NASACORT AQ) 55 mcg nasal inhaler 2 Sprays by Both Nostrils route daily.  Indications: PERENNIAL ALLERGIC RHINITIS    Dexlansoprazole (DEXILANT) 60 mg CpDM Take 1 Cap by mouth Daily (before dinner).  calcium carbonate (TUMS) 200 mg calcium (500 mg) Chew Take 1 Tab by mouth four (4) times daily.  Cetirizine (ZYRTEC) 10 mg Cap Take 10 mg by mouth daily.  montelukast (SINGULAIR) 10 mg tablet Take 10 mg by mouth daily.  allergy injection by SubCUTAneous route every thirty (30) days.  budesonide-formoterol (SYMBICORT) 160-4.5 mcg/Actuation HFA inhaler Take 2 Puffs by inhalation two (2) times a day.  albuterol (PROVENTIL HFA, VENTOLIN HFA) 90 mcg/Actuation inhaler Take 2 Puffs by inhalation every six (6) hours as needed.  nitrofurantoin, macrocrystal-monohydrate, (MACROBID) 100 mg capsule Take 1 Cap by mouth two (2) times a day.  losartan (COZAAR) 50 mg tablet TAKE 1 TABLET DAILY    krill/om-3/dha/epa/phospho/ast (MAXIMUM RED KRILL OMEGA-3 PO) Take  by mouth.  methotrexate 25 mg/mL chemo injection by IntraMUSCular route every seven (7) days.  ibuprofen (MOTRIN) 200 mg tablet Take 200 mg by mouth every eight (8) hours as needed.  naproxen sodium (ALEVE) 220 mg cap Take 220 mg by mouth daily as needed. No current facility-administered medications for this visit. Surgical History:     Past Surgical History:   Procedure Laterality Date    HX CERVICAL FUSION  08/13/14   [facility]      TMJ surgery    HX LUMBAR LAMINECTOMY  Nov 1995    [facility]    HX LUMBAR LAMINECTOMY  Feb 1997    [facility]  1/2015    bunion removal from right foot    HX OTHER SURGICAL  2007    thoracentesis    HX OTHER SURGICAL  2008    chest tube    HX OTHER SURGICAL  1997    TMJ surgery    HX OTHER SURGICAL  2012    sinus surg 2012-Dr Polo Verdugo.     HX OTHER SURGICAL  2016    spinal cord stimulator place for lumbar neuritis, good benefit    HX ROTATOR CUFF REPAIR Right 01/2019    HX TUBAL LIGATION      DC ANESTH,SURGERY OF SHOULDER  01/31/2019    DC COLONOSCOPY FLX DX W/COLLJ SPEC WHEN PFRMD  6-18-08    normal/duntemann     Social History:     Social History Socioeconomic History    Marital status:      Spouse name: Not on file    Number of children: Not on file    Years of education: Not on file    Highest education level: Not on file   Tobacco Use    Smoking status: Never Smoker    Smokeless tobacco: Never Used   Substance and Sexual Activity    Alcohol use: No     Alcohol/week: 0.0 standard drinks     Frequency: Never     Comment: 0-6 per year    Drug use: Yes     Types: Prescription, OTC    Sexual activity: Yes     Partners: Male     Comment: Menopausal since 4   Social History Narrative    Retired          REVIEW OF SYSTEMS:  Review of Systems   Constitutional: Negative for chills and fever. HENT: Negative for ear pain and sore throat. Eyes: Negative for blurred vision and pain. Respiratory: Negative for cough and shortness of breath. Cardiovascular: Negative for chest pain. Gastrointestinal: Negative for abdominal pain, blood in stool and melena. Genitourinary: Negative for dysuria and hematuria. Musculoskeletal: Positive for joint pain. Negative for myalgias. Skin: Negative for rash. Neurological: Negative for tingling, focal weakness and headaches. Endo/Heme/Allergies: Does not bruise/bleed easily. Psychiatric/Behavioral: Negative for substance abuse. Objective:     Vitals:    01/30/20 1202   BP: 111/71   Pulse: 82   Resp: 14   Temp: 98.1 °F (36.7 °C)   TempSrc: Oral   SpO2: 96%   Weight: 183 lb (83 kg)   Height: 5' 10\" (1.778 m)   PainSc:   0 - No pain       Physical Exam  Vitals signs and nursing note reviewed. HENT:      Head: Normocephalic and atraumatic. Right Ear: External ear normal.      Left Ear: External ear normal.      Nose: Nose normal.      Mouth/Throat:      Pharynx: No oropharyngeal exudate or posterior oropharyngeal erythema. Eyes:      General: No scleral icterus. Right eye: No discharge. Left eye: No discharge.       Conjunctiva/sclera: Conjunctivae normal.   Neck:      Musculoskeletal: Neck supple. Cardiovascular:      Rate and Rhythm: Normal rate and regular rhythm. Heart sounds: Normal heart sounds. No murmur. No friction rub. No gallop. Pulmonary:      Effort: Pulmonary effort is normal. No respiratory distress. Breath sounds: Normal breath sounds. No wheezing or rales. Chest:      Chest wall: No tenderness. Abdominal:      General: Bowel sounds are normal. There is no distension. Palpations: Abdomen is soft. There is no mass. Tenderness: There is no abdominal tenderness. There is no guarding or rebound. Musculoskeletal:         General: No tenderness (Bue). Comments: Adequate range of motion along her right knee. Lymphadenopathy:      Cervical: No cervical adenopathy. Skin:     General: Skin is warm and dry. Findings: No erythema. Neurological:      Mental Status: She is alert and oriented to person, place, and time. Motor: No abnormal muscle tone. Gait: Gait is intact.  Gait normal.   Psychiatric:         Mood and Affect: Mood and affect normal.         DIAGNOSTICS:   Lab Results   Component Value Date/Time    Sodium 142 01/20/2020 07:19 AM    Potassium 4.1 01/20/2020 07:19 AM    Chloride 109 01/20/2020 07:19 AM    CO2 27 01/20/2020 07:19 AM    Anion gap 6 01/20/2020 07:19 AM    Glucose 66 (L) 01/20/2020 07:19 AM    BUN 13 01/20/2020 07:19 AM    Creatinine 0.65 01/20/2020 07:19 AM    BUN/Creatinine ratio 20 01/20/2020 07:19 AM    GFR est AA >60 01/20/2020 07:19 AM    GFR est non-AA >60 01/20/2020 07:19 AM    Calcium 9.1 01/20/2020 07:19 AM     Lab Results   Component Value Date/Time    WBC 5.7 01/20/2020 07:19 AM    HGB 13.3 01/20/2020 07:19 AM    HCT 41.6 01/20/2020 07:19 AM    PLATELET 161 56/97/8966 07:19 AM    MCV 95.2 01/20/2020 07:19 AM     Lab Results   Component Value Date/Time    Hemoglobin A1c 5.0 01/20/2020 07:19 AM       Assessment/Plan:   Right Knee Pain:   Preoperative Assessment: No contraindications to planned surgery   Orders/studies that need to be obtained prior to surgical clearance: medical clearance has been obtained    Pt is to undergo an intermediate risk procedure with a low cardiac risk based on current history. Labs and imaging within acceptable range. No contraindications to planned surgery. Discontinue NSAIDS 1 week prior to surgical procedure. Follow up as scheduled post operatively. I have discussed the plan of care with the patient. The patient has received an after-visit summary and questions were answered concerning future plans. I have discussed medication side effects and warnings with the patient as well. All questions were answered. The patient understands the plan of care. Handouts provided today with the above information. Pt instructed if symptoms worsen to call the office or report to the ED for continued care. Greater than 50% of the visit time was spent in counseling and/or coordination of care.       Dr. Annie Cifuentes  [facility] of [address].  Phone: [phone]  Fax: [phone]

## 2020-01-30 NOTE — PATIENT INSTRUCTIONS
Knee Pain or Injury: Care Instructions  Your Care Instructions    Injuries are a common cause of knee problems. Sudden (acute) injuries may be caused by a direct blow to the knee. They can also be caused by abnormal twisting, bending, or falling on the knee. Pain, bruising, or swelling may be severe, and may start within minutes of the injury. Overuse is another cause of knee pain. Other causes are climbing stairs, kneeling, and other activities that use the knee. Everyday wear and tear, especially as you get older, also can cause knee pain. Rest, along with home treatment, often relieves pain and allows your knee to heal. If you have a serious knee injury, you may need tests and treatment. Follow-up care is a key part of your treatment and safety. Be sure to make and go to all appointments, and call your doctor if you are having problems. It's also a good idea to know your test results and keep a list of the medicines you take. How can you care for yourself at home? · Be safe with medicines. Read and follow all instructions on the label. ? If the doctor gave you a prescription medicine for pain, take it as prescribed. ? If you are not taking a prescription pain medicine, ask your doctor if you can take an over-the-counter medicine. · Rest and protect your knee. Take a break from any activity that may cause pain. · Put ice or a cold pack on your knee for 10 to 20 minutes at a time. Put a thin cloth between the ice and your skin. · Prop up a sore knee on a pillow when you ice it or anytime you sit or lie down for the next 3 days. Try to keep it above the level of your heart. This will help reduce swelling. · If your knee is not swollen, you can put moist heat, a heating pad, or a warm cloth on your knee. · If your doctor recommends an elastic bandage, sleeve, or other type of support for your knee, wear it as directed.   · Follow your doctor's instructions about how much weight you can put on your leg. Use a cane, crutches, or a walker as instructed. · Follow your doctor's instructions about activity during your healing process. If you can do mild exercise, slowly increase your activity. · Reach and stay at a healthy weight. Extra weight can strain the joints, especially the knees and hips, and make the pain worse. Losing even a few pounds may help. When should you call for help? Call 911 anytime you think you may need emergency care. For example, call if:    · You have symptoms of a blood clot in your lung (called a pulmonary embolism). These may include:  ? Sudden chest pain. ? Trouble breathing. ? Coughing up blood.    Call your doctor now or seek immediate medical care if:    · You have severe or increasing pain.     · Your leg or foot turns cold or changes color.     · You cannot stand or put weight on your knee.     · Your knee looks twisted or bent out of shape.     · You cannot move your knee.     · You have signs of infection, such as:  ? Increased pain, swelling, warmth, or redness. ? Red streaks leading from the knee. ? Pus draining from a place on your knee. ? A fever.     · You have signs of a blood clot in your leg (called a deep vein thrombosis), such as:  ? Pain in your calf, back of the knee, thigh, or groin. ? Redness and swelling in your leg or groin.    Watch closely for changes in your health, and be sure to contact your doctor if:    · You have tingling, weakness, or numbness in your knee.     · You have any new symptoms, such as swelling.     · You have bruises from a knee injury that last longer than 2 weeks.     · You do not get better as expected. Where can you learn more? Go to http://june-yasmeen.info/. Enter K195 in the search box to learn more about \"Knee Pain or Injury: Care Instructions. \"  Current as of: June 26, 2019  Content Version: 12.2  © 0996-9464 [employer].  Care instructions adapted under license by [employer] (which disclaims liability or warranty for this information). If you have questions about a medical condition or this instruction, always ask your healthcare professional. [address] any warranty or liability for your use of this information.

## 2020-02-01 ENCOUNTER — ANESTHESIA EVENT (OUTPATIENT)
Dept: SURGERY | Age: 65
DRG: 470 | End: 2020-02-01
Payer: MEDICARE

## 2020-02-03 ENCOUNTER — HOME HEALTH ADMISSION (OUTPATIENT)
Dept: HOME HEALTH SERVICES | Facility: HOME HEALTH | Age: 65
End: 2020-02-03
Payer: MEDICARE

## 2020-02-03 ENCOUNTER — APPOINTMENT (OUTPATIENT)
Dept: GENERAL RADIOLOGY | Age: 65
DRG: 470 | End: 2020-02-03
Attending: ORTHOPAEDIC SURGERY
Payer: MEDICARE

## 2020-02-03 ENCOUNTER — HOSPITAL ENCOUNTER (INPATIENT)
Age: 65
LOS: 1 days | Discharge: HOME HEALTH | DRG: 470 | End: 2020-02-04
Attending: ORTHOPAEDIC SURGERY | Admitting: ORTHOPAEDIC SURGERY
Payer: MEDICARE

## 2020-02-03 ENCOUNTER — ANESTHESIA (OUTPATIENT)
Dept: SURGERY | Age: 65
DRG: 470 | End: 2020-02-03
Payer: MEDICARE

## 2020-02-03 DIAGNOSIS — M17.10 ARTHRITIS OF KNEE: Primary | ICD-10-CM

## 2020-02-03 PROCEDURE — 74011250637 HC RX REV CODE- 250/637: Performed by: ORTHOPAEDIC SURGERY

## 2020-02-03 PROCEDURE — 74011000250 HC RX REV CODE- 250: Performed by: ANESTHESIOLOGY

## 2020-02-03 PROCEDURE — 97161 PT EVAL LOW COMPLEX 20 MIN: CPT

## 2020-02-03 PROCEDURE — 74011250636 HC RX REV CODE- 250/636: Performed by: NURSE ANESTHETIST, CERTIFIED REGISTERED

## 2020-02-03 PROCEDURE — 76010000153 HC OR TIME 1.5 TO 2 HR: Performed by: ORTHOPAEDIC SURGERY

## 2020-02-03 PROCEDURE — C9290 INJ, BUPIVACAINE LIPOSOME: HCPCS | Performed by: ORTHOPAEDIC SURGERY

## 2020-02-03 PROCEDURE — 0SRC0J9 REPLACEMENT OF RIGHT KNEE JOINT WITH SYNTHETIC SUBSTITUTE, CEMENTED, OPEN APPROACH: ICD-10-PCS | Performed by: ORTHOPAEDIC SURGERY

## 2020-02-03 PROCEDURE — 65270000029 HC RM PRIVATE

## 2020-02-03 PROCEDURE — 77030003666 HC NDL SPINAL BD -A: Performed by: ORTHOPAEDIC SURGERY

## 2020-02-03 PROCEDURE — C1776 JOINT DEVICE (IMPLANTABLE): HCPCS | Performed by: ORTHOPAEDIC SURGERY

## 2020-02-03 PROCEDURE — C1713 ANCHOR/SCREW BN/BN,TIS/BN: HCPCS | Performed by: ORTHOPAEDIC SURGERY

## 2020-02-03 PROCEDURE — 74011000250 HC RX REV CODE- 250: Performed by: PHYSICIAN ASSISTANT

## 2020-02-03 PROCEDURE — 97116 GAIT TRAINING THERAPY: CPT

## 2020-02-03 PROCEDURE — 77030002922 HC SUT FBRWRE ARTH -B: Performed by: ORTHOPAEDIC SURGERY

## 2020-02-03 PROCEDURE — 76210000006 HC OR PH I REC 0.5 TO 1 HR: Performed by: ORTHOPAEDIC SURGERY

## 2020-02-03 PROCEDURE — 74011250636 HC RX REV CODE- 250/636: Performed by: ORTHOPAEDIC SURGERY

## 2020-02-03 PROCEDURE — 77030040922 HC BLNKT HYPOTHRM STRY -A: Performed by: ORTHOPAEDIC SURGERY

## 2020-02-03 PROCEDURE — 64450 NJX AA&/STRD OTHER PN/BRANCH: CPT | Performed by: ANESTHESIOLOGY

## 2020-02-03 PROCEDURE — 77030002933 HC SUT MCRYL J&J -A: Performed by: ORTHOPAEDIC SURGERY

## 2020-02-03 PROCEDURE — 74011000258 HC RX REV CODE- 258: Performed by: ORTHOPAEDIC SURGERY

## 2020-02-03 PROCEDURE — 77030013708 HC HNDPC SUC IRR PULS STRY –B: Performed by: ORTHOPAEDIC SURGERY

## 2020-02-03 PROCEDURE — 77030019605: Performed by: ORTHOPAEDIC SURGERY

## 2020-02-03 PROCEDURE — 77030031139 HC SUT VCRL2 J&J -A: Performed by: ORTHOPAEDIC SURGERY

## 2020-02-03 PROCEDURE — 77030000032 HC CUF TRNQT ZIMM -B: Performed by: ORTHOPAEDIC SURGERY

## 2020-02-03 PROCEDURE — 76942 ECHO GUIDE FOR BIOPSY: CPT | Performed by: ANESTHESIOLOGY

## 2020-02-03 PROCEDURE — 74011250636 HC RX REV CODE- 250/636: Performed by: PHYSICIAN ASSISTANT

## 2020-02-03 PROCEDURE — 77030019557 HC ELECTRD VES SEAL MEDT -F: Performed by: ORTHOPAEDIC SURGERY

## 2020-02-03 PROCEDURE — 77030010785: Performed by: ORTHOPAEDIC SURGERY

## 2020-02-03 PROCEDURE — 77030018836 HC SOL IRR NACL ICUM -A: Performed by: ORTHOPAEDIC SURGERY

## 2020-02-03 PROCEDURE — 73560 X-RAY EXAM OF KNEE 1 OR 2: CPT

## 2020-02-03 PROCEDURE — 74011000250 HC RX REV CODE- 250: Performed by: NURSE ANESTHETIST, CERTIFIED REGISTERED

## 2020-02-03 PROCEDURE — 74011000258 HC RX REV CODE- 258: Performed by: PHYSICIAN ASSISTANT

## 2020-02-03 PROCEDURE — 77030006835 HC BLD SAW SAG STRY -B: Performed by: ORTHOPAEDIC SURGERY

## 2020-02-03 PROCEDURE — 77030008462 HC STPLR SKN PROX J&J -A: Performed by: ORTHOPAEDIC SURGERY

## 2020-02-03 PROCEDURE — 74011250637 HC RX REV CODE- 250/637: Performed by: PHYSICIAN ASSISTANT

## 2020-02-03 PROCEDURE — 77030027138 HC INCENT SPIROMETER -A: Performed by: ORTHOPAEDIC SURGERY

## 2020-02-03 PROCEDURE — 77030006812 HC BLD SAW RECIP STRY -B: Performed by: ORTHOPAEDIC SURGERY

## 2020-02-03 PROCEDURE — 97110 THERAPEUTIC EXERCISES: CPT

## 2020-02-03 PROCEDURE — 77030003029 HC SUT VCRL J&J -B: Performed by: ORTHOPAEDIC SURGERY

## 2020-02-03 PROCEDURE — 74011250636 HC RX REV CODE- 250/636: Performed by: ANESTHESIOLOGY

## 2020-02-03 PROCEDURE — 77030012935 HC DRSG AQUACEL BMS -B: Performed by: ORTHOPAEDIC SURGERY

## 2020-02-03 PROCEDURE — 76060000034 HC ANESTHESIA 1.5 TO 2 HR: Performed by: ORTHOPAEDIC SURGERY

## 2020-02-03 PROCEDURE — 74011000250 HC RX REV CODE- 250: Performed by: ORTHOPAEDIC SURGERY

## 2020-02-03 DEVICE — PAT ASYM TRIATHLN X3 38X11MM -- TRIATHLON ASYMMETRIC X3: Type: IMPLANTABLE DEVICE | Site: KNEE | Status: FUNCTIONAL

## 2020-02-03 DEVICE — COMPNT FEM POST STBL 5 R --: Type: IMPLANTABLE DEVICE | Site: KNEE | Status: FUNCTIONAL

## 2020-02-03 DEVICE — BASEPLT TIB REV UNIV SZ 6 R/L -- TRIATHLON: Type: IMPLANTABLE DEVICE | Site: KNEE | Status: FUNCTIONAL

## 2020-02-03 DEVICE — CEMENT BNE 20ML 41GM FULL DOSE PMMA W/ TOBRA M VISC RADPQ: Type: IMPLANTABLE DEVICE | Site: KNEE | Status: FUNCTIONAL

## 2020-02-03 DEVICE — COMPONENT KNEE CEM X3 TRIATHLON: Type: IMPLANTABLE DEVICE | Site: KNEE | Status: FUNCTIONAL

## 2020-02-03 RX ORDER — BUDESONIDE 0.5 MG/2ML
500 INHALANT ORAL
Status: DISCONTINUED | OUTPATIENT
Start: 2020-02-03 | End: 2020-02-03

## 2020-02-03 RX ORDER — MIDAZOLAM HYDROCHLORIDE 1 MG/ML
2 INJECTION, SOLUTION INTRAMUSCULAR; INTRAVENOUS ONCE
Status: COMPLETED | OUTPATIENT
Start: 2020-02-03 | End: 2020-02-03

## 2020-02-03 RX ORDER — CELECOXIB 400 MG/1
400 CAPSULE ORAL
Status: COMPLETED | OUTPATIENT
Start: 2020-02-03 | End: 2020-02-03

## 2020-02-03 RX ORDER — ACETAMINOPHEN 500 MG
1000 TABLET ORAL ONCE
Status: COMPLETED | OUTPATIENT
Start: 2020-02-03 | End: 2020-02-03

## 2020-02-03 RX ORDER — ALBUTEROL SULFATE 0.83 MG/ML
2.5 SOLUTION RESPIRATORY (INHALATION)
Status: DISCONTINUED | OUTPATIENT
Start: 2020-02-03 | End: 2020-02-04 | Stop reason: HOSPADM

## 2020-02-03 RX ORDER — ASPIRIN 325 MG
325 TABLET, DELAYED RELEASE (ENTERIC COATED) ORAL 2 TIMES DAILY
Status: DISCONTINUED | OUTPATIENT
Start: 2020-02-04 | End: 2020-02-04 | Stop reason: HOSPADM

## 2020-02-03 RX ORDER — SODIUM CHLORIDE 0.9 % (FLUSH) 0.9 %
5-40 SYRINGE (ML) INJECTION AS NEEDED
Status: DISCONTINUED | OUTPATIENT
Start: 2020-02-03 | End: 2020-02-04 | Stop reason: HOSPADM

## 2020-02-03 RX ORDER — SODIUM CHLORIDE 9 MG/ML
100 INJECTION, SOLUTION INTRAVENOUS CONTINUOUS
Status: DISPENSED | OUTPATIENT
Start: 2020-02-03 | End: 2020-02-04

## 2020-02-03 RX ORDER — DOCUSATE SODIUM 100 MG/1
100 CAPSULE, LIQUID FILLED ORAL 2 TIMES DAILY
Qty: 60 CAP | Refills: 2 | Status: SHIPPED | OUTPATIENT
Start: 2020-02-03 | End: 2020-02-26

## 2020-02-03 RX ORDER — SODIUM CHLORIDE 0.9 % (FLUSH) 0.9 %
5-40 SYRINGE (ML) INJECTION AS NEEDED
Status: DISCONTINUED | OUTPATIENT
Start: 2020-02-03 | End: 2020-02-03 | Stop reason: HOSPADM

## 2020-02-03 RX ORDER — WARFARIN 10 MG/1
10 TABLET ORAL ONCE
Status: COMPLETED | OUTPATIENT
Start: 2020-02-03 | End: 2020-02-03

## 2020-02-03 RX ORDER — PREGABALIN 25 MG/1
25 CAPSULE ORAL 2 TIMES DAILY
Status: DISCONTINUED | OUTPATIENT
Start: 2020-02-03 | End: 2020-02-04 | Stop reason: HOSPADM

## 2020-02-03 RX ORDER — LOSARTAN POTASSIUM 50 MG/1
50 TABLET ORAL DAILY
Status: DISCONTINUED | OUTPATIENT
Start: 2020-02-03 | End: 2020-02-04 | Stop reason: HOSPADM

## 2020-02-03 RX ORDER — ROPIVACAINE HYDROCHLORIDE 2 MG/ML
30 INJECTION, SOLUTION EPIDURAL; INFILTRATION; PERINEURAL
Status: COMPLETED | OUTPATIENT
Start: 2020-02-03 | End: 2020-02-03

## 2020-02-03 RX ORDER — CEFAZOLIN SODIUM 2 G/50ML
2 SOLUTION INTRAVENOUS
Status: COMPLETED | OUTPATIENT
Start: 2020-02-03 | End: 2020-02-03

## 2020-02-03 RX ORDER — OXYCODONE HYDROCHLORIDE 5 MG/1
10-15 TABLET ORAL
Status: DISCONTINUED | OUTPATIENT
Start: 2020-02-03 | End: 2020-02-04 | Stop reason: HOSPADM

## 2020-02-03 RX ORDER — ONDANSETRON 2 MG/ML
INJECTION INTRAMUSCULAR; INTRAVENOUS AS NEEDED
Status: DISCONTINUED | OUTPATIENT
Start: 2020-02-03 | End: 2020-02-03 | Stop reason: HOSPADM

## 2020-02-03 RX ORDER — AMOXICILLIN 250 MG
1 CAPSULE ORAL 2 TIMES DAILY
Status: DISCONTINUED | OUTPATIENT
Start: 2020-02-03 | End: 2020-02-04 | Stop reason: HOSPADM

## 2020-02-03 RX ORDER — ACETAMINOPHEN 500 MG
1000 TABLET ORAL EVERY 6 HOURS
Status: DISCONTINUED | OUTPATIENT
Start: 2020-02-03 | End: 2020-02-04 | Stop reason: HOSPADM

## 2020-02-03 RX ORDER — FENTANYL CITRATE 50 UG/ML
INJECTION, SOLUTION INTRAMUSCULAR; INTRAVENOUS AS NEEDED
Status: DISCONTINUED | OUTPATIENT
Start: 2020-02-03 | End: 2020-02-03 | Stop reason: HOSPADM

## 2020-02-03 RX ORDER — CELECOXIB 200 MG/1
200 CAPSULE ORAL 2 TIMES DAILY
Qty: 60 CAP | Refills: 2 | Status: SHIPPED | OUTPATIENT
Start: 2020-02-03 | End: 2020-02-26

## 2020-02-03 RX ORDER — HYDROMORPHONE HYDROCHLORIDE 2 MG/ML
0.5 INJECTION, SOLUTION INTRAMUSCULAR; INTRAVENOUS; SUBCUTANEOUS
Status: DISCONTINUED | OUTPATIENT
Start: 2020-02-03 | End: 2020-02-03 | Stop reason: HOSPADM

## 2020-02-03 RX ORDER — BUDESONIDE 0.5 MG/2ML
500 INHALANT ORAL
Status: DISCONTINUED | OUTPATIENT
Start: 2020-02-03 | End: 2020-02-04 | Stop reason: HOSPADM

## 2020-02-03 RX ORDER — SODIUM CHLORIDE 0.9 % (FLUSH) 0.9 %
5-40 SYRINGE (ML) INJECTION EVERY 8 HOURS
Status: DISCONTINUED | OUTPATIENT
Start: 2020-02-03 | End: 2020-02-03 | Stop reason: HOSPADM

## 2020-02-03 RX ORDER — POVIDONE-IODINE 10 %
SOLUTION, NON-ORAL TOPICAL AS NEEDED
Status: DISCONTINUED | OUTPATIENT
Start: 2020-02-03 | End: 2020-02-03 | Stop reason: HOSPADM

## 2020-02-03 RX ORDER — ROCURONIUM BROMIDE 10 MG/ML
INJECTION, SOLUTION INTRAVENOUS AS NEEDED
Status: DISCONTINUED | OUTPATIENT
Start: 2020-02-03 | End: 2020-02-03 | Stop reason: HOSPADM

## 2020-02-03 RX ORDER — NITROFURANTOIN 25; 75 MG/1; MG/1
100 CAPSULE ORAL 2 TIMES DAILY
Status: DISCONTINUED | OUTPATIENT
Start: 2020-02-03 | End: 2020-02-03 | Stop reason: ALTCHOICE

## 2020-02-03 RX ORDER — ZOLPIDEM TARTRATE 5 MG/1
5 TABLET ORAL
Status: DISCONTINUED | OUTPATIENT
Start: 2020-02-03 | End: 2020-02-04 | Stop reason: HOSPADM

## 2020-02-03 RX ORDER — FENTANYL CITRATE 50 UG/ML
50 INJECTION, SOLUTION INTRAMUSCULAR; INTRAVENOUS AS NEEDED
Status: DISCONTINUED | OUTPATIENT
Start: 2020-02-03 | End: 2020-02-03 | Stop reason: HOSPADM

## 2020-02-03 RX ORDER — PANTOPRAZOLE SODIUM 40 MG/1
40 TABLET, DELAYED RELEASE ORAL
Status: DISCONTINUED | OUTPATIENT
Start: 2020-02-04 | End: 2020-02-04 | Stop reason: HOSPADM

## 2020-02-03 RX ORDER — NEOSTIGMINE METHYLSULFATE 1 MG/ML
INJECTION, SOLUTION INTRAVENOUS AS NEEDED
Status: DISCONTINUED | OUTPATIENT
Start: 2020-02-03 | End: 2020-02-03 | Stop reason: HOSPADM

## 2020-02-03 RX ORDER — CEPHALEXIN 500 MG/1
500 CAPSULE ORAL 4 TIMES DAILY
Qty: 8 CAP | Refills: 0 | Status: SHIPPED | OUTPATIENT
Start: 2020-02-03 | End: 2020-02-06 | Stop reason: ALTCHOICE

## 2020-02-03 RX ORDER — SODIUM CHLORIDE, SODIUM LACTATE, POTASSIUM CHLORIDE, CALCIUM CHLORIDE 600; 310; 30; 20 MG/100ML; MG/100ML; MG/100ML; MG/100ML
50 INJECTION, SOLUTION INTRAVENOUS CONTINUOUS
Status: DISCONTINUED | OUTPATIENT
Start: 2020-02-03 | End: 2020-02-03 | Stop reason: HOSPADM

## 2020-02-03 RX ORDER — HYDROCODONE BITARTRATE AND ACETAMINOPHEN 5; 325 MG/1; MG/1
1 TABLET ORAL ONCE
Status: DISCONTINUED | OUTPATIENT
Start: 2020-02-03 | End: 2020-02-03 | Stop reason: HOSPADM

## 2020-02-03 RX ORDER — ARFORMOTEROL TARTRATE 15 UG/2ML
15 SOLUTION RESPIRATORY (INHALATION)
Status: DISCONTINUED | OUTPATIENT
Start: 2020-02-03 | End: 2020-02-03

## 2020-02-03 RX ORDER — MONTELUKAST SODIUM 10 MG/1
10 TABLET ORAL DAILY
Status: DISCONTINUED | OUTPATIENT
Start: 2020-02-03 | End: 2020-02-04 | Stop reason: HOSPADM

## 2020-02-03 RX ORDER — ASPIRIN 325 MG
325 TABLET ORAL 2 TIMES DAILY
Qty: 60 TAB | Refills: 0 | Status: SHIPPED | OUTPATIENT
Start: 2020-02-03 | End: 2020-02-26

## 2020-02-03 RX ORDER — FLUMAZENIL 0.1 MG/ML
0.2 INJECTION INTRAVENOUS AS NEEDED
Status: DISCONTINUED | OUTPATIENT
Start: 2020-02-03 | End: 2020-02-04 | Stop reason: HOSPADM

## 2020-02-03 RX ORDER — PREGABALIN 75 MG/1
75 CAPSULE ORAL ONCE
Status: COMPLETED | OUTPATIENT
Start: 2020-02-03 | End: 2020-02-03

## 2020-02-03 RX ORDER — SODIUM CHLORIDE, SODIUM LACTATE, POTASSIUM CHLORIDE, CALCIUM CHLORIDE 600; 310; 30; 20 MG/100ML; MG/100ML; MG/100ML; MG/100ML
75 INJECTION, SOLUTION INTRAVENOUS CONTINUOUS
Status: DISCONTINUED | OUTPATIENT
Start: 2020-02-03 | End: 2020-02-03 | Stop reason: HOSPADM

## 2020-02-03 RX ORDER — SUCCINYLCHOLINE CHLORIDE 20 MG/ML
INJECTION INTRAMUSCULAR; INTRAVENOUS AS NEEDED
Status: DISCONTINUED | OUTPATIENT
Start: 2020-02-03 | End: 2020-02-03 | Stop reason: HOSPADM

## 2020-02-03 RX ORDER — CEFAZOLIN SODIUM 2 G/50ML
2 SOLUTION INTRAVENOUS EVERY 8 HOURS
Status: COMPLETED | OUTPATIENT
Start: 2020-02-03 | End: 2020-02-04

## 2020-02-03 RX ORDER — POLYMYXIN B 500000 [USP'U]/1
INJECTION, POWDER, LYOPHILIZED, FOR SOLUTION INTRAMUSCULAR; INTRATHECAL; INTRAVENOUS; OPHTHALMIC AS NEEDED
Status: DISCONTINUED | OUTPATIENT
Start: 2020-02-03 | End: 2020-02-03 | Stop reason: HOSPADM

## 2020-02-03 RX ORDER — LANOLIN ALCOHOL/MO/W.PET/CERES
1 CREAM (GRAM) TOPICAL 2 TIMES DAILY WITH MEALS
Status: DISCONTINUED | OUTPATIENT
Start: 2020-02-03 | End: 2020-02-04 | Stop reason: HOSPADM

## 2020-02-03 RX ORDER — ARFORMOTEROL TARTRATE 15 UG/2ML
15 SOLUTION RESPIRATORY (INHALATION)
Status: DISCONTINUED | OUTPATIENT
Start: 2020-02-03 | End: 2020-02-04 | Stop reason: HOSPADM

## 2020-02-03 RX ORDER — DEXAMETHASONE SODIUM PHOSPHATE 4 MG/ML
INJECTION, SOLUTION INTRA-ARTICULAR; INTRALESIONAL; INTRAMUSCULAR; INTRAVENOUS; SOFT TISSUE AS NEEDED
Status: DISCONTINUED | OUTPATIENT
Start: 2020-02-03 | End: 2020-02-03 | Stop reason: HOSPADM

## 2020-02-03 RX ORDER — CELECOXIB 100 MG/1
200 CAPSULE ORAL 2 TIMES DAILY
Status: DISCONTINUED | OUTPATIENT
Start: 2020-02-03 | End: 2020-02-04 | Stop reason: HOSPADM

## 2020-02-03 RX ORDER — OXYCODONE AND ACETAMINOPHEN 7.5; 325 MG/1; MG/1
1-2 TABLET ORAL
Qty: 56 TAB | Refills: 0 | Status: SHIPPED | OUTPATIENT
Start: 2020-02-03 | End: 2020-02-10

## 2020-02-03 RX ORDER — LIDOCAINE HYDROCHLORIDE 10 MG/ML
3 INJECTION, SOLUTION EPIDURAL; INFILTRATION; INTRACAUDAL; PERINEURAL ONCE
Status: COMPLETED | OUTPATIENT
Start: 2020-02-03 | End: 2020-02-03

## 2020-02-03 RX ORDER — SODIUM CHLORIDE 0.9 % (FLUSH) 0.9 %
5-40 SYRINGE (ML) INJECTION EVERY 8 HOURS
Status: DISCONTINUED | OUTPATIENT
Start: 2020-02-03 | End: 2020-02-04 | Stop reason: HOSPADM

## 2020-02-03 RX ORDER — VANCOMYCIN HYDROCHLORIDE 1 G/20ML
INJECTION, POWDER, LYOPHILIZED, FOR SOLUTION INTRAVENOUS AS NEEDED
Status: DISCONTINUED | OUTPATIENT
Start: 2020-02-03 | End: 2020-02-03 | Stop reason: HOSPADM

## 2020-02-03 RX ORDER — FENTANYL CITRATE 50 UG/ML
100 INJECTION, SOLUTION INTRAMUSCULAR; INTRAVENOUS ONCE
Status: COMPLETED | OUTPATIENT
Start: 2020-02-03 | End: 2020-02-03

## 2020-02-03 RX ORDER — ONDANSETRON 2 MG/ML
4 INJECTION INTRAMUSCULAR; INTRAVENOUS ONCE
Status: COMPLETED | OUTPATIENT
Start: 2020-02-03 | End: 2020-02-03

## 2020-02-03 RX ORDER — NALOXONE HYDROCHLORIDE 0.4 MG/ML
0.4 INJECTION, SOLUTION INTRAMUSCULAR; INTRAVENOUS; SUBCUTANEOUS AS NEEDED
Status: DISCONTINUED | OUTPATIENT
Start: 2020-02-03 | End: 2020-02-04 | Stop reason: HOSPADM

## 2020-02-03 RX ORDER — ONDANSETRON 2 MG/ML
4 INJECTION INTRAMUSCULAR; INTRAVENOUS
Status: DISCONTINUED | OUTPATIENT
Start: 2020-02-03 | End: 2020-02-04 | Stop reason: HOSPADM

## 2020-02-03 RX ORDER — ALBUTEROL SULFATE 90 UG/1
2 AEROSOL, METERED RESPIRATORY (INHALATION)
Status: DISCONTINUED | OUTPATIENT
Start: 2020-02-03 | End: 2020-02-03

## 2020-02-03 RX ORDER — GLYCOPYRROLATE 0.2 MG/ML
INJECTION INTRAMUSCULAR; INTRAVENOUS AS NEEDED
Status: DISCONTINUED | OUTPATIENT
Start: 2020-02-03 | End: 2020-02-03 | Stop reason: HOSPADM

## 2020-02-03 RX ORDER — LIDOCAINE HYDROCHLORIDE 20 MG/ML
INJECTION, SOLUTION EPIDURAL; INFILTRATION; INTRACAUDAL; PERINEURAL AS NEEDED
Status: DISCONTINUED | OUTPATIENT
Start: 2020-02-03 | End: 2020-02-03 | Stop reason: HOSPADM

## 2020-02-03 RX ORDER — PROPOFOL 10 MG/ML
INJECTION, EMULSION INTRAVENOUS AS NEEDED
Status: DISCONTINUED | OUTPATIENT
Start: 2020-02-03 | End: 2020-02-03 | Stop reason: HOSPADM

## 2020-02-03 RX ADMIN — FENTANYL CITRATE 50 MCG: 50 INJECTION INTRAMUSCULAR; INTRAVENOUS at 08:57

## 2020-02-03 RX ADMIN — MIDAZOLAM 2 MG: 1 INJECTION INTRAMUSCULAR; INTRAVENOUS at 07:15

## 2020-02-03 RX ADMIN — ONDANSETRON 4 MG: 2 INJECTION INTRAMUSCULAR; INTRAVENOUS at 08:56

## 2020-02-03 RX ADMIN — Medication 10 ML: at 21:38

## 2020-02-03 RX ADMIN — FERROUS SULFATE TAB 325 MG (65 MG ELEMENTAL FE) 325 MG: 325 (65 FE) TAB at 16:46

## 2020-02-03 RX ADMIN — PREGABALIN 25 MG: 25 CAPSULE ORAL at 18:27

## 2020-02-03 RX ADMIN — LIDOCAINE HYDROCHLORIDE 60 MG: 20 INJECTION, SOLUTION EPIDURAL; INFILTRATION; INTRACAUDAL; PERINEURAL at 07:29

## 2020-02-03 RX ADMIN — CELECOXIB 400 MG: 400 CAPSULE ORAL at 07:11

## 2020-02-03 RX ADMIN — ACETAMINOPHEN 1000 MG: 500 TABLET ORAL at 06:50

## 2020-02-03 RX ADMIN — WARFARIN SODIUM 10 MG: 10 TABLET ORAL at 06:50

## 2020-02-03 RX ADMIN — ACETAMINOPHEN 1000 MG: 500 TABLET ORAL at 11:45

## 2020-02-03 RX ADMIN — CELECOXIB 200 MG: 100 CAPSULE ORAL at 11:41

## 2020-02-03 RX ADMIN — FENTANYL CITRATE 100 MCG: 50 INJECTION, SOLUTION INTRAMUSCULAR; INTRAVENOUS at 07:15

## 2020-02-03 RX ADMIN — PROPOFOL 150 MG: 10 INJECTION, EMULSION INTRAVENOUS at 07:30

## 2020-02-03 RX ADMIN — GLYCOPYRROLATE 0.4 MG: 0.2 INJECTION INTRAMUSCULAR; INTRAVENOUS at 09:06

## 2020-02-03 RX ADMIN — SENNOSIDES AND DOCUSATE SODIUM 1 TABLET: 8.6; 5 TABLET ORAL at 11:41

## 2020-02-03 RX ADMIN — ROCURONIUM BROMIDE 30 MG: 10 INJECTION, SOLUTION INTRAVENOUS at 07:44

## 2020-02-03 RX ADMIN — ROPIVACAINE HYDROCHLORIDE 60 MG: 2 INJECTION, SOLUTION EPIDURAL; INFILTRATION at 07:17

## 2020-02-03 RX ADMIN — TRANEXAMIC ACID 1 G: 1 INJECTION, SOLUTION INTRAVENOUS at 07:39

## 2020-02-03 RX ADMIN — PREGABALIN 25 MG: 25 CAPSULE ORAL at 11:41

## 2020-02-03 RX ADMIN — ACETAMINOPHEN 1000 MG: 500 TABLET ORAL at 18:27

## 2020-02-03 RX ADMIN — CEFAZOLIN 2 G: 10 INJECTION, POWDER, FOR SOLUTION INTRAVENOUS at 07:39

## 2020-02-03 RX ADMIN — CELECOXIB 200 MG: 100 CAPSULE ORAL at 18:27

## 2020-02-03 RX ADMIN — SENNOSIDES AND DOCUSATE SODIUM 1 TABLET: 8.6; 5 TABLET ORAL at 18:27

## 2020-02-03 RX ADMIN — Medication 3 MG: at 09:06

## 2020-02-03 RX ADMIN — SODIUM CHLORIDE, SODIUM LACTATE, POTASSIUM CHLORIDE, AND CALCIUM CHLORIDE 75 ML/HR: 600; 310; 30; 20 INJECTION, SOLUTION INTRAVENOUS at 06:46

## 2020-02-03 RX ADMIN — CEFAZOLIN SODIUM 2 G: 2 SOLUTION INTRAVENOUS at 16:47

## 2020-02-03 RX ADMIN — ONDANSETRON 4 MG: 2 INJECTION INTRAMUSCULAR; INTRAVENOUS at 09:53

## 2020-02-03 RX ADMIN — DEXAMETHASONE SODIUM PHOSPHATE 4 MG: 4 INJECTION, SOLUTION INTRAMUSCULAR; INTRAVENOUS at 07:36

## 2020-02-03 RX ADMIN — FAMOTIDINE 20 MG: 10 INJECTION, SOLUTION INTRAVENOUS at 06:50

## 2020-02-03 RX ADMIN — LIDOCAINE HYDROCHLORIDE 2 ML: 10 INJECTION, SOLUTION EPIDURAL; INFILTRATION; INTRACAUDAL; PERINEURAL at 07:16

## 2020-02-03 RX ADMIN — FENTANYL CITRATE 100 MCG: 50 INJECTION INTRAMUSCULAR; INTRAVENOUS at 07:29

## 2020-02-03 RX ADMIN — PREGABALIN 75 MG: 75 CAPSULE ORAL at 06:50

## 2020-02-03 RX ADMIN — PROPOFOL 50 MG: 10 INJECTION, EMULSION INTRAVENOUS at 08:00

## 2020-02-03 RX ADMIN — SODIUM CHLORIDE 100 ML/HR: 900 INJECTION, SOLUTION INTRAVENOUS at 11:46

## 2020-02-03 RX ADMIN — SUCCINYLCHOLINE CHLORIDE 100 MG: 20 INJECTION, SOLUTION INTRAMUSCULAR; INTRAVENOUS at 07:31

## 2020-02-03 RX ADMIN — TRANEXAMIC ACID 1 G: 1 INJECTION, SOLUTION INTRAVENOUS at 08:55

## 2020-02-03 RX ADMIN — FENTANYL CITRATE 50 MCG: 50 INJECTION INTRAMUSCULAR; INTRAVENOUS at 08:00

## 2020-02-03 NOTE — PROGRESS NOTES
Problem: Mobility Impaired (Adult and Pediatric)  Goal: *Acute Goals and Plan of Care (Insert Text)  Description  Physical Therapy Goals  Initiated 2/3/2020 and to be accomplished within 7 day(s)  1. Patient will move from supine to sit and sit to supine , scoot up and down and roll side to side in bed with modified independence. 2.  Patient will transfer from bed to chair and chair to bed with modified independence using the least restrictive device. 3.  Patient will perform sit to stand with modified independence. 4.  Patient will ambulate with modified independence for >200 feet with the least restrictive device. 5.  Patient will ascend/descend 2 stairs with 1 handrail(s) with supervision/set-up. PLOF: Patient lives with  in 1 story home with 2STE with HR and has walker, SC, and elevated commode height. Patient was not using AD for mobility PTA. Outcome: Progressing Towards Goal   PHYSICAL THERAPY EVALUATION    Patient: Jonny Baca (71 y.o. female)  Date: 2/3/2020  Primary Diagnosis: Osteoarthritis of right knee, unspecified osteoarthritis type [M17.11]  Arthritis of knee [M17.10]  Procedure(s) (LRB):  RIGHT TOTAL KNEE ARTHROPLASTY (Right) Day of Surgery   Precautions: Fall, RLE WBAT    ASSESSMENT :  Patient is 70 yo F admitted to hospital for TKA and presents today alert and agreeable to therapy. Patient was educated on weight bearing status, role of therapy, TE (see below), and equipment in room including role of SCDs, towel roll, and ice sleeve. Patient demonstrated intact SLR and transferred to sitting EOB for objective assessment. Patient was given demo with instruction on sit <> stand transfer and gait training. Patient transferred to standing with supervision and ambulated 125ft with RW with step to gait pattern and negotiated 2 steps with Left HR.   At conclusion of session patient transferred to sitting in recliner and was left resting with call bell by the side, SCDs donned, and towel roll under ankle. Patient instructed to call for assistance if they needed to get up for any reason and denied need for further assistance. Patient demonstrates decreased strength, mobility, and endurance and will benefit from skilled intervention to address the above impairments. Patient is safe for home mobility and notified nursing. Patient's rehabilitation potential is considered to be Good  Factors which may influence rehabilitation potential include:   []         None noted  []         Mental ability/status  [x]         Medical condition  [x]         Home/family situation and support systems  [x]         Safety awareness  [x]         Pain tolerance/management  []         Other:      PLAN :  Recommendations and Planned Interventions:   [x]           Bed Mobility Training             [x]    Neuromuscular Re-Education  [x]           Transfer Training                   []    Orthotic/Prosthetic Training  [x]           Gait Training                          []    Modalities  [x]           Therapeutic Exercises           []    Edema Management/Control  [x]           Therapeutic Activities            [x]    Family Training/Education  [x]           Patient Education  []           Other (comment):    Frequency/Duration: Patient will be followed by physical therapy 1-2 times per day/4-7 days per week to address goals. Discharge Recommendations: Home Health  Further Equipment Recommendations for Discharge: rolling walker     SUBJECTIVE:   Patient stated I wasn't sure how much I would be able to do after surgery.     OBJECTIVE DATA SUMMARY:     Past Medical History:   Diagnosis Date    Adjacent segment disease C3/4 w/deg changes, poss stenosis, CT '18 6/22/2018    Allergic rhinitis     Anemia 2008    intermittant since then    Asthma     Back pain     Green's esophagus with esophagitis     Cervical radiculopathy     Chronic sinusitis 5/15/2012    DNS (deviated nasal septum)     Empyema Foraminal stenosis of cervical region     C4-C5    GERD (gastroesophageal reflux disease)     Dr Nuzhat Sewell    Hx MRSA infection 8/11/2014    Hypertension     Hypertriglyceridemia     Insulin resistance 4/9/2012    Left knee pain     Lichen planus     Menopause     Age 51    MRSA (methicillin resistant Staphylococcus aureus) infection 2008    left lung    Nausea & vomiting     Restless leg syndrome     Rheumatoid arthritis ([facility] [address]     Spinal cord stimulator status 2016    Spinal stenosis of cervical region     C4-C5 and C5-C6    TMJ syndrome     Wears glasses     and contacts     Past Surgical History:   Procedure Laterality Date    HX CERVICAL FUSION  08/13/14    HX HEENT      TMJ surgery    HX LUMBAR LAMINECTOMY  Nov 1995    [facility]    HX LUMBAR LAMINECTOMY  Feb 1997    [facility]    HX ORTHOPAEDIC  1/2015    bunion removal from right foot    HX OTHER SURGICAL  2007    thoracentesis    HX OTHER SURGICAL  2008    chest tube    HX OTHER SURGICAL  1997    TMJ surgery    HX OTHER SURGICAL  2012    sinus surg 2012-Dr Polo Verdugo.     HX OTHER SURGICAL  2016    spinal cord stimulator place for lumbar neuritis, good benefit    HX ROTATOR CUFF REPAIR Right 01/2019    HX TUBAL LIGATION      MA ANESTH,SURGERY OF SHOULDER  01/31/2019    MA COLONOSCOPY FLX DX W/COLLJ SPEC WHEN PFRMD  6-18-08    normal/[facility]     Barriers to Learning/Limitations: None  Compensate with: N/A  Home Situation:  Home Situation  Home Environment: Private residence  # Steps to Enter: 2  One/Two Story Residence: One story  Living Alone: No  Support Systems: Spouse/Significant Other/Partner  Patient Expects to be Discharged to[de-identified] Private residence  Current DME Used/Available at Home: Ronnie Lam, rollator, [address] chair, Raised toilet seat  Critical Behavior:   A&Ox4  Strength:    Strength: Generally decreased, functional(Right knee flex/ext 4/5, DF/PF 5/5, LLE 5/5)   Tone & Sensation:   Tone: Normal(BLE)   Sensation: Intact(BLE)   Range Of Motion:  AROM: Generally decreased, functional(Right knee 0-100 degrees)   PROM: Within functional limits(BLE)   Functional Mobility:  Bed Mobility:   Supine to Sit: Supervision   Scooting: Supervision  Transfers:  Sit to Stand: Supervision  Stand to Sit: Supervision   Balance:   Sitting: Intact  Standing: Impaired; With support  Standing - Static: Good  Standing - Dynamic : Fair  Ambulation/Gait Training:  Distance (ft): 125 Feet (ft)  Assistive Device: Walker, rolling  Ambulation - Level of Assistance: Supervision   Gait Abnormalities: Decreased step clearance   Base of Support: Narrowed   Speed/Samra: Slow  Step Length: Left shortened;Right shortened   Interventions: Verbal cues; Visual/Demos   Stairs:  Number of Stairs Trained: 4  Stairs - Level of Assistance: Supervision  Rail Use: Left     Therapeutic Exercises:       EXERCISE   Sets   Reps   Active Active Assist   Passive Self ROM   Comments   Ankle Pumps 1 20  [x] [] [] []    Quad Sets 1 10 [x] [] [] []    Hamstring Sets   [] [] [] []    Short Arc Quads   [] [] [] []    Heel Slides 1 5 [x] [] [] []    Straight Leg Raises 1 5 [x] [] [] []    Hip Abd/Add   [] [] [] []    Long Arc Quads   [] [] [] []    Seated Marching   [] [] [] []    Standing Marching   [] [] [] []       [] [] [] []       Pain:  Pain level pre-treatment: 4/10   Pain level post-treatment: 3/10   Intervention: mobility    Activity Tolerance:   Patient tolerated activity well and was left resting with call bell by her side. Please refer to the flowsheet for vital signs taken during this treatment. After treatment:   [x]         Patient left in no apparent distress sitting up in chair  [x]         Patient left in no apparent distress in bed  [x]         Call bell left within reach  [x]         Nursing notified  [x]         Caregiver present  []         Bed alarm activated  []         SCDs applied    COMMUNICATION/EDUCATION:   [x]         Role of Physical Therapy in the acute care setting.   [x]         Fall prevention education was provided and the patient/caregiver indicated understanding. [x]         Patient/family have participated as able in goal setting and plan of care. [x]         Patient/family agree to work toward stated goals and plan of care. [x]         Patient understands intent and goals of therapy, but is neutral about his/her participation. []         Patient is unable to participate in goal setting/plan of care: ongoing with therapy staff.  []         Other:     Thank you for this referral.  Artemio Guevara, PT   Time Calculation: 39 mins      Eval Complexity: History: LOW Complexity : Zero comorbidities / personal factors that will impact the outcome / POCExam:LOW Complexity : 1-2 Standardized tests and measures addressing body structure, function, activity limitation and / or participation in recreation  Presentation: LOW Complexity : Stable, uncomplicated  Clinical Decision Making:Low Complexity    Overall Complexity:LOW

## 2020-02-03 NOTE — ANESTHESIA POSTPROCEDURE EVALUATION
Procedure(s):  RIGHT TOTAL KNEE ARTHROPLASTY. general    Anesthesia Post Evaluation      Multimodal analgesia: multimodal analgesia used between 6 hours prior to anesthesia start to PACU discharge  Patient location during evaluation: bedside  Patient participation: complete - patient participated  Level of consciousness: awake  Pain management: adequate  Airway patency: patent  Anesthetic complications: no  Cardiovascular status: stable  Respiratory status: acceptable  Hydration status: acceptable  Post anesthesia nausea and vomiting:  controlled      Vitals Value Taken Time   /78 2/3/2020  9:49 AM   Temp 36.4 °C (97.5 °F) 2/3/2020  9:50 AM   Pulse 68 2/3/2020  9:57 AM   Resp 14 2/3/2020  9:57 AM   SpO2 95 % 2/3/2020  9:57 AM   Vitals shown include unvalidated device data.

## 2020-02-03 NOTE — PERIOP NOTES
TRANSFER - OUT REPORT:    Verbal report given to Brenda GARCIA(name) on Asia Peterson  being transferred to [address] [place] [address](unit) for routine post - op       Report consisted of patients Situation, Background, Assessment and   Recommendations(SBAR). Information from the following report(s) SBAR, Kardex, OR Summary, Procedure Summary, Intake/Output, MAR, Recent Results and Med Rec Status was reviewed with the receiving nurse. Lines:   Peripheral IV 02/03/20 Left Hand (Active)   Site Assessment Clean, dry, & intact 2/3/2020  9:19 AM   Phlebitis Assessment 0 2/3/2020  9:19 AM   Infiltration Assessment 0 2/3/2020  9:19 AM   Dressing Status Clean, dry, & intact 2/3/2020  9:19 AM   Dressing Type Transparent 2/3/2020  9:19 AM   Hub Color/Line Status Pink 2/3/2020  9:19 AM        Opportunity for questions and clarification was provided.       Patient transported with:   [employer]   Discharge prescriptions

## 2020-02-03 NOTE — PROGRESS NOTES
conducted a pre-surgery visit with Annmarie Pillai, who is a 71 y.o.,female. The  provided the following Interventions:  Initiated a relationship of care and support. Plan:  Chaplains will continue to follow and will provide pastoral care on an as needed/requested basis.  recommends bedside caregivers page  on duty if patient shows signs of acute spiritual or emotional distress.     [address]  [phone]

## 2020-02-03 NOTE — INTERVAL H&P NOTE
H&P Update:  Ena Smith was seen and examined. History and physical has been reviewed. The patient has been examined.  There have been no significant clinical changes since the completion of the originally dated History and Physical.

## 2020-02-03 NOTE — CONSULTS
Consult    PCP:  Momo Rodriguez MD    Requesting physicians: Dr. Grady Lester      Reason for consult: Management for hypertension, asthma and GERD    Hospital course:     Cyndee Perdomo is a 71 y.o.  female who is being seen for management of hypertension, GERD and asthma. Patient was admitted for total knee replacement on the right side. Patient is currently sitting up on chair without any problem. Denies any right knee pain. Denies any headaches or dizziness. No chest pain or shortness of breath or cough. No nausea or vomiting. No abdominal pain or bowel bladder disturbances. She states she takes Symbicort for her asthma. She takes losartan for hypertension. She takes Dexilant for GERD. Denies smoking cigarettes or drinking any alcohol. She has a cane and walker at home.     Past Medical History:   Diagnosis Date    Adjacent segment disease C3/4 w/deg changes, poss stenosis, CT '18 6/22/2018    Allergic rhinitis     Anemia 2008    intermittant since then    Asthma     Back pain     Green's esophagus with esophagitis     Cervical radiculopathy     Chronic sinusitis 5/15/2012    DNS (deviated nasal septum)     Empyema     Foraminal stenosis of cervical region     C4-C5    GERD (gastroesophageal reflux disease)     Dr Bryanna Melendez    Hx MRSA infection 8/11/2014    Hypertension     Hypertriglyceridemia     Insulin resistance 4/9/2012    Left knee pain     Lichen planus     Menopause     Age 51    MRSA (methicillin resistant Staphylococcus aureus) infection 2008    left lung    Nausea & vomiting     Restless leg syndrome     Rheumatoid arthritis ([facility] [address]     Spinal cord stimulator status 2016    Spinal stenosis of cervical region     C4-C5 and C5-C6    TMJ syndrome     Wears glasses     and contacts      Past Surgical History:   Procedure Laterality Date    HX CERVICAL FUSION  08/13/14    HX HEENT      TMJ surgery    HX LUMBAR LAMINECTOMY  Nov 1995    [facility]    HX LUMBAR L[place]  [place]     HX ORTHOPAEDIC  1/2015    bunion removal from right foot    HX OTHER SURGICAL  2007    thoracentesis    HX OTHER SURGICAL  2008    chest tube    HX OTHER SURGICAL  1997    TMJ surgery    HX OTHER SURGICAL  2012    sinus surg 2012-Dr Monika Do.  HX OTHER SURGICAL  2016    spinal cord stimulator place for lumbar neuritis, good benefit    HX ROTATOR CUFF REPAIR Right 01/2019    HX TUBAL LIGATION      SD ANESTH,SURGERY OF SHOULDER  01/31/2019    SD COLONOSCOPY FLX DX W/COLLJ SPEC WHEN PFRMD  6-18-08    normal/duntemann     Family History   Problem Relation Age of Onset    Hypertension Mother     Diabetes Mother    Ribeiro Arthritis-rheumatoid Sister     Other Sister         Lupus    Cancer Sister 35        CA, uterus    Diabetes Sister     Other Brother         myocarditis    Heart Attack Brother     Heart Disease Brother     Heart Surgery Brother     Coronary Artery Disease Father     Hypertension Father     Lung Disease Father     Cancer Sister 48        CA, breast    Breast Cancer Sister         Estrogen based    Diabetes Brother     Asthma Brother     Stroke Maternal Grandmother       Social History     Tobacco Use    Smoking status: Never Smoker    Smokeless tobacco: Never Used   Substance Use Topics    Alcohol use: No     Alcohol/week: 0.0 standard drinks     Frequency: Never     Comment: 0-6 per year       Prior to Admission Medications   Prescriptions Last Dose Informant Patient Reported? Taking? Cetirizine (ZYRTEC) 10 mg Cap 2/2/2020 at pm  Yes Yes   Sig: Take 10 mg by mouth daily. Dexlansoprazole (DEXILANT) 60 mg CpDM 2/2/2020 at pm  Yes Yes   Sig: Take 1 Cap by mouth Daily (before dinner). ESTRACE 0.01 % (0.1 mg/gram) vaginal cream 2/1/2020  No Yes   Sig: Place 1 gm in the vagina twice weekly   acetaminophen (TYLENOL) 325 mg tablet 2/1/2020  Yes Yes   Sig: Take 325 mg by mouth every four (4) hours as needed for Pain.    albuterol (PROVENTIL HFA, VENTOLIN HFA) 90 mcg/Actuation inhaler not recently  Yes Yes   Sig: Take 2 Puffs by inhalation every six (6) hours as needed. allergy injection 1/16/2020  Yes No   Sig: by SubCUTAneous route every thirty (30) days. ascorbic acid, vitamin C, (VITAMIN C) 500 mg tablet 2/2/2020 at Unknown time  No Yes   Sig: Take 1 Tab by mouth daily. budesonide-formoterol (SYMBICORT) 160-4.5 mcg/Actuation HFA inhaler 2/3/2020 at Unknown time  Yes Yes   Sig: Take 2 Puffs by inhalation two (2) times a day. calcium carbonate (TUMS) 200 mg calcium (500 mg) Chew 2/1/2020  Yes No   Sig: Take 1 Tab by mouth four (4) times daily. cholecalciferol, vitamin D3, (VITAMIN D3 PO) 2/2/2020 at Unknown time  Yes Yes   Sig: Take 1 Tab by mouth daily. clobetasol (TEMOVATE) 0.05 % ointment 2/1/2020  No Yes   Sig: Apply  to affected area two (2) days a week. cyanocobalamin 1,000 mcg tablet 2/2/2020 at am  No Yes   Sig: take 1 tablet by mouth once daily   ferrous sulfate 325 mg (65 mg iron) tablet 2/2/2020 at am  No Yes   Sig: take 1 tablet by mouth once daily before breakfast   folic acid (FOLVITE) 1 mg tablet 2/1/2020  Yes No   Sig: take 1 tablet by mouth once daily   ibuprofen (MOTRIN) 200 mg tablet 1/19/2020  Yes Yes   Sig: Take 200 mg by mouth every eight (8) hours as needed. krill/om-3/dha/epa/phospho/ast (MAXIMUM RED KRILL OMEGA-3 PO) 1/19/2020  Yes Yes   Sig: Take  by mouth.   losartan (COZAAR) 50 mg tablet 2/3/2020 at Unknown time  No Yes   Sig: TAKE 1 TABLET DAILY   losartan (COZAAR) 50 mg tablet already on list  No Yes   Sig: TAKE 1 TABLET DAILY   methotrexate 25 mg/mL chemo injection 1/17/2020  Yes Yes   Sig: by IntraMUSCular route every seven (7) days. montelukast (SINGULAIR) 10 mg tablet 2/2/2020 at pm  Yes Yes   Sig: Take 10 mg by mouth daily. multivit-min/iron/folic/lutein (CENTRUM SILVER WOMEN PO) 2/1/2020  Yes Yes   Sig: Take  by mouth daily.    naproxen sodium (ALEVE) 220 mg cap weeks ago  Yes Yes   Sig: Take 220 mg by mouth daily as needed. nitrofurantoin, macrocrystal-monohydrate, (MACROBID) 100 mg capsule completed  No No   Sig: Take 1 Cap by mouth two (2) times a day. triamcinolone (NASACORT AQ) 55 mcg nasal inhaler 2020  Yes Yes   Si Sprays by Both Nostrils route daily. Indications: PERENNIAL ALLERGIC RHINITIS      Facility-Administered Medications: None         Allergies   Allergen Reactions    Adhesive Tape-Silicones Other (comments)     Patient stated iv tape and tegaderm ok    Other Plant, Animal, Environmental Unable to [employer] Sulfasalazine Other (comments)     Could not taste anything, lightheadedness    Tape [Adhesive] Other (comments)     Blisters, use paper tape only  Patient states tegaderm and paper tape are okay    Chlorhexidine Towelette Itching and Other (comments)     \"stinging for a couple of hours\"    itching        Review of Systems:  A comprehensive review of systems was negative except for that written in the History of Present Illness. Objective: Intake and Output:     0701 -  1900  In: 6100 [P.O.:240; I.V.:800]  Out: -   No intake/output data recorded. Physical Exam:     /67 (BP 1 Location: Right arm, BP Patient Position: At rest)   Pulse 64   Temp 98.2 °F (36.8 °C)   Resp 16   Ht 5' 10\" (1.778 m)   Wt 82.7 kg (182 lb 6 oz)   SpO2 97%   BMI 26.17 kg/m²     General appearance - alert, well appearing, and in no distress  Eyes/head- sclera anicteric normocephalic  Nose - no nasal discharge.    Neck - supple, no JVD, trachea is midline  Chest -normal air entry noted in bases, no wheezes  Heart - S1 and S2 normal  Abdomen - soft, nontender, nondistended, Bowel sounds present  Neurological - alert, oriented, normal speech, no focal findings noted in both upper extremity and left lower extremity  Musculoskeletal -right knee dressing and drain in situ  Extremities - no pedal edema noted      Data Review:   No results found for this or any previous visit (from the past 24 hour(s)). Assessment:     Active Problems:    Arthritis of knee (2/3/2020)      1. Status post right total knee replacement  2. Hypertension  3. Chronic mild intermittent asthma without any exacerbation  4. Dyslipidemia  5. GERD  6. Degenerative disc and joint disease    Plan:     Continue current management  Continue Symbicort and as needed albuterol for asthma  Continue losartan for hypertension  Continue Protonix in place of Dexilant for GERD and we will also add Maalox as needed  Patient wishes to be a full code    Thank you for allowing to participate in clinical care of this patient.

## 2020-02-03 NOTE — PROGRESS NOTES

## 2020-02-03 NOTE — DISCHARGE SUMMARY
2/3/2020  5:54 AM    2/3/2020, 9:22 AM    Primary Dx:right Orthopedic / Rheumatologic: Total Knee Replacement  Secondary Dx: Etiological Diagnoses: none    HPI:  Pt has end stage OA and had failed conservative treatment. Due to the current findings and affected activity of daily living surgical intervention is indicated.   The alternatives, risks, complications as well as expected outcome were discussed, the patient understands and wishes to proceed with surgery    Past Medical History:   Diagnosis Date    Adjacent segment disease C3/4 w/deg changes, poss stenosis, CT '18 6/22/2018    Allergic rhinitis     Anemia 2008    intermittant since then    Asthma     Back pain     Green's esophagus with esophagitis     Cervical radiculopathy     Chronic sinusitis 5/15/2012    DNS (deviated nasal septum)     Empyema     Foraminal stenosis of cervical region     C4-C5    GERD (gastroesophageal reflux disease)     Dr Wong Smoker Hx MRSA infection 8/11/2014    Hypertension     Hypertriglyceridemia     Insulin resistance 4/9/2012    Left knee pain     Lichen planus     Menopause     Age 51    MRSA (methicillin resistant Staphylococcus aureus) infection 2008    left lung    Nausea & vomiting     Restless leg syndrome     Rheumatoid arthritis ([facility] [address]     Spinal cord stimulator status 2016    Spinal stenosis of cervical region     C4-C5 and C5-C6    TMJ syndrome     Wears glasses     and contacts         Current Facility-Administered Medications:     lactated Ringers infusion, 50 mL/hr, IntraVENous, CONTINUOUS, Chalo Ruffin CRNA    sodium chloride (NS) flush 5-40 mL, 5-40 mL, IntraVENous, Q8H, Mayur, Jainy, CRNA    sodium chloride (NS) flush 5-40 mL, 5-40 mL, IntraVENous, PRN, Chalo Ruffin CRNA    ondansetron [facility]) injection 4 mg, 4 mg, IntraVENous, ONCE, Chalo Ruffin CRNA    HYDROmorphone (DILAUDID) injection 0.5 mg, 0.5 mg, IntraVENous, Q20MIN PRN, Chalo Ruffin CRNA    fentaNYL citrate (PF) injection 50 mcg, 50 mcg, IntraVENous, PRN, Antonia Tristan, CRNA    HYDROcodone-acetaminophen ([facility]) 5-325 mg per tablet 1 Tab, 1 Tab, Oral, ONCE, Antonia Tristan CRNA    Facility-Administered Medications Ordered in Other Encounters:     fentaNYL citrate (PF) injection, , IntraVENous, PRN, Antoniaangelina Jaimeer, CRNA, 50 mcg at 02/03/20 0857    lidocaine (PF) (XYLOCAINE) 20 mg/mL (2 %) injection, , IntraVENous, PRN, Antonia Kun, CRNA, 60 mg at 02/03/20 0729    propofol (DIPRIVAN) 10 mg/mL injection, , IntraVENous, PRN, Antonia Kun, CRNA, 50 mg at 02/03/20 0800    succinylcholine (ANECTINE) injection, , IntraVENous, PRN, [place] Kun, CRNA, 100 mg at 02/03/20 0731    dexamethasone (DECADRON) 4 mg/mL injection, , , PRN, Antonia Kun, CRNA, 4 mg at 02/03/20 0736    rocuronium injection, , IntraVENous, PRN, [place] Kun, CRNA, 30 mg at 02/03/20 0744    ondansetron (ZOFRAN) injection, , IntraVENous, PRN, Antonia Kun, CRNA, 4 mg at 02/03/20 0856    glycopyrrolate (ROBINUL) injection, , IntraVENous, PRN, [place] Kun, CRNA, 0.4 mg at 02/03/20 0906    neostigmine (PROSTIGMINE) 1 mg/mL syringe, , IntraVENous, PRN, [place] Jaimeer, CRNA, 3 mg at 02/03/20 0906    Adhesive tape-silicones; Other plant, animal, environmental; Sulfasalazine; Tape [adhesive]; and Chlorhexidine towelette    Physical Exam:  General A&O x3 NAD, well developed, well nourished, normal affect  Heart: S1-S2, RRR  Lungs: CTA Bilat  Abd: soft NT, ND  Ext: n/v intact    Hospital Course:    Pt. Had rightOrthopedic / Rheumatologic: Total Knee Replacement    Post -op Course: The patient tolerated the procedure well. They were followed by internal medicine for help with medical management. Pt. Was place on Abx pre and post-op for prophylaxis against infection as well as coumadin pre and post-op for prophylaxis against DVT. Vitals signs remained stable, remained af. The wound wasclean, dry, no drainage. Pain was well controlled.   Pt. Had negative calf tenderness or swelling, no evidence for DVT. Patient had PT/OT consult for evaluation and treatment. CBC  Lab Results   Component Value Date/Time    WBC 5.7 01/20/2020 07:19 AM    RBC 4.37 01/20/2020 07:19 AM    HCT 41.6 01/20/2020 07:19 AM    MCV 95.2 01/20/2020 07:19 AM    MCH 30.4 01/20/2020 07:19 AM    MCHC 32.0 01/20/2020 07:19 AM    RDW 14.7 (H) 01/20/2020 07:19 AM     Coagulation  Lab Results   Component Value Date    INR 0.9 01/20/2020    APTT 30.9 01/20/2020      Basic Metabolic Profile  Lab Results   Component Value Date     01/20/2020    CO2 27 01/20/2020    BUN 13 01/20/2020       Discharge Meds:  Current Discharge Medication List      START taking these medications    Details   oxyCODONE-acetaminophen (PERCOCET) 7.5-325 mg per tablet Take 1-2 Tabs by mouth every four (4) hours as needed for Pain for up to 7 days. Max Daily Amount: 12 Tabs. Qty: 56 Tab, Refills: 0    Associated Diagnoses: Arthritis of knee      docusate sodium (COLACE) 100 mg capsule Take 1 Cap by mouth two (2) times a day for 90 days. Qty: 60 Cap, Refills: 2    Associated Diagnoses: Arthritis of knee      celecoxib (CELEBREX) 200 mg capsule Take 1 Cap by mouth two (2) times a day for 90 days. Qty: 60 Cap, Refills: 2    Associated Diagnoses: Arthritis of knee      aspirin (ASPIRIN) 325 mg tablet Take 1 Tab by mouth two (2) times a day. Qty: 60 Tab, Refills: 0    Associated Diagnoses: Arthritis of knee      cephALEXin (KEFLEX) 500 mg capsule Take 1 Cap by mouth four (4) times daily. Qty: 8 Cap, Refills: 0    Associated Diagnoses: Arthritis of knee         CONTINUE these medications which have NOT CHANGED    Details   multivit-min/iron/folic/lutein (CENTRUM SILVER WOMEN PO) Take  by mouth daily.       !! losartan (COZAAR) 50 mg tablet TAKE 1 TABLET DAILY  Qty: 90 Tab, Refills: 3    Associated Diagnoses: Essential hypertension with goal blood pressure less than 140/90      krill/om-3/dha/epa/phospho/ast (MAXIMUM RED KRILL OMEGA-3 PO) Take by mouth. ascorbic acid, vitamin C, (VITAMIN C) 500 mg tablet Take 1 Tab by mouth daily. Qty: 90 Tab, Refills: 3      ferrous sulfate 325 mg (65 mg iron) tablet take 1 tablet by mouth once daily before breakfast  Qty: 90 Tab, Refills: 1      clobetasol (TEMOVATE) 0.05 % ointment Apply  to affected area two (2) days a week. Qty: 45 g, Refills: 3    Associated Diagnoses: Lichen sclerosus et atrophicus      cyanocobalamin 1,000 mcg tablet take 1 tablet by mouth once daily  Qty: 90 Tab, Refills: 3      !! losartan (COZAAR) 50 mg tablet TAKE 1 TABLET DAILY  Qty: 90 Tab, Refills: 3    Associated Diagnoses: Essential hypertension with goal blood pressure less than 140/90      ESTRACE 0.01 % (0.1 mg/gram) vaginal cream Place 1 gm in the vagina twice weekly  Qty: 42.5 g, Refills: 2    Associated Diagnoses: Atrophic vaginitis      cholecalciferol, vitamin D3, (VITAMIN D3 PO) Take 1 Tab by mouth daily. Associated Diagnoses: Pain of both hip joints      acetaminophen (TYLENOL) 325 mg tablet Take 325 mg by mouth every four (4) hours as needed for Pain.      triamcinolone (NASACORT AQ) 55 mcg nasal inhaler 2 Sprays by Both Nostrils route daily. Indications: PERENNIAL ALLERGIC RHINITIS      Dexlansoprazole (DEXILANT) 60 mg CpDM Take 1 Cap by mouth Daily (before dinner). Cetirizine (ZYRTEC) 10 mg Cap Take 10 mg by mouth daily. montelukast (SINGULAIR) 10 mg tablet Take 10 mg by mouth daily. budesonide-formoterol (SYMBICORT) 160-4.5 mcg/Actuation HFA inhaler Take 2 Puffs by inhalation two (2) times a day. albuterol (PROVENTIL HFA, VENTOLIN HFA) 90 mcg/Actuation inhaler Take 2 Puffs by inhalation every six (6) hours as needed. nitrofurantoin, macrocrystal-monohydrate, (MACROBID) 100 mg capsule Take 1 Cap by mouth two (2) times a day.   Qty: 10 Cap, Refills: 0      folic acid (FOLVITE) 1 mg tablet take 1 tablet by mouth once daily  Refills: 0      calcium carbonate (TUMS) 200 mg calcium (500 mg) Chew Take 1 Tab by mouth four (4) times daily. allergy injection by SubCUTAneous route every thirty (30) days. !! - Potential duplicate medications found. Please discuss with provider. STOP taking these medications       methotrexate 25 mg/mL chemo injection Comments:   Reason for Stopping:         ibuprofen (MOTRIN) 200 mg tablet Comments:   Reason for Stopping:         naproxen sodium (ALEVE) 220 mg cap Comments:   Reason for Stopping:               Discharge Plan:  The patient will be d/c'd to home, total knee protocol, WBAT. She will have [facility] PT and nursing. Total joint protocol. Pt safe for homebound transfer, sp Total joint replacement. A walker, bedside commode, and shower chair will be utilized for ADL's. Follow up with Dr. Joe Leone in 10-12 days. Call with any questions or concerns.

## 2020-02-03 NOTE — HOME CARE
Received HH referral ,[address] will follow for SN,PT,Orestes Knee protocol; patient states she already has a RW ,shower chair ,toilet riser and cane , patient will be pending d/c tomorrow. JAYLAN ELLIOTT.

## 2020-02-03 NOTE — ANESTHESIA PREPROCEDURE EVALUATION
Relevant Problems   No relevant active problems       Anesthetic History     PONV          Review of Systems / Medical History  Patient summary reviewed and pertinent labs reviewed    Pulmonary            Asthma        Neuro/Psych   Within defined limits           Cardiovascular    Hypertension                   GI/Hepatic/Renal     GERD           Endo/Other        Arthritis     Other Findings              Physical Exam    Airway  Mallampati: II  TM Distance: 4 - 6 cm  Neck ROM: normal range of motion   Mouth opening: Normal     Cardiovascular    Rhythm: regular  Rate: normal         Dental    Dentition: Poor dentition     Pulmonary  Breath sounds clear to auscultation               Abdominal  GI exam deferred       Other Findings            Anesthetic Plan    ASA: 2  Anesthesia type: general      Post-op pain plan if not by surgeon: peripheral nerve block single    Induction: Intravenous  Anesthetic plan and risks discussed with: Patient

## 2020-02-03 NOTE — PROGRESS NOTES
The Plan for Transition of Care is related to the following treatment goals: Home with [address] [facility]    The Patient and/or patient representative her  were provided with a choice of provider and agrees with the discharge plan. [x] Yes [] No    Freedom of choice list was provided with basic dialogue that supports the patient's individualized plan of care/goals, treatment preferences and shares the quality data associated with the providers. [x] Yes [] No    Referral to [facility] was officially made, on patient's behalf.  will continue to monitor for discharge planning to ensure a safe discharge from [facility]. Uma Osorio MSW  Care Manager  Pager#: [phone]

## 2020-02-03 NOTE — OP NOTES
[address] [place] [address]   OPERATIVE REPORT    Name:  David Sher  MR#:   [id number]  :  1955  ACCOUNT #:  [de-identified]  DATE OF SERVICE:  2020    PREOPERATIVE DIAGNOSIS:  End-stage inflammatory arthritis of the right knee. POSTOPERATIVE DIAGNOSIS:  End-stage inflammatory arthritis of the right knee with valgus deformity. PROCEDURE PERFORMED:  Right total knee replacement using the Adeline Triathlon system, size 5 right posterior stabilized femoral component, size 6 tibia, size 6 12 tibial bearing insert posterior stabilized and a 38 asymmetric patella, also correction of valgus deformity and correction of mid flexion instability. SURGEON:  Richrd Schirmer, MD    FIRST ASSISTANT:  Yumiko Benavides. SECOND ASSISTANT:  Justyna Lopez. ANESTHESIOLOGIST:  José Doll MD    ANESTHESIA:  Preoperative femoral nerve block with light general.    COMPLICATIONS:  No complication. SPECIMENS REMOVED:  No specimen. IMPLANTS:  As above mentioned. ESTIMATED BLOOD LOSS:  Less than 50 mL. Yumiko Benavides was the first assistant who assisted with all phases of surgery commencing with patient positioning, patient prep, patient drape, leg positioning during surgery, retracting, assisting with the surgery itself, closure, dressing placement and transfer. DESCRIPTION OF OPERATIVE PROCEDURE:  After the anesthetic was successfully induced, it was confirmed the patient did receive her antibiotics and a time-out was performed, midline incision, knee debrided in usual fashion. Bone quality was relatively modest.  A modified gap balancing technique would be performed. All cuts checked for trueness and squareness and all soft tissue structures protected during the sawing process.   Femoral canal aspirated, lavaged and re-aspirated prior to instrumentation,  extra 2 off the distal femur to help correct the fixed flexion deformity and after preliminary femoral cuts, switched the attention to our tibia, used the external alignment guide with appropriate landmarks and resected enough to get a decent clean-up cut, we removed posterior osteophytes while protecting neurovascular structures and used the Aquamantys and Exparel cocktail. Modified gap balancing technique thus confirmed correct femoral rotation. We then did our femoral finishing followed by placement of the trial components to set our tibial rotation which was marked and later repunched. We then resurfaced the patella restoring patella fitness anatomically and using a rongeur to smooth out the edges with all the trial components in place, checked the overall alignment, range of motion, soft tissue balance, patellar tracking, stability, alignment, all of which we were delighted with. Fashioned a bone plug for the femoral canal.  Further control of hemostasis, cemented in the knee removing all extraneous cement and holding the knee in full extension, the cement was fully cured. Further pulse lavage, further trialing, I was happiest with the 12, locked it in place and again reduced the knee. Routine closure, fully flexed knee prior to closure of the skin. At the end of the case, instrument, sponge and needle counts were correct. No complications. The patient tolerated the procedure well.       Gil Britt MD [id number]  D:  02/03/2020 9:12  T:  02/03/2020 10:36  JOB #:  [id number]

## 2020-02-03 NOTE — PROGRESS NOTES
Mobility Intervention:       [] Pt dangled at edge of bed    [] Pt assisted OOB to bedside commode    [] Pt assisted OOB to chair    [] Pt ambulated to bathroom    [x] Patient was ambulated in room/hallway    Assistive Device Utilized:       [x] Rolling walker   [] Crutches   [] Straight Cane   [] Knee immobilizer   [] IV pole    After Rounding and Checking on Patient     [x] Patient left in no apparent distress sitting up in chair  [] Patient left in no apparent distress in bed  [x] Call bell left within reach  [x] SCDs on both legs & machine turned on  [x] Ice applied  [] RN notified  []  present  [] Bed alarm activated    Reason patient not mobilized:      [] Patient refused   [] Nausea/vomiting   [] Low blood pressure   [] Drowsy/lethargic    Pain Rating:     [] 1  [] 6  [] 2  [] 7  [] 3  [] 8  [] 4  [] 9  [] 5  [] 10    Left patient with call light, cell phone and personal belongings in reach for safety.

## 2020-02-03 NOTE — PHYSICIAN ADVISORY
[facility]     Physician Advisor Recommendation      The information in this document is a recommendation to be used for utilization review and utilization management purposes only. This recommendation is not an order. The recommendation is made based on the information reviewed at the time of the referral, is pursuant to the [facility] Conditions of Participation (42 CFR Part 482), and is neither a judgment nor an assessment with regard to the appropriateness or quality of clinical care. Nothing in this document may be used to limit, alter, or affect clinical services provided to the patient named below. The provider of services is ultimately responsible for the submission of a claim that has met all requirements for correct coding, billing, and reimbursement.       Letter of Status Determination:    Recommend Changing patient status from   INPATIENT to OBSERVATION      Pt Name:  Suki Shrestha   MR#  [id number]   [facility]#   [id number]   Room and Hospital  PACU/PL  @ [facility]   Hospitalization date  2/3/2020  5:54 AM   Current Attending Physician  Zee Varma MD   Principal diagnosis  <principal problem not specified>      Clinicals  71 y.o. female hospitalized with     RIGHT TOTAL KNEE ARTHROPLASTY     [place] MCG criteria   Does  NOT   apply    STATUS DETERMINATION  On the basis of review of available clinical data, documentation in the chart  It is our recommendation that the patient's status is changed from INPATIENT to OBSERVATION           The procedure for which the patient was admitted to the hospital is not in the Medicare INP only list. Furthermore, the Documentation in the medical record  does not supports the admitting physician's determination that the patient required inpatient care despite the lack of a 2-midnight expectation based upon complex medical factors including but not limited to: Current medical condition and needs , Patient's history, co-morbidities ,Risk of adverse events  and severity of signs and symptoms   Additional comments     Insurance  Payor: [employer] / Plan: [place] PART [place] & B / Product Type: Medicare /           Dana Jennings MD, [facility]   Physician Mercedes Contreras, [facility] [place] [address] MEDICARE PART A & B Phone:     Subscriber: [facility] Subscriber#: [id number]    Group#:  Precert#:         BLUE CROSS/[facility] Baljinder Rodas 49 Phone:     Subscriber: Cullen Porter Subscriber#: [id number]    Group#: [id number] Precert#:

## 2020-02-03 NOTE — BRIEF OP NOTE
BRIEF OPERATIVE NOTE    Date of Procedure: 2/3/2020   Preoperative Diagnosis: Osteoarthritis of right knee, unspecified osteoarthritis type [M17.11]  Postoperative Diagnosis: Osteoarthritis of right knee, unspecified osteoarthritis type [M17.11]    Procedure(s):  RIGHT TOTAL KNEE ARTHROPLASTY  Surgeon(s) and Role:     * Sukhwinder Leo MD - Primary         Surgical Assistant: Kristen Suazo    Surgical Staff:  Circ-1: Thomas Morrison  Physician Assistant: Estefania Baltazar PA-C  Scrub Tech-1: [facility]  [facility]  Event Time In Time Out   Incision Start 3358    Incision Close       Anesthesia: General   Estimated Blood Loss: 50ml  Specimens: * No specimens in log *   Findings: same   Complications: none  Implants:   Implant Name Type Inv.  Item Serial No.  Lot No. LRB No. Used Action   CEMENT BONE SIMPLEX P 1/PACK - [id number]  CEMENT BONE SIMPLEX P 1/PACK  [employer] HOW [id number] Right 2 Implanted   PAT ASYM TRIATHLN X3 [id number] -- TRIATHLON ASYMMETRIC X3 - [id number]  PAT ASYM TRIATHLN X3 [id number] -- TRIATHLON ASYMMETRIC X3  [employer] HOW [id number] Right 1 Implanted   BASEPLT TIB REV UNIV SZ 6 R/L -- TRIATHLON - [id number]  BASEPLT TIB REV UNIV SZ 6 R/L -- TRIATHLON  [employer] HOW [id number] Right 1 Implanted   COMPNT FEM POST STBL 5 R --  - [id number]  COMPNT FEM POST STBL 5 R --   [employer] HOW [id number] Right 1 Implanted   TRIATHLON X3 TIBIAL BEARING INSERT - PS    [employer] [id number] Right 1 Implanted immune

## 2020-02-04 VITALS
HEIGHT: 70 IN | RESPIRATION RATE: 16 BRPM | SYSTOLIC BLOOD PRESSURE: 131 MMHG | TEMPERATURE: 98.6 F | OXYGEN SATURATION: 97 % | HEART RATE: 67 BPM | BODY MASS INDEX: 26.11 KG/M2 | DIASTOLIC BLOOD PRESSURE: 69 MMHG | WEIGHT: 182.38 LBS

## 2020-02-04 PROCEDURE — 97535 SELF CARE MNGMENT TRAINING: CPT

## 2020-02-04 PROCEDURE — 97116 GAIT TRAINING THERAPY: CPT

## 2020-02-04 PROCEDURE — 74011250637 HC RX REV CODE- 250/637: Performed by: ORTHOPAEDIC SURGERY

## 2020-02-04 PROCEDURE — 74011000250 HC RX REV CODE- 250: Performed by: INTERNAL MEDICINE

## 2020-02-04 PROCEDURE — 74011250636 HC RX REV CODE- 250/636: Performed by: ORTHOPAEDIC SURGERY

## 2020-02-04 PROCEDURE — 97110 THERAPEUTIC EXERCISES: CPT

## 2020-02-04 PROCEDURE — 97165 OT EVAL LOW COMPLEX 30 MIN: CPT

## 2020-02-04 RX ADMIN — SENNOSIDES AND DOCUSATE SODIUM 1 TABLET: 8.6; 5 TABLET ORAL at 09:04

## 2020-02-04 RX ADMIN — Medication 10 ML: at 06:58

## 2020-02-04 RX ADMIN — CEFAZOLIN SODIUM 2 G: 2 SOLUTION INTRAVENOUS at 07:49

## 2020-02-04 RX ADMIN — MONTELUKAST 10 MG: 10 TABLET, FILM COATED ORAL at 09:04

## 2020-02-04 RX ADMIN — OXYCODONE HYDROCHLORIDE 10 MG: 5 TABLET ORAL at 06:57

## 2020-02-04 RX ADMIN — LOSARTAN POTASSIUM 50 MG: 50 TABLET, FILM COATED ORAL at 09:05

## 2020-02-04 RX ADMIN — OXYCODONE HYDROCHLORIDE 10 MG: 5 TABLET ORAL at 10:53

## 2020-02-04 RX ADMIN — CELECOXIB 200 MG: 100 CAPSULE ORAL at 10:52

## 2020-02-04 RX ADMIN — ACETAMINOPHEN 1000 MG: 500 TABLET ORAL at 06:50

## 2020-02-04 RX ADMIN — FERROUS SULFATE TAB 325 MG (65 MG ELEMENTAL FE) 325 MG: 325 (65 FE) TAB at 07:48

## 2020-02-04 RX ADMIN — PANTOPRAZOLE SODIUM 40 MG: 40 TABLET, DELAYED RELEASE ORAL at 06:57

## 2020-02-04 RX ADMIN — PREGABALIN 25 MG: 25 CAPSULE ORAL at 09:04

## 2020-02-04 RX ADMIN — ARFORMOTEROL TARTRATE 15 MCG: 15 SOLUTION RESPIRATORY (INHALATION) at 08:13

## 2020-02-04 RX ADMIN — ASPIRIN 325 MG: 325 TABLET, DELAYED RELEASE ORAL at 09:05

## 2020-02-04 RX ADMIN — ACETAMINOPHEN 1000 MG: 500 TABLET ORAL at 00:20

## 2020-02-04 RX ADMIN — CEFAZOLIN SODIUM 2 G: 2 SOLUTION INTRAVENOUS at 00:20

## 2020-02-04 RX ADMIN — BUDESONIDE 500 MCG: 0.5 INHALANT RESPIRATORY (INHALATION) at 08:08

## 2020-02-04 NOTE — PROGRESS NOTES
Hilda Foster Rounded on post total knee replacement. Patient and family educated: Activity:   OOB for all meals,   Walk every hour to prevent blood clots, help move better and lessen stiffness. Bend knee 10 x /hr  Do not put anything under knee. Towel roll under ankle. VTE prophylaxis:   Use SCD pumps except when walking. Ankle pumps 10 times an hour at hospital & home. Take blood thinner medication as ordered by surgeon. Do not skip a dose. Pain Control:  Pain medications side effects discussed. Wean off narcotics ASAP. Use Tylenol ( 3000 mg/24 hours) , ice, distraction, moving, & change position to help with pain. Rest between activity. Don't get nauseated. Eat a snack before taking pain medication    Do not get constipated: take stool softener/mild laxative daily while on narcotics. Incentive Spirometry:    Use of incentive spirometer 10 x/hr. Demonstration  1500 ml x 3  Wound Care: Dressing dry and intact. Educated patient on how to manage dressing and/or incision per MD protocol. Keep dressing and/or dry and intact. No lotions, powders, creams to surgical leg. .    Diet:   Eat for healing. Protein heals bone/muscle. Drink 8 glasses of water a day. Patient Safety:   Call light & belongings in reach. Call for help when want to walk or get OOB. Educational material given. Patient agreed to keep doing everything at home to prevent complications & have a successful recovery. Patient verbalizing the importance of using incentive spirometer, ankle pumping, walking frequently, doing exercised at home twice a day, taking their anticoagulant per physician instruction, taking medication and drinking water to prevent complication, and eating protein for healing. Patient  verbalized understand. Given the opportunity for asking questions.

## 2020-02-04 NOTE — PROGRESS NOTES
Problem: Mobility Impaired (Adult and Pediatric)  Goal: *Acute Goals and Plan of Care (Insert Text)  Description  Physical Therapy Goals  Initiated 2/3/2020 and to be accomplished within 7 day(s)  1. Patient will move from supine to sit and sit to supine , scoot up and down and roll side to side in bed with modified independence. 2.  Patient will transfer from bed to chair and chair to bed with modified independence using the least restrictive device. 3.  Patient will perform sit to stand with modified independence. 4.  Patient will ambulate with modified independence for >200 feet with the least restrictive device. 5.  Patient will ascend/descend 2 stairs with 1 handrail(s) with supervision/set-up. PLOF: Patient lives with  in 1 story home with 2STE with HR and has walker, SC, and elevated commode height. Patient was not using AD for mobility PTA. Outcome: Progressing Towards Goal   PHYSICAL THERAPY TREATMENT    Patient: Jasbir Cervantes (71 y.o. female)  Date: 2/4/2020  Diagnosis: Osteoarthritis of right knee, unspecified osteoarthritis type [M17.11]  Arthritis of knee [M17.10]   <principal problem not specified>  Procedure(s) (LRB):  RIGHT TOTAL KNEE ARTHROPLASTY (Right) 1 Day Post-Op  Precautions: Fall, WBAT      ASSESSMENT:  Pt found seated in recliner willing to participate w/ therapy. Pt able was able to make further progression w/ therapy this visit, improving in quality of mobility w/ cueing and displaying good carryover from previous tx. Pt also voiced that she feels great comfort w/ mobility and recovery as pt's family is LPTA. Before and during mobility, pt educated on importance of frequent motion via amb and there-ex to maintain and improve flexibility and mobility of right knee as pt displays good flexibility, however does display a shift to left LE when sitting.  Pt amb for a total of 350' this visit w/ use of RW, displaying a reciprocal, antalgic pattern, which improved w/ distance. Pt returned to room to recliner, voicing improvements in pain, and demonstrated/provided/performed there-ex w/ HEP. Pt voiced comfort w/ there-ex, displaying good ROM and little increase in pain. Pt provided ice w/ education on timing, and left in room w/ all needs in reach. From a PT standpoint, pt is safe to return and would benefit from HHPT progressing to OP PT. Nursing notified. 1-105  Progression toward goals: good   [x]      Improving appropriately and progressing toward goals  []      Improving slowly and progressing toward goals  []      Not making progress toward goals and plan of care will be adjusted     PLAN:  Patient continues to benefit from skilled intervention to address the above impairments. Continue treatment per established plan of care. Discharge Recommendations:  Home Health and Outpatient  Further Equipment Recommendations for Discharge: has RW (adjusted wheels)      SUBJECTIVE:   Patient stated I feel good!     OBJECTIVE DATA SUMMARY:   Critical Behavior:  Neurologic State: Alert  Orientation Level: Oriented X4  Cognition: Follows commands  Safety/Judgement: Fall prevention, Awareness of environment  Functional Mobility Training:  Transfers:  Sit to Stand: Modified independent  Stand to Sit: Modified independent  Balance:  Sitting: Intact  Standing: With support; Intact  Standing - Static: Good  Standing - Dynamic : Good   Range Of Motion:   AROM: Within functional limits  Ambulation/Gait Training:  Distance (ft): 350 Feet (ft)  Assistive Device: Walker, rolling  Ambulation - Level of Assistance: Supervision  Gait Abnormalities: Decreased step clearance; Antalgic  Base of Support: Narrowed  Speed/Samra: Pace decreased (<100 feet/min)  Step Length: Right shortened;Left shortened  Interventions: Visual/Demos; Safety awareness training;Manual cues  Therapeutic Exercises:       EXERCISE   Sets   Reps   Active Active Assist   Passive Self- assited ROM   Comments   Ankle Pumps 1 10 [x] [] [] []    Quad Sets 1 10 [x] [] [] []    Seated knee hangs  1 10 [x] [] [] []    Short Arc Quads   [] [] [] []    Heel Slides 1 10 [x] [] [] [x]    Straight Leg Raises 1 10 [x] [] [] []    Hip Abd   [] [] [] []    Long Arc Quads   [] [] [] []    Seated Marching   [] [] [] []    Seated Knee Flexion   [] [] [] []    Standing Marching   [] [] [] []        Pain:  Voiced no pain at rest, voiced slight increase w/ gait training and during stand to sit    Activity Tolerance:   good  Please refer to the flowsheet for vital signs taken during this treatment. After treatment:   [x] Patient left in no apparent distress sitting up in chair  [] Patient left in no apparent distress in bed  [x] Call bell left within reach  [x] Nursing notified  [] Caregiver present  [] Bed alarm activated  [] SCDs applied      COMMUNICATION/EDUCATION:   [x]         Role of Physical Therapy in the acute care setting. [x]         Fall prevention education was provided and the patient/caregiver indicated understanding. [x]         Patient/family have participated as able in working toward goals and plan of care. [x]         Patient/family agree to work toward stated goals and plan of care. []         Patient understands intent and goals of therapy, but is neutral about his/her participation.   []         Patient is unable to participate in stated goals/plan of care: ongoing with therapy staff.  []         Other:        Alec Dodd PTA   Time Calculation: 26 mins

## 2020-02-04 NOTE — PROGRESS NOTES
Discharge order noted for today. Pt has been accepted to [facility] agency. Met with patient who is agreeable to the transition plan today. Transport has been arranged through family. Patient's discharge summary and home health  orders have been forwarded to [facility] home health  agency via que. Updated bedside RN,  to the transition plan. Discharge information has been documented on the AVS.  Has all needed DME.        ANSHU Wu  Case Management  [phone]

## 2020-02-04 NOTE — DISCHARGE INSTRUCTIONS
DISCHARGE SUMMARY from Nurse    PATIENT INSTRUCTIONS:    After general anesthesia or intravenous sedation, for 24 hours or while taking prescription Narcotics:  · Limit your activities  · Do not drive and operate hazardous machinery  · Do not make important personal or business decisions  · Do  not drink alcoholic beverages  · If you have not urinated within 8 hours after discharge, please contact your surgeon on call. Report the following to your surgeon:  · Excessive pain, swelling, redness or odor of or around the surgical area  · Temperature over 100.5  · Nausea and vomiting lasting longer than 4 hours or if unable to take medications  · Any signs of decreased circulation or nerve impairment to extremity: change in color, persistent  numbness, tingling, coldness or increase pain  · Any questions    What to do at Home:  Recommended activity: No lifting, Driving, or Strenuous exercise until seen by Provider  If you experience any of the following symptoms fever, shortness of breath, chest pain, foul smelling wound drainage,severe pain not controlled by pain medicine please follow up with Provider    *  Please give a list of your current medications to your Primary Care Provider. *  Please update this list whenever your medications are discontinued, doses are      changed, or new medications (including over-the-counter products) are added. *  Please carry medication information at all times in case of emergency situations. These are general instructions for a healthy lifestyle:    No smoking/ No tobacco products/ Avoid exposure to second hand smoke  Surgeon General's Warning:  Quitting smoking now greatly reduces serious risk to your health.     Obesity, smoking, and sedentary lifestyle greatly increases your risk for illness    A healthy diet, regular physical exercise & weight monitoring are important for maintaining a healthy lifestyle    You may be retaining fluid if you have a history of heart failure or if you experience any of the following symptoms:  Weight gain of 3 pounds or more overnight or 5 pounds in a week, increased swelling in our hands or feet or shortness of breath while lying flat in bed. Please call your doctor as soon as you notice any of these symptoms; do not wait until your next office visit. The discharge information has been reviewed with the {PATIENT PARENT GUARDIAN:[id number]}. The {PATIENT PARENT GUARDIAN:[id number]} verbalized understanding. Discharge medications reviewed with the {[facility] meds reviewed GYBQ:29176} and appropriate educational materials and side effects teaching were provided.   ___________________________________________________________________________________________________________________________________

## 2020-02-04 NOTE — PROGRESS NOTES
Patient discharged to car via wheelchair with transport in stable condition. Arm bands removed and shredded per protocol.

## 2020-02-04 NOTE — ROUTINE PROCESS
2/4/2020  7:45 AM    End of Shift Note     Bedside and verbal shift change report given to Winsome Aquino 2, RN (On coming nurse) by Vane Huffman RN (Off going nurse).   Report included the following information:      --Procedure Summary     --MAR,     --Recent Results     --Med Rec Status    SBAR Recommendations:     Issues for Provider to address           Activity This Shift     [] Bed Rest Order   [] Refused   [] Dangled    [] TDWB         Ambulating:     [x] Bathroom     [] BSC     [x] Room/Hallway      Up in Chair for meals    []Yes [] No   Voiding       [x] Yes  [] No  Pressley          [] Yes  [] No  Incontinent [] Yes  [] No    DUE TO VOID POUR        [] Yes [] No  Purewick    [] Yes [] No  New Onset [] Yes [] No Straight Cath   []Yes  [] No  Condom Cath  [] Yes [] No  MD Called      [] Yes  [] No   Blood Sugars Managed []Yes [x] No    Bowels Moved [] Yes [x] No    Incontinent     [] Yes [] No Passed Gas [x]Yes [] No    New Onset  []Yes [] No        MD Called []Yes  [] No     CHG Bath Done     Before Surgery     After Surgery      [] Yes  [x] No  [] Yes  [x] No       Drain Removed [] Yes  [] No [x] N/A    Dressing Changed [] Yes   [x] No [] N/A      Nausea/Vomiting [] Yes   [x] No     Ice Packs Changed [x] Yes   [] No  [] N/A    Incentive Spirometer  [x] Yes  [] No      SCD Pumps On     Ankle Pumping  [x] Yes   [] No      [x] Yes   [] No        Telemetry Monitoring [] Yes   [x] No   Rhythm

## 2020-02-04 NOTE — PROGRESS NOTES
Bedside and Verbal shift change report given to Chance Banks RN (oncoming nurse) by Keon De Los Santos RN (offgoing nurse). Report included the following information SBAR and Kardex.

## 2020-02-04 NOTE — PROGRESS NOTES
OT order received and chart reviewed. Patient was seen for skilled OT and is safe for d/c home when medically stable. Full note to follow.            Thank you for this referral,  Dayanna Curtis MS, OTR/L

## 2020-02-04 NOTE — HOME CARE
Discharge order noted for today, [facility] will follow for SN,PT,Orestes Knee protocol ,pt states she has RW,shower chair,cane and a toilet riser; [facility] to [facility] central intake. JAYLAN ELLIOTT.

## 2020-02-04 NOTE — PROGRESS NOTES
Problem: Self Care Deficits Care Plan (Adult)  Goal: *Acute Goals and Plan of Care (Insert Text)  Outcome: Resolved/Met     OCCUPATIONAL THERAPY EVALUATION/DISCHARGE    Patient: Jaqueline Grace (71 y.o. female)  Date: 2/4/2020  Primary Diagnosis: Osteoarthritis of right knee, unspecified osteoarthritis type [M17.11]  Arthritis of knee [M17.10]  Procedure(s) (LRB):  RIGHT TOTAL KNEE ARTHROPLASTY (Right) 1 Day Post-Op   Precautions: Fall, WBAT  PLOF: Patient was independent with self-care and functional mobility PTA. Pt has a RW, cane, SC, grab bars, raised toilet, and reacher at home. ASSESSMENT AND RECOMMENDATIONS:  Pt cleared to participate in OT evaluation by RN. Upon entering the room, pt was seated in chair, alert, and agreeable to participate in OT evaluation. Patient educated on weight-bearing status, importance of ice, and safety around the house. Patient is modified independent - independent with basic self-care seated and standing, Supervision with functional mobility and modified independent with functional transfers using rolling walker. Based on the objective data described below, the patient presents with no deficits that impede pt function with ADLs, functional transfers, and functional mobility. At this time patient is safe to d/c home with family support. OT to d/c from caseload at this time. Skilled occupational therapy is not indicated at this time.   Discharge Recommendations: None  Further Equipment Recommendations for Discharge: Pt has all DME      SUBJECTIVE:   Patient stated my daughter is a PTA    OBJECTIVE DATA SUMMARY:     Past Medical History:   Diagnosis Date    Adjacent segment disease C3/4 w/deg changes, poss stenosis, CT '18 6/22/2018    Allergic rhinitis     Anemia 2008    intermittant since then    Asthma     Back pain     Green's esophagus with esophagitis     Cervical radiculopathy     Chronic sinusitis 5/15/2012    DNS (deviated nasal septum)     Empyema Foraminal stenosis of cervical region     C4-C5    GERD (gastroesophageal reflux disease)     Dr Arden Domingo    Hx MRSA infection 8/11/2014    Hypertension     Hypertriglyceridemia     Insulin resistance 4/9/2012    Left knee pain     Lichen planus     Menopause     Age 51    MRSA (methicillin resistant Staphylococcus aureus) infection 2008    left lung    Nausea & vomiting     Restless leg syndrome     Rheumatoid arthritis ([facility] [address]     Spinal cord stimulator status 2016    Spinal stenosis of cervical region     C4-C5 and C5-C6    TMJ syndrome     Wears glasses     and contacts     Past Surgical History:   Procedure Laterality Date    HX CERVICAL FUSION  08/13/14    HX HEENT      TMJ surgery    HX LUMBAR LAMINECTOMY  Nov 1995    [facility]    HX LUMBAR LAMINECTOMY  Feb 1997    Jacquiline     HX ORTHOPAEDIC  1/2015    bunion removal from right foot    HX OTHER SURGICAL  2007    thoracentesis    HX OTHER SURGICAL  2008    chest tube    HX OTHER SURGICAL  1997    TMJ surgery    HX OTHER SURGICAL  2012    sinus surg 2012-Dr Viral Lozoya.     HX OTHER SURGICAL  2016    spinal cord stimulator place for lumbar neuritis, good benefit    HX ROTATOR CUFF REPAIR Right 01/2019    HX TUBAL LIGATION      KS ANESTH,SURGERY OF SHOULDER  01/31/2019    KS COLONOSCOPY FLX DX W/COLLJ SPEC WHEN PFRMD  6-18-08    normal/duntemann     Barriers to Learning/Limitations: None  Compensate with: visual, verbal, tactile, kinesthetic cues/model    Home Situation:   Home Situation  Home Environment: Private residence  # Steps to Enter: 2  One/Two Story Residence: One story  Living Alone: No  Support Systems: Spouse/Significant Other/Partner  Patient Expects to be Discharged to[de-identified] Private residence  Current DME Used/Available at Home: Sundar Johnson, jeanna, [address] chair, Raised toilet seat  Tub or Shower Type: Tub/Shower combination  [x]     Right hand dominant   []     Left hand dominant    Cognitive/Behavioral Status:  Neurologic State: Alert  Orientation Level: Oriented X4  Cognition: Follows commands  Safety/Judgement: Fall prevention; Awareness of environment    Skin: Intact  Edema: None noted    Vision/Perceptual:    Acuity: Within Defined Limits;Able to read normal print without difficulty    Corrective Lenses: Glasses    Coordination: BUE  Fine Motor Skills-Upper: Left Intact; Right Intact    Gross Motor Skills-Upper: Left Intact; Right Intact    Balance:  Sitting: Intact  Standing: Impaired; With support  Standing - Static: Good  Standing - Dynamic : Fair    Strength: BUE  Strength: Within functional limits    Tone & Sensation: BUE  Tone: Normal  Sensation: Intact    Range of Motion: BUE  AROM: Within functional limits    Functional Mobility and Transfers for ADLs:    Transfers:  Sit to Stand: Modified independent  Stand to Sit: Modified independent   Toilet Transfer : Modified independent    ADL Assessment:  Feeding: Independent    Oral Facial Hygiene/Grooming: Independent    Bathing: Modified independent    Upper Body Dressing: Independent    Lower Body Dressing: Modified independent    Toileting: Independent    ADL Intervention:  Grooming  Grooming Assistance: Independent  Position Performed: Standing  Washing Face: Independent  Brushing Teeth: Independent    Upper Body Bathing  Bathing Assistance: Independent  Position Performed: Standing    Lower Body Bathing  Bathing Assistance: Modified independent    Upper Body Dressing Assistance  Dressing Assistance: Independent  Bra: Independent  Pullover Shirt: Independent    Lower Body Dressing Assistance  Dressing Assistance: Modified independent  Underpants: Modified independent; Compensatory technique training  Pants With Elastic Waist: Modified independent; Compensatory technique training  Socks: Modified independent  Position Performed: Seated in chair;Standing    Toileting  Toileting Assistance: Independent  Bladder Hygiene: Independent    Cognitive Retraining  Safety/Judgement: Fall prevention; Awareness of environment    Pain:  Pain level pre-treatment: 1/10   Pain level post-treatment: 2/10, incisional   Pain Intervention(s): Medication (see MAR)  Response to intervention:  See doc flow    Activity Tolerance:   Good. Patient able to stand approx 10 minutes for selfcare tasks at sink    Please refer to the flowsheet for vital signs taken during this treatment. After treatment:   [x]  Patient left in no apparent distress sitting up in chair  []  Patient left in no apparent distress in bed  [x]  Call bell left within reach  [x]  Nursing notified  []  Caregiver present  []  Bed alarm activated    COMMUNICATION/EDUCATION:   [x]      Role of Occupational Therapy in the acute care setting  [x]      Home safety education was provided and the patient/caregiver indicated understanding. [x]      Patient/family have participated as able and agree with findings and recommendations. []      Patient is unable to participate in plan of care at this time. Thank you for this referral.  Chris Marin OTR/L  Time Calculation: 33 mins      Eval Complexity: History: LOW Complexity : Brief history review ; Examination: LOW Complexity : 1-3 performance deficits relating to physical, cognitive , or psychosocial skils that result in activity limitations and / or participation restrictions ;    Decision Making:LOW Complexity : No comorbidities that affect functional and no verbal or physical assistance needed to complete eval tasks

## 2020-02-04 NOTE — PROGRESS NOTES
Patient given discharge instructions verbally and written. Patient states an understanding of all instructions and follow up appointments scheduled. IV was discontinued earlier, and small pressure dressing applied to site. Patient in stable condition. Patient requesting pain medication for the ride home.  at bedside. No distress noted.

## 2020-02-04 NOTE — PROGRESS NOTES
Ortho    Pt. Seen and evaluated. Doing well, up in chair, pain well controlled  Denies cp, sob, abd pain    Blood pressure 131/69, pulse 67, temperature 98.6 °F (37 °C), resp. rate 16, height 5' 10\" (1.778 m), weight 182 lb 6 oz (82.7 kg), SpO2 97 %.    rightKnee woundclean, dry, no drainage  Sensory intact to LT  Motor intact  nv intact  Neg calf tenderness    Labs:  CBC  @  CBC:   Lab Results   Component Value Date/Time    WBC 5.7 01/20/2020 07:19 AM    RBC 4.37 01/20/2020 07:19 AM    HGB 13.3 01/20/2020 07:19 AM    HCT 41.6 01/20/2020 07:19 AM    PLATELET 857 08/14/0559 07:19 AM     BMP:   Lab Results   Component Value Date/Time    Glucose 66 (L) 01/20/2020 07:19 AM    Sodium 142 01/20/2020 07:19 AM    Potassium 4.1 01/20/2020 07:19 AM    Chloride 109 01/20/2020 07:19 AM    CO2 27 01/20/2020 07:19 AM    BUN 13 01/20/2020 07:19 AM    Creatinine 0.65 01/20/2020 07:19 AM    Calcium 9.1 01/20/2020 07:19 AM   @  Coagulation  Lab Results   Component Value Date    INR 0.9 01/20/2020    APTT 30.9 01/20/2020      Basic Metabolic Profile  Lab Results   Component Value Date     01/20/2020    CO2 27 01/20/2020    BUN 13 01/20/2020       Assesment: rightOrthopedic / Rheumatologic: Total Knee Replacement  Past Medical History:   Diagnosis Date    Adjacent segment disease C3/4 w/deg changes, poss stenosis, CT '18 6/22/2018    Allergic rhinitis     Anemia 2008    intermittant since then    Asthma     Back pain     Green's esophagus with esophagitis     Cervical radiculopathy     Chronic sinusitis 5/15/2012    DNS (deviated nasal septum)     Empyema     Foraminal stenosis of cervical region     C4-C5    GERD (gastroesophageal reflux disease)     Dr Jules Rash Hx MRSA infection 8/11/2014    Hypertension     Hypertriglyceridemia     Insulin resistance 4/9/2012    Left knee pain     Lichen planus     Menopause     Age 51    MRSA (methicillin resistant Staphylococcus aureus) infection 2008    left lung  Nausea & vomiting     Restless leg syndrome     [facility] ([facility] [address]     Spinal cord stimulator status 2016    Spinal stenosis of cervical region     C4-C5 and C5-C6    TMJ syndrome     Wears glasses     and contacts     ASA: 2    Status post joint replacement pt with risk of bleeding, blood clots, and infection. Plan: aspirin, PT, DC to home if cleared by PT and ok with medicine.

## 2020-02-05 ENCOUNTER — PATIENT OUTREACH (OUTPATIENT)
Dept: INTERNAL MEDICINE CLINIC | Age: 65
End: 2020-02-05

## 2020-02-05 ENCOUNTER — HOME CARE VISIT (OUTPATIENT)
Dept: HOME HEALTH SERVICES | Facility: HOME HEALTH | Age: 65
End: 2020-02-05

## 2020-02-05 ENCOUNTER — HOME CARE VISIT (OUTPATIENT)
Dept: SCHEDULING | Facility: HOME HEALTH | Age: 65
End: 2020-02-05
Payer: MEDICARE

## 2020-02-05 PROCEDURE — 3331090001 HH PPS REVENUE CREDIT

## 2020-02-05 PROCEDURE — 400013 HH SOC

## 2020-02-05 PROCEDURE — A6213 FOAM DRG >16<=48 SQ IN W/BDR: HCPCS

## 2020-02-05 PROCEDURE — G0151 HHCP-SERV OF PT,EA 15 MIN: HCPCS

## 2020-02-05 PROCEDURE — 3331090002 HH PPS REVENUE DEBIT

## 2020-02-05 PROCEDURE — G0299 HHS/HOSPICE OF RN EA 15 MIN: HCPCS

## 2020-02-05 NOTE — PROGRESS NOTES
Hospital Discharge Follow-Up      Date/Time:  2/5/2020 10:33 AM    Patient was [facility] to [facility] 2/3/20 and discharged on 2/4/20 for right total knee arthoplasty. The physician discharge summary was available at the time of outreach. Patient was contacted within 1 business days of discharge. Top Challenges reviewed with the provider   Advance Care Planning:   Does patient have an Advance Directive:  reviewed and current          Was this a readmission? no   Patient stated reason for the readmission: N/A    Care Transition Nurse (CTN) contacted the patient by telephone to perform post hospital discharge assessment. Patient verbalized home health was at the home. Patient agreed to call at a lter time.

## 2020-02-06 ENCOUNTER — HOME CARE VISIT (OUTPATIENT)
Dept: HOME HEALTH SERVICES | Facility: HOME HEALTH | Age: 65
End: 2020-02-06
Payer: MEDICARE

## 2020-02-06 ENCOUNTER — HOME CARE VISIT (OUTPATIENT)
Dept: SCHEDULING | Facility: HOME HEALTH | Age: 65
End: 2020-02-06
Payer: MEDICARE

## 2020-02-06 ENCOUNTER — PATIENT OUTREACH (OUTPATIENT)
Dept: CASE MANAGEMENT | Age: 65
End: 2020-02-06

## 2020-02-06 VITALS
TEMPERATURE: 97 F | RESPIRATION RATE: 20 BRPM | HEART RATE: 78 BPM | SYSTOLIC BLOOD PRESSURE: 140 MMHG | OXYGEN SATURATION: 96 % | DIASTOLIC BLOOD PRESSURE: 86 MMHG

## 2020-02-06 VITALS
HEART RATE: 71 BPM | SYSTOLIC BLOOD PRESSURE: 130 MMHG | TEMPERATURE: 97.8 F | OXYGEN SATURATION: 98 % | DIASTOLIC BLOOD PRESSURE: 78 MMHG

## 2020-02-06 PROCEDURE — 3331090002 HH PPS REVENUE DEBIT

## 2020-02-06 PROCEDURE — G0157 HHC PT ASSISTANT EA 15: HCPCS

## 2020-02-06 PROCEDURE — 3331090001 HH PPS REVENUE CREDIT

## 2020-02-06 NOTE — PROGRESS NOTES
Hospital Discharge Follow-Up      Date/Time:  2020 11:42 AM    Patient was admitted to [facility] on 2/3/20 and discharged on 20 for right total knee arthoplasty. The physician discharge summary was available at the time of outreach. Patient was contacted within 2 business days of discharge. Top Challenges reviewed with the provider   Advance Care Planning:   Does patient have an Advance Directive:  reviewed and current          Was this a readmission? no   Patient stated reason for the readmission: N/A    Care Transition Nurse (CTN) contacted the patient by telephone to perform post hospital discharge assessment. Verified name and  with patient as identifiers. Provided introduction to self, and explanation of the CTN role. Patient received hospital discharge instructions. CTN reviewed discharge instructions and red flags with patient who verbalized understanding. Patient given an opportunity to ask questions and does not have any further questions or concerns at this time. The patient agrees to contact the PCP office for questions related to their healthcare. CTN provided contact information for future reference. Patients top risk factors for readmission:  None at this time    Home Health orders at discharge: Gustavo JONES [address]: [facility]  Date of initial visit: 20    [address] [facility] ordered at discharge: none  [address]: None  Durable Medical Equipment received: None    Medication(s):   New Medications at Discharge: Percocet, Colace, Celebrex, , Keflex  Changed Medications at Discharge: None  Discontinued Medications at Discharge: methotrexate chemo injection    Medication reconciliation was performed with patient, who verbalizes understanding of administration of home medications. There were no barriers to obtaining medications identified at this time.     Referral to Pharm D needed: no     Current Outpatient Medications   Medication Sig    multivit-min/iron/folic/lutein (CENTRUM SILVER WOMEN PO) Take 1 Tab by mouth daily.  oxyCODONE-acetaminophen (PERCOCET) 7.5-325 mg per tablet Take 1-2 Tabs by mouth every four (4) hours as needed for Pain for up to 7 days. Max Daily Amount: 12 Tabs.  docusate sodium (COLACE) 100 mg capsule Take 1 Cap by mouth two (2) times a day for 90 days.  celecoxib (CELEBREX) 200 mg capsule Take 1 Cap by mouth two (2) times a day for 90 days.  aspirin (ASPIRIN) 325 mg tablet Take 1 Tab by mouth two (2) times a day.  ascorbic acid, vitamin C, (VITAMIN C) 500 mg tablet Take 1 Tab by mouth daily.  ferrous sulfate 325 mg (65 mg iron) tablet take 1 tablet by mouth once daily before breakfast    clobetasol (TEMOVATE) 0.05 % ointment Apply  to affected area two (2) days a week.  cyanocobalamin 1,000 mcg tablet take 1 tablet by mouth once daily    losartan (COZAAR) 50 mg tablet TAKE 1 TABLET DAILY    ESTRACE 0.01 % (0.1 mg/gram) vaginal cream Place 1 gm in the vagina twice weekly    cholecalciferol, vitamin D3, (VITAMIN D3 PO) Take 1 Tab by mouth daily.  acetaminophen (TYLENOL) 325 mg tablet Take 325 mg by mouth every four (4) hours as needed for Pain.  triamcinolone (NASACORT AQ) 55 mcg nasal inhaler 2 Sprays by Both Nostrils route daily. Indications: PERENNIAL ALLERGIC RHINITIS    Dexlansoprazole (DEXILANT) 60 mg CpDM Take 1 Cap by mouth Daily (before dinner).  calcium carbonate (TUMS) 200 mg calcium (500 mg) Chew Take 1 Tab by mouth four (4) times daily.  Cetirizine (ZYRTEC) 10 mg Cap Take 10 mg by mouth daily.  montelukast (SINGULAIR) 10 mg tablet Take 10 mg by mouth daily.  allergy injection by SubCUTAneous route every thirty (30) days.  budesonide-formoterol (SYMBICORT) 160-4.5 mcg/Actuation HFA inhaler Take 2 Puffs by inhalation two (2) times a day.     albuterol (PROVENTIL HFA, VENTOLIN HFA) 90 mcg/Actuation inhaler Take 2 Puffs by inhalation every six (6) hours as needed.  krill/om-3/dha/epa/phospho/ast (MAXIMUM RED KRILL OMEGA-3 PO) Take 1 Caplet by mouth daily.  folic acid (FOLVITE) 1 mg tablet take 1 tablet by mouth once daily     No current facility-administered medications for this visit.         Medications Discontinued During This Encounter   Medication Reason    cephALEXin (KEFLEX) 500 mg capsule Therapy Completed    losartan (COZAAR) 50 mg tablet Duplicate Order    nitrofurantoin, macrocrystal-monohydrate, (MACROBID) 100 mg capsule Therapy Completed       [facility] follow up appointment(s):   Future Appointments   Date Time Provider Cooper Dhillon   2/7/2020 To Be Determined Blocker, [facility]   2/8/2020 To Be Determined [facility]   2/9/2020 To Be Determined RADHA [address]   2/9/2020 To Be Determined [facility]   2/10/2020 To Be Determined Honor Dony COX, PT [facility]   2/11/2020 To Be Determined [facility]   2/12/2020 To Be Determined Blocker, [facility]   2/13/2020 To Be Determined Blocker, [facility]   2/14/2020 To Be Determined Blocker [facility]   2/14/2020 To Be Determined Freddy Joseph LPN [facility]   2/15/2020 To Be Determined [facility]   2/16/2020 To Be Determined [facility]   2/17/2020 To Be Determined Honor Dony COX, PT [facility]   2/18/2020 To Be Determined Honor Dony COX, PT [facility]   2/18/2020 To Be Determined Freddy Joseph LPN [facility] HR [facility]   2/19/2020 To Be Determined Pedro Luis Seth, PT [facility] HR [facility]   2/20/2020  1:20 PM Jackelin Burns PA-C [facility]   2/21/2020  8:00 AM Beverly Chang MD [facility] [address]   2/22/2020 To Be Determined Mary Lazar RN [facility] HR [facility]   6/2/2020  9:00 AM Beverly Chang MD [address]      Non-[facility] follow up appointment(s): None    Goals Addressed                 This Visit's Progress     Attends follow-up appointments as directed. Ensure provider appt is scheduled within 7 days post-discharge; 2/6: Patient aware of upcoming appts   Confirm patient attended post-discharge provider appt   Complete post-visit call to confirm attendance and update care needs             Prevent complications post hospitalization.         Plan of Care:   CTN will monitor X 4 weeks   Review/educate common or potential \"red flags\" of condition worsening  Evaluate adherence to medications and priority barriers to resolve      Instruct on adherence to medications as ordered and assess for therapeutic response and side-effects; 2/6: Completed Keflex  Review the Home Visit or Home Health documentation for coordinating care and goals

## 2020-02-07 ENCOUNTER — PATIENT OUTREACH (OUTPATIENT)
Dept: INTERNAL MEDICINE CLINIC | Age: 65
End: 2020-02-07

## 2020-02-07 ENCOUNTER — TELEPHONE (OUTPATIENT)
Dept: INTERNAL MEDICINE CLINIC | Age: 65
End: 2020-02-07

## 2020-02-07 ENCOUNTER — HOME CARE VISIT (OUTPATIENT)
Dept: SCHEDULING | Facility: HOME HEALTH | Age: 65
End: 2020-02-07
Payer: MEDICARE

## 2020-02-07 VITALS
OXYGEN SATURATION: 95 % | SYSTOLIC BLOOD PRESSURE: 108 MMHG | HEART RATE: 71 BPM | DIASTOLIC BLOOD PRESSURE: 70 MMHG | TEMPERATURE: 97.7 F

## 2020-02-07 PROCEDURE — G0157 HHC PT ASSISTANT EA 15: HCPCS

## 2020-02-07 PROCEDURE — 3331090002 HH PPS REVENUE DEBIT

## 2020-02-07 PROCEDURE — 3331090001 HH PPS REVENUE CREDIT

## 2020-02-07 NOTE — PROGRESS NOTES
Patient reported she took Miralax X 4 doses (took one dose yesterday, then in the afternoon and then at bedtime. Also had does this AM.  Also taking Colace twice daily. Last BM normal 2/2 and small BM on 2/3. C/o pressure in stomach. Patient is staring to pass gas, patient reported she has been drinking plenty of fluids and is urinating without difficulty. Writer will notify PCP. Writer also suggested contacting Dr. Terra Zimmerman office to make aware. Writer forwarded chart to Trevor Hassan PA-C (ortho).

## 2020-02-08 ENCOUNTER — HOME CARE VISIT (OUTPATIENT)
Dept: SCHEDULING | Facility: HOME HEALTH | Age: 65
End: 2020-02-08
Payer: MEDICARE

## 2020-02-08 VITALS
TEMPERATURE: 97.3 F | HEART RATE: 69 BPM | OXYGEN SATURATION: 99 % | SYSTOLIC BLOOD PRESSURE: 126 MMHG | DIASTOLIC BLOOD PRESSURE: 70 MMHG

## 2020-02-08 VITALS — TEMPERATURE: 98 F | HEART RATE: 88 BPM | DIASTOLIC BLOOD PRESSURE: 75 MMHG | SYSTOLIC BLOOD PRESSURE: 115 MMHG

## 2020-02-08 PROCEDURE — 3331090002 HH PPS REVENUE DEBIT

## 2020-02-08 PROCEDURE — 3331090001 HH PPS REVENUE CREDIT

## 2020-02-08 PROCEDURE — G0157 HHC PT ASSISTANT EA 15: HCPCS

## 2020-02-09 ENCOUNTER — HOME CARE VISIT (OUTPATIENT)
Dept: SCHEDULING | Facility: HOME HEALTH | Age: 65
End: 2020-02-09
Payer: MEDICARE

## 2020-02-09 VITALS
SYSTOLIC BLOOD PRESSURE: 140 MMHG | TEMPERATURE: 97.4 F | OXYGEN SATURATION: 99 % | HEART RATE: 84 BPM | DIASTOLIC BLOOD PRESSURE: 87 MMHG

## 2020-02-09 VITALS
OXYGEN SATURATION: 98 % | SYSTOLIC BLOOD PRESSURE: 110 MMHG | TEMPERATURE: 97.9 F | RESPIRATION RATE: 16 BRPM | DIASTOLIC BLOOD PRESSURE: 70 MMHG | HEART RATE: 71 BPM

## 2020-02-09 PROCEDURE — G0299 HHS/HOSPICE OF RN EA 15 MIN: HCPCS

## 2020-02-09 PROCEDURE — 3331090001 HH PPS REVENUE CREDIT

## 2020-02-09 PROCEDURE — 3331090002 HH PPS REVENUE DEBIT

## 2020-02-09 PROCEDURE — G0157 HHC PT ASSISTANT EA 15: HCPCS

## 2020-02-10 ENCOUNTER — HOME CARE VISIT (OUTPATIENT)
Dept: HOME HEALTH SERVICES | Facility: HOME HEALTH | Age: 65
End: 2020-02-10
Payer: MEDICARE

## 2020-02-10 PROCEDURE — 3331090002 HH PPS REVENUE DEBIT

## 2020-02-10 PROCEDURE — 3331090001 HH PPS REVENUE CREDIT

## 2020-02-11 ENCOUNTER — HOME CARE VISIT (OUTPATIENT)
Dept: SCHEDULING | Facility: HOME HEALTH | Age: 65
End: 2020-02-11
Payer: MEDICARE

## 2020-02-11 VITALS
HEART RATE: 87 BPM | SYSTOLIC BLOOD PRESSURE: 142 MMHG | OXYGEN SATURATION: 98 % | DIASTOLIC BLOOD PRESSURE: 76 MMHG | TEMPERATURE: 97.1 F

## 2020-02-11 PROCEDURE — 3331090002 HH PPS REVENUE DEBIT

## 2020-02-11 PROCEDURE — 3331090001 HH PPS REVENUE CREDIT

## 2020-02-11 PROCEDURE — G0151 HHCP-SERV OF PT,EA 15 MIN: HCPCS

## 2020-02-12 ENCOUNTER — HOME CARE VISIT (OUTPATIENT)
Dept: SCHEDULING | Facility: HOME HEALTH | Age: 65
End: 2020-02-12
Payer: MEDICARE

## 2020-02-12 VITALS
HEART RATE: 75 BPM | DIASTOLIC BLOOD PRESSURE: 80 MMHG | OXYGEN SATURATION: 97 % | SYSTOLIC BLOOD PRESSURE: 135 MMHG | TEMPERATURE: 97.6 F

## 2020-02-12 PROCEDURE — 3331090001 HH PPS REVENUE CREDIT

## 2020-02-12 PROCEDURE — G0157 HHC PT ASSISTANT EA 15: HCPCS

## 2020-02-12 PROCEDURE — 3331090002 HH PPS REVENUE DEBIT

## 2020-02-13 ENCOUNTER — HOME CARE VISIT (OUTPATIENT)
Dept: HOME HEALTH SERVICES | Facility: HOME HEALTH | Age: 65
End: 2020-02-13
Payer: MEDICARE

## 2020-02-13 ENCOUNTER — HOME CARE VISIT (OUTPATIENT)
Dept: SCHEDULING | Facility: HOME HEALTH | Age: 65
End: 2020-02-13
Payer: MEDICARE

## 2020-02-13 PROCEDURE — 3331090001 HH PPS REVENUE CREDIT

## 2020-02-13 PROCEDURE — 3331090002 HH PPS REVENUE DEBIT

## 2020-02-13 PROCEDURE — G0157 HHC PT ASSISTANT EA 15: HCPCS

## 2020-02-14 ENCOUNTER — HOME CARE VISIT (OUTPATIENT)
Dept: HOME HEALTH SERVICES | Facility: HOME HEALTH | Age: 65
End: 2020-02-14
Payer: MEDICARE

## 2020-02-14 ENCOUNTER — HOME CARE VISIT (OUTPATIENT)
Dept: SCHEDULING | Facility: HOME HEALTH | Age: 65
End: 2020-02-14
Payer: MEDICARE

## 2020-02-14 VITALS
SYSTOLIC BLOOD PRESSURE: 118 MMHG | HEART RATE: 66 BPM | DIASTOLIC BLOOD PRESSURE: 78 MMHG | TEMPERATURE: 97.8 F | OXYGEN SATURATION: 99 %

## 2020-02-14 VITALS
DIASTOLIC BLOOD PRESSURE: 90 MMHG | OXYGEN SATURATION: 98 % | HEART RATE: 67 BPM | SYSTOLIC BLOOD PRESSURE: 135 MMHG | TEMPERATURE: 97.8 F

## 2020-02-14 PROCEDURE — G0157 HHC PT ASSISTANT EA 15: HCPCS

## 2020-02-14 PROCEDURE — 3331090001 HH PPS REVENUE CREDIT

## 2020-02-14 PROCEDURE — 3331090002 HH PPS REVENUE DEBIT

## 2020-02-15 ENCOUNTER — HOME CARE VISIT (OUTPATIENT)
Dept: SCHEDULING | Facility: HOME HEALTH | Age: 65
End: 2020-02-15
Payer: MEDICARE

## 2020-02-15 VITALS
DIASTOLIC BLOOD PRESSURE: 78 MMHG | HEART RATE: 82 BPM | TEMPERATURE: 98.1 F | SYSTOLIC BLOOD PRESSURE: 126 MMHG | OXYGEN SATURATION: 98 %

## 2020-02-15 PROCEDURE — G0157 HHC PT ASSISTANT EA 15: HCPCS

## 2020-02-15 PROCEDURE — 3331090001 HH PPS REVENUE CREDIT

## 2020-02-15 PROCEDURE — 3331090002 HH PPS REVENUE DEBIT

## 2020-02-16 ENCOUNTER — HOME CARE VISIT (OUTPATIENT)
Dept: SCHEDULING | Facility: HOME HEALTH | Age: 65
End: 2020-02-16
Payer: MEDICARE

## 2020-02-16 PROCEDURE — 3331090001 HH PPS REVENUE CREDIT

## 2020-02-16 PROCEDURE — 3331090002 HH PPS REVENUE DEBIT

## 2020-02-16 PROCEDURE — G0157 HHC PT ASSISTANT EA 15: HCPCS

## 2020-02-17 ENCOUNTER — HOME CARE VISIT (OUTPATIENT)
Dept: SCHEDULING | Facility: HOME HEALTH | Age: 65
End: 2020-02-17
Payer: MEDICARE

## 2020-02-17 VITALS
SYSTOLIC BLOOD PRESSURE: 138 MMHG | TEMPERATURE: 97.3 F | OXYGEN SATURATION: 98 % | DIASTOLIC BLOOD PRESSURE: 86 MMHG | HEART RATE: 77 BPM

## 2020-02-17 VITALS
OXYGEN SATURATION: 99 % | SYSTOLIC BLOOD PRESSURE: 130 MMHG | TEMPERATURE: 98 F | DIASTOLIC BLOOD PRESSURE: 78 MMHG | HEART RATE: 80 BPM

## 2020-02-17 PROCEDURE — G0151 HHCP-SERV OF PT,EA 15 MIN: HCPCS

## 2020-02-17 PROCEDURE — 3331090001 HH PPS REVENUE CREDIT

## 2020-02-17 PROCEDURE — 3331090002 HH PPS REVENUE DEBIT

## 2020-02-18 ENCOUNTER — HOME CARE VISIT (OUTPATIENT)
Dept: SCHEDULING | Facility: HOME HEALTH | Age: 65
End: 2020-02-18
Payer: MEDICARE

## 2020-02-18 PROCEDURE — 3331090001 HH PPS REVENUE CREDIT

## 2020-02-18 PROCEDURE — G0151 HHCP-SERV OF PT,EA 15 MIN: HCPCS

## 2020-02-18 PROCEDURE — 3331090002 HH PPS REVENUE DEBIT

## 2020-02-18 PROCEDURE — G0300 HHS/HOSPICE OF LPN EA 15 MIN: HCPCS

## 2020-02-19 ENCOUNTER — TELEPHONE (OUTPATIENT)
Dept: INTERNAL MEDICINE CLINIC | Age: 65
End: 2020-02-19

## 2020-02-19 VITALS
DIASTOLIC BLOOD PRESSURE: 78 MMHG | OXYGEN SATURATION: 98 % | TEMPERATURE: 97.8 F | SYSTOLIC BLOOD PRESSURE: 124 MMHG | HEART RATE: 72 BPM

## 2020-02-19 VITALS
RESPIRATION RATE: 16 BRPM | SYSTOLIC BLOOD PRESSURE: 144 MMHG | HEART RATE: 72 BPM | DIASTOLIC BLOOD PRESSURE: 82 MMHG | OXYGEN SATURATION: 98 %

## 2020-02-19 PROCEDURE — 3331090001 HH PPS REVENUE CREDIT

## 2020-02-19 PROCEDURE — 3331090002 HH PPS REVENUE DEBIT

## 2020-02-19 NOTE — TELEPHONE ENCOUNTER
Pt called to r/s her appt on 02/21/- for a post surgiery f/up- she just had a knee replacement surgery and is afraid if the weather is bad , is there another day after 02/21- she can be seen ?

## 2020-02-20 ENCOUNTER — OFFICE VISIT (OUTPATIENT)
Dept: ORTHOPEDIC SURGERY | Age: 65
End: 2020-02-20

## 2020-02-20 VITALS
BODY MASS INDEX: 26.83 KG/M2 | HEART RATE: 76 BPM | RESPIRATION RATE: 16 BRPM | OXYGEN SATURATION: 99 % | WEIGHT: 187.4 LBS | SYSTOLIC BLOOD PRESSURE: 136 MMHG | DIASTOLIC BLOOD PRESSURE: 86 MMHG | TEMPERATURE: 98.5 F | HEIGHT: 70 IN

## 2020-02-20 DIAGNOSIS — M25.561 RIGHT KNEE PAIN, UNSPECIFIED CHRONICITY: ICD-10-CM

## 2020-02-20 DIAGNOSIS — Z96.651 STATUS POST RIGHT KNEE REPLACEMENT: Primary | ICD-10-CM

## 2020-02-20 PROCEDURE — 3331090001 HH PPS REVENUE CREDIT

## 2020-02-20 PROCEDURE — 3331090002 HH PPS REVENUE DEBIT

## 2020-02-20 NOTE — PROGRESS NOTES
[address], [address] [facility]  [phone]           Patient: Mily San                MRN: [id number]       SSN: [id number]  YOB: 1955        AGE: 71 y.o. SEX: female  Body mass index is 26.89 kg/m². PCP: Merlinda Motes, MD  02/20/20      This office note has been dictated. REVIEW OF SYSTEMS:  Constitutional: Negative for fever, chills, weight loss and malaise/fatigue. HENT: Negative. Eyes: Negative. Respiratory: Negative. Cardiovascular: Negative. Gastrointestinal: No bowel incontinence or constipation. Genitourinary: No bladder incontinence or saddle anesthesia. Skin: Negative. Neurological: Negative. Endo/Heme/Allergies: Negative. Psychiatric/Behavioral: Negative. Musculoskeletal: As per HPI above. Past Medical History:   Diagnosis Date    Adjacent segment disease C3/4 w/deg changes, poss stenosis, CT '18 6/22/2018    Allergic rhinitis     Anemia 2008    intermittant since then    Asthma     Back pain     Green's esophagus with esophagitis     Cervical radiculopathy     Chronic sinusitis 5/15/2012    DNS (deviated nasal septum)     Empyema     Foraminal stenosis of cervical region     C4-C5    GERD (gastroesophageal reflux disease)     Dr Butch Atkinson    Hx MRSA infection 8/11/2014    Hypertension     Hypertriglyceridemia     Insulin resistance 4/9/2012    Left knee pain     Lichen planus     Menopause     Age 51    MRSA (methicillin resistant Staphylococcus aureus) infection 2008    left lung    Nausea & vomiting     Restless leg syndrome     Rheumatoid arthritis ([address]     Spinal cord stimulator status 2016    Spinal stenosis of cervical region     C4-C5 and C5-C6    TMJ syndrome     Wears glasses     and contacts         Current Outpatient Medications:     multivit-min/iron/folic/lutein (CENTRUM SILVER WOMEN PO), Take 1 Tab by mouth daily. , Disp: , Rfl:     docusate sodium (COLACE) 100 mg capsule, Take 1 Cap by mouth two (2) times a day for 90 days. , Disp: 60 Cap, Rfl: 2    celecoxib (CELEBREX) 200 mg capsule, Take 1 Cap by mouth two (2) times a day for 90 days. , Disp: 60 Cap, Rfl: 2    aspirin (ASPIRIN) 325 mg tablet, Take 1 Tab by mouth two (2) times a day., Disp: 60 Tab, Rfl: 0    krill/om-3/dha/epa/phospho/ast (MAXIMUM RED KRILL OMEGA-3 PO), Take 1 Caplet by mouth daily. , Disp: , Rfl:     ascorbic acid, vitamin C, (VITAMIN C) 500 mg tablet, Take 1 Tab by mouth daily. , Disp: 90 Tab, Rfl: 3    ferrous sulfate 325 mg (65 mg iron) tablet, take 1 tablet by mouth once daily before breakfast, Disp: 90 Tab, Rfl: 1    clobetasol (TEMOVATE) 0.05 % ointment, Apply  to affected area two (2) days a week., Disp: 45 g, Rfl: 3    cyanocobalamin 1,000 mcg tablet, take 1 tablet by mouth once daily, Disp: 90 Tab, Rfl: 3    losartan (COZAAR) 50 mg tablet, TAKE 1 TABLET DAILY, Disp: 90 Tab, Rfl: 3    ESTRACE 0.01 % (0.1 mg/gram) vaginal cream, Place 1 gm in the vagina twice weekly, Disp: 42.5 g, Rfl: 2    cholecalciferol, vitamin D3, (VITAMIN D3 PO), Take 1 Tab by mouth daily. , Disp: , Rfl:     folic acid (FOLVITE) 1 mg tablet, take 1 tablet by mouth once daily, Disp: , Rfl: 0    acetaminophen (TYLENOL) 325 mg tablet, Take 325 mg by mouth every four (4) hours as needed for Pain., Disp: , Rfl:     triamcinolone (NASACORT AQ) 55 mcg nasal inhaler, 2 Sprays by Both Nostrils route daily. Indications: PERENNIAL ALLERGIC RHINITIS, Disp: , Rfl:     Dexlansoprazole (DEXILANT) 60 mg CpDM, Take 1 Cap by mouth Daily (before dinner). , Disp: , Rfl:     calcium carbonate (TUMS) 200 mg calcium (500 mg) Chew, Take 1 Tab by mouth four (4) times daily. , Disp: , Rfl:     Cetirizine (ZYRTEC) 10 mg Cap, Take 10 mg by mouth daily. , Disp: , Rfl:     montelukast (SINGULAIR) 10 mg tablet, Take 10 mg by mouth daily. , Disp: , Rfl:     allergy injection, by SubCUTAneous route every thirty (30) days. , Disp: , Rfl:     budesonide-formoterol (SYMBICORT) 160-4.5 mcg/Actuation HFA inhaler, Take 2 Puffs by inhalation two (2) times a day., Disp: , Rfl:     albuterol (PROVENTIL HFA, VENTOLIN HFA) 90 mcg/Actuation inhaler, Take 2 Puffs by inhalation every six (6) hours as needed for Shortness of Breath or Wheezing., Disp: , Rfl:     Allergies   Allergen Reactions    Adhesive Tape-Silicones Other (comments)     Patient stated iv tape and tegaderm ok    Other Plant, Animal, Environmental Unable to Consolidated Doc     MOLD    Sulfasalazine Other (comments)     Could not taste anything, lightheadedness    Tape [Adhesive] Other (comments)     Blisters, use paper tape only  Patient states tegaderm and paper tape are okay    Chlorhexidine Towelette Itching and Other (comments)     \"stinging for a couple of hours\"    itching       Social History     Socioeconomic History    Marital status:      Spouse name: Not on file    Number of children: Not on file    Years of education: Not on file    Highest education level: Not on file   Occupational History    Not on file   Social Needs    Financial resource strain: Not on file    Food insecurity:     Worry: Not on file     Inability: Not on file    Transportation needs:     Medical: Not on file     Non-medical: Not on file   Tobacco Use    Smoking status: Never Smoker    Smokeless tobacco: Never Used   Substance and Sexual Activity    Alcohol use: No     Alcohol/week: 0.0 standard drinks     Frequency: Never     Comment: 0-6 per year    Drug use: Yes     Types: Prescription, OTC    Sexual activity: Yes     Partners: Male     Comment: Menopausal since 2000   Lifestyle    Physical activity:     Days per week: Not on file     Minutes per session: Not on file    Stress: Not on file   Relationships    Social connections:     Talks on phone: Not on file     Gets together: Not on file     Attends Protestant service: Not on file     Active member of club or organization: Not on file     Attends meetings of clubs or organizations: Not on file     Relationship status: Not on file    Intimate partner violence:     Fear of current or ex partner: Not on file     Emotionally abused: Not on file     Physically abused: Not on file     Forced sexual activity: Not on file   Other Topics Concern    Not on file   Social History Narrative    Retired        Past Surgical History:   Procedure Laterality Date    HX CERVICAL FUSION  08/13/14   [place]      TMJ surgery    HX LUMBAR LAMINECTOMY  Nov 1995    [facility]    HX LUMBAR LAMINECTOMY  Feb 1997    [facility]  1/2015    bunion removal from right foot    HX OTHER SURGICAL  2007    thoracentesis    HX OTHER SURGICAL  2008    chest tube    HX OTHER SURGICAL  1997    TMJ surgery    HX OTHER SURGICAL  2012    sinus surg 2012-Dr Racquel Bryant.  HX OTHER SURGICAL  2016    spinal cord stimulator place for lumbar neuritis, good benefit    HX ROTATOR CUFF REPAIR Right 01/2019    HX TUBAL LIGATION      UT ANESTH,SURGERY OF SHOULDER  01/31/2019    UT COLONOSCOPY FLX DX W/COLLJ SPEC WHEN PFRMD  6-18-08    normal/duntemann           We did see Ms. Tay Sanchez for followup with regards to her right knee replacement. The patient is now 17 days status post surgery, and is progressing quite nicely. She is doing excellent. She has finished up her home physical therapy. She has had no troubles with the wound and no fever, chills, systemic changes, or injuries to report. She has had no chest pain or shortness of breath. PHYSICAL EXAMINATION:  In general, the patient is alert and oriented x 3 in no acute distress. The patient is well-developed, well-nourished, with a normal affect. The patient is afebrile. Examination of the right knee reveals the skin is intact. The surgical wound is healed up nicely. Staples are in place.   There is no erythema, ecchymosis, and no warmth or signs of infection or cellulitis present. Range of motion is full extension to 120ø of flexion. The patella tracks nicely without rubs or crepitus noted. ASSESSMENT:  Status post right knee replacement. PLAN:  At this point, the staples were removed today in the office and replaced with Steri-Strips without complications. She will be set up with outpatient physical therapy, continue on her home exercise program, and follow up with us in two to three weeks' time for evaluation, range of motion check, and x-rays. She will call with any questions or concerns that shall arise.                   JR Red ESPINOZA, PA-C, ATC

## 2020-02-20 NOTE — PROGRESS NOTES
1. Have you been to the ER, urgent care clinic since your last visit? Hospitalized since your last visit? No    2. Have you seen or consulted any other health care providers outside of the [address] [place] [address] since your last visit? Include any pap smears or colon screening.  No

## 2020-02-21 ENCOUNTER — HOME CARE VISIT (OUTPATIENT)
Dept: SCHEDULING | Facility: HOME HEALTH | Age: 65
End: 2020-02-21
Payer: MEDICARE

## 2020-02-21 VITALS
DIASTOLIC BLOOD PRESSURE: 84 MMHG | TEMPERATURE: 97.6 F | SYSTOLIC BLOOD PRESSURE: 126 MMHG | HEART RATE: 78 BPM | OXYGEN SATURATION: 97 %

## 2020-02-21 PROCEDURE — G0151 HHCP-SERV OF PT,EA 15 MIN: HCPCS

## 2020-02-21 PROCEDURE — 3331090001 HH PPS REVENUE CREDIT

## 2020-02-21 PROCEDURE — 3331090002 HH PPS REVENUE DEBIT

## 2020-02-22 PROCEDURE — 3331090001 HH PPS REVENUE CREDIT

## 2020-02-22 PROCEDURE — 3331090002 HH PPS REVENUE DEBIT

## 2020-02-23 PROCEDURE — 3331090002 HH PPS REVENUE DEBIT

## 2020-02-23 PROCEDURE — 3331090001 HH PPS REVENUE CREDIT

## 2020-02-24 ENCOUNTER — HOSPITAL ENCOUNTER (OUTPATIENT)
Dept: PHYSICAL THERAPY | Age: 65
Discharge: HOME | End: 2020-02-24
Payer: MEDICARE

## 2020-02-24 PROCEDURE — 97535 SELF CARE MNGMENT TRAINING: CPT

## 2020-02-24 PROCEDURE — 3331090001 HH PPS REVENUE CREDIT

## 2020-02-24 PROCEDURE — 3331090002 HH PPS REVENUE DEBIT

## 2020-02-24 PROCEDURE — 97161 PT EVAL LOW COMPLEX 20 MIN: CPT

## 2020-02-24 PROCEDURE — 97110 THERAPEUTIC EXERCISES: CPT

## 2020-02-24 NOTE — PROGRESS NOTES
[facility]  [address]  [facility], [address]  [phone] fax    Plan of Care/ Statement of Necessity for Physical Therapy Services    Patient name: Tree Gan Start of Care: 2020   Referral source: Laury Wynn : 1955    Medical Diagnosis: Knee pain, right [M25.561]  Presence of right artificial knee joint [Z96.651]  Payor: [place] MEDICARE / Plan: [place] MEDICARE PART A & B / Product Type: Medicare /  Onset Date:2/3/2020    Treatment Diagnosis: right knee pain and limited ROM s/p right TKA   Prior Hospitalization: see medical history Provider#: [id number]   Medications: Verified on Patient summary List    Comorbidities: right RTC repair, arthritis, lumbar spinal cord stimulator, HTN, Asthma, bunionectomy, 2x cervical fusion, left lung surgery, lower back surgery, TMJ surgery   Prior Level of Function: ambulation void of AD< dog walking 5 days/week, aquatic exercise classes 3 days/week      The Plan of Care and following information is based on the information from the initial evaluation. Assessment/ key information: Pt is a 71year old female presenting s/p right elective TKA on 2/3/2020 void of reported complications post operatively. She reports she has been undergoing HHPT through last week and has progressed to using a cane PRN for assistance with ambulation. She reports c/o stiffness and swelling in her right knee, but otherwise reports it has been doing pretty well. She presents with associated impairments consisting of right knee and leg edema, mild right knee weakness, right knee deficits in terminal knee extension and flexion ROM passively 1-114 degrees, mild antalgic gait pattnern and difficulty with stair negotiation. Pt will benefit from skilled PT management to address her impairments and improve her level of function to return to OF.     Evaluation Complexity History MEDIUM  Complexity : 1-2 comorbidities / personal factors will impact the outcome/ POC ; Examination MEDIUM Complexity : 3 Standardized tests and measures addressing body structure, function, activity limitation and / or participation in recreation  ;Presentation LOW Complexity : Stable, uncomplicated  ;Clinical Decision Making MEDIUM Complexity : FOTO score of 26-74  Overall Complexity Rating: LOW   Problem List: pain affecting function, decrease ROM, decrease strength, edema affecting function, impaired gait/ balance, decrease ADL/ functional abilitiies, decrease activity tolerance, decrease flexibility/ joint mobility and decrease transfer abilities   Treatment Plan may include any combination of the following: Therapeutic exercise, Therapeutic activities, Neuromuscular re-education, Physical agent/modality, Gait/balance training, Manual therapy, Aquatic therapy, Patient education and Self Care training  Patient / Family readiness to learn indicated by: asking questions, trying to perform skills and interest  Persons(s) to be included in education: patient (P)  Barriers to Learning/Limitations: None  Patient Goal (s): Continue to increase range of motion.   Patient Self Reported Health Status: good  Rehabilitation Potential: good    Short Term Goals: To be accomplished in 2 weeks:   1. Patient will report performance of HEP at least 2 times per day to facilitate improved outcomes and improved self management. Long Term Goals: To be accomplished in 4 weeks:   1. Patient will report FOTO score of 67 or better to indicate significant improvement in functional status. 2. Pt will demonstrate right knee AROM 0 - 125 degrees or better to improve ease of functional mobility. 3. Pt will demonstrate ability to negotiate stairs reciprocally void of compensatory movement to improve ease of community ambulation. 4. Pt will demonstrate 5/5 right knee extension MMT to improve ease of transfers.   Frequency / Duration: Patient to be seen 2 times per week for 4 weeks. Patient/ Caregiver education and instruction: Diagnosis, prognosis, self care, activity modification and exercises   [x]  Plan of care has been reviewed with PTA    Certification Period: 2/24/2020 - 3/24/2020  Yunior James, PT, DPT, ATC 2/24/2020 11:52 AM    ________________________________________________________________________    I certify that the above Therapy Services are being furnished while the patient is under my care. I agree with the treatment plan and certify that this therapy is necessary.     [de-identified] Signature:____________________  Date:____________Time: _________    Please sign and return to [facility]  [address] [facility] [address]  [place], [address]  [phone] fax

## 2020-02-24 NOTE — PROGRESS NOTES
PT DAILY TREATMENT NOTE/KNEE EVAL 10-18    Patient Name: Annmarie Pillai  Date:2020  : 1955  [x]  Patient  Verified  Payor: [place] MED[place]ARE / Plan: [place] MEDICARE PART A & B / Product Type: Medicare /    In time:11:01am  Out time:11:37am  Total Treatment Time (min): 36  Visit #: 1 of 8    Medicare/BCBS Only   Total Timed Codes (min):  24 1:1 Treatment Time:  36     Treatment Area: Knee pain, right [M25.561]  Presence of right artificial knee joint [Z96.651]    SUBJECTIVE  Pain Level (0-10 scale): 1/10  [x]constant []intermittent [x]improving []worsening []no change since onset    Any medication changes, allergies to medications, adverse drug reactions, diagnosis change, or new procedure performed?: [x] No    [] Yes (see summary sheet for update)  Subjective functional status/changes:     Pt is a 71year old female presenting s/p right elective TKA on 2/3/2020 void of reported complications post operatively. She reports she has been undergoing HHPT through last week and has progressed to using a cane PRN for assistance with ambulation. She reports c/o stiffness and swelling in her right knee, but otherwise reports it has been doing pretty well.     PLOF: ambulation void of AD< dog walking 5 days/week, aquatic exercise classes 3 days/week  Limitations to PLOF: limited ambulation distance and stair negotiation, intermittent use of SPC assist  Mechanism of Injury: see above  Current symptoms/Complaints: see above  Previous Treatment/Compliance: HHPT, s/p TKA  PMHx/Surgical Hx: right RTC repair, arthritis, lumbar spinal cord stimulator, HTN, Asthma, bunionectomy, 2x cervical fusion, left lung surgery, lower back surgery, TMJ surgery  Work Hx: see intake  Living Situation:   Pt Goals: see intake  Barriers: []pain []financial []time []transportation []other  Motivation: appears motivated and cooperative  Substance use: []Alcohol []Tobacco []other:   FABQ Score: []low []elevate  Cognition: A & O x 4 Other: OBJECTIVE/EXAMINATION  Domestic Life:   Activity/Recreational Limitations:   Mobility:   Self Care:     12 min [x]Eval                  []Re-Eval     14 min Therapeutic Exercise:  [] See flow sheet : HEP handout creation and instruction   Rationale: increase ROM, increase strength and improve coordination to improve the patients ability to improve ease of daily activity and improve ease of ambulation. Self Care: 10 minutes pt education regarding relevant anatomy, diagnosis, prognosis, plan of care, activity modification, self modality use, edema control to facilitate pt self management and improve outcomes.       With   [] TE   [] TA   [] neuro   [] other: Patient Education: [x] Review HEP    [] Progressed/Changed HEP based on:   [] positioning   [] body mechanics   [] transfers   [] heat/ice application    [] other:      Other Objective/Functional Measures:    Physical Therapy Evaluation - Knee    Posture: [] Varus    [] Valgus    [] Recurvatum        [] Tibial Torsion    [] Foot Supination    [] Foot Pronation    Describe:    Gait:  [] Normal    [x] Abnormal    [x] Antalgic    [] NWB    Device:    Describe: minimally antalgic on left side, bilat genu valgum noted    ROM / Strength  [] Unable to assess                  AROM                      PROM                   Strength (1-5)    Left Right Left Right Left Right   Hip Flexion     4 4    Extension     3 3    Abduction     4/5 3+/5    Adduction         Knee Flexion 140 111 142 114 4+/5 4/5    Extension 0 2 0 1 5/5 4+/5   Ankle Plantarflexion     5 5    Dorsiflexion     5 4+       Flexibility: [] Unable to assess at this time  Hamstrings:    (L) Tightness= [x] WNL   [] Min   [] Mod   [] Severe    (R) Tightness= [] WNL   [x] Min   [] Mod   [] Severe  Quadriceps:    (L) Tightness= [x] WNL   [] Min   [] Mod   [] Severe    (R) Tightness= [] WNL   [x] Min   [] Mod   [] Severe  Gastroc:      (L) Tightness= [x] WNL   [] Min   [] Mod   [] Severe    (R) Tightness= [x] WNL   [] Min   [] Mod   [] Severe  Other:    Palpation:   Neg/Pos  Neg/Pos  Neg/Pos   Joint Line  Quad tendon  Patellar ligament    Patella  Fibular head  Pes Anserinus    Tibial tubercle  Hamstring tendons  Infrapatellar fat pad      Optional Tests:  Patellar Positioning (Static)   []L []R Normal []L []R Lateral   []L []R Lucy Mealy      []L []R Medial   []L []R [place]    Patellar Tracking   []L []R Glide (Lat)   []L []R Tilt (Lat)     []L []R Glide (Med)  []L []R Tilt (Med)      []L []R Tile (Inf)     Patellar Mobility   []L []R Hypermobile []L [x]R Hypomobile         Girth Measurements:     Cm at  Cm above joint line   Cm at   Cm below joint line  Cm at joint line   Left     WNL   Right      Gross right knee edema noted and minimal right leg non-pitting edema noted as well, not formally measured     Lachmans  [] Neg    [] Pos Posterior Drawer [] Neg    [] Pos  Pivot Shift  [] Neg    [] Pos Posterior Sag  [] Neg    [] Pos  AMARA   [] Neg    [] Pos Alfonso's Test [] Neg    [] Pos  ALRI   [] Neg    [] Pos Squat   [] Neg    [] Pos  Valgus@ 0 Degrees [] Neg    [] Pos Colby-Rancho [] Neg    [] Pos  Valgus@ 30 Degrees [] Neg    [] Pos Patellar Apprehension [] Neg    [] Pos  Varus@ 0 Degrees [] Neg    [] Pos Medel's Compression [] Neg    [] Pos  Varus@ 30 Degrees [] Neg    [] Pos Ely's Test  [] Neg    [] Pos  Apley's Compression [] Neg    [] Pos Linda's Test  [] Neg    [] Pos  Apley's Distraction [] Neg    [] Pos Stroke Test  [] Neg    [] Pos   Anterior Drawer [] Neg    [] Pos Fluctuation Test [] Neg    [] Pos  Other:                  [] Neg    [] Pos                 Other tests/comments:  Mild warmth and erythema noted diffusely around right knee, incision appears closed void of signs of drainage with steri strips in place     Mild eccentric and concentric quad control deficits with stair negotiation on the RLE noted    Pain Level (0-10 scale) post treatment: 1/10    ASSESSMENT/Changes in Function: Per POC.    Patient will continue to benefit from skilled PT services to modify and progress therapeutic interventions, address functional mobility deficits, address ROM deficits, address strength deficits, analyze and address soft tissue restrictions, analyze and cue movement patterns, analyze and modify body mechanics/ergonomics and instruct in home and community integration to attain remaining goals. [x]  See Plan of Care  []  See progress note/recertification  []  See Discharge Summary         Progress towards goals / Updated goals:  Short Term Goals: To be accomplished in 2 weeks:              1. Patient will report performance of HEP at least 2 times per day to facilitate improved outcomes and improved self management. Eval: Issued  Long Term Goals: To be accomplished in 4 weeks:              1. Patient will report FOTO score of 67 or better to indicate significant improvement in functional status. Eval: 52              2. Pt will demonstrate right knee AROM 0 - 125 degrees or better to improve ease of functional mobility. Eval: 1-111              3. Pt will demonstrate ability to negotiate stairs reciprocally void of compensatory movement to improve ease of community ambulation. Eval: reciprocal with impaired eccentric and concentric quad control and BUE assist              4. Pt will demonstrate 5/5 right knee extension MMT to improve ease of transfers. Eval: 4+/5    PLAN  []  Upgrade activities as tolerated     [x]  Continue plan of care  []  Update interventions per flow sheet       []  Discharge due to:_  [x]  Other:_address right LE edema and inflammation, progress with right knee ROM, gradually incorporate closed chain strength as tolerated.     Barbara Tam, PT, DPT, ATC 2/24/2020  11:24 AM

## 2020-02-25 RX ORDER — LANOLIN ALCOHOL/MO/W.PET/CERES
CREAM (GRAM) TOPICAL
Qty: 90 TAB | Refills: 1 | Status: SHIPPED | OUTPATIENT
Start: 2020-02-25 | End: 2020-05-20

## 2020-02-26 ENCOUNTER — OFFICE VISIT (OUTPATIENT)
Dept: INTERNAL MEDICINE CLINIC | Age: 65
End: 2020-02-26

## 2020-02-26 VITALS
SYSTOLIC BLOOD PRESSURE: 126 MMHG | HEART RATE: 79 BPM | DIASTOLIC BLOOD PRESSURE: 85 MMHG | HEIGHT: 70 IN | BODY MASS INDEX: 26.2 KG/M2 | RESPIRATION RATE: 16 BRPM | OXYGEN SATURATION: 98 % | TEMPERATURE: 97.2 F | WEIGHT: 183 LBS

## 2020-02-26 DIAGNOSIS — M25.561 RIGHT KNEE PAIN, UNSPECIFIED CHRONICITY: Primary | ICD-10-CM

## 2020-02-26 NOTE — PROGRESS NOTES
TRANSITIONS OF CARE FACE-TO-FACE VISIT  [facility]:  2/26/2020, MRN: [id number]  Chief Complaint   Patient presents with    Surgical Follow-up     Right Knee Total Replacement done on 02- by Dr Sarah Best is a 71 y.o. female and presents to clinic after recent hospitalization. Subjective:   Discharged from: [facility]    Summary of hospitalization problems/diagnoses: Pt is a 64 female with h/o HTN, GERD with Green's esophagus (followed by ), DJD, lumbar post laminectomy syndrome s/p spinal cord stimulator surgery (followed by Stanley Gross), cervical spinal stenosis, prediabetes (resolved), asthma (followed by , Pulmonology), HLD, lichens sclerosus, chronic sinusitis, vitamin B12 deficiency, and rheumatoid arthritis (Followed by ), vitamin D deficiency. Right Knee Pain: S/p right knee replacement surgery. Pain is 1/10. Pain is tight. Taking: tylenol and ibuprofen for pain. Constipation: none. PT: yes, she goes twice a wk. F/u apt with Orthopedics: next month. Falls since hospital d/c: none. SOB: none. Fever: none. Current Outpatient Medications on File Prior to Visit   Medication Sig Dispense Refill    ferrous sulfate 325 mg (65 mg iron) tablet take 1 tablet by mouth once daily BEFORE BREAKFAST 90 Tab 1    multivit-min/iron/folic/lutein (CENTRUM SILVER WOMEN PO) Take 1 Tab by mouth daily.  krill/om-3/dha/epa/phospho/ast (MAXIMUM RED KRILL OMEGA-3 PO) Take 1 Caplet by mouth daily.  ascorbic acid, vitamin C, (VITAMIN C) 500 mg tablet Take 1 Tab by mouth daily. 90 Tab 3    clobetasol (TEMOVATE) 0.05 % ointment Apply  to affected area two (2) days a week.  45 g 3    cyanocobalamin 1,000 mcg tablet take 1 tablet by mouth once daily 90 Tab 3    losartan (COZAAR) 50 mg tablet TAKE 1 TABLET DAILY 90 Tab 3    ESTRACE 0.01 % (0.1 mg/gram) vaginal cream Place 1 gm in the vagina twice weekly 42.5 g 2    cholecalciferol, vitamin D3, (VITAMIN D3 PO) Take 1 Tab by mouth daily.  acetaminophen (TYLENOL) 325 mg tablet Take 325 mg by mouth every four (4) hours as needed for Pain.  triamcinolone (NASACORT AQ) 55 mcg nasal inhaler 2 Sprays by Both Nostrils route daily. Indications: PERENNIAL ALLERGIC RHINITIS      Dexlansoprazole (DEXILANT) 60 mg CpDM Take 1 Cap by mouth Daily (before dinner).  calcium carbonate (TUMS) 200 mg calcium (500 mg) Chew Take 1 Tab by mouth four (4) times daily.  Cetirizine (ZYRTEC) 10 mg Cap Take 10 mg by mouth daily.  montelukast (SINGULAIR) 10 mg tablet Take 10 mg by mouth daily.  allergy injection by SubCUTAneous route every thirty (30) days.  budesonide-formoterol (SYMBICORT) 160-4.5 mcg/Actuation HFA inhaler Take 2 Puffs by inhalation two (2) times a day.  albuterol (PROVENTIL HFA, VENTOLIN HFA) 90 mcg/Actuation inhaler Take 2 Puffs by inhalation every six (6) hours as needed for Shortness of Breath or Wheezing.  [DISCONTINUED] docusate sodium (COLACE) 100 mg capsule Take 1 Cap by mouth two (2) times a day for 90 days. 60 Cap 2    [DISCONTINUED] celecoxib (CELEBREX) 200 mg capsule Take 1 Cap by mouth two (2) times a day for 90 days. 60 Cap 2    [DISCONTINUED] aspirin (ASPIRIN) 325 mg tablet Take 1 Tab by mouth two (2) times a day. 60 Tab 0    folic acid (FOLVITE) 1 mg tablet take 1 tablet by mouth once daily  0     No current facility-administered medications on file prior to visit.         Medication changes: no  Medication list updated:  yes  Needs referral or labs:  no  Treatment Barriers: no      Patient Active Problem List    Diagnosis Date Noted    Arthritis of knee 02/03/2020    Adjacent segment disease C3/4 w/deg changes, poss stenosis, CT '18 71/63/5585    Lichen sclerosus et atrophicus 05/17/2018    Green esophagus 11/16/2017    Rheumatoid arthritis involving multiple sites 02/15/2017    Arthritis, lumbar spine 04/22/2016    Moderate persistent asthma without complication 04/37/4014    Lumbar post-laminectomy syndrome 12/10/2015    S/P cervical spinal fusion 08/13/2014    Cervical stenosis of spine 08/11/2014    GERD (gastroesophageal reflux disease) 08/11/2014    Hx MRSA infection 08/11/2014    Impaired glucose tolerance 06/30/2013    Hypertriglyceridemia 12/16/2012    DNS (deviated nasal septum)     HTN (hypertension) 06/28/2010    Iron deficiency anemia 06/28/2010    Vitamin B12 deficiency 06/28/2010    Vitamin D deficiency 06/28/2010       Current Outpatient Medications   Medication Sig Dispense Refill    ferrous sulfate 325 mg (65 mg iron) tablet take 1 tablet by mouth once daily BEFORE BREAKFAST 90 Tab 1    multivit-min/iron/folic/lutein (CENTRUM SILVER WOMEN PO) Take 1 Tab by mouth daily.  krill/om-3/dha/epa/phospho/ast (MAXIMUM RED KRILL OMEGA-3 PO) Take 1 Caplet by mouth daily.  ascorbic acid, vitamin C, (VITAMIN C) 500 mg tablet Take 1 Tab by mouth daily. 90 Tab 3    clobetasol (TEMOVATE) 0.05 % ointment Apply  to affected area two (2) days a week. 45 g 3    cyanocobalamin 1,000 mcg tablet take 1 tablet by mouth once daily 90 Tab 3    losartan (COZAAR) 50 mg tablet TAKE 1 TABLET DAILY 90 Tab 3    ESTRACE 0.01 % (0.1 mg/gram) vaginal cream Place 1 gm in the vagina twice weekly 42.5 g 2    cholecalciferol, vitamin D3, (VITAMIN D3 PO) Take 1 Tab by mouth daily.  acetaminophen (TYLENOL) 325 mg tablet Take 325 mg by mouth every four (4) hours as needed for Pain.  triamcinolone (NASACORT AQ) 55 mcg nasal inhaler 2 Sprays by Both Nostrils route daily. Indications: PERENNIAL ALLERGIC RHINITIS      Dexlansoprazole (DEXILANT) 60 mg CpDM Take 1 Cap by mouth Daily (before dinner).  calcium carbonate (TUMS) 200 mg calcium (500 mg) Chew Take 1 Tab by mouth four (4) times daily.  Cetirizine (ZYRTEC) 10 mg Cap Take 10 mg by mouth daily.       montelukast (SINGULAIR) 10 mg tablet Take 10 mg by mouth daily.  allergy injection by SubCUTAneous route every thirty (30) days.  budesonide-formoterol (SYMBICORT) 160-4.5 mcg/Actuation HFA inhaler Take 2 Puffs by inhalation two (2) times a day.  albuterol (PROVENTIL HFA, VENTOLIN HFA) 90 mcg/Actuation inhaler Take 2 Puffs by inhalation every six (6) hours as needed for Shortness of Breath or Wheezing.       folic acid (FOLVITE) 1 mg tablet take 1 tablet by mouth once daily  0       Allergies   Allergen Reactions    Adhesive Tape-Silicones Other (comments)     Patient stated iv tape and tegaderm ok    Other Plant, Animal, Environmental Unable to Consolidated Doc     MOLD    Sulfasalazine Other (comments)     Could not taste anything, lightheadedness    Tape [Adhesive] Other (comments)     Blisters, use paper tape only  Patient states tegaderm and paper tape are okay    Chlorhexidine Towelette Itching and Other (comments)     \"stinging for a couple of hours\"    itching       Past Medical History:   Diagnosis Date    Adjacent segment disease C3/4 w/deg changes, poss stenosis, CT '18 6/22/2018    Allergic rhinitis     Anemia 2008    intermittant since then    Asthma     Back pain     Green's esophagus with esophagitis     Cervical radiculopathy     Chronic sinusitis 5/15/2012    DNS (deviated nasal septum)     Empyema     Foraminal stenosis of cervical region     C4-C5    GERD (gastroesophageal reflux disease)     Dr Francois Loredo    Hx MRSA infection 8/11/2014    Hypertension     Hypertriglyceridemia     Insulin resistance 4/9/2012    Left knee pain     Lichen planus     Menopause     Age 51    MRSA (methicillin resistant Staphylococcus aureus) infection 2008    left lung    Nausea & vomiting     Restless leg syndrome     Rheumatoid arthritis ([facility] [address]     Spinal cord stimulator status 2016    Spinal stenosis of cervical region     C4-C5 and C5-C6    TMJ syndrome     Wears glasses     and contacts       Past Surgical History:   Procedure Laterality Date    HX CERVICAL FUSION  08/13/14   [facility]      TMJ surgery    HX LUMBAR LAMINECTOMY  Nov 1995    [facility]    HX LUMBAR LAMINECTOMY  Feb 1997    Armida Avendano  1/2015    bunion removal from right foot    HX OTHER SURGICAL  2007    thoracentesis    HX OTHER SURGICAL  2008    chest tube    HX OTHER SURGICAL  1997    TMJ surgery    HX OTHER SURGICAL  2012    sinus surg 2012-Dr Jose Armando Peoples.  HX OTHER SURGICAL  2016    spinal cord stimulator place for lumbar neuritis, good benefit    HX ROTATOR CUFF REPAIR Right 01/2019    HX TUBAL LIGATION      SC ANESTH,SURGERY OF SHOULDER  01/31/2019    SC COLONOSCOPY FLX DX W/COLLJ SPEC WHEN PFRMD  6-18-08    normal/duntemann       Family History   Problem Relation Age of Onset    Hypertension Mother     Diabetes Mother    Robina Conde Arthritis-rheumatoid Sister     Other Sister         Lupus    Cancer Sister 35        CA, uterus    Diabetes Sister     Other Brother         myocarditis    Heart Attack Brother     Heart Disease Brother     Heart Surgery Brother     Coronary Artery Disease Father     Hypertension Father     Lung Disease Father     Cancer Sister 48        CA, breast    Breast Cancer Sister         Estrogen based    Diabetes Brother     Asthma Brother     Stroke Maternal Grandmother        Social History     Tobacco Use    Smoking status: Never Smoker    Smokeless tobacco: Never Used   Substance Use Topics    Alcohol use: No     Alcohol/week: 0.0 standard drinks     Frequency: Never     Comment: 0-6 per year       ROS   Review of Systems   Constitutional: Negative for chills and fever. HENT: Negative for ear pain and sore throat. Eyes: Negative for blurred vision and pain. Respiratory: Negative for cough and shortness of breath. Cardiovascular: Negative for chest pain. Gastrointestinal: Negative for abdominal pain, blood in stool and melena. Genitourinary: Negative for dysuria and hematuria.    Musculoskeletal: Positive for joint pain. Negative for myalgias. Skin: Negative for rash. Neurological: Negative for tingling, focal weakness and headaches. Endo/Heme/Allergies: Does not bruise/bleed easily. Psychiatric/Behavioral: Negative for substance abuse. Objective     Vitals:    02/26/20 1152   BP: 126/85   Pulse: 79   Resp: 16   Temp: 97.2 °F (36.2 °C)   TempSrc: Oral   SpO2: 98%   Weight: 183 lb (83 kg)   Height: 5' 10\" (1.778 m)   PainSc:   1   PainLoc: Knee       Physical Exam  Vitals signs and nursing note reviewed. HENT:      Head: Normocephalic and atraumatic. Right Ear: External ear normal.      Left Ear: External ear normal.      Nose: Nose normal.      Mouth/Throat:      Pharynx: No oropharyngeal exudate. Eyes:      General: No scleral icterus. Right eye: No discharge. Left eye: No discharge. Conjunctiva/sclera: Conjunctivae normal.   Neck:      Musculoskeletal: Neck supple. Cardiovascular:      Rate and Rhythm: Normal rate and regular rhythm. Heart sounds: Normal heart sounds. No murmur. No friction rub. No gallop. Pulmonary:      Effort: Pulmonary effort is normal. No respiratory distress. Breath sounds: Normal breath sounds. No wheezing or rales. Abdominal:      General: Bowel sounds are normal. There is no distension. Palpations: Abdomen is soft. There is no mass. Tenderness: There is no abdominal tenderness. There is no guarding or rebound. Musculoskeletal:         General: No swelling (Bue) or tenderness (Bue). Comments: Her right knee is swollen but w/o erythema. She has adequate ROM along her right knee   Lymphadenopathy:      Cervical: No cervical adenopathy. Skin:     General: Skin is warm and dry. Findings: No erythema. Neurological:      Mental Status: She is alert and oriented to person, place, and time. Motor: No abnormal muscle tone. Gait: Gait is intact.  Gait normal.   Psychiatric:         Mood and Affect: Mood and affect normal.         LABS   Data Review:   Lab Results   Component Value Date/Time    WBC 5.7 01/20/2020 07:19 AM    HGB 13.3 01/20/2020 07:19 AM    HCT 41.6 01/20/2020 07:19 AM    PLATELET 707 04/45/4104 07:19 AM    MCV 95.2 01/20/2020 07:19 AM       Lab Results   Component Value Date/Time    Sodium 142 01/20/2020 07:19 AM    Potassium 4.1 01/20/2020 07:19 AM    Chloride 109 01/20/2020 07:19 AM    CO2 27 01/20/2020 07:19 AM    Anion gap 6 01/20/2020 07:19 AM    Glucose 66 (L) 01/20/2020 07:19 AM    BUN 13 01/20/2020 07:19 AM    Creatinine 0.65 01/20/2020 07:19 AM    BUN/Creatinine ratio 20 01/20/2020 07:19 AM    GFR est AA >60 01/20/2020 07:19 AM    GFR est non-AA >60 01/20/2020 07:19 AM    Calcium 9.1 01/20/2020 07:19 AM       Lab Results   Component Value Date/Time    Cholesterol, total 175 11/22/2019 08:59 AM    HDL Cholesterol 42 11/22/2019 08:59 AM    LDL, calculated 110.4 (H) 11/22/2019 08:59 AM    VLDL, calculated 22.6 11/22/2019 08:59 AM    Triglyceride 113 11/22/2019 08:59 AM    CHOL/HDL Ratio 4.2 11/22/2019 08:59 AM       Lab Results   Component Value Date/Time    Hemoglobin A1c 5.0 01/20/2020 07:19 AM       Assessment/Plan:   Lab review: labs are reviewed in the EHR   Referrals: not needed  Complexity: Moderate    Community resources identified for patient: none needed  Durable Medical Equipment: none needed    Right Knee Pain: S/p knee replacement surgery. - C/w PT  - Continue to f/u with Orthopedics  - Activity per her specialists' recommendations. A. Key points we discussed today:  1. Pt instructed to call our office if symptoms worsen or if the pt/caregiver has any questions. 2. Handout given       No orders of the defined types were placed in this encounter. B. New labs/medications ordered today:   1. A new medication list was given to the patient/family/caregiver. The medication list has been updated. A new medication list was given today to the patient.  I have discussed the diagnosis with the patient and the intended plan as seen in the above orders. The patient has received an after-visit summary and questions were answered concerning future plans. I have discussed medication side effects and warnings with the patient as well. I have reviewed the plan of care with the patient, accepted their input and they are in agreement with the treatment goals. All questions were answered. The patient understands the plan of care. Handouts provided today with above information. Pt instructed if symptoms worsen to call the office or report to the ED for continued care. Greater than 50% of the visit time was spent in counseling and/or coordination of care.

## 2020-02-26 NOTE — PROGRESS NOTES
Jonny Baca presents today for   Chief Complaint   Patient presents with    Surgical Follow-up     Right Knee Total Replacement done on 02- by Dr Kate Chavarria  12     Patient is now in Physical Therapy twice a week. Is someone accompanying this pt? no    Is the patient using any DME equipment during [address]? Yes cane    Depression Screening:  3 most recent PHQ Screens 2/26/2020   PHQ Not Done -   Little interest or pleasure in doing things Not at all   Feeling down, depressed, irritable, or hopeless Not at all   Total Score PHQ 2 0       Learning Assessment:  Learning Assessment 2/26/2020   PRIMARY LEARNER Patient   HIGHEST LEVEL OF EDUCATION - PRIMARY LEARNER  > 4 YEARS OF COLLEGE   BARRIERS PRIMARY LEARNER NONE   CO-LEARNER CAREGIVER No   PRIMARY LANGUAGE ENGLISH   LEARNER PREFERENCE PRIMARY DEMONSTRATION     -     -     -     -   ANSWERED BY patient     -   RELATIONSHIP SELF       Abuse Screening:  Abuse Screening Questionnaire 2/26/2020   Do you ever feel afraid of your partner? N   Are you in a relationship with someone who physically or mentally threatens you? N   Is it safe for you to go home? Y       Fall Risk  Fall Risk Assessment, last 12 mths 2/26/2020   Able to walk? Yes   Fall in past 12 months? No   Fall with injury? -   Number of falls in past 12 months -   Fall Risk Score -       Health Maintenance reviewed and discussed and ordered per Provider. Health Maintenance Due   Topic Date Due    Medicare Yearly Exam  06/04/2019    GLAUCOMA SCREENING Q2Y  06/21/2019   . Coordination of Care:  1. Have you been to the ER, urgent care clinic since your last visit? Hospitalized since your last visit? no    2. Have you seen or consulted any other health care providers outside of the [address] [place] [address] since your last visit? Include any pap smears or colon screening.  no

## 2020-02-28 ENCOUNTER — HOSPITAL ENCOUNTER (OUTPATIENT)
Dept: PHYSICAL THERAPY | Age: 65
Discharge: HOME | End: 2020-02-28
Payer: MEDICARE

## 2020-02-28 PROCEDURE — 97140 MANUAL THERAPY 1/> REGIONS: CPT

## 2020-02-28 PROCEDURE — 97110 THERAPEUTIC EXERCISES: CPT

## 2020-02-28 PROCEDURE — 97112 NEUROMUSCULAR REEDUCATION: CPT

## 2020-02-28 PROCEDURE — 97016 VASOPNEUMATIC DEVICE THERAPY: CPT

## 2020-02-28 NOTE — PROGRESS NOTES
PT DAILY TREATMENT NOTE 10-18    Patient Name: Reesa Blizzard  Date:2020  : 1955  [x]  Patient  Verified  Payor: [place] MEDICARE / Plan: [place] MEDICARE PART A & B / Product Type: Medicare /    In time:9:00  Out time:9:50  Total Treatment Time (min): 50  Visit #: 2 of 8    Medicare/BCBS Only   Total Timed Codes (min):  40 1:1 Treatment Time:  40       Treatment Area: Knee pain, right [M25.561]  Presence of right artificial knee joint [Z96.651]    SUBJECTIVE  Pain Level (0-10 scale): 1/10  Any medication changes, allergies to medications, adverse drug reactions, diagnosis change, or new procedure performed?: [x] No    [] Yes (see summary sheet for update)  Subjective functional status/changes:   [] No changes reported  Pt reports new complaints of pain. Pt reports compliance with HEP. OBJECTIVE    Modality rationale: decrease inflammation and decrease pain to improve the patients ability to tolerate ADLs.     Min Type Additional Details    [] Estim:  []Unatt       []IFC  []Premod                        []Other:  []w/ice   []w/heat  Position:  Location:    [] Estim: []Att    []TENS instruct  []NMES                    []Other:  []w/US   []w/ice   []w/heat  Position:  Location:    []  Traction: [] Cervical       []Lumbar                       [] Prone          []Supine                       []Intermittent   []Continuous Lbs:  [] before manual  [] after manual    []  Ultrasound: []Continuous   [] Pulsed                           []1MHz   []3MHz W/cm2:  Location:    []  Iontophoresis with dexamethasone         Location: [] Take home patch   [] In clinic    []  Ice     []  heat  []  Ice massage  []  Laser   []  Anodyne Position:  Location:    []  Laser with stim  []  Other:  Position:  Location:   10 [x]  Vasopneumatic Device Pressure:       [x] lo [] med [] hi   Temperature: [x] lo [] med [] hi   [] Skin assessment post-treatment:  []intact []redness- no adverse reaction    []redness - adverse reaction: 22 min Therapeutic Exercise:  [x] See flow sheet :   Rationale: increase ROM and increase strength to improve the patients ability to tolerate ADLs. 10 min Neuromuscular Re-education:  [x]  See flow sheet :   Rationale: increase strength and improve coordination  to improve the patients ability to tolerate ADLs. 8 min Manual Therapy:  PROM to right knee. Rationale: decrease pain, increase ROM and increase tissue extensibility to improve functional mobility of right LE. With   [] TE   [] TA   [] neuro   [] other: Patient Education: [x] Review HEP    [] Progressed/Changed HEP based on:   [] positioning   [] body mechanics   [] transfers   [] heat/ice application    [] other:      Other Objective/Functional Measures: initiated exercises as per flow sheet. Pain Level (0-10 scale) post treatment: 0/10    ASSESSMENT/Changes in Function: Pt demonstrates good available right knee A/PROM with slight limitations through end range extension and flexion. Patient will continue to benefit from skilled PT services to modify and progress therapeutic interventions, address functional mobility deficits, address ROM deficits, address strength deficits, analyze and address soft tissue restrictions, analyze and cue movement patterns, analyze and modify body mechanics/ergonomics and assess and modify postural abnormalities to attain remaining goals. []  See Plan of Care  []  See progress note/recertification  []  See Discharge Summary         Progress towards goals / Updated goals:  Short Term Goals: To be accomplished in 2 weeks:              2. Patient will report performance of HEP at least 2 times per day to facilitate improved outcomes and improved self management.              Eval: Issued   Current: met per patient report. 2/28/2020  Long Term Goals: To be accomplished in 4 weeks:              1.  Patient will report FOTO score of 67 or better to indicate significant improvement in functional status. Eval: 52              2. Pt will demonstrate right knee AROM 0 - 125 degrees or better to improve ease of functional mobility. Eval: 1-111              3. Pt will demonstrate ability to negotiate stairs reciprocally void of compensatory movement to improve ease of community ambulation. Eval: reciprocal with impaired eccentric and concentric quad control and BUE assist              2. Pt will demonstrate 5/5 right knee extension MMT to improve ease of transfers.               Eval: 4+/5    PLAN  []  Upgrade activities as tolerated     []  Continue plan of care  []  Update interventions per flow sheet       []  Discharge due to:_  []  Other:_      Aamir Vásquez PTA 2/28/2020  9:07 AM    Future Appointments   Date Time Provider Cooper Jacquie   3/2/2020  9:00 AM Kaitlynn Gtz PTA [facility]   3/6/2020  9:00 AM Kaitlynn Gtz PTA [facility]   3/9/2020  9:00 AM Kaitlynn Gtz, PTA [facility]   3/12/2020  1:20 PM ANNY Ochoa [address]   3/13/2020  9:00 AM Kaitlynn Gtz, PTA [facility]   3/16/2020  9:00 AM Seth Martinez [facility]   3/20/2020  9:00 AM Kaitlynn Gtz PTA [facility]   6/2/2020  9:00 AM Thor Marte MD [facility]

## 2020-03-02 ENCOUNTER — HOSPITAL ENCOUNTER (OUTPATIENT)
Dept: PHYSICAL THERAPY | Age: 65
Discharge: HOME | End: 2020-03-02
Payer: MEDICARE

## 2020-03-02 ENCOUNTER — PATIENT OUTREACH (OUTPATIENT)
Dept: CASE MANAGEMENT | Age: 65
End: 2020-03-02

## 2020-03-02 PROCEDURE — 97016 VASOPNEUMATIC DEVICE THERAPY: CPT

## 2020-03-02 PROCEDURE — 97140 MANUAL THERAPY 1/> REGIONS: CPT

## 2020-03-02 PROCEDURE — 97110 THERAPEUTIC EXERCISES: CPT

## 2020-03-02 NOTE — PROGRESS NOTES
PT DAILY TREATMENT NOTE 10-18    Patient Name: Paras Guallpa  Date:3/2/2020  : 1955  [x]  Patient  Verified  Payor: [place] MEDICARE / Plan: [place] MEDICARE PART A & B / Product Type: Medicare /    In time:9:00  Out time:9:49  Total Treatment Time (min): 49  Visit #: 3 of 8    Medicare/BCBS Only   Total Timed Codes (min):  39 1:1 Treatment Time:  33       Treatment Area: Knee pain, right [M25.561]  Presence of right artificial knee joint [Z96.651]    SUBJECTIVE   Pain Level (0-10 scale): 0-1/10  Any medication changes, allergies to medications, adverse drug reactions, diagnosis change, or new procedure performed?: [x] No    [] Yes (see summary sheet for update)  Subjective functional status/changes:   [] No changes reported  \"I think once the swelling is gone the knee will be fine. \"    OBJECTIVE     Modality rationale: decrease inflammation and decrease pain to improve the patients ability to perform ADL's.    Min Type Additional Details    [] Estim:  []Unatt       []IFC  []Premod                        []Other:  []w/ice   []w/heat  Position:  Location:    [] Estim: []Att    []TENS instruct  []NMES                    []Other:  []w/US   []w/ice   []w/heat  Position:  Location:    []  Traction: [] Cervical       []Lumbar                       [] Prone          []Supine                       []Intermittent   []Continuous Lbs:  [] before manual  [] after manual    []  Ultrasound: []Continuous   [] Pulsed                           []1MHz   []3MHz W/cm2:  Location:    []  Iontophoresis with dexamethasone         Location: [] Take home patch   [] In clinic    []  Ice     []  heat  []  Ice massage  []  Laser   []  Anodyne Position:  Location:    []  Laser with stim  []  Other:  Position:  Location:   10 [x]  Vasopneumatic Device Pressure:       [x] lo [] med [] hi   Temperature: [x] lo [] med [] hi   [] Skin assessment post-treatment:  []intact []redness- no adverse reaction    []redness - adverse reaction:     31 min Therapeutic Exercise:  [x] See flow sheet :   Rationale: increase ROM, increase strength and increase proprioception to improve the patients ability to perform functional activities. 8 min Manual Therapy:  Right patellar mobs, knee PROM with extension emphasis. STM right quads. Pt supine. Rationale: decrease pain, increase ROM and increase tissue extensibility to improve ease of performing functional activities. With   [x] TE   [] TA   [] neuro   [] other: Patient Education: [x] Review HEP    [] Progressed/Changed HEP based on:   [] positioning   [] body mechanics   [] transfers   [] heat/ice application    [] other:      Other Objective/Functional Measures: Added lateral 6\" step ups. AROM Right knee 1-132 degrees. Pain Level (0-10 scale) post treatment: 0-1/10    ASSESSMENT/Changes in Function: Improved knee flexion ROM, lacking 1 degree extension. Pt fully participated in treatment. Continue PT to increase Right LE strength to improve ease of performing functional activities. Patient will continue to benefit from skilled PT services to modify and progress therapeutic interventions, address functional mobility deficits, address ROM deficits, address strength deficits, analyze and address soft tissue restrictions and analyze and modify body mechanics/ergonomics to attain remaining goals. [x]  See Plan of Care  []  See progress note/recertification  []  See Discharge Summary         Progress towards goals / Updated goals:  Short Term Goals: To be accomplished in 2 weeks:              3. Patient will report performance of HEP at least 2 times per day to facilitate improved outcomes and improved self management.              Eval: Issued              Current: met per patient report. 2/28/2020  Long Term Goals: To be accomplished in 4 weeks:              1. Patient will report FOTO score of 67 or better to indicate significant improvement in functional status.               Eval: 46              2. Pt will demonstrate right knee AROM 0 - 125 degrees or better to improve ease of functional mobility.             Eval: 1-111   Current: Progressing, AROM Right knee 1-132 degrees.  3/2/2020              3. Pt will demonstrate ability to negotiate stairs reciprocally void of compensatory movement to improve ease of community ambulation.              Eval: reciprocal with impaired eccentric and concentric quad control and BUE assist              4. Pt will demonstrate 5/5 right knee extension MMT to improve ease of transfers.              Eval: 4+/5    PLAN  []  Upgrade activities as tolerated     [x]  Continue plan of care  []  Update interventions per flow sheet       []  Discharge due to:_  []  Other:_      Matti Newton PTA 3/2/2020  8:52 AM    Future Appointments   Date Time Provider Cooper Dhillon   3/2/2020  9:00 AM Mervat Peres, PTA [facility]   3/6/2020  9:00 AM Mervat Peres, PTA [facility]   3/9/2020  9:00 AM Mervat Peres, PTA [facility]   3/12/2020  1:20 PM ANNY Yanes   3/13/2020  9:00 AM Mervat Evercarolina, PTA [facility]   3/16/2020  9:00 AM Manish Patel [facility]   3/20/2020  9:00 AM Mervat Peres, PTA [facility]   6/2/2020  9:00 AM Amy Rodriguez MD [facility]

## 2020-03-06 ENCOUNTER — HOSPITAL ENCOUNTER (OUTPATIENT)
Dept: PHYSICAL THERAPY | Age: 65
Discharge: HOME | End: 2020-03-06
Payer: MEDICARE

## 2020-03-06 ENCOUNTER — PATIENT OUTREACH (OUTPATIENT)
Dept: CASE MANAGEMENT | Age: 65
End: 2020-03-06

## 2020-03-06 PROCEDURE — 97140 MANUAL THERAPY 1/> REGIONS: CPT

## 2020-03-06 PROCEDURE — 97016 VASOPNEUMATIC DEVICE THERAPY: CPT

## 2020-03-06 NOTE — PROGRESS NOTES
PT DAILY TREATMENT NOTE 10-18    Patient Name: Jonny Baca  Date:3/6/2020  : 1955  [x]  Patient  Verified  Payor: [place] MEDICARE / Plan: VA MEDICARE PART A & B / Product Type: Medicare /    In time:8:59  Out time:9:45  Total Treatment Time (min): 55  Visit #: 4 of 8    Medicare/BCBS Only   Total Timed Codes (min):  36 1:1 Treatment Time:  16       Treatment Area: Knee pain, right [M25.561]  Presence of right artificial knee joint [Z96.651]    SUBJECTIVE  Pain Level (0-10 scale): 0-1/10  Any medication changes, allergies to medications, adverse drug reactions, diagnosis change, or new procedure performed?: [x] No    [] Yes (see summary sheet for update)  Subjective functional status/changes:   [] No changes reported  \"Doing okay. \"    OBJECTIVE    Modality rationale: decrease inflammation and decrease pain to improve the patients ability to perform ADL's.    Min Type Additional Details    [] Estim:  []Unatt       []IFC  []Premod                        []Other:  []w/ice   []w/heat  Position:  Location:    [] Estim: []Att    []TENS instruct  []NMES                    []Other:  []w/US   []w/ice   []w/heat  Position:  Location:    []  Traction: [] Cervical       []Lumbar                       [] Prone          []Supine                       []Intermittent   []Continuous Lbs:  [] before manual  [] after manual    []  Ultrasound: []Continuous   [] Pulsed                           []1MHz   []3MHz W/cm2:  Location:    []  Iontophoresis with dexamethasone         Location: [] Take home patch   [] In clinic    []  Ice     []  heat  []  Ice massage  []  Laser   []  Anodyne Position:  Location:    []  Laser with stim  []  Other:  Position:  Location:   10 [x]  Vasopneumatic Device Pressure:       [x] lo [] med [] hi   Temperature: [x] lo [] med [] hi   [] Skin assessment post-treatment:  []intact []redness- no adverse reaction    []redness - adverse reaction:     28 min Therapeutic Exercise:  [x] See flow sheet :   Rationale: increase ROM, increase strength and increase proprioception to improve the patients ability to perform ADL's.    8 min Manual Therapy:  Right patellar mobs, knee PROM with extension emphasis. STM right quads. Pt supine. Rationale: decrease pain, increase ROM, increase tissue extensibility and decrease trigger points to improve heel/toe gait pattern. With   [x] TE   [] TA   [] neuro   [] other: Patient Education: [x] Review HEP    [] Progressed/Changed HEP based on:   [] positioning   [] body mechanics   [] transfers   [] heat/ice application    [] other:      Other Objective/Functional Measures: Added prone HS curls and quad stretch per flow sheet. Added eccentric 2\" step downs 10 reps. Pain Level (0-10 scale) post treatment: 1/10    ASSESSMENT/Changes in Function: Progressing well, demonstrates good active knee flexion, lacking Right TKE. Continue PT to increase Right LE strength and Right TKE to improve ease of performing functional activities. Patient will continue to benefit from skilled PT services to modify and progress therapeutic interventions, address functional mobility deficits, address ROM deficits, address strength deficits, analyze and address soft tissue restrictions and analyze and modify body mechanics/ergonomics to attain remaining goals. [x]  See Plan of Care  []  See progress note/recertification  []  See Discharge Summary         Progress towards goals / Updated goals:  Short Term Goals: To be accomplished in 2 weeks:              0. Patient will report performance of HEP at least 2 times per day to facilitate improved outcomes and improved self management.              Eval: Issued              Current: met per patient report. 2/28/2020  Long Term Goals: To be accomplished in 4 weeks:              1. Patient will report FOTO score of 67 or better to indicate significant improvement in functional status.               Eval: 46              2. Pt will demonstrate right knee AROM 0 - 125 degrees or better to improve ease of functional mobility.             Eval: 1-111              Current: Progressing, AROM Right knee 1-132 degrees.  3/2/2020              3. Pt will demonstrate ability to negotiate stairs reciprocally void of compensatory movement to improve ease of community ambulation.              Eval: reciprocal with impaired eccentric and concentric quad control and BUE assist              4. Pt will demonstrate 5/5 right knee extension MMT to improve ease of transfers.              Eval: 4+/5    PLAN  []  Upgrade activities as tolerated     [x]  Continue plan of care  []  Update interventions per flow sheet       []  Discharge due to:_  []  Other:_      Ramiro Delsonny, PTA 3/6/2020  8:54 AM    Future Appointments   Date Time Provider Cooper Dhillon   3/6/2020  9:00 AM Mackenzie Danielle, PTA [facility]   3/9/2020  9:00 AM Mackenzie Danielle, PTA [facility]   3/12/2020  1:20 PM ANNY [address]   3/13/2020  9:00 AM Mackenzie Danielle, PTA [facility]   3/16/2020  9:00 AM Kylie Numbers [facility]   3/20/2020  9:00 AM Mackenzie Danielle, PTA [facility]   4/15/2020  1:00 PM [facility] [address]   4/15/2020  2:15 PM Miguel Diaz MD [address]   6/2/2020  9:00 AM Jamie Henao MD [facility]

## 2020-03-06 NOTE — PROGRESS NOTES
Patient has graduated from the Transitions of Care Coordination  program on 3/6/20. Patient's symptoms are stable at this time. Patient/family has the ability to self-manage. Care management goals have been completed at this time. No further care transitions nurse follow up scheduled. Goals Addressed                 This Visit's Progress     COMPLETED: Attends follow-up appointments as directed. Ensure provider appt is scheduled within 7 days post-discharge; 2/6: Patient aware of upcoming appts   Confirm patient attended post-discharge provider appt; 3/2: Attended PCP appt on 2/26 and orhto appt on 2/20. Complete post-visit call to confirm attendance and update care needs; Done            COMPLETED: Prevent complications post hospitalization. Plan of Care:   CTN will monitor X 4 weeks   Review/educate common or potential \"red flags\" of condition worsening  Evaluate adherence to medications and priority barriers to resolve      Instruct on adherence to medications as ordered and assess for therapeutic response and side-effects; 2/6: Completed Keflex  Review the Home Visit or Home Health documentation for coordinating care and goals; 3/2: Reviewed                Patient has care transitions nurse contact information for any further questions, concerns, or needs.   Patient's upcoming visits:    Future Appointments   Date Time Provider Cooper Dhillon   3/9/2020  9:00 AM Corinda Aschoff, PTA [facility]   3/12/2020  1:20 PM ANNY [address]   3/13/2020  9:00 AM Corinda Aschoff, PTA [facility]   3/16/2020  9:00 AM Santiago Dubon [facility]   3/20/2020  9:00 AM Corinda Aschoff, PTA [facility]   4/15/2020  1:00 PM [facility] [address]   4/15/2020  2:15 PM Prieto Hurtado MD [address]   6/2/2020  9:00 AM Roderick Peña MD [facility]

## 2020-03-09 ENCOUNTER — HOSPITAL ENCOUNTER (OUTPATIENT)
Dept: PHYSICAL THERAPY | Age: 65
Discharge: HOME | End: 2020-03-09
Payer: MEDICARE

## 2020-03-09 PROCEDURE — 97016 VASOPNEUMATIC DEVICE THERAPY: CPT

## 2020-03-09 PROCEDURE — 97140 MANUAL THERAPY 1/> REGIONS: CPT

## 2020-03-09 PROCEDURE — 97110 THERAPEUTIC EXERCISES: CPT

## 2020-03-09 NOTE — PROGRESS NOTES
PT DAILY TREATMENT NOTE 10-18    Patient Name: Ena Smith  Date:3/9/2020  : 1955  [x]  Patient  Verified  Payor: [place] MEDICARE / Plan: [place] MEDICARE PART A & B / Product Type: Medicare /    In time:9:00  Out time:9:52  Total Treatment Time (min): 52  Visit #: 5 of 8    Medicare/BCBS Only   Total Timed Codes (min):  42 1:1 Treatment Time:  33       Treatment Area: Knee pain, right [M25.561]  Presence of right artificial knee joint [Z96.651]    SUBJECTIVE  Pain Level (0-10 scale): 0/10  Any medication changes, allergies to medications, adverse drug reactions, diagnosis change, or new procedure performed?: [x] No    [] Yes (see summary sheet for update)  Subjective functional status/changes:   [] No changes reported  \"The knee is coming along. \"    OBJECTIVE    Modality rationale: decrease inflammation and decrease pain to improve the patients ability to perform ADL's.    Min Type Additional Details    [] Estim:  []Unatt       []IFC  []Premod                        []Other:  []w/ice   []w/heat  Position:  Location:    [] Estim: []Att    []TENS instruct  []NMES                    []Other:  []w/US   []w/ice   []w/heat  Position:  Location:    []  Traction: [] Cervical       []Lumbar                       [] Prone          []Supine                       []Intermittent   []Continuous Lbs:  [] before manual  [] after manual    []  Ultrasound: []Continuous   [] Pulsed                           []1MHz   []3MHz W/cm2:  Location:    []  Iontophoresis with dexamethasone         Location: [] Take home patch   [] In clinic    []  Ice     []  heat  []  Ice massage  []  Laser   []  Anodyne Position:  Location:    []  Laser with stim  []  Other:  Position:  Location:   10 [x]  Vasopneumatic Device Pressure:       [x] lo [] med [] hi   Temperature: [x] lo [] med [] hi   [] Skin assessment post-treatment:  []intact []redness- no adverse reaction    []redness - adverse reaction:     34 min Therapeutic Exercise:  [x] See flow sheet :   Rationale: increase ROM, increase strength and increase proprioception to improve the patients ability to perform ADL's.    8 min Manual Therapy:  Right patellar mobs, knee PROM with extension emphasis. STM right quads. Pt supine. Rationale: decrease pain, increase ROM, increase tissue extensibility and decrease trigger points to improve ease of performing functional activities. With   [x] TE   [] TA   [] neuro   [] other: Patient Education: [x] Review HEP    [] Progressed/Changed HEP based on:   [] positioning   [] body mechanics   [] transfers   [] heat/ice application    [] other:      Other Objective/Functional Measures: Added SLR's flex/abd. Pain Level (0-10 scale) post treatment: 0/10    ASSESSMENT/Changes in Function: Demonstrates no difficulty negotiating up stairs, challenged with eccentric step downs. Pt is making good progress, improved AROM per visual assessment. Continue PT to further increase strength to improve ease of performing functional activities. Patient will continue to benefit from skilled PT services to modify and progress therapeutic interventions, address functional mobility deficits, address ROM deficits, address strength deficits, analyze and address soft tissue restrictions and analyze and modify body mechanics/ergonomics to attain remaining goals. [x]  See Plan of Care  []  See progress note/recertification  []  See Discharge Summary         Progress towards goals / Updated goals:  Short Term Goals: To be accomplished in 2 weeks:              8. Patient will report performance of HEP at least 2 times per day to facilitate improved outcomes and improved self management.              Eval: Issued              Current: met per patient report. 2/28/2020  Long Term Goals: To be accomplished in 4 weeks:              1. Patient will report FOTO score of 67 or better to indicate significant improvement in functional status.               Eval: 46              2. Pt will demonstrate right knee AROM 0 - 125 degrees or better to improve ease of functional mobility.             Eval: 1-111              Current: Progressing, AROM Right knee 1-132 degrees. 3/2/2020              3. Pt will demonstrate ability to negotiate stairs reciprocally void of compensatory movement to improve ease of community ambulation.              Eval: reciprocal with impaired eccentric and concentric quad control and BUE assist   Current: Progressing, demonstrates no difficulty negotiating up stairs, challenged with eccentric step downs.  3/9/2020              4. Pt will demonstrate 5/5 right knee extension MMT to improve ease of transfers.              Eval: 4+/5    PLAN  []  Upgrade activities as tolerated     [x]  Continue plan of care  []  Update interventions per flow sheet       []  Discharge due to:_  []  Other:_      Justen Juarez PTA 3/9/2020  8:52 AM    Future Appointments   Date Time Provider Cooper Dhillon   3/9/2020  9:00 [facility]   [facility]2020  [facility]20 [facility]   3/13/2020  9:00 AM [facility]   3/16/2020  9:00 AM [facility]   3/20/2020  9:00 AM Cody Ansari PTA [facility]   4/15/2020  1:00 PM [facility] [address]   4/15/2020  2:15 PM Marco Antonio Taveras MD [address]   6/2/2020  9:00 AM Yenny Garnica MD [facility]

## 2020-03-12 ENCOUNTER — OFFICE VISIT (OUTPATIENT)
Dept: ORTHOPEDIC SURGERY | Age: 65
End: 2020-03-12

## 2020-03-12 VITALS
TEMPERATURE: 97.1 F | HEART RATE: 78 BPM | HEIGHT: 70 IN | WEIGHT: 180.8 LBS | RESPIRATION RATE: 14 BRPM | BODY MASS INDEX: 25.88 KG/M2 | SYSTOLIC BLOOD PRESSURE: 125 MMHG | DIASTOLIC BLOOD PRESSURE: 81 MMHG | OXYGEN SATURATION: 100 %

## 2020-03-12 DIAGNOSIS — Z96.651 STATUS POST RIGHT KNEE REPLACEMENT: Primary | ICD-10-CM

## 2020-03-12 NOTE — PROGRESS NOTES
[address] [facility], [address] [facility]  [phone]           Patient: Venkata Johnson                MRN: [id number]       SSN: [id number]  YOB: 1955        AGE: 71 y.o. SEX: female  Body mass index is 25.94 kg/m². PCP: Erasmo Barnes MD  03/12/20      This office note has been dictated. REVIEW OF SYSTEMS:  Constitutional: Negative for fever, chills, weight loss and malaise/fatigue. HENT: Negative. Eyes: Negative. Respiratory: Negative. Cardiovascular: Negative. Gastrointestinal: No bowel incontinence or constipation. Genitourinary: No bladder incontinence or saddle anesthesia. Skin: Negative. Neurological: Negative. Endo/Heme/Allergies: Negative. Psychiatric/Behavioral: Negative. Musculoskeletal: As per HPI above.      Past Medical History:   Diagnosis Date    Adjacent segment disease C3/4 w/deg changes, poss stenosis, CT '18 6/22/2018    Allergic rhinitis     Anemia 2008    intermittant since then    Asthma     Back pain     Green's esophagus with esophagitis     Cervical radiculopathy     Chronic sinusitis 5/15/2012    DNS (deviated nasal septum)     Empyema     Foraminal stenosis of cervical region     C4-C5    GERD (gastroesophageal reflux disease)     Dr Moyer Ahr    Hx MRSA infection 8/11/2014    Hypertension     Hypertriglyceridemia     Insulin resistance 4/9/2012    Left knee pain     Lichen planus     Menopause     Age 51    MRSA (methicillin resistant Staphylococcus aureus) infection 2008    left lung    Nausea & vomiting     Restless leg syndrome     Rheumatoid arthritis ([address]     Spinal cord stimulator status 2016    Spinal stenosis of cervical region     C4-C5 and C5-C6    TMJ syndrome     Wears glasses     and contacts         Current Outpatient Medications:     ferrous sulfate 325 mg (65 mg iron) tablet, take 1 tablet by mouth once daily BEFORE BREAKFAST, Disp: 90 Tab, Rfl: 1    multivit-min/iron/folic/lutein (CENTRUM SILVER WOMEN PO), Take 1 Tab by mouth daily. , Disp: , Rfl:     krill/om-3/dha/epa/phospho/ast (MAXIMUM RED KRILL OMEGA-3 PO), Take 1 Caplet by mouth daily. , Disp: , Rfl:     ascorbic acid, vitamin C, (VITAMIN C) 500 mg tablet, Take 1 Tab by mouth daily. , Disp: 90 Tab, Rfl: 3    clobetasol (TEMOVATE) 0.05 % ointment, Apply  to affected area two (2) days a week., Disp: 45 g, Rfl: 3    cyanocobalamin 1,000 mcg tablet, take 1 tablet by mouth once daily, Disp: 90 Tab, Rfl: 3    losartan (COZAAR) 50 mg tablet, TAKE 1 TABLET DAILY, Disp: 90 Tab, Rfl: 3    ESTRACE 0.01 % (0.1 mg/gram) vaginal cream, Place 1 gm in the vagina twice weekly, Disp: 42.5 g, Rfl: 2    cholecalciferol, vitamin D3, (VITAMIN D3 PO), Take 1 Tab by mouth daily. , Disp: , Rfl:     folic acid (FOLVITE) 1 mg tablet, take 1 tablet by mouth once daily, Disp: , Rfl: 0    acetaminophen (TYLENOL) 325 mg tablet, Take 325 mg by mouth every four (4) hours as needed for Pain., Disp: , Rfl:     triamcinolone (NASACORT AQ) 55 mcg nasal inhaler, 2 Sprays by Both Nostrils route daily. Indications: PERENNIAL ALLERGIC RHINITIS, Disp: , Rfl:     Dexlansoprazole (DEXILANT) 60 mg CpDM, Take 1 Cap by mouth Daily (before dinner). , Disp: , Rfl:     calcium carbonate (TUMS) 200 mg calcium (500 mg) Chew, Take 1 Tab by mouth four (4) times daily. , Disp: , Rfl:     Cetirizine (ZYRTEC) 10 mg Cap, Take 10 mg by mouth daily. , Disp: , Rfl:     montelukast (SINGULAIR) 10 mg tablet, Take 10 mg by mouth daily. , Disp: , Rfl:     allergy injection, by SubCUTAneous route every thirty (30) days. , Disp: , Rfl:     budesonide-formoterol (SYMBICORT) 160-4.5 mcg/Actuation HFA inhaler, Take 2 Puffs by inhalation two (2) times a day., Disp: , Rfl:     albuterol (PROVENTIL HFA, VENTOLIN HFA) 90 mcg/Actuation inhaler, Take 2 Puffs by inhalation every six (6) hours as needed for Shortness of Breath or Wheezing., Disp: , Rfl:     Allergies   Allergen Reactions    Adhesive Tape-Silicones Other (comments)     Patient stated iv tape and tegaderm ok    Other Plant, Animal, Environmental Unable to Consolidated Doc     MOLD    Sulfasalazine Other (comments)     Could not taste anything, lightheadedness    Tape [Adhesive] Other (comments)     Blisters, use paper tape only  Patient states tegaderm and paper tape are okay    Chlorhexidine Towelette Itching and Other (comments)     \"stinging for a couple of hours\"    itching       Social History     Socioeconomic History    Marital status:      Spouse name: Not on file    Number of children: Not on file    Years of education: Not on file    Highest education level: Not on file   Occupational History    Not on file   Social Needs    Financial resource strain: Not on file    Food insecurity     Worry: Not on file     Inability: Not on file    Transportation needs     Medical: Not on file     Non-medical: Not on file   Tobacco Use    Smoking status: Never Smoker    Smokeless tobacco: Never Used   Substance and Sexual Activity    Alcohol use: No     Alcohol/week: 0.0 standard drinks     Frequency: Never     Comment: 0-6 per year    Drug use: Yes     Types: Prescription, OTC    Sexual activity: Yes     Partners: Male     Comment: Menopausal since 2000   Lifestyle    Physical activity     Days per week: Not on file     Minutes per session: Not on file    Stress: Not on file   Relationships    Social connections     Talks on phone: Not on file     Gets together: Not on file     Attends [employer] service: Not on file     Active member of club or organization: Not on file     Attends meetings of clubs or organizations: Not on file     Relationship status: Not on file    Intimate partner violence     Fear of current or ex partner: Not on file     Emotionally abused: Not on file     Physically abused: Not on file     Forced sexual activity: Not on file   Other Topics Concern    Not on file   Social History Narrative    Retired        Past Surgical History:   Procedure Laterality Date    HX CERVICAL FUSION  08/13/14   Al Michael SOSA      TMJ surgery    HX LUMBAR LAMINECTOMY  Nov 1995    [facility]    HX LUMBAR LAMINECTOMY  Feb 1997    [facility]  1/2015    bunion removal from right foot    HX OTHER SURGICAL  2007    thoracentesis    HX OTHER SURGICAL  2008    chest tube    HX OTHER SURGICAL  1997    TMJ surgery    HX OTHER SURGICAL  2012    sinus surg 2012- [facility]  HX OTHER SURGICAL  2016    spinal cord stimulator place for lumbar neuritis, good benefit    HX ROTATOR CUFF REPAIR Right 01/2019    HX TUBAL LIGATION      RI ANESTH,SURGERY OF SHOULDER  01/31/2019    RI COLONOSCOPY FLX DX W/COLLJ SPEC WHEN PFRMD  6-18-08    normal/duntemann             Patient seen and evaluated for her right knee. She has number 5 weeks status post right knee replacement surgery. She is doing extremely well. Does continue physical therapy complications. She has full range of motion. She has had no troubles with wound. No fevers or chills. No systemic changes. No chest pain shortness of breath. General: Alert and oriented x3, nad.  well-developed, well nourished. normal affect, AF. NC/AT, EOMI, neck supple, trachea midline, no JVD present. Breathing is non-labored. Examination of the right knee of the skin intact there is no erythema, ecchymosis. She has knee joint effusion. Negative patellar ballottement. No signs of infection. There is full range of motion. Excellent stability. Patella tracks nicely without rubs or crepitus noted. Radiographs obtained our office today 3/12/2020 at the [facility] location including AP lateral and skyline of the right knee shows a total knee components to be well fixed without evidence for loosening or fracture noted. Assessment: Status post right knee replacement    Plan:  At this point, she will continue physical per physical therapy. She will continue home excise program quad strengthening. She has a for analgesics. She will see us back in about 6 weeks time for evaluation. She will call if any questions or concerns that shall arise.           JR Red ESPINOZA, PALexiiC, ATC

## 2020-03-13 ENCOUNTER — HOSPITAL ENCOUNTER (OUTPATIENT)
Dept: PHYSICAL THERAPY | Age: 65
Discharge: HOME | End: 2020-03-13
Payer: MEDICARE

## 2020-03-13 PROCEDURE — 97140 MANUAL THERAPY 1/> REGIONS: CPT

## 2020-03-13 PROCEDURE — 97110 THERAPEUTIC EXERCISES: CPT

## 2020-03-13 NOTE — PROGRESS NOTES
PT DAILY TREATMENT NOTE 10-18    Patient Name: Melissa Mora  Date:3/13/2020  : 1955  [x]  Patient  Verified  Payor: VA MEDICARE / Plan: VA MEDICARE PART A & B / Product Type: Medicare /    In time:9:00 Out time:9:58  Total Treatment Time (min): 58  Visit #: 6 of 8    Medicare/BCBS Only   Total Timed Codes (min):  48 1:1 Treatment Time:  38       Treatment Area: Knee pain, right [M25.561]  Presence of right artificial knee joint [Z96.651]    SUBJECTIVE  Pain Level (0-10 scale): 0  Any medication changes, allergies to medications, adverse drug reactions, diagnosis change, or new procedure performed?: [x] No    [] Yes (see summary sheet for update)  Subjective functional status/changes:   [] No changes reported  Pt reports she is feeling good and has no pain coming in today. OBJECTIVE    Modality rationale: decrease pain to improve the patients ability to perform ADL's with ease.    Min Type Additional Details    [] Estim:  []Unatt       []IFC  []Premod                        []Other:  []w/ice   []w/heat  Position:  Location:    [] Estim: []Att    []TENS instruct  []NMES                    []Other:  []w/US   []w/ice   []w/heat  Position:  Location:    []  Traction: [] Cervical       []Lumbar                       [] Prone          []Supine                       []Intermittent   []Continuous Lbs:  [] before manual  [] after manual    []  Ultrasound: []Continuous   [] Pulsed                           []1MHz   []3MHz W/cm2:  Location:    []  Iontophoresis with dexamethasone         Location: [] Take home patch   [] In clinic    []  Ice     []  heat  []  Ice massage  []  Laser   []  Anodyne Position:  Location:    []  Laser with stim  []  Other:  Position:  Location:   10 [x]  Vasopneumatic Device Pressure:       [x] lo [] med [] hi   Temperature: [x] lo [] med [] hi   [x] Skin assessment post-treatment:  [x]intact []redness- no adverse reaction    []redness - adverse reaction:       40 min Therapeutic Exercise:  [x] See flow sheet :   Rationale: increase ROM and increase strength to improve the patients ability to improve functional task with ease. 8 min Manual Therapy:  Right patellar mobs, manual stretching of right knee with extension emphasis. STM right quads. Pt supine   Rationale: decrease pain, increase ROM and increase tissue extensibility to improve functional mobility. With   [] TE   [] TA   [] neuro   [] other: Patient Education: [x] Review HEP    [] Progressed/Changed HEP based on:   [] positioning   [] body mechanics   [] transfers   [] heat/ice application    [] other:      Other Objective/Functional Measures:      Pain Level (0-10 scale) post treatment: 0    ASSESSMENT/Changes in Function:  Pt reports no increased pain with therex and was able to perform exercises as prescribed. Pt continues to display improved functional mobility in the right knee but slower progression in right knee strength. Continued treatment to improve right knee strength and mobility and aid with ease of ADL's. Patient will continue to benefit from skilled PT services to modify and progress therapeutic interventions, address functional mobility deficits, address ROM deficits, address strength deficits, analyze and address soft tissue restrictions, analyze and cue movement patterns, analyze and modify body mechanics/ergonomics and assess and modify postural abnormalities to attain remaining goals. [x]  See Plan of Care  []  See progress note/recertification  []  See Discharge Summary         Progress towards goals / Updated goals:  Short Term Goals: To be accomplished in 2 weeks:              9. Patient will report performance of HEP at least 2 times per day to facilitate improved outcomes and improved self management.              Eval: Issued              Current: met per patient report. 2/28/2020  Long Term Goals: To be accomplished in 4 weeks:              1.  Patient will report Lance Babb score of 67 or better to indicate significant improvement in functional status.             Eval: 52              2. Pt will demonstrate right knee AROM 0 - 125 degrees or better to improve ease of functional mobility.             Eval: 1-111              Current: Progressing, AROM Right knee 1-132 degrees. 3/2/2020              3. Pt will demonstrate ability to negotiate stairs reciprocally void of compensatory movement to improve ease of community ambulation.              Eval: reciprocal with impaired eccentric and concentric quad control and BUE assist              Current: Progressing, demonstrates no difficulty negotiating up stairs, challenged with eccentric step downs.  3/9/2020              4. Pt will demonstrate 5/5 right knee extension MMT to improve ease of transfers.              Eval: 4+/5*   Current: No change 3/13/2020 (3/10 pain)    PLAN  []  Upgrade activities as tolerated     [x]  Continue plan of care  []  Update interventions per flow sheet       []  Discharge due to:_  []  Other:_      Adeline Kinsey PTA 3/13/2020  9:02 AM    Future Appointments   Date Time Provider [place]   3[facility]16[facility]2020  [facility]   3/20/2020  9:00 AM Rohith Hwang PTA [facility]   4/15/2020  1:00 PM [facility] [address]   4/15/2020  2:15 PM Odalys Neal MD [address]   4/24/2020  1:20 PM ANNY [address]   6/2/2020  9:00 AM No Bassett MD [facility]

## 2020-03-16 ENCOUNTER — HOSPITAL ENCOUNTER (OUTPATIENT)
Dept: PHYSICAL THERAPY | Age: 65
Discharge: HOME | End: 2020-03-16
Payer: MEDICARE

## 2020-03-16 PROCEDURE — 97110 THERAPEUTIC EXERCISES: CPT

## 2020-03-16 NOTE — PROGRESS NOTES
PT DAILY TREATMENT NOTE 10-18    Patient Name: Tree Gan  Date:3/16/2020  : 1955  [x]  Patient  Verified  Payor: Andrew Romero / Plan: [place] MEDICARE PART A & B / Product Type: Medicare /    In time:8:59am  Out time:9:45am  Total Treatment Time (min): 46  Visit #: 7 of 8    Medicare/BCBS Only   Total Timed Codes (min):  36 1:1 Treatment Time:  36       Treatment Area: Knee pain, right [M25.561]  Presence of right artificial knee joint [Z96.651]    SUBJECTIVE  Pain Level (0-10 scale): 0/10  Any medication changes, allergies to medications, adverse drug reactions, diagnosis change, or new procedure performed?: [x] No    [] Yes (see summary sheet for update)  Subjective functional status/changes:   [] No changes reported  Pt reports she does get some discomfort when the \"barometric pressure drops\", but otherwise has no complaints. OBJECTIVE    36 min Therapeutic Exercise:  [x] See flow sheet :   Rationale: increase ROM and increase strength to improve the patients ability to improve ease of daily activity and improve ease of ambulation. Modality rationale: decrease edema, decrease inflammation and decrease pain to improve the patients ability to improve ease of daily activity.    Min Type Additional Details    [] Estim:  []Unatt       []IFC  []Premod                        []Other:  []w/ice   []w/heat  Position:  Location:    [] Estim: []Att    []TENS instruct  []NMES                    []Other:  []w/US   []w/ice   []w/heat  Position:  Location:    []  Traction: [] Cervical       []Lumbar                       [] Prone          []Supine                       []Intermittent   []Continuous Lbs:  [] before manual  [] after manual    []  Ultrasound: []Continuous   [] Pulsed                           []1MHz   []3MHz Location:  W/cm2:    []  Iontophoresis with dexamethasone         Location: [] Take home patch   [] In clinic    []  Ice     []  heat  []  Ice massage  []  Laser   []  Anodyne Position:  Location:    []  Laser with stim  []  Other: Position:  Location:   10 [x]  Vasopneumatic Device Pressure:       [x] lo [] med [] hi   Temperature: [x] lo [] med [] hi   [] Skin assessment post-treatment:  [x]intact []redness- no adverse reaction    []redness - adverse reaction:     With   [] TE   [] TA   [] neuro   [] other: Patient Education: [x] Review HEP    [] Progressed/Changed HEP based on:   [] positioning   [] body mechanics   [] transfers   [] heat/ice application    [] other:      Other Objective/Functional Measures:    Knee AROM/PROM: 2-131/1-136 degrees     Pain Level (0-10 scale) post treatment: 0    ASSESSMENT/Changes in Function: Pt appears to be progressing well with regards to knee ROM with minimal deficits in terminal knee extension. Continue per POC. Patient will continue to benefit from skilled PT services to modify and progress therapeutic interventions, address functional mobility deficits, address ROM deficits, address strength deficits, analyze and address soft tissue restrictions, analyze and cue movement patterns, analyze and modify body mechanics/ergonomics and instruct in home and community integration to attain remaining goals. []  See Plan of Care  []  See progress note/recertification  []  See Discharge Summary         Progress towards goals / Updated goals:  Short Term Goals: To be accomplished in 2 weeks:              0. Patient will report performance of HEP at least 2 times per day to facilitate improved outcomes and improved self management.              Eval: Issued              Current: met per patient report. 2/28/2020  Long Term Goals: To be accomplished in 4 weeks:              1. Patient will report FOTO score of 67 or better to indicate significant improvement in functional status.             Eval: 52              2. Pt will demonstrate right knee AROM 0 - 125 degrees or better to improve ease of functional mobility.               Eval: 1-111              Current: 2-131, no significant change 3/16/2020              3. Pt will demonstrate ability to negotiate stairs reciprocally void of compensatory movement to improve ease of community ambulation.              Eval: reciprocal with impaired eccentric and concentric quad control and BUE assist              Current: Progressing, demonstrates no difficulty negotiating up stairs, challenged with eccentric step downs. 3/9/2020              4. Pt will demonstrate 5/5 right knee extension MMT to improve ease of transfers.              Eval: 4+/5*              Current: No change 3/13/2020 (3/10 pain)    PLAN  []  Upgrade activities as tolerated     [x]  Continue plan of care  []  Update interventions per flow sheet       []  Discharge due to:_  []  Other:_      Mayur Roberts, PT, DPT, ATC 3/16/2020  9:05 AM    Future Appointments   Date Time Provider Cooper [place]hill[place]   3/20/2020  9:00 AM Kecia Velasco PTA [id number]   4/15/2020  1:00 PM [facility] [address]   4/15/2020  2:15 PM Marc Gann MD [address]   4/24/2020  1:20 PM Geoffrey Talbert PA-C [facility]   6/2/2020  9:00 AM Elijah Crespo MD [facility]

## 2020-03-20 ENCOUNTER — HOSPITAL ENCOUNTER (OUTPATIENT)
Dept: PHYSICAL THERAPY | Age: 65
Discharge: HOME | End: 2020-03-20
Payer: MEDICARE

## 2020-03-20 PROCEDURE — 97110 THERAPEUTIC EXERCISES: CPT

## 2020-03-20 PROCEDURE — 97016 VASOPNEUMATIC DEVICE THERAPY: CPT

## 2020-03-20 NOTE — PROGRESS NOTES
[facility]  [address] [facility] [address]  [place], [address]  [phone] fax    Physical Therapy Discharge Summary  Patient name: Mila Simpson Start of Care: 2020   Referral source: Anival Coombs : 1955               Medical Diagnosis: Knee pain, right [M25.561]  Presence of right artificial knee joint [Z96.651]  Payor: VA MEDICARE / Plan: VA MEDICARE PART A & B / Product Type: Medicare /  Onset Date:2/3/2020               Treatment Diagnosis: right knee pain and limited ROM s/p right TKA   Prior Hospitalization: see medical history Provider#: [id number]   Medications: Verified on Patient summary List    Comorbidities: right RTC repair, arthritis, lumbar spinal cord stimulator, HTN, Asthma, bunionectomy, 2x cervical fusion, left lung surgery, lower back surgery, TMJ surgery   Prior Level of Function: ambulation void of AD< dog walking 5 days/week, aquatic exercise classes 3 days/week  Visits from Start of Care: 8    Missed Visits: 0  Reporting Period : 2020 to 3/20/2020    Summary of Care:  Goal: Patient will report performance of HEP at least 2 times per day to facilitate improved outcomes and improved self management. Status at last note/certification: met per patient report  Status at discharge: met    Goal: Patient will report FOTO score of 67 or better to indicate significant improvement in functional status. Status at last note/certification: not met but improved to 57%  Status at discharge: not met    Goal: Pt will demonstrate right knee AROM 0 - 125 degrees or better to improve ease of functional mobility. Status at last note/certification: not met, 2-131, no significant change  Status at discharge: not met    Goal: Pt will demonstrate ability to negotiate stairs reciprocally void of compensatory movement to improve ease of community ambulation.   Status at last note/certification: Progressing, demonstrates no difficulty negotiating up stairs, challenged with eccentric step downs  Status at discharge: not met    Goal: Pt will demonstrate 5/5 right knee extension MMT to improve ease of transfers  Status at last note/certification: not met, No change (3/10 pain)  Status at discharge: not met    ASSESSMENT/RECOMMENDATIONS:  Pt was seen for 8 therapy sessions. Pt demonstrated/reported improvements in mobility and ease of ADLs since starting therapy. Pt educated to continue HEP to home to further improve current deficits. Pt is d/c'ed from therapy at this time to HEP secondary to report of having no difficulty with ADLs, progressing towards unmet goals above, and ability to independently perform HEP to manage current deficits.    [x]Discontinue therapy: [x]Patient has reached or is progressing toward set goals      []Patient is non-compliant or has abdicated      []Due to lack of appreciable progress towards set goals    Haydee Juan, PT 3/20/2020 9:48 AM

## 2020-03-20 NOTE — PROGRESS NOTES
PT DAILY TREATMENT NOTE 10-18    Patient Name: Bob Shen  Date:3/20/2020  : 1955  [x]  Patient  Verified  [place]or: [place] / Plan: [place] MEDICARE PART A & B / Product Type: Medicare /    In time:8:58  Out time:9:37  Total Treatment Time (min): 39  Visit #: 8 of 8    Medicare/BCBS Only   Total Timed Codes (min):  29 1:1 Treatment Time:  29       Treatment Area: Knee pain, right [M25.561]  Presence of right artificial knee joint [Z96.651]    SUBJECTIVE  Pain Level (0-10 scale): 0/10  Any medication changes, allergies to medications, adverse drug reactions, diagnosis change, or new procedure performed?: [x] No    [] Yes (see summary sheet for update)  Subjective functional status/changes:   [] No changes reported  \"Knee has been doing fine. \"    OBJECTIVE    Modality rationale: decrease inflammation and decrease pain to improve the patients ability to perform ADL's.    Min Type Additional Details    [] Estim:  []Unatt       []IFC  []Premod                        []Other:  []w/ice   []w/heat  Position:  Location:    [] Estim: []Att    []TENS instruct  []NMES                    []Other:  []w/US   []w/ice   []w/heat  Position:  Location:    []  Traction: [] Cervical       []Lumbar                       [] Prone          []Supine                       []Intermittent   []Continuous Lbs:  [] before manual  [] after manual    []  Ultrasound: []Continuous   [] Pulsed                           []1MHz   []3MHz W/cm2:  Location:    []  Iontophoresis with dexamethasone         Location: [] Take home patch   [] In clinic    []  Ice     []  heat  []  Ice massage  []  Laser   []  Anodyne Position:  Location:    []  Laser with stim  []  Other:  Position:  Location:   10 [x]  Vasopneumatic Device Pressure:       [x] lo [] med [] hi   Temperature: [x] lo [] med [] hi   [] Skin assessment post-treatment:  []intact []redness- no adverse reaction    []redness - adverse reaction:     29 min Therapeutic Exercise:  [x] See flow sheet :   Rationale: increase ROM, increase strength and increase proprioception to improve the patients ability to perform ADL's. With   [x] TE   [] TA   [] neuro   [] other: Patient Education: [x] Review HEP    [] Progressed/Changed HEP based on:   [] positioning   [] body mechanics   [] transfers   [] heat/ice application    [] other:      Other Objective/Functional Measures: FOTO 57%. Pt has made significant improvement and progress towards goals. Continues to have active TKE however demonstrates improved ambulation and denies difficulty with ADL's. Pt instructed to continue HEP, pt had no further questions/comments/concerns. Pain Level (0-10 scale) post treatment: 0/10    ASSESSMENT/Changes in Function:      []  See Plan of Care  []  See progress note/recertification  [x]  See Discharge Summary         Progress towards goals / Updated goals:  Short Term Goals: To be accomplished in 2 weeks:              6. Patient will report performance of HEP at least 2 times per day to facilitate improved outcomes and improved self management.              Eval: Issued              Current: met per patient report. 2/28/2020  Long Term Goals: To be accomplished in 4 weeks:              1. Patient will report FOTO score of 67 or better to indicate significant improvement in functional status.             Eval: 52   Current: Not met but improved to 57%. 3/20/2020              2. Pt will demonstrate right knee AROM 0 - 125 degrees or better to improve ease of functional mobility.               Eval: 1-111              Current: 2-131, no significant change 3/16/2020              3. Pt will demonstrate ability to negotiate stairs reciprocally void of compensatory movement to improve ease of community ambulation.              Eval: reciprocal with impaired eccentric and concentric quad control and BUE assist              Current: Progressing, demonstrates no difficulty negotiating up stairs, challenged with eccentric step downs. 3/9/2020              4. Pt will demonstrate 5/5 right knee extension MMT to improve ease of transfers.              Eval: 4+/5*              Current: No change 3/13/2020 (3/10 pain)     PLAN  []  Upgrade activities as tolerated     []  Continue plan of care  []  Update interventions per flow sheet       [x]  Discharge due to: Partial achievement of LTG's, pt instructed to continue HEP.   []  Other:_      Arvada Scales, PTA 3/20/2020  8:59 AM    Future Appointments   Date Time Provider Cooper Dhillon   3/20/2020  9:00 AM Mendel Sprain, Cornell Mate [facility]   4/15/2020  1:00 PM [facility] [address]   4/15/2020  2:15 PM Chey Gonzalez MD [address]   4/24/2020  1:20 PM Tania Irene PA-C [facility] SCHED   6/2/2020  9:00 AM Carol Wagoner MD [facility]

## 2020-04-03 RX ORDER — LANOLIN ALCOHOL/MO/W.PET/CERES
CREAM (GRAM) TOPICAL
Qty: 90 TAB | Refills: 3 | Status: SHIPPED | OUTPATIENT
Start: 2020-04-03 | End: 2021-03-23

## 2020-05-20 RX ORDER — LANOLIN ALCOHOL/MO/W.PET/CERES
CREAM (GRAM) TOPICAL
Qty: 90 TAB | Refills: 1 | Status: SHIPPED | OUTPATIENT
Start: 2020-05-20 | End: 2021-05-10

## 2020-05-22 ENCOUNTER — TELEPHONE (OUTPATIENT)
Dept: ORTHOPEDIC SURGERY | Age: 65
End: 2020-05-22

## 2020-05-22 NOTE — TELEPHONE ENCOUNTER
Patient called stating that she has a dental procedure next month and needs a prescription of antibiotics sent to [facility]. Please advise patient at [phone]

## 2020-05-26 RX ORDER — AMOXICILLIN 500 MG/1
CAPSULE ORAL
Qty: 4 CAP | Refills: 3 | Status: SHIPPED | OUTPATIENT
Start: 2020-05-26 | End: 2021-10-11 | Stop reason: ALTCHOICE

## 2020-06-02 ENCOUNTER — VIRTUAL VISIT (OUTPATIENT)
Dept: INTERNAL MEDICINE CLINIC | Age: 65
End: 2020-06-02

## 2020-06-02 DIAGNOSIS — R73.9 HYPERGLYCEMIA: ICD-10-CM

## 2020-06-02 DIAGNOSIS — I10 ESSENTIAL HYPERTENSION: Primary | ICD-10-CM

## 2020-06-02 DIAGNOSIS — Z12.31 ENCOUNTER FOR SCREENING MAMMOGRAM FOR BREAST CANCER: ICD-10-CM

## 2020-06-02 DIAGNOSIS — M06.9 RHEUMATOID ARTHRITIS INVOLVING MULTIPLE SITES, UNSPECIFIED RHEUMATOID FACTOR PRESENCE: ICD-10-CM

## 2020-06-02 DIAGNOSIS — E78.2 MIXED HYPERLIPIDEMIA: ICD-10-CM

## 2020-06-02 NOTE — PROGRESS NOTES
Dari Ogden is a 71 y.o. female who was seen by synchronous (real-time) audio-video technology on 6/2/2020. Assessment & Plan:   1. Health Maintenance:  I encouraged her to get her flu shot later this year. Ordering a mammogram.    ORDERS:  - TYLER 3D REYNALDO W MAMMO BI SCREENING INCL CAD; Future    2. HTN:  I instructed the patient to check her blood pressure at home. She is to notify me if all readings are persistently greater than 140/90 for a 2-week period.  Checking labs later this year. (CMP, A1c, lipid panel, and a urine protein screen). Continue with Rx as prescribed. 3. HLD: Stable.  Checking labs as mentioned above. 4.  RA: Stable per patient history.  Continue to follow-up with Rheumatology. Continue with Rx per Rheumatology recommendations. Lab review: labs are reviewed in the EHR and ordered as mentioned above. I have discussed the diagnosis with the patient and the intended plan as seen in the above orders. I have discussed medication side effects and warnings with the patient as well. I have reviewed the plan of care with the patient, accepted their input and they are in agreement with the treatment goals. All questions were answered. The patient understands the plan of care. Pt instructed if symptoms worsen to call the office or report to the ED for continued care. Greater than 50% of the visit time was spent in counseling and/or coordination of care. I spent 25 minutes with the patient with encounter, 15 of which were spent counseling her as described above. Voice recognition was used to generate this report, which may have resulted in some phonetic based errors in grammar and contents. Even though attempts were made to correct all the mistakes, some may have been missed, and remained in the body of the document.       Subjective:   Dari Ogden was seen for   Chief Complaint   Patient presents with    Follow-up     Pt is a 64 female with h/o HTN, GERD with Green's esophagus (followed by ), DJD, lumbar post laminectomy syndrome s/p spinal cord stimulator surgery (followed by Nicholas Miller), cervical spinal stenosis, prediabetes (resolved), asthma (followed by , Pulmonology), HLD, lichens sclerosus, chronic sinusitis, vitamin B12 deficiency, and rheumatoid arthritis (Followed by ), vitamin D deficiency. 1. HTN:  Taking losartan. Home BP checks: none. She has a machine. 2. RA: Taking: MTX. No adverse side effects from this rx. Followed by Rheumatology. The patient has chronic pain in her joints from RA but pain is manageable at this time. 3.  Health Maintenance: Her mammogram is due later this year. No breast pain or masses. She has 3/3 short item recall. She has not had any falls in the past year. No depression. She does not need assistance with ADLs/IADLs. No excessive alcoholic beverage consumption. No issues with memory. No tobacco or drug use. She is up-to-date on her colonoscopy. She is overdue for her shingles vaccine. 4. HLD: She is not on a cholesterol lowering medication. Her last LDL was 110 in November. +H/o prediabetes. Her last a1C was within normal limits in January. Prior to Admission medications    Medication Sig Start Date End Date Taking? Authorizing Provider   amoxicillin (AMOXIL) 500 mg capsule 4 tabs by mouth 1 hour prior to appointment 5/26/20   Carmen Damon PA-C   ferrous sulfate 325 mg (65 mg iron) tablet take 1 tablet by mouth once daily BEFORE BREAKFAST 5/20/20   Mack Castanon MD   cyanocobalamin 1,000 mcg tablet take 1 tablet by mouth once daily 4/3/20   Mack Castanon MD   multivit-min/iron/folic/lutein (CENTRUM SILVER WOMEN PO) Take 1 Tab by mouth daily. Provider, Historical   krill/om-3/dha/epa/phospho/ast (MAXIMUM RED KRILL OMEGA-3 PO) Take 1 Caplet by mouth daily.     Provider, Historical   ascorbic acid, vitamin C, (VITAMIN C) 500 mg tablet Take 1 Tab by mouth daily. 12/3/19   Bry Martino MD   clobetasol (TEMOVATE) 0.05 % ointment Apply  to affected area two (2) days a week. 9/12/19   Apolinar Castañeda MD   losartan (COZAAR) 50 mg tablet TAKE 1 TABLET DAILY 2/21/19   Bry Martino MD   ESTRACE 0.01 % (0.1 mg/gram) vaginal cream Place 1 gm in the vagina twice weekly 8/14/18   Rip Anglin MD   cholecalciferol, vitamin D3, (VITAMIN D3 PO) Take 1 Tab by mouth daily. Provider, Historical   folic acid (FOLVITE) 1 mg tablet take 1 tablet by mouth once daily 10/12/16   Provider, Historical   acetaminophen (TYLENOL) 325 mg tablet Take 325 mg by mouth every four (4) hours as needed for Pain. Provider, Historical   triamcinolone (NASACORT AQ) 55 mcg nasal inhaler 2 Sprays by Both Nostrils route daily. Indications: PERENNIAL ALLERGIC RHINITIS    Provider, Historical   Dexlansoprazole (DEXILANT) 60 mg CpDM Take 1 Cap by mouth Daily (before dinner). Provider, Historical   calcium carbonate (TUMS) 200 mg calcium (500 mg) Chew Take 1 Tab by mouth four (4) times daily. Provider, Historical   Cetirizine (ZYRTEC) 10 mg Cap Take 10 mg by mouth daily. Provider, Historical   montelukast (SINGULAIR) 10 mg tablet Take 10 mg by mouth daily. Provider, Historical   allergy injection by SubCUTAneous route every thirty (30) days. Provider, Historical   budesonide-formoterol (SYMBICORT) 160-4.5 mcg/Actuation HFA inhaler Take 2 Puffs by inhalation two (2) times a day.     Provider, Historical   albuterol (PROVENTIL HFA, VENTOLIN HFA) 90 mcg/Actuation inhaler Take 2 Puffs by inhalation every six (6) hours as needed for Shortness of Breath or Wheezing. 6/22/10   Provider, Historical     Allergies   Allergen Reactions    Adhesive Tape-Silicones Other (comments)     Patient stated iv tape and tegaderm ok    Other Plant, Animal, Environmental Unable to Consolidated Doc     MOLD    Sulfasalazine Other (comments)     Could not taste anything, lightheadedness    Tape [Adhesive] Other (comments)     Blisters, use paper tape only  Patient states tegaderm and paper tape are okay    Chlorhexidine Towelette Itching and Other (comments)     \"stinging for a couple of hours\"    itching     Past Medical History:   Diagnosis Date    Adjacent segment disease C3/4 w/deg changes, poss stenosis, CT '18 6/22/2018    Allergic rhinitis     Anemia 2008    intermittant since then    Asthma     Back pain     Green's esophagus with esophagitis     Cervical radiculopathy     Chronic sinusitis 5/15/2012    DNS (deviated nasal septum)     Empyema     Foraminal stenosis of cervical region     C4-C5    GERD (gastroesophageal reflux disease)     Dr Brennon Jauregui Hx MRSA infection 8/11/2014    Hypertension     Hypertriglyceridemia     Insulin resistance 4/9/2012    Left knee pain     Lichen planus     Menopause     Age 51    MRSA (methicillin resistant Staphylococcus aureus) infection 2008    left lung    Nausea & vomiting     Restless leg syndrome     Rheumatoid arthritis ([facility] [address]     Spinal cord stimulator status 2016    Spinal stenosis of cervical region     C4-C5 and C5-C6    TMJ syndrome     Wears glasses     and contacts     Past Surgical History:   Procedure Laterality Date    HX CERVICAL FUSION  08/13/14    HX HEENT      TMJ surgery    HX LUMBAR LAMINECTOMY  Nov 1995    [facility]    HX LUMBAR LAMINECTOMY  Feb 1997    Janna Gail  1/2015    bunion removal from right foot    HX OTHER SURGICAL  2007    thoracentesis    HX OTHER SURGICAL  2008    chest tube    HX OTHER SURGICAL  1997    TMJ surgery    HX OTHER SURGICAL  2012    sinus surg 2012-Dr Nish Ashby.     HX OTHER SURGICAL  2016    spinal cord stimulator place for lumbar neuritis, good benefit    HX ROTATOR CUFF REPAIR Right 01/2019    HX TUBAL LIGATION      NM ANESTH,SURGERY OF SHOULDER  01/31/2019    NM COLONOSCOPY FLX DX W/COLLJ SPEC WHEN PFRMD  6-18-08    normal/duntemann     Family History   Problem Relation Age of Onset    Hypertension Mother    Dossie Colleen Diabetes Mother    Dosjoão Macias Arthritis-rheumatoid Sister     Other Sister         Lupus    Cancer Sister 35        CA, uterus    Diabetes Sister     Other Brother         myocarditis    Heart Attack Brother     Heart Disease Brother     Heart Surgery Brother     Coronary Artery Disease Father     Hypertension Father     Lung Disease Father     Cancer Sister 48        CA, breast    Breast Cancer Sister         Estrogen based    Diabetes Brother     Asthma Brother     Stroke Maternal Grandmother      Social History     Socioeconomic History    Marital status:      Spouse name: Not on file    Number of children: Not on file    Years of education: Not on file    Highest education level: Not on file   Tobacco Use    Smoking status: Never Smoker    Smokeless tobacco: Never Used   Substance and Sexual Activity    Alcohol use: No     Alcohol/week: 0.0 standard drinks     Frequency: Never     Comment: 0-6 per year    Drug use: Yes     Types: Prescription, OTC    Sexual activity: Yes     Partners: Male     Comment: Menopausal since 2000   Social History Narrative    Retired        ROS:  Gen: No fever/chills  HEENT: No sore throat, eye pain, ear pain, or congestion.  No HA  CV: No CP  Resp: No cough/SOB  GI: No abdominal pain  : No hematuria/dysuria  Derm: No rash  Neuro: No new paresthesias/weakness  Musc: No new myalgias/jt pain  Psych: No depression sx      Objective:     General: alert, cooperative, no distress   Mental  status: mental status: alert, oriented to person, place, and time, normal mood, behavior, speech, dress, motor activity, and thought processes   Resp: resp: normal effort and no respiratory distress   Neuro: neuro: no gross deficits   Skin: skin: no discoloration or lesions of concern on visible areas     LABS:  Lab Results   Component Value Date/Time    Sodium 142 01/20/2020 07:19 AM    Potassium 4.1 01/20/2020 07:19 AM    Chloride 109 01/20/2020 07:19 AM    CO2 27 01/20/2020 07:19 AM    Anion gap 6 01/20/2020 07:19 AM    Glucose 66 (L) 01/20/2020 07:19 AM    BUN 13 01/20/2020 07:19 AM    Creatinine 0.65 01/20/2020 07:19 AM    BUN/Creatinine ratio 20 01/20/2020 07:19 AM    GFR est AA >60 01/20/2020 07:19 AM    GFR est non-AA >60 01/20/2020 07:19 AM    Calcium 9.1 01/20/2020 07:19 AM       Lab Results   Component Value Date/Time    Cholesterol, total 175 11/22/2019 08:59 AM    HDL Cholesterol 42 11/22/2019 08:59 AM    LDL, calculated 110.4 (H) 11/22/2019 08:59 AM    VLDL, calculated 22.6 11/22/2019 08:59 AM    Triglyceride 113 11/22/2019 08:59 AM    CHOL/HDL Ratio 4.2 11/22/2019 08:59 AM       Lab Results   Component Value Date/Time    WBC 5.7 01/20/2020 07:19 AM    HGB 13.3 01/20/2020 07:19 AM    HCT 41.6 01/20/2020 07:19 AM    PLATELET 966 84/26/2830 07:19 AM    MCV 95.2 01/20/2020 07:19 AM       Lab Results   Component Value Date/Time    Hemoglobin A1c 5.0 01/20/2020 07:19 AM       Lab Results   Component Value Date/Time    TSH 0.97 11/14/2016 07:33 AM           Due to this being a TeleHealth  evaluation, many elements of the physical examination are unable to be assessed. The pt was seen by synchronous (real-time) audio-video technology, and/or her healthcare decision maker, is aware that this patient-initiated, Telehealth encounter is a billable service, with coverage as determined by her insurance carrier. She is aware that she may receive a bill and has provided verbal consent to proceed: Yes. The pt is being evaluated by a video visit encounter for concerns as above. A caregiver was present when appropriate. Due to this being a TeleHealth encounter (During LEEOD-73 public health emergency), evaluation of the following organ systems was limited: Vitals/Constitutional/EENT/Resp/CV/GI//MS/Neuro/Skin/Heme-Lymph-Imm.    Pursuant to the emergency declaration under the [facility] [address] waiver authority and the [employer] Act, this Virtual  Visit was conducted, with patient's (and/or legal guardian's) consent, to reduce the patient's risk of exposure to COVID-19 and provide necessary medical care. Services were provided through a video synchronous discussion virtually to substitute for in-person clinic visit. Patient and provider were located at their individual homes. We discussed the expected course, resolution and complications of the diagnosis(es) in detail. Medication risks, benefits, costs, interactions, and alternatives were discussed as indicated. I advised her to contact the office if her condition worsens, changes or fails to improve as anticipated. She expressed understanding with the diagnosis(es) and plan.      Neil Valenzuela MD

## 2020-06-02 NOTE — ACP (ADVANCE CARE PLANNING)
Advance Care Planning       Advance Care Planning (ACP) Physician/NP/PA (Provider) Conversation        Date of ACP Conversation: 6/2/2020    Conversation Conducted with:   Patient with Decision Making Capacity    She has no changes to make to her pre-existing advance directive.     Dr. Kumar Len[place]  [facility], [address] [place] [address], [address] [place] [address].  Phone: [phone]  Fax: [phone]

## 2020-06-10 ENCOUNTER — VIRTUAL VISIT (OUTPATIENT)
Dept: PULMONOLOGY | Age: 65
End: 2020-06-10

## 2020-06-10 DIAGNOSIS — K21.9 GASTROESOPHAGEAL REFLUX DISEASE WITHOUT ESOPHAGITIS: Chronic | ICD-10-CM

## 2020-06-10 DIAGNOSIS — J45.40 MODERATE PERSISTENT ASTHMA WITHOUT COMPLICATION: Primary | ICD-10-CM

## 2020-06-10 DIAGNOSIS — M05.79 RHEUMATOID ARTHRITIS INVOLVING MULTIPLE SITES WITH POSITIVE RHEUMATOID FACTOR (HCC): ICD-10-CM

## 2020-06-10 RX ORDER — METHOTREXATE 25 MG/ML
INJECTION INTRA-ARTERIAL; INTRAMUSCULAR; INTRATHECAL; INTRAVENOUS
COMMUNITY
End: 2021-10-11

## 2020-06-10 NOTE — PROGRESS NOTES
Trevin Avilez is a 71 y.o. female who was seen by synchronous (real-time) audio-video technology on 6/10/2020. Consent:  She and/or her healthcare decision maker is aware that this patient-initiated Telehealth encounter is a billable service, with coverage as determined by her insurance carrier. She is aware that she may receive a bill and has provided verbal consent to proceed: Yes    I was in the office while conducting this encounter. Assessment & Plan:   Diagnoses and all orders for this visit:    1. Moderate persistent asthma without complication    2. Rheumatoid arthritis involving multiple sites with positive rheumatoid factor (HCC)    3. Gastroesophageal reflux disease without esophagitis          Patient's asthma is very well controlled at present with Symbicort, Singulair as maintenance therapy. She has hardly needed to use her rescue inhaler-last time was in November 2019 when she was out of state visiting her grandchild. Rheumatoid arthritis also well controlled with methotrexate. She continues to follow with allergist-takes Zyrtec daily. No change in plan since patient has been doing well  Will be followed up in 6 months and sooner should there be any change. Subjective:   Trevin Avilez was seen for Other (F/U to 6/4)    Patient reports no new problems since her last visit. She had knee replacement surgery on February 3 and did very well-was able to get independent in about 3 weeks and since then has been progressing well. She has not had any increase in symptoms of coughing wheezing chest tightness denies any significant nasal congestion postnasal dripping  Takes her medications as prescribed  Has been receiving methotrexate injections for rheumatoid arthritis control  Continues to follow with allergist Dr. Mindy Brooks  No new treatments planned    Prior to Admission medications    Medication Sig Start Date End Date Taking?  Authorizing Provider   methotrexate, PF, 25 mg/mL injection every seven (7) days. Indications: rheumatoid arthritis   Yes Provider, Historical   amoxicillin (AMOXIL) 500 mg capsule 4 tabs by mouth 1 hour prior to appointment 5/26/20  Yes Glenn Benito PA-C   ferrous sulfate 325 mg (65 mg iron) tablet take 1 tablet by mouth once daily BEFORE BREAKFAST 5/20/20  Yes Abe Booth MD   cyanocobalamin 1,000 mcg tablet take 1 tablet by mouth once daily 4/3/20  Yes Abe Botoh MD   multivit-min/iron/folic/lutein (CENTRUM SILVER WOMEN PO) Take 1 Tab by mouth daily. Yes Provider, Historical   krill/om-3/dha/epa/phospho/ast (MAXIMUM RED KRILL OMEGA-3 PO) Take 1 Caplet by mouth daily. Yes Provider, Historical   ascorbic acid, vitamin C, (VITAMIN C) 500 mg tablet Take 1 Tab by mouth daily. 12/3/19  Yes Abe Booth MD   clobetasol (TEMOVATE) 0.05 % ointment Apply  to affected area two (2) days a week. 9/12/19  Yes Rosaura Chwodary MD   losartan (COZAAR) 50 mg tablet TAKE 1 TABLET DAILY 2/21/19  Yes Abe Booth MD   ESTRACE 0.01 % (0.1 mg/gram) vaginal cream Place 1 gm in the vagina twice weekly 8/14/18  Yes Matthieu Alejo MD   cholecalciferol, vitamin D3, (VITAMIN D3 PO) Take 1 Tab by mouth daily. Yes Provider, Historical   folic acid (FOLVITE) 1 mg tablet take 1 tablet by mouth once daily 10/12/16  Yes Provider, Historical   acetaminophen (TYLENOL) 325 mg tablet Take 325 mg by mouth every four (4) hours as needed for Pain. Yes Provider, Historical   triamcinolone (NASACORT AQ) 55 mcg nasal inhaler 2 Sprays by Both Nostrils route daily. Indications: PERENNIAL ALLERGIC RHINITIS   Yes Provider, Historical   Dexlansoprazole (DEXILANT) 60 mg CpDM Take 1 Cap by mouth Daily (before dinner). Yes Provider, Historical   calcium carbonate (TUMS) 200 mg calcium (500 mg) Chew Take 1 Tab by mouth four (4) times daily. Yes Provider, Historical   Cetirizine (ZYRTEC) 10 mg Cap Take 10 mg by mouth daily.    Yes Provider, Historical   montelukast (SINGULAIR) 10 mg tablet Take 10 mg by mouth daily. Yes Provider, Historical   allergy injection by SubCUTAneous route every thirty (30) days. Yes Provider, Historical   budesonide-formoterol (SYMBICORT) 160-4.5 mcg/Actuation HFA inhaler Take 2 Puffs by inhalation two (2) times a day. Yes Provider, Historical   albuterol (PROVENTIL HFA, VENTOLIN HFA) 90 mcg/Actuation inhaler Take 2 Puffs by inhalation every six (6) hours as needed for Shortness of Breath or Wheezing. 6/22/10  Yes Provider, Historical     Allergies   Allergen Reactions    Adhesive Tape-Silicones Other (comments)     Patient stated iv tape and tegaderm ok    Other Plant, Animal, Environmental Unable to [employer] Sulfasalazine Other (comments)     Could not taste anything, lightheadedness    Tape [Adhesive] Other (comments)     Blisters, use paper tape only  Patient states tegaderm and paper tape are okay    Chlorhexidine Towelette Itching and Other (comments)     \"stinging for a couple of hours\"    itching       Patient Active Problem List   Diagnosis Code    HTN (hypertension) I10    Iron deficiency anemia D50.9    Vitamin B12 deficiency E53.8    Vitamin D deficiency E55.9    DNS (deviated nasal septum) J34.2    Hypertriglyceridemia E78.1    Impaired glucose tolerance R73.02    Cervical stenosis of spine M48.02    GERD (gastroesophageal reflux disease) K21.9    Hx MRSA infection Z86.14    S/P cervical spinal fusion Z98.1    Lumbar post-laminectomy syndrome M96.1    Moderate persistent asthma without complication Z33.18    Arthritis, lumbar spine M47.816    Rheumatoid arthritis involving multiple sites M06.9    Green esophagus A56.00    Lichen sclerosus et atrophicus L90.0    Adjacent segment disease C3/4 w/deg changes, poss stenosis, CT '18 M48.00    Arthritis of knee M17.10       Review of Systems     There were no vitals taken for this visit.      Constitutional: [x] Appears well-developed and well-nourished [x] No apparent distress        Mental status: [x] Alert and awake  [x] Oriented to person/place/time [x] Able to follow commands      Eyes:   EOM    [x]  Normal        Sclera  [x]  Normal        HENT: [x] Normocephalic, atraumatic    [x] Mouth/Throat: Mucous membranes are moist    External Ears [x] Normal      Neck: [x] No visualized mass     Pulmonary/Chest: [x] Respiratory effort normal   [x] No visualized signs of difficulty breathing or respiratory distress         Musculoskeletal:         [x] Normal range of motion of neck          Neurological:        [x] No Facial Asymmetry (Cranial nerve 7 motor function) (limited exam due to video visit)          [x] No gaze palsy          Skin:        [x] No significant exanthematous lesions or discoloration noted on facial skin               Psychiatric:       [x] Normal Affect         XR Results (most recent):  Results from Hospital Encounter encounter on 02/03/20   XR KNEE RT MAX 2 VWS    Narrative EXAM: LEFT KNEE    CLINICAL INDICATION/HISTORY: Postoperative evaluation total knee replacement. COMPARISON: 22 August 2016. TECHNIQUE: Two views. FINDINGS: There has been a total knee arthroplasty. The prosthesis is seated  well against the bony substrate. The prosthesis is intact and alignment is  grossly normal.  Visualized bony structures are otherwise unremarkable. There  is some residual air in the anterior joint space. The soft tissues are  otherwise unremarkable. Impression IMPRESSION:    Status post right total knee arthroplasty, as described. CT Results (most recent):  Results from Hospital Encounter encounter on 09/27/18   CT SHOULDER RT W CONT    Narrative The CT arthrogram right shoulder    Following intra-articular contrast injection, axial CT images were obtained. Sagittal and coronal images were reformatted.     All CT scans at this facility are performed using dose optimization technique as  appropriate to a performed exam, to include automated exposure control,  adjustment of the mA and/or kV according to patient size (including appropriate  matching for site specific examination) or use of iterative reconstruction  technique. HISTORY: Pain. Rotator cuff evaluation. Full-thickness tear at anterior distal supraspinatus tendon insertion, from the  footprint, with a gap of 2 cm in AP and 1.2 cm transverse dimensions. Tendon  edge is about the lateral third of the humeral head. Mild supraspinatus muscular  atrophy suspected. Contrast extravasation into the subacromial bursa. Suspect  partial thickness tear along the articular side of the infraspinatus tendon. Subscapularis tendon is intact. No definite labral tear. Hypertrophy at the acromioclavicular  joint. Curved acromial undersurface. Indentation to the supraspinatus myotendinous junction. Impression IMPRESSION:  1. Full-thickness tear of the supraspinatus tendon, at the anterior insertion  site as described. Partial-thickness tear of infraspinatus tendon, articular  side. 2. AC joint hypertrophy, type II acromion, with indentation to supraspinatus  myotendinous junction. We discussed the expected course, resolution and complications of the diagnosis(es) in detail. Medication risks, benefits, costs, interactions, and alternatives were discussed as indicated. I advised her to contact the office if her condition worsens, changes or fails to improve as anticipated. She expressed understanding with the diagnosis(es) and plan. This patient is being evaluated by a video visit encounter for concerns as above. A caregiver was present when appropriate. Due to this being a TeleHealth encounter (During XQQFA-11 public health emergency), evaluation of the following organ systems was limited: Vitals/Constitutional/EENT/Resp/CV/GI//MS/Neuro/Skin/Heme-Lymph-Imm.   Pursuant to the emergency declaration under the [facility], [address] waiver authority and the "[employer] and [employer] Act, this Virtual  Visit was conducted, with patient's (and/or legal guardian's) consent, to reduce the patient's risk of exposure to COVID-19 and provide necessary medical care. Services were provided through a video synchronous discussion virtually to substitute for in-person clinic visit. Patient located in his home. Provider located in clinic.     Janice Hilton MD

## 2020-09-02 ENCOUNTER — HOSPITAL ENCOUNTER (OUTPATIENT)
Dept: MAMMOGRAPHY | Age: 65
Discharge: HOME | End: 2020-09-02
Attending: INTERNAL MEDICINE
Payer: MEDICARE

## 2020-09-02 DIAGNOSIS — Z12.31 ENCOUNTER FOR SCREENING MAMMOGRAM FOR BREAST CANCER: ICD-10-CM

## 2020-09-02 PROCEDURE — 77063 BREAST TOMOSYNTHESIS BI: CPT

## 2020-09-28 ENCOUNTER — HOSPITAL ENCOUNTER (OUTPATIENT)
Dept: LAB | Age: 65
Discharge: HOME | End: 2020-09-28
Payer: MEDICARE

## 2020-09-28 ENCOUNTER — APPOINTMENT (OUTPATIENT)
Dept: INTERNAL MEDICINE CLINIC | Age: 65
End: 2020-09-28

## 2020-09-28 DIAGNOSIS — R73.9 HYPERGLYCEMIA: ICD-10-CM

## 2020-09-28 DIAGNOSIS — I10 ESSENTIAL HYPERTENSION: ICD-10-CM

## 2020-09-28 DIAGNOSIS — M06.9 RHEUMATOID ARTHRITIS INVOLVING MULTIPLE SITES, UNSPECIFIED RHEUMATOID FACTOR PRESENCE: ICD-10-CM

## 2020-09-28 DIAGNOSIS — E78.2 MIXED HYPERLIPIDEMIA: ICD-10-CM

## 2020-09-28 LAB
ALBUMIN SERPL-MCNC: 4.1 G/DL (ref 3.4–5)
ALBUMIN/GLOB SERPL: 1.3 {RATIO} (ref 0.8–1.7)
ALP SERPL-CCNC: 92 U/L (ref 45–117)
ALT SERPL-CCNC: 53 U/L (ref 13–56)
ANION GAP SERPL CALC-SCNC: 6 MMOL/L (ref 3–18)
AST SERPL-CCNC: 49 U/L (ref 10–38)
BILIRUB SERPL-MCNC: 0.7 MG/DL (ref 0.2–1)
BUN SERPL-MCNC: 16 MG/DL (ref 7–18)
BUN/CREAT SERPL: 23 (ref 12–20)
CALCIUM SERPL-MCNC: 9.2 MG/DL (ref 8.5–10.1)
CHLORIDE SERPL-SCNC: 112 MMOL/L (ref 100–111)
CHOLEST SERPL-MCNC: 180 MG/DL
CO2 SERPL-SCNC: 27 MMOL/L (ref 21–32)
CREAT SERPL-MCNC: 0.7 MG/DL (ref 0.6–1.3)
CREAT UR-MCNC: 73 MG/DL (ref 30–125)
GLOBULIN SER CALC-MCNC: 3.2 G/DL (ref 2–4)
GLUCOSE SERPL-MCNC: 77 MG/DL (ref 74–99)
HBA1C MFR BLD: 5.4 % (ref 4.2–5.6)
HDLC SERPL-MCNC: 49 MG/DL (ref 40–60)
HDLC SERPL: 3.7 {RATIO} (ref 0–5)
LDLC SERPL CALC-MCNC: 110 MG/DL (ref 0–100)
LIPID PROFILE,FLP: ABNORMAL
MICROALBUMIN UR-MCNC: 1.02 MG/DL (ref 0–3)
MICROALBUMIN/CREAT UR-RTO: 14 MG/G (ref 0–30)
POTASSIUM SERPL-SCNC: 5 MMOL/L (ref 3.5–5.5)
PROT SERPL-MCNC: 7.3 G/DL (ref 6.4–8.2)
SODIUM SERPL-SCNC: 145 MMOL/L (ref 136–145)
TRIGL SERPL-MCNC: 105 MG/DL (ref ?–150)
VLDLC SERPL CALC-MCNC: 21 MG/DL

## 2020-09-28 PROCEDURE — 83036 HEMOGLOBIN GLYCOSYLATED A1C: CPT

## 2020-09-28 PROCEDURE — 80061 LIPID PANEL: CPT

## 2020-09-28 PROCEDURE — 80053 COMPREHEN METABOLIC PANEL: CPT

## 2020-09-28 PROCEDURE — 82043 UR ALBUMIN QUANTITATIVE: CPT

## 2020-09-28 PROCEDURE — 36415 COLL VENOUS BLD VENIPUNCTURE: CPT

## 2020-09-29 ENCOUNTER — TELEPHONE (OUTPATIENT)
Dept: INTERNAL MEDICINE CLINIC | Age: 65
End: 2020-09-29

## 2020-09-29 DIAGNOSIS — R74.01 TRANSAMINITIS: ICD-10-CM

## 2020-09-29 DIAGNOSIS — R74.01 TRANSAMINITIS: Primary | ICD-10-CM

## 2020-09-29 DIAGNOSIS — I10 ESSENTIAL HYPERTENSION WITH GOAL BLOOD PRESSURE LESS THAN 140/90: ICD-10-CM

## 2020-09-29 NOTE — PROGRESS NOTES
Please let her know that her renal function labs are normal. Her total cholesterol is 180. Triglycerides are 105. HDL is 49. LDL is 110. Her AST liver test is mildly elevated at 49. ALT (liver test) is normal at 53. Her A1C is 5.4. Please fax these results to her rheumatologist for review since she is on methotrexate (which could be the reason for her very mildly elevated AST). Meanwhile, I will order a RUQ ultrasound to evaluate this lab abnormality a little bit further.     Dr. Yane Mullins  [facility], [address] [place] [address], [address] [place] [address] Str.  Phone: [phone]  Fax: [phone]

## 2020-09-29 NOTE — TELEPHONE ENCOUNTER
----- Message from Yovani Layne MD sent at 9/29/2020  8:24 AM EDT -----  Please let her know that her renal function labs are normal. Her total cholesterol is 180. Triglycerides are 105. HDL is 49. LDL is 110. Her AST liver test is mildly elevated at 49. ALT (liver test) is normal at 53. Her A1C is 5.4. Please fax these results to her rheumatologist for review since she is on methotrexate (which could be the reason for her very mildly elevated AST). Meanwhile, I will order a RUQ ultrasound to evaluate this lab abnormality a little bit further.     Dr. Hay Ocasio  [facility], [address] [place] [address], [address] [place] [address] Str.  Phone: [phone]  Fax: [phone]

## 2020-09-30 RX ORDER — LOSARTAN POTASSIUM 50 MG/1
TABLET ORAL
Qty: 90 TAB | Refills: 3 | Status: SHIPPED | OUTPATIENT
Start: 2020-09-30 | End: 2021-08-02

## 2020-10-01 ENCOUNTER — HOSPITAL ENCOUNTER (OUTPATIENT)
Dept: ULTRASOUND IMAGING | Age: 65
Discharge: HOME | End: 2020-10-01
Attending: INTERNAL MEDICINE
Payer: MEDICARE

## 2020-10-01 PROCEDURE — 76705 ECHO EXAM OF ABDOMEN: CPT

## 2020-10-06 ENCOUNTER — TELEPHONE (OUTPATIENT)
Dept: INTERNAL MEDICINE CLINIC | Age: 65
End: 2020-10-06

## 2020-10-06 NOTE — TELEPHONE ENCOUNTER
----- Message from Ishan Major MD sent at 10/6/2020  9:27 AM EDT -----  Please let the patient know that her liver ultrasound shows evidence of hepatic steatosis. Please fax these results to her rheumatologist given her recent mild transaminitis, as he may want to adjust some of her medications given these results. Please fax them to her GI team as well for review. Typically hepatic steatosis or fatty liver disease is managed with lifestyle modification measures. Please let her know that. Please let her know that we are faxing these results to her other specialists for review.     Dr. Yane Mullins  [facility], [address] [place] [address], [address] [place] [address] Str.  Phone: [phone]  Fax: [phone]

## 2020-10-06 NOTE — PROGRESS NOTES
Please let the patient know that her liver ultrasound shows evidence of hepatic steatosis. Please fax these results to her rheumatologist given her recent mild transaminitis, as he may want to adjust some of her medications given these results. Please fax them to her GI team as well for review. Typically hepatic steatosis or fatty liver disease is managed with lifestyle modification measures. Please let her know that. Please let her know that we are faxing these results to her other specialists for review.     Dr. Linnette Rush  [facility] [address], [address] [place] [address] Str.  Phone: [phone]  Fax: [phone]

## 2020-10-06 NOTE — TELEPHONE ENCOUNTER
Patient contacted, patient identified with two identifiers (Name & ). Patient aware of results per  and verbalizes understanding. Results faxed.

## 2020-10-15 ENCOUNTER — TELEPHONE (OUTPATIENT)
Dept: INTERNAL MEDICINE CLINIC | Age: 65
End: 2020-10-15

## 2020-10-15 NOTE — TELEPHONE ENCOUNTER
Pt asking, stated for the 3rd time for US results of her abdomin to be sent to her Rheumatologist.      please return her call

## 2020-10-15 NOTE — TELEPHONE ENCOUNTER
Confirmed with DR. Odell's that there fax number is [phone]. Results faxed again with confirmation.

## 2020-11-02 ENCOUNTER — TRANSCRIBE ORDER (OUTPATIENT)
Dept: SCHEDULING | Age: 65
End: 2020-11-02

## 2020-11-02 DIAGNOSIS — R74.8 ELEVATED LIVER ENZYMES LEVEL DUE TO CYSTIC FIBROSIS (HCC): Primary | ICD-10-CM

## 2020-11-02 DIAGNOSIS — E84.8 ELEVATED LIVER ENZYMES LEVEL DUE TO CYSTIC FIBROSIS (HCC): Primary | ICD-10-CM

## 2020-11-10 ENCOUNTER — HOSPITAL ENCOUNTER (OUTPATIENT)
Dept: ULTRASOUND IMAGING | Age: 65
Discharge: HOME | End: 2020-11-10
Attending: INTERNAL MEDICINE
Payer: MEDICARE

## 2020-11-10 DIAGNOSIS — E84.8 ELEVATED LIVER ENZYMES LEVEL DUE TO CYSTIC FIBROSIS (HCC): ICD-10-CM

## 2020-11-10 DIAGNOSIS — R74.8 ELEVATED LIVER ENZYMES LEVEL DUE TO CYSTIC FIBROSIS (HCC): ICD-10-CM

## 2020-11-10 PROCEDURE — 76981 USE PARENCHYMA: CPT

## 2020-12-01 ENCOUNTER — HOSPITAL ENCOUNTER (OUTPATIENT)
Dept: GENERAL RADIOLOGY | Age: 65
Discharge: HOME | End: 2020-12-01
Payer: MEDICARE

## 2020-12-01 DIAGNOSIS — M06.09 POLYARTHRITIS WITH NEGATIVE RHEUMATOID FACTOR (HCC): ICD-10-CM

## 2020-12-01 PROCEDURE — 71046 X-RAY EXAM CHEST 2 VIEWS: CPT

## 2020-12-02 DIAGNOSIS — L90.0 LICHEN SCLEROSUS ET ATROPHICUS: ICD-10-CM

## 2020-12-02 RX ORDER — CLOBETASOL PROPIONATE 0.5 MG/G
OINTMENT TOPICAL
Qty: 45 G | Refills: 3 | Status: SHIPPED | OUTPATIENT
Start: 2020-12-03

## 2020-12-02 NOTE — TELEPHONE ENCOUNTER
Patient reached she was instructed to call [address] [place] [address] about the error. She also asked for a refill, now that her obgyn office is closed. Patient aware that we will send the refill to DR. Tien Harvey but she will need to find a new obgyn. Patient given contact info to obgyn to schedule an appointment.

## 2020-12-02 NOTE — TELEPHONE ENCOUNTER
[facility]  Patient Medical Advice Request Pool 57 minutes ago (8:35 AM)          Dr Miguel Cabello said that my medical record in his office does not show cystic fibrosis. You  Hilda Palencia 1 hour ago (8:15 AM)          Good morning,   I do see what your talking about it in an ultrasound you had done ordered by DR. Miguel Cabello. This is not listed a diagnosis in your medical history, looks to be a clerical error by the radiologist that typed the report. I would reach out to DR. Erickson office to see what they can do, and if they cant help, I will have to ask around at who can help here. Not sure how to have this removed. Thanks. [facility]  Patient Medical Advice Request Pool 16 hours ago (5:08 PM)          How do I get an incorrect medical diagnosis removed from my medical history? I do not now and never have had cystic fibrosis. The first time I ever saw that on my chart was after my Ultrasound with elasticity at the [address] [place] [address] last month. I believe there was a mistake in coding or something. I have had asthma my entire life not cystic fibrosis.

## 2021-01-05 ENCOUNTER — OFFICE VISIT (OUTPATIENT)
Dept: PULMONOLOGY | Age: 66
End: 2021-01-05
Payer: MEDICARE

## 2021-01-05 VITALS
BODY MASS INDEX: 27 KG/M2 | TEMPERATURE: 98 F | WEIGHT: 188.6 LBS | OXYGEN SATURATION: 98 % | RESPIRATION RATE: 18 BRPM | HEART RATE: 73 BPM | SYSTOLIC BLOOD PRESSURE: 158 MMHG | HEIGHT: 70 IN | DIASTOLIC BLOOD PRESSURE: 92 MMHG

## 2021-01-05 DIAGNOSIS — J34.2 DNS (DEVIATED NASAL SEPTUM): ICD-10-CM

## 2021-01-05 DIAGNOSIS — Z79.899 HIGH RISK MEDICATION USE: ICD-10-CM

## 2021-01-05 DIAGNOSIS — J45.40 MODERATE PERSISTENT ASTHMA WITHOUT COMPLICATION: Primary | ICD-10-CM

## 2021-01-05 DIAGNOSIS — M05.79 RHEUMATOID ARTHRITIS INVOLVING MULTIPLE SITES WITH POSITIVE RHEUMATOID FACTOR (HCC): ICD-10-CM

## 2021-01-05 DIAGNOSIS — K21.9 GASTROESOPHAGEAL REFLUX DISEASE WITHOUT ESOPHAGITIS: Chronic | ICD-10-CM

## 2021-01-05 PROCEDURE — G8536 NO DOC ELDER MAL SCRN: HCPCS | Performed by: INTERNAL MEDICINE

## 2021-01-05 PROCEDURE — 1090F PRES/ABSN URINE INCON ASSESS: CPT | Performed by: INTERNAL MEDICINE

## 2021-01-05 PROCEDURE — 3017F COLORECTAL CA SCREEN DOC REV: CPT | Performed by: INTERNAL MEDICINE

## 2021-01-05 PROCEDURE — G8419 CALC BMI OUT NRM PARAM NOF/U: HCPCS | Performed by: INTERNAL MEDICINE

## 2021-01-05 PROCEDURE — G8432 DEP SCR NOT DOC, RNG: HCPCS | Performed by: INTERNAL MEDICINE

## 2021-01-05 PROCEDURE — G8399 PT W/DXA RESULTS DOCUMENT: HCPCS | Performed by: INTERNAL MEDICINE

## 2021-01-05 PROCEDURE — G8427 DOCREV CUR MEDS BY ELIG CLIN: HCPCS | Performed by: INTERNAL MEDICINE

## 2021-01-05 PROCEDURE — G8753 SYS BP > OR = 140: HCPCS | Performed by: INTERNAL MEDICINE

## 2021-01-05 PROCEDURE — G8755 DIAS BP > OR = 90: HCPCS | Performed by: INTERNAL MEDICINE

## 2021-01-05 PROCEDURE — 99214 OFFICE O/P EST MOD 30 MIN: CPT | Performed by: INTERNAL MEDICINE

## 2021-01-05 PROCEDURE — 1101F PT FALLS ASSESS-DOCD LE1/YR: CPT | Performed by: INTERNAL MEDICINE

## 2021-01-05 PROCEDURE — G9899 SCRN MAM PERF RSLTS DOC: HCPCS | Performed by: INTERNAL MEDICINE

## 2021-01-05 RX ORDER — ALBUTEROL SULFATE 90 UG/1
2 AEROSOL, METERED RESPIRATORY (INHALATION)
Qty: 1 INHALER | Refills: 3 | Status: SHIPPED | OUTPATIENT
Start: 2021-01-05 | End: 2022-01-20

## 2021-01-05 NOTE — PROGRESS NOTES
[facility]   Pulmonary, Critical Care, and Sleep Medicine     Reason for Evaluation: follow up asthma    20   She has done well since her last visit and has not had any exacerbations    Continues to use her Singulair, Symbicort  Not needing much of rescue inhalers-in fact her current albuterol  in . Discussed need for new inhaler and prescription placed  On Dexilant for Green's esophagus  She has been on methotrexate -injectable for RA. But recently found to have elevated LFTs and is currently being transitioned to infusions    Denies wheezing. Occasional sneezing. Feels better overall with no recent set backs. No cough, congestion or wheezing. She denied any hemoptysis, change in her appetite or weight. Has not needed any prednisone tapers. No Er visits    Allergies:  No known drug allergies. Current Outpatient Medications:     clobetasoL (TEMOVATE) 0.05 % ointment, Apply  to affected area two (2) days a week., Disp: 45 g, Rfl: 3    losartan (COZAAR) 50 mg tablet, TAKE 1 TABLET DAILY, Disp: 90 Tab, Rfl: 3    methotrexate, PF, 25 mg/mL injection, every seven (7) days. Indications: rheumatoid arthritis, Disp: , Rfl:     ferrous sulfate 325 mg (65 mg iron) tablet, take 1 tablet by mouth once daily BEFORE BREAKFAST, Disp: 90 Tab, Rfl: 1    cyanocobalamin 1,000 mcg tablet, take 1 tablet by mouth once daily, Disp: 90 Tab, Rfl: 3    multivit-min/iron/folic/lutein (CENTRUM SILVER WOMEN PO), Take 1 Tab by mouth daily. , Disp: , Rfl:     krill/om-3/dha/epa/phospho/ast (MAXIMUM RED KRILL OMEGA-3 PO), Take 1 Caplet by mouth daily. , Disp: , Rfl:     ascorbic acid, vitamin C, (VITAMIN C) 500 mg tablet, Take 1 Tab by mouth daily. , Disp: 90 Tab, Rfl: 3    ESTRACE 0.01 % (0.1 mg/gram) vaginal cream, Place 1 gm in the vagina twice weekly, Disp: 42.5 g, Rfl: 2    cholecalciferol, vitamin D3, (VITAMIN D3 PO), Take 1 Tab by mouth daily. , Disp: , Rfl:     folic acid (FOLVITE) 1 mg tablet, take 1 tablet by mouth once daily, Disp: , Rfl: 0    acetaminophen (TYLENOL) 325 mg tablet, Take 325 mg by mouth every four (4) hours as needed for Pain., Disp: , Rfl:     triamcinolone (NASACORT AQ) 55 mcg nasal inhaler, 2 Sprays by Both Nostrils route daily. Indications: PERENNIAL ALLERGIC RHINITIS, Disp: , Rfl:     Dexlansoprazole (DEXILANT) 60 mg CpDM, Take 1 Cap by mouth Daily (before dinner). , Disp: , Rfl:     calcium carbonate (TUMS) 200 mg calcium (500 mg) Chew, Take 1 Tab by mouth four (4) times daily. , Disp: , Rfl:     Cetirizine (ZYRTEC) 10 mg Cap, Take 10 mg by mouth daily. , Disp: , Rfl:     montelukast (SINGULAIR) 10 mg tablet, Take 10 mg by mouth daily. , Disp: , Rfl:     allergy injection, by SubCUTAneous route every thirty (30) days. , Disp: , Rfl:     budesonide-formoterol (SYMBICORT) 160-4.5 mcg/Actuation HFA inhaler, Take 2 Puffs by inhalation two (2) times a day., Disp: , Rfl:     albuterol (PROVENTIL HFA, VENTOLIN HFA) 90 mcg/Actuation inhaler, Take 2 Puffs by inhalation every six (6) hours as needed for Shortness of Breath or Wheezing., Disp: , Rfl:     amoxicillin (AMOXIL) 500 mg capsule, 4 tabs by mouth 1 hour prior to appointment, Disp: 4 Cap, Rfl: 3       Physical Examination:       HEENT:    Normocephalic, atraumatic. Pupils equal, round, reactive to light and accommodation. Sclerae white. Conjunctivae pale. Oral exam shows no thrush,  without any exudate or mass. Nasal mucous membranes are without any erythema or edema. Neck:  Thick, supple, no JVD, normal thyroid, no adenopathy, no other masses palpable. Cardiovascular:  S1, S2, regular rate and rhythm without any murmurs, rubs or gallops. Chest:  Equal air entry without wheezing. There is no dullness to percussion. No tenderness on palpation. No rash on inspection   Abdomen:  Obese, bowel sounds normoactive, soft, nontender without any organomegaly. Tympanic to percussion.    Extremities: Without any clubbing, cyanosis or edema. No calf tenderness on palpation. Skin:  Dry, warm to touch. No rash on inspection. Impression:     Asthma- better controlled. With no exacerbations over past  3 + years after sinus surgery. SOB- multifactorial  Recurrent sinusitis  She had in the interval nasal septal deviation repair surgery to correct any anatomical defect causing her frequent sinus and respiratory infections. Esophageal stricture with reflux. Now diagnosed with Green's esophagus. On Dexilent  Rheumatoid arthritis- rheumatology following  Elevated LFTs-believed to be related to methotrexate-GI following    Recommendations:      Continue current maintenance therapy-Singulair and Symbicort  The patient will continue her as needed albuterol-refill placed  Continue trigger control- PPI for Green's, allergy immunotherapy and controller meds  Commended on compliance and excellent control  Health maintenance- up to date on vaccines  Environmental control  Periodic PFT, imaging. Education provided. Follow up in 6 months  .         May Mckenna MD

## 2021-01-05 NOTE — PROGRESS NOTES
Leonard Lafleur presents today for   Chief Complaint   Patient presents with    Results     Chest Xray  12/2020 @ [facility]        Is someone accompanying this pt? No    Is the patient using any DME equipment during OV? No    -DME Company NA    Depression Screening:  3 most recent PHQ Screens 6/2/2020   PHQ Not Done -   Little interest or pleasure in doing things Not at all   Feeling down, depressed, irritable, or hopeless Not at all   Total Score PHQ 2 0       Learning Assessment:  Learning Assessment 2/26/2020   PRIMARY LEARNER Patient   HIGHEST LEVEL OF EDUCATION - PRIMARY LEARNER  > 4 YEARS OF COLLEGE   BARRIERS PRIMARY LEARNER NONE   CO-LEARNER CAREGIVER No   PRIMARY LANGUAGE ENGLISH   LEARNER PREFERENCE PRIMARY DEMONSTRATION     -     -     -     -   ANSWERED BY patient     -   RELATIONSHIP SELF       Abuse Screening:  Abuse Screening Questionnaire 2/26/2020   Do you ever feel afraid of your partner? N   Are you in a relationship with someone who physically or mentally threatens you? N   Is it safe for you to go home? Y       Fall Risk  Fall Risk Assessment, last 12 mths 6/2/2020   Able to walk? Yes   Fall in past 12 months? No   Number of falls in past 12 months -   Fall with injury? -         Coordination of Care:  1. Have you been to the ER, urgent care clinic since your last visit? Hospitalized since your last visit? No    2. Have you seen or consulted any other health care providers outside of the [address] [place] [address] since your last visit? Include any pap smears or colon screening.  Dr Frances Gray  Rheumologist

## 2021-03-23 RX ORDER — LANOLIN ALCOHOL/MO/W.PET/CERES
CREAM (GRAM) TOPICAL
Qty: 90 TAB | Refills: 3 | Status: SHIPPED | OUTPATIENT
Start: 2021-03-23 | End: 2022-03-25

## 2021-04-07 NOTE — PROGRESS NOTES
Shivani Fink presents today for   Chief Complaint   Patient presents with    Follow-up    Hypertension    Annual Wellness Visit          Coordination of Care:  1. Have you been to the ER, urgent care clinic since your last visit? Hospitalized since your last visit? no    2. Have you seen or consulted any other health care providers outside of the [address] [place] [address] since your last visit? Include any pap smears or colon screening.  yes

## 2021-04-09 ENCOUNTER — OFFICE VISIT (OUTPATIENT)
Dept: INTERNAL MEDICINE CLINIC | Age: 66
End: 2021-04-09
Payer: MEDICARE

## 2021-04-09 VITALS
WEIGHT: 188 LBS | BODY MASS INDEX: 26.92 KG/M2 | RESPIRATION RATE: 16 BRPM | HEIGHT: 70 IN | SYSTOLIC BLOOD PRESSURE: 128 MMHG | DIASTOLIC BLOOD PRESSURE: 85 MMHG | HEART RATE: 82 BPM | TEMPERATURE: 97.2 F | OXYGEN SATURATION: 96 %

## 2021-04-09 DIAGNOSIS — R73.9 HYPERGLYCEMIA: ICD-10-CM

## 2021-04-09 DIAGNOSIS — M05.79 RHEUMATOID ARTHRITIS INVOLVING MULTIPLE SITES WITH POSITIVE RHEUMATOID FACTOR (HCC): ICD-10-CM

## 2021-04-09 DIAGNOSIS — Z00.00 INITIAL MEDICARE ANNUAL WELLNESS VISIT: ICD-10-CM

## 2021-04-09 DIAGNOSIS — I10 ESSENTIAL HYPERTENSION: Primary | ICD-10-CM

## 2021-04-09 DIAGNOSIS — J45.40 MODERATE PERSISTENT ASTHMA WITHOUT COMPLICATION: ICD-10-CM

## 2021-04-09 DIAGNOSIS — E78.1 HYPERTRIGLYCERIDEMIA: ICD-10-CM

## 2021-04-09 DIAGNOSIS — Z71.89 ADVANCE CARE PLANNING: ICD-10-CM

## 2021-04-09 PROCEDURE — G0463 HOSPITAL OUTPT CLINIC VISIT: HCPCS | Performed by: INTERNAL MEDICINE

## 2021-04-09 PROCEDURE — G8510 SCR DEP NEG, NO PLAN REQD: HCPCS | Performed by: INTERNAL MEDICINE

## 2021-04-09 PROCEDURE — 1101F PT FALLS ASSESS-DOCD LE1/YR: CPT | Performed by: INTERNAL MEDICINE

## 2021-04-09 PROCEDURE — 99214 OFFICE O/P EST MOD 30 MIN: CPT | Performed by: INTERNAL MEDICINE

## 2021-04-09 PROCEDURE — 1090F PRES/ABSN URINE INCON ASSESS: CPT | Performed by: INTERNAL MEDICINE

## 2021-04-09 PROCEDURE — G8427 DOCREV CUR MEDS BY ELIG CLIN: HCPCS | Performed by: INTERNAL MEDICINE

## 2021-04-09 PROCEDURE — G8536 NO DOC ELDER MAL SCRN: HCPCS | Performed by: INTERNAL MEDICINE

## 2021-04-09 PROCEDURE — G8752 SYS BP LESS 140: HCPCS | Performed by: INTERNAL MEDICINE

## 2021-04-09 PROCEDURE — 3017F COLORECTAL CA SCREEN DOC REV: CPT | Performed by: INTERNAL MEDICINE

## 2021-04-09 PROCEDURE — G0438 PPPS, INITIAL VISIT: HCPCS | Performed by: INTERNAL MEDICINE

## 2021-04-09 PROCEDURE — G9899 SCRN MAM PERF RSLTS DOC: HCPCS | Performed by: INTERNAL MEDICINE

## 2021-04-09 PROCEDURE — G8419 CALC BMI OUT NRM PARAM NOF/U: HCPCS | Performed by: INTERNAL MEDICINE

## 2021-04-09 PROCEDURE — G8399 PT W/DXA RESULTS DOCUMENT: HCPCS | Performed by: INTERNAL MEDICINE

## 2021-04-09 PROCEDURE — G8754 DIAS BP LESS 90: HCPCS | Performed by: INTERNAL MEDICINE

## 2021-04-09 RX ORDER — METHYLPREDNISOLONE 4 MG/1
TABLET ORAL
Qty: 1 DOSE PACK | Refills: 0 | Status: SHIPPED | OUTPATIENT
Start: 2021-04-09 | End: 2021-10-11 | Stop reason: ALTCHOICE

## 2021-04-09 NOTE — PROGRESS NOTES
INTERNISTS OF [facility]:  04/12/21, [id number]      The Initial Medicare Annual Wellness Exam PROGRESS NOTE    This is an Initial Medicare Annual Wellness Exam (AWV). I have reviewed the patient's medical history in detail and updated the computerized patient record. Neo Mantilla is a 76 y.o.  female and presents for an annual wellness exam.    SUBJECTIVE    Past Medical History:   Diagnosis Date    Adjacent segment disease C3/4 w/deg changes, poss stenosis, CT '18 6/22/2018    Allergic rhinitis     Anemia 2008    intermittant since then    Asthma     Back pain     Green's esophagus with esophagitis     Cervical radiculopathy     Chronic sinusitis 5/15/2012    DNS (deviated nasal septum)     Empyema     Foraminal stenosis of cervical region     C4-C5    GERD (gastroesophageal reflux disease)     Dr Gissel Doherty Hx MRSA infection 8/11/2014    Hypertension     Hypertriglyceridemia     Insulin resistance 4/9/2012    Left knee pain     Lichen planus     Menopause     Age 51    MRSA (methicillin resistant Staphylococcus aureus) infection 2008    left lung    Nausea & vomiting     Restless leg syndrome     Rheumatoid arthritis ([address]     Spinal cord stimulator status 2016    Spinal stenosis of cervical region     C4-C5 and C5-C6    TMJ syndrome     Wears glasses     and contacts      Past Surgical History:   Procedure Laterality Date    HX CERVICAL FUSION  08/13/14    HX HEENT      TMJ surgery    HX LUMBAR LAMINECTOMY  Nov 1995    [facility]    HX LUMBAR LAMINECTOMY  Feb 1997    Shereen Hu  1/2015    bunion removal from right foot    HX OTHER SURGICAL  2007    thoracentesis    HX OTHER SURGICAL  2008    chest tube    HX OTHER SURGICAL  1997    TMJ surgery    HX OTHER SURGICAL  2012    sinus surg 2012-Dr [facility]     HX OTHER SURGICAL  2016    spinal cord stimulator place for lumbar neuritis, good benefit    HX ROTATOR CUFF REPAIR Right 01/2019    HX TUBAL LIGATION      KS ANESTH,SURGERY OF SHOULDER  01/31/2019    KS COLONOSCOPY FLX DX W/COLLJ SPEC WHEN PFRMD  6-18-08    normal/duntemann     Current Outpatient Medications   Medication Sig Dispense Refill    naphazoline HCl/pheniramine (EYE ALLERGY RELIEF OP) Apply  to eye.  methylPREDNISolone (MEDROL DOSEPACK) 4 mg tablet Take per package instructions 1 Dose Pack 0    cyanocobalamin 1,000 mcg tablet take 1 tablet by mouth once daily 90 Tab 3    albuterol (PROVENTIL HFA, VENTOLIN HFA, PROAIR HFA) 90 mcg/actuation inhaler Take 2 Puffs by inhalation every six (6) hours as needed for Shortness of Breath or Wheezing. 1 Inhaler 3    clobetasoL (TEMOVATE) 0.05 % ointment Apply  to affected area two (2) days a week. 45 g 3    losartan (COZAAR) 50 mg tablet TAKE 1 TABLET DAILY 90 Tab 3    methotrexate, PF, 25 mg/mL injection every seven (7) days. Indications: rheumatoid arthritis      ferrous sulfate 325 mg (65 mg iron) tablet take 1 tablet by mouth once daily BEFORE BREAKFAST 90 Tab 1    multivit-min/iron/folic/lutein (CENTRUM SILVER WOMEN PO) Take 1 Tab by mouth daily.  krill/om-3/dha/epa/phospho/ast (MAXIMUM RED KRILL OMEGA-3 PO) Take 1 Caplet by mouth daily.  ascorbic acid, vitamin C, (VITAMIN C) 500 mg tablet Take 1 Tab by mouth daily. 90 Tab 3    ESTRACE 0.01 % (0.1 mg/gram) vaginal cream Place 1 gm in the vagina twice weekly 42.5 g 2    cholecalciferol, vitamin D3, (VITAMIN D3 PO) Take 1 Tab by mouth daily.  folic acid (FOLVITE) 1 mg tablet take 1 tablet by mouth once daily  0    acetaminophen (TYLENOL) 325 mg tablet Take 325 mg by mouth every four (4) hours as needed for Pain.  triamcinolone (NASACORT AQ) 55 mcg nasal inhaler 2 Sprays by Both Nostrils route daily. Indications: PERENNIAL ALLERGIC RHINITIS      Dexlansoprazole (DEXILANT) 60 mg CpDM Take 1 Cap by mouth Daily (before dinner).       calcium carbonate (TUMS) 200 mg calcium (500 mg) Chew Take 1 Tab by mouth four (4) times daily.  Cetirizine (ZYRTEC) 10 mg Cap Take 10 mg by mouth daily.  montelukast (SINGULAIR) 10 mg tablet Take 10 mg by mouth daily.  allergy injection by SubCUTAneous route every thirty (30) days.  budesonide-formoterol (SYMBICORT) 160-4.5 mcg/Actuation HFA inhaler Take 2 Puffs by inhalation two (2) times a day.       amoxicillin (AMOXIL) 500 mg capsule 4 tabs by mouth 1 hour prior to appointment 4 Cap 3     Allergies   Allergen Reactions    Adhesive Tape-Silicones Other (comments)     Patient stated iv tape and tegaderm ok    Other Plant, Animal, Environmental Unable to Consolidated Doc     MOLD    Sulfasalazine Other (comments)     Could not taste anything, lightheadedness    Tape [Adhesive] Other (comments)     Blisters, use paper tape only  Patient states tegaderm and paper tape are okay    Chlorhexidine Towelette Itching and Other (comments)     \"stinging for a couple of hours\"    itching     Family History   Problem Relation Age of Onset    Hypertension Mother    [facility] Diabetes Mother     Arthritis-rheumatoid Sister     Other Sister         Lupus    Cancer Sister 35        CA, uterus    Diabetes Sister     Other Brother         myocarditis    Heart Attack Brother     Heart Disease Brother     Heart Surgery Brother     Coronary Artery Disease Father     Hypertension Father     Lung Disease Father     Cancer Sister 48        CA, breast    Breast Cancer Sister         Estrogen based    Diabetes Brother     Asthma Brother     Stroke Maternal Grandmother     Cancer Daughter      Social History     Tobacco Use    Smoking status: Never Smoker    Smokeless tobacco: Never Used   Substance Use Topics    Alcohol use: No     Alcohol/week: 0.0 standard drinks     Frequency: Never     Comment: 0-6 per year     Patient Active Problem List   Diagnosis Code    HTN (hypertension) I10    Iron deficiency anemia D50.9    Vitamin B12 deficiency E53.8    Vitamin D deficiency E55.9    DNS (deviated nasal septum) J34.2    Hypertriglyceridemia E78.1    Impaired glucose tolerance R73.02    Cervical stenosis of spine M48.02    GERD (gastroesophageal reflux disease) K21.9    Hx MRSA infection Z86.14    S/P cervical spinal fusion Z98.1    Lumbar post-laminectomy syndrome M96.1    Moderate persistent asthma without complication Y23.99    Arthritis, lumbar spine M47.816    Rheumatoid arthritis involving multiple sites M06.9    Green esophagus V74.39    Lichen sclerosus et atrophicus L90.0    Adjacent segment disease C3/4 w/deg changes, poss stenosis, CT '18 [id number]    Arthritis of knee M17.10    High risk medication use Z79.899       Pt is a 65 female with h/o HTN, GERD with Green's esophagus (followed by ), DJD, lumbar post laminectomy syndrome s/p spinal cord stimulator surgery (followed by Candelaria Perez), cervical spinal stenosis, prediabetes (resolved), asthma (followed by , Pulmonology), HLD, lichens sclerosus, chronic sinusitis, vitamin B12 deficiency, and rheumatoid arthritis (Followed by ), vitamin D deficiency.     Health Maintenance History  Immunizations reviewed:    Tdap up to date   Pneumovax: up to date   Flu: up to date  Zoster: up to date    Immunization History   Administered Date(s) Administered    Covid-19, MODERNA, Mrna, Lnp-s, Pf, 100mcg/0.5mL 03/05/2021, 04/02/2021    Hep A and Hep B Vaccine 10/29/2020, 11/30/2020    Hepatitis B Vaccine 01/01/2009    Influenza High Dose Vaccine PF 10/03/2017, 09/11/2019, 09/28/2020    Influenza Vaccine 10/09/2014, 10/22/2015    Influenza Vaccine (Tri) Adjuvanted (>65 Yrs FLUAD TRI 21635) 09/11/2019    Influenza Vaccine PF 10/12/2016    Influenza Vaccine Whole 01/04/2013    Influenza, Quadrivalent, Adjuvanted (>65 Yrs FLUAD QUAD 25236) 09/28/2020    Pneumococcal Polysaccharide (PPSV-23) 07/28/2016    Td 08/01/2011    Tdap 07/28/2016, 03/03/2017    Zoster Recombinant 03/30/2019, 07/02/2019 Colonoscopy: Up to date. No bleeding. Eye exam: Up to date    Mammo: Up to date    Dexascan: Up to date    Pelvic/Pap: No bleeding. Review of Systems   Constitutional: Negative for chills, fever and malaise/fatigue. HENT: Negative for ear pain and sore throat. Eyes: Negative for blurred vision and pain. Respiratory: Negative for cough and shortness of breath. Cardiovascular: Negative for chest pain. Gastrointestinal: Negative for abdominal pain, blood in stool and melena. Genitourinary: Negative for dysuria and hematuria. Musculoskeletal: Positive for joint pain. Negative for myalgias. Skin: Negative for rash. Neurological: Negative for tingling, focal weakness and headaches. Endo/Heme/Allergies: Does not bruise/bleed easily. Psychiatric/Behavioral: Negative for depression and substance abuse. Depression Risk Factor Screening:      Patient Health Questionnaire (PHQ-2)   Over the last 2 weeks, how often have you been bothered by any of the following problems? · Little interest or pleasure in doing things? · Not at all. [0]  · Feeling down, depressed, or hopeless? · Not at all. [0]    Total Score: 0/6  PHQ-2 Assessment Scoring:   A score of 2 or more requires further screening with the PHQ-9    Alcohol Risk Factor Screening:   Women: On any occasion during the past 3 months, have you had more than 3 drinks containing alcohol? no    Do you average more than 7 drinks per week? no    Tobacco Use Screening:     Social History     Tobacco Use   Smoking Status Never Smoker   Smokeless Tobacco Never Used       Hearing Loss    Hearing is unchanged and does not need any additional evaluation at this time. Activities of Daily Living   Self-care. Requires assistance with: no ADLs  She does not require assistance with ADLs/IADLs.     Fall Risk   no falls within the past year    Abuse Screen   None    Additional Examination Findings:  Vitals:    04/09/21 1355 04/09/21 1402 04/09/21 1403   BP: (!) 137/90 (!) 140/77 128/85   Pulse: 82     Resp: 16     Temp: 97.2 °F (36.2 °C)     TempSrc: Temporal     SpO2: 96%     Weight: 188 lb (85.3 kg)     Height: 5' 10\" (1.778 m)     PainSc:   2     PainLoc: Head        Body mass index is 26.98 kg/m². Evaluation of Cognitive Function:  Mood/affect: Euthymic  Appearance: Well-groomed  Family member/caregiver input: She is not accompanied by a family member      General:   Well-nourished, well-groomed, pleasant, alert, in no acute distress.      Head:  Normocephalic, atraumatic  Ears:  External ears WNL  Eyes:  Clear sclera  Neuro:   Alert, conversant, appropriate, following commands  Skin:    No rashes noted  Psych: Affect, mood and judgment appropriate      Dementia Screen:  Clock Drawing (ten past eleven) Exercise: unremarkable      LABS   Data Review:   Lab Results   Component Value Date/Time    Sodium 145 09/28/2020 08:31 AM    Potassium 5.0 09/28/2020 08:31 AM    Chloride 112 (H) 09/28/2020 08:31 AM    CO2 27 09/28/2020 08:31 AM    Anion gap 6 09/28/2020 08:31 AM    Glucose 77 09/28/2020 08:31 AM    BUN 16 09/28/2020 08:31 AM    Creatinine 0.70 09/28/2020 08:31 AM    BUN/Creatinine ratio 23 (H) 09/28/2020 08:31 AM    GFR est AA >60 09/28/2020 08:31 AM    GFR est non-AA >60 09/28/2020 08:31 AM    Calcium 9.2 09/28/2020 08:31 AM       Lab Results   Component Value Date/Time    WBC 5.7 01/20/2020 07:19 AM    HGB 13.3 01/20/2020 07:19 AM    HCT 41.6 01/20/2020 07:19 AM    PLATELET 945 06/05/0628 07:19 AM    MCV 95.2 01/20/2020 07:19 AM       Lab Results   Component Value Date/Time    Hemoglobin A1c 5.4 09/28/2020 08:31 AM       Lab Results   Component Value Date/Time    Cholesterol, total 180 09/28/2020 08:31 AM    HDL Cholesterol 49 09/28/2020 08:31 AM    LDL, calculated 110 (H) 09/28/2020 08:31 AM    VLDL, calculated 21 09/28/2020 08:31 AM    Triglyceride 105 09/28/2020 08:31 AM    CHOL/HDL Ratio 3.7 09/28/2020 08:31 AM       Patient Care Team:  Ronald Smith MD as PCP - General (Family Medicine)  Ronald Smith MD as PCP - [facility] Empaneled Provider  Lalo Dobbins MD (Orthopedic Surgery)  Christin Flores MD (Physical Medicine and Rehabilitation)  Torito Morley MD (Pulmonary Disease)  Nathaniel Aguilera MD (Allergy)  Flavio Lindo MD (Inactive) (Cardiology)  Merrilyn Bence, MD as Consulting Provider (Rheumatology)    End-of-life planning  Advanced Directive in the case than an injury or illness causes the patient to be unable to make health care decisions was discussed with the patient. Advice/Referrals/Counselling/Plan:   Education and counseling provided:  Are appropriate based on today's review and evaluation  End-of-Life planning (with patient's consent)  Pneumococcal Vaccine  Influenza Vaccine  Screening Mammography  Screening Pap and pelvic (covered once every 2 years)  Colorectal cancer screening tests  Cardiovascular screening blood test  Bone mass measurement (DEXA)  Screening for glaucoma  Diabetes screening test  Include in education list (weight loss, physical activity, smoking cessation, fall prevention, and nutrition)    ICD-10-CM ICD-9-CM    1. Essential hypertension  I10 401.9 LIPID PANEL      METABOLIC PANEL, COMPREHENSIVE      MICROALBUMIN, UR, RAND W/ MICROALB/CREAT RATIO   2. Moderate persistent asthma without complication  O74.51 628.75    3. Rheumatoid arthritis involving multiple sites with positive rheumatoid factor (HCC)  O09.26 992.3 METABOLIC PANEL, COMPREHENSIVE      methylPREDNISolone (MEDROL DOSEPACK) 4 mg tablet      CBC WITH AUTOMATED DIFF   4. Hypertriglyceridemia  E78.1 272.1 LIPID PANEL      METABOLIC PANEL, COMPREHENSIVE   5. Hyperglycemia  R73.9 790.29 HEMOGLOBIN A1C W/O EAG     reviewed diet, exercise and weight control.   Brief written plan, checklist    Assessment/Plan:      Lab review: labs are reviewed in the [address] (ACP) Provider Conversation Date of ACP Conversation: 4/9/21  Persons included in Conversation:  patient    Authorized Decision Maker (if patient is incapable of making informed decisions): This person is:   Named in Advance Directive or Healthcare Power of           For Patients with Decision Making Capacity:   Values/Goals: Exploration of values, goals, and preferences if recovery is not expected, even with continued medical treatment in the event of:  Imminent death  Severe, permanent brain injury    Conversation Outcomes / Follow-Up Plan:   ACP complete - no further action today. She has no changes to made to her pre-existing advance directive. I have discussed the diagnosis with the patient and the intended plan as seen in the above orders. The patient has received an after-visit summary and questions were answered concerning future plans. I have discussed medication side effects and warnings with the patient as well. I have reviewed the plan of care with the patient, accepted their input and they are in agreement with the treatment goals.

## 2021-04-09 NOTE — PATIENT INSTRUCTIONS
Body Mass Index: Care Instructions  Your Care Instructions     Body mass index (BMI) can help you see if your weight is raising your risk for health problems. It uses a formula to compare how much you weigh with how tall you are. · A BMI lower than 18.5 is considered underweight. · A BMI between 18.5 and 24.9 is considered healthy. · A BMI between 25 and 29.9 is considered overweight. A BMI of 30 or higher is considered obese. If your BMI is in the normal range, it means that you have a lower risk for weight-related health problems. If your BMI is in the overweight or obese range, you may be at increased risk for weight-related health problems, such as high blood pressure, heart disease, stroke, arthritis or joint pain, and diabetes. If your BMI is in the underweight range, you may be at increased risk for health problems such as fatigue, lower protection (immunity) against illness, muscle loss, bone loss, hair loss, and hormone problems. BMI is just one measure of your risk for weight-related health problems. You may be at higher risk for health problems if you are not active, you eat an unhealthy diet, or you drink too much alcohol or use tobacco products. Follow-up care is a key part of your treatment and safety. Be sure to make and go to all appointments, and call your doctor if you are having problems. It's also a good idea to know your test results and keep a list of the medicines you take. How can you care for yourself at home? · Practice healthy eating habits. This includes eating plenty of fruits, vegetables, whole grains, lean protein, and low-fat dairy. · If your doctor recommends it, get more exercise. Walking is a good choice. Bit by bit, increase the amount you walk every day. Try for at least 30 minutes on most days of the week. · Do not smoke. Smoking can increase your risk for health problems. If you need help quitting, talk to your doctor about stop-smoking programs and medicines. These can increase your chances of quitting for good. · Limit alcohol to 2 drinks a day for men and 1 drink a day for women. Too much alcohol can cause health problems. If you have a BMI higher than 25  · Your doctor may do other tests to check your risk for weight-related health problems. This may include measuring the distance around your waist. A waist measurement of more than 40 inches in men or 35 inches in women can increase the risk of weight-related health problems. · Talk with your doctor about steps you can take to stay healthy or improve your health. You may need to make lifestyle changes to lose weight and stay healthy, such as changing your diet and getting regular exercise. If you have a BMI lower than 18.5  · Your doctor may do other tests to check your risk for health problems. · Talk with your doctor about steps you can take to stay healthy or improve your health. You may need to make lifestyle changes to gain or maintain weight and stay healthy, such as getting more healthy foods in your diet and doing exercises to build muscle. Where can you learn more? Go to http://www.garcia.com/  Enter S176 in the search box to learn more about \"Body Mass Index: Care Instructions. \"  Current as of: September 23, 2020               Content Version: 12.8  © 2006-2021 [employer]. Care instructions adapted under license by [employer] (which disclaims liability or warranty for this information). If you have questions about a medical condition or this instruction, always ask your healthcare professional. [place] [address] any warranty or liability for your use of this information. Medicare Part B Preventive Services Limitations Recommendation Scheduled   Bone Mass Measurement  (age 72 & older, biennial) Requires diagnosis related to osteoporosis or estrogen deficiency.  Biennial benefit unless patient has history of long-term glucocorticoid tx or baseline is needed because initial test was by other method This should be done at age 72 and again if on osteoporosis medication at 2-3 year intervals. Up to date   Cardiovascular Screening Blood Tests (every 5 years)  Total cholesterol, HDL, Triglycerides Order as a panel if possible We should check your cholesterol panel at least once every 5 years. Up to date   Colorectal Cancer Screening  -Fecal occult blood test (annual)  -Flexible sigmoidoscopy (5y)  -Screening colonoscopy (10y)  -Barium Enema  Due per your Gastroenterologist's recommendations. Up to date   Counseling to Prevent Tobacco Use (up to 8 sessions per year)  - Counseling greater than 3 and up to 10 minutes  - Counseling greater than 10 minutes Patients must be asymptomatic of tobacco-related conditions to receive as preventive service Continue with smoking cessation Not applicable   Diabetes Screening Tests (at least every 3 years, Medicare covers annually or at 6-month intervals for prediabetic patients)    Fasting blood sugar (FBS) or glucose tolerance test (GTT) Patient must be diagnosed with one of the following:  -Hypertension, Dyslipidemia, obesity, previous impaired FBS or GTT  Or any two of the following: overweight, FH of diabetes, age ? 72, history of gestational diabetes, birth of baby weighing more than 9 pounds Should be done yearly Up to date   Diabetes Self-Management Training (DSMT) (no USPSTF recommendation) Requires referral by treating physician for patient with diabetes or renal disease. 10 hours of initial DSMT session of no less than 30 minutes each in a continuous 12-month period. 2 hours of follow-up DSMT in subsequent years. Not applicable Not applicable   Glaucoma Screening (no USPSTF recommendation) Diabetes mellitus, family history, , age 48 or over,  American, age 72 or over Continue with annual eye exams.  Up to date   Human Immunodeficiency Virus (HIV) Screening (annually for increased risk patients)  HIV-1 and HIV-2 by EIA, NINOSKA, rapid antibody test, or oral mucosa transudate Patient must be at increased risk for HIV infection per USPSTF guidelines or pregnant. Tests covered annually for patients at increased risk. Pregnant patients may receive up to 3 test during pregnancy. Not applicable Not applicable   Medical Nutrition Therapy (MNT) (for diabetes or renal disease not recommended schedule) Requires referral by treating physician for patient with diabetes or renal disease. Can be provided in same year as diabetes self-management training (DSMT), and CMS recommends medical nutrition therapy take place after DSMT. Up to 3 hours for initial year and 2 hours in subsequent years. Not applicable Not applicable   Shingles Vaccination A shingles vaccine is also recommended once in a lifetime after age 61 Vaccination recommended for shingles vaccination. Up to date   Seasonal Influenza Vaccination (annually)  Continue with yearly \"flu\" shot annually Up to date   Pneumococcal Vaccination (once after 65)  Please receive this vaccination at age 72. Up to date   Hepatitis B Vaccinations (if medium/high risk) Medium/high risk factors:  End-stage renal disease,  Hemophiliacs who received Factor VIII or IX concentrates, Clients of institutions for the mentally retarded, Persons who live in the same house as a HepB virus carrier, Homosexual men, Illicit injectable drug abusers. Not applicable Not applicable   Screening Mammography (biennial age 54-69) Annually (age 36 or over) You need a mammogram yearly to screen for breast cancer. Up to date   Screening Pap Tests and Pelvic Examination (up to age 79 and after 79 if unknown history or abnormal study last 10 years) Every 24 months except high risk You need no Pap smear at this time. Not warranted at this time.    Ultrasound Screening for Abdominal Aortic Aneurysm (AAA) (once) Patient must be referred through IPPE and not have had a screening for abdominal aortic aneurysm before under Medicare. Limited to patients who meet one of the following criteria:  - Men who are 73-68 years old and have smoked more than 100 cigarettes in their lifetime.  -Anyone with a FH of AAA  -Anyone recommended for screening by USPSTF Not applicable Not applicable       Medicare Wellness Visit, Female     The best way to live healthy is to have a lifestyle where you eat a well-balanced diet, exercise regularly, limit alcohol use, and quit all forms of tobacco/nicotine, if applicable. Regular preventive services are another way to keep healthy. Preventive services (vaccines, screening tests, monitoring & exams) can help personalize your care plan, which helps you manage your own care. Screening tests can find health problems at the earliest stages, when they are easiest to treat. [facility] follows the current, evidence-based guidelines published by the [facility] States Eliel Posada ([facility]) when recommending preventive services for our patients. Because we follow these guidelines, sometimes recommendations change over time as research supports it. (For example, mammograms used to be recommended annually. Even though Medicare will still pay for an annual mammogram, the newer guidelines recommend a mammogram every two years for women of average risk). Of course, you and your doctor may decide to screen more often for some diseases, based on your risk and your co-morbidities (chronic disease you are already diagnosed with). Preventive services for you include:  - Medicare offers their members a free annual wellness visit, which is time for you and your primary care provider to discuss and plan for your preventive service needs. Take advantage of this benefit every year!  -All adults over the age of 72 should receive the recommended pneumonia vaccines. Current USPSTF guidelines recommend a series of two vaccines for the best pneumonia protection. -All adults should have a flu vaccine yearly and a tetanus vaccine every 10 years.   -All adults age 48 and older should receive the shingles vaccines (series of two vaccines). -All adults age 38-68 who are overweight should have a diabetes screening test once every three years.   -All adults born between [birth year] and 1965 should be screened once for Hepatitis C.  -Other screening tests and preventive services for persons with diabetes include: an eye exam to screen for diabetic retinopathy, a kidney function test, a foot exam, and stricter control over your cholesterol.   -Cardiovascular screening for adults with routine risk involves an electrocardiogram (ECG) at intervals determined by your doctor.   -Colorectal cancer screenings should be done for adults age 54-65 with no increased risk factors for colorectal cancer. There are a number of acceptable methods of screening for this type of cancer. Each test has its own benefits and drawbacks. Discuss with your doctor what is most appropriate for you during your annual wellness visit. The different tests include: colonoscopy (considered the best screening method), a fecal occult blood test, a fecal DNA test, and sigmoidoscopy.    -A bone mass density test is recommended when a woman turns 65 to screen for osteoporosis. This test is only recommended one time, as a screening. Some providers will use this same test as a disease monitoring tool if you already have osteoporosis. -Breast cancer screenings are recommended every other year for women of normal risk, age 54-69.  -Cervical cancer screenings for women over age 72 are only recommended with certain risk factors.      Here is a list of your current Health Maintenance items (your personalized list of preventive services) with a due date:  Health Maintenance Due   Topic Date Due    Annual Well Visit  Never done

## 2021-04-09 NOTE — PROGRESS NOTES
[facility] OF [facility]:  4/9/2021, MRN: [id number]      Castillo Draper is a 76 y.o. female and presents to clinic for Follow-up and Hypertension    Subjective:   Pt is a 65 female with h/o HTN, GERD with Green's esophagus (followed by ), DJD, lumbar post laminectomy syndrome s/p spinal cord stimulator surgery (followed by Aki Osborne), cervical spinal stenosis, prediabetes (resolved), asthma (followed by Gilmar Hendricks, Pulmonology), HLD, lichens sclerosus, chronic sinusitis, vitamin B12 deficiency, and rheumatoid arthritis (Followed by ), vitamin D deficiency. 1.  Hypertension: Her BP is 128/85 today. She is on losartan. +H/o prediabetes. 2.  Hyperlipidemia: She is not on medication given her history of high cholesterol. 3. RA: Her pain is 2 and 10 today mostly along: her hands. She continues to take folic acid and we will check today. Last apt with Rheumatology: last month. She was placed on orencia infusions +MTX for better RA control. Her right wrist is swollen. 4. ORTEGA: She has a history of transaminitis, from hepatic steatosis per imaging studies. ETOH: none. Wt Readings from Last 3 Encounters:   04/09/21 188 lb (85.3 kg)   01/05/21 188 lb 9.6 oz (85.5 kg)   03/12/20 180 lb 12.8 oz (82 kg)      5. Asthma/Allergic Rhinitis: She is using albuterol on avg: none since her last apt. Asymptomatic.+Singulair. +Allergic rhinitis. She has itchy eyes, sinus HA, and drainage. Sx resolve when she is not outside. Using saline lavage, antihistamine rx, and nasacort. Followed by Gilmar Hendricks and .        Patient Active Problem List    Diagnosis Date Noted    High risk medication use 01/05/2021    Arthritis of knee 02/03/2020    Adjacent segment disease C3/4 w/deg changes, poss stenosis, CT '18 42/88/4031    Lichen sclerosus et atrophicus 05/17/2018    Green esophagus 11/16/2017    Rheumatoid arthritis involving multiple sites 02/15/2017    Arthritis, lumbar spine 04/22/2016  Moderate persistent asthma without complication 39/15/7741    Lumbar post-laminectomy syndrome 12/10/2015    S/P cervical spinal fusion 08/13/2014    Cervical stenosis of spine 08/11/2014    GERD (gastroesophageal reflux disease) 08/11/2014    Hx MRSA infection 08/11/2014    Impaired glucose tolerance 06/30/2013    Hypertriglyceridemia 12/16/2012    DNS (deviated nasal septum)     HTN (hypertension) 06/28/2010    Iron deficiency anemia 06/28/2010    Vitamin B12 deficiency 06/28/2010    Vitamin D deficiency 06/28/2010       Current Outpatient Medications   Medication Sig Dispense Refill    naphazoline HCl/pheniramine (EYE ALLERGY RELIEF OP) Apply  to eye.  cyanocobalamin 1,000 mcg tablet take 1 tablet by mouth once daily 90 Tab 3    albuterol (PROVENTIL HFA, VENTOLIN HFA, PROAIR HFA) 90 mcg/actuation inhaler Take 2 Puffs by inhalation every six (6) hours as needed for Shortness of Breath or Wheezing. 1 Inhaler 3    clobetasoL (TEMOVATE) 0.05 % ointment Apply  to affected area two (2) days a week. 45 g 3    losartan (COZAAR) 50 mg tablet TAKE 1 TABLET DAILY 90 Tab 3    methotrexate, PF, 25 mg/mL injection every seven (7) days. Indications: rheumatoid arthritis      ferrous sulfate 325 mg (65 mg iron) tablet take 1 tablet by mouth once daily BEFORE BREAKFAST 90 Tab 1    multivit-min/iron/folic/lutein (CENTRUM SILVER WOMEN PO) Take 1 Tab by mouth daily.  krill/om-3/dha/epa/phospho/ast (MAXIMUM RED KRILL OMEGA-3 PO) Take 1 Caplet by mouth daily.  ascorbic acid, vitamin C, (VITAMIN C) 500 mg tablet Take 1 Tab by mouth daily. 90 Tab 3    ESTRACE 0.01 % (0.1 mg/gram) vaginal cream Place 1 gm in the vagina twice weekly 42.5 g 2    cholecalciferol, vitamin D3, (VITAMIN D3 PO) Take 1 Tab by mouth daily.  folic acid (FOLVITE) 1 mg tablet take 1 tablet by mouth once daily  0    acetaminophen (TYLENOL) 325 mg tablet Take 325 mg by mouth every four (4) hours as needed for Pain.       triamcinolone (NASACORT AQ) 55 mcg nasal inhaler 2 Sprays by Both Nostrils route daily. Indications: PERENNIAL ALLERGIC RHINITIS      Dexlansoprazole (DEXILANT) 60 mg CpDM Take 1 Cap by mouth Daily (before dinner).  calcium carbonate (TUMS) 200 mg calcium (500 mg) Chew Take 1 Tab by mouth four (4) times daily.  Cetirizine (ZYRTEC) 10 mg Cap Take 10 mg by mouth daily.  montelukast (SINGULAIR) 10 mg tablet Take 10 mg by mouth daily.  allergy injection by SubCUTAneous route every thirty (30) days.  budesonide-formoterol (SYMBICORT) 160-4.5 mcg/Actuation HFA inhaler Take 2 Puffs by inhalation two (2) times a day.       amoxicillin (AMOXIL) 500 mg capsule 4 tabs by mouth 1 hour prior to appointment 4 Cap 3       Allergies   Allergen Reactions    Adhesive Tape-Silicones Other (comments)     Patient stated iv tape and tegaderm ok    Other Plant, Animal, Environmental Unable to Consolidated Doc     MOLD    Sulfasalazine Other (comments)     Could not taste anything, lightheadedness    Tape [Adhesive] Other (comments)     Blisters, use paper tape only  Patient states tegaderm and paper tape are okay    Chlorhexidine Towelette Itching and Other (comments)     \"stinging for a couple of hours\"    itching       Past Medical History:   Diagnosis Date    Adjacent segment disease C3/4 w/deg changes, poss stenosis, CT '18 6/22/2018    Allergic rhinitis     Anemia 2008    intermittant since then    Asthma     Back pain     Green's esophagus with esophagitis     Cervical radiculopathy     Chronic sinusitis 5/15/2012    DNS (deviated nasal septum)     Empyema     Foraminal stenosis of cervical region     C4-C5    GERD (gastroesophageal reflux disease)     Dr Jasen Fragoso    Hx MRSA infection 8/11/2014    Hypertension     Hypertriglyceridemia     Insulin resistance 4/9/2012    Left knee pain     Lichen planus     Menopause     Age 51    MRSA (methicillin resistant Staphylococcus aureus) infection 2008    left lung    Nausea & vomiting     Restless leg syndrome     Rheumatoid arthritis ([address]     Spinal cord stimulator status 2016    Spinal stenosis of cervical region     C4-C5 and C5-C6    TMJ syndrome     Wears glasses     and contacts       Past Surgical History:   Procedure Laterality Date    HX CERVICAL FUSION  08/13/14    HX HEENT      TMJ surgery    HX LUMBAR LAMINECTOMY  Nov 1995    [facility]    HX LUMBAR LAMINECTOMY  Feb 1997    [facility] Grandchild  1/2015    bunion removal from right foot    HX OTHER SURGICAL  2007    thoracentesis    HX OTHER SURGICAL  2008    chest tube    HX OTHER SURGICAL  1997    TMJ surgery    HX OTHER SURGICAL  2012    sinus surg 2012-Dr Neil Ferrer.  HX OTHER SURGICAL  2016    spinal cord stimulator place for lumbar neuritis, good benefit    HX ROTATOR CUFF REPAIR Right 01/2019    HX TUBAL LIGATION      SD ANESTH,SURGERY OF SHOULDER  01/31/2019    SD COLONOSCOPY FLX DX W/COLLJ SPEC WHEN PFRMD  6-18-08    normal/duntemann       Family History   Problem Relation Age of Onset    Hypertension Mother     Diabetes Mother    [facility] Arthritis-rheumatoid Sister     Other Sister         Lupus    Cancer Sister 35        [place], uterus    Diabetes Sister     Other Brother         myocarditis    Heart Attack Brother     Heart Disease Brother     Heart Surgery Brother     Coronary Artery Disease Father     Hypertension Father     Lung Disease Father     Cancer Sister 48        [place], breast    Breast Cancer Sister         Estrogen based    Diabetes Brother     Asthma Brother     Stroke Maternal Grandmother     Cancer Daughter        Social History     Tobacco Use    Smoking status: Never Smoker    Smokeless tobacco: Never Used   Substance Use Topics    Alcohol use: No     Alcohol/week: 0.0 standard drinks     Frequency: Never     Comment: 0-6 per year       ROS   Review of Systems   Constitutional: Negative for chills and fever.    HENT: Negative for ear pain and sore throat. Eyes: Negative for blurred vision and pain. Respiratory: Negative for shortness of breath. Cardiovascular: Negative for chest pain. Gastrointestinal: Negative for abdominal pain, blood in stool and melena. Genitourinary: Negative for dysuria and hematuria. Musculoskeletal: Positive for joint pain. Negative for myalgias. Skin: Negative for rash. Neurological: Negative for tingling and focal weakness. Endo/Heme/Allergies: Positive for environmental allergies. Does not bruise/bleed easily. Psychiatric/Behavioral: Negative for substance abuse. Objective     Vitals:    04/09/21 1355 04/09/21 1402 04/09/21 1403   BP: (!) 137/90 (!) 140/77 128/85   Pulse: 82     Resp: 16     Temp: 97.2 °F (36.2 °C)     TempSrc: Temporal     SpO2: 96%     Weight: 188 lb (85.3 kg)     Height: 5' 10\" (1.778 m)     PainSc:   2     PainLoc: Head         Physical Exam  Vitals signs and nursing note reviewed. HENT:      Head: Normocephalic and atraumatic. Right Ear: External ear normal.      Left Ear: External ear normal.   Eyes:      General: No scleral icterus. Right eye: No discharge. Left eye: No discharge. Conjunctiva/sclera: Conjunctivae normal.   Neck:      Musculoskeletal: Neck supple. Cardiovascular:      Rate and Rhythm: Normal rate and regular rhythm. Heart sounds: Normal heart sounds. No murmur. No friction rub. No gallop. Pulmonary:      Effort: Pulmonary effort is normal. No respiratory distress. Breath sounds: Normal breath sounds. No wheezing or rales. Abdominal:      General: Bowel sounds are normal. There is no distension. Palpations: Abdomen is soft. There is no mass. Tenderness: There is no abdominal tenderness. There is no guarding or rebound. Musculoskeletal:         General: No swelling (BUE) or tenderness (BUE). Lymphadenopathy:      Cervical: No cervical adenopathy. Skin:     General: Skin is warm and dry.       Findings: No erythema. Neurological:      Mental Status: She is alert and oriented to person, place, and time. Motor: No abnormal muscle tone. Gait: Gait is intact. Gait normal.   Psychiatric:         Mood and Affect: Mood and affect normal.         LABS   Data Review:   Lab Results   Component Value Date/Time    WBC 5.7 01/20/2020 07:19 AM    HGB 13.3 01/20/2020 07:19 AM    HCT 41.6 01/20/2020 07:19 AM    PLATELET 767 21/79/6233 07:19 AM    MCV 95.2 01/20/2020 07:19 AM       Lab Results   Component Value Date/Time    Sodium 145 09/28/2020 08:31 AM    Potassium 5.0 09/28/2020 08:31 AM    Chloride 112 (H) 09/28/2020 08:31 AM    CO2 27 09/28/2020 08:31 AM    Anion gap 6 09/28/2020 08:31 AM    Glucose 77 09/28/2020 08:31 AM    BUN 16 09/28/2020 08:31 AM    Creatinine 0.70 09/28/2020 08:31 AM    BUN/Creatinine ratio 23 (H) 09/28/2020 08:31 AM    GFR est AA >60 09/28/2020 08:31 AM    GFR est non-AA >60 09/28/2020 08:31 AM    Calcium 9.2 09/28/2020 08:31 AM       Lab Results   Component Value Date/Time    Cholesterol, total 180 09/28/2020 08:31 AM    HDL Cholesterol 49 09/28/2020 08:31 AM    LDL, calculated 110 (H) 09/28/2020 08:31 AM    VLDL, calculated 21 09/28/2020 08:31 AM    Triglyceride 105 09/28/2020 08:31 AM    CHOL/HDL Ratio 3.7 09/28/2020 08:31 AM       Lab Results   Component Value Date/Time    Hemoglobin A1c 5.4 09/28/2020 08:31 AM       Assessment/Plan:   1. HTN: Stable. -We will check labs in 5 to 6 months.  -Continue with Rx as prescribed. 2. RA: Stable. -Continue with Rx as prescribed. Medrol dose pack ordered prn.   -We will check labs in 5 to 6 months.  -Continue to follow Rheumatology for continued medication recommendations/treatment. 3.  Hyperlipidemia:  -I encouraged her to reduce her weight by exercising regularly and maintain a heart healthy diet. Checking labs in 5 to 6 months. 4.  Asthma: Stable. +Allergic rhinitis.  -Continue with albuterol as needed.   - Medrol dose pack prescribed prn  - C/w rx as prescribed. 5.  Transaminitis/ORTEGA:  -Heart healthy diet and weight loss discussed as mentioned above. Health Maintenance Due   Topic Date Due    COVID-19 Vaccine (1) Never done    Medicare Yearly Exam  Never done     Lab review: labs are reviewed in the EHR and ordered as mentioned above. I have discussed the diagnosis with the patient and the intended plan as seen in the above orders. The patient has received an after-visit summary and questions were answered concerning future plans. I have discussed medication side effects and warnings with the patient as well. I have reviewed the plan of care with the patient, accepted their input and they are in agreement with the treatment goals. All questions were answered. The patient understands the plan of care. Handouts provided today with above information. Pt instructed if symptoms worsen to call the office or report to the ED for continued care. Greater than 50% of the visit time was spent in counseling and/or coordination of care. Voice recognition was used to generate this report, which may have resulted in some phonetic based errors in grammar and contents. Even though attempts were made to correct all the mistakes, some may have been missed, and remained in the body of the document.           Pratik Chavez MD

## 2021-04-12 NOTE — ACP (ADVANCE CARE PLANNING)
Advance Care Planning  Advance Care Planning (ACP) Provider Conversation     Date of ACP Conversation: 4/9/21  Persons included in Conversation:  patient    Authorized Decision Maker (if patient is incapable of making informed decisions): This person is:   Named in Advance Directive or Healthcare Power of           For Patients with Decision Making Capacity:   Values/Goals: Exploration of values, goals, and preferences if recovery is not expected, even with continued medical treatment in the event of:  Imminent death  Severe, permanent brain injury    Conversation Outcomes / Follow-Up Plan:   ACP complete - no further action today. She has no changes to made to her pre-existing advance directive.       Dr. Pippa Sheth  [facility], [address] [place] [address], [address] [place] [address] Str.  Phone: [phone]  Fax: [phone]

## 2021-04-21 LAB — CREATININE, EXTERNAL: 0.71

## 2021-05-07 ENCOUNTER — OFFICE VISIT (OUTPATIENT)
Dept: ORTHOPEDIC SURGERY | Age: 66
End: 2021-05-07
Payer: MEDICARE

## 2021-05-07 VITALS
OXYGEN SATURATION: 97 % | TEMPERATURE: 97.5 F | HEIGHT: 70 IN | BODY MASS INDEX: 26.92 KG/M2 | HEART RATE: 83 BPM | WEIGHT: 188 LBS

## 2021-05-07 DIAGNOSIS — G89.29 CHRONIC PAIN OF LEFT KNEE: ICD-10-CM

## 2021-05-07 DIAGNOSIS — M16.11 PRIMARY OSTEOARTHRITIS OF RIGHT HIP: Primary | ICD-10-CM

## 2021-05-07 DIAGNOSIS — M25.562 CHRONIC PAIN OF LEFT KNEE: ICD-10-CM

## 2021-05-07 PROCEDURE — G8399 PT W/DXA RESULTS DOCUMENT: HCPCS | Performed by: PHYSICIAN ASSISTANT

## 2021-05-07 PROCEDURE — G8427 DOCREV CUR MEDS BY ELIG CLIN: HCPCS | Performed by: PHYSICIAN ASSISTANT

## 2021-05-07 PROCEDURE — G8419 CALC BMI OUT NRM PARAM NOF/U: HCPCS | Performed by: PHYSICIAN ASSISTANT

## 2021-05-07 PROCEDURE — G8756 NO BP MEASURE DOC: HCPCS | Performed by: PHYSICIAN ASSISTANT

## 2021-05-07 PROCEDURE — 99214 OFFICE O/P EST MOD 30 MIN: CPT | Performed by: PHYSICIAN ASSISTANT

## 2021-05-07 PROCEDURE — 1090F PRES/ABSN URINE INCON ASSESS: CPT | Performed by: PHYSICIAN ASSISTANT

## 2021-05-07 PROCEDURE — 73502 X-RAY EXAM HIP UNI 2-3 VIEWS: CPT | Performed by: PHYSICIAN ASSISTANT

## 2021-05-07 PROCEDURE — 73564 X-RAY EXAM KNEE 4 OR MORE: CPT | Performed by: PHYSICIAN ASSISTANT

## 2021-05-07 PROCEDURE — 3017F COLORECTAL CA SCREEN DOC REV: CPT | Performed by: PHYSICIAN ASSISTANT

## 2021-05-07 PROCEDURE — G8432 DEP SCR NOT DOC, RNG: HCPCS | Performed by: PHYSICIAN ASSISTANT

## 2021-05-07 PROCEDURE — G8536 NO DOC ELDER MAL SCRN: HCPCS | Performed by: PHYSICIAN ASSISTANT

## 2021-05-07 PROCEDURE — G9899 SCRN MAM PERF RSLTS DOC: HCPCS | Performed by: PHYSICIAN ASSISTANT

## 2021-05-07 PROCEDURE — 1101F PT FALLS ASSESS-DOCD LE1/YR: CPT | Performed by: PHYSICIAN ASSISTANT

## 2021-05-07 RX ORDER — ABATACEPT 250 MG/15ML
INJECTION, POWDER, LYOPHILIZED, FOR SOLUTION INTRAVENOUS
COMMUNITY

## 2021-05-07 NOTE — PROGRESS NOTES
[address]  [phone]           Patient: Sherrell Osborne                MRN: [id number]       SSN: [id number]  YOB: 1955        AGE: 76 y.o. SEX: female  Body mass index is 26.98 kg/m². PCP: Antonia Sever, MD  05/07/21      This office note has been dictated. REVIEW OF SYSTEMS:  Constitutional: Negative for fever, chills, weight loss and malaise/fatigue. HENT: Negative. Eyes: Negative. Respiratory: Negative. Cardiovascular: Negative. Gastrointestinal: No bowel incontinence or constipation. Genitourinary: No bladder incontinence or saddle anesthesia. Skin: Negative. Neurological: Negative. Endo/Heme/Allergies: Negative. Psychiatric/Behavioral: Negative. Musculoskeletal: As per HPI above.      Past Medical History:   Diagnosis Date    Adjacent segment disease C3/4 w/deg changes, poss stenosis, CT '18 6/22/2018    Allergic rhinitis     Anemia 2008    intermittant since then    Asthma     Back pain     Green's esophagus with esophagitis     Cervical radiculopathy     Chronic sinusitis 5/15/2012    DNS (deviated nasal septum)     Empyema     Foraminal stenosis of cervical region     C4-C5    GERD (gastroesophageal reflux disease)     Dr Doris Santana    Hx MRSA infection 8/11/2014    Hypertension     Hypertriglyceridemia     Insulin resistance 4/9/2012    Left knee pain     Lichen planus     Menopause     Age 51    MRSA (methicillin resistant Staphylococcus aureus) infection 2008    left lung    Nausea & vomiting     Restless leg syndrome     Rheumatoid arthritis ([facility] [address]     Spinal cord stimulator status 2016    Spinal stenosis of cervical region     C4-C5 and C5-C6    TMJ syndrome     Wears glasses     and contacts         Current Outpatient Medications:     abatacept (Orencia, with maltose,) 250 mg injection, give 750mg, Disp: , Rfl:    naphazoline HCl/pheniramine (EYE ALLERGY RELIEF OP), Apply  to eye., Disp: , Rfl:     cyanocobalamin 1,000 mcg tablet, take 1 tablet by mouth once daily, Disp: 90 Tab, Rfl: 3    albuterol (PROVENTIL HFA, VENTOLIN HFA, PROAIR HFA) 90 mcg/actuation inhaler, Take 2 Puffs by inhalation every six (6) hours as needed for Shortness of Breath or Wheezing., Disp: 1 Inhaler, Rfl: 3    clobetasoL (TEMOVATE) 0.05 % ointment, Apply  to affected area two (2) days a week., Disp: 45 g, Rfl: 3    losartan (COZAAR) 50 mg tablet, TAKE 1 TABLET DAILY, Disp: 90 Tab, Rfl: 3    methotrexate, PF, 25 mg/mL injection, every seven (7) days. Indications: rheumatoid arthritis, Disp: , Rfl:     ferrous sulfate 325 mg (65 mg iron) tablet, take 1 tablet by mouth once daily BEFORE BREAKFAST, Disp: 90 Tab, Rfl: 1    multivit-min/iron/folic/lutein (CENTRUM SILVER WOMEN PO), Take 1 Tab by mouth daily. , Disp: , Rfl:     krill/om-3/dha/epa/phospho/ast (MAXIMUM RED KRILL OMEGA-3 PO), Take 1 Caplet by mouth daily. , Disp: , Rfl:     ascorbic acid, vitamin C, (VITAMIN C) 500 mg tablet, Take 1 Tab by mouth daily. , Disp: 90 Tab, Rfl: 3    ESTRACE 0.01 % (0.1 mg/gram) vaginal cream, Place 1 gm in the vagina twice weekly, Disp: 42.5 g, Rfl: 2    cholecalciferol, vitamin D3, (VITAMIN D3 PO), Take 1 Tab by mouth daily. , Disp: , Rfl:     folic acid (FOLVITE) 1 mg tablet, take 1 tablet by mouth once daily, Disp: , Rfl: 0    acetaminophen (TYLENOL) 325 mg tablet, Take 325 mg by mouth every four (4) hours as needed for Pain., Disp: , Rfl:     triamcinolone (NASACORT AQ) 55 mcg nasal inhaler, 2 Sprays by Both Nostrils route daily. Indications: PERENNIAL ALLERGIC RHINITIS, Disp: , Rfl:     Dexlansoprazole (DEXILANT) 60 mg CpDM, Take 1 Cap by mouth Daily (before dinner). , Disp: , Rfl:     calcium carbonate (TUMS) 200 mg calcium (500 mg) Chew, Take 1 Tab by mouth four (4) times daily. , Disp: , Rfl:     Cetirizine (ZYRTEC) 10 mg Cap, Take 10 mg by mouth daily., Disp: , Rfl:     montelukast (SINGULAIR) 10 mg tablet, Take 10 mg by mouth daily. , Disp: , Rfl:     allergy injection, by SubCUTAneous route every thirty (30) days. , Disp: , Rfl:     budesonide-formoterol (SYMBICORT) 160-4.5 mcg/Actuation HFA inhaler, Take 2 Puffs by inhalation two (2) times a day., Disp: , Rfl:     methylPREDNISolone (MEDROL DOSEPACK) 4 mg tablet, Take per package instructions, Disp: 1 Dose Pack, Rfl: 0    amoxicillin (AMOXIL) 500 mg capsule, 4 tabs by mouth 1 hour prior to appointment, Disp: 4 Cap, Rfl: 3    Allergies   Allergen Reactions    Adhesive Tape-Silicones Other (comments)     Patient stated iv tape and tegaderm ok    Other Plant, Animal, Environmental Unable to Consolidated Doc     MOLD    Sulfasalazine Other (comments)     Could not taste anything, lightheadedness    Tape [Adhesive] Other (comments)     Blisters, use paper tape only  Patient states tegaderm and paper tape are okay    Chlorhexidine Towelette Itching and Other (comments)     \"stinging for a couple of hours\"    itching       Social History     Socioeconomic History    Marital status:      Spouse name: Not on file    Number of children: Not on file    Years of education: Not on file    Highest education level: Not on file   Occupational History    Not on file   Social Needs    Financial resource strain: Not on file    Food insecurity     Worry: Not on file     Inability: Not on file   [employer] needs     Medical: Not on file     Non-medical: Not on file   Tobacco Use    Smoking status: Never Smoker    Smokeless tobacco: Never Used   Substance and Sexual Activity    Alcohol use: No     Alcohol/week: 0.0 standard drinks     Frequency: Never     Comment: 0-6 per year    Drug use: Yes     Types: Prescription, OTC    Sexual activity: Yes     Partners: Male     Comment: Menopausal since 2000   Lifestyle    Physical activity     Days per week: Not on file     Minutes per session: Not on file    Stress: Not on file   Relationships    Social connections     Talks on phone: Not on file     Gets together: Not on file     Attends [employer]al service: Not on file     Active member of club or organization: Not on file     Attends meetings of clubs or organizations: Not on file     Relationship status: Not on file    Intimate partner violence     Fear of current or ex partner: Not on file     Emotionally abused: Not on file     Physically abused: Not on file     Forced sexual activity: Not on file   Other Topics Concern    Not on file   Social History Narrative    Retired        Past Surgical History:   Procedure Laterality Date   Alvia Foot CERVICAL FUSION  08/13/14   Adams Johnson      TMJ surgery    HX LUMBAR LAMINECTOMY  Nov 1995    [facility]    HX LUMBAR LAMINECTOMY  Feb 1997    Pancho Friday 1/2015    bunion removal from right foot    HX OTHER SURGICAL  2007    thoracentesis    HX OTHER SURGICAL  2008    chest tube    HX OTHER SURGICAL  1997    TMJ surgery    HX OTHER SURGICAL  2012    sinus surg 2012-Dr Maday Napoles.  HX OTHER SURGICAL  2016    spinal cord stimulator place for lumbar neuritis, good benefit    HX ROTATOR CUFF REPAIR Right 01/2019    HX TUBAL LIGATION      IA ANESTH,SURGERY OF SHOULDER  01/31/2019    IA COLONOSCOPY FLX DX W/COLLJ SPEC WHEN PFRMD  6-18-08    normal/duntemann         Patient seen and evaluated today for her hips and knees. She had a right total knee replacement done approximate year and a half ago has done very well with it. She is very pleased with the results of the knee replacement. She does have known advanced arthritis of the right hip as well as her left knee. Her left knee has been symptomatic up until about 2 weeks ago when she started doing her water therapy again. It has improved. She does get a clunk in the knee at times. In regards to the hip she has groin discomfort on the right side. She has some discomfort with put on her shoes and socks. No night pain. No radiating pain down the lower extremity. No change in bowel bladder habits. Patient denies recent fevers, chills, chest pain, SOB, or injuries. No recent systemic changes noted. A 12-point review of systems is performed today. Pertinent positives are noted. All other systems reviewed and otherwise are negative. Physical exam: General: Alert and oriented x3, nad.  well-developed, well nourished. normal affect, AF. NC/AT, EOMI, neck supple, trachea midline, no JVD present. Breathing is non-labored. Examination of lower extremities reveals decreased range of motion each of the hips. Internal rotation does cause some discomfort on the right side. There is no pain to palpation trochanter bursa. Neck straight leg raise. Negative calf tenderness. Negative Homans. No signs of DVT present. The right knee reveals skin intact. The surgical wound nicely. There is no erythema, ecchymosis, warmth. There are no signs of infection or cellulitis present. She has full range of motion. Excellent stability. Patella tracks nicely without rubs or crepitus noted. The left knee reveals skin intact. There is no erythema, ecchymosis, warmth. There are no signs of infection or cellulitis present. There is discomfort to palpation of the lateral joint line as well as patellofemoral grinding crepitus anteriorly with range of motion activities noted. Radiographs pain office today 5/7/2021 at the [facility] location including AP pelvis, AP and crosstable lateral of the right hip does show fairly advanced osteoarthritis with near bone-on-bone eburnation. There is acetabular spurring noted. An AP, tunnel, lateral, skyline of each of the knees is also obtained showing on the right side to have a total knee components to be well fixed without evidence for loosening or fracture noted.   The left knee does confirm end-stage arthritis of bone-on-bone eburnation and valgus malalignment. Assessment: #1 right hip advanced osteoarthritis, #2 status post right total knee replacement doing well, #3 left knee end-stage osteoarthritis    Plan: At this point, we discussed treatment options. We will move forward with getting her set up for an intra-articular injection of the right hip. We discussed a co cortisone injection for the left knee today and she declines. We discussed surgical intervention regarding her right hip replacement as well as a left knee replacement. We will continue with conservative treatment and have her follow-up with us in 4 weeks after the intra-articular injection. She will call if any questions or concerns arise.           JR Red ESPINOZA, PA-C, ATC

## 2021-05-10 RX ORDER — FERROUS SULFATE TAB 325 MG (65 MG ELEMENTAL FE) 325 (65 FE) MG
TAB ORAL
Qty: 90 TAB | Refills: 1 | Status: SHIPPED | OUTPATIENT
Start: 2021-05-10 | End: 2021-12-06

## 2021-05-13 ENCOUNTER — HOSPITAL ENCOUNTER (OUTPATIENT)
Dept: GENERAL RADIOLOGY | Age: 66
Discharge: HOME | End: 2021-05-13
Attending: PHYSICIAN ASSISTANT
Payer: MEDICARE

## 2021-05-13 PROCEDURE — 77030018868 XR FLUORO GUIDE ASP/BX/INJ/LOC

## 2021-05-13 PROCEDURE — 74011000250 HC RX REV CODE- 250: Performed by: PHYSICIAN ASSISTANT

## 2021-05-13 PROCEDURE — 74011250636 HC RX REV CODE- 250/636: Performed by: PHYSICIAN ASSISTANT

## 2021-05-13 PROCEDURE — 74011000636 HC RX REV CODE- 636: Performed by: PHYSICIAN ASSISTANT

## 2021-05-13 RX ORDER — METHYLPREDNISOLONE ACETATE 40 MG/ML
40 INJECTION, SUSPENSION INTRA-ARTICULAR; INTRALESIONAL; INTRAMUSCULAR; SOFT TISSUE
Status: COMPLETED | OUTPATIENT
Start: 2021-05-13 | End: 2021-05-13

## 2021-05-13 RX ORDER — LIDOCAINE HYDROCHLORIDE 10 MG/ML
30 INJECTION, SOLUTION EPIDURAL; INFILTRATION; INTRACAUDAL; PERINEURAL
Status: COMPLETED | OUTPATIENT
Start: 2021-05-13 | End: 2021-05-13

## 2021-05-13 RX ADMIN — METHYLPREDNISOLONE ACETATE 40 MG: 40 INJECTION, SUSPENSION INTRA-ARTICULAR; INTRALESIONAL; INTRAMUSCULAR; SOFT TISSUE at 13:23

## 2021-05-13 RX ADMIN — IOPAMIDOL 5 ML: 408 INJECTION, SOLUTION INTRATHECAL at 13:22

## 2021-05-13 RX ADMIN — LIDOCAINE HYDROCHLORIDE 8 ML: 10 INJECTION, SOLUTION EPIDURAL; INFILTRATION; INTRACAUDAL; PERINEURAL at 13:22

## 2021-05-14 DIAGNOSIS — M16.11 PRIMARY OSTEOARTHRITIS OF RIGHT HIP: ICD-10-CM

## 2021-06-10 ENCOUNTER — OFFICE VISIT (OUTPATIENT)
Dept: ORTHOPEDIC SURGERY | Age: 66
End: 2021-06-10
Payer: MEDICARE

## 2021-06-10 VITALS
HEIGHT: 70 IN | BODY MASS INDEX: 26.83 KG/M2 | WEIGHT: 187.4 LBS | OXYGEN SATURATION: 99 % | HEART RATE: 78 BPM | TEMPERATURE: 97.3 F

## 2021-06-10 DIAGNOSIS — M17.12 ARTHRITIS OF LEFT KNEE: Primary | ICD-10-CM

## 2021-06-10 DIAGNOSIS — Z96.651 STATUS POST RIGHT KNEE REPLACEMENT: ICD-10-CM

## 2021-06-10 DIAGNOSIS — M16.11 PRIMARY OSTEOARTHRITIS OF RIGHT HIP: ICD-10-CM

## 2021-06-10 PROCEDURE — 1090F PRES/ABSN URINE INCON ASSESS: CPT | Performed by: PHYSICIAN ASSISTANT

## 2021-06-10 PROCEDURE — 1101F PT FALLS ASSESS-DOCD LE1/YR: CPT | Performed by: PHYSICIAN ASSISTANT

## 2021-06-10 PROCEDURE — 99214 OFFICE O/P EST MOD 30 MIN: CPT | Performed by: PHYSICIAN ASSISTANT

## 2021-06-10 PROCEDURE — 3017F COLORECTAL CA SCREEN DOC REV: CPT | Performed by: PHYSICIAN ASSISTANT

## 2021-06-10 PROCEDURE — 20611 DRAIN/INJ JOINT/BURSA W/US: CPT | Performed by: PHYSICIAN ASSISTANT

## 2021-06-10 PROCEDURE — G8427 DOCREV CUR MEDS BY ELIG CLIN: HCPCS | Performed by: PHYSICIAN ASSISTANT

## 2021-06-10 PROCEDURE — G8536 NO DOC ELDER MAL SCRN: HCPCS | Performed by: PHYSICIAN ASSISTANT

## 2021-06-10 PROCEDURE — G8399 PT W/DXA RESULTS DOCUMENT: HCPCS | Performed by: PHYSICIAN ASSISTANT

## 2021-06-10 PROCEDURE — G8419 CALC BMI OUT NRM PARAM NOF/U: HCPCS | Performed by: PHYSICIAN ASSISTANT

## 2021-06-10 PROCEDURE — G8432 DEP SCR NOT DOC, RNG: HCPCS | Performed by: PHYSICIAN ASSISTANT

## 2021-06-10 PROCEDURE — G8756 NO BP MEASURE DOC: HCPCS | Performed by: PHYSICIAN ASSISTANT

## 2021-06-10 PROCEDURE — G9899 SCRN MAM PERF RSLTS DOC: HCPCS | Performed by: PHYSICIAN ASSISTANT

## 2021-06-10 RX ORDER — BETAMETHASONE SODIUM PHOSPHATE AND BETAMETHASONE ACETATE 3; 3 MG/ML; MG/ML
6 INJECTION, SUSPENSION INTRA-ARTICULAR; INTRALESIONAL; INTRAMUSCULAR; SOFT TISSUE ONCE
Status: COMPLETED | OUTPATIENT
Start: 2021-06-10 | End: 2021-06-10

## 2021-06-10 RX ADMIN — BETAMETHASONE SODIUM PHOSPHATE AND BETAMETHASONE ACETATE 6 MG: 3; 3 INJECTION, SUSPENSION INTRA-ARTICULAR; INTRALESIONAL; INTRAMUSCULAR; SOFT TISSUE at 15:04

## 2021-06-10 NOTE — PROGRESS NOTES
[address] [place] [address]  [phone]           Patient: Susana Diallo                MRN: [id number]       SSN: [id number]  YOB: 1955        AGE: 76 y.o. SEX: female  Body mass index is 26.89 kg/m². PCP: Columba Duarte MD  06/10/21            REVIEW OF SYSTEMS:  Constitutional: Negative for fever, chills, weight loss and malaise/fatigue. HENT: Negative. Eyes: Negative. Respiratory: Negative. Cardiovascular: Negative. Gastrointestinal: No bowel incontinence or constipation. Genitourinary: No bladder incontinence or saddle anesthesia. Skin: Negative. Neurological: Negative. Endo/Heme/Allergies: Negative. Psychiatric/Behavioral: Negative. Musculoskeletal: As per HPI above.      Past Medical History:   Diagnosis Date    Adjacent segment disease C3/4 w/deg changes, poss stenosis, CT '18 6/22/2018    Allergic rhinitis     Anemia 2008    intermittant since then    Asthma     Back pain     Green's esophagus with esophagitis     Cervical radiculopathy     Chronic sinusitis 5/15/2012    DNS (deviated nasal septum)     Empyema     Foraminal stenosis of cervical region     C4-C5    GERD (gastroesophageal reflux disease)     Dr Aleja Maxwell    Hx MRSA infection 8/11/2014    Hypertension     Hypertriglyceridemia     Insulin resistance 4/9/2012    Left knee pain     Lichen planus     Menopause     Age 51    MRSA (methicillin resistant Staphylococcus aureus) infection 2008    left lung    Nausea & vomiting     Restless leg syndrome     Rheumatoid arthritis ([address]     Spinal cord stimulator status 2016    Spinal stenosis of cervical region     C4-C5 and C5-C6    TMJ syndrome     Wears glasses     and contacts         Current Outpatient Medications:     FeroSuL 325 mg (65 mg iron) tablet, take 1 tablet by mouth once daily before breakfast, Disp: 90 Tab, Rfl: 1    abatacept (Orencia, with maltose,) 250 mg injection, give 750mg, Disp: , Rfl:     naphazoline HCl/pheniramine (EYE ALLERGY RELIEF OP), Apply  to eye., Disp: , Rfl:     cyanocobalamin 1,000 mcg tablet, take 1 tablet by mouth once daily, Disp: 90 Tab, Rfl: 3    albuterol (PROVENTIL HFA, VENTOLIN HFA, PROAIR HFA) 90 mcg/actuation inhaler, Take 2 Puffs by inhalation every six (6) hours as needed for Shortness of Breath or Wheezing., Disp: 1 Inhaler, Rfl: 3    clobetasoL (TEMOVATE) 0.05 % ointment, Apply  to affected area two (2) days a week., Disp: 45 g, Rfl: 3    losartan (COZAAR) 50 mg tablet, TAKE 1 TABLET DAILY, Disp: 90 Tab, Rfl: 3    methotrexate, PF, 25 mg/mL injection, every seven (7) days. Indications: rheumatoid arthritis, Disp: , Rfl:     multivit-min/iron/folic/lutein (CENTRUM SILVER WOMEN PO), Take 1 Tab by mouth daily. , Disp: , Rfl:     krill/om-3/dha/epa/phospho/ast (MAXIMUM RED KRILL OMEGA-3 PO), Take 1 Caplet by mouth daily. , Disp: , Rfl:     ascorbic acid, vitamin C, (VITAMIN C) 500 mg tablet, Take 1 Tab by mouth daily. , Disp: 90 Tab, Rfl: 3    ESTRACE 0.01 % (0.1 mg/gram) vaginal cream, Place 1 gm in the vagina twice weekly, Disp: 42.5 g, Rfl: 2    cholecalciferol, vitamin D3, (VITAMIN D3 PO), Take 1 Tab by mouth daily. , Disp: , Rfl:     folic acid (FOLVITE) 1 mg tablet, take 1 tablet by mouth once daily, Disp: , Rfl: 0    acetaminophen (TYLENOL) 325 mg tablet, Take 325 mg by mouth every four (4) hours as needed for Pain., Disp: , Rfl:     triamcinolone (NASACORT AQ) 55 mcg nasal inhaler, 2 Sprays by Both Nostrils route daily. Indications: PERENNIAL ALLERGIC RHINITIS, Disp: , Rfl:     Dexlansoprazole (DEXILANT) 60 mg CpDM, Take 1 Cap by mouth Daily (before dinner). , Disp: , Rfl:     calcium carbonate (TUMS) 200 mg calcium (500 mg) Chew, Take 1 Tab by mouth four (4) times daily. , Disp: , Rfl:     Cetirizine (ZYRTEC) 10 mg Cap, Take 10 mg by mouth daily. , Disp: , Rfl:     montelukast (SINGULAIR) 10 mg tablet, Take 10 mg by mouth daily. , Disp: , Rfl:     allergy injection, by SubCUTAneous route every thirty (30) days. , Disp: , Rfl:     budesonide-formoterol (SYMBICORT) 160-4.5 mcg/Actuation HFA inhaler, Take 2 Puffs by inhalation two (2) times a day., Disp: , Rfl:     methylPREDNISolone (MEDROL DOSEPACK) 4 mg tablet, Take per package instructions (Patient not taking: Reported on 6/10/2021), Disp: 1 Dose Pack, Rfl: 0    amoxicillin (AMOXIL) 500 mg capsule, 4 tabs by mouth 1 hour prior to appointment (Patient not taking: Reported on 6/10/2021), Disp: 4 Cap, Rfl: 3    Allergies   Allergen Reactions    Adhesive Tape-Silicones Other (comments)     Patient stated iv tape and tegaderm ok    Other Plant, Animal, Environmental Unable to Consolidated Doc     MOLD    Sulfasalazine Other (comments)     Could not taste anything, lightheadedness    Tape [Adhesive] Other (comments)     Blisters, use paper tape only  Patient states tegaderm and paper tape are okay    Chlorhexidine Towelette Itching and Other (comments)     \"stinging for a couple of hours\"    itching       Social History     Socioeconomic History    Marital status:      Spouse name: Not on file    Number of children: Not on file    Years of education: Not on file    Highest education level: Not on file   Occupational History    Not on file   Tobacco Use    Smoking status: Never Smoker    Smokeless tobacco: Never Used   Substance and Sexual Activity    Alcohol use: No     Alcohol/week: 0.0 standard drinks     Comment: 0-6 per year    Drug use: Yes     Types: Prescription, OTC    Sexual activity: Yes     Partners: Male     Comment: Menopausal since 2000   Other Topics Concern    Not on file   Social History Narrative    Retired      Social Determinants of Health     Financial Resource Strain:     Difficulty of Paying Living Expenses:    Food Insecurity:     Worried About [address] [employer] in the Last Year:     [employer] in the Last Year:    Transportation Needs:     Lack of Transportation (Medical):  Lack of Transportation (Non-Medical):    Physical Activity:     Days of Exercise per Week:     Minutes of Exercise per Session:    Stress:     Feeling of Stress :    Social Connections:     Frequency of Communication with Friends and Family:     Frequency of Social Gatherings with Friends and Family:     Attends Religion Services:     Active Member of Clubs or Organizations:     Attends Club or Organization Meetings:     Marital Status:    Intimate Partner Violence:     Fear of Current or Ex-Partner:     Emotionally Abused:     Physically Abused:     Sexually Abused:        Past Surgical History:   Procedure Laterality Date    HX CERVICAL FUSION  08/13/14   Jason SOSA      TMJ surgery    HX LUMBAR LAMINECTOMY  Nov 1995    [facility]    HX LUMBAR LAMINECTOMY  Feb 1997    Rebekah Pink  1/2015    bunion removal from right foot    HX OTHER SURGICAL  2007    thoracentesis    HX OTHER SURGICAL  2008    chest tube    HX OTHER SURGICAL  1997    TMJ surgery    HX OTHER SURGICAL  2012    sinus surg 2012-Dr Leatha Arevalo.  HX OTHER SURGICAL  2016    spinal cord stimulator place for lumbar neuritis, good benefit    HX ROTATOR CUFF REPAIR Right 01/2019    HX TUBAL LIGATION      NM ANESTH,SURGERY OF SHOULDER  01/31/2019    NM COLONOSCOPY FLX DX W/COLLJ SPEC WHEN PFRMD  6-18-08    normal/duntemann     Patient seen and evaluated today for her right hip and her left knee. She does have known advancing arthritis of the right hip. She received an intra-articular injection which worked well for her. She is still quite active and does exercises in the pool. She has had a previous right knee replacement and done very well with it. She is very pleased with results of the knee replacement. The left knee is causing her quite a bit of discomfort. She does have feelings of instability with it.   She does have known advanced osteoarthritis. Last cortisone injection worked quite well for her. She is requesting repeat injection. Patient denies recent fevers, chills, chest pain, SOB, or injuries. No recent systemic changes noted. A 12-point review of systems is performed today. Pertinent positives are noted. All other systems reviewed and otherwise are negative. Physical exam: General: Alert and oriented x3, nad.  well-developed, well nourished. normal affect, AF. NC/AT, EOMI, neck supple, trachea midline, no JVD present. Breathing is non-labored. Examination of lower extremities reveals decreased range of motion each of the hips with forced motion causing some discomfort in the groin. There is very minimal.  She has negative straight leg raise. Negative calf tenderness. Negative Homans. No signs of DVT present. The right knee reveals skin intact. The surgical wound healed nicely. She has full range of motion. Excellent stability. Patella tracks nicely without rubs or crepitus noted. The left knee reveals skin intact. There is no erythema, ecchymosis, warmth. There are no signs of infection or cellulitis present. Valgus deformity present. Findings are consistent with advanced arthritis of the left knee with pain to palpation tricompartmentally and crepitus arising into compartment. Assessment: #1 right hip osteoarthritis, #2 right total knee replacement doing well, #3 left hip osteoarthritis, #4 left knee advanced osteoarthritis. Plan: At this point, we discussed treatment options. We will move forward with a cortisone injection for the left knee. After informed consent, under aseptic conditions, with US guided assitance, the left knee was prepped with betadine and a mxiture of 3ml 1% lidocaine and 6mg of celestone was injected without complications. The patient tolerated the injection well. The patient is instructed on post-injection care.   Surgical intervention was discussed and elected against at this time. She would like to discuss with her . The alternatives, risks, complications, expected outcome were discussed. She will let us know when she like to move forward with surgical intervention. Chart reviewed for the following:  IKathy PA-C, have reviewed the History, Physical and updated the Allergic reactions for Hilda Marquez? TIME OUT performed immediately prior to start of procedure:  Kathy PÉREZ PA-C, have performed the following reviews on Lewis Higgins prior to the start of the procedure:  ????????  * Patient was identified by name and date of birth   * Agreement on procedure being performed was verified  * Risks and Benefits explained to the patient  * Procedure site verified and marked as necessary  * Patient was positioned for comfort  * Consent was signed and verified    Time:3:03 PM    Body part: left knee, intra-articular    Medication & Dose: 3ml 1% lidocaine and 6mg celestone    Date of procedure: 06/10/21    Procedure performed by: Kathy Colorado PA-C    Provider assisted by: none    Patient assisted by: self    How tolerated by patient: tolerated the procedure well with no complications    Post Procedural Pain Scale: 1    Comments:   [email or web address] using a frequency of 10MHz with a 12L-RS transducer head was used to confirm needle placement.   Ultrasound images captured using [address] [employer] [address] Ultrasound machine and scanned into patient's chart       Will ANNY Olivo, ATC

## 2021-08-02 DIAGNOSIS — I10 ESSENTIAL HYPERTENSION WITH GOAL BLOOD PRESSURE LESS THAN 140/90: ICD-10-CM

## 2021-08-02 RX ORDER — LOSARTAN POTASSIUM 50 MG/1
TABLET ORAL
Qty: 90 TABLET | Refills: 3 | Status: SHIPPED | OUTPATIENT
Start: 2021-08-02 | End: 2022-05-18

## 2021-09-02 ENCOUNTER — OFFICE VISIT (OUTPATIENT)
Dept: ORTHOPEDIC SURGERY | Age: 66
End: 2021-09-02
Payer: MEDICARE

## 2021-09-02 VITALS
WEIGHT: 187 LBS | OXYGEN SATURATION: 95 % | BODY MASS INDEX: 26.77 KG/M2 | TEMPERATURE: 97.3 F | HEART RATE: 78 BPM | HEIGHT: 70 IN

## 2021-09-02 DIAGNOSIS — M25.551 RIGHT HIP PAIN: ICD-10-CM

## 2021-09-02 DIAGNOSIS — M16.11 PRIMARY OSTEOARTHRITIS OF RIGHT HIP: Primary | ICD-10-CM

## 2021-09-02 DIAGNOSIS — J45.40 MODERATE PERSISTENT ASTHMA WITHOUT COMPLICATION: ICD-10-CM

## 2021-09-02 PROCEDURE — G8510 SCR DEP NEG, NO PLAN REQD: HCPCS | Performed by: ORTHOPAEDIC SURGERY

## 2021-09-02 PROCEDURE — G8419 CALC BMI OUT NRM PARAM NOF/U: HCPCS | Performed by: ORTHOPAEDIC SURGERY

## 2021-09-02 PROCEDURE — G8427 DOCREV CUR MEDS BY ELIG CLIN: HCPCS | Performed by: ORTHOPAEDIC SURGERY

## 2021-09-02 PROCEDURE — 1090F PRES/ABSN URINE INCON ASSESS: CPT | Performed by: ORTHOPAEDIC SURGERY

## 2021-09-02 PROCEDURE — G8756 NO BP MEASURE DOC: HCPCS | Performed by: ORTHOPAEDIC SURGERY

## 2021-09-02 PROCEDURE — 3017F COLORECTAL CA SCREEN DOC REV: CPT | Performed by: ORTHOPAEDIC SURGERY

## 2021-09-02 PROCEDURE — G9899 SCRN MAM PERF RSLTS DOC: HCPCS | Performed by: ORTHOPAEDIC SURGERY

## 2021-09-02 PROCEDURE — G8399 PT W/DXA RESULTS DOCUMENT: HCPCS | Performed by: ORTHOPAEDIC SURGERY

## 2021-09-02 PROCEDURE — 99214 OFFICE O/P EST MOD 30 MIN: CPT | Performed by: ORTHOPAEDIC SURGERY

## 2021-09-02 PROCEDURE — G8536 NO DOC ELDER MAL SCRN: HCPCS | Performed by: ORTHOPAEDIC SURGERY

## 2021-09-02 PROCEDURE — 1101F PT FALLS ASSESS-DOCD LE1/YR: CPT | Performed by: ORTHOPAEDIC SURGERY

## 2021-09-02 NOTE — PROGRESS NOTES
Patient: Hieu Moser                MRN: [id number]       SSN: [id number]  YOB: 1955        AGE: 78 y.o. SEX: female  Body mass index is 26.83 kg/m². PCP: Mi Morrison MD  09/02/21    Ms. Nirmal Cleary returns evaluation of right hip pain she has known inflammatory arthritis and status post knee replacement on the right side done well advancing arthritis left knee but his right hip is really driving her crazy it hurts every day she like to have a direct anterior hip replacement and I think that be reasonable for her.     Denies fevers or chills and otherwise has been feeling well the right hip is really slowing her down less than 10-minute level walking tolerance night pain activities of daily living are quite restricted the examination today of the right hip is quite stiff which reproduces her groin discomfort left hip is rotating adequately in both feet warm and well-perfused minimal peripheral edema calf nontender    Previous x-rays confirm severe arthritis right hip    Otherwise a discussion regarding surgery it is recommended    Risk and benefits involving total hip replacement were described including but not limited to infection DVT pulmonary embolism anesthetic complications blood loss requiring transfusion dislocation leg length discrepancy limp fracture chronic pain bursitis    REVIEW OF SYSTEMS:      CON: negative  EYE: negative   ENT: negative  RESP: negative  GI:    negative   :  negative  MSK: Positive  A twelve point review of systems was completed, positives noted and all other systems were reviewed and are negative          Past Medical History:   Diagnosis Date    Adjacent segment disease C3/4 w/deg changes, poss stenosis, CT '18 6/22/2018    Allergic rhinitis     Anemia 2008    intermittant since then    Asthma     Back pain     Green's esophagus with esophagitis     Cervical radiculopathy     Chronic sinusitis 5/15/2012    DNS (deviated nasal septum)     Empyema     Foraminal stenosis of cervical region     C4-C5    GERD (gastroesophageal reflux disease)     Dr Sakshi Beckman Hx MRSA infection 8/11/2014    Hypertension     Hypertriglyceridemia     Insulin resistance 4/9/2012    Left knee pain     Lichen planus     Menopause     Age 51    MRSA (methicillin resistant Staphylococcus aureus) infection 2008    left lung    Nausea & vomiting     Restless leg syndrome     Rheumatoid arthritis ([address]     Spinal cord stimulator status 2016    Spinal stenosis of cervical region     C4-C5 and C5-C6    TMJ syndrome     Wears glasses     and contacts       Family History   Problem Relation Age of Onset    Hypertension Mother    Altagracia Latin Diabetes Mother    Altagracia Latin Arthritis-rheumatoid Sister     Other Sister         Lupus    Cancer Sister 35        CA, uterus    Diabetes Sister     Other Brother         myocarditis    Heart Attack Brother     Heart Disease Brother     Heart Surgery Brother     Coronary Artery Disease Father     Hypertension Father     Lung Disease Father     Cancer Sister 48        CA, breast    Breast Cancer Sister         Estrogen based    Diabetes Brother     Asthma Brother     Stroke Maternal Grandmother     Cancer Daughter        Current Outpatient Medications   Medication Sig Dispense Refill    losartan (COZAAR) 50 mg tablet TAKE 1 TABLET DAILY 90 Tablet 3    FeroSuL 325 mg (65 mg iron) tablet take 1 tablet by mouth once daily before breakfast 90 Tab 1    abatacept (Orencia, with maltose,) 250 mg injection give 750mg      naphazoline HCl/pheniramine (EYE ALLERGY RELIEF OP) Apply  to eye.       methylPREDNISolone (MEDROL DOSEPACK) 4 mg tablet Take per package instructions (Patient not taking: Reported on 6/10/2021) 1 Dose Pack 0    cyanocobalamin 1,000 mcg tablet take 1 tablet by mouth once daily 90 Tab 3    albuterol (PROVENTIL HFA, VENTOLIN HFA, PROAIR HFA) 90 mcg/actuation inhaler Take 2 Puffs by inhalation every six (6) hours as needed for Shortness of Breath or Wheezing. 1 Inhaler 3    clobetasoL (TEMOVATE) 0.05 % ointment Apply  to affected area two (2) days a week. 45 g 3    methotrexate, PF, 25 mg/mL injection every seven (7) days. Indications: rheumatoid arthritis      amoxicillin (AMOXIL) 500 mg capsule 4 tabs by mouth 1 hour prior to appointment (Patient not taking: Reported on 6/10/2021) 4 Cap 3    multivit-min/iron/folic/lutein (CENTRUM SILVER WOMEN PO) Take 1 Tab by mouth daily.  krill/om-3/dha/epa/phospho/ast (MAXIMUM RED KRILL OMEGA-3 PO) Take 1 Caplet by mouth daily.  ascorbic acid, vitamin C, (VITAMIN C) 500 mg tablet Take 1 Tab by mouth daily. 90 Tab 3    ESTRACE 0.01 % (0.1 mg/gram) vaginal cream Place 1 gm in the vagina twice weekly 42.5 g 2    cholecalciferol, vitamin D3, (VITAMIN D3 PO) Take 1 Tab by mouth daily.  folic acid (FOLVITE) 1 mg tablet take 1 tablet by mouth once daily  0    acetaminophen (TYLENOL) 325 mg tablet Take 325 mg by mouth every four (4) hours as needed for Pain.  triamcinolone (NASACORT AQ) 55 mcg nasal inhaler 2 Sprays by Both Nostrils route daily. Indications: PERENNIAL ALLERGIC RHINITIS      Dexlansoprazole (DEXILANT) 60 mg CpDM Take 1 Cap by mouth Daily (before dinner).  calcium carbonate (TUMS) 200 mg calcium (500 mg) Chew Take 1 Tab by mouth four (4) times daily.  Cetirizine (ZYRTEC) 10 mg Cap Take 10 mg by mouth daily.  montelukast (SINGULAIR) 10 mg tablet Take 10 mg by mouth daily.  allergy injection by SubCUTAneous route every thirty (30) days.  budesonide-formoterol (SYMBICORT) 160-4.5 mcg/Actuation HFA inhaler Take 2 Puffs by inhalation two (2) times a day.          Allergies   Allergen Reactions    Adhesive Tape-Silicones Other (comments)     Patient stated iv tape and tegaderm ok    Other Plant, Animal, Environmental Unable to Consolidated Doc     MOLD    Sulfasalazine Other (comments)     Could not taste anything, lightheadedness  Tape [Adhesive] Other (comments)     Blisters, use paper tape only  Patient states tegaderm and paper tape are okay    Chlorhexidine Towelette Itching and Other (comments)     \"stinging for a couple of hours\"    itching       Past Surgical History:   Procedure Laterality Date    HX CERVICAL FUSION  08/13/14    HX HEENT      TMJ surgery    HX LUMBAR LAMINECTOMY  Nov 1995    [address]    HX LUMBAR LAMINECTOMY  Feb 1997    [facility]  1/2015    bunion removal from right foot    HX OTHER SURGICAL  2007    thoracentesis    HX OTHER SURGICAL  2008    chest tube    HX OTHER SURGICAL  1997    TMJ surgery    HX OTHER SURGICAL  2012    sinus surg 2012-Dr Pippa Kim.  HX OTHER SURGICAL  2016    spinal cord stimulator place for lumbar neuritis, good benefit    HX ROTATOR CUFF REPAIR Right 01/2019    HX TUBAL LIGATION      GA ANESTH,SURGERY OF SHOULDER  01/31/2019    GA COLONOSCOPY FLX DX W/COLLJ SPEC WHEN PFRMD  6-18-08    normal/duntemann       Social History     Socioeconomic History    Marital status:      Spouse name: Not on file    Number of children: Not on file    Years of education: Not on file    Highest education level: Not on file   Occupational History    Not on file   Tobacco Use    Smoking status: Never Smoker    Smokeless tobacco: Never Used   Substance and Sexual Activity    Alcohol use: No     Alcohol/week: 0.0 standard drinks     Comment: 0-6 per year    Drug use: Yes     Types: Prescription, OTC    Sexual activity: Yes     Partners: Male     Comment: Menopausal since 2000   Other Topics Concern    Not on file   Social History Narrative    Retired      Social Determinants of Health     Financial Resource Strain:     Difficulty of Paying Living Expenses:    Food Insecurity:     Worried About [address] in the Last Year:    [address] [place] [address] in the Last Year:    Transportation Needs:     Lack of Transportation (Medical):      Lack of Transportation (Non-Medical):    Physical Activity:     Days of Exercise per Week:     Minutes of Exercise per Session:    Stress:     Feeling of Stress :    Social Connections:     Frequency of Communication with Friends and Family:     Frequency of Social Gatherings with Friends and Family:     Attends Yazdanism Services:     Active Member of Clubs or Organizations:     Attends Club or Organization Meetings:     Marital Status:    Intimate Partner Violence:     Fear of Current or Ex-Partner:     Emotionally Abused:     Physically Abused:     Sexually Abused:        Visit Vitals  Pulse 78   Temp 97.3 °F (36.3 °C) (Temporal)   Ht 5' 10\" (1.778 m)   Wt 84.8 kg (187 lb)   SpO2 95%   BMI 26.83 kg/m²         PHYSICAL EXAMINATION:  GENERAL: Alert and oriented x3, in no acute distress, well-developed, well-nourished, afebrile. HEART: No JVD. EYES: No scleral icterus   NECK: No significant lymphadenopathy   LUNGS: No respiratory compromise or indrawing  ABDOMEN: Soft, non-tender, non-distended. Note: This note was completed using voice recognition software. Any typographical/name errors or mistakes are unintentional.    Electronically signed by:  Celestine Renee MD

## 2021-09-16 ENCOUNTER — TRANSCRIBE ORDER (OUTPATIENT)
Dept: SCHEDULING | Age: 66
End: 2021-09-16

## 2021-09-16 DIAGNOSIS — Z12.31 VISIT FOR SCREENING MAMMOGRAM: Primary | ICD-10-CM

## 2021-09-23 ENCOUNTER — HOSPITAL ENCOUNTER (OUTPATIENT)
Dept: MAMMOGRAPHY | Age: 66
Discharge: HOME | End: 2021-09-23
Attending: INTERNAL MEDICINE
Payer: MEDICARE

## 2021-09-23 DIAGNOSIS — Z12.31 VISIT FOR SCREENING MAMMOGRAM: ICD-10-CM

## 2021-09-23 PROCEDURE — 77063 BREAST TOMOSYNTHESIS BI: CPT

## 2021-10-04 ENCOUNTER — APPOINTMENT (OUTPATIENT)
Dept: INTERNAL MEDICINE CLINIC | Age: 66
End: 2021-10-04

## 2021-10-04 ENCOUNTER — HOSPITAL ENCOUNTER (OUTPATIENT)
Dept: LAB | Age: 66
Discharge: HOME | End: 2021-10-04
Payer: MEDICARE

## 2021-10-04 DIAGNOSIS — E78.1 HYPERTRIGLYCERIDEMIA: ICD-10-CM

## 2021-10-04 DIAGNOSIS — M05.79 RHEUMATOID ARTHRITIS INVOLVING MULTIPLE SITES WITH POSITIVE RHEUMATOID FACTOR (HCC): ICD-10-CM

## 2021-10-04 DIAGNOSIS — I10 ESSENTIAL HYPERTENSION: ICD-10-CM

## 2021-10-04 DIAGNOSIS — R73.9 HYPERGLYCEMIA: ICD-10-CM

## 2021-10-04 LAB
ALBUMIN SERPL-MCNC: 3.9 G/DL (ref 3.4–5)
ALBUMIN/GLOB SERPL: 1.1 {RATIO} (ref 0.8–1.7)
ALP SERPL-CCNC: 97 U/L (ref 45–117)
ALT SERPL-CCNC: 25 U/L (ref 13–56)
ANION GAP SERPL CALC-SCNC: 5 MMOL/L (ref 3–18)
AST SERPL-CCNC: 14 U/L (ref 10–38)
BASOPHILS # BLD: 0 K/UL (ref 0–0.1)
BASOPHILS NFR BLD: 1 % (ref 0–2)
BILIRUB SERPL-MCNC: 0.5 MG/DL (ref 0.2–1)
BUN SERPL-MCNC: 12 MG/DL (ref 7–18)
BUN/CREAT SERPL: 16 (ref 12–20)
CALCIUM SERPL-MCNC: 9.4 MG/DL (ref 8.5–10.1)
CHLORIDE SERPL-SCNC: 109 MMOL/L (ref 100–111)
CHOLEST SERPL-MCNC: 175 MG/DL
CO2 SERPL-SCNC: 30 MMOL/L (ref 21–32)
CREAT SERPL-MCNC: 0.74 MG/DL (ref 0.6–1.3)
CREAT UR-MCNC: 82 MG/DL (ref 30–125)
DIFFERENTIAL METHOD BLD: ABNORMAL
EOSINOPHIL # BLD: 0.1 K/UL (ref 0–0.4)
EOSINOPHIL NFR BLD: 2 % (ref 0–5)
ERYTHROCYTE [DISTWIDTH] IN BLOOD BY AUTOMATED COUNT: 13.4 % (ref 11.6–14.5)
GLOBULIN SER CALC-MCNC: 3.4 G/DL (ref 2–4)
GLUCOSE SERPL-MCNC: 94 MG/DL (ref 74–99)
HBA1C MFR BLD: 5.3 % (ref 4.2–5.6)
HCT VFR BLD AUTO: 46.3 % (ref 35–45)
HDLC SERPL-MCNC: 45 MG/DL (ref 40–60)
HDLC SERPL: 3.9 {RATIO} (ref 0–5)
HGB BLD-MCNC: 14.3 G/DL (ref 12–16)
LDLC SERPL CALC-MCNC: 102.2 MG/DL (ref 0–100)
LIPID PROFILE,FLP: ABNORMAL
LYMPHOCYTES # BLD: 1.4 K/UL (ref 0.9–3.6)
LYMPHOCYTES NFR BLD: 22 % (ref 21–52)
MCH RBC QN AUTO: 30.4 PG (ref 24–34)
MCHC RBC AUTO-ENTMCNC: 30.9 G/DL (ref 31–37)
MCV RBC AUTO: 98.3 FL (ref 78–100)
MICROALBUMIN UR-MCNC: 0.72 MG/DL (ref 0–3)
MICROALBUMIN/CREAT UR-RTO: 9 MG/G (ref 0–30)
MONOCYTES # BLD: 0.5 K/UL (ref 0.05–1.2)
MONOCYTES NFR BLD: 8 % (ref 3–10)
NEUTS SEG # BLD: 4.3 K/UL (ref 1.8–8)
NEUTS SEG NFR BLD: 68 % (ref 40–73)
PLATELET # BLD AUTO: 250 K/UL (ref 135–420)
PMV BLD AUTO: 9.4 FL (ref 9.2–11.8)
POTASSIUM SERPL-SCNC: 4.7 MMOL/L (ref 3.5–5.5)
PROT SERPL-MCNC: 7.3 G/DL (ref 6.4–8.2)
RBC # BLD AUTO: 4.71 M/UL (ref 4.2–5.3)
SODIUM SERPL-SCNC: 144 MMOL/L (ref 136–145)
TRIGL SERPL-MCNC: 139 MG/DL (ref ?–150)
VLDLC SERPL CALC-MCNC: 27.8 MG/DL
WBC # BLD AUTO: 6.3 K/UL (ref 4.6–13.2)

## 2021-10-04 PROCEDURE — 85025 COMPLETE CBC W/AUTO DIFF WBC: CPT

## 2021-10-04 PROCEDURE — 80053 COMPREHEN METABOLIC PANEL: CPT

## 2021-10-04 PROCEDURE — 80061 LIPID PANEL: CPT

## 2021-10-04 PROCEDURE — 36415 COLL VENOUS BLD VENIPUNCTURE: CPT

## 2021-10-04 PROCEDURE — 83036 HEMOGLOBIN GLYCOSYLATED A1C: CPT

## 2021-10-04 PROCEDURE — 82043 UR ALBUMIN QUANTITATIVE: CPT

## 2021-10-05 NOTE — PROGRESS NOTES
I will let her know at her upcoming appointment then her labs show no microalbuminuria. Her A1c is normal at 5.3. Her total cholesterol is 175. Triglycerides are 139. HDL is 45. Her LDL is 102. Her CMP is unremarkable. Her CBC is not show any significant abnormalities.     Dr. Alissa Salinas  [facility], [address] [place] [address], [address] [place] [address] Str.  Phone: [phone]  Fax: [phone]

## 2021-10-08 NOTE — PROGRESS NOTES
Laury Lambert presents today for   Chief Complaint   [facility]   [facility]     10-4-21                Coordination of Care:  1. Have you been to the ER, urgent care clinic since your last visit? Hospitalized since your last visit? no    2. Have you seen or consulted any other health care providers outside of the [address] [place] [address] since your last visit? Include any pap smears or colon screening. Yes    Laury Lambert 1955 female who presents for routine immunizations. Patient denies any symptoms , reactions or allergies that would exclude them from being immunized today. Risks and adverse reactions were discussed and the VIS was given to them. All questions were addressed. Order placed for ppsv23,  per Verbal Order from  with read back. Patient was observed for 15 min post injection. There were no reactions observed.     Tamara Posada LPN

## 2021-10-11 ENCOUNTER — OFFICE VISIT (OUTPATIENT)
Dept: INTERNAL MEDICINE CLINIC | Age: 66
End: 2021-10-11
Payer: MEDICARE

## 2021-10-11 VITALS
RESPIRATION RATE: 16 BRPM | BODY MASS INDEX: 26.77 KG/M2 | OXYGEN SATURATION: 96 % | SYSTOLIC BLOOD PRESSURE: 122 MMHG | WEIGHT: 187 LBS | TEMPERATURE: 97.1 F | HEIGHT: 70 IN | DIASTOLIC BLOOD PRESSURE: 84 MMHG | HEART RATE: 81 BPM

## 2021-10-11 DIAGNOSIS — Z23 ENCOUNTER FOR IMMUNIZATION: ICD-10-CM

## 2021-10-11 DIAGNOSIS — M05.79 RHEUMATOID ARTHRITIS INVOLVING MULTIPLE SITES WITH POSITIVE RHEUMATOID FACTOR (HCC): ICD-10-CM

## 2021-10-11 DIAGNOSIS — J45.40 MODERATE PERSISTENT ASTHMA WITHOUT COMPLICATION: Primary | ICD-10-CM

## 2021-10-11 DIAGNOSIS — I10 PRIMARY HYPERTENSION: ICD-10-CM

## 2021-10-11 DIAGNOSIS — E78.5 HYPERLIPIDEMIA, UNSPECIFIED HYPERLIPIDEMIA TYPE: ICD-10-CM

## 2021-10-11 DIAGNOSIS — R73.02 IMPAIRED GLUCOSE TOLERANCE: ICD-10-CM

## 2021-10-11 PROCEDURE — 99214 OFFICE O/P EST MOD 30 MIN: CPT | Performed by: INTERNAL MEDICINE

## 2021-10-11 PROCEDURE — 1090F PRES/ABSN URINE INCON ASSESS: CPT | Performed by: INTERNAL MEDICINE

## 2021-10-11 PROCEDURE — G8752 SYS BP LESS 140: HCPCS | Performed by: INTERNAL MEDICINE

## 2021-10-11 PROCEDURE — G8754 DIAS BP LESS 90: HCPCS | Performed by: INTERNAL MEDICINE

## 2021-10-11 PROCEDURE — G8432 DEP SCR NOT DOC, RNG: HCPCS | Performed by: INTERNAL MEDICINE

## 2021-10-11 PROCEDURE — 3017F COLORECTAL CA SCREEN DOC REV: CPT | Performed by: INTERNAL MEDICINE

## 2021-10-11 PROCEDURE — G0463 HOSPITAL OUTPT CLINIC VISIT: HCPCS | Performed by: INTERNAL MEDICINE

## 2021-10-11 PROCEDURE — 1101F PT FALLS ASSESS-DOCD LE1/YR: CPT | Performed by: INTERNAL MEDICINE

## 2021-10-11 PROCEDURE — G8536 NO DOC ELDER MAL SCRN: HCPCS | Performed by: INTERNAL MEDICINE

## 2021-10-11 PROCEDURE — G8427 DOCREV CUR MEDS BY ELIG CLIN: HCPCS | Performed by: INTERNAL MEDICINE

## 2021-10-11 PROCEDURE — 90732 PPSV23 VACC 2 YRS+ SUBQ/IM: CPT | Performed by: INTERNAL MEDICINE

## 2021-10-11 PROCEDURE — G8399 PT W/DXA RESULTS DOCUMENT: HCPCS | Performed by: INTERNAL MEDICINE

## 2021-10-11 PROCEDURE — G9899 SCRN MAM PERF RSLTS DOC: HCPCS | Performed by: INTERNAL MEDICINE

## 2021-10-11 PROCEDURE — G8419 CALC BMI OUT NRM PARAM NOF/U: HCPCS | Performed by: INTERNAL MEDICINE

## 2021-10-11 NOTE — PATIENT INSTRUCTIONS
Vaccine Information Statement    Pneumococcal Polysaccharide Vaccine (PPSV23): What You Need to Know    Many Vaccine Information Statements are available in French and other languages. See www.immunize.org/vis  Hojas de información sobre vacunas están disponibles en español y en muchos otros idiomas. Visite www.immunize.org/vis    1. Why get vaccinated? Pneumococcal polysaccharide vaccine (PPSV23) can prevent pneumococcal disease. Pneumococcal disease refers to any illness caused by pneumococcal bacteria. These bacteria can cause many types of illnesses, including pneumonia, which is an infection of the lungs. Pneumococcal bacteria are one of the most common causes of pneumonia. Besides pneumonia, pneumococcal bacteria can also cause:   Ear infections   Sinus infections   Meningitis (infection of the tissue covering the brain and spinal cord)   Bacteremia (bloodstream infection)    Anyone can get pneumococcal disease, but children under 3years of age, people with certain medical conditions, adults 72 years or older, and cigarette smokers are at the highest risk. Most pneumococcal infections are mild. However, some can result in long-term problems, such as brain damage or hearing loss. Meningitis, bacteremia, and pneumonia caused by pneumococcal disease can be fatal.     2. PPSV23     PPSV23 protects against 23 types of bacteria that cause pneumococcal disease. PPSV23 is recommended for:   All adults 72 years or older,   Anyone 2 years or older with certain medical conditions that can lead to an increased risk for pneumococcal disease. Most people need only one dose of PPSV23. A second dose of PPSV23, and another type of pneumococcal vaccine called PCV13, are recommended for certain high-risk groups. Your health care provider can give you more information.     People 65 years or older should get a dose of PPSV23 even if they have already gotten one or more doses of the vaccine before they turned 72.    3. Talk with your health care provider    Tell your vaccine provider if the person getting the vaccine:   Has had an allergic reaction after a previous dose of PPSV23, or has any severe, life-threatening allergies. In some cases, your health care provider may decide to postpone PPSV23 vaccination to a future visit. People with minor illnesses, such as a cold, may be vaccinated. People who are moderately or severely ill should usually wait until they recover before getting PPSV23. Your health care provider can give you more information. 4. Risks of a vaccine reaction     Redness or pain where the shot is given, feeling tired, fever, or muscle aches can happen after PPSV23. People sometimes faint after medical procedures, including vaccination. Tell your provider if you feel dizzy or have vision changes or ringing in the ears. As with any medicine, there is a very remote chance of a vaccine causing a severe allergic reaction, other serious injury, or death. 5. What if there is a serious problem? An allergic reaction could occur after the vaccinated person leaves the clinic. If you see signs of a severe allergic reaction (hives, swelling of the face and throat, difficulty breathing, a fast heartbeat, dizziness, or weakness), call 9-1-1 and get the person to the nearest hospital.    For other signs that concern you, call your health care provider. Adverse reactions should be reported to the Vaccine Adverse Event Reporting System (VAERS). Your health care provider will usually file this report, or you can do it yourself. Visit the VAERS website at www.vaers. hhs.gov or call 1-850.277.9223. VAERS is only for reporting reactions, and VAERS staff do not give medical advice. 6. How can I learn more?  Ask your health care provider.  Call your local or state health department.    Contact the Centers for Disease Control and Prevention (CDC):  - Call 3-850.761.2719 (9-6922-351-WYN-INFO) or  - Visit CDCs website at www.cdc.gov/vaccines    Vaccine Information Statement   PPSV23   10/30/2019    [facility] and [facility] for Disease Control and Prevention    Office Use Only         Eating Healthy Foods: Care Instructions  Your Care Instructions     Eating healthy foods can help lower your risk for disease. Healthy food gives you energy and keeps your heart strong, your brain active, your muscles working, and your bones strong. A healthy diet includes a variety of foods from the basic food groups: grains, vegetables, fruits, milk and milk products, and meat and beans. Some people may eat more of their favorite foods from only one food group and, as a result, miss getting the nutrients they need. So, it is important to pay attention not only to what you eat but also to what you are missing from your diet. You can eat a healthy, balanced diet by making a few small changes. Follow-up care is a key part of your treatment and safety. Be sure to make and go to all appointments, and call your doctor if you are having problems. It's also a good idea to know your test results and keep a list of the medicines you take. How can you care for yourself at home? Look at what you eat  · Keep a food diary for a week or two and record everything you eat or drink. Track the number of servings you eat from each food group. · For a balanced diet every day, eat a variety of:  ? 6 or more ounce-equivalents of grains, such as cereals, breads, crackers, rice, or pasta, every day. An ounce-equivalent is 1 slice of bread, 1 cup of ready-to-eat cereal, or ½ cup of cooked rice, cooked pasta, or cooked cereal.  ? 2½ cups of vegetables, especially:  § Dark-green vegetables such as broccoli and spinach. § Orange vegetables such as carrots and sweet potatoes. § Dry beans (such as carpenter and kidney beans) and peas (such as lentils). ? 2 cups of fresh, frozen, or canned fruit.  A small apple or 1 banana or orange equals 1 cup. ? 3 cups of nonfat or low-fat milk, yogurt, or other milk products. ? 5½ ounces of meat and beans, such as chicken, fish, lean meat, beans, nuts, and seeds. One egg, 1 tablespoon of peanut butter, ½ ounce nuts or seeds, or ¼ cup of cooked beans equals 1 ounce of meat. · Learn how to read food labels for serving sizes and ingredients. Fast-food and convenience-food meals often contain few or no fruits or vegetables. Make sure you eat some fruits and vegetables to make the meal more nutritious. · Look at your food diary. For each food group, add up what you have eaten and then divide the total by the number of days. This will give you an idea of how much you are eating from each food group. See if you can find some ways to change your diet to make it more healthy. Start small  · Do not try to make dramatic changes to your diet all at once. You might feel that you are missing out on your favorite foods and then be more likely to fail. · Start slowly, and gradually change your habits. Try some of the following:  ? Use whole wheat bread instead of white bread. ? Use nonfat or low-fat milk instead of whole milk. ? Eat brown rice instead of white rice, and eat whole wheat pasta instead of white-flour pasta. ? Try low-fat cheeses and low-fat yogurt. ? Add more fruits and vegetables to meals and have them for snacks. ? Add lettuce, tomato, cucumber, and onion to sandwiches. ? Add fruit to yogurt and cereal.  Enjoy food  · You can still eat your favorite foods. You just may need to eat less of them. If your favorite foods are high in fat, salt, and sugar, limit how often you eat them, but do not cut them out entirely. · Eat a wide variety of foods. Make healthy choices when eating out  · The type of restaurant you choose can help you make healthy choices. Even fast-food chains are now offering more low-fat or healthier choices on the menu.   · Choose smaller portions, or take half of your meal home. · When eating out, try:  ? A veggie pizza with a whole wheat crust or grilled chicken (instead of sausage or pepperoni). ? Pasta with roasted vegetables, grilled chicken, or marinara sauce instead of cream sauce. ? A vegetable wrap or grilled chicken wrap. ? Broiled or poached food instead of fried or breaded items. Make healthy choices easy  · Buy packaged, prewashed, ready-to-eat fresh vegetables and fruits, such as baby carrots, salad mixes, and chopped or shredded broccoli and cauliflower. · Buy packaged, presliced fruits, such as melon or pineapple. · Choose 100% fruit or vegetable juice instead of soda. Limit juice intake to 4 to 6 oz (½ to ¾ cup) a day. · Blend low-fat yogurt, fruit juice, and canned or frozen fruit to make a smoothie for breakfast or a snack. Where can you learn more? Go to http://www.garcia.com/  Enter T756 in the search box to learn more about \"Eating Healthy Foods: Care Instructions. \"  Current as of: December 17, 2020               Content Version: 13.0  © 2006-2021 [employer]. Care instructions adapted under license by [employer] (which disclaims liability or warranty for this information). If you have questions about a medical condition or this instruction, always ask your healthcare professional. [place] [address] any warranty or liability for your use of this information.

## 2021-10-11 NOTE — PROGRESS NOTES
INTERNISTS [facility]:  10/11/2021, MRN: [id number]      Megha Mccain is a 78 y.o. female and presents to clinic for Follow-up and Labs (10-4-21)      Subjective:   Pt is a 66 female with h/o HTN, GERD with Green's esophagus (followed by ), DJD, lumbar post laminectomy syndrome s/p spinal cord stimulator surgery (followed by Nolan Cushing), cervical spinal stenosis, prediabetes (resolved), asthma (followed by , Pulmonology), HLD, lichens sclerosus, chronic sinusitis, vitamin B12 deficiency, and rheumatoid arthritis (Followed by ), vitamin D deficiency. 1. RA: Followed by . She is now positive for RF. She was RF negative for yrs. Colder weather triggers worsening sx. She has pain along her hands and hip jts. +Orencia. 2. HTN: BP is stable on losartan. 3. Prediabetes Hx: Her most recent A1C was WNL. 4.  HLD: Her most recent LDL was 102. Not on rx.      5. Asthma: On Singulair and symbicort. No albuterol since the spring. Followed by  and . She is up to date with her vaccinations.  Her symptoms are well controlled at this time.        Patient Active Problem List    Diagnosis Date Noted    High risk medication use 01/05/2021    Arthritis of knee 02/03/2020    Adjacent segment disease C3/4 w/deg changes, poss stenosis, CT '18 26/48/2982    Lichen sclerosus et atrophicus 05/17/2018    Green esophagus 11/16/2017    Rheumatoid arthritis involving multiple sites 02/15/2017    Arthritis, lumbar spine 04/22/2016    Moderate persistent asthma without complication 91/07/0608    Lumbar post-laminectomy syndrome 12/10/2015    S/P cervical spinal fusion 08/13/2014    Cervical stenosis of spine 08/11/2014    GERD (gastroesophageal reflux disease) 08/11/2014    Hx MRSA infection 08/11/2014    Impaired glucose tolerance 06/30/2013    Hypertriglyceridemia 12/16/2012    DNS (deviated nasal septum)     HTN (hypertension) 06/28/2010    Iron deficiency anemia 06/28/2010    Vitamin B12 deficiency 06/28/2010    Vitamin D deficiency 06/28/2010       Current Outpatient Medications   Medication Sig Dispense Refill    losartan (COZAAR) 50 mg tablet TAKE 1 TABLET DAILY 90 Tablet 3    FeroSuL 325 mg (65 mg iron) tablet take 1 tablet by mouth once daily before breakfast 90 Tab 1    abatacept (Orencia, with maltose,) 250 mg injection give 750mg      naphazoline HCl/pheniramine (EYE ALLERGY RELIEF OP) Apply  to eye.  cyanocobalamin 1,000 mcg tablet take 1 tablet by mouth once daily 90 Tab 3    albuterol (PROVENTIL HFA, VENTOLIN HFA, PROAIR HFA) 90 mcg/actuation inhaler Take 2 Puffs by inhalation every six (6) hours as needed for Shortness of Breath or Wheezing. 1 Inhaler 3    clobetasoL (TEMOVATE) 0.05 % ointment Apply  to affected area two (2) days a week. 45 g 3    multivit-min/iron/folic/lutein (CENTRUM SILVER WOMEN PO) Take 1 Tab by mouth daily.  krill/om-3/dha/epa/phospho/ast (MAXIMUM RED KRILL OMEGA-3 PO) Take 1 Caplet by mouth daily.  ascorbic acid, vitamin C, (VITAMIN C) 500 mg tablet Take 1 Tab by mouth daily. 90 Tab 3    ESTRACE 0.01 % (0.1 mg/gram) vaginal cream Place 1 gm in the vagina twice weekly 42.5 g 2    cholecalciferol, vitamin D3, (VITAMIN D3 PO) Take 1 Tab by mouth daily.  acetaminophen (TYLENOL) 325 mg tablet Take 325 mg by mouth every four (4) hours as needed for Pain.  triamcinolone (NASACORT AQ) 55 mcg nasal inhaler 2 Sprays by Both Nostrils route daily. Indications: PERENNIAL ALLERGIC RHINITIS      Dexlansoprazole (DEXILANT) 60 mg CpDM Take 1 Cap by mouth Daily (before dinner).  calcium carbonate (TUMS) 200 mg calcium (500 mg) Chew Take 1 Tab by mouth four (4) times daily.  Cetirizine (ZYRTEC) 10 mg Cap Take 10 mg by mouth daily.  montelukast (SINGULAIR) 10 mg tablet Take 10 mg by mouth daily.  allergy injection by SubCUTAneous route every thirty (30) days.       budesonide-formoterol (SYMBICORT) 160-4.5 mcg/Actuation HFA inhaler Take 2 Puffs by inhalation two (2) times a day.          Allergies   Allergen Reactions    Adhesive Tape-Silicones Other (comments)     Patient stated iv tape and tegaderm ok    Other Plant, Animal, Environmental Unable to Consolidated Doc     MOLD    Sulfasalazine Other (comments)     Could not taste anything, lightheadedness    Tape [Adhesive] Other (comments)     Blisters, use paper tape only  Patient states tegaderm and paper tape are okay    Mold Other (comments)    Chlorhexidine Towelette Itching and Other (comments)     \"stinging for a couple of hours\"    itching       Past Medical History:   Diagnosis Date    Adjacent segment disease C3/4 w/deg changes, poss stenosis, CT '18 6/22/2018    Allergic rhinitis     Anemia 2008    intermittant since then    Asthma     Back pain     Green's esophagus with esophagitis     Cervical radiculopathy     Chronic sinusitis 5/15/2012    DNS (deviated nasal septum)     Empyema     Foraminal stenosis of cervical region     C4-C5    GERD (gastroesophageal reflux disease)     Dr Chuck Edmonds Hx MRSA infection 8/11/2014    Hypertension     Hypertriglyceridemia     Insulin resistance 4/9/2012    Left knee pain     Lichen planus     Menopause     Age 51    MRSA (methicillin resistant Staphylococcus aureus) infection 2008    left lung    Nausea & vomiting     Restless leg syndrome     Rheumatoid arthritis ([address]     Spinal cord stimulator status 2016    Spinal stenosis of cervical region     C4-C5 and C5-C6    TMJ syndrome     Wears glasses     and contacts       Past Surgical History:   Procedure Laterality Date    HX CERVICAL FUSION  08/13/14    HX HEENT      TMJ surgery    HX LUMBAR LAMINECTOMY  Nov 1995    [facility]    HX LUMBAR LAMINECTOMY  Feb 1997    Monisha Spoon  1/2015    bunion removal from right foot    HX OTHER SURGICAL  2007    thoracentesis    HX OTHER SURGICAL  2008 chest tube    HX OTHER SURGICAL  1997    TMJ surgery    HX OTHER SURGICAL  2012    sinus surg 2012-Dr Ford Sandy.  HX OTHER SURGICAL  2016    spinal cord stimulator place for lumbar neuritis, good benefit    HX ROTATOR CUFF REPAIR Right 01/2019    HX TUBAL LIGATION      CT ANESTH,SURGERY OF SHOULDER  01/31/2019    CT COLONOSCOPY FLX DX W/COLLJ SPEC WHEN PFRMD  6-18-08    normal/duntemann       Family History   Problem Relation Age of Onset    Hypertension Mother     Diabetes Mother    [facility] Arthritis-rheumatoid Sister     Other Sister         Lupus    Cancer Sister 35        CA, uterus    Diabetes Sister     Other Brother         myocarditis    Heart Attack Brother     Heart Disease Brother     Heart Surgery Brother     Coronary Artery Disease Father     Hypertension Father     Lung Disease Father     Cancer Sister 48        CA, breast    Breast Cancer Sister         Estrogen based    Diabetes Brother     Asthma Brother     Stroke Maternal Grandmother     Cancer Daughter        Social History     Tobacco Use    Smoking status: Never Smoker    Smokeless tobacco: Never Used   Substance Use Topics    Alcohol use: No     Alcohol/week: 0.0 standard drinks     Comment: 0-6 per year       ROS   Review of Systems   Constitutional: Negative for chills and fever. HENT: Negative for ear pain and sore throat. Eyes: Positive for blurred vision (she just saw her eye doctor and was told she has cataracts). Negative for pain. Respiratory: Negative for cough and shortness of breath. Cardiovascular: Negative for chest pain. Gastrointestinal: Negative for abdominal pain, blood in stool and melena. Genitourinary: Negative for dysuria and hematuria. Musculoskeletal: Positive for joint pain (hip pain = scheduled for surgery in January). Negative for myalgias. Skin: Negative for rash. Neurological: Negative for headaches. Endo/Heme/Allergies: Does not bruise/bleed easily. Psychiatric/Behavioral: Negative for substance abuse. Objective     Vitals:    10/11/21 0932   BP: 122/84   Pulse: 81   Resp: 16   Temp: 97.1 °F (36.2 °C)   TempSrc: Temporal   SpO2: 96%   Weight: 187 lb (84.8 kg)   Height: 5' 10\" (1.778 m)   PainSc:   2   PainLoc: Generalized       Physical Exam  Vitals and nursing note reviewed. HENT:      Head: Normocephalic and atraumatic. Right Ear: External ear normal.      Left Ear: External ear normal.   Eyes:      General: No scleral icterus. Right eye: No discharge. Left eye: No discharge. Conjunctiva/sclera: Conjunctivae normal.   Cardiovascular:      Rate and Rhythm: Normal rate and regular rhythm. Heart sounds: Normal heart sounds. No murmur heard. No friction rub. No gallop. Pulmonary:      Effort: Pulmonary effort is normal. No respiratory distress. Breath sounds: Normal breath sounds. No wheezing or rales. Abdominal:      General: Bowel sounds are normal. There is no distension. Palpations: Abdomen is soft. There is no mass. Tenderness: There is no abdominal tenderness. There is no guarding or rebound. Musculoskeletal:         General: No swelling (BUE) or tenderness (BUE). Cervical back: Neck supple. Comments: Slight ulnar deviation b/l. +Prominent MCP jts   Lymphadenopathy:      Cervical: No cervical adenopathy. Skin:     General: Skin is warm and dry. Findings: No erythema or rash. Neurological:      Mental Status: She is alert. Mental status is at baseline. Motor: No abnormal muscle tone.    Psychiatric:         Mood and Affect: Mood normal.         LABS   Data Review:   Lab Results   Component Value Date/Time    WBC 6.3 10/04/2021 07:49 AM    HGB 14.3 10/04/2021 07:49 AM    HCT 46.3 (H) 10/04/2021 07:49 AM    PLATELET 090 41/79/4038 07:49 AM    MCV 98.3 10/04/2021 07:49 AM       Lab Results   Component Value Date/Time    Sodium 144 10/04/2021 07:49 AM    Potassium 4.7 10/04/2021 07:49 AM    Chloride 109 10/04/2021 07:49 AM    CO2 30 10/04/2021 07:49 AM    Anion gap 5 10/04/2021 07:49 AM    Glucose 94 10/04/2021 07:49 AM    BUN 12 10/04/2021 07:49 AM    Creatinine 0.74 10/04/2021 07:49 AM    BUN/Creatinine ratio 16 10/04/2021 07:49 AM    GFR est AA >60 10/04/2021 07:49 AM    GFR est non-AA >60 10/04/2021 07:49 AM    Calcium 9.4 10/04/2021 07:49 AM       Lab Results   Component Value Date/Time    Cholesterol, total 175 10/04/2021 07:49 AM    HDL Cholesterol 45 10/04/2021 07:49 AM    LDL, calculated 102.2 (H) 10/04/2021 07:49 AM    VLDL, calculated 27.8 10/04/2021 07:49 AM    Triglyceride 139 10/04/2021 07:49 AM    CHOL/HDL Ratio 3.9 10/04/2021 07:49 AM       Lab Results   Component Value Date/Time    Hemoglobin A1c 5.3 10/04/2021 07:49 AM       Assessment/Plan:   1. Health Maintenance:  -The pneumococcal 23 vaccine was administered today.  -I instructed her to follow-up with me in January for her preop clearance appointment. ORDERS:  - PNEUMOCOCCAL POLYSACCHARIDE VACCINE, 23-VALENT, ADULT OR IMMUNOSUPPRESSED PT DOSE,  - ADMIN PNEUMOCOCCAL VACCINE    2. Hypertension: Stable. -Continue medication as prescribed. 3.  RA: Stable. -Continue with medication per Dr. Shruthi Peterson. 4.  Hyperlipidemia: Her LDL is mildly elevated at 102. +H/o Prediabetes -her most recent A1c is normal  -I encouraged her to maintain a heart healthy diet. 5.  Asthma: Stable. -Continue with inhaler therapy and Singulair.  -Continue to follow-up with Dr. Jessica Hutchinson and       There are no preventive care reminders to display for this patient. Lab review: labs are reviewed in the EHR    I have discussed the diagnosis with the patient and the intended plan as seen in the above orders. The patient has received an after-visit summary and questions were answered concerning future plans. I have discussed medication side effects and warnings with the patient as well.  I have reviewed the plan of care with the patient, accepted their input and they are in agreement with the treatment goals. All questions were answered. The patient understands the plan of care. Handouts provided today with above information. Pt instructed if symptoms worsen to call the office or report to the ED for continued care. Greater than 50% of the visit time was spent in counseling and/or coordination of care. Voice recognition was used to generate this report, which may have resulted in some phonetic based errors in grammar and contents. Even though attempts were made to correct all the mistakes, some may have been missed, and remained in the body of the document.           Jeannine Lombard, MD

## 2021-11-09 NOTE — PROGRESS NOTES
1. Have you been to the ER, urgent care clinic since your last visit? Hospitalized since your last visit? NO    2. Have you seen or consulted any other health care providers outside of the [address] [place] [address] since your last visit? Include any pap smears or colon screening.  NO Patient Seen in: [facility] AND [facility] Emergency Department      History   No chief complaint on file.     Stated Complaint: heartburn, arm pain    Subjective:   HPI    Pt is 43 yo F who p/w burning in esophagus and dull pain in left shoulder that woke her fr Result Value    Glucose 119 (*)     Potassium 3.3 (*)     Calcium, Total 8.2 (*)     All other components within normal limits   TROPONIN I - Normal   CBC WITH DIFFERENTIAL WITH PLATELET    Narrative:      The following orders were created for panel o unspecified whether esophagitis present  (primary encounter diagnosis)     Disposition:  Discharge  11/9/2021  4:18 am    Follow-up:  MD Lukas Paez [phone]    Schedule an appointment as soon as pos

## 2021-11-19 ENCOUNTER — DOCUMENTATION ONLY (OUTPATIENT)
Dept: PULMONOLOGY | Age: 66
End: 2021-11-19

## 2021-12-06 RX ORDER — FERROUS SULFATE TAB 325 MG (65 MG ELEMENTAL FE) 325 (65 FE) MG
TAB ORAL
Qty: 90 TABLET | Refills: 1 | Status: SHIPPED | OUTPATIENT
Start: 2021-12-06 | End: 2022-06-01

## 2021-12-29 DIAGNOSIS — M16.11 PRIMARY OSTEOARTHRITIS OF RIGHT HIP: ICD-10-CM

## 2021-12-29 DIAGNOSIS — Z01.818 PREOPERATIVE TESTING: Primary | ICD-10-CM

## 2022-01-11 ENCOUNTER — HOSPITAL ENCOUNTER (OUTPATIENT)
Dept: PREADMISSION TESTING | Age: 67
Discharge: HOME | End: 2022-01-11
Payer: MEDICARE

## 2022-01-11 ENCOUNTER — HOSPITAL ENCOUNTER (OUTPATIENT)
Dept: GENERAL RADIOLOGY | Age: 67
Discharge: HOME | End: 2022-01-11
Payer: MEDICARE

## 2022-01-11 ENCOUNTER — TELEPHONE (OUTPATIENT)
Dept: INTERNAL MEDICINE CLINIC | Age: 67
End: 2022-01-11

## 2022-01-11 DIAGNOSIS — Z01.818 PREOPERATIVE TESTING: ICD-10-CM

## 2022-01-11 DIAGNOSIS — M16.11 PRIMARY OSTEOARTHRITIS OF RIGHT HIP: ICD-10-CM

## 2022-01-11 LAB
ABO + RH BLD: NORMAL
ALBUMIN SERPL-MCNC: 4.1 G/DL (ref 3.4–5)
ANION GAP SERPL CALC-SCNC: 4 MMOL/L (ref 3–18)
APPEARANCE UR: CLEAR
APTT PPP: 31.2 SEC (ref 23–36.4)
ATRIAL RATE: 73 BPM
BACTERIA URNS QL MICRO: NEGATIVE /HPF
BASOPHILS # BLD: 0.1 K/UL (ref 0–0.1)
BASOPHILS NFR BLD: 1 % (ref 0–2)
BILIRUB UR QL: NEGATIVE
BLOOD GROUP ANTIBODIES SERPL: NORMAL
BUN SERPL-MCNC: 14 MG/DL (ref 7–18)
BUN/CREAT SERPL: 18 (ref 12–20)
CALCIUM SERPL-MCNC: 9.5 MG/DL (ref 8.5–10.1)
CALCULATED P AXIS, ECG09: 53 DEGREES
CALCULATED R AXIS, ECG10: -3 DEGREES
CALCULATED T AXIS, ECG11: 45 DEGREES
CHLORIDE SERPL-SCNC: 110 MMOL/L (ref 100–111)
CO2 SERPL-SCNC: 28 MMOL/L (ref 21–32)
COLOR UR: YELLOW
CREAT SERPL-MCNC: 0.76 MG/DL (ref 0.6–1.3)
DIAGNOSIS, 93000: NORMAL
DIFFERENTIAL METHOD BLD: ABNORMAL
EOSINOPHIL # BLD: 0.2 K/UL (ref 0–0.4)
EOSINOPHIL NFR BLD: 2 % (ref 0–5)
EPITH CASTS URNS QL MICRO: NORMAL /LPF (ref 0–5)
ERYTHROCYTE [DISTWIDTH] IN BLOOD BY AUTOMATED COUNT: 13.1 % (ref 11.6–14.5)
EST. AVERAGE GLUCOSE BLD GHB EST-MCNC: 111 MG/DL
GLUCOSE SERPL-MCNC: 106 MG/DL (ref 74–99)
GLUCOSE UR STRIP.AUTO-MCNC: NEGATIVE MG/DL
HBA1C MFR BLD: 5.5 % (ref 4.2–5.6)
HCT VFR BLD AUTO: 46.3 % (ref 35–45)
HGB BLD-MCNC: 14.6 G/DL (ref 12–16)
HGB UR QL STRIP: NEGATIVE
IMM GRANULOCYTES # BLD AUTO: 0 K/UL (ref 0–0.04)
IMM GRANULOCYTES NFR BLD AUTO: 0 % (ref 0–0.5)
INR PPP: 1 (ref 0.8–1.2)
KETONES UR QL STRIP.AUTO: NEGATIVE MG/DL
LEUKOCYTE ESTERASE UR QL STRIP.AUTO: ABNORMAL
LYMPHOCYTES # BLD: 1.4 K/UL (ref 0.9–3.6)
LYMPHOCYTES NFR BLD: 19 % (ref 21–52)
MCH RBC QN AUTO: 29 PG (ref 24–34)
MCHC RBC AUTO-ENTMCNC: 31.5 G/DL (ref 31–37)
MCV RBC AUTO: 92 FL (ref 78–100)
MONOCYTES # BLD: 0.5 K/UL (ref 0.05–1.2)
MONOCYTES NFR BLD: 7 % (ref 3–10)
NEUTS SEG # BLD: 4.9 K/UL (ref 1.8–8)
NEUTS SEG NFR BLD: 70 % (ref 40–73)
NITRITE UR QL STRIP.AUTO: NEGATIVE
NRBC # BLD: 0 K/UL (ref 0–0.01)
NRBC BLD-RTO: 0 PER 100 WBC
P-R INTERVAL, ECG05: 130 MS
PH UR STRIP: 6 [PH] (ref 5–8)
PLATELET # BLD AUTO: 265 K/UL (ref 135–420)
PMV BLD AUTO: 9.1 FL (ref 9.2–11.8)
POTASSIUM SERPL-SCNC: 4.3 MMOL/L (ref 3.5–5.5)
PROT UR STRIP-MCNC: NEGATIVE MG/DL
PROTHROMBIN TIME: 12.8 SEC (ref 11.5–15.2)
Q-T INTERVAL, ECG07: 406 MS
QRS DURATION, ECG06: 84 MS
QTC CALCULATION (BEZET), ECG08: 447 MS
RBC # BLD AUTO: 5.03 M/UL (ref 4.2–5.3)
RBC #/AREA URNS HPF: NORMAL /HPF (ref 0–5)
SODIUM SERPL-SCNC: 142 MMOL/L (ref 136–145)
SP GR UR REFRACTOMETRY: 1.01 (ref 1–1.03)
SPECIMEN EXP DATE BLD: NORMAL
UROBILINOGEN UR QL STRIP.AUTO: 0.2 EU/DL (ref 0.2–1)
VENTRICULAR RATE, ECG03: 73 BPM
WBC # BLD AUTO: 7 K/UL (ref 4.6–13.2)
WBC URNS QL MICRO: NORMAL /HPF (ref 0–4)

## 2022-01-11 PROCEDURE — 71046 X-RAY EXAM CHEST 2 VIEWS: CPT

## 2022-01-11 PROCEDURE — 80048 BASIC METABOLIC PNL TOTAL CA: CPT

## 2022-01-11 PROCEDURE — 81001 URINALYSIS AUTO W/SCOPE: CPT

## 2022-01-11 PROCEDURE — 83036 HEMOGLOBIN GLYCOSYLATED A1C: CPT

## 2022-01-11 PROCEDURE — 85025 COMPLETE CBC W/AUTO DIFF WBC: CPT

## 2022-01-11 PROCEDURE — 85730 THROMBOPLASTIN TIME PARTIAL: CPT

## 2022-01-11 PROCEDURE — 85610 PROTHROMBIN TIME: CPT

## 2022-01-11 PROCEDURE — 82040 ASSAY OF SERUM ALBUMIN: CPT

## 2022-01-11 PROCEDURE — 87147 CULTURE TYPE IMMUNOLOGIC: CPT

## 2022-01-11 PROCEDURE — 36415 COLL VENOUS BLD VENIPUNCTURE: CPT

## 2022-01-11 PROCEDURE — 87086 URINE CULTURE/COLONY COUNT: CPT

## 2022-01-11 PROCEDURE — 93005 ELECTROCARDIOGRAM TRACING: CPT

## 2022-01-11 PROCEDURE — 86900 BLOOD TYPING SEROLOGIC ABO: CPT

## 2022-01-11 NOTE — TELEPHONE ENCOUNTER
Pt had and EKG done today at [facility] and she has concerns. Said the EKG was ordered by Mr Andreea Leung office for upcoming hip replacement surgery. She is also making his office aware of these concerns. Stated she has had may EKG's over the years but never like this one.     1. The EKG was done with her sitting up, her legs hanging. 2. There were no leads placed on her chest at all. 3. One lead was placed on each fore arm. 4. The other 8 leads, 4 on each side were place between her bra band and the waist band of her pants. She is concerned that this EKG was not done properly and it could result in her having to see a cardiologist and her surgery being delayed.

## 2022-01-12 LAB
BACTERIA SPEC CULT: ABNORMAL
CC UR VC: ABNORMAL
SERVICE CMNT-IMP: ABNORMAL

## 2022-01-13 ENCOUNTER — OFFICE VISIT (OUTPATIENT)
Dept: PULMONOLOGY | Age: 67
End: 2022-01-13

## 2022-01-13 VITALS
HEART RATE: 80 BPM | BODY MASS INDEX: 27.49 KG/M2 | TEMPERATURE: 97.6 F | HEIGHT: 70 IN | OXYGEN SATURATION: 98 % | WEIGHT: 192 LBS | SYSTOLIC BLOOD PRESSURE: 150 MMHG | RESPIRATION RATE: 20 BRPM | DIASTOLIC BLOOD PRESSURE: 86 MMHG

## 2022-01-13 DIAGNOSIS — J34.2 DNS (DEVIATED NASAL SEPTUM): ICD-10-CM

## 2022-01-13 DIAGNOSIS — M05.79 RHEUMATOID ARTHRITIS INVOLVING MULTIPLE SITES WITH POSITIVE RHEUMATOID FACTOR (HCC): ICD-10-CM

## 2022-01-13 DIAGNOSIS — K21.9 GASTROESOPHAGEAL REFLUX DISEASE WITHOUT ESOPHAGITIS: Chronic | ICD-10-CM

## 2022-01-13 DIAGNOSIS — Z01.818 PRE-OPERATIVE CLEARANCE: Primary | ICD-10-CM

## 2022-01-13 DIAGNOSIS — J45.40 MODERATE PERSISTENT ASTHMA WITHOUT COMPLICATION: ICD-10-CM

## 2022-01-13 PROCEDURE — G8427 DOCREV CUR MEDS BY ELIG CLIN: HCPCS | Performed by: INTERNAL MEDICINE

## 2022-01-13 PROCEDURE — 99214 OFFICE O/P EST MOD 30 MIN: CPT | Performed by: INTERNAL MEDICINE

## 2022-01-13 PROCEDURE — G8754 DIAS BP LESS 90: HCPCS | Performed by: INTERNAL MEDICINE

## 2022-01-13 PROCEDURE — 1090F PRES/ABSN URINE INCON ASSESS: CPT | Performed by: INTERNAL MEDICINE

## 2022-01-13 PROCEDURE — 1101F PT FALLS ASSESS-DOCD LE1/YR: CPT | Performed by: INTERNAL MEDICINE

## 2022-01-13 PROCEDURE — G8432 DEP SCR NOT DOC, RNG: HCPCS | Performed by: INTERNAL MEDICINE

## 2022-01-13 PROCEDURE — G8753 SYS BP > OR = 140: HCPCS | Performed by: INTERNAL MEDICINE

## 2022-01-13 PROCEDURE — 3017F COLORECTAL CA SCREEN DOC REV: CPT | Performed by: INTERNAL MEDICINE

## 2022-01-13 PROCEDURE — G9899 SCRN MAM PERF RSLTS DOC: HCPCS | Performed by: INTERNAL MEDICINE

## 2022-01-13 PROCEDURE — G8419 CALC BMI OUT NRM PARAM NOF/U: HCPCS | Performed by: INTERNAL MEDICINE

## 2022-01-13 PROCEDURE — G8399 PT W/DXA RESULTS DOCUMENT: HCPCS | Performed by: INTERNAL MEDICINE

## 2022-01-13 PROCEDURE — 94060 EVALUATION OF WHEEZING: CPT | Performed by: INTERNAL MEDICINE

## 2022-01-13 PROCEDURE — G8536 NO DOC ELDER MAL SCRN: HCPCS | Performed by: INTERNAL MEDICINE

## 2022-01-13 RX ORDER — NITROFURANTOIN 25; 75 MG/1; MG/1
100 CAPSULE ORAL 2 TIMES DAILY
Qty: 10 CAPSULE | Refills: 0 | Status: SHIPPED | OUTPATIENT
Start: 2022-01-13 | End: 2022-03-04

## 2022-01-13 RX ORDER — NITROFURANTOIN 25; 75 MG/1; MG/1
100 CAPSULE ORAL 2 TIMES DAILY
Qty: 10 CAPSULE | Refills: 0 | Status: SHIPPED | OUTPATIENT
Start: 2022-01-13 | End: 2022-01-18 | Stop reason: SDUPTHER

## 2022-01-13 NOTE — LETTER
1/13/2022    Patient: Caroline Hennessy   YOB: 1955   Date of Visit: 1/13/2022     Juana Calvo, [address]  [address] [address]  Via In Basket    Dear Juana Calvo MD,      Thank you for referring Ms. Skyler Charles to [address] [place] [address] for evaluation. My notes for this consultation are attached. If you have questions, please do not hesitate to call me. I look forward to following your patient along with you.       Sincerely,    Michiel Buerger, MD

## 2022-01-13 NOTE — PATIENT INSTRUCTIONS
Learning About Using an [employer]  What is an incentive spirometer? An incentive spirometer is a handheld device that exercises your lungs and measures how much air you can breathe in. It tells you and your doctor how well your lungs are working. The spirometer can help you practice taking deep breaths. Deep breaths can help open your airways and prevent fluid or mucus from building up in your lungs, and make it easier for you to breathe. Using the device can help prevent serious lung infections like pneumonia, improve your breathing after you've had pneumonia or surgery, and keep your airways open and lungs active if you can't get out of bed. How do you use an incentive spirometer? When you use an incentive spirometer, you breathe in air through a tube that is connected to a large air column containing a piston or ball. As you breathe in, the piston or ball inside the column moves up. The height of the piston or ball shows how much air you breathed in. You may feel lightheaded when you breathe in deeply for this exercise. If you feel dizzy or feel like you're going to pass out, stop the exercise and rest.  Each time you do this exercise, keep track of your progress by writing down how high the piston or ball moves up the column. 1. Move the slider on the outside of the large column to the level that you want to reach or that your doctor recommended. 2. Sit or stand up straight, and hold the spirometer in front of you. Be sure to keep it level. 3. To start, breathe out normally. Then close your lips tightly around the mouthpiece. Make sure that you don't block the mouthpiece with your tongue. 4. Take a slow, deep breath. Breathe in as deeply as you can. As you breathe in, the piston or ball inside the large column will move up. 1. Try to move the piston or ball as high up as you can or to the level your doctor recommended.   2. When you can't breathe in anymore, hold your breath for 2 to 5 seconds. 5. Relax, take the mouthpiece out of your mouth, and breathe out normally. 6. Repeat steps 1 through 5 as many times as your doctor tells you to.  7. After you've taken the recommended number of breaths, try to cough a few times. This will help loosen any mucus that has built up in your lungs. It will make it easier for you to breathe. If you just had surgery on your belly or chest, hold a pillow over your cut (incision) when you cough. Follow-up care is a key part of your treatment and safety. Be sure to make and go to all appointments, and call your doctor if you are having problems. It's also a good idea to know your test results and keep a list of the medicines you take. Where can you learn more? Go to http://www.gray.com/  Enter B979 in the search box to learn more about \"Learning About Using an [employer]. \"  Current as of: July 6, 2021               Content Version: 13.0  © 2006-2021 Healthwise, Inc[employer]. Care instructions adapted under license by [employer] (which disclaims liability or warranty for this information). If you have questions about a medical condition or this instruction, always ask your healthcare professional. [address] any warranty or liability for your use of this information.

## 2022-01-13 NOTE — PROGRESS NOTES
Noman Randle presents today for   Chief Complaint   Patient presents with    Surgical Clearance       Is someone accompanying this pt? No    Is the patient using any DME equipment during OV? No    -DME Company NA    Depression Screening:  3 most recent PHQ Screens 9/2/2021   PHQ Not Done -   Little interest or pleasure in doing things Not at all   Feeling down, depressed, irritable, or hopeless Not at all   Total Score PHQ 2 0       Learning Assessment:  Learning Assessment 2/26/2020   PRIMARY LEARNER Patient   HIGHEST LEVEL OF EDUCATION - PRIMARY LEARNER  > 4 YEARS OF COLLEGE   BARRIERS PRIMARY LEARNER NONE   CO-LEARNER CAREGIVER No   PRIMARY LANGUAGE ENGLISH   LEARNER PREFERENCE PRIMARY DEMONSTRATION     -     -     -     -   ANSWERED BY patient     -   RELATIONSHIP SELF       Abuse Screening:  Abuse Screening Questionnaire 10/11/2021   Do you ever feel afraid of your partner? N   Are you in a relationship with someone who physically or mentally threatens you? N   Is it safe for you to go home? Y       Fall Risk  Fall Risk Assessment, last 12 mths 10/11/2021   Able to walk? Yes   Fall in past 12 months? 0   Do you feel unsteady? 0   Are you worried about falling 0   Number of falls in past 12 months -   Fall with injury? -         Coordination of Care:  1. Have you been to the ER, urgent care clinic since your last visit? Hospitalized since your last visit? No    2. Have you seen or consulted any other health care providers outside of the [address] [place] [address] since your last visit? Include any pap smears or colon screening.  Dr Callie Valentin

## 2022-01-13 NOTE — PROGRESS NOTES
[facility]   Pulmonary, Critical Care, and Sleep Medicine     Reason for Evaluation: Preop evaluation for upcoming hip surgery ,follow up asthma    22     She has done well since her last visit and has not had any exacerbations  Continues to use her Singulair, Symbicort-now generic form  Not needing much of rescue inhalers-in fact her current albuterol  in . Discussed need for new inhaler and prescription placed  On Dexilant for Green's esophagus  She has been on methotrexate -injectable for RA. But recently found to have elevated LFTs and is currently being transitioned to infusions  She states that she has done phenomenally well during the pandemic-masking at all times and getting 3 doses of moderna vaccine  Denies wheezing. Occasional sneezing. Feels better overall with no recent set backs. No cough, congestion or wheezing. She denied any hemoptysis, change in her appetite or weight. Has not needed any prednisone tapers. No Er visits  She is scheduled for hip replacement surgery and has completed all her preop evaluation-lab work and EKG. EKG was reported abnormal and she is very concerned since she noticed that lead placement could have been an issue. She is awaiting further instructions from her PCP and Dr. Fabrice Magallanes:  No known drug allergies.     Current Outpatient Medications:     FeroSuL 325 mg (65 mg iron) tablet, take 1 tablet by mouth once daily BEFORE BREAKFAST, Disp: 90 Tablet, Rfl: 1    losartan (COZAAR) 50 mg tablet, TAKE 1 TABLET DAILY, Disp: 90 Tablet, Rfl: 3    abatacept (Orencia, with maltose,) 250 mg injection, give 750mg, Disp: , Rfl:     naphazoline HCl/pheniramine (EYE ALLERGY RELIEF OP), Apply  to eye., Disp: , Rfl:     cyanocobalamin 1,000 mcg tablet, take 1 tablet by mouth once daily, Disp: 90 Tab, Rfl: 3    albuterol (PROVENTIL HFA, VENTOLIN HFA, PROAIR HFA) 90 mcg/actuation inhaler, Take 2 Puffs by inhalation every six (6) hours as needed for Shortness of Breath or Wheezing., Disp: 1 Inhaler, Rfl: 3    clobetasoL (TEMOVATE) 0.05 % ointment, Apply  to affected area two (2) days a week., Disp: 45 g, Rfl: 3    multivit-min/iron/folic/lutein (CENTRUM SILVER WOMEN PO), Take 1 Tab by mouth daily. , Disp: , Rfl:     krill/om-3/dha/epa/phospho/ast (MAXIMUM RED KRILL OMEGA-3 PO), Take 1 Caplet by mouth daily. , Disp: , Rfl:     ascorbic acid, vitamin C, (VITAMIN C) 500 mg tablet, Take 1 Tab by mouth daily. , Disp: 90 Tab, Rfl: 3    ESTRACE 0.01 % (0.1 mg/gram) vaginal cream, Place 1 gm in the vagina twice weekly, Disp: 42.5 g, Rfl: 2    cholecalciferol, vitamin D3, (VITAMIN D3 PO), Take 1 Tab by mouth daily. , Disp: , Rfl:     acetaminophen (TYLENOL) 325 mg tablet, Take 325 mg by mouth every four (4) hours as needed for Pain., Disp: , Rfl:     triamcinolone (NASACORT AQ) 55 mcg nasal inhaler, 2 Sprays by Both Nostrils route daily. Indications: PERENNIAL ALLERGIC RHINITIS, Disp: , Rfl:     Dexlansoprazole (DEXILANT) 60 mg CpDM, Take 1 Cap by mouth Daily (before dinner). , Disp: , Rfl:     calcium carbonate (TUMS) 200 mg calcium (500 mg) Chew, Take 1 Tab by mouth four (4) times daily. , Disp: , Rfl:     Cetirizine (ZYRTEC) 10 mg Cap, Take 10 mg by mouth daily. , Disp: , Rfl:     montelukast (SINGULAIR) 10 mg tablet, Take 10 mg by mouth daily. , Disp: , Rfl:     allergy injection, by SubCUTAneous route every thirty (30) days. , Disp: , Rfl:     budesonide-formoterol (SYMBICORT) 160-4.5 mcg/Actuation HFA inhaler, Take 2 Puffs by inhalation two (2) times a day., Disp: , Rfl:        Physical Examination:       HEENT:    Normocephalic, atraumatic. Pupils equal, round, reactive to light and accommodation. Sclerae white. Conjunctivae pale. Oral exam shows no thrush,  without any exudate or mass. Nasal mucous membranes are without any erythema or edema. Neck:  Thick, supple, no JVD, normal thyroid, no adenopathy, no other masses palpable. Cardiovascular:  S1, S2, regular rate and rhythm without any murmurs, rubs or gallops. Chest:  Equal air entry without wheezing. There is no dullness to percussion. No tenderness on palpation. No rash on inspection   Abdomen:  Obese, bowel sounds normoactive, soft, nontender without any organomegaly. Tympanic to percussion. Extremities:  Without any clubbing, cyanosis or edema. No calf tenderness on palpation. Skin:  Dry, warm to touch. No rash on inspection. PFT:  Spirometry-1/13/2022  FVC reduced to 67% predicted and improved to 78% postbronchodilator  FEV1 reduced to 47% predicted and improves to 57% postbronchodilator  Midexpiratory flow rate is reduced to 23% predicted and improves to 28% postbronchodilator  FEV1 FVC ratio is reduced  Studies consistent with moderate to severe obstructive defect with bronchodilator response. Compared to previous study of 2009 there is no significant change in flows    XR Results (most recent):  Results from Hospital Encounter encounter on 01/11/22    XR CHEST PA LAT    Narrative  HISTORY: Preoperative clearance. EXAM: Chest.    TECHNIQUE: Two views of the chest were obtained. COMPARISON: No prior studies for comparison. FINDINGS: There is no pneumothorax, pneumonia or pleural effusions. Heart and  mediastinal structures are unremarkable. . Visualized bony thorax and soft  tissues are within normal limits. Spinal cord stimulator. No change. Impression  IMPRESSION:    1. No acute cardiopulmonary process. No change. Impression:     Preop clearance for hip surgery. ?Pulmonary function tests (PFTs)  ? Chest radiograph    PATIENT-RELATED RISK FACTORS       ¨ Age ,50=0  51-80=+3   >80=+16  ¨ Preop SpO2  >96%=0  91-95%=+8 <90%=+24  ¨ Resp Infection in last 1 month  no=0  Yes= +11  ¨ Preop Anemia <10 No=0 Yes=+11  ¨ Surgical incision Peripheral=0  Upper abdominal=+15  Intrathoracic= +24  ¨ Duration of Surgery  <2 hrs  2-3hrs +16  > 3 hrs +23  ¨ Emergency Procedure No=0 Yes=+8  ¨   ARISCAT preop Pulmonary risk index:3+0+0+0+0+0+0    0-25 points:      Low risk: 0.1% Pulmonary complication rate  52-91 points:    Intermediate risk: 81.6% Pulmonary complication rate   points:  High risk: 56.7% Pulmonary complication rate    Other contributors  ¨ Chronic obstructive pulmonary disease (COPD)  ¨ Asthma    For preoperative pulmonary risk assessment, low risk for pulmonary complications in the perioperative and postoperative periods. This is based on ARISCAT (Canet) risk index - preoperative hypoxia, location and duration of surgery, as well as independent risk factor of asthma -COPD which is currently controlled with bronchodilators, albuterol . Potential pulmonary complications include postoperative hypoxia, atelectasis, infection, exacerbation of COPD, and respiratory failure (both prolonged mechanical ventilation and unplanned reintubation). In order to optimize the patient's outcomes, I would recommend that:                Pt receive scheduled bronchodilators in the perioperative and postoperative period                Avoid overuse of opioid and sedating medications in the postoperative period                Use supplemental oxygen to maintain SpO2 >88%                Aggressive pulmonary toileting                Frequent incentive spirometry                Out of bed as soon as possible with early ambulation and involvement of physical therapy                DVT prophylaxis as soon as possible per the surgeon  Asthma- better controlled. With no exacerbations over past  3 + years after sinus surgery. SOB- multifactorial  Recurrent sinusitis  She had in the interval nasal septal deviation repair surgery to correct any anatomical defect causing her frequent sinus and respiratory infections. Esophageal stricture with reflux. Now diagnosed with Green's esophagus.  On Dexilent  Rheumatoid arthritis- rheumatology following  Elevated LFTs-believed to be related to methotrexate-GI following    Recommendations:    Can proceed with planned surgery-right hip replacement with continued use of bronchodilators and above outlined perioperative recommendations  Continue current maintenance therapy-Singulair and Symbicort  The patient will continue her as needed albuterol-refill placed  Continue trigger control- PPI for Green's, allergy immunotherapy and controller meds  Commended on compliance and excellent control  Health maintenance- up to date on vaccines  Environmental control  Periodic PFT, imaging. Education provided. Follow up in 4 months  .         Tu Chang MD

## 2022-01-14 ENCOUNTER — HOSPITAL ENCOUNTER (OUTPATIENT)
Dept: LAB | Age: 67
Discharge: HOME | End: 2022-01-14
Payer: MEDICARE

## 2022-01-14 DIAGNOSIS — R94.31 ABNORMAL EKG: Primary | ICD-10-CM

## 2022-01-14 DIAGNOSIS — R94.31 ABNORMAL EKG: ICD-10-CM

## 2022-01-14 LAB
ATRIAL RATE: 77 BPM
CALCULATED P AXIS, ECG09: 62 DEGREES
CALCULATED R AXIS, ECG10: -29 DEGREES
CALCULATED T AXIS, ECG11: 26 DEGREES
DIAGNOSIS, 93000: NORMAL
P-R INTERVAL, ECG05: 140 MS
Q-T INTERVAL, ECG07: 396 MS
QRS DURATION, ECG06: 86 MS
QTC CALCULATION (BEZET), ECG08: 448 MS
VENTRICULAR RATE, ECG03: 77 BPM

## 2022-01-14 PROCEDURE — 93005 ELECTROCARDIOGRAM TRACING: CPT

## 2022-01-14 NOTE — TELEPHONE ENCOUNTER
Patient reached and aware a new order has been placed. Patient will go to [facility] instead of [facility].

## 2022-01-17 DIAGNOSIS — M16.11 PRIMARY OSTEOARTHRITIS OF RIGHT HIP: Primary | ICD-10-CM

## 2022-01-17 DIAGNOSIS — Z01.818 ENCOUNTER FOR PREOPERATIVE EXAMINATION FOR GENERAL SURGICAL PROCEDURE: ICD-10-CM

## 2022-01-17 PROCEDURE — 90000 NO LOS: CPT | Performed by: PHYSICIAN ASSISTANT

## 2022-01-17 PROCEDURE — 73503 X-RAY EXAM HIP UNI 4/> VIEWS: CPT | Performed by: PHYSICIAN ASSISTANT

## 2022-01-17 NOTE — TELEPHONE ENCOUNTER
Pt came in for her pre op xrays. She states she has not received her anti biotics. It was sent to [facility], but needs to go to the [facility] on [address] and tyre neck. Pharmacy phone is [phone].

## 2022-01-18 RX ORDER — NITROFURANTOIN 25; 75 MG/1; MG/1
100 CAPSULE ORAL 2 TIMES DAILY
Qty: 10 CAPSULE | Refills: 0 | Status: SHIPPED | OUTPATIENT
Start: 2022-01-18 | End: 2022-01-20

## 2022-01-19 ENCOUNTER — OFFICE VISIT (OUTPATIENT)
Dept: ORTHOPEDIC SURGERY | Age: 67
End: 2022-01-19
Payer: MEDICARE

## 2022-01-19 VITALS
WEIGHT: 193.4 LBS | DIASTOLIC BLOOD PRESSURE: 76 MMHG | OXYGEN SATURATION: 86 % | BODY MASS INDEX: 27.69 KG/M2 | HEIGHT: 70 IN | TEMPERATURE: 97 F | HEART RATE: 98 BPM | SYSTOLIC BLOOD PRESSURE: 123 MMHG

## 2022-01-19 DIAGNOSIS — Z01.818 PRE-OP EXAM: ICD-10-CM

## 2022-01-19 DIAGNOSIS — M16.11 PRIMARY OSTEOARTHRITIS OF RIGHT HIP: Primary | ICD-10-CM

## 2022-01-19 DIAGNOSIS — Z01.818 PREOPERATIVE TESTING: Primary | ICD-10-CM

## 2022-01-19 PROCEDURE — G9899 SCRN MAM PERF RSLTS DOC: HCPCS | Performed by: PHYSICIAN ASSISTANT

## 2022-01-19 PROCEDURE — 90000 NO LOS: CPT | Performed by: PHYSICIAN ASSISTANT

## 2022-01-19 PROCEDURE — G8752 SYS BP LESS 140: HCPCS | Performed by: PHYSICIAN ASSISTANT

## 2022-01-19 PROCEDURE — 3017F COLORECTAL CA SCREEN DOC REV: CPT | Performed by: PHYSICIAN ASSISTANT

## 2022-01-19 PROCEDURE — G8419 CALC BMI OUT NRM PARAM NOF/U: HCPCS | Performed by: PHYSICIAN ASSISTANT

## 2022-01-19 PROCEDURE — G8536 NO DOC ELDER MAL SCRN: HCPCS | Performed by: PHYSICIAN ASSISTANT

## 2022-01-19 PROCEDURE — G8427 DOCREV CUR MEDS BY ELIG CLIN: HCPCS | Performed by: PHYSICIAN ASSISTANT

## 2022-01-19 PROCEDURE — 1101F PT FALLS ASSESS-DOCD LE1/YR: CPT | Performed by: PHYSICIAN ASSISTANT

## 2022-01-19 PROCEDURE — G8432 DEP SCR NOT DOC, RNG: HCPCS | Performed by: PHYSICIAN ASSISTANT

## 2022-01-19 PROCEDURE — G8399 PT W/DXA RESULTS DOCUMENT: HCPCS | Performed by: PHYSICIAN ASSISTANT

## 2022-01-19 PROCEDURE — G8754 DIAS BP LESS 90: HCPCS | Performed by: PHYSICIAN ASSISTANT

## 2022-01-19 PROCEDURE — 1090F PRES/ABSN URINE INCON ASSESS: CPT | Performed by: PHYSICIAN ASSISTANT

## 2022-01-19 RX ORDER — PREGABALIN 25 MG/1
75 CAPSULE ORAL ONCE
Status: CANCELLED | OUTPATIENT
Start: 2022-01-19 | End: 2022-01-19

## 2022-01-19 RX ORDER — DEXAMETHASONE SODIUM PHOSPHATE 4 MG/ML
8 INJECTION, SOLUTION INTRA-ARTICULAR; INTRALESIONAL; INTRAMUSCULAR; INTRAVENOUS; SOFT TISSUE
Status: CANCELLED | OUTPATIENT
Start: 2022-01-19 | End: 2022-01-19

## 2022-01-19 RX ORDER — ACETAMINOPHEN 325 MG/1
1000 TABLET ORAL ONCE
Status: CANCELLED | OUTPATIENT
Start: 2022-01-19 | End: 2022-01-19

## 2022-01-19 NOTE — H&P
[address] [facility], [address] [facility]  [phone]           HISTORY & PHYSICAL      Patient: Mabel Barrios                MRN: [id number]       SSN: [id number]  YOB: 1955        AGE: 78 y.o. SEX: female  Body mass index is 27.75 kg/m². PCP: Sarah Plata MD  01/19/22      CC: right hip end stage OA  Problem List Items Addressed This Visit     None      Visit Diagnoses     Primary osteoarthritis of right hip    -  Primary    Pre-op exam                HPI:  The patient is a pleasant 78 y.o. whom has end stage OA of their Right hip and has failed conservative treatment including but not limited to NSAIDS, cortisone injections, viscosupplementation, PT, and pain medicine. Due to the current findings and affected activities of daily living, surgical intervention is indicated. The alternatives, risks, complications, as well as expected outcome were discussed. These include but are not limited to infection, blood loss, need for blood transfusion, neurovascular damage, DVT, PE,  post-op stiffness and pain, leg length discrepancy, dislocation, anesthetic complications, prothesis longevity, need for more surgery, MI, stroke, and even death. The patient understands and wishes to proceed with surgery.       Past Medical History:   Diagnosis Date    Adjacent segment disease C3/4 w/deg changes, poss stenosis, CT '18 6/22/2018    Allergic rhinitis     Anemia 2008    intermittant since then    Asthma     Back pain     Green's esophagus with esophagitis     Cervical radiculopathy     Chronic sinusitis 5/15/2012    DNS (deviated nasal septum)     Empyema     Foraminal stenosis of cervical region     C4-C5    GERD (gastroesophageal reflux disease)     Dr Brittney Romero    Hx MRSA infection 8/11/2014    Hypertension     Hypertriglyceridemia     Insulin resistance 4/9/2012    Left knee pain     Lichen planus     Liver disease     ORTEGA-per pt, followed by Dr Sheela Castaneda Menopause     Age 51    MRSA (methicillin resistant Staphylococcus aureus) infection 2008    left lung    Nausea & vomiting     Restless leg syndrome     Rheumatoid arthritis ([address]     Spinal cord stimulator status 2016    Spinal stenosis of cervical region     C4-C5 and C5-C6    TMJ syndrome     Wears glasses     and contacts         Current Outpatient Medications:     nitrofurantoin, macrocrystal-monohydrate, (MACROBID) 100 mg capsule, Take 1 Capsule by mouth two (2) times a day., Disp: 10 Capsule, Rfl: 0    nitrofurantoin, macrocrystal-monohydrate, (MACROBID) 100 mg capsule, Take 1 Capsule by mouth two (2) times a day., Disp: 10 Capsule, Rfl: 0    FeroSuL 325 mg (65 mg iron) tablet, take 1 tablet by mouth once daily BEFORE BREAKFAST, Disp: 90 Tablet, Rfl: 1    losartan (COZAAR) 50 mg tablet, TAKE 1 TABLET DAILY, Disp: 90 Tablet, Rfl: 3    abatacept (Orencia, with maltose,) 250 mg injection, by IntraVENous route. monthly, Disp: , Rfl:     cyanocobalamin 1,000 mcg tablet, take 1 tablet by mouth once daily, Disp: 90 Tab, Rfl: 3    clobetasoL (TEMOVATE) 0.05 % ointment, Apply  to affected area two (2) days a week., Disp: 45 g, Rfl: 3    multivit-min/iron/folic/lutein (CENTRUM SILVER WOMEN PO), Take 1 Tab by mouth daily. , Disp: , Rfl:     krill/om-3/dha/epa/phospho/ast (MAXIMUM RED KRILL OMEGA-3 PO), Take 1 Caplet by mouth daily. , Disp: , Rfl:     ascorbic acid, vitamin C, (VITAMIN C) 500 mg tablet, Take 1 Tab by mouth daily. , Disp: 90 Tab, Rfl: 3    ESTRACE 0.01 % (0.1 mg/gram) vaginal cream, Place 1 gm in the vagina twice weekly (Patient taking differently: Place 1 gm in the vagina  weekly), Disp: 42.5 g, Rfl: 2    cholecalciferol, vitamin D3, (VITAMIN D3 PO), Take 1 Tab by mouth daily. , Disp: , Rfl:     acetaminophen (TYLENOL) 325 mg tablet, Take 325 mg by mouth every four (4) hours as needed for Pain., Disp: , Rfl:     triamcinolone (NASACORT AQ) 55 mcg nasal inhaler, 2 Sprays by Both Nostrils route daily. Indications: PERENNIAL ALLERGIC RHINITIS, Disp: , Rfl:     Dexlansoprazole (DEXILANT) 60 mg CpDM, Take 1 Cap by mouth Daily (before dinner). , Disp: , Rfl:     calcium carbonate (TUMS) 200 mg calcium (500 mg) Chew, Take 1 Tab by mouth four (4) times daily. , Disp: , Rfl:     Cetirizine (ZYRTEC) 10 mg Cap, Take 10 mg by mouth daily. , Disp: , Rfl:     montelukast (SINGULAIR) 10 mg tablet, Take 10 mg by mouth daily. , Disp: , Rfl:     allergy injection, by SubCUTAneous route every thirty (30) days. , Disp: , Rfl:     budesonide-formoterol (SYMBICORT) 160-4.5 mcg/Actuation HFA inhaler, Take 2 Puffs by inhalation two (2) times a day., Disp: , Rfl:     naphazoline HCl/pheniramine (EYE ALLERGY RELIEF OP), Apply  to eye. (Patient not taking: Reported on 1/18/2022), Disp: , Rfl:     albuterol (PROVENTIL HFA, VENTOLIN HFA, PROAIR HFA) 90 mcg/actuation inhaler, Take 2 Puffs by inhalation every six (6) hours as needed for Shortness of Breath or Wheezing.  (Patient not taking: Reported on 1/18/2022), Disp: 1 Inhaler, Rfl: 3    Allergies   Allergen Reactions    Adhesive Tape-Silicones Other (comments)     Patient stated iv tape and tegaderm ok    Other Plant, Animal, Environmental Unable to Consolidated Doc     MOLD    Sulfasalazine Other (comments)     Could not taste anything, lightheadedness    Tape [Adhesive] Other (comments)     Blisters, use paper tape only  Patient states tegaderm and paper tape are okay    Mold Other (comments)    Chlorhexidine Towelette Itching and Other (comments)     \"stinging for a couple of hours\"    itching       Social History     Socioeconomic History    Marital status:      Spouse name: Not on file    Number of children: Not on file    Years of education: Not on file    Highest education level: Not on file   Occupational History    Not on file   Tobacco Use    Smoking status: Never Smoker    Smokeless tobacco: Never Used   Vaping Use    Vaping Use: Never used   Substance and Sexual Activity    Alcohol use: Not Currently     Alcohol/week: 0.0 standard drinks     Comment: 0-6 per year    Drug use: Yes     Types: Prescription, OTC    Sexual activity: Yes     Partners: Male     Comment: Menopausal since 2000   Other Topics Concern    Not on file   Social History Narrative    Retired      Social Determinants of Health     Financial Resource Strain:     Difficulty of Paying Living Expenses: Not on file   Food Insecurity:     Worried About [address] in the Last Year: Not on file    [address] in the Last Year: Not on file   Transportation Needs:     Lack of Transportation (Medical): Not on file    Lack of Transportation (Non-Medical):  Not on file   Physical Activity:     Days of Exercise per Week: Not on file    Minutes of Exercise per Session: Not on file   Stress:     Feeling of Stress : Not on file   Social Connections:     Frequency of Communication with Friends and Family: Not on file    Frequency of Social Gatherings with Friends and Family: Not on file    Attends [employer] Services: Not on file    Active Member of Clubs or Organizations: Not on file    Attends Club or Organization Meetings: Not on file    Marital Status: Not on file   Intimate Partner Violence:     Fear of Current or Ex-Partner: Not on file    Emotionally Abused: Not on file    Physically Abused: Not on file    Sexually Abused: Not on file   Housing Stability:     Unable to Pay for Housing in the Last Year: Not on file    Number of [place] in the Last Year: Not on file    Unstable Housing in the Last Year: Not on file       Past Surgical History:   Procedure Laterality Date    HX CERVICAL FUSION  08/13/14    HX HEENT  1997    TMJ surgery    HX KNEE REPLACEMENT Right 2020    HX LUMBAR LAMINECTOMY  Nov 1995    [facility]    HX LUMBAR LAMINECTOMY  Feb 1997    Justyna Roque  1/2015    prakash removal from right foot    HX OTHER SURGICAL  2007    thoracentesis    HX OTHER SURGICAL  2008    chest tube    HX OTHER SURGICAL  1997    TMJ surgery    HX OTHER SURGICAL  2012    sinus surg 2012-Dr Flossie Brittle.  HX OTHER SURGICAL  2016    spinal cord stimulator place for lumbar neuritis, good benefit    HX ROTATOR CUFF REPAIR Right 01/2019    HX TUBAL LIGATION      MD ANESTH,SURGERY OF SHOULDER  01/31/2019    MD COLONOSCOPY FLX DX W/COLLJ SPEC WHEN PFRMD  6-18-08    normal/duntemann       Family History:  Non-contributory. PE:  Visit Vitals  /76 (BP 1 Location: Left arm, BP Patient Position: Sitting, BP Cuff Size: Adult)   Pulse 98   Temp 97 °F (36.1 °C) (Temporal)   Ht 5' 10\" (1.778 m)   Wt 193 lb 6.4 oz (87.7 kg)   SpO2 (!) 86%   BMI 27.75 kg/m²     A&O X3, NAD, well develop, well nourished  Heart: S1-S2, rrr  Lungs: CTA bilat  Abd: soft, nt, nt, + bs in all quadrants  Ext:  Pos distal pulses to DP, PT  Leg lengths even grossly sitting in the chair    X-ray: Radiographs done 1/17/2022 at the history location include AP pelvis, AP and crosstable lateral of the right hip as well as an AP the contralateral hip confirms end-stage arthritis with bone-on-bone eburnation. Labs: labs were reviewed and wnl.  ua pos, treated with macrobid    A:  Right  hip end stage OA    P:  At this point we will move forward with surgery. Again, the alternatives, risks, complications, as well as expected outcome were discussed and the patient wishes to proceed with surgery. Pt has been instructed to stop aspirin, nsaids, rheumatologic medications and blood thinners. They have also been instructed to continue on any heart and bp meds and to take them the morning of surgery with sips of water. Direct anterior approach. The patient was counseled at length about the risks of latha Covid-19 during their perioperative period and any recovery window from their procedure.   The patient was made aware that latha Covid-19  may worsen their prognosis for recovering from their procedure and lend to a higher morbidity and/or mortality risk. All material risks, benefits, and reasonable alternatives including postponing the procedure were discussed. The patient does  wish to proceed with the procedure at this time.           Lobo Nunez

## 2022-01-20 ENCOUNTER — OFFICE VISIT (OUTPATIENT)
Dept: INTERNAL MEDICINE CLINIC | Age: 67
End: 2022-01-20
Payer: MEDICARE

## 2022-01-20 VITALS
BODY MASS INDEX: 27.49 KG/M2 | HEIGHT: 70 IN | WEIGHT: 192 LBS | DIASTOLIC BLOOD PRESSURE: 77 MMHG | TEMPERATURE: 97.6 F | HEART RATE: 74 BPM | OXYGEN SATURATION: 99 % | SYSTOLIC BLOOD PRESSURE: 123 MMHG | RESPIRATION RATE: 16 BRPM

## 2022-01-20 DIAGNOSIS — I10 PRIMARY HYPERTENSION: ICD-10-CM

## 2022-01-20 DIAGNOSIS — I51.7 ENLARGED LA (LEFT ATRIUM): ICD-10-CM

## 2022-01-20 DIAGNOSIS — E53.8 VITAMIN B12 DEFICIENCY: ICD-10-CM

## 2022-01-20 DIAGNOSIS — R82.71 ASYMPTOMATIC BACTERIURIA: ICD-10-CM

## 2022-01-20 DIAGNOSIS — D50.9 IRON DEFICIENCY ANEMIA, UNSPECIFIED IRON DEFICIENCY ANEMIA TYPE: ICD-10-CM

## 2022-01-20 DIAGNOSIS — M05.79 RHEUMATOID ARTHRITIS INVOLVING MULTIPLE SITES WITH POSITIVE RHEUMATOID FACTOR (HCC): ICD-10-CM

## 2022-01-20 DIAGNOSIS — R73.9 HYPERGLYCEMIA: ICD-10-CM

## 2022-01-20 DIAGNOSIS — M25.551 RIGHT HIP PAIN: Primary | ICD-10-CM

## 2022-01-20 PROCEDURE — G8752 SYS BP LESS 140: HCPCS | Performed by: INTERNAL MEDICINE

## 2022-01-20 PROCEDURE — G8754 DIAS BP LESS 90: HCPCS | Performed by: INTERNAL MEDICINE

## 2022-01-20 PROCEDURE — 1101F PT FALLS ASSESS-DOCD LE1/YR: CPT | Performed by: INTERNAL MEDICINE

## 2022-01-20 PROCEDURE — G8432 DEP SCR NOT DOC, RNG: HCPCS | Performed by: INTERNAL MEDICINE

## 2022-01-20 PROCEDURE — G8399 PT W/DXA RESULTS DOCUMENT: HCPCS | Performed by: INTERNAL MEDICINE

## 2022-01-20 PROCEDURE — 3017F COLORECTAL CA SCREEN DOC REV: CPT | Performed by: INTERNAL MEDICINE

## 2022-01-20 PROCEDURE — G8419 CALC BMI OUT NRM PARAM NOF/U: HCPCS | Performed by: INTERNAL MEDICINE

## 2022-01-20 PROCEDURE — G8427 DOCREV CUR MEDS BY ELIG CLIN: HCPCS | Performed by: INTERNAL MEDICINE

## 2022-01-20 PROCEDURE — G9899 SCRN MAM PERF RSLTS DOC: HCPCS | Performed by: INTERNAL MEDICINE

## 2022-01-20 PROCEDURE — G0463 HOSPITAL OUTPT CLINIC VISIT: HCPCS | Performed by: INTERNAL MEDICINE

## 2022-01-20 PROCEDURE — G8536 NO DOC ELDER MAL SCRN: HCPCS | Performed by: INTERNAL MEDICINE

## 2022-01-20 PROCEDURE — 1090F PRES/ABSN URINE INCON ASSESS: CPT | Performed by: INTERNAL MEDICINE

## 2022-01-20 PROCEDURE — 99214 OFFICE O/P EST MOD 30 MIN: CPT | Performed by: INTERNAL MEDICINE

## 2022-01-20 NOTE — PROGRESS NOTES
INTERNISTS OF [facility]:  1/20/2022, MRN: [id number]      Dayan Vázquez is a 78 y.o. female and presents to clinic for Follow-up (EKG)      Subjective:   Pt is a 66 female with h/o HTN, GERD with Green's esophagus (followed by ), DJD, lumbar post laminectomy syndrome s/p spinal cord stimulator surgery (followed by Funmilayo Boyer), cervical spinal stenosis, prediabetes (resolved), asthma (followed by , Pulmonology and ), HLD, lichens sclerosus, chronic sinusitis, vitamin B12 deficiency, and rheumatoid arthritis (Followed by ), vitamin D deficiency.     1. Right Hip Pain: She brought xray pictures of her right hip. I reviewed them with her today. Her hip replacement surgery was canceled/postponed due to the postponing of elective procedures (due to the Covid pandemic). She has worsening hip pain. 2. RA: She saw her rheumatologist last year in December per her hx. treated with Orencia. No fever    3. Abnormal UA: Taking macrobid per Orthopedics. Asymptomatic. Her urinalysis showed trace leukocyte esterase. 4. Abnormal EKG: Her EKG was done for her preop clearance. When the initial EKG was done, she states that the leads were not correctly placed on her body. As a result, her first EKG showed new findings. A follow-up second EKG was done with the leads placed correctly per her history. There are no new findings when compared to previous EKGs prior to this year, other than ? left atrium enlargement. .    Echocardiogram 11/29/16: Left ventricle: Size was normal. Systolic function was normal. Ejection fraction was estimated to be 65 %. There were no regional wall motion abnormalities. Wall thickness was normal. Doppler parameters were consistent with abnormal left ventricular relaxation (grade 1 diastolic dysfunction). 5.  Hypertension: Treated with losartan. Blood pressure is 123/77. +H/o prediabetes. Her last A1c was normal though.       Patient Active Problem List Diagnosis Date Noted    High risk medication use 01/05/2021    Arthritis of knee 02/03/2020    Adjacent segment disease C3/4 w/deg changes, poss stenosis, CT '18 49/55/3460    Lichen sclerosus et atrophicus 05/17/2018    Green esophagus 11/16/2017    Rheumatoid arthritis involving multiple sites 02/15/2017    Arthritis, lumbar spine 04/22/2016    Moderate persistent asthma without complication 43/29/6986    Lumbar post-laminectomy syndrome 12/10/2015    S/P cervical spinal fusion 08/13/2014    Cervical stenosis of spine 08/11/2014    GERD (gastroesophageal reflux disease) 08/11/2014    Hx MRSA infection 08/11/2014    Impaired glucose tolerance 06/30/2013    Hypertriglyceridemia 12/16/2012    DNS (deviated nasal septum)     HTN (hypertension) 06/28/2010    Iron deficiency anemia 06/28/2010    Vitamin B12 deficiency 06/28/2010    Vitamin D deficiency 06/28/2010       Current Outpatient Medications   Medication Sig Dispense Refill    nitrofurantoin, macrocrystal-monohydrate, (MACROBID) 100 mg capsule Take 1 Capsule by mouth two (2) times a day. 10 Capsule 0    FeroSuL 325 mg (65 mg iron) tablet take 1 tablet by mouth once daily BEFORE BREAKFAST 90 Tablet 1    losartan (COZAAR) 50 mg tablet TAKE 1 TABLET DAILY 90 Tablet 3    abatacept (Orencia, with maltose,) 250 mg injection by IntraVENous route. monthly      cyanocobalamin 1,000 mcg tablet take 1 tablet by mouth once daily 90 Tab 3    clobetasoL (TEMOVATE) 0.05 % ointment Apply  to affected area two (2) days a week. 45 g 3    multivit-min/iron/folic/lutein (CENTRUM SILVER WOMEN PO) Take 1 Tab by mouth daily.  krill/om-3/dha/epa/phospho/ast (MAXIMUM RED KRILL OMEGA-3 PO) Take 1 Caplet by mouth daily.  ascorbic acid, vitamin C, (VITAMIN C) 500 mg tablet Take 1 Tab by mouth daily.  90 Tab 3    ESTRACE 0.01 % (0.1 mg/gram) vaginal cream Place 1 gm in the vagina twice weekly (Patient taking differently: Place 1 gm in the vagina weekly) 42.5 g 2    cholecalciferol, vitamin D3, (VITAMIN D3 PO) Take 1 Tab by mouth daily.  acetaminophen (TYLENOL) 325 mg tablet Take 325 mg by mouth every four (4) hours as needed for Pain.  triamcinolone (NASACORT AQ) 55 mcg nasal inhaler 2 Sprays by Both Nostrils route daily. Indications: PERENNIAL ALLERGIC RHINITIS      Dexlansoprazole (DEXILANT) 60 mg CpDM Take 1 Cap by mouth Daily (before dinner).  calcium carbonate (TUMS) 200 mg calcium (500 mg) Chew Take 1 Tab by mouth four (4) times daily.  Cetirizine (ZYRTEC) 10 mg Cap Take 10 mg by mouth daily.  montelukast (SINGULAIR) 10 mg tablet Take 10 mg by mouth daily.  allergy injection by SubCUTAneous route every thirty (30) days.  budesonide-formoterol (SYMBICORT) 160-4.5 mcg/Actuation HFA inhaler Take 2 Puffs by inhalation two (2) times a day.  nitrofurantoin, macrocrystal-monohydrate, (MACROBID) 100 mg capsule Take 1 Capsule by mouth two (2) times a day. (Patient not taking: Reported on 1/20/2022) 10 Capsule 0    naphazoline HCl/pheniramine (EYE ALLERGY RELIEF OP) Apply  to eye. (Patient not taking: Reported on 1/18/2022)      albuterol (PROVENTIL HFA, VENTOLIN HFA, PROAIR HFA) 90 mcg/actuation inhaler Take 2 Puffs by inhalation every six (6) hours as needed for Shortness of Breath or Wheezing.  (Patient not taking: Reported on 1/18/2022) 1 Inhaler 3       Allergies   Allergen Reactions    Adhesive Tape-Silicones Other (comments)     Patient stated iv tape and tegaderm ok    Other Plant, Animal, Environmental Unable to Consolidated Doc     MOLD    Sulfasalazine Other (comments)     Could not taste anything, lightheadedness    Tape [Adhesive] Other (comments)     Blisters, use paper tape only  Patient states tegaderm and paper tape are okay    Mold Other (comments)    Chlorhexidine Towelette Itching and Other (comments)     \"stinging for a couple of hours\"    itching       Past Medical History:   Diagnosis Date    Adjacent segment disease C3/4 w/deg changes, poss stenosis, CT '18 6/22/2018    Allergic rhinitis     Anemia 2008    intermittant since then    Asthma     Back pain     Green's esophagus with esophagitis     Cervical radiculopathy     Chronic sinusitis 5/15/2012    DNS (deviated nasal septum)     Empyema     Foraminal stenosis of cervical region     C4-C5    GERD (gastroesophageal reflux disease)     Dr Lottie Rodríguez Hx MRSA infection 8/11/2014    Hypertension     Hypertriglyceridemia     Insulin resistance 4/9/2012    Left knee pain     Lichen planus     Liver disease     ORTEGA-per pt, followed by Dr Melida Willams Menopause     Age 51    MRSA (methicillin resistant Staphylococcus aureus) infection 2008    left lung    Nausea & vomiting     Restless leg syndrome     Rheumatoid arthritis ([address]     Spinal cord stimulator status 2016    Spinal stenosis of cervical region     C4-C5 and C5-C6    TMJ syndrome     Wears glasses     and contacts       Past Surgical History:   Procedure Laterality Date    HX CERVICAL FUSION  08/13/14    HX HEENT  1997    TMJ surgery    HX KNEE REPLACEMENT Right 2020    HX LUMBAR LAMINECTOMY  Nov 1995    [facility]    HX LUMBAR LAMINECTOMY  Feb 1997    Lord Sprinkles  1/2015    bunion removal from right foot    HX OTHER SURGICAL  2007    thoracentesis    HX OTHER SURGICAL  2008    chest tube    HX OTHER SURGICAL  1997    TMJ surgery    HX OTHER SURGICAL  2012    sinus surg 2012-Dr Andrew Felix.     HX OTHER SURGICAL  2016    spinal cord stimulator place for lumbar neuritis, good benefit    HX ROTATOR CUFF REPAIR Right 01/2019    HX TUBAL LIGATION      PA ANESTH,SURGERY OF SHOULDER  01/31/2019    PA COLONOSCOPY FLX DX W/COLLJ SPEC WHEN PFRMD  6-18-08    normal/duntemann       Family History   Problem Relation Age of Onset    Hypertension Mother     Diabetes Mother    Ribeiro Arthritis-rheumatoid Sister     Other Sister         Lupus    Cancer Sister 35        [place], uterus    Diabetes Sister     Other Brother         myocarditis    Heart Attack Brother     Heart Disease Brother     Heart Surgery Brother     Coronary Art Dis Father     Hypertension Father     Lung Disease Father     Cancer Sister 48        [place], breast    Breast Cancer Sister         Estrogen based    Diabetes Brother     Asthma Brother     Stroke Maternal Grandmother     Cancer Daughter        Social History     Tobacco Use    Smoking status: Never Smoker    Smokeless tobacco: Never Used   Substance Use Topics    Alcohol use: Not Currently     Alcohol/week: 0.0 standard drinks     Comment: 0-6 per year       ROS   Review of Systems   Constitutional: Negative for chills and fever. HENT: Negative for ear pain and sore throat. Eyes: Negative for blurred vision and pain. Respiratory: Negative for cough and shortness of breath. Cardiovascular: Negative for chest pain. Gastrointestinal: Negative for abdominal pain, blood in stool and melena. Genitourinary: Negative for dysuria and hematuria. Musculoskeletal: Positive for joint pain. Negative for myalgias. Skin: Negative for rash. Neurological: Negative for headaches. Endo/Heme/Allergies: Does not bruise/bleed easily. Psychiatric/Behavioral: Negative for substance abuse. Objective     Vitals:    01/20/22 0958   BP: 123/77   Pulse: 74   Resp: 16   Temp: 97.6 °F (36.4 °C)   TempSrc: Temporal   SpO2: 99%   Weight: 192 lb (87.1 kg)   Height: 5' 10\" (1.778 m)   PainSc:   0 - No pain       Physical Exam  Vitals and nursing note reviewed. HENT:      Head: Normocephalic and atraumatic. Right Ear: External ear normal.      Left Ear: External ear normal.   Eyes:      General: No scleral icterus. Right eye: No discharge. Left eye: No discharge. Conjunctiva/sclera: Conjunctivae normal.   Cardiovascular:      Rate and Rhythm: Normal rate and regular rhythm. Heart sounds: Normal heart sounds.  No murmur heard.  No friction rub. No gallop. Pulmonary:      Effort: Pulmonary effort is normal. No respiratory distress. Breath sounds: Normal breath sounds. No wheezing or rales. Abdominal:      General: Bowel sounds are normal. There is no distension. Palpations: Abdomen is soft. Tenderness: There is no abdominal tenderness. Musculoskeletal:         General: No swelling (BUE) or tenderness (BUE). Cervical back: Neck supple. Comments: She has significant decreased range of motion along her right hip secondary to pain. Lymphadenopathy:      Cervical: No cervical adenopathy. Skin:     General: Skin is warm and dry. Findings: No erythema or rash. Neurological:      Mental Status: She is alert. Motor: No abnormal muscle tone. Comments: Unstable gait secondary to right hip pain.    Psychiatric:         Mood and Affect: Mood normal.         LABS   Data Review:   Lab Results   Component Value Date/Time    WBC 7.0 01/11/2022 07:46 AM    HGB 14.6 01/11/2022 07:46 AM    HCT 46.3 (H) 01/11/2022 07:46 AM    PLATELET 233 24/41/3937 07:46 AM    MCV 92.0 01/11/2022 07:46 AM       Lab Results   Component Value Date/Time    Sodium 142 01/11/2022 07:46 AM    Potassium 4.3 01/11/2022 07:46 AM    Chloride 110 01/11/2022 07:46 AM    CO2 28 01/11/2022 07:46 AM    Anion gap 4 01/11/2022 07:46 AM    Glucose 106 (H) 01/11/2022 07:46 AM    BUN 14 01/11/2022 07:46 AM    Creatinine 0.76 01/11/2022 07:46 AM    BUN/Creatinine ratio 18 01/11/2022 07:46 AM    GFR est AA >60 01/11/2022 07:46 AM    GFR est non-AA >60 01/11/2022 07:46 AM    Calcium 9.5 01/11/2022 07:46 AM       Lab Results   Component Value Date/Time    Cholesterol, total 175 10/04/2021 07:49 AM    HDL Cholesterol 45 10/04/2021 07:49 AM    LDL, calculated 102.2 (H) 10/04/2021 07:49 AM    VLDL, calculated 27.8 10/04/2021 07:49 AM    Triglyceride 139 10/04/2021 07:49 AM    CHOL/HDL Ratio 3.9 10/04/2021 07:49 AM       Lab Results   Component Value Date/Time    Hemoglobin A1c 5.5 01/11/2022 07:46 AM       Assessment/Plan:   1. Enlarged LA (left atrium): Suggested by her recent EKG. -Placing a referral to Cardiology for a checkup. ORDERS:  - REFERRAL TO CARDIOLOGY    2. Hypertension: Stable. +H/o prediabetes. -Continue medication as prescribed.  -Checking labs in 6 months. 3. RA: Stable. -Continue with Orencia.  -I encouraged her to follow-up with her rheumatologist for continued medication management.  -Checking labs in 6 months. 4.  Right Hip Pain: From osteoarthritis. -Activity as tolerated. -I encouraged her to follow-up with Orthopedics for anticipated right hip replacement surgery. 5. Asymptomatic Bacteruria: Receiving treatment per Orthopedics. The patient can continue the antibiotic course but antibiotics are not warranted for asymptomatic bacteruria or due to her UA showing trace leukocyte esterase (in the absence of sx) outside of pregnancy and urologic procedures. **I will check a B12 level and iron studies given her history of B12 deficiency and iron deficiency. **    There are no preventive care reminders to display for this patient. Lab review: labs are reviewed in the EHR and ordered as mentioned above. I have discussed the diagnosis with the patient and the intended plan as seen in the above orders. The patient has received an after-visit summary and questions were answered concerning future plans. I have discussed medication side effects and warnings with the patient as well. I have reviewed the plan of care with the patient, accepted their input and they are in agreement with the treatment goals. All questions were answered. The patient understands the plan of care. Handouts provided today with above information. Pt instructed if symptoms worsen to call the office or report to the ED for continued care. Greater than 50% of the visit time was spent in counseling and/or coordination of care.       Voice recognition was used to generate this report, which may have resulted in some phonetic based errors in grammar and contents. Even though attempts were made to correct all the mistakes, some may have been missed, and remained in the body of the document.           Apoorva Gautam MD

## 2022-01-20 NOTE — PROGRESS NOTES
Chief Complaint   Patient presents with    Follow-up     EKG       1. \"Have you been to the ER, urgent care clinic since your last visit? Hospitalized since your last visit? \" No    2. \"Have you seen or consulted any other health care providers outside of the [address] [place] [address] since your last visit? \" No     3. For patients aged 39-70: Has the patient had a colonoscopy? Yes - no Care Gap present     If the patient is female:    4. For patients aged 41-77: Has the patient had a mammogram within the past 2 years? Yes - no Care Gap present    5. For patients aged 21-65: Has the patient had a pap smear? NA - based on age    Learning Assessment 2/26/2020   PRIMARY LEARNER Patient   HIGHEST LEVEL OF EDUCATION - PRIMARY LEARNER  > 4 YEARS OF COLLEGE   BARRIERS PRIMARY LEARNER NONE   CO-LEARNER CAREGIVER No   PRIMARY LANGUAGE ENGLISH   LEARNER PREFERENCE PRIMARY DEMONSTRATION     -     -     -     -   ANSWERED BY patient     -   RELATIONSHIP SELF     3 most recent PHQ Screens 1/19/2022   PHQ Not Done -   Little interest or pleasure in doing things Not at all   Feeling down, depressed, irritable, or hopeless Not at all   Total Score PHQ 2 0     Fall Risk Assessment, last 12 mths 10/11/2021   Able to walk? Yes   Fall in past 12 months? 0   Do you feel unsteady? 0   Are you worried about falling 0   Number of falls in past 12 months -   Fall with injury? -     Abuse Screening Questionnaire 10/11/2021   Do you ever feel afraid of your partner? N   Are you in a relationship with someone who physically or mentally threatens you? N   Is it safe for you to go home?  Danish Buchanan

## 2022-01-20 NOTE — PATIENT INSTRUCTIONS
Electrocardiogram (EKG): About This Test  What is it? An electrocardiogram (EKG or ECG) is a test that checks for problems with the electrical activity of your heart. An EKG translates the heart's electrical activity into line tracings on paper. Why is this test done? You may need this test to check your heart's electrical activity. The test also can check the health of your heart. For example, it can help find the cause of unexplained chest pain or pressure, or other symptoms of heart disease. How do you prepare for the test?  · Understand exactly what test is planned, along with the risks, benefits, and other options. · Tell your doctor ALL the medicines, vitamins, supplements, and herbal remedies you take. Some may increase the risk of problems during your test. Your doctor will tell you if you should stop taking any of them before the test and how soon to do it. How is the test done? · You may have to remove certain jewelry. · You will take your top off and be given a gown to wear. · You will lie on a bed or table. Parts of your arms, legs, and chest will be cleaned and may be shaved. · Small pads or patches (electrodes) will be placed, like stickers, on your skin on each arm and leg and on your chest. The electrodes are hooked to a machine that traces your heart activity onto a paper. · During the test, lie very still and breathe normally. Do not talk during the test.  What are the risks of the test?  An EKG is a completely safe test. No electricity passes through your body from the machine, and there is no danger of getting an electrical shock. How long does the test take? The test usually takes 5 to 10 minutes. What happens after the test?  · You will probably be able to go home right away. It depends on the reason for the test.  · You can go back to your usual activities right away. Follow-up care is a key part of your treatment and safety.  Be sure to make and go to all appointments, and call your doctor if you are having problems. It's also a good idea to keep a list of the medicines you take. Ask your doctor when you can expect to have your test results. Where can you learn more? Go to http://www.garcia.com/  Enter E180 in the search box to learn more about \"Electrocardiogram (EKG): About This Test.\"  Current as of: April 29, 2021               Content Version: 13.0  © 2006-2021 [employer]wise[employer] Inc[employer]. Care instructions adapted under license by [employer] (which disclaims liability or warranty for this information). If you have questions about a medical condition or this instruction, always ask your healthcare professional. [address] any warranty or liability for your use of this information.

## 2022-03-01 NOTE — PROGRESS NOTES
"Reason For Visit:   Chief Complaint   Patient presents with     RECHECK     right hip. pt is unable to sleep on either side due to pain        Resp 18   Ht 1.54 m (5' 0.63\")   Wt 64.4 kg (142 lb)   BMI 27.16 kg/m      Pain Assessment  Patient Currently in Pain: Yes  0-10 Pain Scale: 2  Primary Pain Location: Hip    Ofe Encarnacion ATC       " Transitions of Care Coordination  Follow-Up    Date/Time:  3/2/2020 10:30 AM     CTN (Care Transitions Nurse) contacted patient for Transitions of Care Coordination  follow up. Spoke to patient. Introduced self/role and reason for call. Patient reported:   1/10 right knee pain  Applying ice  Participating in outpatient PT  Utilizing cane when ambulating outside of the home  Having normal bowel movements and no difficulty with urination    Specialist appointments since last outreach? yes  If so, specialist and date: Jerrica Cortes ortho 2/20/20    Medications:   New medications since last outreach: no  Does patient need refills on any medications: no  Medication changes since last outreach (dose adjustments or discontinued meds): no    Home Health company: [facility]  Date of discharge: 2/21/20    Barriers to care? None     Patient reminded that there are physicians on call 24 hours a day / 7 days a week (M-F 5pm to 8am and from Friday 5pm until Monday 8a for the weekend) should the patient have questions or concerns. Future Appointments   Date Time Provider Cooper Dhillon   3/6/2020  9:00 AM Louanna Reach, PTA [facility]   3/9/2020  9:00 AM Louanna Reach, PTA [facility]   3/12/2020  1:20 PM ANNY Steward   3/13/2020  9:00 AM Louanna Reach, PTA [facility]   3/16/2020  9:00 AM Liza Arnold [facility]   3/20/2020  9:00 AM Louanna Reach, PTA [facility]   6/2/2020  9:00 AM Memo Hancock MD [address]        Other upcoming appointments:  None    Goals      Attends follow-up appointments as directed. Ensure provider appt is scheduled within 7 days post-discharge; 2/6: Patient aware of upcoming appts   Confirm patient attended post-discharge provider appt; 3/2: Attended PCP appt on 2/26 and orhto appt on 2/20.     Complete post-visit call to confirm attendance and update care needs; Done            Prevent complications post hospitalization.       Plan of Care:   CTN will monitor X 4 weeks   Review/educate common or potential \"red flags\" of condition worsening  Evaluate adherence to medications and priority barriers to resolve      Instruct on adherence to medications as ordered and assess for therapeutic response and side-effects; 2/6: Completed Keflex  Review the Home Visit or Home Health documentation for coordinating care and goals; 3/2: Reviewed

## 2022-03-04 ENCOUNTER — OFFICE VISIT (OUTPATIENT)
Dept: CARDIOLOGY CLINIC | Age: 67
End: 2022-03-04
Payer: MEDICARE

## 2022-03-04 VITALS
OXYGEN SATURATION: 97 % | WEIGHT: 195 LBS | BODY MASS INDEX: 27.92 KG/M2 | HEIGHT: 70 IN | HEART RATE: 71 BPM | SYSTOLIC BLOOD PRESSURE: 124 MMHG | DIASTOLIC BLOOD PRESSURE: 88 MMHG

## 2022-03-04 DIAGNOSIS — Z01.818 PRE-OP EVALUATION: ICD-10-CM

## 2022-03-04 DIAGNOSIS — R94.31 ABNORMAL EKG: Primary | ICD-10-CM

## 2022-03-04 PROCEDURE — 93000 ELECTROCARDIOGRAM COMPLETE: CPT | Performed by: INTERNAL MEDICINE

## 2022-03-04 PROCEDURE — G9899 SCRN MAM PERF RSLTS DOC: HCPCS | Performed by: INTERNAL MEDICINE

## 2022-03-04 PROCEDURE — G8752 SYS BP LESS 140: HCPCS | Performed by: INTERNAL MEDICINE

## 2022-03-04 PROCEDURE — G8510 SCR DEP NEG, NO PLAN REQD: HCPCS | Performed by: INTERNAL MEDICINE

## 2022-03-04 PROCEDURE — G8399 PT W/DXA RESULTS DOCUMENT: HCPCS | Performed by: INTERNAL MEDICINE

## 2022-03-04 PROCEDURE — 99204 OFFICE O/P NEW MOD 45 MIN: CPT | Performed by: INTERNAL MEDICINE

## 2022-03-04 PROCEDURE — 1101F PT FALLS ASSESS-DOCD LE1/YR: CPT | Performed by: INTERNAL MEDICINE

## 2022-03-04 PROCEDURE — G8427 DOCREV CUR MEDS BY ELIG CLIN: HCPCS | Performed by: INTERNAL MEDICINE

## 2022-03-04 PROCEDURE — 3017F COLORECTAL CA SCREEN DOC REV: CPT | Performed by: INTERNAL MEDICINE

## 2022-03-04 PROCEDURE — G8536 NO DOC ELDER MAL SCRN: HCPCS | Performed by: INTERNAL MEDICINE

## 2022-03-04 PROCEDURE — 1090F PRES/ABSN URINE INCON ASSESS: CPT | Performed by: INTERNAL MEDICINE

## 2022-03-04 PROCEDURE — G8419 CALC BMI OUT NRM PARAM NOF/U: HCPCS | Performed by: INTERNAL MEDICINE

## 2022-03-04 PROCEDURE — G8754 DIAS BP LESS 90: HCPCS | Performed by: INTERNAL MEDICINE

## 2022-03-04 RX ORDER — ALBUTEROL SULFATE 90 UG/1
1 AEROSOL, METERED RESPIRATORY (INHALATION)
COMMUNITY

## 2022-03-04 NOTE — PROGRESS NOTES
Marshall Meneses    Chief Complaint   Patient presents with    New Patient     Ref by PCP for an Enlarged Left Atrium       HPI    Marshall Meneses is a 78 y.o. pleasant female with no known coronary disease here for evaluation of abnormal EKG. Patient has been seen by cardiology one of my late partners in the past but was found to have anemia and B12 deficiency as a cause so was asked to follow-up as needed. He has no cardiac complaints but does deal with a lot of issues regarding her rheumatoid arthritis. Her primary care physician's notes were personally obtained and reviewed with the patient today. Patient was concerned that her initial EKG was different than a repeat and also was concerned about lead placement I reviewed all of these EKGs and still would say they are within normal limits there is no concern for atrial enlargement she does have some kind of poor R wave progression. We repeated her EKG today and I can see some underlying artifact that looks to be from her back stimulator. Would be hard to say but this may be why her EKGs have been challenging in the past and the computer is not picking it up or telling them that there is a lead placement issue or that it is not making good contact because of this electrical interference may be why her EKGs in the past have also taken a longer time.   But patient says there are times when her stimulator is off so again it is hard for me to say    Again she has no cardiac complaints she denies chest pain no swelling no shortness of breath etc.    Past Medical History:   Diagnosis Date    Adjacent segment disease C3/4 w/deg changes, poss stenosis, CT '18 6/22/2018    Allergic rhinitis     Anemia 2008    intermittant since then    Asthma     Back pain     Green's esophagus with esophagitis     Cervical radiculopathy     Chronic sinusitis 5/15/2012    DNS (deviated nasal septum)     Empyema     Foraminal stenosis of cervical region C4-C5    GERD (gastroesophageal reflux disease)     Dr Antonia Conway Hx MRSA infection 8/11/2014    Hypertension     Hypertriglyceridemia     Insulin resistance 4/9/2012    Left knee pain     Lichen planus     Liver disease     ORTEGA-per pt, followed by Dr Myah Sanders Menopause     Age 51    MRSA (methicillin resistant Staphylococcus aureus) infection 2008    left lung    Nausea & vomiting     Restless leg syndrome     Rheumatoid arthritis ([address]     Spinal cord stimulator status 2016    Spinal stenosis of cervical region     C4-C5 and C5-C6    TMJ syndrome     Wears glasses     and contacts       Past Surgical History:   Procedure Laterality Date    HX CERVICAL FUSION  08/13/14    HX HEENT  1997    TMJ surgery    HX KNEE REPLACEMENT Right 2020    HX LUMBAR LAMINECTOMY  Nov 1995    [address]    HX LUMBAR LAMINECTOMY  Feb 1997    Lizett Sjogren  1/2015    bunion removal from right foot    HX OTHER SURGICAL  2007    thoracentesis    HX OTHER SURGICAL  2008    chest tube    HX OTHER SURGICAL  1997    TMJ surgery    HX OTHER SURGICAL  2012    sinus surg 2012-Dr Melissa Potts.  HX OTHER SURGICAL  2016    spinal cord stimulator place for lumbar neuritis, good benefit    HX ROTATOR CUFF REPAIR Right 01/2019    HX TUBAL LIGATION      AR ANESTH,SURGERY OF SHOULDER  01/31/2019    AR COLONOSCOPY FLX DX W/COLLJ SPEC WHEN PFRMD  6-18-08    normal/duntemann       Current Outpatient Medications   Medication Sig Dispense Refill    albuterol (PROVENTIL HFA, VENTOLIN HFA, PROAIR HFA) 90 mcg/actuation inhaler Take 1 Puff by inhalation every six (6) hours as needed for Wheezing.  FeroSuL 325 mg (65 mg iron) tablet take 1 tablet by mouth once daily BEFORE BREAKFAST 90 Tablet 1    losartan (COZAAR) 50 mg tablet TAKE 1 TABLET DAILY 90 Tablet 3    abatacept (Orencia, with maltose,) 250 mg injection by IntraVENous route.  monthly      cyanocobalamin 1,000 mcg tablet take 1 tablet by mouth once daily 90 Tab 3    clobetasoL (TEMOVATE) 0.05 % ointment Apply  to affected area two (2) days a week. 45 g 3    multivit-min/iron/folic/lutein (CENTRUM SILVER WOMEN PO) Take 1 Tab by mouth daily.  krill/om-3/dha/epa/phospho/ast (MAXIMUM RED KRILL OMEGA-3 PO) Take 1 Caplet by mouth daily.  ascorbic acid, vitamin C, (VITAMIN C) 500 mg tablet Take 1 Tab by mouth daily. 90 Tab 3    ESTRACE 0.01 % (0.1 mg/gram) vaginal cream Place 1 gm in the vagina twice weekly (Patient taking differently: Insert 1 g into vagina every seven (7) days.) 42.5 g 2    cholecalciferol, vitamin D3, (VITAMIN D3 PO) Take 1 Tab by mouth daily.  acetaminophen (TYLENOL) 325 mg tablet Take 325 mg by mouth every four (4) hours as needed for Pain.  triamcinolone (NASACORT AQ) 55 mcg nasal inhaler 2 Sprays by Both Nostrils route daily. Indications: PERENNIAL ALLERGIC RHINITIS      Dexlansoprazole (DEXILANT) 60 mg CpDM Take 1 Cap by mouth Daily (before dinner).  Cetirizine (ZYRTEC) 10 mg Cap Take 10 mg by mouth daily.  montelukast (SINGULAIR) 10 mg tablet Take 10 mg by mouth daily.  allergy injection by SubCUTAneous route every thirty (30) days.  budesonide-formoterol (SYMBICORT) 160-4.5 mcg/Actuation HFA inhaler Take 2 Puffs by inhalation two (2) times a day.          Allergies   Allergen Reactions    Adhesive Tape-Silicones Other (comments)     Patient stated iv tape and tegaderm ok    Other Plant, Animal, Environmental Unable to Consolidated Doc     MOLD    Sulfasalazine Other (comments)     Could not taste anything, lightheadedness    Tape [Adhesive] Other (comments)     Blisters, use paper tape only  Patient states tegaderm and paper tape are okay    Mold Other (comments)    Chlorhexidine Towelette Itching and Other (comments)     \"stinging for a couple of hours\"    itching       Social History     Socioeconomic History    Marital status:      Spouse name: Not on file    Number of children: Not on file    Years of education: Not on file    Highest education level: Not on file   Occupational History    Not on file   Tobacco Use    Smoking status: Never Smoker    Smokeless tobacco: Never Used   Vaping Use    Vaping Use: Never used   Substance and Sexual Activity    Alcohol use: Not Currently     Alcohol/week: 0.0 standard drinks     Comment: 0-6 per year    Drug use: Yes     Types: Prescription, OTC    Sexual activity: Yes     Partners: Male     Comment: Menopausal since 2000   Other Topics Concern    Not on file   Social History Narrative    Retired      Social Determinants of Health     Financial Resource Strain:     Difficulty of Paying Living Expenses: Not on file   Food Insecurity:     Worried About [address] [employer] in the Last Year: Not on file    [address] [employer] in the Last Year: Not on file   Transportation Needs:     Lack of Transportation (Medical): Not on file    Lack of Transportation (Non-Medical):  Not on file   Physical Activity:     Days of Exercise per Week: Not on file    Minutes of Exercise per Session: Not on file   Stress:     Feeling of Stress : Not on file   Social Connections:     Frequency of Communication with Friends and Family: Not on file    Frequency of Social Gatherings with Friends and Family: Not on file    Attends Qu[employer] Services: Not on file    Active Member of [address] [place] [address] or Organizations: Not on file    Attends Club or Organization Meetings: Not on file    Marital Status: Not on file   Intimate Partner Violence:     Fear of Current or Ex-Partner: Not on file    Emotionally Abused: Not on file    Physically Abused: Not on file    Sexually Abused: Not on file   Housing Stability:     Unable to Pay for Housing in the Last Year: Not on file    Number of [place] in the Last Year: Not on file    Unstable Housing in the Last Year: Not on file      FH: Negative for premature coronary disease no sudden cardiac death but she does believe that her mother was told at some point that she had an enlarged heart    Review of Systems    14 pt Review of Systems is negative unless otherwise mentioned in the HPI. Wt Readings from Last 3 Encounters:   03/04/22 88.5 kg (195 lb)   01/20/22 87.1 kg (192 lb)   01/19/22 87.7 kg (193 lb 6.4 oz)     Temp Readings from Last 3 Encounters:   01/20/22 97.6 °F (36.4 °C) (Temporal)   01/19/22 97 °F (36.1 °C) (Temporal)   01/13/22 97.6 °F (36.4 °C) (Oral)     BP Readings from Last 3 Encounters:   03/04/22 124/88   01/20/22 123/77   01/19/22 123/76     Pulse Readings from Last 3 Encounters:   03/04/22 71   01/20/22 74   01/19/22 98       Physical Exam:    Visit Vitals  /88 (BP 1 Location: Left upper arm, BP Patient Position: Sitting, BP Cuff Size: Large adult)   Pulse 71   Ht 5' 10\" (1.778 m)   Wt 88.5 kg (195 lb)   SpO2 97%   BMI 27.98 kg/m²      Physical Exam  HENT:      Head: Normocephalic and atraumatic. Eyes:      Pupils: Pupils are equal, round, and reactive to light. Cardiovascular:      Rate and Rhythm: Normal rate and regular rhythm. Heart sounds: Normal heart sounds. No murmur heard. No friction rub. No gallop. Pulmonary:      Effort: Pulmonary effort is normal. No respiratory distress. Breath sounds: Normal breath sounds. No wheezing or rales. Chest:      Chest wall: No tenderness. Abdominal:      General: Bowel sounds are normal.      Palpations: Abdomen is soft. Musculoskeletal:         General: No tenderness. Skin:     General: Skin is warm and dry. Neurological:      Mental Status: She is alert and oriented to person, place, and time.          EKG today shows: NSR, leftward axis, no ST segment abnormalities, underlying artifact    Impression and Plan:  Sissy Oscar is a 78 y.o. with:    Nonischemic EKG/ normal variant  Today's EKG with artifact due to stimulator  No CV symptoms   RA  Preop risk    Pt is low risk from CV standpoint and can and should proceed to OR without further testing, she has no known CAD or CHF, and no signs and symptoms of such. Her ekg is nonischemic, no further CV testing planned    RTC as needed  45 mins spent     Thank you for allowing me to participate in the care of your patient, please do not hesitate to call with questions or concerns.     Kindest Regards,    Blessing Chin, DO

## 2022-03-04 NOTE — PROGRESS NOTES
Sherrell Yun presents today for   Chief Complaint   Patient presents with    New Patient     Ref by PCP for an Enlarged Left Atrium       Hilda Junior preferred language for health care discussion is english/other. Is someone accompanying this pt? no    Is the patient using any DME equipment during [address]? no    Depression Screening:  3 most recent PHQ Screens 3/4/2022   PHQ Not Done -   Little interest or pleasure in doing things Not at all   Feeling down, depressed, irritable, or hopeless Not at all   Total Score PHQ 2 0       Learning Assessment:  Learning Assessment 3/4/2022   PRIMARY LEARNER Patient   HIGHEST LEVEL OF EDUCATION - PRIMARY LEARNER  -   BARRIERS PRIMARY LEARNER -   [facility]   LEARNER PREFERENCE PRIMARY DEMONSTRATION     -     -     -     -   ANSWERED BY patient     -   RELATIONSHIP SELF       Abuse Screening:  Abuse Screening Questionnaire 3/4/2022   Do you ever feel afraid of your partner? N   Are you in a relationship with someone who physically or mentally threatens you? N   Is it safe for you to go home? Y       Fall Risk  Fall Risk Assessment, last 12 mths 3/4/2022   Able to walk? Yes   Fall in past 12 months? 0   Do you feel unsteady? 0   Are you worried about falling 0   Number of falls in past 12 months -   Fall with injury? -           Pt currently taking Anticoagulant therapy? no    Pt currently taking Antiplatelet therapy ? no      Coordination of Care:  1. Have you been to the ER, urgent care clinic since your last visit? Hospitalized since your last visit? no    2. Have you seen or consulted any other health care providers outside of the [address] [place] [address] since your last visit? Include any pap smears or colon screening.  no

## 2022-03-09 ENCOUNTER — TELEPHONE (OUTPATIENT)
Dept: ORTHOPEDIC SURGERY | Age: 67
End: 2022-03-09

## 2022-03-09 NOTE — TELEPHONE ENCOUNTER
Patient reports her rheumatologist would like to administer some Cortisone injections this week or next week. She is making sure this would be ok to do with her upcoming surgery in May for hip replacement. Please advise.      Patient [phone]

## 2022-03-18 PROBLEM — K22.70 BARRETT ESOPHAGUS: Status: ACTIVE | Noted: 2017-11-16

## 2022-03-18 PROBLEM — M06.9 RHEUMATOID ARTHRITIS INVOLVING MULTIPLE SITES (HCC): Status: ACTIVE | Noted: 2017-02-15

## 2022-03-19 PROBLEM — M17.10 ARTHRITIS OF KNEE: Status: ACTIVE | Noted: 2020-02-03

## 2022-03-19 PROBLEM — L90.0 LICHEN SCLEROSUS ET ATROPHICUS: Status: ACTIVE | Noted: 2018-05-17

## 2022-03-19 PROBLEM — Z79.899 HIGH RISK MEDICATION USE: Status: ACTIVE | Noted: 2021-01-05

## 2022-03-25 RX ORDER — LANOLIN ALCOHOL/MO/W.PET/CERES
CREAM (GRAM) TOPICAL
Qty: 90 TABLET | Refills: 3 | Status: SHIPPED | OUTPATIENT
Start: 2022-03-25

## 2022-04-15 DIAGNOSIS — M16.11 PRIMARY OSTEOARTHRITIS OF RIGHT HIP: ICD-10-CM

## 2022-04-15 DIAGNOSIS — Z01.818 PREOPERATIVE TESTING: Primary | ICD-10-CM

## 2022-04-29 ENCOUNTER — HOSPITAL ENCOUNTER (OUTPATIENT)
Dept: PREADMISSION TESTING | Age: 67
Discharge: HOME | End: 2022-04-29
Payer: MEDICARE

## 2022-04-29 DIAGNOSIS — M16.11 PRIMARY OSTEOARTHRITIS OF RIGHT HIP: ICD-10-CM

## 2022-04-29 DIAGNOSIS — Z01.818 PREOPERATIVE TESTING: ICD-10-CM

## 2022-04-29 LAB
ABO + RH BLD: NORMAL
ALBUMIN SERPL-MCNC: 4.1 G/DL (ref 3.4–5)
ANION GAP SERPL CALC-SCNC: 3 MMOL/L (ref 3–18)
APPEARANCE UR: CLEAR
APTT PPP: 31.3 SEC (ref 23–36.4)
BACTERIA URNS QL MICRO: ABNORMAL /HPF
BASOPHILS # BLD: 0.1 K/UL (ref 0–0.1)
BASOPHILS NFR BLD: 1 % (ref 0–2)
BILIRUB UR QL: NEGATIVE
BLOOD GROUP ANTIBODIES SERPL: NORMAL
BUN SERPL-MCNC: 16 MG/DL (ref 7–18)
BUN/CREAT SERPL: 19 (ref 12–20)
CALCIUM SERPL-MCNC: 9.9 MG/DL (ref 8.5–10.1)
CHLORIDE SERPL-SCNC: 108 MMOL/L (ref 100–111)
CO2 SERPL-SCNC: 29 MMOL/L (ref 21–32)
COLOR UR: YELLOW
CREAT SERPL-MCNC: 0.83 MG/DL (ref 0.6–1.3)
DIFFERENTIAL METHOD BLD: ABNORMAL
EOSINOPHIL # BLD: 0.2 K/UL (ref 0–0.4)
EOSINOPHIL NFR BLD: 2 % (ref 0–5)
EPITH CASTS URNS QL MICRO: ABNORMAL /LPF (ref 0–5)
ERYTHROCYTE [DISTWIDTH] IN BLOOD BY AUTOMATED COUNT: 14.1 % (ref 11.6–14.5)
EST. AVERAGE GLUCOSE BLD GHB EST-MCNC: 103 MG/DL
GLUCOSE SERPL-MCNC: 114 MG/DL (ref 74–99)
GLUCOSE UR STRIP.AUTO-MCNC: NEGATIVE MG/DL
HBA1C MFR BLD: 5.2 % (ref 4.2–5.6)
HCT VFR BLD AUTO: 45.9 % (ref 35–45)
HGB BLD-MCNC: 14.7 G/DL (ref 12–16)
HGB UR QL STRIP: NEGATIVE
IMM GRANULOCYTES # BLD AUTO: 0 K/UL (ref 0–0.04)
IMM GRANULOCYTES NFR BLD AUTO: 0 % (ref 0–0.5)
INR PPP: 0.9 (ref 0.8–1.2)
KETONES UR QL STRIP.AUTO: NEGATIVE MG/DL
LEUKOCYTE ESTERASE UR QL STRIP.AUTO: ABNORMAL
LYMPHOCYTES # BLD: 1.5 K/UL (ref 0.9–3.6)
LYMPHOCYTES NFR BLD: 21 % (ref 21–52)
MCH RBC QN AUTO: 29.9 PG (ref 24–34)
MCHC RBC AUTO-ENTMCNC: 32 G/DL (ref 31–37)
MCV RBC AUTO: 93.3 FL (ref 78–100)
MONOCYTES # BLD: 0.6 K/UL (ref 0.05–1.2)
MONOCYTES NFR BLD: 9 % (ref 3–10)
NEUTS SEG # BLD: 4.7 K/UL (ref 1.8–8)
NEUTS SEG NFR BLD: 67 % (ref 40–73)
NITRITE UR QL STRIP.AUTO: NEGATIVE
NRBC # BLD: 0 K/UL (ref 0–0.01)
NRBC BLD-RTO: 0 PER 100 WBC
PH UR STRIP: 5.5 [PH] (ref 5–8)
PLATELET # BLD AUTO: 239 K/UL (ref 135–420)
PMV BLD AUTO: 9.3 FL (ref 9.2–11.8)
POTASSIUM SERPL-SCNC: 4.7 MMOL/L (ref 3.5–5.5)
PROT UR STRIP-MCNC: NEGATIVE MG/DL
PROTHROMBIN TIME: 12.4 SEC (ref 11.5–15.2)
RBC # BLD AUTO: 4.92 M/UL (ref 4.2–5.3)
RBC #/AREA URNS HPF: NEGATIVE /HPF (ref 0–5)
SODIUM SERPL-SCNC: 140 MMOL/L (ref 136–145)
SP GR UR REFRACTOMETRY: 1.01 (ref 1–1.03)
SPECIMEN EXP DATE BLD: NORMAL
UROBILINOGEN UR QL STRIP.AUTO: 0.2 EU/DL (ref 0.2–1)
WBC # BLD AUTO: 7 K/UL (ref 4.6–13.2)
WBC URNS QL MICRO: ABNORMAL /HPF (ref 0–4)

## 2022-04-29 PROCEDURE — 85610 PROTHROMBIN TIME: CPT

## 2022-04-29 PROCEDURE — 80048 BASIC METABOLIC PNL TOTAL CA: CPT

## 2022-04-29 PROCEDURE — 87077 CULTURE AEROBIC IDENTIFY: CPT

## 2022-04-29 PROCEDURE — 86900 BLOOD TYPING SEROLOGIC ABO: CPT

## 2022-04-29 PROCEDURE — 85025 COMPLETE CBC W/AUTO DIFF WBC: CPT

## 2022-04-29 PROCEDURE — 85730 THROMBOPLASTIN TIME PARTIAL: CPT

## 2022-04-29 PROCEDURE — 87086 URINE CULTURE/COLONY COUNT: CPT

## 2022-04-29 PROCEDURE — 36415 COLL VENOUS BLD VENIPUNCTURE: CPT

## 2022-04-29 PROCEDURE — 81001 URINALYSIS AUTO W/SCOPE: CPT

## 2022-04-29 PROCEDURE — 82040 ASSAY OF SERUM ALBUMIN: CPT

## 2022-04-29 PROCEDURE — 83036 HEMOGLOBIN GLYCOSYLATED A1C: CPT

## 2022-04-30 LAB
BACTERIA SPEC CULT: ABNORMAL
CC UR VC: ABNORMAL
SERVICE CMNT-IMP: ABNORMAL

## 2022-05-02 DIAGNOSIS — M16.11 PRIMARY OSTEOARTHRITIS OF RIGHT HIP: Primary | ICD-10-CM

## 2022-05-02 DIAGNOSIS — Z01.818 PRE-OP EXAM: ICD-10-CM

## 2022-05-02 PROCEDURE — 73503 X-RAY EXAM HIP UNI 4/> VIEWS: CPT | Performed by: PHYSICIAN ASSISTANT

## 2022-05-03 ENCOUNTER — OFFICE VISIT (OUTPATIENT)
Dept: INTERNAL MEDICINE CLINIC | Age: 67
End: 2022-05-03
Payer: MEDICARE

## 2022-05-03 VITALS
HEART RATE: 81 BPM | OXYGEN SATURATION: 96 % | TEMPERATURE: 97.2 F | RESPIRATION RATE: 18 BRPM | WEIGHT: 190 LBS | DIASTOLIC BLOOD PRESSURE: 81 MMHG | BODY MASS INDEX: 27.2 KG/M2 | SYSTOLIC BLOOD PRESSURE: 122 MMHG | HEIGHT: 70 IN

## 2022-05-03 DIAGNOSIS — I10 PRIMARY HYPERTENSION: ICD-10-CM

## 2022-05-03 DIAGNOSIS — Z00.00 MEDICARE ANNUAL WELLNESS VISIT, SUBSEQUENT: ICD-10-CM

## 2022-05-03 DIAGNOSIS — R82.90 ABNORMAL URINALYSIS: ICD-10-CM

## 2022-05-03 DIAGNOSIS — M05.79 RHEUMATOID ARTHRITIS INVOLVING MULTIPLE SITES WITH POSITIVE RHEUMATOID FACTOR (HCC): Primary | ICD-10-CM

## 2022-05-03 DIAGNOSIS — Z12.31 ENCOUNTER FOR SCREENING MAMMOGRAM FOR BREAST CANCER: ICD-10-CM

## 2022-05-03 DIAGNOSIS — Z71.89 ADVANCE CARE PLANNING: ICD-10-CM

## 2022-05-03 DIAGNOSIS — M25.551 RIGHT HIP PAIN: ICD-10-CM

## 2022-05-03 DIAGNOSIS — J45.40 MODERATE PERSISTENT ASTHMA WITHOUT COMPLICATION: ICD-10-CM

## 2022-05-03 PROBLEM — Z79.899 HIGH RISK MEDICATION USE: Status: RESOLVED | Noted: 2021-01-05 | Resolved: 2022-05-03

## 2022-05-03 PROCEDURE — G8427 DOCREV CUR MEDS BY ELIG CLIN: HCPCS | Performed by: INTERNAL MEDICINE

## 2022-05-03 PROCEDURE — 1101F PT FALLS ASSESS-DOCD LE1/YR: CPT | Performed by: INTERNAL MEDICINE

## 2022-05-03 PROCEDURE — G8752 SYS BP LESS 140: HCPCS | Performed by: INTERNAL MEDICINE

## 2022-05-03 PROCEDURE — G9899 SCRN MAM PERF RSLTS DOC: HCPCS | Performed by: INTERNAL MEDICINE

## 2022-05-03 PROCEDURE — G0463 HOSPITAL OUTPT CLINIC VISIT: HCPCS | Performed by: INTERNAL MEDICINE

## 2022-05-03 PROCEDURE — 1090F PRES/ABSN URINE INCON ASSESS: CPT | Performed by: INTERNAL MEDICINE

## 2022-05-03 PROCEDURE — G8536 NO DOC ELDER MAL SCRN: HCPCS | Performed by: INTERNAL MEDICINE

## 2022-05-03 PROCEDURE — G8419 CALC BMI OUT NRM PARAM NOF/U: HCPCS | Performed by: INTERNAL MEDICINE

## 2022-05-03 PROCEDURE — G8754 DIAS BP LESS 90: HCPCS | Performed by: INTERNAL MEDICINE

## 2022-05-03 PROCEDURE — G8399 PT W/DXA RESULTS DOCUMENT: HCPCS | Performed by: INTERNAL MEDICINE

## 2022-05-03 PROCEDURE — G0438 PPPS, INITIAL VISIT: HCPCS | Performed by: INTERNAL MEDICINE

## 2022-05-03 PROCEDURE — 99214 OFFICE O/P EST MOD 30 MIN: CPT | Performed by: INTERNAL MEDICINE

## 2022-05-03 PROCEDURE — G8510 SCR DEP NEG, NO PLAN REQD: HCPCS | Performed by: INTERNAL MEDICINE

## 2022-05-03 PROCEDURE — 3017F COLORECTAL CA SCREEN DOC REV: CPT | Performed by: INTERNAL MEDICINE

## 2022-05-03 RX ORDER — CIPROFLOXACIN 250 MG/1
250 TABLET, FILM COATED ORAL EVERY 12 HOURS
Qty: 6 TABLET | Refills: 0 | Status: SHIPPED | OUTPATIENT
Start: 2022-05-03 | End: 2022-05-06

## 2022-05-03 NOTE — PROGRESS NOTES
[facility]:   Preoperative Evaluation    Date of Exam: 05/03/22    MRN: [id number]    Madyson Chisholm  Is a 78 y.o.  female  who presents for preoperative evaluation and management. Chief Complaint   Patient presents with    Pre-op Exam     Scheduled for Right total hip surgery w/DR. Carlisle on 5-13-22    Annual Wellness Visit       Subjective:   Pt is a 66 female with h/o HTN, GERD with Green's esophagus (followed by ), DJD, lumbar post laminectomy syndrome s/p spinal cord stimulator surgery (followed by Rakel Sheikh), cervical spinal stenosis, prediabetes (resolved), asthma (followed by , Pulmonology and ), HLD, lichens sclerosus, chronic sinusitis, vitamin B12 deficiency, and rheumatoid arthritis (Followed by ), vitamin D deficiency. 1.  Right Hip Pain: Pain is worsening. She was scheduled earlier this year but her surgery was postponed due to the [facility] pandemic. 2.  Hypertension: Well-controlled on losartan. Her last A1c was normal despite history of prediabetes. 3/4/22 's Note (Cardiology): \"Pt is low risk from CV standpoint and can and should proceed to OR without further testing, she has no known CAD or CHF, and no signs and symptoms of such. Her ekg is nonischemic, no further CV testing planned\"    3. Asthma: Followed by Dr. John Molina and Dr. Lakeshia Barrios. Average albuterol use: 3 times in the past wk but prior to last wk, not at all. Using her Symbicort and Singulair. She continues to get allergy shots. She next apt with her allergist is June. She has been having more allergy sx. 4. RA: Treated with Orencia. Today she reports:\"I'm fine. \" She has been having some right wrist and knee pain but her knee pain is from OA per her hx. She had injections along her wrist and knee in January. 5. Abnormal UA: Asymptomatic. Her urinalysis was positive for leukocyte esterase (small) and bacteria.     General Information:  Procedure/Surgery: Right hip replacement surgery. Date of Procedure/Surgery: 5/13/22  Surgeon: Lang Bolivar  Primary Physician: Genesis Rae MD  Surgery status: Elective  Surgery risk: Intermediate (head/neck, intraperitoneal, intrathoracic, orthopedic, and prostate    Cardiovascular Risk Factors:  1. Coronary revascularization within 5 years: no  2. Recurrent chest pain: no  3. Shortness of breath:  no  4. Recent coronary evaluation/stress test/angiogram:  no  5. Recent MI (less than 1 month ago):  no  6. Prior MI (by way of history or pathological waves):  no  7. Compensated CHF or h/o CHF:  no  8. Severe valvular disease:  no  9. Decompensated CHF:  no  10. High-grade atrioventricular block:  no  11. Arrhythmia:  no    Echocardiogram 11/29/16: Left ventricle: Size was normal. Systolic function was normal. Ejection fraction was estimated to be 65 %. There were no regional wall motion abnormalities. Wall thickness was normal. Doppler parameters were consistent with abnormal left ventricular relaxation (grade 1 diastolic dysfunction). Other Risk Factors:  1. Diabetes hx:  no  2. H/o CVA:  no  3. Uncontrolled hypertension:  no  4, Advanced age:  yes  5. Low functional capacity:  no  6. Recent use of: No recent use of aspirin (ASA), NSAIDS or steroids  7. Tetanus up to date: tetanus re-vaccination not indicated  8. Anesthesia Complications: None  9. History of abnormal bleeding : None  10. History of Blood Transfusions: no  11. Health Care Directive or Living Will: yes  12.  Latex Allergy: no      Problem List:     Patient Active Problem List    Diagnosis Date Noted    High risk medication use 01/05/2021    Arthritis of knee 02/03/2020    Adjacent segment disease C3/4 w/deg changes, poss stenosis, CT '18 63/95/2960    Lichen sclerosus et atrophicus 05/17/2018    Green esophagus 11/16/2017    Rheumatoid arthritis involving multiple sites 02/15/2017    Arthritis, lumbar spine 04/22/2016    Moderate persistent asthma without complication 38/39/7691    Lumbar post-laminectomy syndrome 12/10/2015    S/P cervical spinal fusion 08/13/2014    Cervical stenosis of spine 08/11/2014    GERD (gastroesophageal reflux disease) 08/11/2014    Hx MRSA infection 08/11/2014    Impaired glucose tolerance 06/30/2013    Hypertriglyceridemia 12/16/2012    DNS (deviated nasal septum)     HTN (hypertension) 06/28/2010    Iron deficiency anemia 06/28/2010    Vitamin B12 deficiency 06/28/2010    Vitamin D deficiency 06/28/2010     Medical History:     Past Medical History:   Diagnosis Date    Adjacent segment disease C3/4 w/deg changes, poss stenosis, CT '18 6/22/2018    Allergic rhinitis     Anemia 2008    intermittant since then    Asthma     Back pain     Green's esophagus with esophagitis     Cervical radiculopathy     Chronic sinusitis 5/15/2012    DNS (deviated nasal septum)     Empyema     Foraminal stenosis of cervical region     C4-C5    GERD (gastroesophageal reflux disease)     Dr Mendes Cancer Hx MRSA infection 8/11/2014    Hypertension     Hypertriglyceridemia     Insulin resistance 4/9/2012    Left knee pain     Lichen planus     Liver disease     ORTEGA-per pt, followed by Dr Renu Mckeon Menopause     Age 51    MRSA (methicillin resistant Staphylococcus aureus) infection 2008    left lung    Nausea & vomiting     Restless leg syndrome     Rheumatoid arthritis ([address]     Spinal cord stimulator status 2016    Spinal stenosis of cervical region     C4-C5 and C5-C6    TMJ syndrome     Wears glasses     and contacts     Allergies:      Allergies   Allergen Reactions    Adhesive Tape-Silicones Other (comments)     Patient stated iv tape and tegaderm ok    Other Plant, Animal, Environmental Unable to Consolidated Doc     MOLD    Sulfasalazine Other (comments)     Could not taste anything, lightheadedness    Tape [Adhesive] Other (comments)     Blisters, use paper tape only  Patient states tegaderm and paper tape are okay    Mold Other (comments)    Chlorhexidine Towelette Itching and Other (comments)     \"stinging for a couple of hours\"    itching      Medications:     Current Outpatient Medications   Medication Sig    cyanocobalamin 1,000 mcg tablet take 1 tablet by mouth once daily    albuterol (PROVENTIL HFA, VENTOLIN HFA, PROAIR HFA) 90 mcg/actuation inhaler Take 1 Puff by inhalation every six (6) hours as needed for Wheezing.  FeroSuL 325 mg (65 mg iron) tablet take 1 tablet by mouth once daily BEFORE BREAKFAST    losartan (COZAAR) 50 mg tablet TAKE 1 TABLET DAILY    abatacept (Orencia, with maltose,) 250 mg injection by IntraVENous route. monthly    clobetasoL (TEMOVATE) 0.05 % ointment Apply  to affected area two (2) days a week.  multivit-min/iron/folic/lutein (CENTRUM SILVER WOMEN PO) Take 1 Tab by mouth daily.  krill/om-3/dha/epa/phospho/ast (MAXIMUM RED KRILL OMEGA-3 PO) Take 1 Caplet by mouth daily.  ascorbic acid, vitamin C, (VITAMIN C) 500 mg tablet Take 1 Tab by mouth daily.  ESTRACE 0.01 % (0.1 mg/gram) vaginal cream Place 1 gm in the vagina twice weekly (Patient taking differently: Insert 1 g into vagina every seven (7) days.)    cholecalciferol, vitamin D3, (VITAMIN D3 PO) Take 1 Tab by mouth daily.  acetaminophen (TYLENOL) 325 mg tablet Take 325 mg by mouth every four (4) hours as needed for Pain.  triamcinolone (NASACORT AQ) 55 mcg nasal inhaler 2 Sprays by Both Nostrils route daily. Indications: PERENNIAL ALLERGIC RHINITIS    Dexlansoprazole (DEXILANT) 60 mg CpDM Take 1 Cap by mouth Daily (before dinner).  Cetirizine (ZYRTEC) 10 mg Cap Take 10 mg by mouth daily.  montelukast (SINGULAIR) 10 mg tablet Take 10 mg by mouth daily.  allergy injection by SubCUTAneous route every thirty (30) days.  budesonide-formoterol (SYMBICORT) 160-4.5 mcg/Actuation HFA inhaler Take 2 Puffs by inhalation two (2) times a day.      No current facility-administered medications for this visit. Surgical History:     Past Surgical History:   Procedure Laterality Date    HX CERVICAL FUSION  08/13/14    HX HEENT  1997    TMJ surgery    HX KNEE REPLACEMENT Right 2020    HX LUMBAR LAMINECTOMY  Nov 1995    [facility]    HX LUMBAR LAMINECTOMY  Feb 1997    [place]  1/2015    bunion removal from right foot    HX OTHER SURGICAL  2007    thoracentesis    HX OTHER SURGICAL  2008    chest tube    HX OTHER SURGICAL  1997    TMJ surgery    HX OTHER SURGICAL  2012    sinus surg 2012-Dr Rudy Horner.  HX OTHER SURGICAL  2016    spinal cord stimulator place for lumbar neuritis, good benefit    HX ROTATOR CUFF REPAIR Right 01/2019    HX TUBAL LIGATION      ME ANESTH,SURGERY OF SHOULDER  01/31/2019    ME COLONOSCOPY FLX DX W/COLLJ SPEC WHEN PFRMD  6-18-08    normal/duntemann     Social History:     Social History     Socioeconomic History    Marital status:    Tobacco Use    Smoking status: Never Smoker    Smokeless tobacco: Never Used   Vaping Use    Vaping Use: Never used   Substance and Sexual Activity    Alcohol use: Not Currently     Alcohol/week: 0.0 standard drinks     Comment: 0-6 per year    Drug use: Yes     Types: Prescription, OTC    Sexual activity: Yes     Partners: Male     Comment: Menopausal since 2000   Social History Narrative    Retired          REVIEW OF SYSTEMS:  Review of Systems   Constitutional: Negative for chills and fever. HENT: Negative for ear pain and sore throat. Eyes: Negative for blurred vision and pain. Respiratory: Negative for cough and shortness of breath. Cardiovascular: Negative for chest pain. Gastrointestinal: Negative for abdominal pain, blood in stool and melena. Genitourinary: Negative for dysuria and hematuria. Musculoskeletal: Positive for joint pain. Negative for myalgias. Skin: Negative for rash. Neurological: Negative for headaches.    Endo/Heme/Allergies: Does not bruise/bleed easily. Psychiatric/Behavioral: Negative for depression and substance abuse. Objective:     Vitals:    05/03/22 1014   BP: 122/81   Pulse: 81   Resp: 18   Temp: 97.2 °F (36.2 °C)   TempSrc: Temporal   SpO2: 96%   Weight: 190 lb (86.2 kg)   Height: 5' 10\" (1.778 m)   PainSc:   2   PainLoc: Generalized       Physical Exam  Vitals and nursing note reviewed. HENT:      Head: Normocephalic and atraumatic. Right Ear: External ear normal.      Left Ear: External ear normal.   Eyes:      General: No scleral icterus. Right eye: No discharge. Left eye: No discharge. Conjunctiva/sclera: Conjunctivae normal.   Cardiovascular:      Rate and Rhythm: Normal rate and regular rhythm. Heart sounds: Normal heart sounds. No murmur heard. No friction rub. No gallop. Pulmonary:      Effort: Pulmonary effort is normal. No respiratory distress. Breath sounds: Normal breath sounds. No wheezing or rales. Abdominal:      General: Bowel sounds are normal. There is no distension. Palpations: Abdomen is soft. There is no mass. Tenderness: There is no abdominal tenderness. There is no guarding or rebound. Musculoskeletal:         General: No swelling (BUE) or tenderness (BUE). Cervical back: Neck supple. Comments: She has decreased range of motion along her right hip secondary to pain. Lymphadenopathy:      Cervical: No cervical adenopathy. Skin:     General: Skin is warm and dry. Findings: No erythema or rash. Neurological:      Mental Status: She is alert. Motor: No abnormal muscle tone.       Gait: Gait normal.   Psychiatric:         Mood and Affect: Mood normal.         DIAGNOSTICS:   Lab Results   Component Value Date/Time    Sodium 140 04/29/2022 07:48 AM    Potassium 4.7 04/29/2022 07:48 AM    Chloride 108 04/29/2022 07:48 AM    CO2 29 04/29/2022 07:48 AM    Anion gap 3 04/29/2022 07:48 AM    Glucose 114 (H) 04/29/2022 07:48 AM BUN 16 04/29/2022 07:48 AM    Creatinine 0.83 04/29/2022 07:48 AM    BUN/Creatinine ratio 19 04/29/2022 07:48 AM    GFR est AA >60 04/29/2022 07:48 AM    GFR est non-AA >60 04/29/2022 07:48 AM    Calcium 9.9 04/29/2022 07:48 AM    Bilirubin, total 0.5 10/04/2021 07:49 AM    Alk. phosphatase 97 10/04/2021 07:49 AM    Protein, total 7.3 10/04/2021 07:49 AM    Albumin 4.1 04/29/2022 07:48 AM    Globulin 3.4 10/04/2021 07:49 AM    A-G Ratio 1.1 10/04/2021 07:49 AM    ALT (SGPT) 25 10/04/2021 07:49 AM    AST (SGOT) 14 10/04/2021 07:49 AM     Lab Results   Component Value Date/Time    WBC 7.0 04/29/2022 07:48 AM    HGB 14.7 04/29/2022 07:48 AM    HCT 45.9 (H) 04/29/2022 07:48 AM    PLATELET 621 81/49/0053 07:48 AM    MCV 93.3 04/29/2022 07:48 AM     Lab Results   Component Value Date/Time    Hemoglobin A1c 5.2 04/29/2022 07:48 AM     Assessment/Plan:   1. Rheumatoid arthritis: Stable. - Continue with medication as prescribed at her rheumatologist.    2. HTN: Stable. - Continue with medication as prescribed. 3.  Abnormal urinalysis: Mild positive for leukocyte esterase and bacteria. - We will treat with ciprofloxacin for 3 days. 4.  Asthma: +Allergic Rhinitis.   -Continue inhaler therapy. 5.  Health Maintenance:  - Ordering her mammogram for later this year. 6.  Right Hip Pain: From OA. Preoperative Assessment: No contraindications to planned surgery   Orders/studies that need to be obtained prior to surgical clearance: medical clearance has been obtained    Pt is to undergo an intermediate risk procedure with a low cardiac risk based on current history. Labs and imaging within acceptable range. No contraindications to planned surgery. Discontinue NSAIDS 1 week prior to surgical procedure. Follow up as scheduled post operatively. I have discussed the plan of care with the patient. The patient has received an after-visit summary and questions were answered concerning future plans.   I have discussed medication side effects and warnings with the patient as well. All questions were answered. The patient understands the plan of care. Handouts provided today with the above information. Pt instructed if symptoms worsen to call the office or report to the ED for continued care.       Dr. Paty Reis  [facility], [address] [place] [address], [address] [place] [address].  Phone: [phone]  Fax: [phone]

## 2022-05-03 NOTE — PROGRESS NOTES
[facility]NISTS OF [facility] [place]:  05/03/22, [id number]      The Initial Medicare Annual Wellness Exam PROGRESS NOTE    This is an Initial Medicare Annual Wellness Exam (AWV). I have reviewed the patient's medical history in detail and updated the computerized patient record. Carole Wilkins is a 78 y.o.  female and presents for an annual wellness exam.    SUBJECTIVE    Past Medical History:   Diagnosis Date    Adjacent segment disease C3/4 w/deg changes, poss stenosis, CT '18 6/22/2018    Allergic rhinitis     Anemia 2008    intermittant since then    Asthma     Back pain     Green's esophagus with esophagitis     Cervical radiculopathy     Chronic sinusitis 5/15/2012    DNS (deviated nasal septum)     Empyema     Foraminal stenosis of cervical region     C4-C5    GERD (gastroesophageal reflux disease)     Dr Gutierrez Manzanares Hx MRSA infection 8/11/2014    Hypertension     Hypertriglyceridemia     Insulin resistance 4/9/2012    Left knee pain     Lichen planus     Liver disease     ORTEGA-per pt, followed by Dr Aury Arias Menopause     Age 51    MRSA (methicillin resistant Staphylococcus aureus) infection 2008    left lung    Nausea & vomiting     Restless leg syndrome     Rheumatoid arthritis ([facility] [address]     Spinal cord stimulator status 2016    Spinal stenosis of cervical region     C4-C5 and C5-C6    TMJ syndrome     Wears glasses     and contacts      Past Surgical History:   Procedure Laterality Date    HX CERVICAL FUSION  08/13/14    HX HEENT  1997    TMJ surgery    HX KNEE REPLACEMENT Right 2020    HX LUMBAR LAMINECTOMY  Nov 1995    [facility]    HX LUMBAR LAMINECTOMY  Feb 1997    [facility]  1/2015    bunion removal from right foot    HX OTHER SURGICAL  2007    thoracentesis    HX OTHER SURGICAL  2008    chest tube    HX OTHER SURGICAL  1997    TMJ surgery    HX OTHER SURGICAL  2012    sinus surg 2012-Dr Ivett Chong.     HX OTHER SURGICAL  2016    spinal cord stimulator place for lumbar neuritis, good benefit    HX ROTATOR CUFF REPAIR Right 01/2019    HX TUBAL LIGATION      OR ANESTH,SURGERY OF SHOULDER  01/31/2019    OR COLONOSCOPY FLX DX W/COLLJ SPEC WHEN PFRMD  6-18-08    normal/duntemann     Current Outpatient Medications   Medication Sig Dispense Refill    ciprofloxacin HCl (CIPRO) 250 mg tablet Take 1 Tablet by mouth every twelve (12) hours for 3 days. 6 Tablet 0    cyanocobalamin 1,000 mcg tablet take 1 tablet by mouth once daily 90 Tablet 3    albuterol (PROVENTIL HFA, VENTOLIN HFA, PROAIR HFA) 90 mcg/actuation inhaler Take 1 Puff by inhalation every six (6) hours as needed for Wheezing.  FeroSuL 325 mg (65 mg iron) tablet take 1 tablet by mouth once daily BEFORE BREAKFAST 90 Tablet 1    losartan (COZAAR) 50 mg tablet TAKE 1 TABLET DAILY 90 Tablet 3    abatacept (Orencia, with maltose,) 250 mg injection by IntraVENous route. monthly      clobetasoL (TEMOVATE) 0.05 % ointment Apply  to affected area two (2) days a week. 45 g 3    multivit-min/iron/folic/lutein (CENTRUM SILVER WOMEN PO) Take 1 Tab by mouth daily.  krill/om-3/dha/epa/phospho/ast (MAXIMUM RED KRILL OMEGA-3 PO) Take 1 Caplet by mouth daily.  ascorbic acid, vitamin C, (VITAMIN C) 500 mg tablet Take 1 Tab by mouth daily. 90 Tab 3    ESTRACE 0.01 % (0.1 mg/gram) vaginal cream Place 1 gm in the vagina twice weekly (Patient taking differently: Insert 1 g into vagina every seven (7) days.) 42.5 g 2    cholecalciferol, vitamin D3, (VITAMIN D3 PO) Take 1 Tab by mouth daily.  acetaminophen (TYLENOL) 325 mg tablet Take 325 mg by mouth every four (4) hours as needed for Pain.  triamcinolone (NASACORT AQ) 55 mcg nasal inhaler 2 Sprays by Both Nostrils route daily. Indications: PERENNIAL ALLERGIC RHINITIS      Dexlansoprazole (DEXILANT) 60 mg CpDM Take 1 Cap by mouth Daily (before dinner).  Cetirizine (ZYRTEC) 10 mg Cap Take 10 mg by mouth daily.       montelukast (SINGULAIR) 10 mg tablet Take 10 mg by mouth daily.  allergy injection by SubCUTAneous route every thirty (30) days.  budesonide-formoterol (SYMBICORT) 160-4.5 mcg/Actuation HFA inhaler Take 2 Puffs by inhalation two (2) times a day.        Allergies   Allergen Reactions    Adhesive Tape-Silicones Other (comments)     Patient stated iv tape and tegaderm ok    Other Plant, Animal, Environmental Unable to Consolidated Doc     MOLD    Sulfasalazine Other (comments)     Could not taste anything, lightheadedness    Tape [Adhesive] Other (comments)     Blisters, use paper tape only  Patient states tegaderm and paper tape are okay    Mold Other (comments)    Chlorhexidine Towelette Itching and Other (comments)     \"stinging for a couple of hours\"    itching     Family History   Problem Relation Age of Onset    Hypertension Mother    [facility] Diabetes Mother    Matilde Jeronimo Sister     Other Sister         Lupus    Cancer Sister 35        CA, uterus    Diabetes Sister     Other Brother         myocarditis    Heart Attack Brother     Heart Disease Brother     Heart Surgery Brother     Coronary Art Dis Father     Hypertension Father     Lung Disease Father     Cancer Sister 48        CA, breast    Breast Cancer Sister         Estrogen based    Diabetes Brother     Asthma Brother     Stroke Maternal Grandmother     Cancer Daughter      Social History     Tobacco Use    Smoking status: Never Smoker    Smokeless tobacco: Never Used   Substance Use Topics    Alcohol use: Not Currently     Alcohol/week: 0.0 standard drinks     Comment: 0-6 per year     Patient Active Problem List   Diagnosis Code    HTN (hypertension) I10    Iron deficiency anemia D50.9    Vitamin B12 deficiency E53.8    Vitamin D deficiency E55.9    DNS (deviated nasal septum) J34.2    Hypertriglyceridemia E78.1    Impaired glucose tolerance R73.02    Cervical stenosis of spine M48.02    GERD (gastroesophageal reflux disease) K21.9    Hx MRSA infection Z86.14    S/P cervical spinal fusion Z98.1    Lumbar post-laminectomy syndrome M96.1    Moderate persistent asthma without complication K97.47    Arthritis, lumbar spine M47.816    Rheumatoid arthritis involving multiple sites M06.9    Green esophagus H94.89    Lichen sclerosus et atrophicus L90.0    Adjacent segment disease C3/4 w/deg changes, poss stenosis, CT '18 [id number]    Arthritis of knee M17.10    High risk medication use Z79.899     Pt is a 66 female with h/o HTN, GERD with Green's esophagus (followed by ), DJD, lumbar post laminectomy syndrome s/p spinal cord stimulator surgery (followed by Ricardo Valle), cervical spinal stenosis, prediabetes (resolved), asthma (followed by , Pulmonology and ), HLD, lichens sclerosus, chronic sinusitis, vitamin B12 deficiency, and rheumatoid arthritis (Followed by ), vitamin D deficiency. Health Maintenance History  Immunizations reviewed: Tdap up to date   Pneumovax: up to date   Flu: up to date  Zoster: up to date    Immunization History   Administered Date(s) Administered    COVID-19, Moderna Booster, PF, 0.25mL Dose 10/28/2021, 03/31/2022    COVID-19, Moderna, Primary or Immunocompromised Series, MRNA, PF, 100mcg/0.5mL 03/05/2021, 04/02/2021    Hep A and Hep B Vaccine 10/29/2020, 11/30/2020, 05/03/2021    Hepatitis B Vaccine 01/01/2009    Influenza High Dose Vaccine PF 10/03/2017, 09/11/2019, 09/28/2020, 10/07/2021    Influenza Vaccine 10/09/2014, 10/22/2015    Influenza Vaccine (Tri) Adjuvanted (>65 Yrs FLUAD TRI 38560) 09/11/2019    Influenza Vaccine PF 10/12/2016    Influenza Vaccine Whole 01/04/2013    Influenza, Quadrivalent, Adjuvanted (>65 Yrs FLUAD QUAD 45895) 09/28/2020, 10/07/2021    Pneumococcal Polysaccharide (PPSV-23) 07/28/2016, 10/11/2021    Td 08/01/2011    Tdap 07/28/2016, 03/03/2017    Zoster Recombinant 03/30/2019, 07/02/2019       Colonoscopy: Up to date. No bleeding.      Eye exam: Up to date. Mammo: Up to date. Dexascan: Up to date. Pelvic/Pap: No bleeding. Review of Systems   Constitutional: Negative for chills and fever. HENT: Negative for ear pain and sore throat. Eyes: Negative for blurred vision and pain. Respiratory: Negative for cough and shortness of breath. Cardiovascular: Negative for chest pain. Gastrointestinal: Negative for abdominal pain, blood in stool and melena. Genitourinary: Negative for dysuria and hematuria. Musculoskeletal: Positive for joint pain. Negative for myalgias. Skin: Negative for rash. Neurological: Negative for headaches. Endo/Heme/Allergies: Does not bruise/bleed easily. Psychiatric/Behavioral: Negative for depression and substance abuse. Depression Risk Factor Screening:      Patient Health Questionnaire (PHQ-2)   Over the last 2 weeks, how often have you been bothered by any of the following problems? · Little interest or pleasure in doing things? · Not at all. [0]  · Feeling down, depressed, or hopeless? · Not at all. [0]    Total Score: 0/6  PHQ-2 Assessment Scoring:   A score of 2 or more requires further screening with the PHQ-9    Alcohol Risk Factor Screening:   Women: On any occasion during the past 3 months, have you had more than 3 drinks containing alcohol? no    Do you average more than 7 drinks per week? no    Tobacco Use Screening:     Social History     Tobacco Use   Smoking Status Never Smoker   Smokeless Tobacco Never Used       Hearing Loss   There have been no changes to the pt's hearing. No additional studies/evaluation is warranted at this time    Activities of Daily Living   Self-care. Requires assistance with: no ADLs  Despite her upcoming right hip replacement surgery, she is still able to complete ADLs/IADLs. Fall Risk   No falls w/I the past year.     Abuse Screen   None    Additional Examination Findings:  Vitals:    05/03/22 1014   BP: 122/81   Pulse: 81   Resp: 18 Temp: 97.2 °F (36.2 °C)   TempSrc: Temporal   SpO2: 96%   Weight: 190 lb (86.2 kg)   Height: 5' 10\" (1.778 m)   PainSc:   2   PainLoc: Generalized      Body mass index is 27.26 kg/m². Evaluation of Cognitive Function:  Mood/affect: Euthymic  Appearance: Well-groomed  Family member/caregiver input: She is not with a family member today. General:   Well-nourished, well-groomed, pleasant, alert, in no acute distress. Head:  Normocephalic, atraumatic  Ears:  External ears WNL  Eyes:  Clear sclera  Neuro:   Alert, conversant, appropriate, following commands  Skin:    No rashes noted  Psych: Affect, mood and judgment appropriate      Dementia Screen:  Clock Drawing (ten past eleven) Exercise: Unremarkable.       LABS   Data Review:   Lab Results   Component Value Date/Time    Sodium 140 04/29/2022 07:48 AM    Potassium 4.7 04/29/2022 07:48 AM    Chloride 108 04/29/2022 07:48 AM    CO2 29 04/29/2022 07:48 AM    Anion gap 3 04/29/2022 07:48 AM    Glucose 114 (H) 04/29/2022 07:48 AM    BUN 16 04/29/2022 07:48 AM    Creatinine 0.83 04/29/2022 07:48 AM    BUN/Creatinine ratio 19 04/29/2022 07:48 AM    GFR est AA >60 04/29/2022 07:48 AM    GFR est non-AA >60 04/29/2022 07:48 AM    Calcium 9.9 04/29/2022 07:48 AM       Lab Results   Component Value Date/Time    WBC 7.0 04/29/2022 07:48 AM    HGB 14.7 04/29/2022 07:48 AM    HCT 45.9 (H) 04/29/2022 07:48 AM    PLATELET 774 09/50/7852 07:48 AM    MCV 93.3 04/29/2022 07:48 AM       Lab Results   Component Value Date/Time    Hemoglobin A1c 5.2 04/29/2022 07:48 AM       Lab Results   Component Value Date/Time    Cholesterol, total 175 10/04/2021 07:49 AM    HDL Cholesterol 45 10/04/2021 07:49 AM    LDL, calculated 102.2 (H) 10/04/2021 07:49 AM    VLDL, calculated 27.8 10/04/2021 07:49 AM    Triglyceride 139 10/04/2021 07:49 AM    CHOL/HDL Ratio 3.9 10/04/2021 07:49 AM         Patient Care Team:  Carley Jacobo MD as PCP - General (Family Medicine)  Carley Jacobo, MD as PCP - [facility] Empaneled Provider  Darryle Hopkins, MD (Orthopedic Surgery)  Patric Smith MD (Physical Medicine and Rehabilitation)  Neo Snyder MD (Pulmonary Disease)  Prasanna Maxwell MD (Allergy)  Sylvia Cohen MD (Inactive) (Cardiology)  Ariella Resendez MD as Consulting Provider (Rheumatology)    End-of-life planning  Advanced Directive in the case than an injury or illness causes the patient to be unable to make health care decisions was discussed with the patient. Advice/Referrals/Counselling/Plan:   Education and counseling provided:  Are appropriate based on today's review and evaluation  End-of-Life planning (with patient's consent)  Pneumococcal Vaccine  Influenza Vaccine  Screening Mammography  Screening Pap and pelvic (covered once every 2 years)  Colorectal cancer screening tests  Cardiovascular screening blood test  Bone mass measurement (DEXA)  Screening for glaucoma  Diabetes screening test  Include in education list (weight loss, physical activity, smoking cessation, fall prevention, and nutrition)    ICD-10-CM ICD-9-CM    1. Rheumatoid arthritis involving multiple sites with positive rheumatoid factor (HCC)  M05.79 714.0    2. Encounter for screening mammogram for breast cancer  Z12.31 V76.12 TYLER 3D REYNALDO W MAMMO BI SCREENING INCL CAD   3. Medicare annual wellness visit, subsequent  Z00.00 V70.0    4. Primary hypertension  I10 401.9    5. Right hip pain  M25.551 719.45    6. Abnormal urinalysis  R82.90 791.9    7. Moderate persistent asthma without complication  P14.66 876.18      reviewed diet, exercise and weight control. Brief written plan, checklist    Assessment/Plan:    Health Maintenance:  -Ordering mammogram for this summer to screen for breast cancer. ORDERS:  - TYLER 3D REYNALDO W MAMMO BI SCREENING INCL CAD;  Future          Lab review: labs are reviewed in the [facility] (ACP) Provider Conversation     Date of ACP Conversation: 05/03/22  Persons included in Conversation:  patient    Authorized Decision Maker (if patient is incapable of making informed decisions): This person is:   Named in Advance Directive or Healthcare Power of           For Patients with Decision Making Capacity:   Values/Goals: Exploration of values, goals, and preferences if recovery is not expected, even with continued medical treatment in the event of:  Imminent death  Severe, permanent brain injury    Conversation Outcomes / Follow-Up Plan:   ACP complete - no further action today. She has no changes to make to her pre-existing advance directive. I have discussed the diagnosis with the patient and the intended plan as seen in the above orders. The patient has received an after-visit summary and questions were answered concerning future plans. I have discussed medication side effects and warnings with the patient as well. I have reviewed the plan of care with the patient, accepted their input and they are in agreement with the treatment goals.

## 2022-05-03 NOTE — PATIENT INSTRUCTIONS
Preventing Falls: Care Instructions  Your Care Instructions     Getting around your home safely can be a challenge if you have injuries or health problems that make it easy for you to fall. Loose rugs and furniture in walkways are among the dangers for many older people who have problems walking or who have poor eyesight. People who have conditions such as arthritis, osteoporosis, or dementia also have to be careful not to fall. You can make your home safer with a few simple measures. Follow-up care is a key part of your treatment and safety. Be sure to make and go to all appointments, and call your doctor if you are having problems. It's also a good idea to know your test results and keep a list of the medicines you take. How can you care for yourself at home? Taking care of yourself  · Exercise regularly to improve your strength, muscle tone, and balance. Walk if you can. Swimming may be a good choice if you cannot walk easily. · Have your vision and hearing checked each year or any time you notice a change. If you have trouble seeing and hearing, you might not be able to avoid objects and could lose your balance. · Know the side effects of the medicines you take. Ask your doctor or pharmacist whether the medicines you take can affect your balance. Sleeping pills or sedatives can affect your balance. · Limit the amount of alcohol you drink. Alcohol can impair your balance and other senses. · Ask your doctor whether calluses or corns on your feet need to be removed. If you wear loose-fitting shoes because of calluses or corns, you can lose your balance and fall. · Talk to your doctor if you have numbness in your feet. · You may get dizzy if you do not drink enough water. To prevent dehydration, drink plenty of fluids. Choose water and other clear liquids.  If you have kidney, heart, or liver disease and have to limit fluids, talk with your doctor before you increase the amount of fluids you drink.  Preventing falls at home  · Remove raised doorway thresholds, throw rugs, and clutter. Repair loose carpet or raised areas in the floor. · Move furniture and electrical cords to keep them out of walking paths. · Use nonskid floor wax, and wipe up spills right away, especially on ceramic tile floors. · If you use a walker or cane, put rubber tips on it. If you use crutches, clean the bottoms of them regularly with an abrasive pad, such as steel wool. · Keep your house well lit, especially Jaunita Ill, and outside walkways. Use night-lights in areas such as hallways and bathrooms. Add extra light switches or use remote switches (such as switches that go on or off when you clap your hands) to make it easier to turn lights on if you have to get up during the night. · Install sturdy handrails on stairways. · Move items in your cabinets so that the things you use a lot are on the lower shelves (about waist level). · Keep a cordless phone and a flashlight with new batteries by your bed. If possible, put a phone in each of the main rooms of your house, or carry a cell phone in case you fall and cannot reach a phone. Or, you can wear a device around your neck or wrist. You push a button that sends a signal for help. · Wear low-heeled shoes that fit well and give your feet good support. Use footwear with nonskid soles. Check the heels and soles of your shoes for wear. Repair or replace worn heels or soles. · Do not wear socks without shoes on wood floors. · Walk on the grass when the sidewalks are slippery. If you live in an area that gets snow and ice in the winter, sprinkle salt on slippery steps and sidewalks. Or ask a family member or friend to do this for you. Preventing falls in the bath  · Install grab bars and nonskid mats inside and outside your shower or tub and near the toilet and sinks. · Use shower chairs and bath benches.   · Use a hand-held shower head that will allow you to sit while showering. · Get into a tub or shower by putting the weaker leg in first. Get out of a tub or shower with your strong side first.  · Repair loose toilet seats and consider installing a raised toilet seat to make getting on and off the toilet easier. · Keep your bathroom door unlocked while you are in the shower. Where can you learn more? Go to http://www.garcia.com/  Enter G117 in the search box to learn more about \"Preventing Falls: Care Instructions. \"  Current as of: September 8, 2021               Content Version: 13.2  © 0736-9745 [employer]. Care instructions adapted under license by [employer] (which disclaims liability or warranty for this information). If you have questions about a medical condition or this instruction, always ask your healthcare professional. [address] any warranty or liability for your use of this information. Medicare Wellness Visit, Female     The best way to live healthy is to have a lifestyle where you eat a well-balanced diet, exercise regularly, limit alcohol use, and quit all forms of tobacco/nicotine, if applicable. Regular preventive services are another way to keep healthy. Preventive services (vaccines, screening tests, monitoring & exams) can help personalize your care plan, which helps you manage your own care. Screening tests can find health problems at the earliest stages, when they are easiest to treat. [facility] follows the current, evidence-based guidelines published by the [facility] (USPSTF) when recommending preventive services for our patients. Because we follow these guidelines, sometimes recommendations change over time as research supports it. (For example, mammograms used to be recommended annually.  Even though Medicare will still pay for an annual mammogram, the newer guidelines recommend a mammogram every two years for women of average risk). Of course, you and your doctor may decide to screen more often for some diseases, based on your risk and your co-morbidities (chronic disease you are already diagnosed with). Preventive services for you include:  - Medicare offers their members a free annual wellness visit, which is time for you and your primary care provider to discuss and plan for your preventive service needs. Take advantage of this benefit every year!  -All adults over the age of 72 should receive the recommended pneumonia vaccines. Current USPSTF guidelines recommend a series of two vaccines for the best pneumonia protection.   -All adults should have a flu vaccine yearly and a tetanus vaccine every 10 years.   -All adults age 48 and older should receive the shingles vaccines (series of two vaccines). -All adults age 38-68 who are overweight should have a diabetes screening test once every three years.   -All adults born between [birth year] and 1965 should be screened once for Hepatitis C.  -Other screening tests and preventive services for persons with diabetes include: an eye exam to screen for diabetic retinopathy, a kidney function test, a foot exam, and stricter control over your cholesterol.   -Cardiovascular screening for adults with routine risk involves an electrocardiogram (ECG) at intervals determined by your doctor.   -Colorectal cancer screenings should be done for adults age 54-65 with no increased risk factors for colorectal cancer. There are a number of acceptable methods of screening for this type of cancer. Each test has its own benefits and drawbacks. Discuss with your doctor what is most appropriate for you during your annual wellness visit. The different tests include: colonoscopy (considered the best screening method), a fecal occult blood test, a fecal DNA test, and sigmoidoscopy.    -A bone mass density test is recommended when a woman turns 65 to screen for osteoporosis.  This test is only recommended one time, as a screening. Some providers will use this same test as a disease monitoring tool if you already have osteoporosis. -Breast cancer screenings are recommended every other year for women of normal risk, age 54-69.  -Cervical cancer screenings for women over age 72 are only recommended with certain risk factors.      Here is a list of your current Health Maintenance items (your personalized list of preventive services) with a due date:  Health Maintenance Due   Topic Date Due    Annual Well Visit  04/10/2022

## 2022-05-03 NOTE — ACP (ADVANCE CARE PLANNING)
Advance Care Planning  Advance Care Planning (ACP) Provider Conversation     Date of ACP Conversation: 05/03/22  Persons included in Conversation:  patient    Authorized Decision Maker (if patient is incapable of making informed decisions): This person is:   Named in Advance Directive or Healthcare Power of           For Patients with Decision Making Capacity:   Values/Goals: Exploration of values, goals, and preferences if recovery is not expected, even with continued medical treatment in the event of:  Imminent death  Severe, permanent brain injury    Conversation Outcomes / Follow-Up Plan:   ACP complete - no further action today. She has no changes to make to her pre-existing advance directive.     Dr. Parul Benitez  [facility], [address] [place] [address], [address] [place] [address] Str.  Phone: [phone]  Fax: [phone]

## 2022-05-03 NOTE — PROGRESS NOTES
Wenceslao Wan presents today for   Chief Complaint   Patient presents with    Pre-op Exam     Scheduled for Right total hip surgery w/DR. Carlisle on 5-13-22    Annual Wellness Visit       1. \"Have you been to the ER, urgent care clinic since your last visit? Hospitalized since your last visit? \" n0    2. \"Have you seen or consulted any other health care providers outside of the [address] [place] [address] since your last visit? \" yes     3. For patients aged 39-70: Has the patient had a colonoscopy / FIT/ Cologuard? Yes - no Care Gap present      If the patient is female:    4. For patients aged 41-77: Has the patient had a mammogram within the past 2 years? Yes - no Care Gap present  See top three    5. For patients aged 21-65: Has the patient had a pap smear?  NA - based on age or sex

## 2022-05-05 ENCOUNTER — OFFICE VISIT (OUTPATIENT)
Dept: ORTHOPEDIC SURGERY | Age: 67
End: 2022-05-05
Payer: MEDICARE

## 2022-05-05 VITALS
HEART RATE: 85 BPM | SYSTOLIC BLOOD PRESSURE: 128 MMHG | WEIGHT: 191.4 LBS | HEIGHT: 70 IN | OXYGEN SATURATION: 96 % | TEMPERATURE: 97.3 F | BODY MASS INDEX: 27.4 KG/M2 | DIASTOLIC BLOOD PRESSURE: 81 MMHG

## 2022-05-05 DIAGNOSIS — M16.11 PRIMARY OSTEOARTHRITIS OF RIGHT HIP: Primary | ICD-10-CM

## 2022-05-05 DIAGNOSIS — Z01.818 PRE-OP EXAM: ICD-10-CM

## 2022-05-05 PROCEDURE — 99214 OFFICE O/P EST MOD 30 MIN: CPT | Performed by: PHYSICIAN ASSISTANT

## 2022-05-05 RX ORDER — DEXAMETHASONE SODIUM PHOSPHATE 4 MG/ML
8 INJECTION, SOLUTION INTRA-ARTICULAR; INTRALESIONAL; INTRAMUSCULAR; INTRAVENOUS; SOFT TISSUE
Status: CANCELLED | OUTPATIENT
Start: 2022-05-05 | End: 2022-05-05

## 2022-05-05 RX ORDER — ACETAMINOPHEN 325 MG/1
1000 TABLET ORAL ONCE
Status: CANCELLED | OUTPATIENT
Start: 2022-05-05 | End: 2022-05-05

## 2022-05-05 RX ORDER — PREGABALIN 25 MG/1
75 CAPSULE ORAL ONCE
Status: CANCELLED | OUTPATIENT
Start: 2022-05-05 | End: 2022-05-05

## 2022-05-05 NOTE — H&P (VIEW-ONLY)
[address] [facility], [address] [facility]  [phone]           HISTORY & PHYSICAL      Patient: Nic Nelson                MRN: [id number]       SSN: [id number]  YOB: 1955        AGE: 78 y.o. SEX: female  Body mass index is 27.46 kg/m². PCP: Bradley Damon MD  05/05/22      CC: right hip end stage OA  Problem List Items Addressed This Visit     None      Visit Diagnoses     Primary osteoarthritis of right hip    -  Primary    Pre-op exam                HPI:  The patient is a pleasant 78 y.o. whom has end stage OA of their Right hip and has failed conservative treatment including but not limited to NSAIDS, cortisone injections, viscosupplementation, PT, and pain medicine. Due to the current findings and affected activities of daily living, surgical intervention is indicated. The alternatives, risks, complications, as well as expected outcome were discussed. These include but are not limited to infection, blood loss, need for blood transfusion, neurovascular damage, DVT, PE,  post-op stiffness and pain, leg length discrepancy, dislocation, anesthetic complications, prothesis longevity, need for more surgery, MI, stroke, and even death. The patient understands and wishes to proceed with surgery.       Past Medical History:   Diagnosis Date    Adjacent segment disease C3/4 w/deg changes, poss stenosis, CT '18 6/22/2018    Allergic rhinitis     Anemia 2008    intermittant since then    Asthma     Back pain     Green's esophagus with esophagitis     Cervical radiculopathy     Chronic sinusitis 5/15/2012    DNS (deviated nasal septum)     Empyema     Foraminal stenosis of cervical region     C4-C5    GERD (gastroesophageal reflux disease)     Dr Tano Rush    Hx MRSA infection 8/11/2014    Hypertension     Hypertriglyceridemia     Insulin resistance 4/9/2012    Left knee pain     Lichen planus     Liver disease     ORTEGA-per pt, followed by Dr Aury Arias Menopause     Age 51    MRSA (methicillin resistant Staphylococcus aureus) infection 2008    left lung    Nausea & vomiting     Restless leg syndrome     Rheumatoid arthritis ([facility] [address]     Spinal cord stimulator status 2016    Spinal stenosis of cervical region     C4-C5 and C5-C6    TMJ syndrome     Wears glasses     and contacts         Current Outpatient Medications:     ciprofloxacin HCl (CIPRO) 250 mg tablet, Take 1 Tablet by mouth every twelve (12) hours for 3 days. , Disp: 6 Tablet, Rfl: 0    cyanocobalamin 1,000 mcg tablet, take 1 tablet by mouth once daily, Disp: 90 Tablet, Rfl: 3    albuterol (PROVENTIL HFA, VENTOLIN HFA, PROAIR HFA) 90 mcg/actuation inhaler, Take 1 Puff by inhalation every six (6) hours as needed for Wheezing., Disp: , Rfl:     FeroSuL 325 mg (65 mg iron) tablet, take 1 tablet by mouth once daily BEFORE BREAKFAST, Disp: 90 Tablet, Rfl: 1    losartan (COZAAR) 50 mg tablet, TAKE 1 TABLET DAILY, Disp: 90 Tablet, Rfl: 3    abatacept (Orencia, with maltose,) 250 mg injection, by IntraVENous route. monthly, Disp: , Rfl:     clobetasoL (TEMOVATE) 0.05 % ointment, Apply  to affected area two (2) days a week., Disp: 45 g, Rfl: 3    multivit-min/iron/folic/lutein (CENTRUM SILVER WOMEN PO), Take 1 Tab by mouth daily. , Disp: , Rfl:     krill/om-3/dha/epa/phospho/ast (MAXIMUM RED KRILL OMEGA-3 PO), Take 1 Caplet by mouth daily. , Disp: , Rfl:     ascorbic acid, vitamin C, (VITAMIN C) 500 mg tablet, Take 1 Tab by mouth daily. , Disp: 90 Tab, Rfl: 3    ESTRACE 0.01 % (0.1 mg/gram) vaginal cream, Place 1 gm in the vagina twice weekly (Patient taking differently: Insert 1 g into vagina every seven (7) days.), Disp: 42.5 g, Rfl: 2    cholecalciferol, vitamin D3, (VITAMIN D3 PO), Take 1 Tab by mouth daily. , Disp: , Rfl:     acetaminophen (TYLENOL) 325 mg tablet, Take 325 mg by mouth every four (4) hours as needed for Pain., Disp: , Rfl:   triamcinolone (NASACORT AQ) 55 mcg nasal inhaler, 2 Sprays by Both Nostrils route daily. Indications: PERENNIAL ALLERGIC RHINITIS, Disp: , Rfl:     Dexlansoprazole (DEXILANT) 60 mg CpDM, Take 1 Cap by mouth Daily (before dinner). , Disp: , Rfl:     Cetirizine (ZYRTEC) 10 mg Cap, Take 10 mg by mouth daily. , Disp: , Rfl:     montelukast (SINGULAIR) 10 mg tablet, Take 10 mg by mouth daily. , Disp: , Rfl:     allergy injection, by SubCUTAneous route every thirty (30) days. , Disp: , Rfl:     budesonide-formoterol (SYMBICORT) 160-4.5 mcg/Actuation HFA inhaler, Take 2 Puffs by inhalation two (2) times a day., Disp: , Rfl:     Allergies   Allergen Reactions    Adhesive Tape-Silicones Other (comments)     Patient stated iv tape and tegaderm ok    Other Plant, Animal, Environmental Unable to Consolidated Doc     MOLD    Sulfasalazine Other (comments)     Could not taste anything, lightheadedness    Tape [Adhesive] Other (comments)     Blisters, use paper tape only  Patient states tegaderm and paper tape are okay    Mold Other (comments)    Chlorhexidine Towelette Itching and Other (comments)     \"stinging for a couple of hours\"    itching       Social History     Socioeconomic History    Marital status:      Spouse name: Not on file    Number of children: Not on file    Years of education: Not on file    Highest education level: Not on file   Occupational History    Not on file   Tobacco Use    Smoking status: Never Smoker    Smokeless tobacco: Never Used   Vaping Use    Vaping Use: Never used   Substance and Sexual Activity    Alcohol use: Not Currently     Alcohol/week: 0.0 standard drinks     Comment: 0-6 per year    Drug use: Yes     Types: Prescription, OTC    Sexual activity: Yes     Partners: Male     Comment: Menopausal since 2000   Other Topics Concern    Not on file   Social History Narrative    Retired      Social Determinants of Health     Financial Resource Strain:    Tessa Molina Difficulty of Paying Living Expenses: Not on file   Food Insecurity:     Worried About Running Out of Food in the Last Year: Not on file    Ran Out of Food in the Last Year: Not on file   Transportation Needs:     Lack of Transportation (Medical): Not on file    Lack of Transportation (Non-Medical): Not on file   Physical Activity:     Days of Exercise per Week: Not on file    Minutes of Exercise per Session: Not on file   Stress:     Feeling of Stress : Not on file   Social Connections:     Frequency of Communication with Friends and Family: Not on file    Frequency of Social Gatherings with Friends and Family: Not on file    Attends Gnosticism Services: Not on file    Active Member of [address] [place] [address] [employer] or Organizations: Not on file    Attends Club or Organization Meetings: Not on file    Marital Status: Not on file   Intimate Partner Violence:     Fear of Current or Ex-Partner: Not on file    Emotionally Abused: Not on file    Physically Abused: Not on file    Sexually Abused: Not on file   Housing Stability:     Unable to Pay for Housing in the Last Year: Not on file    Number of [place] in the Last Year: Not on file    Unstable Housing in the Last Year: Not on file       Past Surgical History:   Procedure Laterality Date    HX CERVICAL FUSION  08/13/14    HX HEENT  1997    TMJ surgery    HX KNEE REPLACEMENT Right 2020    HX LUMBAR LAMINECTOMY  Nov 1995    [facility]    HX LUMBAR LAMINECTOMY  Feb 1997    Jordana Romeo  1/2015    bunion removal from right foot    HX OTHER SURGICAL  2007    thoracentesis    HX OTHER SURGICAL  2008    chest tube    HX OTHER SURGICAL  1997    TMJ surgery    HX OTHER SURGICAL  2012    sinus surg 2012-Dr Theresa Gonzalez.     HX OTHER SURGICAL  2016    spinal cord stimulator place for lumbar neuritis, good benefit    HX ROTATOR CUFF REPAIR Right 01/2019    HX TUBAL LIGATION      [place] ANESTH,SURGERY OF SHOULDER  01/31/2019    [place] COLONOSCOPY FLX DX W/COLLJ SPEC WHEN PFRMD 6-18-08    normal/duntemann       Family History:  Non-contributory. PE:  Visit Vitals  /81 (BP 1 Location: Left upper arm, BP Patient Position: Sitting, BP Cuff Size: Adult)   Pulse 85   Temp 97.3 °F (36.3 °C) (Temporal)   Ht 5' 10\" (1.778 m)   Wt 191 lb 6.4 oz (86.8 kg)   SpO2 96%   BMI 27.46 kg/m²     A&O X3, NAD, well develop, well nourished  Heart: S1-S2, rrr  Lungs: CTA bilat  Abd: soft, nt, nt, + bs in all quadrants  Ext:  Pos distal pulses to DP, PT  Leg lengths even grossly sitting in the chair    X-ray: Radiographs of the right hip done 5/2/2022 at the [facility] location include AP pelvis, AP and crosstable lateral the right hip as well as AP the contralateral hip confirms end-stage arthritis with bone-on-bone eburnation. Labs: labs were reviewed and wnl.  ua pos, treated with cipro    A:  Right  hip end stage OA    P:  At this point we will move forward with surgery. Again, the alternatives, risks, complications, as well as expected outcome were discussed and the patient wishes to proceed with surgery. Pt has been instructed to stop aspirin, nsaids, rheumatologic medications and blood thinners. They have also been instructed to continue on any heart and bp meds and to take them the morning of surgery with sips of water. Direct anterior approach. The patient was counseled at length about the risks of latha Covid-19 during their perioperative period and any recovery window from their procedure. The patient was made aware that latha Covid-19  may worsen their prognosis for recovering from their procedure and lend to a higher morbidity and/or mortality risk. All material risks, benefits, and reasonable alternatives including postponing the procedure were discussed. The patient does  wish to proceed with the procedure at this time.           Jerlyn Essex PA-C Dr. Renita Caper

## 2022-05-05 NOTE — H&P
[address] [facility], [address] [facility]  [phone]           HISTORY & PHYSICAL      Patient: Debbie Nunes                MRN: [id number]       SSN: [id number]  YOB: 1955        AGE: 78 y.o. SEX: female  Body mass index is 27.46 kg/m². PCP: Robert Ryan MD  05/05/22      CC: right hip end stage OA  Problem List Items Addressed This Visit     None      Visit Diagnoses     Primary osteoarthritis of right hip    -  Primary    Pre-op exam                HPI:  The patient is a pleasant 78 y.o. whom has end stage OA of their Right hip and has failed conservative treatment including but not limited to NSAIDS, cortisone injections, viscosupplementation, PT, and pain medicine. Due to the current findings and affected activities of daily living, surgical intervention is indicated. The alternatives, risks, complications, as well as expected outcome were discussed. These include but are not limited to infection, blood loss, need for blood transfusion, neurovascular damage, DVT, PE,  post-op stiffness and pain, leg length discrepancy, dislocation, anesthetic complications, prothesis longevity, need for more surgery, MI, stroke, and even death. The patient understands and wishes to proceed with surgery.       Past Medical History:   Diagnosis Date    Adjacent segment disease C3/4 w/deg changes, poss stenosis, CT '18 6/22/2018    Allergic rhinitis     Anemia 2008    intermittant since then    Asthma     Back pain     Green's esophagus with esophagitis     Cervical radiculopathy     Chronic sinusitis 5/15/2012    DNS (deviated nasal septum)     Empyema     Foraminal stenosis of cervical region     C4-C5    GERD (gastroesophageal reflux disease)     Dr Benson Folds    Hx MRSA infection 8/11/2014    Hypertension     Hypertriglyceridemia     Insulin resistance 4/9/2012    Left knee pain     Lichen planus     Liver disease     ORTEGA-per pt, followed by Dr Paramjit Holguin Menopause     Age 51    MRSA (methicillin resistant Staphylococcus aureus) infection 2008    left lung    Nausea & vomiting     Restless leg syndrome     Rheumatoid arthritis ([facility] [address]     Spinal cord stimulator status 2016    Spinal stenosis of cervical region     C4-C5 and C5-C6    TMJ syndrome     Wears glasses     and contacts         Current Outpatient Medications:     ciprofloxacin HCl (CIPRO) 250 mg tablet, Take 1 Tablet by mouth every twelve (12) hours for 3 days. , Disp: 6 Tablet, Rfl: 0    cyanocobalamin 1,000 mcg tablet, take 1 tablet by mouth once daily, Disp: 90 Tablet, Rfl: 3    albuterol (PROVENTIL HFA, VENTOLIN HFA, PROAIR HFA) 90 mcg/actuation inhaler, Take 1 Puff by inhalation every six (6) hours as needed for Wheezing., Disp: , Rfl:     FeroSuL 325 mg (65 mg iron) tablet, take 1 tablet by mouth once daily BEFORE BREAKFAST, Disp: 90 Tablet, Rfl: 1    losartan (COZAAR) 50 mg tablet, TAKE 1 TABLET DAILY, Disp: 90 Tablet, Rfl: 3    abatacept (Orencia, with maltose,) 250 mg injection, by IntraVENous route. monthly, Disp: , Rfl:     clobetasoL (TEMOVATE) 0.05 % ointment, Apply  to affected area two (2) days a week., Disp: 45 g, Rfl: 3    multivit-min/iron/folic/lutein (CENTRUM SILVER WOMEN PO), Take 1 Tab by mouth daily. , Disp: , Rfl:     krill/om-3/dha/epa/phospho/ast (MAXIMUM RED KRILL OMEGA-3 PO), Take 1 Caplet by mouth daily. , Disp: , Rfl:     ascorbic acid, vitamin C, (VITAMIN C) 500 mg tablet, Take 1 Tab by mouth daily. , Disp: 90 Tab, Rfl: 3    ESTRACE 0.01 % (0.1 mg/gram) vaginal cream, Place 1 gm in the vagina twice weekly (Patient taking differently: Insert 1 g into vagina every seven (7) days.), Disp: 42.5 g, Rfl: 2    cholecalciferol, vitamin D3, (VITAMIN D3 PO), Take 1 Tab by mouth daily. , Disp: , Rfl:     acetaminophen (TYLENOL) 325 mg tablet, Take 325 mg by mouth every four (4) hours as needed for Pain., Disp: , Rfl:   triamcinolone (NASACORT AQ) 55 mcg nasal inhaler, 2 Sprays by Both Nostrils route daily. Indications: PERENNIAL ALLERGIC RHINITIS, Disp: , Rfl:     Dexlansoprazole (DEXILANT) 60 mg CpDM, Take 1 Cap by mouth Daily (before dinner). , Disp: , Rfl:     Cetirizine (ZYRTEC) 10 mg Cap, Take 10 mg by mouth daily. , Disp: , Rfl:     montelukast (SINGULAIR) 10 mg tablet, Take 10 mg by mouth daily. , Disp: , Rfl:     allergy injection, by SubCUTAneous route every thirty (30) days. , Disp: , Rfl:     budesonide-formoterol (SYMBICORT) 160-4.5 mcg/Actuation HFA inhaler, Take 2 Puffs by inhalation two (2) times a day., Disp: , Rfl:     Allergies   Allergen Reactions    Adhesive Tape-Silicones Other (comments)     Patient stated iv tape and tegaderm ok    Other Plant, Animal, Environmental Unable to Consolidated Doc     MOLD    Sulfasalazine Other (comments)     Could not taste anything, lightheadedness    Tape [Adhesive] Other (comments)     Blisters, use paper tape only  Patient states tegaderm and paper tape are okay    Mold Other (comments)    Chlorhexidine Towelette Itching and Other (comments)     \"stinging for a couple of hours\"    itching       Social History     Socioeconomic History    Marital status:      Spouse name: Not on file    Number of children: Not on file    Years of education: Not on file    Highest education level: Not on file   Occupational History    Not on file   Tobacco Use    Smoking status: Never Smoker    Smokeless tobacco: Never Used   Vaping Use    Vaping Use: Never used   Substance and Sexual Activity    Alcohol use: Not Currently     Alcohol/week: 0.0 standard drinks     Comment: 0-6 per year    Drug use: Yes     Types: Prescription, OTC    Sexual activity: Yes     Partners: Male     Comment: Menopausal since 2000   Other Topics Concern    Not on file   Social History Narrative    Retired      Social Determinants of Health     Financial Resource Strain:    Agustín Garcia Difficulty of Paying Living Expenses: Not on file   Food Insecurity:     Worried About Running Out of Food in the Last Year: Not on file    Ran Out of Food in the Last Year: Not on file   Transportation Needs:     Lack of Transportation (Medical): Not on file    Lack of Transportation (Non-Medical): Not on file   Physical Activity:     Days of Exercise per Week: Not on file    Minutes of Exercise per Session: Not on file   Stress:     Feeling of Stress : Not on file   Social Connections:     Frequency of Communication with Friends and Family: Not on file    Frequency of Social Gatherings with Friends and Family: Not on file    Attends Mandaen Services: Not on file    Active Member of [address] [place] [address] [employer] or Organizations: Not on file    Attends Club or Organization Meetings: Not on file    Marital Status: Not on file   Intimate Partner Violence:     Fear of Current or Ex-Partner: Not on file    Emotionally Abused: Not on file    Physically Abused: Not on file    Sexually Abused: Not on file   Housing Stability:     Unable to Pay for Housing in the Last Year: Not on file    Number of [place] in the Last Year: Not on file    Unstable Housing in the Last Year: Not on file       Past Surgical History:   Procedure Laterality Date    HX CERVICAL FUSION  08/13/14    HX HEENT  1997    TMJ surgery    HX KNEE REPLACEMENT Right 2020    HX LUMBAR LAMINECTOMY  Nov 1995    [facility]    HX LUMBAR LAMINECTOMY  Feb 1997    Loenor Lanza  1/2015    bunion removal from right foot    HX OTHER SURGICAL  2007    thoracentesis    HX OTHER SURGICAL  2008    chest tube    HX OTHER SURGICAL  1997    TMJ surgery    HX OTHER SURGICAL  2012    sinus surg 2012-Dr Elsworth Bence.     HX OTHER SURGICAL  2016    spinal cord stimulator place for lumbar neuritis, good benefit    HX ROTATOR CUFF REPAIR Right 01/2019    HX TUBAL LIGATION      [place] ANESTH,SURGERY OF SHOULDER  01/31/2019    [place] COLONOSCOPY FLX DX W/COLLJ SPEC WHEN PFRMD 6-18-08    normal/duntemann       Family History:  Non-contributory. PE:  Visit Vitals  /81 (BP 1 Location: Left upper arm, BP Patient Position: Sitting, BP Cuff Size: Adult)   Pulse 85   Temp 97.3 °F (36.3 °C) (Temporal)   Ht 5' 10\" (1.778 m)   Wt 191 lb 6.4 oz (86.8 kg)   SpO2 96%   BMI 27.46 kg/m²     A&O X3, NAD, well develop, well nourished  Heart: S1-S2, rrr  Lungs: CTA bilat  Abd: soft, nt, nt, + bs in all quadrants  Ext:  Pos distal pulses to DP, PT  Leg lengths even grossly sitting in the chair    X-ray: Radiographs of the right hip done 5/2/2022 at the [facility] location include AP pelvis, AP and crosstable lateral the right hip as well as AP the contralateral hip confirms end-stage arthritis with bone-on-bone eburnation. Labs: labs were reviewed and wnl.  ua pos, treated with cipro    A:  Right  hip end stage OA    P:  At this point we will move forward with surgery. Again, the alternatives, risks, complications, as well as expected outcome were discussed and the patient wishes to proceed with surgery. Pt has been instructed to stop aspirin, nsaids, rheumatologic medications and blood thinners. They have also been instructed to continue on any heart and bp meds and to take them the morning of surgery with sips of water. Direct anterior approach. The patient was counseled at length about the risks of latha Covid-19 during their perioperative period and any recovery window from their procedure. The patient was made aware that latha Covid-19  may worsen their prognosis for recovering from their procedure and lend to a higher morbidity and/or mortality risk. All material risks, benefits, and reasonable alternatives including postponing the procedure were discussed. The patient does  wish to proceed with the procedure at this time.           Yinka Díaz

## 2022-05-11 NOTE — PERIOP NOTES
PRE-SURGICAL INSTRUCTIONS        Patient's Name:  Aparna Bolivar      Today's Date:  5/11/2022              Surgery Date:  5/13/2022                1. Do NOT eat or drink anything, including candy, gum, or ice chips after midnight on 5/12/2022, unless you have specific instructions from your surgeon or anesthesia provider to do so.  2. You may brush your teeth before coming to the hospital.  3. No smoking 24 hours prior to the day of surgery. 4. No alcohol 24 hours prior to the day of surgery. 5. No recreational drugs for one week prior to the day of surgery. 6. Leave all valuables, including money/purse, at home. 7. Remove all jewelry, nail polish, acrylic nails, and makeup (including mascara); no lotions powders, deodorant, or perfume/cologne/after shave on the skin. 8. Follow instruction for Hibiclens washes and CHG wipes from surgeon's office. 9. Glasses/contact lenses and dentures may be worn to the hospital.  They will be removed prior to surgery. 10. Call your doctor if symptoms of a cold or illness develop within 24-48 hours prior to your surgery. 11.  If you are having an outpatient procedure, please make arrangements for a responsible ADULT TO [address] [place] [address] and stay with you for 24 hours after your surgery. 12. ONE VISITOR in the hospital at this time for outpatient procedures. Exceptions may be made for surgical admissions, per nursing unit guidelines      Special Instructions:      Bring list of CURRENT medications. Bring inhaler. Bring any pertinent legal medical records. Take these medications the morning of surgery with a sip of water: medications as directed by physician. Follow physician instructions about stopping anticoagulants. On the day of surgery, come in the main entrance of [facility]. Let the  at the desk know you are there for surgery. A staff member will come escort you to the surgical area on the second floor.     If you have any questions or concerns, please do not hesitate to call:     (Prior to the day of surgery) PAT department:  [phone]   (Day of surgery) Pre-Op department:  [phone]    These surgical instructions were reviewed with patient during the PAT phone call.

## 2022-05-12 ENCOUNTER — ANESTHESIA EVENT (OUTPATIENT)
Dept: SURGERY | Age: 67
End: 2022-05-12
Payer: MEDICARE

## 2022-05-12 NOTE — NURSE NAVIGATOR
Call placed to patient, ID verified x 2. Patient  has decided with her surgeon to have a right total hip replacement to decrease her pain and increase her mobility. Topics discussed included surgery preparation, what to expect the day of surgery, medications, physical and occupational therapy, and discharge planning. Patient states that she had her knee replaced with Dr. Roshni Colon last year and she is very familiar with this process. She has a rolling walker at home as well as a raised toilet seat. It was discussed that this is considered an elective surgery and that prior to the surgery they need to make decisions such as arranging for help at home once they are discharged. Patient agreed to get home ready for surgery and to have a ride arranged to go home. She identifies her  as her support system and she is very motivated to go home after surgery rather than stay in the hospital.  Instructions were given for CHG bathing. Patient will complete this procedure this evening. She was reminded not to apply any deoderant, perfumes, makeup or lotions to her skin the morning of her surgery. She will remain NPO after midnight the night before her surgery and will take only the medications she was instructed to take by her surgeon the morning of surgery with a sip of water. Patient states that she will be taking her losartan and using her inhaler. A total hip replacement surgery education book will be provided to patient post op prior to discharge. Education regarding the importance of early and frequent ambulation to avoid surgical complications and to assist with pain was provided. Recommended the use of ice to assist with pain and swelling post op for 20 minutes an hour, not to be placed directly on her skin. Patient verbalized understanding of all information provided.   Opportunity was given to ask questions and phone number of the Orthopaedic   was given for any questions or concerns that may arise later.

## 2022-05-13 ENCOUNTER — APPOINTMENT (OUTPATIENT)
Dept: GENERAL RADIOLOGY | Age: 67
End: 2022-05-13
Attending: ORTHOPAEDIC SURGERY
Payer: MEDICARE

## 2022-05-13 ENCOUNTER — ANESTHESIA (OUTPATIENT)
Dept: SURGERY | Age: 67
End: 2022-05-13
Payer: MEDICARE

## 2022-05-13 ENCOUNTER — HOSPITAL ENCOUNTER (OUTPATIENT)
Age: 67
Discharge: HOME HEALTH | End: 2022-05-13
Attending: ORTHOPAEDIC SURGERY | Admitting: ORTHOPAEDIC SURGERY
Payer: MEDICARE

## 2022-05-13 ENCOUNTER — HOME HEALTH ADMISSION (OUTPATIENT)
Dept: HOME HEALTH SERVICES | Facility: HOME HEALTH | Age: 67
End: 2022-05-13
Payer: MEDICARE

## 2022-05-13 VITALS
DIASTOLIC BLOOD PRESSURE: 81 MMHG | HEART RATE: 65 BPM | SYSTOLIC BLOOD PRESSURE: 129 MMHG | BODY MASS INDEX: 27.2 KG/M2 | HEIGHT: 70 IN | WEIGHT: 190 LBS | RESPIRATION RATE: 16 BRPM | OXYGEN SATURATION: 96 % | TEMPERATURE: 97.5 F

## 2022-05-13 DIAGNOSIS — M16.10 HIP ARTHRITIS: ICD-10-CM

## 2022-05-13 PROCEDURE — 76060000036 HC ANESTHESIA 2.5 TO 3 HR: Performed by: ORTHOPAEDIC SURGERY

## 2022-05-13 PROCEDURE — 77030002933 HC SUT MCRYL J&J -A: Performed by: ORTHOPAEDIC SURGERY

## 2022-05-13 PROCEDURE — 74011000258 HC RX REV CODE- 258: Performed by: NURSE ANESTHETIST, CERTIFIED REGISTERED

## 2022-05-13 PROCEDURE — 77030003029 HC SUT VCRL J&J -B: Performed by: ORTHOPAEDIC SURGERY

## 2022-05-13 PROCEDURE — 74011000258 HC RX REV CODE- 258: Performed by: ORTHOPAEDIC SURGERY

## 2022-05-13 PROCEDURE — 88304 TISSUE EXAM BY PATHOLOGIST: CPT

## 2022-05-13 PROCEDURE — 2709999900 HC NON-CHARGEABLE SUPPLY: Performed by: ORTHOPAEDIC SURGERY

## 2022-05-13 PROCEDURE — 74011000250 HC RX REV CODE- 250: Performed by: ORTHOPAEDIC SURGERY

## 2022-05-13 PROCEDURE — 01214 ANES OPEN PX TOT HIP ARTHRP: CPT | Performed by: ANESTHESIOLOGY

## 2022-05-13 PROCEDURE — 77030003028 HC SUT VCRL J&J -A: Performed by: ORTHOPAEDIC SURGERY

## 2022-05-13 PROCEDURE — 77030013708 HC HNDPC SUC IRR PULS STRY –B: Performed by: ORTHOPAEDIC SURGERY

## 2022-05-13 PROCEDURE — C1776 JOINT DEVICE (IMPLANTABLE): HCPCS | Performed by: ORTHOPAEDIC SURGERY

## 2022-05-13 PROCEDURE — 74011250637 HC RX REV CODE- 250/637: Performed by: PHYSICIAN ASSISTANT

## 2022-05-13 PROCEDURE — 88311 DECALCIFY TISSUE: CPT

## 2022-05-13 PROCEDURE — 77030012890: Performed by: ORTHOPAEDIC SURGERY

## 2022-05-13 PROCEDURE — 77030040241 HC ABD PLLW HIP MDII -B: Performed by: ORTHOPAEDIC SURGERY

## 2022-05-13 PROCEDURE — 97161 PT EVAL LOW COMPLEX 20 MIN: CPT

## 2022-05-13 PROCEDURE — 76010000132 HC OR TIME 2.5 TO 3 HR: Performed by: ORTHOPAEDIC SURGERY

## 2022-05-13 PROCEDURE — 01214 ANES OPEN PX TOT HIP ARTHRP: CPT | Performed by: NURSE ANESTHETIST, CERTIFIED REGISTERED

## 2022-05-13 PROCEDURE — C9290 INJ, BUPIVACAINE LIPOSOME: HCPCS | Performed by: ORTHOPAEDIC SURGERY

## 2022-05-13 PROCEDURE — 77030040922 HC BLNKT HYPOTHRM STRY -A: Performed by: ORTHOPAEDIC SURGERY

## 2022-05-13 PROCEDURE — 74011000250 HC RX REV CODE- 250: Performed by: NURSE ANESTHETIST, CERTIFIED REGISTERED

## 2022-05-13 PROCEDURE — 77030019557 HC ELECTRD VES SEAL MEDT -F: Performed by: ORTHOPAEDIC SURGERY

## 2022-05-13 PROCEDURE — 77030013079 HC BLNKT BAIR HGGR 3M -A: Performed by: ANESTHESIOLOGY

## 2022-05-13 PROCEDURE — 77030012935 HC DRSG AQUACEL BMS -B: Performed by: ORTHOPAEDIC SURGERY

## 2022-05-13 PROCEDURE — 97116 GAIT TRAINING THERAPY: CPT

## 2022-05-13 PROCEDURE — 77030006835 HC BLD SAW SAG STRY -B: Performed by: ORTHOPAEDIC SURGERY

## 2022-05-13 PROCEDURE — 77030031139 HC SUT VCRL2 J&J -A: Performed by: ORTHOPAEDIC SURGERY

## 2022-05-13 PROCEDURE — 76210000023 HC REC RM PH II 2 TO 2.5 HR: Performed by: ORTHOPAEDIC SURGERY

## 2022-05-13 PROCEDURE — 27130 TOTAL HIP ARTHROPLASTY: CPT | Performed by: PHYSICIAN ASSISTANT

## 2022-05-13 PROCEDURE — 97530 THERAPEUTIC ACTIVITIES: CPT

## 2022-05-13 PROCEDURE — 77030038692 HC WND DEB SYS IRMX -B: Performed by: ORTHOPAEDIC SURGERY

## 2022-05-13 PROCEDURE — 74011250636 HC RX REV CODE- 250/636: Performed by: NURSE ANESTHETIST, CERTIFIED REGISTERED

## 2022-05-13 PROCEDURE — 76210000016 HC OR PH I REC 1 TO 1.5 HR: Performed by: ORTHOPAEDIC SURGERY

## 2022-05-13 PROCEDURE — 73502 X-RAY EXAM HIP UNI 2-3 VIEWS: CPT

## 2022-05-13 PROCEDURE — 97165 OT EVAL LOW COMPLEX 30 MIN: CPT

## 2022-05-13 PROCEDURE — 77030008683 HC TU ET CUF COVD -A: Performed by: ANESTHESIOLOGY

## 2022-05-13 PROCEDURE — 27130 TOTAL HIP ARTHROPLASTY: CPT | Performed by: ORTHOPAEDIC SURGERY

## 2022-05-13 PROCEDURE — 97535 SELF CARE MNGMENT TRAINING: CPT

## 2022-05-13 PROCEDURE — 74011250637 HC RX REV CODE- 250/637: Performed by: NURSE ANESTHETIST, CERTIFIED REGISTERED

## 2022-05-13 PROCEDURE — 77030034479 HC ADH SKN CLSR PRINEO J&J -B: Performed by: ORTHOPAEDIC SURGERY

## 2022-05-13 PROCEDURE — 74011250636 HC RX REV CODE- 250/636: Performed by: ORTHOPAEDIC SURGERY

## 2022-05-13 DEVICE — Z DUP USE 2377968 SCREW ACET HEX LOW PROFILE 6.5X25MM TRIDENT II: Type: IMPLANTABLE DEVICE | Site: HIP | Status: FUNCTIONAL

## 2022-05-13 DEVICE — COMPONENT TOT HIP CAPPED LNR POLYETH [H2STRYKER] [STRYKER CORP]: Type: IMPLANTABLE DEVICE | Site: HIP | Status: FUNCTIONAL

## 2022-05-13 DEVICE — STEM FEM SZ 5 127D 35X108X44MM -- ACCOLADE II V40: Type: IMPLANTABLE DEVICE | Site: HIP | Status: FUNCTIONAL

## 2022-05-13 DEVICE — SCREW BNE L20MM DIA65MM LO PROF HEX TRIDENT LL: Type: IMPLANTABLE DEVICE | Site: HIP | Status: FUNCTIONAL

## 2022-05-13 DEVICE — INSERT ACET E 0 DEG 36 MM HIP X3 TRIDENT: Type: IMPLANTABLE DEVICE | Site: HIP | Status: FUNCTIONAL

## 2022-05-13 DEVICE — HEAD FEM DELT V40 -2.5MM 36MM -- V40 BIOLOX: Type: IMPLANTABLE DEVICE | Site: HIP | Status: FUNCTIONAL

## 2022-05-13 DEVICE — SHELL ACET CLUS H 54E TRTANIUM -- TRIDENT II: Type: IMPLANTABLE DEVICE | Site: HIP | Status: FUNCTIONAL

## 2022-05-13 RX ORDER — SODIUM CHLORIDE 0.9 % (FLUSH) 0.9 %
5-40 SYRINGE (ML) INJECTION AS NEEDED
Status: DISCONTINUED | OUTPATIENT
Start: 2022-05-13 | End: 2022-05-13 | Stop reason: HOSPADM

## 2022-05-13 RX ORDER — EPHEDRINE SULFATE/0.9% NACL/PF 25 MG/5 ML
SYRINGE (ML) INTRAVENOUS AS NEEDED
Status: DISCONTINUED | OUTPATIENT
Start: 2022-05-13 | End: 2022-05-13

## 2022-05-13 RX ORDER — EPHEDRINE SULFATE/0.9% NACL/PF 25 MG/5 ML
SYRINGE (ML) INTRAVENOUS AS NEEDED
Status: DISCONTINUED | OUTPATIENT
Start: 2022-05-13 | End: 2022-05-13 | Stop reason: HOSPADM

## 2022-05-13 RX ORDER — OXYCODONE HYDROCHLORIDE 10 MG/1
10-15 TABLET ORAL
Status: CANCELLED | OUTPATIENT
Start: 2022-05-13

## 2022-05-13 RX ORDER — MAGNESIUM SULFATE 100 %
4 CRYSTALS MISCELLANEOUS AS NEEDED
Status: DISCONTINUED | OUTPATIENT
Start: 2022-05-13 | End: 2022-05-13 | Stop reason: HOSPADM

## 2022-05-13 RX ORDER — ACETAMINOPHEN 500 MG
1000 TABLET ORAL ONCE
Status: COMPLETED | OUTPATIENT
Start: 2022-05-13 | End: 2022-05-13

## 2022-05-13 RX ORDER — SODIUM CHLORIDE 0.9 % (FLUSH) 0.9 %
5-40 SYRINGE (ML) INJECTION AS NEEDED
Status: CANCELLED | OUTPATIENT
Start: 2022-05-13

## 2022-05-13 RX ORDER — TRIAMCINOLONE ACETONIDE 55 UG/1
2 SPRAY, METERED NASAL DAILY
Status: CANCELLED | OUTPATIENT
Start: 2022-05-14

## 2022-05-13 RX ORDER — WARFARIN 3 MG/1
3 TABLET ORAL DAILY
Qty: 30 TABLET | Refills: 1 | Status: SHIPPED | OUTPATIENT
Start: 2022-05-13 | End: 2022-06-03

## 2022-05-13 RX ORDER — CEFADROXIL 500 MG/1
500 CAPSULE ORAL 2 TIMES DAILY
Qty: 14 CAPSULE | Refills: 0 | Status: SHIPPED | OUTPATIENT
Start: 2022-05-13 | End: 2022-06-14

## 2022-05-13 RX ORDER — GLYCOPYRROLATE 0.2 MG/ML
INJECTION INTRAMUSCULAR; INTRAVENOUS AS NEEDED
Status: DISCONTINUED | OUTPATIENT
Start: 2022-05-13 | End: 2022-05-13 | Stop reason: HOSPADM

## 2022-05-13 RX ORDER — ACETAMINOPHEN 500 MG
1000 TABLET ORAL EVERY 6 HOURS
Status: CANCELLED | OUTPATIENT
Start: 2022-05-13

## 2022-05-13 RX ORDER — FAMOTIDINE 20 MG/1
20 TABLET, FILM COATED ORAL ONCE
Status: COMPLETED | OUTPATIENT
Start: 2022-05-13 | End: 2022-05-13

## 2022-05-13 RX ORDER — PROPOFOL 10 MG/ML
INJECTION, EMULSION INTRAVENOUS AS NEEDED
Status: DISCONTINUED | OUTPATIENT
Start: 2022-05-13 | End: 2022-05-13 | Stop reason: HOSPADM

## 2022-05-13 RX ORDER — HYDROMORPHONE HYDROCHLORIDE 1 MG/ML
0.5 INJECTION, SOLUTION INTRAMUSCULAR; INTRAVENOUS; SUBCUTANEOUS AS NEEDED
Status: DISCONTINUED | OUTPATIENT
Start: 2022-05-13 | End: 2022-05-13 | Stop reason: HOSPADM

## 2022-05-13 RX ORDER — ROCURONIUM BROMIDE 10 MG/ML
INJECTION, SOLUTION INTRAVENOUS AS NEEDED
Status: DISCONTINUED | OUTPATIENT
Start: 2022-05-13 | End: 2022-05-13 | Stop reason: HOSPADM

## 2022-05-13 RX ORDER — HYDROMORPHONE HYDROCHLORIDE 2 MG/ML
INJECTION, SOLUTION INTRAMUSCULAR; INTRAVENOUS; SUBCUTANEOUS AS NEEDED
Status: DISCONTINUED | OUTPATIENT
Start: 2022-05-13 | End: 2022-05-13 | Stop reason: HOSPADM

## 2022-05-13 RX ORDER — DEXAMETHASONE SODIUM PHOSPHATE 4 MG/ML
INJECTION, SOLUTION INTRA-ARTICULAR; INTRALESIONAL; INTRAMUSCULAR; INTRAVENOUS; SOFT TISSUE AS NEEDED
Status: DISCONTINUED | OUTPATIENT
Start: 2022-05-13 | End: 2022-05-13 | Stop reason: HOSPADM

## 2022-05-13 RX ORDER — ONDANSETRON 2 MG/ML
4 INJECTION INTRAMUSCULAR; INTRAVENOUS
Status: CANCELLED | OUTPATIENT
Start: 2022-05-13

## 2022-05-13 RX ORDER — ONDANSETRON 4 MG/1
4 TABLET, FILM COATED ORAL
Qty: 30 TABLET | Refills: 1 | Status: SHIPPED | OUTPATIENT
Start: 2022-05-13 | End: 2022-06-14

## 2022-05-13 RX ORDER — SODIUM CHLORIDE 0.9 % (FLUSH) 0.9 %
5-40 SYRINGE (ML) INJECTION EVERY 8 HOURS
Status: CANCELLED | OUTPATIENT
Start: 2022-05-13

## 2022-05-13 RX ORDER — DOCUSATE SODIUM 100 MG/1
100 CAPSULE, LIQUID FILLED ORAL 2 TIMES DAILY
Qty: 60 CAPSULE | Refills: 2 | Status: SHIPPED | OUTPATIENT
Start: 2022-05-13 | End: 2022-06-14

## 2022-05-13 RX ORDER — ASPIRIN 81 MG/1
81 TABLET ORAL 2 TIMES DAILY
Status: CANCELLED | OUTPATIENT
Start: 2022-05-14

## 2022-05-13 RX ORDER — MONTELUKAST SODIUM 10 MG/1
10 TABLET ORAL DAILY
Status: CANCELLED | OUTPATIENT
Start: 2022-05-14

## 2022-05-13 RX ORDER — MIDAZOLAM HYDROCHLORIDE 1 MG/ML
INJECTION, SOLUTION INTRAMUSCULAR; INTRAVENOUS AS NEEDED
Status: DISCONTINUED | OUTPATIENT
Start: 2022-05-13 | End: 2022-05-13 | Stop reason: HOSPADM

## 2022-05-13 RX ORDER — ONDANSETRON 2 MG/ML
INJECTION INTRAMUSCULAR; INTRAVENOUS AS NEEDED
Status: DISCONTINUED | OUTPATIENT
Start: 2022-05-13 | End: 2022-05-13 | Stop reason: HOSPADM

## 2022-05-13 RX ORDER — SODIUM CHLORIDE 9 MG/ML
100 INJECTION, SOLUTION INTRAVENOUS CONTINUOUS
Status: CANCELLED | OUTPATIENT
Start: 2022-05-13 | End: 2022-05-14

## 2022-05-13 RX ORDER — SODIUM CHLORIDE 0.9 % (FLUSH) 0.9 %
5-40 SYRINGE (ML) INJECTION EVERY 8 HOURS
Status: DISCONTINUED | OUTPATIENT
Start: 2022-05-13 | End: 2022-05-13 | Stop reason: HOSPADM

## 2022-05-13 RX ORDER — PHENYLEPHRINE HCL IN 0.9% NACL 1 MG/10 ML
SYRINGE (ML) INTRAVENOUS AS NEEDED
Status: DISCONTINUED | OUTPATIENT
Start: 2022-05-13 | End: 2022-05-13 | Stop reason: HOSPADM

## 2022-05-13 RX ORDER — FLUMAZENIL 0.1 MG/ML
0.2 INJECTION INTRAVENOUS AS NEEDED
Status: CANCELLED | OUTPATIENT
Start: 2022-05-13

## 2022-05-13 RX ORDER — LANOLIN ALCOHOL/MO/W.PET/CERES
1 CREAM (GRAM) TOPICAL 2 TIMES DAILY WITH MEALS
Status: CANCELLED | OUTPATIENT
Start: 2022-05-13

## 2022-05-13 RX ORDER — LIDOCAINE HYDROCHLORIDE 10 MG/ML
0.1 INJECTION, SOLUTION EPIDURAL; INFILTRATION; INTRACAUDAL; PERINEURAL AS NEEDED
Status: DISCONTINUED | OUTPATIENT
Start: 2022-05-13 | End: 2022-05-13 | Stop reason: HOSPADM

## 2022-05-13 RX ORDER — SODIUM CHLORIDE 9 MG/ML
INJECTION, SOLUTION INTRAVENOUS
Status: COMPLETED | OUTPATIENT
Start: 2022-05-13 | End: 2022-05-13

## 2022-05-13 RX ORDER — AMOXICILLIN 250 MG
1 CAPSULE ORAL 2 TIMES DAILY
Status: CANCELLED | OUTPATIENT
Start: 2022-05-13

## 2022-05-13 RX ORDER — OXYCODONE AND ACETAMINOPHEN 7.5; 325 MG/1; MG/1
1-2 TABLET ORAL
Qty: 56 TABLET | Refills: 0 | Status: SHIPPED | OUTPATIENT
Start: 2022-05-13 | End: 2022-05-20

## 2022-05-13 RX ORDER — FENTANYL CITRATE 50 UG/ML
INJECTION, SOLUTION INTRAMUSCULAR; INTRAVENOUS AS NEEDED
Status: DISCONTINUED | OUTPATIENT
Start: 2022-05-13 | End: 2022-05-13 | Stop reason: HOSPADM

## 2022-05-13 RX ORDER — DEXAMETHASONE SODIUM PHOSPHATE 4 MG/ML
8 INJECTION, SOLUTION INTRA-ARTICULAR; INTRALESIONAL; INTRAMUSCULAR; INTRAVENOUS; SOFT TISSUE
Status: DISCONTINUED | OUTPATIENT
Start: 2022-05-13 | End: 2022-05-13 | Stop reason: HOSPADM

## 2022-05-13 RX ORDER — BUDESONIDE AND FORMOTEROL FUMARATE DIHYDRATE 160; 4.5 UG/1; UG/1
2 AEROSOL RESPIRATORY (INHALATION) 2 TIMES DAILY
Status: CANCELLED | OUTPATIENT
Start: 2022-05-13

## 2022-05-13 RX ORDER — ONDANSETRON 2 MG/ML
4 INJECTION INTRAMUSCULAR; INTRAVENOUS ONCE
Status: COMPLETED | OUTPATIENT
Start: 2022-05-13 | End: 2022-05-13

## 2022-05-13 RX ORDER — LOSARTAN POTASSIUM 50 MG/1
50 TABLET ORAL DAILY
Status: CANCELLED | OUTPATIENT
Start: 2022-05-14

## 2022-05-13 RX ORDER — KETOROLAC TROMETHAMINE 15 MG/ML
15 INJECTION, SOLUTION INTRAMUSCULAR; INTRAVENOUS EVERY 6 HOURS
Status: DISCONTINUED | OUTPATIENT
Start: 2022-05-13 | End: 2022-05-13 | Stop reason: HOSPADM

## 2022-05-13 RX ORDER — OXYCODONE HYDROCHLORIDE 20 MG/1
20 TABLET ORAL
Status: CANCELLED | OUTPATIENT
Start: 2022-05-13

## 2022-05-13 RX ORDER — SODIUM CHLORIDE, SODIUM LACTATE, POTASSIUM CHLORIDE, CALCIUM CHLORIDE 600; 310; 30; 20 MG/100ML; MG/100ML; MG/100ML; MG/100ML
25 INJECTION, SOLUTION INTRAVENOUS CONTINUOUS
Status: DISCONTINUED | OUTPATIENT
Start: 2022-05-13 | End: 2022-05-13 | Stop reason: HOSPADM

## 2022-05-13 RX ORDER — NALOXONE HYDROCHLORIDE 0.4 MG/ML
0.4 INJECTION, SOLUTION INTRAMUSCULAR; INTRAVENOUS; SUBCUTANEOUS AS NEEDED
Status: CANCELLED | OUTPATIENT
Start: 2022-05-13

## 2022-05-13 RX ORDER — LIDOCAINE HYDROCHLORIDE 20 MG/ML
INJECTION, SOLUTION EPIDURAL; INFILTRATION; INTRACAUDAL; PERINEURAL AS NEEDED
Status: DISCONTINUED | OUTPATIENT
Start: 2022-05-13 | End: 2022-05-13 | Stop reason: HOSPADM

## 2022-05-13 RX ORDER — FENTANYL CITRATE 50 UG/ML
50 INJECTION, SOLUTION INTRAMUSCULAR; INTRAVENOUS
Status: DISCONTINUED | OUTPATIENT
Start: 2022-05-13 | End: 2022-05-13 | Stop reason: HOSPADM

## 2022-05-13 RX ORDER — ALBUTEROL SULFATE 90 UG/1
1 AEROSOL, METERED RESPIRATORY (INHALATION)
Status: CANCELLED | OUTPATIENT
Start: 2022-05-13

## 2022-05-13 RX ORDER — INSULIN LISPRO 100 [IU]/ML
INJECTION, SOLUTION INTRAVENOUS; SUBCUTANEOUS ONCE
Status: DISCONTINUED | OUTPATIENT
Start: 2022-05-13 | End: 2022-05-13 | Stop reason: HOSPADM

## 2022-05-13 RX ORDER — PREGABALIN 75 MG/1
75 CAPSULE ORAL ONCE
Status: COMPLETED | OUTPATIENT
Start: 2022-05-13 | End: 2022-05-13

## 2022-05-13 RX ORDER — SUCCINYLCHOLINE CHLORIDE 20 MG/ML
INJECTION INTRAMUSCULAR; INTRAVENOUS AS NEEDED
Status: DISCONTINUED | OUTPATIENT
Start: 2022-05-13 | End: 2022-05-13 | Stop reason: HOSPADM

## 2022-05-13 RX ORDER — PREGABALIN 25 MG/1
25 CAPSULE ORAL 2 TIMES DAILY
Status: CANCELLED | OUTPATIENT
Start: 2022-05-13

## 2022-05-13 RX ADMIN — ROCURONIUM BROMIDE 45 MG: 50 INJECTION INTRAVENOUS at 10:00

## 2022-05-13 RX ADMIN — GLYCOPYRROLATE 0.2 MG: 0.2 INJECTION INTRAMUSCULAR; INTRAVENOUS at 10:31

## 2022-05-13 RX ADMIN — LIDOCAINE HYDROCHLORIDE 100 MG: 20 INJECTION, SOLUTION EPIDURAL; INFILTRATION; INTRACAUDAL; PERINEURAL at 09:52

## 2022-05-13 RX ADMIN — Medication 50 MCG: at 11:41

## 2022-05-13 RX ADMIN — Medication 5 MG: at 10:11

## 2022-05-13 RX ADMIN — FENTANYL CITRATE 100 MCG: 50 INJECTION, SOLUTION INTRAMUSCULAR; INTRAVENOUS at 09:52

## 2022-05-13 RX ADMIN — Medication 10 MG: at 10:08

## 2022-05-13 RX ADMIN — TRANEXAMIC ACID 1000 G: 100 INJECTION, SOLUTION INTRAVENOUS at 11:45

## 2022-05-13 RX ADMIN — SODIUM CHLORIDE, SODIUM LACTATE, POTASSIUM CHLORIDE, AND CALCIUM CHLORIDE 25 ML/HR: 600; 310; 30; 20 INJECTION, SOLUTION INTRAVENOUS at 08:15

## 2022-05-13 RX ADMIN — ACETAMINOPHEN 1000 MG: 500 TABLET ORAL at 07:54

## 2022-05-13 RX ADMIN — ONDANSETRON 4 MG: 2 INJECTION INTRAMUSCULAR; INTRAVENOUS at 15:16

## 2022-05-13 RX ADMIN — HYDROMORPHONE HYDROCHLORIDE 1 MG: 2 INJECTION, SOLUTION INTRAMUSCULAR; INTRAVENOUS; SUBCUTANEOUS at 10:04

## 2022-05-13 RX ADMIN — SUCCINYLCHOLINE CHLORIDE 100 MG: 20 INJECTION, SOLUTION INTRAMUSCULAR; INTRAVENOUS at 09:52

## 2022-05-13 RX ADMIN — DEXMEDETOMIDINE HYDROCHLORIDE 4 MCG: 100 INJECTION, SOLUTION, CONCENTRATE INTRAVENOUS at 10:33

## 2022-05-13 RX ADMIN — TRANEXAMIC ACID 1000 G: 100 INJECTION, SOLUTION INTRAVENOUS at 09:57

## 2022-05-13 RX ADMIN — Medication 5 MG: at 11:36

## 2022-05-13 RX ADMIN — DEXMEDETOMIDINE HYDROCHLORIDE 4 MCG: 100 INJECTION, SOLUTION, CONCENTRATE INTRAVENOUS at 10:41

## 2022-05-13 RX ADMIN — DEXAMETHASONE SODIUM PHOSPHATE 8 MG: 4 INJECTION, SOLUTION INTRAMUSCULAR; INTRAVENOUS at 10:00

## 2022-05-13 RX ADMIN — DEXMEDETOMIDINE HYDROCHLORIDE 4 MCG: 100 INJECTION, SOLUTION, CONCENTRATE INTRAVENOUS at 09:49

## 2022-05-13 RX ADMIN — ROCURONIUM BROMIDE 10 MG: 50 INJECTION INTRAVENOUS at 10:59

## 2022-05-13 RX ADMIN — DEXMEDETOMIDINE HYDROCHLORIDE 4 MCG: 100 INJECTION, SOLUTION, CONCENTRATE INTRAVENOUS at 10:17

## 2022-05-13 RX ADMIN — PROPOFOL 200 MG: 10 INJECTION, EMULSION INTRAVENOUS at 09:52

## 2022-05-13 RX ADMIN — DEXMEDETOMIDINE HYDROCHLORIDE 4 MCG: 100 INJECTION, SOLUTION, CONCENTRATE INTRAVENOUS at 11:29

## 2022-05-13 RX ADMIN — PREGABALIN 75 MG: 75 CAPSULE ORAL at 07:54

## 2022-05-13 RX ADMIN — MIDAZOLAM HYDROCHLORIDE 2 MG: 2 INJECTION, SOLUTION INTRAMUSCULAR; INTRAVENOUS at 09:43

## 2022-05-13 RX ADMIN — ROCURONIUM BROMIDE 10 MG: 50 INJECTION INTRAVENOUS at 11:23

## 2022-05-13 RX ADMIN — SUGAMMADEX 200 MG: 100 INJECTION, SOLUTION INTRAVENOUS at 11:55

## 2022-05-13 RX ADMIN — DEXMEDETOMIDINE HYDROCHLORIDE 4 MCG: 100 INJECTION, SOLUTION, CONCENTRATE INTRAVENOUS at 11:05

## 2022-05-13 RX ADMIN — ROCURONIUM BROMIDE 5 MG: 50 INJECTION INTRAVENOUS at 09:52

## 2022-05-13 RX ADMIN — DEXMEDETOMIDINE HYDROCHLORIDE 4 MCG: 100 INJECTION, SOLUTION, CONCENTRATE INTRAVENOUS at 10:47

## 2022-05-13 RX ADMIN — FAMOTIDINE 20 MG: 20 TABLET ORAL at 07:54

## 2022-05-13 RX ADMIN — HYDROMORPHONE HYDROCHLORIDE 1 MG: 2 INJECTION, SOLUTION INTRAMUSCULAR; INTRAVENOUS; SUBCUTANEOUS at 10:35

## 2022-05-13 RX ADMIN — ONDANSETRON 4 MG: 2 INJECTION INTRAMUSCULAR; INTRAVENOUS at 11:44

## 2022-05-13 NOTE — HOME CARE
Received home health referral for [address] for SN,PT,[place] Hip protocol. Discharge order noted for today; spoke with patient,Verified demographics,explained [facility] services and answered all questions; Patient states she has DME: RW,RTS,and shower chair; patient states her spouse Leo Gomez) will be assisting her after discharge at home; [facility] referral processed to [address] central Intake. JAYLAN ELLIOTT.

## 2022-05-13 NOTE — PROGRESS NOTES
OCCUPATIONAL THERAPY EVALUATION/DISCHARGE    Patient: Williams Mirza (48 y.o. female)  Date: 5/13/2022  Primary Diagnosis: Osteoarthritis of right hip, unspecified osteoarthritis type [M16.11]  Hip arthritis [M16.10]  Procedure(s) (LRB):  RIGHT TOTAL HIP ARTHROPLASTY DIRECT ANTERIOR APPROACH/WALTER/ARCH TABLE/C-ARM/PHAN TO ASSIST (Right) Day of Surgery   Precautions:   Fall,Total hip  PLOF: Pt reports being independent with ADLs and functional mobility. ASSESSMENT AND RECOMMENDATIONS:  Based on the objective data described below, the patient presents with hip precautions, limiting her independence with ADLs. Pt is in the BR upon entry with RN present. Pt completes BR mobility and functional transfer to the recliner with Supervision. Pt had 1 episode of emesis while in the recliner, following which reports feeling better. Pt educated on THP and how they relate to ADLs and functional mobility. Pt issued AE for LB ADLs (reacher, long handled sponge, long handled shoe horn, sock aid). Following education pt was able to perform LB ADLs with Supervision for following precautions using AE. Pt completed UB dressing with Set-up. Pt reports she has all needed DME at home for safety with ADLs and her spouse is available to assist as needed. Further skilled occupational therapy is not indicated at this time, we will sign off. Discharge Recommendations: Home Health  Further Equipment Recommendations for Discharge: N/A      SUBJECTIVE:   Patient stated I feel better.     OBJECTIVE DATA SUMMARY:     Past Medical History:   Diagnosis Date    Adjacent segment disease C3/4 w/deg changes, poss stenosis, CT '18 6/22/2018    Allergic rhinitis     Anemia 2008    intermittant since then    Asthma     Back pain     Green's esophagus with esophagitis     Cervical radiculopathy     Chronic sinusitis 5/15/2012    DNS (deviated nasal septum)     Empyema     Foraminal stenosis of cervical region     C4-C5    GERD (gastroesophageal reflux disease)     Dr Clark Sigala    Hx MRSA infection 8/11/2014    Hypertension     Hypertriglyceridemia     Insulin resistance 4/9/2012    Left knee pain     Lichen planus     Liver disease     ORTEGA-per pt, followed by Dr Alexa Castro    Menopause     Age 51    MRSA (methicillin resistant Staphylococcus aureus) infection 2008    left lung    Nausea & vomiting     Restless leg syndrome     Rheumatoid arthritis ([address]     Spinal cord stimulator status 2016    Spinal stenosis of cervical region     C4-C5 and C5-C6    TMJ syndrome     Wears glasses     and contacts     Past Surgical History:   Procedure Laterality Date    HX CERVICAL FUSION  08/13/14    HX HEENT  1997    TMJ surgery    HX KNEE REPLACEMENT Right 2020    HX LUMBAR LAMINECTOMY  Nov 1995    [facility]    HX LUMBAR LAMINECTOMY  Feb 1997    [facility]    HX ORTHOPAEDIC Right 1/2015    bunion removal from right foot    HX OTHER SURGICAL  2007    thoracentesis    HX OTHER SURGICAL  2008    chest tube    HX OTHER SURGICAL Bilateral 1997    TMJ surgery    HX OTHER SURGICAL  2012    sinus surg 2012-Dr Chelsea Browne.     HX OTHER SURGICAL  2016    spinal cord stimulator place for lumbar neuritis, good benefit    HX ROTATOR CUFF REPAIR Right 01/2019    HX TUBAL LIGATION      MA ANESTH,SURGERY OF SHOULDER  01/31/2019    MA COLONOSCOPY FLX DX W/COLLJ SPEC WHEN PFRMD  6-18-08    normal/[facility]     Barriers to Learning/Limitations: None  Compensate with: visual, verbal, tactile, kinesthetic cues/model    Home Situation:   Home Situation  Home Environment: Private residence  # Steps to Enter: 3  Rails to Enter: Yes  Hand Rails : Right  One/Two Story Residence: One story  Living Alone: No  Support Systems: Spouse/Significant Other  Patient Expects to be Discharged to[de-identified] Home  Current DME Used/Available at Home: Raised toilet seat,Cane, straight,Walker  Tub or Shower Type: Shower  [x]     Right hand dominant   []     Left hand dominant    Cognitive/Behavioral Status:  Neurologic State: Alert  Orientation Level: Oriented X4  Cognition: Follows commands  Safety/Judgement: Awareness of environment    Skin: visible skin intact  Edema: none noted    Vision/Perceptual:       Acuity: Able to read clock/calendar on wall without difficulty     Coordination: BUE  Coordination: Generally decreased, functional  Fine Motor Skills-Upper: Left Intact; Right Intact    Gross Motor Skills-Upper: Left Intact; Right Intact  Balance:  Sitting: Intact; With support  Standing: Impaired; With support  Standing - Static: Good  Standing - Dynamic : Fair    Strength: BUE  Strength: Generally decreased, functional   Tone & Sensation: BUE  Tone: Normal  Sensation: Intact   Range of Motion: BUE  AROM: Generally decreased, functional   Functional Mobility and Transfers for ADLs:  Bed Mobility:     Supine to Sit: Contact guard assistance; Additional time;Assist x1   Transfers:  Sit to Stand: Supervision  Stand to Sit: Supervision   Toilet Transfer : Stand-by assistance    Bathroom Mobility: Supervision/set up  ADL Assessment:  Feeding: Modified independent  Oral Facial Hygiene/Grooming: Modified Independent  Bathing: Supervision  Upper Body Dressing: Modified independent  Lower Body Dressing: Supervision  Toileting: Supervision   ADL Intervention:   Cognitive Retraining  Safety/Judgement: Awareness of environment  Pain:  Pain level pre-treatment: 2/10   Pain level post-treatment: 2/10     Activity Tolerance:   Fair+  Please refer to the flowsheet for vital signs taken during this treatment. After treatment:   [x]  Patient left in no apparent distress sitting up in chair  []  Patient left in no apparent distress in bed  [x]  Call bell left within reach  [x]  Nursing notified  [x]  Caregiver present  []  Bed alarm activated    COMMUNICATION/EDUCATION:   [x]      Role of Occupational Therapy in the acute care setting  [x]      Home safety education was provided and the patient/caregiver indicated understanding.   [x] Patient/family have participated as able and agree with findings and recommendations. []      Patient is unable to participate in plan of care at this time. Thank you for this referral.  Joe King, OTR/L  Time Calculation: 23 mins      Eval Complexity: History: LOW Complexity : Brief history review ; Examination: LOW Complexity : 1-3 performance deficits relating to physical, cognitive , or psychosocial skils that result in activity limitations and / or participation restrictions ;    Decision Making:LOW Complexity : No comorbidities that affect functional and no verbal or physical assistance needed to complete eval tasks

## 2022-05-13 NOTE — DISCHARGE INSTRUCTIONS
Patient Education        Hip Replacement Surgery (Anterior): What to Expect at Home  Your Recovery     Hip replacement surgery replaces the worn parts of your hip joint. After surgery, you will use crutches or a walker. You will need someone to help you at home for a few days or weeks or until you have more energy and can move around better. You will go home with a bandage and stitches, staples, skin glue, or tape strips. You can remove the bandage when your doctor tells you to. If you have stitches or staples, your doctor will remove them about 2 weeks after your surgery. Glue or tape strips will fall off on their own over time. You may have some mild pain and swelling after surgery. Your doctor may give you medicine for the pain. You will keep doing the physical therapy you started in the hospital. The better you do with your exercises, the sooner you will get your strength and movement back. Your doctor will tell you when you can go back to work or other activities. This will depend on what type of work and activities you do. This care sheet gives you a general idea about how long it will take for you to recover. But each person recovers at a different pace. Follow the steps below to get better as quickly as possible. How can you care for yourself at home? Activity    · For the first few months, your doctor may want you to avoid things that could dislocate your hip. If so, your therapist may suggest ideas such as:  ? Do not turn your leg too far out to the side. Keep your toes pointing forward or slightly in.  ? Do not step backward or bend backward. ? Do not cross your legs.     · Go slowly when you climb stairs. Make sure the lights are on, and have someone watch you, if possible. When you climb stairs:  ? Step up first with your unaffected leg. Then bring the affected leg up to the same step. Bring your crutches or cane up. ? To go down stairs, reverse the order.  First, put your crutches or cane on the lower step. Then bring the affected leg down to that step. Finally, step down with the unaffected leg.     · You can ride in a car, but stop at least once every hour to get out and walk around.     · Rest when you feel tired. You may take a nap, but don't stay in bed all day.     · If your doctor recommends exercises, do them as directed. You can cut back on your exercises if your muscles start to ache, but don't stop doing them.     · You may use crutches or a walker for a couple of weeks, and then switch to a cane.     · Try not to sit for too long at one time. You will feel less stiff if you take a short walk about every hour.     · Ask your doctor when you can drive again.     · Ask your doctor when it is okay for you to have sex. Diet    · By the time you leave the hospital, you will probably be eating your normal diet. Your doctor may recommend that you take iron and vitamin supplements.     · Eat healthy foods, and watch your portion sizes. Try to stay at your ideal weight. Controlling your weight will help your new hip joint last longer.     · If your bowel movements are not regular right after surgery, try to avoid constipation and straining. Drink plenty of water. Your doctor may suggest fiber, a stool softener, or a mild laxative. Medicines    · Be safe with medicines. Read and follow all instructions on the label. ? If the doctor gave you a prescription medicine for pain, take it as prescribed. ? If you are not taking a prescription pain medicine, ask your doctor if you can take an over-the-counter medicine.     · If your doctor prescribed antibiotics, take them as directed. Do not stop taking them just because you feel better. You need to take the full course of antibiotics.     · Your doctor will tell you if and when you can restart your medicines.  You will also get instructions about taking any new medicines.     · If you take aspirin or some other blood thinner, ask your doctor if and when to start taking it again. Make sure that you understand exactly what your doctor wants you to do.     · Your doctor may give you a blood-thinning medicine to prevent blood clots for a few weeks after surgery. Be sure you get instructions about how to take your medicine safely. Blood thinners can cause serious bleeding problems. This medicine could be in pill form or as a shot (injection). If a shot is necessary, your doctor will tell you how to do this. Incision care    · If your doctor told you how to care for your cut (incision), follow your doctor's instructions. You will have a dressing over the cut. A dressing helps the incision heal and protects it. Your doctor will tell you how to take care of this.     · If you did not get instructions, follow this general advice:  ? If you have strips of tape on the cut the doctor made, leave the tape on for a week or until it falls off.  ? If you have stitches or staples, your doctor will tell you when to come back to have them removed. ? If you have skin glue on the cut, leave it on until it falls off. Skin glue is also called skin adhesive or liquid stitches. ? Change the bandage every day. ? Wash the area daily with warm water, and pat it dry. Don't use hydrogen peroxide or alcohol. They can slow healing. ? You may cover the area with a gauze bandage if it oozes fluid or rubs against clothing. ? You may shower 24 to 48 hours after surgery. Pat the incision dry. Don't swim or take a bath for the first 2 weeks, or until your doctor tells you it is okay. Exercise    · Your physical therapist will teach you exercises to do at home. Always do them as your therapist tells you.     · Avoid activities where you might fall. Ice and elevation    · For pain, put ice or a cold pack on the area for 10 to 20 minutes at a time. Put a thin cloth between the ice and your skin.  If your doctor recommended cold therapy using a portable machine, follow the instructions that came with the machine.     · Your ankle may swell for a few weeks after hip surgery. Prop up your ankle when you ice your hip or anytime you sit or lie down. Try to keep it above the level of your heart. This will help reduce swelling. Other instructions    · Wear compression stockings if your doctor told you to. These may help to prevent blood clots. Your doctor will tell you how long you need to keep wearing the compression stockings.     · Try to prevent falls. To avoid falling:  ? Arrange furniture so that you will not trip on it. ? Get rid of throw rugs, and move electrical cords out of the way. ? Put grab bars in showers and bathtubs. ? Wear shoes with sturdy, flat soles. ? Walk only in areas with plenty of light. ? Try to avoid icy or snowy sidewalks. Choose shoes with good traction, or consider using traction devices that attach to your shoes. Follow-up care is a key part of your treatment and safety. Be sure to make and go to all appointments, and call your doctor if you are having problems. It's also a good idea to know your test results and keep a list of the medicines you take. When should you call for help? Call 911 anytime you think you may need emergency care. For example, call if:    · You passed out (lost consciousness).     · You have severe trouble breathing.     · You have sudden chest pain and shortness of breath, or you cough up blood. Call your doctor now or seek immediate medical care if:    · You have symptoms of a blood clot in your leg (called a deep vein thrombosis), such as:  ? Pain in your calf, back of the knee, thigh, or groin. ? Redness and swelling in your leg or groin.     · You have signs of infection, such as:  ? Increased pain, swelling, warmth, or redness. ? Red streaks leading from the incision. ? Pus draining from the incision. ?  A fever.     · Your leg or foot turns cold or changes color.     · You have tingling, weakness, or numbness in your leg or foot.     · You have signs that your hip may be dislocated, including:  ? Severe pain and not being able to stand. ? A crooked leg that looks like your hip is out of position. ? Not being able to bend or straighten your leg.     · You have pain that does not get better after you take pain medicine.     · You have loose stitches, or your incision comes open. Watch closely for changes in your health, and be sure to contact your doctor if:    · You do not have a bowel movement after taking a laxative.     · You do not get better as expected. Where can you learn more? Go to http://www.gray.com/  Enter J451 in the search box to learn more about \"Hip Replacement Surgery (Anterior): What to Expect at Home. \"  Current as of: July 1, 2021               Content Version: 13.2  © 2006-2022 [employer]. Care instructions adapted under license by [employer] (which disclaims liability or warranty for this information). If you have questions about a medical condition or this instruction, always ask your healthcare professional. [place] [address] any warranty or liability for your use of this information. DISCHARGE SUMMARY from Nurse    PATIENT INSTRUCTIONS:    After general anesthesia or intravenous sedation, for 24 hours or while taking prescription Narcotics:  · Limit your activities  · Do not drive and operate hazardous machinery  · Do not make important personal or business decisions  · Do  not drink alcoholic beverages  · If you have not urinated within 8 hours after discharge, please contact your surgeon on call.     Report the following to your surgeon:  · Excessive pain, swelling, redness or odor of or around the surgical area  · Temperature over 100.5  · Nausea and vomiting lasting longer than 4 hours or if unable to take medications  · Any signs of decreased circulation or nerve impairment to extremity: change in color, persistent  numbness, tingling, coldness or increase pain  · Any questions        These are general instructions for a healthy lifestyle:    No smoking/ No tobacco products/ Avoid exposure to second hand smoke  Surgeon General's Warning:  Quitting smoking now greatly reduces serious risk to your health. Obesity, smoking, and sedentary lifestyle greatly increases your risk for illness    A healthy diet, regular physical exercise & weight monitoring are important for maintaining a healthy lifestyle    You may be retaining fluid if you have a history of heart failure or if you experience any of the following symptoms:  Weight gain of 3 pounds or more overnight or 5 pounds in a week, increased swelling in our hands or feet or shortness of breath while lying flat in bed. Please call your doctor as soon as you notice any of these symptoms; do not wait until your next office visit. The discharge information has been reviewed with the patient. The patient verbalized understanding. Discharge medications reviewed with the patient and spouse and appropriate educational materials and side effects teaching were provided.   ___________________________________________________________________________________________________________________________________

## 2022-05-13 NOTE — INTERVAL H&P NOTE
Update History & Physical    The Patient's History and Physical of May 13,   2022 was reviewed with the patient and I examined the patient. There was no change. The surgical site was confirmed by the patient and me. Plan:  The risk, benefits, expected outcome, and alternative to the recommended procedure have been discussed with the patient. Patient understands and wants to proceed with the procedure.     Electronically signed by Kaz Lux MD on 5/13/2022 at 8:33 AM

## 2022-05-13 NOTE — BRIEF OP NOTE
Brief Postoperative Note    Patient: Tomasz Arevalo  YOB: 1955  MRN: [id number]    Date of Procedure: 5/13/2022     Pre-Op Diagnosis: Osteoarthritis of right hip, unspecified osteoarthritis type [M16.11]    Post-Op Diagnosis: Same as preoperative diagnosis. Procedure(s):  RIGHT TOTAL HIP ARTHROPLASTY DIRECT ANTERIOR APPROACH/WALTER/ARCH TABLE/C-ARM/PHAN TO ASSIST    Surgeon(s):  Amparo Wilson MD    Surgical Assistant: Physician Assistant: Trell Guerrero PA-C  Surg Asst-1: Erin Douglas    Anesthesia: General     Estimated Blood Loss (mL): 967     Complications: None    Specimens:   ID Type Source Tests Collected by Time Destination   1 : RIGHT femoral head Preservative Leg, Right  Amparo Wilson MD 5/13/2022 1147 Pathology        Implants:   Implant Name Type Inv.  Item Serial No.  Lot No. LRB No. Used Action   SCREW BNE L20MM [id number] LO PROF HEX TRIDENT LL - [id number]  SCREW BNE L20MM [id number] LO PROF HEX TRIDENT LL  WALTER [employer] WLD Right 1 Implanted   SCREW ACET HEX LOW PROFILE 6.5X25MM TRIDENT II - [id number]  SCREW ACET HEX LOW PROFILE 6.5X25MM TRIDENT II  WALTER [employer] [id number] Right 1 Implanted   SHELL ACET CLUS H 54E TRTANIUM -- TRIDENT II - [id number]  SHELL ACET CLUS H 54E TRTANIUM -- TRIDENT II  [employer] [facility] [id number] Right 1 Implanted   INSERT ACET E 0 DEG 36 MM HIP X3 TRIDENT - [id number]  INSERT ACET E 0 DEG 36 MM HIP X3 TRIDENT  [employer] [facility] [id number] Right 1 Implanted   STEM FEM SZ 5 127D [id number] -- ACCOLADE II V40 - [id number]  STEM FEM SZ 5 127D [id number] -- ACCOLADE II V40  [employer] [facility] [id number] Right 1 Implanted   HEAD FEM DELT V40 -2.5MM 36MM -- V40 BIOLOX - [id number]  HEAD FEM DELT V40 -2.5MM 36MM -- V40 BIOLOX  [employer] [facility] [id number] Right 1 Implanted       Drains: * No LDAs found *    Findings: same    Electronically Signed by Julienne Damon MD on 5/13/2022 at 11:48 AM

## 2022-05-13 NOTE — NURSE NAVIGATOR
Rounded on patient s/p right total hip replacement with Dr. Delroy Gomez 05/13/2022. Patient observed to be alert and oriented x 3 sitting up in bed. Patient denies chest pain, shortness of breath or vomiting. She endorses some nausea and requested gingerale which was provided. Nurse notified. Patient states that pain is better than it was before surgery. Physical therapy at bedside to work with patient. Dressing to right hip noted to be clean, dry and intact. Patient has no questions at this time.

## 2022-05-13 NOTE — DISCHARGE SUMMARY
5/13/2022  6:53 AM    5/13/2022, 3:54 PM    Primary Dx:  Orthopedic: Right JOSIE  Secondar Dx: none    HPI:  Pt has end stage OA of their right hip and had failed conservative treatment. Due to the current findings and affected activity of daily living surgical intervention is indicated.   The alternatives, risks, complications as well as expected outcome were discussed, the patient understands and wishes to proceed with surgery    Past Medical History:   Diagnosis Date    Adjacent segment disease C3/4 w/deg changes, poss stenosis, CT '18 6/22/2018    Allergic rhinitis     Anemia 2008    intermittant since then    Asthma     Back pain     Green's esophagus with esophagitis     Cervical radiculopathy     Chronic sinusitis 5/15/2012    DNS (deviated nasal septum)     Empyema     Foraminal stenosis of cervical region     C4-C5    GERD (gastroesophageal reflux disease)     Dr Jones Null Hx MRSA infection 8/11/2014    Hypertension     Hypertriglyceridemia     Insulin resistance 4/9/2012    Left knee pain     Lichen planus     Liver disease     ORTEGA-per pt, followed by Dr Reyes Gain Menopause     Age 51    MRSA (methicillin resistant Staphylococcus aureus) infection 2008    left lung    Nausea & vomiting     Restless leg syndrome     Rheumatoid arthritis ([address]     Spinal cord stimulator status 2016    Spinal stenosis of cervical region     C4-C5 and C5-C6    TMJ syndrome     Wears glasses     and contacts         Current Facility-Administered Medications:     sodium chloride (NS) flush 5-40 mL, 5-40 mL, IntraVENous, Q8H, Becker, Nusrat E, CRNA    sodium chloride (NS) flush 5-40 mL, 5-40 mL, IntraVENous, PRN, Becker, Nusrat E, CRNA    HYDROmorphone (DILAUDID) syringe 0.5 mg, 0.5 mg, IntraVENous, PRN, Becker, Nusrat E, CRNA    fentaNYL citrate (PF) injection 50 mcg, 50 mcg, IntraVENous, Multiple, Becker, Nusrat E, CRNA    insulin lispro (HUMALOG) injection, , SubCUTAneous, ONCE, [place] Nusrat E, CRNA    glucose chewable tablet 16 g, 4 Tablet, Oral, PRN, [place] E, CRNA    glucagon (GLUCAGEN) injection 1 mg, 1 mg, IntraMUSCular, PRN, [place] E, CRNA    ketorolac (TORADOL) injection 15 mg, 15 mg, IntraVENous, Q6H, Kana Carlisle MD    Adhesive tape-silicones; Other plant, animal, environmental; Sulfasalazine; Tape [adhesive]; Mold; and Chlorhexidine towelette    Physical Exam:  General A&O x3 NAD, well developed, well nourished, normal affect  Heart: S1-S2, RRR  Lungs: CTA Bilat  Abd: soft NT, ND  Ext: n/v intact    Hospital Course:    Pt. Had Right JOSIE    Post -op Course: The patient tolerated the procedure well. Pt. Was place on Abx pre and post-op for prophylaxis against infection as well as coumadin post-op for prophylaxis against DVT. Vitals signs remained stable, remained af. The wound was CDI. Pain was well controlled. Pt. Had negative calf tenderness or swelling, no evidence for DVT. Patient had PT/OT consult for evaluation and treatment. CBC  Lab Results   Component Value Date/Time    WBC 7.0 04/29/2022 07:48 AM    RBC 4.92 04/29/2022 07:48 AM    HCT 45.9 (H) 04/29/2022 07:48 AM    MCV 93.3 04/29/2022 07:48 AM    MCH 29.9 04/29/2022 07:48 AM    MCHC 32.0 04/29/2022 07:48 AM    RDW 14.1 04/29/2022 07:48 AM     Coagulation  Lab Results   Component Value Date    INR 0.9 04/29/2022    APTT 31.3 04/29/2022      Basic Metabolic Profile  Lab Results   Component Value Date     04/29/2022    CO2 29 04/29/2022    BUN 16 04/29/2022       Discharge Meds:  Current Discharge Medication List      START taking these medications    Details   oxyCODONE-acetaminophen (Percocet) 7.5-325 mg per tablet Take 1-2 Tablets by mouth every six (6) hours as needed for Pain for up to 7 days. Max Daily Amount: 8 Tablets. Qty: 56 Tablet, Refills: 0    Associated Diagnoses: Hip arthritis      cefadroxil (DURICEF) 500 mg capsule Take 1 Capsule by mouth two (2) times a day.   Qty: 14 Capsule, Refills: 0    Associated Diagnoses: Hip arthritis      docusate sodium (Colace) 100 mg capsule Take 1 Capsule by mouth two (2) times a day for 90 days. Qty: 60 Capsule, Refills: 2    Associated Diagnoses: Hip arthritis      warfarin (Coumadin) 3 mg tablet Take 1 Tablet by mouth daily for 21 days. Qty: 30 Tablet, Refills: 1    Associated Diagnoses: Hip arthritis      ondansetron hcl (Zofran) 4 mg tablet Take 1 Tablet by mouth every eight (8) hours as needed for Nausea. Qty: 30 Tablet, Refills: 1         CONTINUE these medications which have NOT CHANGED    Details   albuterol (PROVENTIL HFA, VENTOLIN HFA, PROAIR HFA) 90 mcg/actuation inhaler Take 1 Puff by inhalation every six (6) hours as needed for Wheezing. FeroSuL 325 mg (65 mg iron) tablet take 1 tablet by mouth once daily BEFORE BREAKFAST  Qty: 90 Tablet, Refills: 1      losartan (COZAAR) 50 mg tablet TAKE 1 TABLET DAILY  Qty: 90 Tablet, Refills: 3    Associated Diagnoses: Essential hypertension with goal blood pressure less than 140/90      abatacept (Orencia, with maltose,) 250 mg injection by IntraVENous route. monthly      clobetasoL (TEMOVATE) 0.05 % ointment Apply  to affected area two (2) days a week. Qty: 45 g, Refills: 3    Associated Diagnoses: Lichen sclerosus et atrophicus      ESTRACE 0.01 % (0.1 mg/gram) vaginal cream Place 1 gm in the vagina twice weekly  Qty: 42.5 g, Refills: 2    Associated Diagnoses: Atrophic vaginitis      acetaminophen (TYLENOL) 325 mg tablet Take 325 mg by mouth every four (4) hours as needed for Pain.      triamcinolone (NASACORT AQ) 55 mcg nasal inhaler 2 Sprays by Both Nostrils route daily. Indications: PERENNIAL ALLERGIC RHINITIS      Dexlansoprazole (DEXILANT) 60 mg CpDM Take 1 Cap by mouth Daily (before dinner). Cetirizine (ZYRTEC) 10 mg Cap Take 10 mg by mouth daily. montelukast (SINGULAIR) 10 mg tablet Take 10 mg by mouth daily.       allergy injection by SubCUTAneous route every thirty (30) days. budesonide-formoterol (SYMBICORT) 160-4.5 mcg/Actuation HFA inhaler Take 2 Puffs by inhalation two (2) times a day. cyanocobalamin 1,000 mcg tablet take 1 tablet by mouth once daily  Qty: 90 Tablet, Refills: 3      multivit-min/iron/folic/lutein (CENTRUM SILVER WOMEN PO) Take 1 Tab by mouth daily. krill/om-3/dha/epa/phospho/ast (MAXIMUM RED KRILL OMEGA-3 PO) Take 1 Caplet by mouth daily. ascorbic acid, vitamin C, (VITAMIN C) 500 mg tablet Take 1 Tab by mouth daily. Qty: 90 Tab, Refills: 3      cholecalciferol, vitamin D3, (VITAMIN D3 PO) Take 1 Tab by mouth daily. Associated Diagnoses: Pain of both hip joints             Discharge Plan:  The patient will be d/c'd to home, total hip protocol, wbat. No slr exercises x 6 weeks. .  Coumadin daily with PT/INR called to the office on Mon/Thurs. They will have [facility] PT and nursing. Total joint protocol. Pt safe for homebound transfer, after being cleared by PT, sp Total joint replacement. A walker, bedside commode, and shower chair will be utilized for ADL's. Follow up with Dr. Essie Gray in 14 days. Call with any questions or concerns.

## 2022-05-13 NOTE — OP NOTES
[address] [place] [address]   OPERATIVE REPORT    Name:  Shabnam Diaz  MR#:   [id number]  :  1955  ACCOUNT #:  [de-identified]  DATE OF SERVICE:  2022    PREOPERATIVE DIAGNOSIS:  End-stage arthritis of the right hip with preoperative leg length discrepancy being 1 mm longer on the affected extremity. POSTOPERATIVE DIAGNOSIS:  End-stage arthritis of the right hip with preoperative leg length discrepancy being 1 mm longer on the affected extremity. PROCEDURES PERFORMED:  Direct anterior right total hip replacement using the Adeline Accolade II system with a size 5 high-offset Accolade  femoral component with a 36, -2.5 ceramic femoral head and a 54-mm Trident II Tritanium acetabular shell. Also, removal of osteophyte. SURGEON:  Reena José MD    FIRST ASSISTANT:  Zaid Bermudez. SECOND ASSISTANT:  Chan Castro. ANESTHETIST:  Dr. Jaqueline Chandra. ANESTHESIA:  General.    COMPLICATIONS:  No complications. SPECIMENS REMOVED:  Femoral head. IMPLANTS:  As above mentioned. ESTIMATED BLOOD LOSS:  300 mL. Zaid Bermudez was the first assistant who assisted with all phases of the surgery commencing with patient positioning, patient prep, patient drape, leg positioning during surgery, retracting, assisting with the surgery itself, closure, dressing placement, and transfer. PROCEDURE:  After the anesthetic was successfully induced, it was confirmed the patient did receive preoperative antibiotic and leg lengths were determined. She was starting 1 mm or 2 mm long on the affected extremity. We would shoot an AP pelvis before surgery and during for help with leg lengths. After the time-out and with antibiotics confirmed given, standard approach. Neurovascular bundle protected. Found the leash of vessels and tied them and used the Aquamantys as well and found our correct interval as well.   We then resected the precapsular fat and repositioned our retractors and then did our arthrotomy and did our three standard releases, had good control of hemostasis, even though the blood pressure was a little bit high at that time which was resolved. We then with the aid of the image intensifier delineated the correct level of osteotomy and then divided the femoral neck, extracted the head, removed some osteophyte. We instrumented around the cup and protected the neurovascular structures, did a meticulous debridement of the cup, identified the medial wall through the foveal notch. With the aid of the image intensifier, medialized appropriately, in appropriate inclination and anteversion reamed up to accommodate the cup. Opened the introitus a little bit more and removed some further osteophyte and implanted the definitive shell after trialing it and it seated very nicely. Augmented with a couple of screws, removed some osteophyte, Aquamantys and Exparel cocktail. Removed some of the posterior capsule and with the traction off the leg in the three click down position, did our standard release and then dropped the leg all the way down with great proximal exposure, lateralized with [place], used the canal finder after the chili pepper and confirmed with the C-arm that we were aligned anatomically and then broached our way up to accommodate the implant. Calcar planer and reduced the hip, AP pelvis again and was really between a -5 and -2.5. I thought the -2.5 gave anatomic reconstruction. The hip was extremely stable. Combined anteversion was excellent. Very pleased with this. Lavaged out, implanted definitive components and retrialed, took x-rays, and I was happiest with the -2.5, cleaned the trunnion, impacted on again and reduced the hip. Very stable, combined anteversion excellent. Further Aquamantys and Exparel and routine closure. No complications. Excellent outcome of the case.       Rudy Horner MD [id number]  D:  05/13/2022 12:06  T:  05/13/2022 14:22  JOB #:  [id number]

## 2022-05-13 NOTE — PROGRESS NOTES
Problem: Mobility Impaired (Adult and Pediatric)  Goal: *Acute Goals and Plan of Care (Insert Text)  Description: Physical Therapy Goals  Initiated 5/13/2022 and to be accomplished within 7 day(s)  1. Patient will move from supine to sit and sit to supine  in bed with modified independence. 2.  Patient will transfer from bed to chair and chair to bed with modified independence using the least restrictive device. 3.  Patient will perform sit to stand with modified independence. 4.  Patient will ambulate with modified independence for 300 feet with the least restrictive device. 5.  Patient will ascend/descend 3 stairs with L handrail(s) with modified independence. PLOF: Pt lives with her  in a [facility] C[facility] [place] with 3 TOÑO. Indep PTA. Outcome: Progressing Towards Goal     PHYSICAL THERAPY EVALUATION    Patient: Ward Adame (78 y.o. female)  Date: 5/13/2022  Primary Diagnosis: Osteoarthritis of right hip, unspecified osteoarthritis type [M16.11]  Hip arthritis [M16.10]  Procedure(s) (LRB):  RIGHT TOTAL HIP ARTHROPLASTY DIRECT ANTERIOR APPROACH/WALTER/ARCH TABLE/C-ARM/PHAN TO ASSIST (Right) Day of Surgery   Precautions:  Fall,Total hip    ASSESSMENT :  Patient is 77 yo female admitted to hospital for R JOSIE and presents today POD 0 and agreeable to therapy. Patient was educated on weight bearing status, hip precautions, and role of therapy. Patient transferred to sitting EOB for objective assessment. Patient was given demo with instruction on sit <> stand transfer and gait training. C/o of slight nausea. Patient transferred to standing with CGA and initially amb 5 ft to sit in recliner. Pt requested to sit and hydrate 2/2 nausea. Over course of ~10 minutes, pt stating she felt better. Pt then stood again and ambulated approx 25 f tin hallway where she had to stop and has episode of large emesis in green bag. After this, pt reporting she felt a lot better.  She amb 300 ft in hallway with RW and SBA, no unsteadiness noted, as well as ascended/descended 1 step with L HR with SBA. Pt declined further practice on stairs as she said she has a landing in between each of the 3 steps where she can fit her walker on each step. Patient demonstrated good compliance with precautions throughout session. At conclusion of session patient transferred to sitting in recliner and was left resting with call bell by the side. Patient instructed to call for assistance if they needed to get up for any reason and denied need for further assistance. Patient demonstrates decreased strength, mobility, and endurance. Pt is cleared for d/c home with [facility] and assist from  as needed. Patient will benefit from skilled intervention to address the above impairments. Patient's rehabilitation potential is considered to be Good  Factors which may influence rehabilitation potential include:   []         None noted  []         Mental ability/status  [x]         Medical condition  [x]         Home/family situation and support systems  []         Safety awareness  []         Pain tolerance/management  []         Other:      PLAN :  Recommendations and Planned Interventions:   [x]           Bed Mobility Training             [x]    Neuromuscular Re-Education  [x]           Transfer Training                   []    Orthotic/Prosthetic Training  [x]           Gait Training                          []    Modalities  [x]           Therapeutic Exercises           []    Edema Management/Control  [x]           Therapeutic Activities            [x]    Family Training/Education  [x]           Patient Education  []           Other (comment):    Frequency/Duration: Patient will be followed by physical therapy 1-2 times per day/4-7 days per week to address goals. Discharge Recommendations: Home Health with assist from   Further Equipment Recommendations for Discharge: N/A     SUBJECTIVE:   Patient stated I feel okay to go.     OBJECTIVE DATA SUMMARY:     Past Medical History:   Diagnosis Date    Adjacent segment disease C3/4 w/deg changes, poss stenosis, CT '18 6/22/2018    Allergic rhinitis     Anemia 2008    intermittant since then    Asthma     Back pain     Green's esophagus with esophagitis     Cervical radiculopathy     Chronic sinusitis 5/15/2012    DNS (deviated nasal septum)     Empyema     Foraminal stenosis of cervical region     C4-C5    GERD (gastroesophageal reflux disease)     Dr Tarik Gomez    Hx MRSA infection 8/11/2014    Hypertension     Hypertriglyceridemia     Insulin resistance 4/9/2012    Left knee pain     Lichen planus     Liver disease     ORTEGA-per pt, followed by Dr Hossein Paulson    Menopause     Age 51    MRSA (methicillin resistant Staphylococcus aureus) infection 2008    left lung    Nausea & vomiting     Restless leg syndrome     Rheumatoid arthritis ([address]     Spinal cord stimulator status 2016    Spinal stenosis of cervical region     C4-C5 and C5-C6    TMJ syndrome     Wears glasses     and contacts     Past Surgical History:   Procedure Laterality Date    HX CERVICAL FUSION  08/13/14    HX HEENT  1997    TMJ surgery    HX KNEE REPLACEMENT Right 2020    HX LUMBAR LAMINECTOMY  Nov 1995    [facility]    HX LUMBAR LAMINECTOMY  Feb 1997    [facility]    HX ORTHOPAEDIC Right 1/2015    bunion removal from right foot    HX OTHER SURGICAL  2007    thoracentesis    HX OTHER SURGICAL  2008    chest tube    HX OTHER SURGICAL Bilateral 1997    TMJ surgery    HX OTHER SURGICAL  2012    sinus surg 2012-Dr Bairon Thorpe.     HX OTHER SURGICAL  2016    spinal cord stimulator place for lumbar neuritis, good benefit    HX ROTATOR CUFF REPAIR Right 01/2019    HX TUBAL LIGATION      [place] ANESTH,SURGERY OF SHOULDER  01/31/2019    [place] COLONOSCOPY FLX DX W/COLLJ SPEC WHEN PFRMD  6-18-08    normal/duntemann     Barriers to Learning/Limitations: None  Compensate with: N/A  Home Situation:  Home Situation  Home Environment: Private residence  # Steps to Enter: 3  Rails to Enter: Yes  Hand Rails : Right  One/Two Story Residence: One story  Living Alone: No  Support Systems: Spouse/Significant Other  Patient Expects to be Discharged to[de-identified] Home  Current DME Used/Available at Home: Raised toilet seat,Cane, straight,Walker  Critical Behavior:  Neurologic State: Alert  Orientation Level: Oriented X4  Cognition: Follows commands  Safety/Judgement: Fall prevention  Psychosocial  Patient Behaviors: Calm; Cooperative                 Strength:    Strength: Generally decreased, functional                    Tone & Sensation:   Tone: Normal              Sensation: Impaired               Range Of Motion:  AROM: Generally decreased, functional                       Posture:        Functional Mobility:  Bed Mobility:     Supine to Sit: Contact guard assistance; Additional time;Assist x1        Transfers:  Sit to Stand: Contact guard assistance  Stand to Sit: Contact guard assistance                       Balance:   Sitting: Intact; With support  Standing: Impaired; With support  Standing - Static: Good  Standing - Dynamic : Fair    Ambulation/Gait Training:  Distance (ft): 300 Feet (ft)  Assistive Device: Walker, rolling  Ambulation - Level of Assistance: Stand-by assistance        Gait Abnormalities: Decreased step clearance        Base of Support: Shift to left     Speed/Samra: Slow       Stairs:  Number of Stairs Trained: 1 (patient felt comfortable- has a landing between each step)  Stairs - Level of Assistance: Stand-by assistance  Rail Use: Left      Therapeutic Exercises:   Pt performed x10 LAQ's and ankle pumps  Pain:  Pain level pre-treatment: 2/10   Pain level post-treatment: 2/10   Pain Intervention(s) : Medication (see MAR); Rest, Ice, Repositioning  Response to intervention: Nurse notified, See doc flow    Activity Tolerance:   Pt tolerated mobility well  Please refer to the flowsheet for vital signs taken during this treatment.   After treatment:   [x]         Patient left in no apparent distress sitting up in chair  []         Patient left in no apparent distress in bed  [x]         Call bell left within reach  [x]         Nursing notified  []         Caregiver present  []         Bed alarm activated  []         SCDs applied    COMMUNICATION/EDUCATION:   [x]         Role of Physical Therapy in the acute care setting. [x]         Fall prevention education was provided and the patient/caregiver indicated understanding. [x]         Patient/family have participated as able in goal setting and plan of care. []         Patient/family agree to work toward stated goals and plan of care. []         Patient understands intent and goals of therapy, but is neutral about his/her participation. []         Patient is unable to participate in goal setting/plan of care: ongoing with therapy staff.  []         Other:     Thank you for this referral.  Patty Reilly   Time Calculation: 44 mins      Eval Complexity: History: LOW Complexity : Zero comorbidities / personal factors that will impact the outcome / POCExam:LOW Complexity : 1-2 Standardized tests and measures addressing body structure, function, activity limitation and / or participation in recreation  Presentation: LOW Complexity : Stable, uncomplicated  Clinical Decision Making:Low Complexity    Overall Complexity:LOW

## 2022-05-13 NOTE — ANESTHESIA POSTPROCEDURE EVALUATION
Procedure(s):  RIGHT TOTAL HIP ARTHROPLASTY DIRECT ANTERIOR APPROACH/WALTER/ARCH TABLE/C-ARM/PHAN TO ASSIST.    general    Anesthesia Post Evaluation      Multimodal analgesia: multimodal analgesia used between 6 hours prior to anesthesia start to PACU discharge  Patient location during evaluation: bedside  Patient participation: complete - patient participated  Level of consciousness: awake  Pain score: 0  Pain management: adequate  Airway patency: patent  Anesthetic complications: no  Cardiovascular status: stable  Respiratory status: acceptable  Hydration status: acceptable  Post anesthesia nausea and vomiting:  controlled  Final Post Anesthesia Temperature Assessment:  Normothermia (36.0-37.5 degrees C)      INITIAL Post-op Vital signs:   Vitals Value Taken Time   /68 05/13/22 1333   Temp 36.3 °C (97.3 °F) 05/13/22 1219   Pulse 68 05/13/22 1335   Resp 12 05/13/22 1335   SpO2 96 % 05/13/22 1336   Vitals shown include unvalidated device data.

## 2022-05-13 NOTE — ANESTHESIA PREPROCEDURE EVALUATION
Relevant Problems   No relevant active problems       Anesthetic History     PONV          Review of Systems / Medical History  Patient summary reviewed and pertinent labs reviewed    Pulmonary            Asthma : well controlled       Neuro/Psych   Within defined limits           Cardiovascular    Hypertension: well controlled              Exercise tolerance: >4 METS     GI/Hepatic/Renal     GERD: well controlled      Liver disease     Endo/Other        Arthritis     Other Findings              Physical Exam    Airway  Mallampati: III  TM Distance: 4 - 6 cm  Neck ROM: decreased range of motion   Mouth opening: Normal     Cardiovascular    Rhythm: regular  Rate: normal         Dental    Dentition: Bridges and Caps/crowns     Pulmonary  Breath sounds clear to auscultation               Abdominal  GI exam deferred       Other Findings            Anesthetic Plan    ASA: 3  Anesthesia type: general          Induction: Intravenous  Anesthetic plan and risks discussed with: Patient

## 2022-05-14 ENCOUNTER — HOME CARE VISIT (OUTPATIENT)
Dept: SCHEDULING | Facility: HOME HEALTH | Age: 67
End: 2022-05-14
Payer: MEDICARE

## 2022-05-14 VITALS
OXYGEN SATURATION: 97 % | HEART RATE: 77 BPM | WEIGHT: 190 LBS | DIASTOLIC BLOOD PRESSURE: 70 MMHG | BODY MASS INDEX: 27.2 KG/M2 | SYSTOLIC BLOOD PRESSURE: 112 MMHG | TEMPERATURE: 97.7 F | RESPIRATION RATE: 18 BRPM | HEIGHT: 70 IN

## 2022-05-14 PROCEDURE — 400013 HH SOC

## 2022-05-14 PROCEDURE — MED12682

## 2022-05-14 PROCEDURE — 400018 HH-NO PAY CLAIM PROCEDURE

## 2022-05-14 PROCEDURE — 3331090001 HH PPS REVENUE CREDIT

## 2022-05-14 PROCEDURE — G0151 HHCP-SERV OF PT,EA 15 MIN: HCPCS

## 2022-05-14 PROCEDURE — 3331090002 HH PPS REVENUE DEBIT

## 2022-05-14 NOTE — HOME HEALTH
PT THR eval:  Pt is a 77 y/o female who is s/p R anterior approach THR on 5/13/22 by Dr. Ciera Vergara. Pt is referred to home care PT for anterior approach THR PT protocol per Dr. Ciera Vergara. PMH:  R TKR; Allergic rhinitis; Anemia 2008; intermittant; Asthma;  Back pain; [place] tt's esophagus with esophagitis;  Cervical radiculopathy;  Chronic sinusitis 5/15/2012; DNS (deviated nasal septum); Empyema; Foraminal stenosis of cervical region  C4-C5;  GERD (gastroesophageal reflux disease) Dr Leena Cardenas; Hx MRSA infection 8/11/2014; Hypertension; Hypertriglyceridemia; Insulin resistance 4/9/2012;  Left knee pain; Lichen planus; Liver disease  ORTEGA-per pt,  followed by Dr Anita Moura; MRSA (methicillin resistant Staphylococcus aureus) infection 2008; left lung; Nausea & vomiting; Restless leg syndrome; Rheumatoid arthritis;  Spinal cord stimulator status 2016; Spinal stenosis of cervical region; C4-C5 and C5-C6; TMJ syndrome       PLOF/living environment:  ind and ambulatory, driving, retired;  Lives with  in a one story home. PREC:  WBAT RLE; THR precautions;  fall risk;  S:  Pt denies falls since coming home from hospital  and reports change in meds since surgery. pt reports that she has been using her long handled reacher, sock-aid and shoe horn. Pt's goals :  walk pain free. O:  Pain:  R hip = 2-5 /10  Integumentary:  R anterior hip incision with dry, clean, intact Aquacel dressing. Ecchymosis on R lateral hip. Addendum:  collaborated with SN SRIRAM Aguilar during her 5/19/22 visit regarding wound status update:  R hip incision = newly epithelialized and was redressed with Aquacel dressing. Edema:  R hip and thigh  ROM:  all peripheral jts = WFL except  R hip  limited ROM due to pain;  MMT:  BUE =  4+/5;  LLE =  4+/5. RLE = deferred due to hip pain, but is able to bear weight on RLE. Bed mobility:  sleeps on bed;  sit <> supine = Min A to support R leg.   Transfers:  sit <> stand  from chair, bed, toilet = SBA with FWW.  TU sec with FWW, indicating high fall risk. Gait:  20 ft, SBA, FWW, WBAT RLE, exhibiting mild antalgic gait. Instructed on using her non skid shoes. Patient education:  PREC: WBAT, THR prec. Fall risk reduction:  use non slip shoes, ask for assistance, use FWW when out of bed. Maintenance of skin integrity:  change body position every 1-2 hours. Physical activity:  Do THR HEP 3x/day, walk every hour with AD in daytime. Pain and edema control:  take pain pills as prescribed and 1-2 hours prior to PT arrival, apply ice x 20 mins on and 40 mins off during awake time to affected hip/thigh. THR HEP initiated:  15 reps each = seated:  LAQ, heel/toe raises;  supine:  ap, qs, SAQ, hs.  Caregiver instructed to apply ice x 20 mins to affected hip after exercises. Patient level of understanding of education provided: Pt. is compliant with use of FWW and fall risk reduction, and use of her hip assistive devices for self care. Patient response to treatment:  tolerated well and had minimal pain . Caregiver involvement/assistance needed:  (name of caregiver) assists with self care, ADLs, iADLs, functional mobility, transportation. A:  Pt is s/p R anterior approach THR and presents with decreased gait, transfers and bed mobility resulting to increased fall risk and need for assistance. Pt will benefit from PT for strengthening of affected lower extremity and be ind and safe in her gait, transfers, stairs negotiation with LRAD. P:    1d14  for THR PT protocol per Dr. Roshni Colon. f/u Dr Roshni Colon is on 22. Dr. Roshni Colon was informed of start of care with home care agency today for post op THR PT protocol. SN eval to follow. SOC book:  [address] selected representative identified as  Ashley Law . Obtained authorization for treatment and viewed insurance cards for accuracy. Patient handbook was provided and reviewed with pt/CG.   Patient Jacob Edgar of rights and advanced directive information reviewed. Instructed on Agency 24 hour on call system and when to call 911 vs MD vs agency for changes in pt condition. Instructed in and promoted pt/CG involvement in plan of care. Emergency preparations done using information in admission booklet. Medication reconciled and updated and no issues noted. Discussed correct dosage and administration of high risk medication: Percocet and Warfarin, effects of overdose or underdose. Informed Rehab clinical manager of PT frequency. Ramsey Lal

## 2022-05-14 NOTE — Clinical Note
Start of care with home care agency was done today for post op THR PT protocol. SN bowling to follow.   Thank you for this referral.

## 2022-05-15 ENCOUNTER — HOME CARE VISIT (OUTPATIENT)
Dept: HOME HEALTH SERVICES | Facility: HOME HEALTH | Age: 67
End: 2022-05-15
Payer: MEDICARE

## 2022-05-15 ENCOUNTER — HOME CARE VISIT (OUTPATIENT)
Dept: SCHEDULING | Facility: HOME HEALTH | Age: 67
End: 2022-05-15
Payer: MEDICARE

## 2022-05-15 VITALS
HEART RATE: 80 BPM | RESPIRATION RATE: 20 BRPM | SYSTOLIC BLOOD PRESSURE: 106 MMHG | OXYGEN SATURATION: 98 % | DIASTOLIC BLOOD PRESSURE: 60 MMHG | TEMPERATURE: 97.8 F

## 2022-05-15 PROCEDURE — 3331090001 HH PPS REVENUE CREDIT

## 2022-05-15 PROCEDURE — G0157 HHC PT ASSISTANT EA 15: HCPCS

## 2022-05-15 PROCEDURE — 3331090002 HH PPS REVENUE DEBIT

## 2022-05-15 PROCEDURE — G0299 HHS/HOSPICE OF RN EA 15 MIN: HCPCS

## 2022-05-16 ENCOUNTER — TELEPHONE (OUTPATIENT)
Dept: OTHER | Age: 67
End: 2022-05-16

## 2022-05-16 ENCOUNTER — HOME CARE VISIT (OUTPATIENT)
Dept: SCHEDULING | Facility: HOME HEALTH | Age: 67
End: 2022-05-16
Payer: MEDICARE

## 2022-05-16 ENCOUNTER — TELEPHONE (OUTPATIENT)
Dept: ORTHOPEDIC SURGERY | Age: 67
End: 2022-05-16

## 2022-05-16 VITALS
RESPIRATION RATE: 18 BRPM | TEMPERATURE: 97.5 F | HEART RATE: 96 BPM | OXYGEN SATURATION: 94 % | SYSTOLIC BLOOD PRESSURE: 116 MMHG | DIASTOLIC BLOOD PRESSURE: 64 MMHG

## 2022-05-16 VITALS
DIASTOLIC BLOOD PRESSURE: 67 MMHG | TEMPERATURE: 97.7 F | SYSTOLIC BLOOD PRESSURE: 103 MMHG | OXYGEN SATURATION: 97 % | HEART RATE: 73 BPM

## 2022-05-16 LAB
INR BLD: 1.5 (ref 0.9–1.1)
PT POC: 18.2 SECONDS (ref 11.8–14.9)

## 2022-05-16 PROCEDURE — G0157 HHC PT ASSISTANT EA 15: HCPCS

## 2022-05-16 PROCEDURE — G0299 HHS/HOSPICE OF RN EA 15 MIN: HCPCS

## 2022-05-16 PROCEDURE — 3331090001 HH PPS REVENUE CREDIT

## 2022-05-16 PROCEDURE — 3331090002 HH PPS REVENUE DEBIT

## 2022-05-16 NOTE — HOME HEALTH
S: Pt c/o pain in Right hip, see pain tab, PT is taking Miralax 1x day for consitpation  CAREGIVER PARTICIPATION: Pt is Sup with ADL care,    O: TRANSFERS:  CGA for sit to stand transfers from couch, chair. Shower and commode transfers require CGA   THEREX/HEP: Pt instructed in seated heel toe raises, long arc quad 15 reps x 1 set, supine exercises  Ankle pumps, quad sets, glut sets, heel slides, short arc quads 15 reps x 1 set, verbal cues to improve technique  BED MOBIILTY: bed mobility tng with Min/ModA reqiured for supine<>sit transfers  GAIT: Gait tng on even surfaces x 150't x 2 with CGA/FWW within house, obstacles of rugs and stepping over threshhold with no loss of balance   EDUCATION: Pt instructed to perform HEP 3x day, instructed in pain management,edema control, hip precautions and s/s of infection  A: ;RESPONSE TO EDUCATION: Pt able to verbalize understanding of education, able to return demonstrate HEP with verbal cues  RESPONSE to [address]: Tolerated without increase in pain, minimal fatigue, 1 rest perod following exercises   Patient requires skilled PT for instruction in strengthening, balance and coordination to improve strength and facilitate increased independence with functional mobility. P: Cont with PT Daily x 14 days  for strengthening, gait tng, endurance and balance.  Anticipated DC 5/27/2022

## 2022-05-16 NOTE — HOME HEALTH
Skilled reason for visit: RT THR, incision assessment, PT/INR and medication teaching. Caregiver involvement: spouse assist with meal prep, transportation, and ADLS. Medications reviewed and all medications are available in the home this visit. The following education was provided regarding medications:  coumadin: Report any s/sx of abnormal bleeding to MD immediately/seek medical treatment for any: black tarry stools, dark/tea/blood, hematoma, dizziness, frequent gum/nose bleeds, unusual bruising, or prolong bleeding. verbalized understanding. MD notified of any discrepancies/look a-like medications/medication interactions: reviewed no NSAID'S while taking coumadin and let all medical provider/dentist aware that are taking a blood thinner. verbalized understanding. Medications are effective at this time. Home health supplies by type and quantity ordered/delivered this visit include: bandage ordered by admitting nurse. Patient education provided this visit:see interventions tab. Sharps education provided: N/A    Patient level of understanding of education provided: verbalized understanding to all education. Skilled Care Performed this visit: right hip assessment and PT/INR. Patient response to procedure performed:  tolerated PT/INR without any complaints of pain. applied pressure and band-aid applied    Agency Progress toward goals:see interventions tab. Patient's Progress towards personal goals:see interventions tab. Home exercise program: CONT ALL MEDICATIONS AS PRESCRIBED, DIET AS PRESCRIBED, IMPLEMENT FALL/INFECTION CONTROL INTERVENTIONS, ICE 20 MIN ON 20 OFF USE BARRIER, MONITOR FOR ABNORMAL S/SX DVT/infection (listed above) Tivis Slim TO MD IMMEDIATELY. Continued need for the following skills: Nursing and Physical Therapy    Plan for next visit: wound care/ PT/INR and joint replacement/medication teaching.     Patient and/or caregiver notified and agrees to changes in the Plan of Care N/A      The following discharge planning was discussed with the pt/caregiver: Will discharge when all RT THR disease process, complication and dietary goals are met and understands all medication purpose/frequencies/side effects.

## 2022-05-16 NOTE — HOME HEALTH
SUBJECTIVE: Pt reports she has had increased edema in R knee today and feels groggy after taking pain meds prior to PT session today. CAREGIVER INVOLVEMENT/ASSISTANCE NEEDED FOR: pts  present during PT session and assist pt with all needs prn. HOME HEALTH SUPPLIES BY TYPE AND QUANTITY ORDERED/DELIVERED THIS VISIT INCLUDE: n/a  OBJECTIVE:  See interventions. 1.Patient level of understanding of education provided: Pt demonstrates a good understanding of HEP and reports a good compliance. 2.Patient response to treatment:  Pt required occ rest breaks secondary to c/o fatigue in R hip x 1-2 minutes each x 3.  ASSESSMENT OF PROGRESS TOWARD GOALS: Pt is very motivated today and was able to perform all actvities today with good technique. CONTINUED NEED FOR THE FOLLOWING SKILLS:Pt requires continued PT to improve LE strength, bed mobility and transfer/gait ability. PLAN FOR NEXT VISIT: Will plan to continue PT working towards progressing exercises and reps prn to improve LE strength and transfer/gait ability. THE FOLLOWING DISCHARGE PLANNING WAS DISCUSSED WITH THE PATIENT/CAREGIVER: Pt is scheduled daily through 5/27/22 with HEP and caregiver.

## 2022-05-16 NOTE — TELEPHONE ENCOUNTER
Call placed to patient, ID verified x 2. Patient is s/p right total hip replacement with Dr. Jaylene Thomas 05/13/2022. Patient denies chest pain, shortness of breath, nausea, vomiting, fever or chills. She reports her pain is well controlled and is only taking her pain medication as needed. Per patient physical therapy and home health have both been out to her home. Her dressing is dry and intact with a small spot of blood on it. Per patient home health stated that they wound change the dressing on Thursday. Patient states that she is up ambulating ad casper with her rolling walker. She reports doing so well that physical therapy said she could move to a cane next week. Overall she is very pleased with her results. She will follow up with Dr. Sanjeev Miner in two weeks for her postop appointment. She has no questions at this time.

## 2022-05-16 NOTE — TELEPHONE ENCOUNTER
Contacted Letitia with [facility]. Informed her of the recommendations and repeated back the orders as given.

## 2022-05-16 NOTE — TELEPHONE ENCOUNTER
Boris Jean from [facility] called with PT/INR:    PT: 18.2  INR: 1.5  Taking 3 mg of Coumadin, has not taken any today. Boris Jean stated her pain medication and antibiotic can interact with Coumadin. Boris Jean can be reached at [phone].

## 2022-05-17 ENCOUNTER — HOME CARE VISIT (OUTPATIENT)
Dept: HOME HEALTH SERVICES | Facility: HOME HEALTH | Age: 67
End: 2022-05-17
Payer: MEDICARE

## 2022-05-17 ENCOUNTER — HOME CARE VISIT (OUTPATIENT)
Dept: SCHEDULING | Facility: HOME HEALTH | Age: 67
End: 2022-05-17
Payer: MEDICARE

## 2022-05-17 DIAGNOSIS — I10 ESSENTIAL HYPERTENSION WITH GOAL BLOOD PRESSURE LESS THAN 140/90: ICD-10-CM

## 2022-05-17 PROCEDURE — 3331090002 HH PPS REVENUE DEBIT

## 2022-05-17 PROCEDURE — 3331090001 HH PPS REVENUE CREDIT

## 2022-05-17 PROCEDURE — G0157 HHC PT ASSISTANT EA 15: HCPCS

## 2022-05-17 NOTE — CASE COMMUNICATION
Nursing Integumentary and Medication Assessment  Question          Score  M 1306             Unhealed Pressure Injury Stage 2 or higher 0           If score is 0, skip  (A-F)                 A1. Stage 2   0               B1. Stage 3    0              C1. Stage 4          0        D1. Unstageable: non-removable dressing   0    E1. Unstageable: Slough/Eschar    0                 F1. Unstageable: Deep tissue injury   0  M 1322              Current number of Stage 1 Pressure Injuries    0  M 1324             Stage of Most Problematic Stageable Pressure Injury   N/A  M 1330             Does patient have a Stasis Ulcer NO                        If score is 0, skip M 1332 and M 1334  M 1332             Current number of Observable Stasis Ulcers       0      M 1334             Status of Most Problematic Observable Stasis Ulcer             Does patient have a Christian gical Wound             1          If score is 0 or 2, skip M 1342  M 1342             Status of Most Problematic Observable Surgical Wound  -non-observable due to dressing not due or required to be changed this visit. M 2001             Drug Regimen Review                       YES  M 2003 Medication Follow-up             No issues found  M 2010 High Risk Drug Education     YES      M 1306 Unhealed Pressure Ulcer/Injury  at Stage 2 or higher  0= No     M 1311 Current Number of Unhealed Pressure Ulcers/Injuries at Each Stage    N/A     [id number] Current Number of Stage 1 Pressure Injuries: 0      Stage of Most Problematic Unhealed Pressure Ulcer/Injury that is Stageable   NONE   N/A- Patient has no pressure ulcers or no stageable pressure ulcers      M 1330 Does this patient have a Stasis Ulcer?  NO  0= No     M 1332 Current Number of Stasis Ulcer(s) th at are Observable: NONE     M 1334 Status of Most Problematic Stasis Ulcer that is Observable N/A   Surgical Wound-1 (non-observable due to dressing not due or required to be changed this visit)  Wound that is Observable NONE     M 2001 Did a complete drug regimen review identify potential clinically significant medication issues? No issues found. M 2003 Did the agency contact a physician by midnight of the next calendar da y and complete recommended actions for clinically significant medication issues? No issues found.      M 2010 Has the patient/caregiver received instruction on special precautions for all high-risk medications  1= Yes

## 2022-05-17 NOTE — CASE COMMUNICATION
Hi Dr. Dio Garibay,     I just wanted to make you aware that Mrs. Yazan Cooper has been admitted to home health care with skilled nursing in for disease/medication management and education regarding recent right hip surgery-pain, safety, incision assessment and dressing changes per orders, monitoring for possible post op complications. Thank you for allowing [facility] to care for your patient.     Thank you,  DORETHA ArceoN, RN

## 2022-05-18 ENCOUNTER — HOME CARE VISIT (OUTPATIENT)
Dept: SCHEDULING | Facility: HOME HEALTH | Age: 67
End: 2022-05-18
Payer: MEDICARE

## 2022-05-18 VITALS
DIASTOLIC BLOOD PRESSURE: 76 MMHG | SYSTOLIC BLOOD PRESSURE: 129 MMHG | HEART RATE: 58 BPM | OXYGEN SATURATION: 97 % | TEMPERATURE: 97.5 F

## 2022-05-18 VITALS
TEMPERATURE: 97.7 F | SYSTOLIC BLOOD PRESSURE: 117 MMHG | DIASTOLIC BLOOD PRESSURE: 72 MMHG | OXYGEN SATURATION: 97 % | HEART RATE: 70 BPM

## 2022-05-18 PROCEDURE — G0157 HHC PT ASSISTANT EA 15: HCPCS

## 2022-05-18 PROCEDURE — 3331090001 HH PPS REVENUE CREDIT

## 2022-05-18 PROCEDURE — 3331090002 HH PPS REVENUE DEBIT

## 2022-05-18 RX ORDER — LOSARTAN POTASSIUM 50 MG/1
TABLET ORAL
Qty: 90 TABLET | Refills: 3 | Status: SHIPPED | OUTPATIENT
Start: 2022-05-18

## 2022-05-18 NOTE — HOME HEALTH
S: Pt denies falls or changes in medication, c/o 2/10 pain in right hip, took shower with shower chair prior to visit today  CAREGIVER PARTICIPATION: Pt is Ind with ADLs,use of shower chair with spouse support for set up. O: TRANSFERS: Ind- Sup provided for sit to stand tranfers from armed chair   A: THEREX/HEP: Pt instructed in standing heel toe raises, hip abduction, HS curls, marching in place, seated knee ext x 15 reps x 1 set added side stepping length of counter x 3 pt able to return demonstrate HEP   STAIRS: Pt instructed in stair negotiation with use of SPC/Rail CGA with VCs for cane placement and sequencing step to pattern  GAIT: Pt progresses to SPC amb x 250ft/CGA provided VCs to improve stride lengh with CGA pt able to return demonstrate improved stride length   EDUCATION: Pt instructed to perform HEP 3x day, reviewed pain management, s/s of infection and hip precautions, instructed in risk associated with use of opiods and risk of bleeding RESPONSE TO EDUCATION: Pt able to return demonstrate HEP, able to teach back pain management, risk associated with use of opiods, risk of bleeding and hip precautions  RESPONSE TO TX: Pt tolerated increaed amb dist on even surfaces, c/o 3/10 pain following tx session with minimal fatugue  Patient requires skilled PT for instruction in strengthening, balance and coordination to improve strength and facilitate increased independence with functional mobility. P: Cont with PTx 14 days for strengthening, gait tng, endurance and balance.  Anticipated DC 5/27/2022

## 2022-05-18 NOTE — HOME HEALTH
S: Pt denies falls or changes in medication, c/o 2/10 pain in right hip, pt had a BM this morning   CAREGIVER PARTICIPATION: Pt is Ind with ADLs  O: TRANSFERS: CGA provided for sit to stand tranfers from armed chair   THEREX/HEP: Pt progressed to standing therex instructed in standing heel toe raises, hip abduction, HS curls, marching in place, seated knee ext x 15 reps x 1 set pt able to return demonstrate HEP   BED MOBIILTY: Min A provided for sit<>supine transfers requires assistance to manage RLE  GAIT: Gait tng on even surfaces with FWW/SBA x 4 minutes pt ambulates with good stride length and foot clearance, decreased shanta    EDUCATION: Pt instructed to perform HEP 3x day, instructed in pain mangaement, risk associated with use of opiods and risk of bleeding, pt also instructed in fall prevention   A: RESPONSE TO EDUCATION: Pt able to return demonstrate HEP, able to teach back pain management and risk associated with use of opiods, will continue education on risk of bleeding RESPONSE TO TX: Pt tolerated increaed amb dist on even surfaces, c/o 3/10 pain following tx session    Patient requires skilled PT for instruction in strengthening, balance and coordination to improve strength and facilitate increased independence with functional mobility. P: Cont with PTx 14 days for strengthening, gait tng, endurance and balance.  Anticipated DC 5/27/2022

## 2022-05-19 ENCOUNTER — HOME CARE VISIT (OUTPATIENT)
Dept: SCHEDULING | Facility: HOME HEALTH | Age: 67
End: 2022-05-19
Payer: MEDICARE

## 2022-05-19 ENCOUNTER — TELEPHONE (OUTPATIENT)
Dept: ORTHOPEDIC SURGERY | Age: 67
End: 2022-05-19

## 2022-05-19 ENCOUNTER — HOME CARE VISIT (OUTPATIENT)
Dept: HOME HEALTH SERVICES | Facility: HOME HEALTH | Age: 67
End: 2022-05-19
Payer: MEDICARE

## 2022-05-19 PROCEDURE — G0300 HHS/HOSPICE OF LPN EA 15 MIN: HCPCS

## 2022-05-19 PROCEDURE — 3331090002 HH PPS REVENUE DEBIT

## 2022-05-19 PROCEDURE — G0157 HHC PT ASSISTANT EA 15: HCPCS

## 2022-05-19 PROCEDURE — 3331090001 HH PPS REVENUE CREDIT

## 2022-05-19 NOTE — TELEPHONE ENCOUNTER
[place] from [facility] called with PT/INR results:    PT: 24.8  INR: 2.1  Patient is taking 3 mg Coumadin daily    [place] can be reached at [phone].

## 2022-05-19 NOTE — TELEPHONE ENCOUNTER
Contacted Kandy with [facility].    Informed her of pts medication order at this time

## 2022-05-20 ENCOUNTER — HOME CARE VISIT (OUTPATIENT)
Dept: SCHEDULING | Facility: HOME HEALTH | Age: 67
End: 2022-05-20
Payer: MEDICARE

## 2022-05-20 VITALS
SYSTOLIC BLOOD PRESSURE: 122 MMHG | HEART RATE: 65 BPM | TEMPERATURE: 98.1 F | OXYGEN SATURATION: 98 % | RESPIRATION RATE: 16 BRPM | DIASTOLIC BLOOD PRESSURE: 78 MMHG

## 2022-05-20 VITALS
HEART RATE: 84 BPM | OXYGEN SATURATION: 97 % | SYSTOLIC BLOOD PRESSURE: 127 MMHG | TEMPERATURE: 97.3 F | DIASTOLIC BLOOD PRESSURE: 72 MMHG

## 2022-05-20 PROCEDURE — G0157 HHC PT ASSISTANT EA 15: HCPCS

## 2022-05-20 PROCEDURE — 3331090001 HH PPS REVENUE CREDIT

## 2022-05-20 PROCEDURE — 3331090002 HH PPS REVENUE DEBIT

## 2022-05-20 NOTE — HOME HEALTH
S: Pt denies falls or changes in medication, c/o 2/10 pain in right hip. Reported BM prior to therapy today. CAREGIVER PARTICIPATION: Pt is Ind with ADLs,use of shower chair with spouse support for set up. Ind with meal prep. O: TRANSFERS: Ind- Sup provided for sit to stand tranfers from armed chair/recliner  A: THEREX/HEP: Pt instructed in standing heel toe raises, hip abduction, HS curls, marching in place, seated knee ext x 15 reps x 1 set, added side stepping length of counter x 3 pt able to return demonstrate HEP  STAIRS: Pt instructed in stair negotiation with use of SPC/Rail CGA with VCs sequencing step to pattern  GAIT: Ambulated with SPC amb x 400ft/CGA provided VCs to improve stride lengh, Gait 4 x 20' with 360degree turns with VC's for direction and technique, pt able to return demonstrate improved turns and stride length  BED MOBILITY: Pt instructed in sitting <> supine with CGA, VC's for safety and hip precautions  EDUCATION: Pt instructed to perform HEP 3x day, reviewed pain management, increased ice as needed, s/s of infection and hip precautions, instructed in risk associated with use of opiods and risk of bleeding   RESPONSE TO EDUCATION: Pt able to return demonstrate HEP, able to teach back pain management, risk associated with use of opiods, risk of bleeding and hip precautions  RESPONSE TO TX: Pt tolerated increaed amb dist on uneven surfaces, Pt stated no fatigue but Therapist observed SOB folowing HEP  Patient requires skilled PT for instruction in strengthening, balance and coordination to improve strength and facilitate increased independence with functional mobility. P: Cont with PTx 14 days for strengthening, gait tng, endurance and balance.  Anticipated DC 5/27/2022

## 2022-05-21 ENCOUNTER — HOME CARE VISIT (OUTPATIENT)
Dept: HOME HEALTH SERVICES | Facility: HOME HEALTH | Age: 67
End: 2022-05-21
Payer: MEDICARE

## 2022-05-21 VITALS
DIASTOLIC BLOOD PRESSURE: 72 MMHG | SYSTOLIC BLOOD PRESSURE: 114 MMHG | HEART RATE: 84 BPM | TEMPERATURE: 98.2 F | OXYGEN SATURATION: 97 %

## 2022-05-21 PROCEDURE — 3331090002 HH PPS REVENUE DEBIT

## 2022-05-21 PROCEDURE — 3331090001 HH PPS REVENUE CREDIT

## 2022-05-21 NOTE — HOME HEALTH
S: Pt denies falls or changes in medication, c/o 2/10 pain in right hip  CAREGIVER PARTICIPATION: Pt is Ind with ADLs  O: TRANSFERS: Ind- transfers from recliner   A: THEREX/HEP: Pt instructed in standing heel toe raises, hip abduction, HS curls, marching in place, seated knee ext x 15 reps x 1 set, added side stepping length of counter x 3, alternating toe taps and alternating stepping up onto step pt willis to return demonstrate HEP  STAIRS: Pt instructed in stair negotiation with use of SPC/Rail Sup with step to gait pattern  GAIT: gait tng outside on uneven surfaces with SPC amb 225ft x 2 VCs to improve stride and to improve safety with turning   EDUCATION: Pt instructed to perform HEP 3x day, reviewed pain management   RESPONSE TO EDUCATION: Pt able to return demonstrate HEP, able to teach back pain management  RESPONSE TO TX: Pt progressing well toward established goals tolerated increaed amb dist on uneven surfaces, tolerated increaed wt bearing on RLE without increase in pain, pt denies SOB but visibly winded  Patient requires skilled PT for instruction in strengthening, balance and coordination to improve strength and facilitate increased independence with functional mobility. P: Cont with PTx 14 days for strengthening, gait tng, endurance and balance.  Anticipated DC 5/27/2022

## 2022-05-21 NOTE — HOME HEALTH
SKILLED REASON for visit: right total hip replacement -dressing change  CAREGIVER INVOLVEMENT: spouse is available as needed for assistance with iadls, adls, meal prep, medication management, taking to md appointments. MEDICATIONS: warfarin reviewed and all medications are available in the home this visit. Patient denies any medication changes at this time of assessment. EDUCATION PROVIDED: regarding medications, medication interactions, and look alike medications (specify): reviewed side effects, purposes, dosage, frequencies. High risk medication teaching regarding anticoagulants-warfarin bleeding risks, hyperglycemic agents or opiod narcotics performed (specify) n/a  Medications  are effective at this time. SUPPLIES: by type and quantity ordered/delivered this visit include: n/a  PATIENT EDUCATION ON THIS VISIT:Educated bleeding risks associated with anticoagulants. patient/cg instructed to monitor for edema/increase in edema, to elevate extremity when edema occurs and to notify md if edema exceeds normal limits for patient, none noted at this time. patient made aware to monitor for s/s of infection [increased swelling, increased redness around site, increased pain, foul smelling drainage, fever] aware who to report to/when. no s/s of infection noted. encouraged patient to get three nutritional meals daily and to stay hydrated. reviewed heart healthy diet- monitoring sodium intake, cholesterol and fat intake. patient aware to limit sodium, no added sodium to diet. reviewed foods to avoid patient  Instructed onrepostioning every 2 hours as necessary for prevention of pressure sores  PATIENT LEVEL OF UNDERSTANDING: patient/cg has a partial understanding of education that was provided at this time by engaging in all education provided and is able to verbalize understanding, pt denies any questions or concerns at this time.   SKILLED CARE PERFORMED THIS VISIT: PT/INR  PATIENT RESPONSE TO PROCEDURE PERFORMED: patient tolerated procedure with no signs of discomfort, grimacing or pain, no complications or concerns noted. Agency Progress toward goals: goals/teaching reviewed. patient is progressing towards goals at this time, Patient's Progress towards personal goals: pt reporting they are progressing towards their goals at this time. Home exercise program:HEP deep breathing  Continued need for the following skills: Nursing  Plan for next visit: routine dressing  Patient and/or caregiver notified and agrees to changes in the Plan of Care NA  The following discharge planning was discussed with the pt/caregiver: Patient will be discharged once education has completed, patient is medically stable and  independent with care.   Sharps-n/a

## 2022-05-22 ENCOUNTER — HOME CARE VISIT (OUTPATIENT)
Dept: HOME HEALTH SERVICES | Facility: HOME HEALTH | Age: 67
End: 2022-05-22
Payer: MEDICARE

## 2022-05-22 PROCEDURE — 3331090001 HH PPS REVENUE CREDIT

## 2022-05-22 PROCEDURE — 3331090002 HH PPS REVENUE DEBIT

## 2022-05-23 ENCOUNTER — HOME CARE VISIT (OUTPATIENT)
Dept: HOME HEALTH SERVICES | Facility: HOME HEALTH | Age: 67
End: 2022-05-23
Payer: MEDICARE

## 2022-05-23 ENCOUNTER — TELEPHONE (OUTPATIENT)
Dept: ORTHOPEDIC SURGERY | Age: 67
End: 2022-05-23

## 2022-05-23 ENCOUNTER — HOME CARE VISIT (OUTPATIENT)
Dept: SCHEDULING | Facility: HOME HEALTH | Age: 67
End: 2022-05-23
Payer: MEDICARE

## 2022-05-23 VITALS
SYSTOLIC BLOOD PRESSURE: 124 MMHG | RESPIRATION RATE: 18 BRPM | HEART RATE: 90 BPM | DIASTOLIC BLOOD PRESSURE: 62 MMHG | OXYGEN SATURATION: 95 % | TEMPERATURE: 97.3 F

## 2022-05-23 LAB
INR BLD: 2.4 (ref 0.9–1.1)
PT POC: 0 SECONDS (ref 11.8–14.9)

## 2022-05-23 PROCEDURE — G0300 HHS/HOSPICE OF LPN EA 15 MIN: HCPCS

## 2022-05-23 PROCEDURE — G0151 HHCP-SERV OF PT,EA 15 MIN: HCPCS

## 2022-05-23 PROCEDURE — 3331090002 HH PPS REVENUE DEBIT

## 2022-05-23 PROCEDURE — 3331090001 HH PPS REVENUE CREDIT

## 2022-05-23 NOTE — HOME HEALTH
PT reassessment visit. S:  Denies falls nor change in meds. Pt has very minimal R hip pain. O:  PT eval date is 22 and she has been receiving home care PT daily for anterior approach THR by Dr. Roshni Colon. Integumentary:  R hip incision with dry, clean, intact Aquacel dressing. Dressing changes done by SN. Goal progressing. Gait trn ft, SC, ind indoor, SBA with SC for outdoor terrain, exhibiting good gait pattern. Goal progressing. 6MWT:  200 ft with SC, O2 sats = 99%. Indicates good walking endurance. Goal met. 3 front entry stair negotiation:  SBA holding on railing and SC. Goal progressing. Therex:  THR exercise protocol per Dr. Roshni Colon, 20 reps;  seated = LAQ.  standing with B hand support:  alternating hip flex/ext/abd;  heel raises, mini squats, sideways walking. Goal progressing. Bed mobility:  ind.  Goal met. TU.8 seconds with SC, indicating improvement but still a fall risk. Goal progressing. Transfers:  sit <> stand from chair, toilet, bed = ind with SC. Shower transfers = SBA;  car transfers: to be done on a succeeding session. Goal progressing. Pt instructed:  to apply ice x 20 mins to R hip/thigh post exercises, do exercises 3x/day, walk once every hour during  daytime. Fall risk reduction:  use appropriate shoes, use correct AD, ask for supervision. Patient level of understanding of education provided: Pt is ind and compliant with THR precautions, HEP and ambulation program.  Patient response to treatment:  Tolerated well and has very minimal L hip pain during the past 24 hours. She only took 1 pain pill yesterday. Caregiver involvement/assistance needed:  (name of caregiver) assists with ADLs, iADLs, functional mobility, transportation. A:  Patient demonstrated a positive result to therapy this date as evidenced by performance of 6MWT and improved TUG . Progressing well toward goals for independence in functional mobility.   Patient continues to have deficits in transfers, strength and slow gait shanta. Patient will benefit from continued intervention with progression of gait, transfers to car/shower. P:  cont PT 1d4 for THR PT protocol per Dr. Purvi Georges to work on car transfers and progressive gait training to be ind in outdoor gait. Pt was informed of DC on 5/27/22.   F/u visit with Mac WYNNE is on 5/27/22 at 9:20 am.

## 2022-05-23 NOTE — TELEPHONE ENCOUNTER
[facility] patients results. .PT: 26.2 INR: 2.4. Usama Brooke taking coumadin 3mg daily. Usama Brooke Patient is being discharged today.

## 2022-05-24 ENCOUNTER — HOME CARE VISIT (OUTPATIENT)
Dept: HOME HEALTH SERVICES | Facility: HOME HEALTH | Age: 67
End: 2022-05-24
Payer: MEDICARE

## 2022-05-24 ENCOUNTER — TELEPHONE (OUTPATIENT)
Dept: ORTHOPEDIC SURGERY | Age: 67
End: 2022-05-24

## 2022-05-24 ENCOUNTER — HOME CARE VISIT (OUTPATIENT)
Dept: SCHEDULING | Facility: HOME HEALTH | Age: 67
End: 2022-05-24
Payer: MEDICARE

## 2022-05-24 PROCEDURE — 3331090001 HH PPS REVENUE CREDIT

## 2022-05-24 PROCEDURE — 3331090002 HH PPS REVENUE DEBIT

## 2022-05-24 PROCEDURE — G0157 HHC PT ASSISTANT EA 15: HCPCS

## 2022-05-24 NOTE — TELEPHONE ENCOUNTER
José Miguel Esquivel called in and stated the patient has been discharged so she won't be able to go out and check her on Friday. She states it's urgent and is requesting a call back.     Kandy's contact# [phone]

## 2022-05-24 NOTE — TELEPHONE ENCOUNTER
Spoke with Kandy at this time. Informed her of orders per PA. Verbal read back given with understanding.

## 2022-05-24 NOTE — TELEPHONE ENCOUNTER
Kandy advised no need to repeat INR. The patient has an appointment with Francine Pittman Friday at [facility] at 10:50. Alejandra Bain is going to remind patient of her upcoming appointment.

## 2022-05-25 ENCOUNTER — HOME CARE VISIT (OUTPATIENT)
Dept: SCHEDULING | Facility: HOME HEALTH | Age: 67
End: 2022-05-25
Payer: MEDICARE

## 2022-05-25 VITALS
HEART RATE: 75 BPM | DIASTOLIC BLOOD PRESSURE: 73 MMHG | OXYGEN SATURATION: 97 % | TEMPERATURE: 98 F | SYSTOLIC BLOOD PRESSURE: 107 MMHG

## 2022-05-25 VITALS
HEART RATE: 61 BPM | RESPIRATION RATE: 16 BRPM | DIASTOLIC BLOOD PRESSURE: 78 MMHG | OXYGEN SATURATION: 98 % | SYSTOLIC BLOOD PRESSURE: 121 MMHG | TEMPERATURE: 97.7 F

## 2022-05-25 VITALS
TEMPERATURE: 98.6 F | HEART RATE: 71 BPM | OXYGEN SATURATION: 98 % | DIASTOLIC BLOOD PRESSURE: 67 MMHG | SYSTOLIC BLOOD PRESSURE: 110 MMHG

## 2022-05-25 PROCEDURE — 3331090002 HH PPS REVENUE DEBIT

## 2022-05-25 PROCEDURE — G0157 HHC PT ASSISTANT EA 15: HCPCS

## 2022-05-25 PROCEDURE — 3331090001 HH PPS REVENUE CREDIT

## 2022-05-25 NOTE — HOME HEALTH
SKILLED REASON for visit:  R-THR requiring  skilled nursing -dressing changes and medications  CAREGIVER INVOLVEMENT: spouse is available as needed for assistance with iadls, adls, meal prep, medication management, taking to md appointments. MEDICATIONS:warfarin  reviewed and all medications are available in the home this visit. Patient denies any medication changes at this time of assessment. EDUCATION PROVIDED: regarding medications, medication interactions, and look alike medications (specify): reviewed side effects, purposes, dosage, frequencies. High risk medication teaching regarding anticoagulants, hyperglycemic agents or opiod narcotics performed (specify) na  Medications  are effective at this time. SUPPLIES: by type and quantity ordered/delivered this visit include:   PATIENT EDUCATION ON THIS VISIT:warfarin bleeding risks, patient/cg instructed to monitor for edema/increase in edema, to elevate extremity when edema occurs and to notify md if edema exceeds normal limits for patient, none noted at this time. patient made aware to monitor for s/s of infection [increased swelling, increased redness around site, increased pain, foul smelling drainage, fever] aware who to report to/when. no s/s of infection noted. encouraged patient to get three nutritional meals daily and to stay hydrated. reviewed heart healthy diet- monitoring sodium intake, cholesterol and fat intake. patient aware to limit sodium, no added sodium to diet. reviewed foods to avoid patient  Instructed onrepostioning every 2 hours as necessary for prevention of pressure sores  PATIENT LEVEL OF UNDERSTANDING: patient/cg has a partial understanding of education that was provided at this time by engaging in all education provided and is able to verbalize understanding, pt denies any questions or concerns at this time.   SKILLED CARE PERFORMED THIS VISIT -PT/INR  PATIENT RESPONSE TO PROCEDURE PERFORMED:   patient tolerated procedure with no signs of discomfort, grimacing or pain, no complications or concerns noted. Agency Progress toward goals: goals/teaching reviewed. patient is progressing towards goals at this time, Patient's Progress towards personal goals: pt reporting they are progressing towards their goals at this time. Home exercise program: HEP deep breathing 2-3 x daily  Continued need for the following skills: Nursing  Plan for next visit: d/c  Patient and/or caregiver notified and agrees to changes in the Plan of Care NA  The following discharge planning was discussed with the pt/caregiver: Patient will be discharged once education has completed, patient is medically stable and  independent with care.   Marek-n/a

## 2022-05-25 NOTE — HOME HEALTH
S: Pt denies falls or changes in medication, c/o 1/10right hip pain   CAREGIVER PARTICIPATION: Pt is Ind with ADLs  O: TRANSFERS: Ind transfers from multiple surfaces in home   A: THEREX/HEP: Pt instructed in supine ankle pumps, quad sets, heel slides, SAQ, LAQs x 20 reps x 1 set   GAIT: gait tng on even surfaces with SPC x  6 minutes improved shanta, good stride length and heel strike  EDUCATION: Pt instructed to perform HEP 3x day, reviewed pain management and fall prevention   A: RESPONSE TO EDUCATION: Pt able to return demonstrate HEP, able to teach back pain management and fall prevention   RESPONSE TO TX: Pt has progressed well, Ind with HEP, improved gait with LRAD, Ind sit to stand transfers from multiple surfaces in home to include shower and commode  Patient requires skilled PT for instruction in strengthening, balance and coordination to improve strength and facilitate increased independence with functional mobility.   P: Pt is scheduled for DC 5/27/2022

## 2022-05-25 NOTE — HOME HEALTH
S: Pt denies falls or changes in medication, c/o 1/10right hip pain   CAREGIVER PARTICIPATION: Pt is Ind with ADLs  O: TRANSFERS: Ind transfers from recliner   A: THEREX/HEP: Pt instructed in standing heel toe raises, hip abduction, HS curls, marching in place, mini sqauats increaed to 20 reps. seated knee ext x 20 reps x 1 set, side stepping length of counter x 3 pt able to return demonstrate HEP with minimal VCs  GAIT: gait tng on even surfaces with SPC x 4 minutes improved shanta, good stride length and heel strike  EDUCATION: Pt instructed to perform HEP 3x day, reviewed pain management and fall prevention   A: RESPONSE TO EDUCATION: Pt able to return demonstrate HEP, able to teach back pain management and fall prevention   RESPONSE TO TX: Pt continues to progress well toward established goals, improved shanta, minimal VCs to recall HEP  Patient requires skilled PT for instruction in strengthening, balance and coordination to improve strength and facilitate increased independence with functional mobility. P: Cont with PTx 14 days, 3 days remaining for strengthening, gait tng, endurance and balance.  Anticipated DC 5/27/2022

## 2022-05-25 NOTE — TELEPHONE ENCOUNTER
Notified Alfonso Zuluaga and patient that she is to continue on the coumadin until she come in to see Lazaro Cervantes.

## 2022-05-26 ENCOUNTER — HOME CARE VISIT (OUTPATIENT)
Dept: HOME HEALTH SERVICES | Facility: HOME HEALTH | Age: 67
End: 2022-05-26
Payer: MEDICARE

## 2022-05-26 ENCOUNTER — HOME CARE VISIT (OUTPATIENT)
Dept: SCHEDULING | Facility: HOME HEALTH | Age: 67
End: 2022-05-26
Payer: MEDICARE

## 2022-05-26 VITALS
HEART RATE: 82 BPM | OXYGEN SATURATION: 97 % | TEMPERATURE: 97.8 F | DIASTOLIC BLOOD PRESSURE: 70 MMHG | SYSTOLIC BLOOD PRESSURE: 110 MMHG

## 2022-05-26 PROCEDURE — 3331090002 HH PPS REVENUE DEBIT

## 2022-05-26 PROCEDURE — G0157 HHC PT ASSISTANT EA 15: HCPCS

## 2022-05-26 PROCEDURE — 3331090001 HH PPS REVENUE CREDIT

## 2022-05-27 ENCOUNTER — OFFICE VISIT (OUTPATIENT)
Dept: ORTHOPEDIC SURGERY | Age: 67
End: 2022-05-27
Payer: MEDICARE

## 2022-05-27 ENCOUNTER — HOME CARE VISIT (OUTPATIENT)
Dept: SCHEDULING | Facility: HOME HEALTH | Age: 67
End: 2022-05-27
Payer: MEDICARE

## 2022-05-27 VITALS
OXYGEN SATURATION: 99 % | TEMPERATURE: 97.5 F | HEART RATE: 81 BPM | SYSTOLIC BLOOD PRESSURE: 122 MMHG | DIASTOLIC BLOOD PRESSURE: 84 MMHG | RESPIRATION RATE: 18 BRPM

## 2022-05-27 VITALS — TEMPERATURE: 97.3 F

## 2022-05-27 DIAGNOSIS — Z96.641 STATUS POST RIGHT HIP REPLACEMENT: Primary | ICD-10-CM

## 2022-05-27 DIAGNOSIS — M25.551 RIGHT HIP PAIN: ICD-10-CM

## 2022-05-27 PROCEDURE — G0151 HHCP-SERV OF PT,EA 15 MIN: HCPCS

## 2022-05-27 PROCEDURE — 3331090002 HH PPS REVENUE DEBIT

## 2022-05-27 PROCEDURE — 3331090001 HH PPS REVENUE CREDIT

## 2022-05-27 PROCEDURE — 99024 POSTOP FOLLOW-UP VISIT: CPT | Performed by: PHYSICIAN ASSISTANT

## 2022-05-27 NOTE — PROGRESS NOTES
[address] [place] [address]  [phone]           Patient: Aly Chi                MRN: [id number]       SSN: [id number]  YOB: 1955        AGE: 78 y.o. SEX: female  There is no height or weight on file to calculate BMI. PCP: Tabatha Palm MD  05/27/22      This office note has been dictated. REVIEW OF SYSTEMS:  Constitutional: Negative for fever, chills, weight loss and malaise/fatigue. HENT: Negative. Eyes: Negative. Respiratory: Negative. Cardiovascular: Negative. Gastrointestinal: No bowel incontinence or constipation. Genitourinary: No bladder incontinence or saddle anesthesia. Skin: Negative. Neurological: Negative. Endo/Heme/Allergies: Negative. Psychiatric/Behavioral: Negative. Musculoskeletal: As per HPI above.      Past Medical History:   Diagnosis Date    Adjacent segment disease C3/4 w/deg changes, poss stenosis, CT '18 6/22/2018    Allergic rhinitis     Anemia 2008    intermittant since then    Asthma     Back pain     Green's esophagus with esophagitis     Cervical radiculopathy     Chronic sinusitis 5/15/2012    DNS (deviated nasal septum)     Empyema     Foraminal stenosis of cervical region     C4-C5    GERD (gastroesophageal reflux disease)     Dr Alton Romero    Hx MRSA infection 8/11/2014    Hypertension     Hypertriglyceridemia     Insulin resistance 4/9/2012    Left knee pain     Lichen planus     Liver disease     ORTEGA-per pt, followed by Dr Chuy Manuel Menopause     Age 51    MRSA (methicillin resistant Staphylococcus aureus) infection 2008    left lung    Nausea & vomiting     Restless leg syndrome     Rheumatoid arthritis ([address]     Spinal cord stimulator status 2016    Spinal stenosis of cervical region     C4-C5 and C5-C6    TMJ syndrome     Wears glasses     and contacts         Current Outpatient Medications:     losartan (COZAAR) 50 mg tablet, TAKE 1 TABLET DAILY (Patient taking differently: TAKE 1 TABLET DAILY, oral), Disp: 90 Tablet, Rfl: 3    cefadroxil (DURICEF) 500 mg capsule, Take 1 Capsule by mouth two (2) times a day., Disp: 14 Capsule, Rfl: 0    docusate sodium (Colace) 100 mg capsule, Take 1 Capsule by mouth two (2) times a day for 90 days. , Disp: 60 Capsule, Rfl: 2    warfarin (Coumadin) 3 mg tablet, Take 1 Tablet by mouth daily for 21 days. , Disp: 30 Tablet, Rfl: 1    ondansetron hcl (Zofran) 4 mg tablet, Take 1 Tablet by mouth every eight (8) hours as needed for Nausea., Disp: 30 Tablet, Rfl: 1    cyanocobalamin 1,000 mcg tablet, take 1 tablet by mouth once daily, Disp: 90 Tablet, Rfl: 3    albuterol (PROVENTIL HFA, VENTOLIN HFA, PROAIR HFA) 90 mcg/actuation inhaler, Take 1 Puff by inhalation every six (6) hours as needed for Wheezing., Disp: , Rfl:     FeroSuL 325 mg (65 mg iron) tablet, take 1 tablet by mouth once daily BEFORE BREAKFAST, Disp: 90 Tablet, Rfl: 1    abatacept (Orencia, with maltose,) 250 mg injection, by IntraVENous route. monthly, Disp: , Rfl:     clobetasoL (TEMOVATE) 0.05 % ointment, Apply  to affected area two (2) days a week. (Patient taking differently: Apply 1 Applicator to affected area two (2) days a week.), Disp: 45 g, Rfl: 3    multivit-min/iron/folic/lutein (CENTRUM SILVER WOMEN PO), Take 1 Tablet by mouth daily. not taking but in the  home (Patient not taking: Reported on 5/27/2022), Disp: , Rfl:     krill/om-3/dha/epa/phospho/ast (MAXIMUM RED KRILL OMEGA-3 PO), Take 1 Caplet by mouth daily. not taking, Disp: , Rfl:     ascorbic acid, vitamin C, (VITAMIN C) 500 mg tablet, Take 1 Tab by mouth daily.  (Patient not taking: Reported on 5/27/2022), Disp: 90 Tab, Rfl: 3    ESTRACE 0.01 % (0.1 mg/gram) vaginal cream, Place 1 gm in the vagina twice weekly (Patient taking differently: Insert 1 g into vagina every seven (7) days.), Disp: 42.5 g, Rfl: 2    cholecalciferol, vitamin D3, (VITAMIN D3 PO), Take 1 Tab by mouth daily. (Patient not taking: Reported on 5/27/2022), Disp: , Rfl:     acetaminophen (TYLENOL) 325 mg tablet, Take 325 mg by mouth every four (4) hours as needed for Pain., Disp: , Rfl:     triamcinolone (NASACORT AQ) 55 mcg nasal inhaler, 2 Sprays by Both Nostrils route daily. Indications: PERENNIAL ALLERGIC RHINITIS, Disp: , Rfl:     Dexlansoprazole (DEXILANT) 60 mg CpDM, Take 1 Cap by mouth Daily (before dinner). , Disp: , Rfl:     Cetirizine (ZYRTEC) 10 mg Cap, Take 10 mg by mouth daily. , Disp: , Rfl:     montelukast (SINGULAIR) 10 mg tablet, Take 10 mg by mouth daily. , Disp: , Rfl:     allergy injection, by SubCUTAneous route every thirty (30) days. , Disp: , Rfl:     budesonide-formoterol (SYMBICORT) 160-4.5 mcg/Actuation HFA inhaler, Take 2 Puffs by inhalation two (2) times a day., Disp: , Rfl:     Allergies   Allergen Reactions    Adhesive Tape-Silicones Other (comments)     Patient stated iv tape and tegaderm ok    Other Plant, Animal, Environmental Unable to Consolidated Doc     MOLD    Sulfasalazine Other (comments)     Could not taste anything, lightheadedness    Tape [Adhesive] Other (comments)     Blisters, use paper tape only  Patient states tegaderm and paper tape are okay    Mold Other (comments)    Chlorhexidine Towelette Itching and Other (comments)     \"stinging for a couple of hours\"    itching       Social History     Socioeconomic History    Marital status:      Spouse name: Not on file    Number of children: Not on file    Years of education: Not on file    Highest education level: Not on file   Occupational History    Not on file   Tobacco Use    Smoking status: Never Smoker    Smokeless tobacco: Never Used   Vaping Use    Vaping Use: Never used   Substance and Sexual Activity    Alcohol use: Not Currently     Alcohol/week: 0.0 standard drinks     Comment: 0-6 per year    Drug use: Yes     Types: Prescription, OTC    Sexual activity: Yes Partners: Male     Comment: Menopausal since 2000   Other Topics Concern    Not on file   Social History Narrative    Retired      Social Determinants of Health     Financial Resource Strain:     Difficulty of Paying Living Expenses: Not on file   Food Insecurity:     Worried About [address] in the Last Year: Not on file    [address] in the Last Year: Not on file   Transportation Needs:     Lack of Transportation (Medical): Not on file    Lack of Transportation (Non-Medical):  Not on file   Physical Activity:     Days of Exercise per Week: Not on file    Minutes of Exercise per Session: Not on file   Stress:     Feeling of Stress : Not on file   Social Connections:     Frequency of Communication with Friends and Family: Not on file    Frequency of Social Gatherings with Friends and Family: Not on file    Attends Adventism Services: Not on file    Active Member of [address] [place] [address] or Organizations: Not on file    Attends Club or Organization Meetings: Not on file    Marital Status: Not on file   Intimate Partner Violence:     Fear of Current or Ex-Partner: Not on file    Emotionally Abused: Not on file    Physically Abused: Not on file    Sexually Abused: Not on file   Housing Stability:     Unable to Pay for Housing in the Last Year: Not on file    Number of [place] in the Last Year: Not on file    Unstable Housing in the Last Year: Not on file       Past Surgical History:   Procedure Laterality Date    HX CERVICAL FUSION  08/13/14    HX HEENT  1997    TMJ surgery    HX HIP REPLACEMENT Right 05/13/2022    HX KNEE REPLACEMENT Right 2020    HX LUMBAR LAMINECTOMY  Nov 1995    [facility]    HX LUMBAR LAMINECTOMY  Feb 1997    [place] HX ORTHOPAEDIC Right 1/2015    bunion removal from right foot    HX OTHER SURGICAL  2007    thoracentesis    HX OTHER SURGICAL  2008    chest tube    HX OTHER SURGICAL Bilateral 1997    TMJ surgery    HX OTHER SURGICAL  2012    sinus surg 2012-Dr Alison Powers.  HX OTHER SURGICAL  2016    spinal cord stimulator place for lumbar neuritis, good benefit    HX ROTATOR CUFF REPAIR Right 01/2019    HX TUBAL LIGATION      ME ANESTH,SURGERY OF SHOULDER  01/31/2019    ME COLONOSCOPY FLX DX W/COLLJ SPEC WHEN PFRMD  6-18-08    normal/duntemann         Patient seen evaluated today for her right direct anterior placement. She is now 2-week status post surgery and doing quite well. She is very pleased with the results. Status post wound. Pain is well controlled. She has been on Coumadin. No fevers or chills. She does have some side effects from the antibiotics and pain medicine. Patient denies recent fevers, chills, chest pain, SOB, or injuries. No recent systemic changes noted. A 12-point review of systems is performed today. Pertinent positives are noted. All other systems reviewed and otherwise are negative. Physical exam: General: Alert and oriented x3, nad.  well-developed, well nourished. normal affect, AF. NC/AT, EOMI, neck supple, trachea midline, no JVD present. Breathing is non-labored. Examination of the right hip reveals skin intact. The surgical wound is healed nicely. There is no erythema or ecchymosis noted. There are no signs of infection or cellulitis present. She has no pain to rotation hip. Leg lengths are perfect. Negative calf tenderness. Negative Homans. No signs of DVT present. Assessment: Status post right total hip replacement    Plan: At this point, she will be set up with outpatient physical therapy. She can discontinue her Coumadin. She is instructed on precautions. We will see her back in the office in about 3 weeks time for evaluation. Will obtain x-ray of the right hip upon return.               JR Red ESPINOZA, PANINOSKA, ATC

## 2022-05-27 NOTE — HOME HEALTH
S: Pt denies falls or changes in medication, c/o /10 pain in right hip  CAREGIVER PARTICIPATION: Pt is Ind with ADLs is able to prepare meals and manage medications  O: TRANSFERS: Ind- transfers from multiple surfaces in [place]   A: THEREX/HEP: Pt instructed in standing heel toe raises, hip abduction, HS curls, marching in place, seated knee ext x 15 reps x 1 set, side stepping length of counter x 3, alternating  step up steps x 10 x 1  STAIRS: Mod I stair negotiation with SPC with step to gait pattern  GAIT: 550ft x 2 on uneven surfaces SPC/ Mod I   EDUCATION: Pt instructed to perform HEP 3x day, reviewed pain management   TU  RESPONSE TO TX: Pt has progessed well all goals resolved, pt has been prepared for DC next visit   P: DC next visit

## 2022-05-28 PROCEDURE — 3331090001 HH PPS REVENUE CREDIT

## 2022-05-28 PROCEDURE — 3331090002 HH PPS REVENUE DEBIT

## 2022-05-28 NOTE — HOME HEALTH
PT THR DC summary:    Pt is a 79 y/o  female who is  s/p anterior approach R THR  by Dr. Alexia Jimenez on 22. S:  Pt has minimal pain on R hip. Pt denies falls nor change in medication. O:  Integumentary:  R hip incision = with incision dressing, Aquacel dressing. Pt's current status:   TU  sec with SC;  low fall risk, goal met. 6MWT:  250  ft  with SC. O2 sats = 99%; good walking endurance. Pain: R  hip :   0-3 /10. Ind pain management by patient/ caregiver. Goal met. bed mobiilty = ind.  Goal met    Transfers = ind sit <> stand from multiple surfaces including car and shower transfers. Goal achieved. GAit: 250  ft   ind with SC  indoors and outdoors exhibiting good gait pattern. L knee valgus and pt says she was told that this was due to arthritis. Goal achieved. Ingress/egress to home:   ind via front entry steps holding on to [facility] and railing. Goal met. HEP:  ind.  Goal met    Pt education:  use sneaker type shoes instead of slippers, continue doing HEP and ambulation program and ice x 20 mins to R hip for pain and edema control. Patient level of understanding of education provided: ind and compliant with all instructions given. Patient response to treatment:  tolerated well and is ready for her f/u visit with Dr. Jericho Sanderson or his PA this afternoon. Caregiver involvement/assistance needed:  (name of caregiver) assists with transportation. A:  Pt has made significant progress with PT in  bed mob, transfers and gait. Pt has  met PT goals and is ready to start with OP PT.    P:  DC PT and pt agreed. F/u visit with DR. Carlisle/JR Lety Fleming PA is this afternoon.

## 2022-05-29 PROCEDURE — 3331090002 HH PPS REVENUE DEBIT

## 2022-05-29 PROCEDURE — 3331090001 HH PPS REVENUE CREDIT

## 2022-05-30 PROCEDURE — 3331090001 HH PPS REVENUE CREDIT

## 2022-05-30 PROCEDURE — 3331090002 HH PPS REVENUE DEBIT

## 2022-05-31 ENCOUNTER — HOSPITAL ENCOUNTER (OUTPATIENT)
Dept: PHYSICAL THERAPY | Age: 67
Discharge: HOME | End: 2022-05-31
Attending: PHYSICIAN ASSISTANT
Payer: COMMERCIAL

## 2022-05-31 PROCEDURE — 3331090001 HH PPS REVENUE CREDIT

## 2022-05-31 PROCEDURE — 97161 PT EVAL LOW COMPLEX 20 MIN: CPT

## 2022-05-31 PROCEDURE — 97110 THERAPEUTIC EXERCISES: CPT

## 2022-05-31 PROCEDURE — 3331090002 HH PPS REVENUE DEBIT

## 2022-05-31 NOTE — PROGRESS NOTES
PT DAILY TREATMENT NOTE     Patient Name: Venkata Abdul  Date:2022  : 1955  [x]  Patient  Verified  Payor: [place] MEDICARE / Plan: [place] MEDICARE PART A & B / Product Type: Medicare /    In time:11:50  Out time:12:17  Total Treatment Time (min): 27  Visit #: 1 of 8    Medicare/BCBS Only   Total Timed Codes (min):  10 1:1 Treatment Time:         Treatment Area: Pain in right hip [M25.551]    SUBJECTIVE  Pain Level (0-10 scale): 1  Any medication changes, allergies to medications, adverse drug reactions, diagnosis change, or new procedure performed?: [x] No    [] Yes (see summary sheet for update)  Subjective functional status/changes:   [] No changes reported  The pt reports some quad soreness and limitation bending over    OBJECTIVE    17 min [x]Eval                  []Re-Eval       10 min Therapeutic Exercise:  [] See flow sheet :   Rationale: increase ROM and increase strength to improve the patients ability to perform ADLs            With   [] TE   [] TA   [] neuro   [] other: Patient Education: [x] Review HEP    [] Progressed/Changed HEP based on:   [] positioning   [] body mechanics   [] transfers   [] heat/ice application    [] other:      Other Objective/Functional Measures:      Pain Level (0-10 scale) post treatment: 1    ASSESSMENT/Changes in Function: see POC    Patient will continue to benefit from skilled PT services to modify and progress therapeutic interventions, address functional mobility deficits, address ROM deficits, address strength deficits, analyze and address soft tissue restrictions, analyze and cue movement patterns and analyze and modify body mechanics/ergonomics to attain remaining goals. [x]  See Plan of Care  []  See progress note/recertification  []  See Discharge Summary         Progress towards goals / Updated goals:    Short Term Goals: To be accomplished in 2 weeks:  1. Pt will demonstrate I and compliance with HEP to maximize therapeutic effect.    IE: HEP issued and instructed  2. Pt will demonstrate right hip flexion AROM > 100 deg for improved ease of dressing. IE: 95 deg  Long Term Goals: To be accomplished in 4 weeks:  1. Pt will demonstrate right hip flexion AROM 110 deg for improved ease of transfers. IE: 95 deg  2. Pt will demonstrate trunk flexion fingertips to ankles to shins for improved ease of household chores. IE: pt avoiding forward flexion currently  3. Pt will demonstrate right hip strength 4+/5 for improved stability in stance. IE: 4/5 flexion and abduction  Extension NT   4. Pt will demonstrate community ambulation without AD void of instability or compensation to improve quality of life.    IE: SPC with antalgia    PLAN  []  Upgrade activities as tolerated     [x]  Continue plan of care  []  Update interventions per flow sheet       []  Discharge due to:_  []  Other:_      Rodneygrisel Velazco DPT CMTPT 5/31/2022  12:28 PM    Future Appointments   Date Time Provider Cooper Dhillon   6/7/2022 10:00 AM [facility], PTA [facility]   6/9/2022 10:30 AM [facility], PTA [facility]   6/14/2022  9:00 AM Marlen Cunha MD [facility] BS AMB   6/14/2022 11:15 AM [facility] PTA [facility]   6/16/2022  3:00 PM Andres [address] [facility]   6/17/2022  3:25 PM Venus Claros PA-C [facility] BS AMB   6/21/2022 10:30 AM Cristian Jeff PTA [facility]   6/23/2022 10:45 AM Cristian Jeff PTA [facility]   6/27/2022 10:30 AM Racquel Solorzano [facility]   7/14/2022  9:35 AM IO LAB VISIT [facility] BS AMB   7/21/2022  9:40 AM Nadine Wilkes MD [facility] BS AMB

## 2022-05-31 NOTE — PROGRESS NOTES
[facility]  [address]  [place], [address]  [phone] fax    Plan of Care/ Statement of Necessity for Physical Therapy Services    Patient name: Debbie Nunes Start of Care: 2022   Referral source: Mervin Artis : 1955    Medical Diagnosis: Pain in right hip [M25.551]  Payor: Kari Maria / Plan: [place] MEDICARE PART A & B / Product Type: Medicare /  Onset Date:2022    Treatment Diagnosis: Right hip pain s/p JOSIE   Prior Hospitalization: see medical history Provider#: [id number]   Medications: Verified on Patient summary List    Comorbidities: osteoarthritis, HTN, asthma, spinal cord stimulator, Right TKA (), RTC repair (), cervical fusion ()   Prior Level of Function: Pt with pain limited ambulation and standing prior to surgery      The Plan of Care and following information is based on the information from the initial evaluation. Assessment/ key information: The pt is a 79 y/o F presenting s/p Right JOSIE with anterior approach on . Pt reports long history of arthritis with poor standing/ambulation tolerance leading to surgery. She reports 2 weeks of HHPT with good results and presents on [facility] with minimal pain. Pt demonstrates some right hip flexibility limitations especially into extension and flexion. Fair hip strength at this time with mild quad region soreness. Limited closed chain strength and proprioception on right with SLS. Incision site well healing, some scar tissue formation at proximal incision. Pt would benefit from PT to improve ROM, strength and pain to improve ease of ADLs and quality of life.     Evaluation Complexity History HIGH Complexity :3+ comorbidities / personal factors will impact the outcome/ POC ; Examination LOW Complexity : 1-2 Standardized tests and measures addressing body structure, function, activity limitation and / or participation in recreation  ;Presentation LOW Complexity : Stable, uncomplicated  ;Clinical Decision Making MEDIUM Complexity : FOTO score of 26-74  Overall Complexity Rating: LOW   Problem List: pain affecting function, decrease ROM, decrease strength, impaired gait/ balance, decrease ADL/ functional abilitiies, decrease activity tolerance, decrease flexibility/ joint mobility and decrease transfer abilities   Treatment Plan may include any combination of the following: Therapeutic exercise, Therapeutic activities, Neuromuscular re-education, Physical agent/modality, Gait/balance training, Manual therapy, Patient education, Self Care training, Functional mobility training and Stair training  Patient / Family readiness to learn indicated by: asking questions, trying to perform skills and interest  Persons(s) to be included in education: patient (P)  Barriers to Learning/Limitations: None  Patient Goal (s): Be able to bend over  Patient Self Reported Health Status: good  Rehabilitation Potential: excellent    Short Term Goals: To be accomplished in 2 weeks:  1. Pt will demonstrate I and compliance with HEP to maximize therapeutic effect. 2. Pt will demonstrate right hip flexion AROM > 100 deg for improved ease of dressing. Long Term Goals: To be accomplished in 4 weeks:  1. Pt will demonstrate right hip flexion AROM 110 deg for improved ease of transfers. 2. Pt will demonstrate trunk flexion fingertips to ankles to shins for improved ease of household chores. 3. Pt will demonstrate right hip strength 4+/5 for improved stability in stance. 4. Pt will demonstrate community ambulation without AD void of instability or compensation to improve quality of life. Frequency / Duration: Patient to be seen 2 times per week for 4 weeks.     Patient/ Caregiver education and instruction: Diagnosis, prognosis, self care, activity modification and exercises   [x]  Plan of care has been reviewed with PTA    Certification Period: 5/31/2022 to 6/29/2022  Marybel Hilliard DPT CMTPT 5/31/2022 12:21 PM    ________________________________________________________________________    I certify that the above Therapy Services are being furnished while the patient is under my care. I agree with the treatment plan and certify that this therapy is necessary.     [de-identified] Signature:____________________  Date:____________Time: _________     Chung Guerin PA-C  Please sign and return to [facility] [address] [place] [address] & [facility]  [address], [facility] [address]  [phone] fax

## 2022-06-01 ENCOUNTER — HOME CARE VISIT (OUTPATIENT)
Dept: SCHEDULING | Facility: HOME HEALTH | Age: 67
End: 2022-06-01
Payer: MEDICARE

## 2022-06-01 PROCEDURE — G0299 HHS/HOSPICE OF RN EA 15 MIN: HCPCS

## 2022-06-01 PROCEDURE — 3331090001 HH PPS REVENUE CREDIT

## 2022-06-01 PROCEDURE — 3331090002 HH PPS REVENUE DEBIT

## 2022-06-01 RX ORDER — FERROUS SULFATE TAB 325 MG (65 MG ELEMENTAL FE) 325 (65 FE) MG
TAB ORAL
Qty: 90 TABLET | Refills: 1 | Status: SHIPPED | OUTPATIENT
Start: 2022-06-01

## 2022-06-02 PROCEDURE — 3331090002 HH PPS REVENUE DEBIT

## 2022-06-02 PROCEDURE — 3331090001 HH PPS REVENUE CREDIT

## 2022-06-03 VITALS
DIASTOLIC BLOOD PRESSURE: 66 MMHG | RESPIRATION RATE: 18 BRPM | TEMPERATURE: 98.1 F | OXYGEN SATURATION: 96 % | SYSTOLIC BLOOD PRESSURE: 126 MMHG | HEART RATE: 78 BPM

## 2022-06-03 PROCEDURE — 3331090001 HH PPS REVENUE CREDIT

## 2022-06-03 PROCEDURE — 3331090002 HH PPS REVENUE DEBIT

## 2022-06-04 PROCEDURE — 3331090002 HH PPS REVENUE DEBIT

## 2022-06-04 PROCEDURE — 3331090001 HH PPS REVENUE CREDIT

## 2022-06-05 PROCEDURE — 3331090002 HH PPS REVENUE DEBIT

## 2022-06-05 PROCEDURE — 3331090001 HH PPS REVENUE CREDIT

## 2022-06-06 PROCEDURE — 3331090001 HH PPS REVENUE CREDIT

## 2022-06-06 PROCEDURE — 3331090002 HH PPS REVENUE DEBIT

## 2022-06-07 ENCOUNTER — HOSPITAL ENCOUNTER (OUTPATIENT)
Dept: PHYSICAL THERAPY | Age: 67
Discharge: HOME | End: 2022-06-07
Attending: PHYSICIAN ASSISTANT
Payer: MEDICARE

## 2022-06-07 PROCEDURE — 97110 THERAPEUTIC EXERCISES: CPT

## 2022-06-07 PROCEDURE — 3331090001 HH PPS REVENUE CREDIT

## 2022-06-07 PROCEDURE — 97140 MANUAL THERAPY 1/> REGIONS: CPT

## 2022-06-07 PROCEDURE — 3331090002 HH PPS REVENUE DEBIT

## 2022-06-07 PROCEDURE — 97530 THERAPEUTIC ACTIVITIES: CPT

## 2022-06-07 NOTE — PROGRESS NOTES
PT DAILY TREATMENT NOTE     Patient Name: Tony Baca  Date:2022  : 1955  [x]  Patient  Verified  Payor: VA MEDICARE / Plan: [place] MEDICARE PART A & B / Product Type: Medicare /    In time:9:59  Out time:10:45  Total Treatment Time (min): 55  Visit #: 2 of 8    Medicare/BCBS Only   Total Timed Codes (min):  46 1:1 Treatment Time:  46       Treatment Area: Pain in right hip [M25.551]    SUBJECTIVE  Pain Level (0-10 scale): 1  Any medication changes, allergies to medications, adverse drug reactions, diagnosis change, or new procedure performed?: [x] No    [] Yes (see summary sheet for update)  Subjective functional status/changes:   [] No changes reported  Pt. Reports that this past Saturday she \"overdid it\" and experienced increased pain (3-4/10) after extended standing and walking at the grocery store. Ice and rest alleviated the pain. Other than that instance, she has been relatively pain-free and has been able to complete her HEP every day except on Saturday. She has not needed to use her pain medication. OBJECTIVE      15 min Therapeutic Exercise:  [x] See flow sheet : VC to ensure gentle hip flexor stretch. VC to decrease speed of exercise completion for increased eccentric muscular strengthening. Rationale: increase ROM and increase strength to improve the patients ability to improve strength and mobility for improved ADL completion. 23 min Therapeutic Activity:  [x]  See flow sheet : use of parallel bars for marching, squatting, and stepping. VC/Tc to focus hip extension only to point of gluteal contraction. Rationale: increase ROM, increase strength, improve coordination and improve balance  to improve the patients ability to complete ADLs and increase standing/walking/stepping tolerance. 8 min Manual Therapy:  PROM into hip flexion and abduction and  with pt. Supine.     The manual therapy interventions were performed at a separate and distinct time from the therapeutic activities interventions. Rationale: decrease pain, increase ROM, increase tissue extensibility and decrease trigger points to improve ADLs. With   [x] TE   [] TA   [] neuro   [] other: Patient Education: [x] Review HEP    [] Progressed/Changed HEP based on:   [] positioning   [] body mechanics   [] transfers   [] heat/ice application    [] other:      Other Objective/Functional Measures: Pt. Presented to clinic using SPC and reciprocating gait pattern with NBOS. Pain Level (0-10 scale) post treatment: 1    ASSESSMENT/Changes in Function: Pt. Tolerated introduction of POC well, with no adverse responses noted throughout session. Continue to progress hip extension ROM and strengthening exercises gradually to respect protocol. Patient will continue to benefit from skilled PT services to modify and progress therapeutic interventions, address functional mobility deficits, address ROM deficits, address strength deficits, analyze and address soft tissue restrictions, analyze and cue movement patterns, analyze and modify body mechanics/ergonomics, assess and modify postural abnormalities and instruct in home and community integration to attain remaining goals. [x]  See Plan of Care  []  See progress note/recertification  []  See Discharge Summary         Progress towards goals / Updated goals:  Short Term Goals: To be accomplished in 2 weeks:  1. Pt will demonstrate I and compliance with HEP to maximize therapeutic effect. IE: HEP issued and instructed  Current: MET, pt. Reports completion of HEP as directed daily, except for one day since IE. 6/7/22. 2. Pt will demonstrate right hip flexion AROM > 100 deg for improved ease of dressing. IE: 95 deg  Long Term Goals: To be accomplished in 4 weeks:  1. Pt will demonstrate right hip flexion AROM 110 deg for improved ease of transfers. IE: 95 deg  2.  Pt will demonstrate trunk flexion fingertips to ankles to shins for improved ease of household chores. IE: pt avoiding forward flexion currently  3. Pt will demonstrate right hip strength 4+/5 for improved stability in stance. IE: 4/5 flexion and abduction  Extension NT   4. Pt will demonstrate community ambulation without AD void of instability or compensation to improve quality of life.               IE: SPC with antalgia    PLAN  [x]  Upgrade activities as tolerated     [x]  Continue plan of care  []  Update interventions per flow sheet       []  Discharge due to:_  []  Other:_      Adis Palacios, YEISON 6/7/2022  9:43 AM    Future Appointments   Date Time Provider Cooper Jacquie   6/7/2022 10:00 AM Keily Eaton PTA [facility]   6/9/2022 10:30 AM Keily Eaton, PTA [facility]   6/14/2022  9:00 AM Sarai Paniagua MD [facility] BS AMB   6/14/2022 11:15 AM Keily Eaton, PTA [facility]   6/16/2022  3:00 PM Andres, [address] [facility]   6/17/2022  3:25 PM Dominguez Peterson PA-C [facility] AM[facility]   6/21/2022 10:30 AM Leora Reins, PTA [facility]   6/23/2022 10:45 AM Leora Reins, PTA [facility]   6/27/2022 10:30 AM Tatum Moser [facility]   7/14/2022  9:35 AM IO LAB VISIT [facility] BS AMB   7/21/2022  9:40 AM Devora Gallardo MD [facility] BS AMB

## 2022-06-08 PROCEDURE — 3331090002 HH PPS REVENUE DEBIT

## 2022-06-08 PROCEDURE — 3331090001 HH PPS REVENUE CREDIT

## 2022-06-09 ENCOUNTER — HOSPITAL ENCOUNTER (OUTPATIENT)
Dept: PHYSICAL THERAPY | Age: 67
Discharge: HOME | End: 2022-06-09
Attending: PHYSICIAN ASSISTANT
Payer: MEDICARE

## 2022-06-09 PROCEDURE — 97530 THERAPEUTIC ACTIVITIES: CPT

## 2022-06-09 PROCEDURE — 3331090001 HH PPS REVENUE CREDIT

## 2022-06-09 PROCEDURE — 97110 THERAPEUTIC EXERCISES: CPT

## 2022-06-09 PROCEDURE — 3331090002 HH PPS REVENUE DEBIT

## 2022-06-09 NOTE — PROGRESS NOTES
PT DAILY TREATMENT NOTE     Patient Name: Laxmi Reyes  Date:2022  : 1955  [x]  Patient  Verified  Payor: VA MEDICARE / Plan: VA MEDICARE PART A & B / Product Type: Medicare /    In time:1030  Out time:1125  Total Treatment Time (min): 54  Visit #: 3 of 8    Medicare/BCBS Only   Total Timed Codes (min):  45 1:1 Treatment Time:  45       Treatment Area: Pain in right hip [M25.551]    SUBJECTIVE  Pain Level (0-10 scale): 1  Any medication changes, allergies to medications, adverse drug reactions, diagnosis change, or new procedure performed?: [x] No    [] Yes (see summary sheet for update)  Subjective functional status/changes:   [] No changes reported  Pt. Reports that she experienced some muscular soreness post last visit; however, this was alleviated with application of an ice pack that day. She states that she has been focusing on not using her arms/hands when rising from the bed and toilet to practice her sit to stand exercises and get stronger. OBJECTIVE    Modality rationale: decrease pain to improve the patients ability to decrease post session muscular soreness.     Min Type Additional Details    [] Estim:  []Unatt       []IFC  []Premod                        []Other:  []w/ice   []w/heat  Position:  Location:    [] Estim: []Att    []TENS instruct  []NMES                    []Other:  []w/US   []w/ice   []w/heat  Position:  Location:    []  Traction: [] Cervical       []Lumbar                       [] Prone          []Supine                       []Intermittent   []Continuous Lbs:  [] before manual  [] after manual    []  Ultrasound: []Continuous   [] Pulsed                           []1MHz   []3MHz Location:  W/cm2:    []  Iontophoresis with dexamethasone         Location: [] Take home patch   [] In clinic   10 [x]  Ice     []  heat  []  Ice massage  []  Laser   []  Anodyne Position:  Location:    []  Laser with stim  []  Other: Position:  Location:    []  Vasopneumatic Device  Pre-treatment girth:  Post-treatment girth:  Measured at (location):  Pressure:       [] lo [] med [] hi   Temperature: [] lo [] med [] hi   [x] Skin assessment post-treatment:  [x]intact [x]redness- no adverse reaction    []redness - adverse reaction:       14 min Therapeutic Exercise:  [x] See flow sheet : increased reps and load during 3 way hip and LAQ for increased LE strength. Rationale: increase ROM and increase strength to improve the patients ability to improve strength and mobility for improved ADL completion. 23 min Therapeutic Activity:  [x]  See flow sheet : added weight to standing marches and VC for controlled movement; SBG utilized with hand railing at stairs available in case of LOB (parallel bars were in use). Rationale: increase ROM and increase strength  to improve the patients ability to complete ADLs and increase standing/walking/stepping tolerance. 8 min Manual Therapy:  PROM into hip flexion and abduction with pt. Supine. The manual therapy interventions were performed at a separate and distinct time from the therapeutic activities interventions. Rationale: increase ROM, increase tissue extensibility and decrease trigger points to improve ADLs. With   [x] TE   [] TA   [] neuro   [] other: Patient Education: [x] Review HEP    [] Progressed/Changed HEP based on:   [] positioning   [] body mechanics   [] transfers   [] heat/ice application    [] other:      Other Objective/Functional Measures: Noted tendency to rock from sitting to standing. Pain Level (0-10 scale) post treatment: 0    ASSESSMENT/Changes in Function: Pt continued to tolerate increases in load and repetitions with no report of adverse responses. Initiate dynamic gait training at next visit.       Patient will continue to benefit from skilled PT services to modify and progress therapeutic interventions, address functional mobility deficits, address ROM deficits, address strength deficits, analyze and address soft tissue restrictions, analyze and cue movement patterns, analyze and modify body mechanics/ergonomics, assess and modify postural abnormalities and instruct in home and community integration to attain remaining goals. [x]  See Plan of Care  []  See progress note/recertification  []  See Discharge Summary         Progress towards goals / Updated goals:  Short Term Goals: To be accomplished in 2 weeks:  1. Pt will demonstrate I and compliance with HEP to maximize therapeutic effect.              IE: HEP issued and instructed  Current: MET, pt. Reports completion of HEP as directed daily, except for one day since IE. 6/7/22. 2. Pt will demonstrate right hip flexion AROM > 100 deg for improved ease of dressing.              IE: 95 deg  Long Term Goals: To be accomplished in 4 weeks:  1. Pt will demonstrate right hip flexion AROM 110 deg for improved ease of transfers.              IE: 95 deg  2. Pt will demonstrate trunk flexion fingertips to ankles to shins for improved ease of household chores.              IE: pt avoiding forward flexion currently  3. Pt will demonstrate right hip strength 4+/5 for improved stability in stance.              IE: 4/5 flexion and abduction  Extension NT   4.  Pt will demonstrate community ambulation without AD void of instability or compensation to improve quality of life.              IE: SPC with antalgia    PLAN  [x]  Upgrade activities as tolerated     [x]  Continue plan of care  []  Update interventions per flow sheet       []  Discharge due to:_  []  Other:_      Conner Austin PTA 6/9/2022  10:22 AM    Future Appointments   Date Time Provider Cooper Jacquie   6/9/2022 10:30 AM Blessing Coffee[facility]   6/14/2022  9:00 AM Danielle Stack MD [facility]   6/14/2022 11:15 AM Blessing Coffee, [facility]   6/16/2022  3:00 PM Andres, [email or web address] [facility]   6/17/2022  3:25 PM Zoltan Rasmussen PA-C [facility]   6/21/2022 10:30 AM Monique Score, PTA [facility]   6/23/2022 10:45 AM Monique Score, PTA [facility]   6/27/2022 10:30 AM Simon Sams [facility]   7/14/2022  9:35 AM [facility] LAB VISIT [facility] BS AMB   7/21/2022  9:40 AM Marisol Carver MD [facility] BS AMB

## 2022-06-10 PROCEDURE — 3331090001 HH PPS REVENUE CREDIT

## 2022-06-10 PROCEDURE — 3331090002 HH PPS REVENUE DEBIT

## 2022-06-11 PROCEDURE — 3331090002 HH PPS REVENUE DEBIT

## 2022-06-11 PROCEDURE — 3331090001 HH PPS REVENUE CREDIT

## 2022-06-12 PROCEDURE — 3331090002 HH PPS REVENUE DEBIT

## 2022-06-12 PROCEDURE — 3331090001 HH PPS REVENUE CREDIT

## 2022-06-14 ENCOUNTER — HOSPITAL ENCOUNTER (OUTPATIENT)
Dept: PHYSICAL THERAPY | Age: 67
Discharge: HOME | End: 2022-06-14
Attending: PHYSICIAN ASSISTANT
Payer: MEDICARE

## 2022-06-14 ENCOUNTER — OFFICE VISIT (OUTPATIENT)
Dept: PULMONOLOGY | Age: 67
End: 2022-06-14
Payer: MEDICARE

## 2022-06-14 VITALS
WEIGHT: 187.8 LBS | HEART RATE: 88 BPM | DIASTOLIC BLOOD PRESSURE: 70 MMHG | TEMPERATURE: 97.4 F | RESPIRATION RATE: 16 BRPM | BODY MASS INDEX: 26.88 KG/M2 | SYSTOLIC BLOOD PRESSURE: 124 MMHG | HEIGHT: 70 IN | OXYGEN SATURATION: 97 %

## 2022-06-14 DIAGNOSIS — M05.79 RHEUMATOID ARTHRITIS INVOLVING MULTIPLE SITES WITH POSITIVE RHEUMATOID FACTOR (HCC): ICD-10-CM

## 2022-06-14 DIAGNOSIS — J45.40 MODERATE PERSISTENT ASTHMA WITHOUT COMPLICATION: Primary | ICD-10-CM

## 2022-06-14 DIAGNOSIS — M16.10 HIP ARTHRITIS: ICD-10-CM

## 2022-06-14 DIAGNOSIS — K21.9 GASTROESOPHAGEAL REFLUX DISEASE WITHOUT ESOPHAGITIS: Chronic | ICD-10-CM

## 2022-06-14 DIAGNOSIS — K22.70 BARRETT'S ESOPHAGUS WITHOUT DYSPLASIA: ICD-10-CM

## 2022-06-14 PROCEDURE — G8536 NO DOC ELDER MAL SCRN: HCPCS | Performed by: INTERNAL MEDICINE

## 2022-06-14 PROCEDURE — 1090F PRES/ABSN URINE INCON ASSESS: CPT | Performed by: INTERNAL MEDICINE

## 2022-06-14 PROCEDURE — 97140 MANUAL THERAPY 1/> REGIONS: CPT

## 2022-06-14 PROCEDURE — G8417 CALC BMI ABV UP PARAM F/U: HCPCS | Performed by: INTERNAL MEDICINE

## 2022-06-14 PROCEDURE — G9899 SCRN MAM PERF RSLTS DOC: HCPCS | Performed by: INTERNAL MEDICINE

## 2022-06-14 PROCEDURE — G8399 PT W/DXA RESULTS DOCUMENT: HCPCS | Performed by: INTERNAL MEDICINE

## 2022-06-14 PROCEDURE — 97530 THERAPEUTIC ACTIVITIES: CPT

## 2022-06-14 PROCEDURE — 3017F COLORECTAL CA SCREEN DOC REV: CPT | Performed by: INTERNAL MEDICINE

## 2022-06-14 PROCEDURE — G8432 DEP SCR NOT DOC, RNG: HCPCS | Performed by: INTERNAL MEDICINE

## 2022-06-14 PROCEDURE — 99214 OFFICE O/P EST MOD 30 MIN: CPT | Performed by: INTERNAL MEDICINE

## 2022-06-14 PROCEDURE — 97110 THERAPEUTIC EXERCISES: CPT

## 2022-06-14 PROCEDURE — G8752 SYS BP LESS 140: HCPCS | Performed by: INTERNAL MEDICINE

## 2022-06-14 PROCEDURE — 97116 GAIT TRAINING THERAPY: CPT

## 2022-06-14 PROCEDURE — G8427 DOCREV CUR MEDS BY ELIG CLIN: HCPCS | Performed by: INTERNAL MEDICINE

## 2022-06-14 PROCEDURE — 1101F PT FALLS ASSESS-DOCD LE1/YR: CPT | Performed by: INTERNAL MEDICINE

## 2022-06-14 PROCEDURE — 1123F ACP DISCUSS/DSCN MKR DOCD: CPT | Performed by: INTERNAL MEDICINE

## 2022-06-14 PROCEDURE — G8754 DIAS BP LESS 90: HCPCS | Performed by: INTERNAL MEDICINE

## 2022-06-14 NOTE — PATIENT INSTRUCTIONS
Asthma in Adults: Care Instructions  Overview     Asthma makes it hard for you to breathe. During an asthma attack, the airways swell and narrow. Severe asthma attacks can be dangerous, but you can usually prevent them. Controlling asthma and treating symptoms before they get bad can help you avoid bad attacks. You may also avoid future trips to the doctor. Follow-up care is a key part of your treatment and safety. Be sure to make and go to all appointments, and call your doctor if you are having problems. It's also a good idea to know your test results and keep a list of the medicines you take. How can you care for yourself at home? · Follow your asthma action plan so you can manage your symptoms at home. An asthma action plan will help you prevent and control airway reactions and will tell you what to do during an asthma attack. If you do not have an asthma action plan, work with your doctor to build one. · Take your asthma medicine exactly as prescribed. Medicine plays an important role in controlling asthma. Talk to your doctor right away if you have any questions about what to take and how to take it. ? Use your quick-relief medicine when you have symptoms of an asthma attack. Some people need to use quick-relief medicine before they exercise to prevent asthma symptoms. Albuterol is a quick-relief medicine that is often used. In some cases, a certain type of controller inhaler is used as a quick-relief medicine. Ask your doctor what to use for quick relief. ? Take your controller medicine. If you have symptoms often, you will likely need to take it every day. Controller medicine usually includes an inhaled corticosteroid. The goal is to prevent problems before they occur. ? If your doctor prescribed corticosteroid pills to use during an attack, take them as directed. They may take hours to work, but they may shorten the attack and help you breathe better. ?  Keep your quick-relief medicine with you at all times. · Talk to your doctor before using other medicines. Some medicines, such as aspirin, can cause asthma attacks in some people. · Check yourself for asthma symptoms to know which step to follow in your action plan. Watch for things like being short of breath, having chest tightness, coughing, and wheezing. Also notice if symptoms wake you up at night or if you get tired quickly when you exercise. · If you have a peak flow meter, use it to check how well you are breathing. This can help you predict when an asthma attack is going to occur. Then you can take medicine to prevent the asthma attack or make it less severe. · See your doctor regularly. These visits will help you learn more about asthma and what you can do to control it. Your doctor will monitor your treatment to make sure the medicine is helping you. · Keep track of your asthma attacks and your treatment. After you have had an attack, write down what triggered it, what helped end it, and any concerns you have about your asthma action plan. Take your diary when you see your doctor. You can then review your asthma action plan and decide if it is working. · Do not smoke or allow others to smoke around you. Avoid smoky places. Smoking makes asthma worse. If you need help quitting, talk to your doctor about stop-smoking programs and medicines. These can increase your chances of quitting for good. · Learn what triggers an asthma attack for you, and avoid the triggers when you can. Common triggers include colds, smoke, air pollution, dust, pollen, mold, pets, cockroaches, stress, and cold air. · Avoid colds and the flu. Talk to your doctor about getting a pneumococcal vaccine shot. If you have had one before, ask your doctor whether you need a second dose. Get a flu vaccine every fall. If you must be around people with colds or the flu, wash your hands often. When should you call for help?    Call 911 anytime you think you may need emergency care. For example, call if:    · You have severe trouble breathing. Call your doctor now or seek immediate medical care if:    · Your symptoms do not get better after you have followed your asthma action plan.     · You cough up yellow, dark brown, or bloody mucus (sputum). Watch closely for changes in your health, and be sure to contact your doctor if:    · Your coughing and wheezing get worse.     · You need to use quick-relief medicine on more than 2 days a week within a month (unless it is just for exercise).     · You need help figuring out what is triggering your asthma attacks. Where can you learn more? Go to http://www.gray.com/  Enter P597 in the search box to learn more about \"Asthma in Adults: Care Instructions. \"  Current as of: July 6, 2021               Content Version: 13.2  © 7409-4327 [employer]. Care instructions adapted under license by [employer] (which disclaims liability or warranty for this information). If you have questions about a medical condition or this instruction, always ask your healthcare professional. [address] any warranty or liability for your use of this information.

## 2022-06-14 NOTE — PROGRESS NOTES
[facility]   Pulmonary, Critical Care, and Sleep Medicine     Reason for Evaluation: follow up asthma    06/14/22   Patient reports doing well with her asthma control.-She continues to use Singulair, Symbicort and receives allergen injections by Dr. Chavo Reed  Does not need much rescue medications  She has not had any exacerbations  She tolerated hip surgery without any complications, started ambulating quickly and now uses the cane occasionally when she goes out  On Olesya Silvio for Green's esophagus  She has been on Orencia infusions for her rheumatoid arthritis  She states that she has done phenomenally well during the pandemic-masking at all times and getting 3 doses of moderna vaccine  Denies wheezing. Occasional sneezing. Feels better overall with no recent set backs. No cough, congestion or wheezing. She denied any hemoptysis, change in her appetite or weight. Has not needed any prednisone tapers. No Er visits      Allergies:  No known drug allergies. Current Outpatient Medications:     FeroSuL 325 mg (65 mg iron) tablet, take 1 tablet by mouth once daily BEFORE BREAKFAST, Disp: 90 Tablet, Rfl: 1    losartan (COZAAR) 50 mg tablet, TAKE 1 TABLET DAILY (Patient taking differently: TAKE 1 TABLET DAILY, oral), Disp: 90 Tablet, Rfl: 3    cyanocobalamin 1,000 mcg tablet, take 1 tablet by mouth once daily, Disp: 90 Tablet, Rfl: 3    albuterol (PROVENTIL HFA, VENTOLIN HFA, PROAIR HFA) 90 mcg/actuation inhaler, Take 1 Puff by inhalation every six (6) hours as needed for Wheezing., Disp: , Rfl:     abatacept (Orencia, with maltose,) 250 mg injection, by IntraVENous route. monthly, Disp: , Rfl:     multivit-min/iron/folic/lutein (CENTRUM SILVER WOMEN PO), Take 1 Tablet by mouth daily. not taking but in the  home, Disp: , Rfl:     krill/om-3/dha/epa/phospho/ast (MAXIMUM RED KRILL OMEGA-3 PO), Take 1 Caplet by mouth daily.  not taking, Disp: , Rfl:     ESTRACE 0.01 % (0.1 mg/gram) vaginal cream, Place 1 gm in the vagina twice weekly (Patient taking differently: Insert 1 g into vagina every seven (7) days.), Disp: 42.5 g, Rfl: 2    acetaminophen (TYLENOL) 325 mg tablet, Take 325 mg by mouth every four (4) hours as needed for Pain., Disp: , Rfl:     triamcinolone (NASACORT AQ) 55 mcg nasal inhaler, 2 Sprays by Both Nostrils route daily. Indications: PERENNIAL ALLERGIC RHINITIS, Disp: , Rfl:     Dexlansoprazole (DEXILANT) 60 mg CpDM, Take 1 Cap by mouth Daily (before dinner). , Disp: , Rfl:     Cetirizine (ZYRTEC) 10 mg Cap, Take 10 mg by mouth daily. , Disp: , Rfl:     montelukast (SINGULAIR) 10 mg tablet, Take 10 mg by mouth daily. , Disp: , Rfl:     allergy injection, by SubCUTAneous route every thirty (30) days. , Disp: , Rfl:     budesonide-formoterol (SYMBICORT) 160-4.5 mcg/Actuation HFA inhaler, Take 2 Puffs by inhalation two (2) times a day., Disp: , Rfl:     clobetasoL (TEMOVATE) 0.05 % ointment, Apply  to affected area two (2) days a week. (Patient not taking: Reported on 6/14/2022), Disp: 45 g, Rfl: 3    ascorbic acid, vitamin C, (VITAMIN C) 500 mg tablet, Take 1 Tab by mouth daily. (Patient not taking: Reported on 5/27/2022), Disp: 90 Tab, Rfl: 3    cholecalciferol, vitamin D3, (VITAMIN D3 PO), Take 1 Tab by mouth daily. (Patient not taking: Reported on 5/27/2022), Disp: , Rfl:        Physical Examination:       HEENT:    Normocephalic, atraumatic. Pupils equal, round, reactive to light and accommodation. Sclerae white. Conjunctivae pale. Oral exam shows no thrush,  without any exudate or mass. Nasal mucous membranes are without any erythema or edema. Neck:  Thick, supple, no JVD, normal thyroid, no adenopathy, no other masses palpable. Cardiovascular:  S1, S2, regular rate and rhythm without any murmurs, rubs or gallops. Chest:  Equal air entry without wheezing. There is no dullness to percussion. No tenderness on palpation.   No rash on inspection   Abdomen: Obese, bowel sounds normoactive, soft, nontender without any organomegaly. Tympanic to percussion. Extremities:  Without any clubbing, cyanosis or edema. No calf tenderness on palpation. Skin:  Dry, warm to touch. No rash on inspection. PFT:  Spirometry-1/13/2022  FVC reduced to 67% predicted and improved to 78% postbronchodilator  FEV1 reduced to 47% predicted and improves to 57% postbronchodilator  Midexpiratory flow rate is reduced to 23% predicted and improves to 28% postbronchodilator  FEV1 FVC ratio is reduced  Studies consistent with moderate to severe obstructive defect with bronchodilator response. Compared to previous study of 2009 there is no significant change in flows    XR Results (most recent):  Results from Hospital Encounter encounter on 05/13/22    XR HIP RT W OR WO PELV 2-3 VWS    Narrative  Right hip, 2 views    HISTORY: Postop. Total right hip replacement with unremarkable prosthesis placement and  alignment. No acute fracture. Impression  As described      Impression:     Asthma- better controlled. With no exacerbations over past  3 + years after sinus surgery. SOB- multifactorial  Recurrent sinusitis  She had in the interval nasal septal deviation repair surgery to correct any anatomical defect causing her frequent sinus and respiratory infections. Esophageal stricture with reflux. Now diagnosed with Green's esophagus. On Dexilent  Rheumatoid arthritis- rheumatology following on Orencia infusions  Elevated LFTs-believed to be related to methotrexate-GI following    Recommendations:      Continue current maintenance therapy-Singulair and Symbicort  The patient will continue her as needed albuterol  Continue trigger control- PPI for Green's, allergy immunotherapy and controller meds  Commended on compliance and excellent control  Health maintenance- up to date on vaccines  Environmental control  Periodic PFT, imaging. Education provided.   Follow up in 6 months      Harsha Abbott MD

## 2022-06-14 NOTE — LETTER
6/14/2022    Patient: Venson Councilman   YOB: 1955   Date of Visit: 6/14/2022     Tamiko Hilario, [address]  [address] [address]  Via In Basket    Dear Tamiko Hilario MD,      Thank you for referring Ms. Vito Galvan to [address] [place] [address] for evaluation. My notes for this consultation are attached. If you have questions, please do not hesitate to call me. I look forward to following your patient along with you.       Sincerely,    Belia Johnson MD

## 2022-06-14 NOTE — PROGRESS NOTES
Mackenzie Murray presents today for   Chief Complaint   Patient presents with    Follow Up Chronic Condition       Is someone accompanying this pt? No    Is the patient using any DME equipment during OV? No    -DME Company NA    Depression Screening:  3 most recent PHQ Screens 5/3/2022   PHQ Not Done -   Little interest or pleasure in doing things Not at all   Feeling down, depressed, irritable, or hopeless Not at all   Total Score PHQ 2 0       Learning Assessment:  Learning Assessment 3/4/2022   PRIMARY LEARNER Patient   HIGHEST LEVEL OF EDUCATION - PRIMARY LEARNER  -   BARRIERS PRIMARY LEARNER -   CO-LEARNER CAREGIVER -   PRIMARY LANGUAGE ENGLISH   LEARNER PREFERENCE PRIMARY DEMONSTRATION     -     -     -     -   ANSWERED BY patient     -   RELATIONSHIP SELF       Abuse Screening:  Abuse Screening Questionnaire 5/3/2022   Do you ever feel afraid of your partner? N   Are you in a relationship with someone who physically or mentally threatens you? N   Is it safe for you to go home? Y       Fall Risk  Fall Risk Assessment, last 12 mths 5/3/2022   Able to walk? Yes   Fall in past 12 months? 0   Do you feel unsteady? 0   Are you worried about falling 0   Number of falls in past 12 months -   Fall with injury? -         Coordination of Care:  1. Have you been to the ER, urgent care clinic since your last visit? Hospitalized since your last visit? No    2. Have you seen or consulted any other health care providers outside of the [address] [place] [address] since your last visit? Include any pap smears or colon screening.  Dr Merle Bardales

## 2022-06-14 NOTE — PROGRESS NOTES
PT DAILY TREATMENT NOTE     Patient Name: Ward Adame  Date:2022  : 1955  [x]  Patient  Verified  Payor: [place] MEDICARE / Plan: [place] MEDICARE PART A & B / Product Type: Medicare /    In time:1110  Out time:1158  Total Treatment Time (min): 48  Visit #: 4 of 8    Medicare/BCBS Only   Total Timed Codes (min):  48 1:1 Treatment Time:  48       Treatment Area: Pain in right hip [M25.551]    SUBJECTIVE  Pain Level (0-10 scale): 1  Any medication changes, allergies to medications, adverse drug reactions, diagnosis change, or new procedure performed?: [x] No    [] Yes (see summary sheet for update)  Subjective functional status/changes:   [] No changes reported  Pt. Reports that she had a quiet weekend, she was able to complete her household chores such as laundry and grocery shopping \"just fine\". She modifies by carrying less weight. She notices that her leg gets a little more \"achey\" when she drives for prolonged periods and anticipates that this will occur this weekend when she drives to [place] to visit family. OBJECTIVE        9 min Therapeutic Exercise:  [x] See flow sheet : VC/Tc to avoid pelvic rotation during Dk and hip extensions. Rationale: increase ROM and increase strength to improve the patients ability to increase strength for ease of ADL completion. 23 min Therapeutic Activity:  [x]  See flow sheet : increased time under tension during marching. Exercises completed within parallel bars for added safety. Rationale: increase strength, improve coordination and improve balance  to improve the patients ability to comlete ADLs with ease. 8 min Gait Trainin\" hurdles x 4 passes each (fwd, lat, retro) with _SPc__ device on level surfaces with _SBG/CG__ level of assist. Gait belt used throughout all gait training. Rationale: to improve ambulation for improved completion of ADLs.          8 min Manual Therapy:  PROM into hip flexion to 90 degrees and hip abduction with pt. Supine on plinth. The manual therapy interventions were performed at a separate and distinct time from the therapeutic activities interventions. Rationale: decrease pain, increase ROM, increase tissue extensibility and decrease trigger points to improve completion of ADLs. With   [x] TE   [] TA   [] neuro   [] other: Patient Education: [x] Review HEP    [] Progressed/Changed HEP based on:   [] positioning   [] body mechanics   [] transfers   [] heat/ice application    [] other:      Other Objective/Functional Measures: Bike seat set at 13 to adhere to protocol. Exhibited decreased B UE usage during parallel bar exercises today. Noted  SPC, gait belt, and SBG/CG utilized throughout gait training. Pain Level (0-10 scale) post treatment: 1    ASSESSMENT/Changes in Function: Pt continues to tolerate progressions to therapeutic interventions well, with no adverse responses reported throughout activities. Gait training was initiated successfully; consider adding load at next visit for increased strength and proprioception while abiding within hip precaution protocols. Patient will continue to benefit from skilled PT services to modify and progress therapeutic interventions, address functional mobility deficits, address ROM deficits, address strength deficits, analyze and address soft tissue restrictions, analyze and cue movement patterns, analyze and modify body mechanics/ergonomics, assess and modify postural abnormalities and instruct in home and community integration to attain remaining goals. [x]  See Plan of Care  []  See progress note/recertification  []  See Discharge Summary         Progress towards goals / Updated goals:  Short Term Goals: To be accomplished in 2 weeks:  1. Pt will demonstrate I and compliance with HEP to maximize therapeutic effect.              IE: HEP issued and instructed  Current: MET, pt.  Reports completion of HEP as directed daily, except for one day since IE. 6/7/22.   2. Pt will demonstrate right hip flexion AROM > 100 deg for improved ease of dressing.              IE: 95 deg  Long Term Goals: To be accomplished in 4 weeks:  1. Pt will demonstrate right hip flexion AROM 110 deg for improved ease of transfers.              IE: 95 deg  2. Pt will demonstrate trunk flexion fingertips to ankles to shins for improved ease of household chores.              IE: pt avoiding forward flexion currently  3. Pt will demonstrate right hip strength 4+/5 for improved stability in stance.              IE: 4/5 flexion and abduction  Extension NT   4.  Pt will demonstrate community ambulation without AD void of instability or compensation to improve quality of life.              IE: SPC with antalgia       PLAN  [x]  Upgrade activities as tolerated     [x]  Continue plan of care  []  Update interventions per flow sheet       []  Discharge due to:_  []  Other:_      David Montana, YEISON 6/14/2022  8:46 AM    Future Appointments   Date Time Provider Cooper [place]   6/14/2022  9:00 AM Ana Davis MD [facility]   6/14/2022 11:15 AM Inocencia Hernandez PTA [facility]   6/16/2022  3:00 PM Andres, [address] [facility]   6/17/2022  3:25 [facility]   6/21/2022 10:30 AM Erik Mcmullen PTA [facility]   6/23/2022 10:45 AM Erik Mcmlulen PTA [facility]   6/27/2022 10:30 AM Lexx Hayes [facility]   7/14/2022  9:35 AM IO LAB VISIT [facility]   7/21/2022  9:40 AM Jessica Fritz MD [facility]   9/26/2022  9:30 AM [facility] TYLER RM 4 3D [facility]

## 2022-06-16 ENCOUNTER — HOSPITAL ENCOUNTER (OUTPATIENT)
Dept: PHYSICAL THERAPY | Age: 67
Discharge: HOME | End: 2022-06-16
Attending: PHYSICIAN ASSISTANT
Payer: MEDICARE

## 2022-06-16 PROCEDURE — 97112 NEUROMUSCULAR REEDUCATION: CPT

## 2022-06-16 PROCEDURE — 97140 MANUAL THERAPY 1/> REGIONS: CPT

## 2022-06-16 PROCEDURE — 97110 THERAPEUTIC EXERCISES: CPT

## 2022-06-16 NOTE — PROGRESS NOTES
PT DAILY TREATMENT NOTE     Patient Name: Tomasz Arevalo  Date:2022  : 1955  [x]  Patient  Verified  Payor: [place] MEDICARE / Plan: VA MEDICARE PART A & B / Product Type: Medicare /    In time:3:02  Out time:3:42  Total Treatment Time (min): 40  Visit #: 5 of 8    Medicare/BCBS Only   Total Timed Codes (min):  40 1:1 Treatment Time:  40       Treatment Area: Pain in right hip [M25.551]    SUBJECTIVE  Pain Level (0-10 scale): 0-1  Any medication changes, allergies to medications, adverse drug reactions, diagnosis change, or new procedure performed?: [x] No    [] Yes (see summary sheet for update)  Subjective functional status/changes:   [] No changes reported  The pt reports continued well controlled pain levels. No issues with sessions or HEP to date.     OBJECTIVE    Modality rationale: PD to improve the patients ability to    Min Type Additional Details    [] Estim:  []Unatt       []IFC  []Premod                        []Other:  []w/ice   []w/heat  Position:  Location:    [] Estim: []Att    []TENS instruct  []NMES                    []Other:  []w/US   []w/ice   []w/heat  Position:  Location:    []  Traction: [] Cervical       []Lumbar                       [] Prone          []Supine                       []Intermittent   []Continuous Lbs:  [] before manual  [] after manual    []  Ultrasound: []Continuous   [] Pulsed                           []1MHz   []3MHz W/cm2:  Location:    []  Iontophoresis with dexamethasone         Location: [] Take home patch   [] In clinic    []  Ice     []  heat  []  Ice massage  []  Laser   []  Anodyne Position:  Location:    []  Laser with stim  []  Other:  Position:  Location:    []  Vasopneumatic Device    []  Right     []  Left  Pre-treatment girth:  Post-treatment girth:  Measured at (location):  Pressure:       [] lo [] med [] hi   Temperature: [] lo [] med [] hi   [] Skin assessment post-treatment:  []intact []redness- no adverse reaction    []redness - adverse reaction:       22 min Therapeutic Exercise:  [] See flow sheet :   Rationale: increase ROM and increase strength to improve the patients ability to perform daily tasks     10 min Neuromuscular Re-education:  []  See flow sheet :   Rationale: increase strength, improve balance and increase proprioception  to improve the patients ability to perform functional tasks with improved stability and mechanics    8 min Manual Therapy:  Right hip passive hip flexion and abduction to tolerance pain free report from patient   The manual therapy interventions were performed at a separate and distinct time from the therapeutic activities interventions. Rationale: increase ROM and increase tissue extensibility to improve ease of transfers            With   [] TE   [] TA   [] neuro   [] other: Patient Education: [x] Review HEP    [] Progressed/Changed HEP based on:   [] positioning   [] body mechanics   [] transfers   [] heat/ice application    [] other:      Other Objective/Functional Measures:      Pain Level (0-10 scale) post treatment: 0    ASSESSMENT/Changes in Function: The pt demonstrates improving hip ROM and good pain control currently. She is progressing with closed chain exercise progression with good response. Continue with gradual strength progression for improved ADL ease    Patient will continue to benefit from skilled PT services to modify and progress therapeutic interventions, address functional mobility deficits, address ROM deficits, address strength deficits, analyze and address soft tissue restrictions, analyze and cue movement patterns, analyze and modify body mechanics/ergonomics and address imbalance/dizziness to attain remaining goals. []  See Plan of Care  []  See progress note/recertification  []  See Discharge Summary         Progress towards goals / Updated goals:  Short Term Goals: To be accomplished in 2 weeks:  1.  Pt will demonstrate I and compliance with HEP to maximize therapeutic effect.              IE: HEP issued and instructed  Current: MET, pt. Reports completion of HEP as directed daily, except for one day since IE. 6/7/22.   2. Pt will demonstrate right hip flexion AROM > 100 deg for improved ease of dressing.              IE: 95 deg   Current: met 105 deg 6/16/2022  Long Term Goals: To be accomplished in 4 weeks:  1. Pt will demonstrate right hip flexion AROM 110 deg for improved ease of transfers.              IE: 95 deg   Current: near met 105 deg 6/16/2022  2. Pt will demonstrate trunk flexion fingertips to ankles to shins for improved ease of household chores.              IE: pt avoiding forward flexion currently  3. Pt will demonstrate right hip strength 4+/5 for improved stability in stance.              IE: 4/5 flexion and abduction  Extension NT   4.  Pt will demonstrate community ambulation without AD void of instability or compensation to improve quality of life.              IE: SPC with antalgia    PLAN  [x]  Upgrade activities as tolerated     []  Continue plan of care  []  Update interventions per flow sheet       []  Discharge due to:_  []  Other:_      Milka Correia DPT CMTPT 6/16/2022  3:07 PM    Future Appointments   Date Time Provider Cooper Jacquie   6/17/2022  3:25 PM Adia Gtz PA-C [facility]   6/21/2022 10:30 AM Chante Eller, PTA [facility]   6/23/2022 10:45 AM Chante Eller, PTA [facility]   6/27/2022 10:30 AM Soco Car [facility]   7/14/2022  9:35 AM [facility] LAB VISIT [facility]   7/21/2022  9:40 AM Zac Whitley MD [facility]   9/26/2022  9:30 AM HBV TYLER RM 4 3D [facility]

## 2022-06-17 ENCOUNTER — TELEPHONE (OUTPATIENT)
Dept: ORTHOPEDIC SURGERY | Age: 67
End: 2022-06-17

## 2022-06-17 RX ORDER — AMOXICILLIN 500 MG/1
CAPSULE ORAL
Qty: 4 CAPSULE | Refills: 3 | Status: SHIPPED | OUTPATIENT
Start: 2022-06-17 | End: 2022-07-21

## 2022-06-17 NOTE — TELEPHONE ENCOUNTER
Patient [phone]    Please inform patient if she needs to take Amoxicillin or something different prior to her teeth cleaning on 06/28/22. Please call her cell listed above.

## 2022-06-21 ENCOUNTER — HOSPITAL ENCOUNTER (OUTPATIENT)
Dept: PHYSICAL THERAPY | Age: 67
Discharge: HOME | End: 2022-06-21
Attending: PHYSICIAN ASSISTANT
Payer: MEDICARE

## 2022-06-21 PROCEDURE — 97140 MANUAL THERAPY 1/> REGIONS: CPT

## 2022-06-21 PROCEDURE — 97112 NEUROMUSCULAR REEDUCATION: CPT

## 2022-06-21 PROCEDURE — 97110 THERAPEUTIC EXERCISES: CPT

## 2022-06-21 NOTE — PROGRESS NOTES
PT DAILY TREATMENT NOTE     Patient Name: Carole Face  Date:2022  : 1955  [x]  Patient  Verified  Payor: [place] MEDICARE / Plan: [place] MEDICARE PART A & B / Product Type: Medicare /    In time:10:30  Out time:11:23  Total Treatment Time (min): 48  Visit #: 6 of 8    Medicare/BCBS Only   Total Timed Codes (min):  53 1:1 Treatment Time:  48       Treatment Area: Pain in right hip [M25.551]    SUBJECTIVE  Pain Level (0-10 scale): 1  Any medication changes, allergies to medications, adverse drug reactions, diagnosis change, or new procedure performed?: [x] No    [] Yes (see summary sheet for update)  Subjective functional status/changes:   [] No changes reported  Pt reports she has been walking around the house without use of her cane but she does take it with her around the community still due to uneven ground. Pt did present with no cane use to treatment today but reports she has it in her car just in case. OBJECTIVE    35 min Therapeutic Exercise:  [x] See flow sheet :   Rationale: increase ROM and increase strength to improve the patients ability to perform functional task with ease. 10 min Neuromuscular Re-education:  [x]  See flow sheet :   Rationale: increase strength, improve coordination, improve balance and increase proprioception  to improve the patients ability to perform ADL's with ease. 8 min Manual Therapy:  Right hip passive hip flexion and abduction to tolerance pain free report from patient   The manual therapy interventions were performed at a separate and distinct time from the therapeutic activities interventions. Rationale: decrease pain, increase ROM and increase tissue extensibility to perform ADL's with ease.           With   [] TE   [] TA   [] neuro   [] other: Patient Education: [x] Review HEP    [] Progressed/Changed HEP based on:   [] positioning   [] body mechanics   [] transfers   [] heat/ice application    [] other:      Other Objective/Functional Measures:   No AD used as pt navigated gym this visit. Pain Level (0-10 scale) post treatment: 0    ASSESSMENT/Changes in Function:   Hip hiking on the right noted during hip x 3 with a peach band with tactile cueing required to correct. Fair carryover however pt does display continued intermittent hiking. Continued work needed for squat form however pt able to perform with no UE use. Pt displays good static and dynamic balance stability and reports feeling stronger and more confident with ambulation without AD use. Pt was able to meet LTG #2 this visit with forward trunk flexion to her shoes with no pain reported. Pt reports no pain post treatment and left in no apparent distress. Patient will continue to benefit from skilled PT services to modify and progress therapeutic interventions, address functional mobility deficits, address ROM deficits, address strength deficits, analyze and address soft tissue restrictions, analyze and cue movement patterns, analyze and modify body mechanics/ergonomics and assess and modify postural abnormalities to attain remaining goals. [x]  See Plan of Care  []  See progress note/recertification  []  See Discharge Summary         Progress towards goals / Updated goals:  Short Term Goals: To be accomplished in 2 weeks:  1. Pt will demonstrate I and compliance with HEP to maximize therapeutic effect.              IE: HEP issued and instructed  Current: MET, pt. Reports completion of HEP as directed daily, except for one day since IE. 6/7/22.   2. Pt will demonstrate right hip flexion AROM > 100 deg for improved ease of dressing.              IE: 95 deg              Current: met 105 deg 6/16/2022  Long Term Goals: To be accomplished in 4 weeks:  1. Pt will demonstrate right hip flexion AROM 110 deg for improved ease of transfers.              IE: 95 deg              Current: near met 105 deg 6/16/2022  2.  Pt will demonstrate trunk flexion fingertips to ankles to shins for improved ease of household chores.              OW: pt avoiding forward flexion currently   Current: Met 6/21/22 trunk flexion fingertips to shoes with no pain  3. Pt will demonstrate right hip strength 4+/5 for improved stability in stance.              IE: 4/5 flexion and abduction  Extension NT   4.  Pt will demonstrate community ambulation without AD void of instability or compensation to improve quality of life.              IE: SPC with antalgia   Current: progressing 6/21/22 no AD around the home, intermittent use of SPC around the community    PLAN  []  Upgrade activities as tolerated     [x]  Continue plan of care  []  Update interventions per flow sheet       []  Discharge due to:_  []  Other:_      Hay Steve PTA 6/21/2022  10:33 AM    Future Appointments   Date Time Provider Cooper Dhillon   6/23/2022 10:45 [facility]   6/27/2022 10:30 AM [facility]   7/14/2022  9:35 AM IO LAB VISIT [facility]   7/15/2022  3:25 PM Yunior Lassiter PA-C [facility]   7/21/2022  9:40 AM Michelle Gomez MD [facility]   9/26/2022  9:30 AM [facility] RM 4 3D [facility]

## 2022-06-23 ENCOUNTER — HOSPITAL ENCOUNTER (OUTPATIENT)
Dept: PHYSICAL THERAPY | Age: 67
Discharge: HOME | End: 2022-06-23
Attending: PHYSICIAN ASSISTANT
Payer: MEDICARE

## 2022-06-23 PROCEDURE — 97110 THERAPEUTIC EXERCISES: CPT

## 2022-06-23 PROCEDURE — 97140 MANUAL THERAPY 1/> REGIONS: CPT

## 2022-06-23 PROCEDURE — 97112 NEUROMUSCULAR REEDUCATION: CPT

## 2022-06-23 NOTE — PROGRESS NOTES
PT DAILY TREATMENT NOTE     Patient Name: Sixto Bee  Date:2022  : 1955  [x]  Patient  Verified  Payor: [place] MEDICARE / Plan: [place] MEDICARE PART A & B / Product Type: Medicare /    In time:10:45  Out time:11:38  Total Treatment Time (min): 48  Visit #: 7 of 8    Medicare/BCBS Only   Total Timed Codes (min):  53 1:1 Treatment Time:  45       Treatment Area: Pain in right hip [M25.551]    SUBJECTIVE  Pain Level (0-10 scale): 1  Any medication changes, allergies to medications, adverse drug reactions, diagnosis change, or new procedure performed?: [x] No    [] Yes (see summary sheet for update)  Subjective functional status/changes:   [] No changes reported  Pt reports she did a lot of walking yesterday and her right hip is a little sore. OBJECTIVE    35 min Therapeutic Exercise:  [x] See flow sheet :   Rationale: increase ROM and increase strength to improve the patients ability to perform functional task with ease. 10 min Neuromuscular Re-education:  [x]  See flow sheet :   Rationale: increase strength, improve coordination, improve balance and increase proprioception  to improve the patients ability to perform ADL's with ease. 8 min Manual Therapy:  Right hip passive hip flexion and abduction to tolerance pain free report from patient   The manual therapy interventions were performed at a separate and distinct time from the therapeutic activities interventions. Rationale: decrease pain, increase ROM and increase tissue extensibility to improve functional mobility with ambulation ADL's.           With   [] TE   [] TA   [] neuro   [] other: Patient Education: [x] Review HEP    [] Progressed/Changed HEP based on:   [] positioning   [] body mechanics   [] transfers   [] heat/ice application    [] other:      Other Objective/Functional Measures:      Pain Level (0-10 scale) post treatment: 0     ASSESSMENT/Changes in Function:   Some remaining inflammation and warmth noted around the incision with pt reporting she does not ice at home and she declines cold modalities in the clinic. Pt educated in the benefits of utilizing ice to decrease inflammation and discomfort. Pt performs therex well but requires some cueing for form and control and displays increased fatigue requiring several rest breaks. [place] band added to dynamic gait activities with no difficulty. Pt reports no pain post treatment. Patient will continue to benefit from skilled PT services to modify and progress therapeutic interventions, address functional mobility deficits, address ROM deficits, address strength deficits, analyze and address soft tissue restrictions, analyze and cue movement patterns, analyze and modify body mechanics/ergonomics and assess and modify postural abnormalities to attain remaining goals. [x]  See Plan of Care  []  See progress note/recertification  []  See Discharge Summary         Progress towards goals / Updated goals:  Short Term Goals: To be accomplished in 2 weeks:  1. Pt will demonstrate I and compliance with HEP to maximize therapeutic effect.              IE: HEP issued and instructed   Current: MET, pt. Reports completion of HEP as directed daily, except for one day since IE. 6/7/22.   2. Pt will demonstrate right hip flexion AROM > 100 deg for improved ease of dressing.              IE: 95 deg              Current: met 105 deg 6/16/2022  Long Term Goals: To be accomplished in 4 weeks:  1. Pt will demonstrate right hip flexion AROM 110 deg for improved ease of transfers.              IE: 95 deg              Current: near met 105 deg 6/16/2022  2. Pt will demonstrate trunk flexion fingertips to ankles to shins for improved ease of household chores.              IE: pt avoiding forward flexion currently              Current: Met 6/21/22 trunk flexion fingertips to shoes with no pain  3.  Pt will demonstrate right hip strength 4+/5 for improved stability in stance.              IE: 4/5 flexion and abduction  Extension NT    Current: progressing 6/23/22 4+/5 abd, 4/5 hip flex  4.  Pt will demonstrate community ambulation without AD void of instability or compensation to improve quality of life.              IE: SPC with antalgia              Current: progressing 6/21/22 no AD around the home, intermittent use of SPC around the community    PLAN  []  Upgrade activities as tolerated     [x]  Continue plan of care  []  Update interventions per flow sheet       []  Discharge due to:_  []  Other:_      Yolanda Turner, PTA 6/23/2022  11:06 AM    Future Appointments   Date Time Provider Cooper Dhillon   6/27/2022 10:30 AM Andres, [address] [facility]   7/14/2022  9:35 AM IO LAB VISIT [facility] BS [facility]   7/15/2022  3:25 PM Marleny Quevedo PA-C [facility]   7/21/2022  9:40 AM Rasheed Hammonds MD [facility]   9/26/2022  9:30 AM [facility] RM 4 3D [facility]

## 2022-06-27 ENCOUNTER — HOSPITAL ENCOUNTER (OUTPATIENT)
Dept: PHYSICAL THERAPY | Age: 67
Discharge: HOME | End: 2022-06-27
Attending: PHYSICIAN ASSISTANT
Payer: MEDICARE

## 2022-06-27 PROCEDURE — 97110 THERAPEUTIC EXERCISES: CPT

## 2022-06-27 PROCEDURE — 97112 NEUROMUSCULAR REEDUCATION: CPT

## 2022-06-27 PROCEDURE — 97140 MANUAL THERAPY 1/> REGIONS: CPT

## 2022-06-27 NOTE — PROGRESS NOTES
Physical Therapy Discharge Instructions      [facility]  [address]  [place], [address]  [phone] fax    Patient: Tasha Leigh  : 1955      Continue Home Exercise Program 1 times per day for 4 weeks, then decrease to 3 times per week      Continue with    [] Ice  as needed  times per day     [] Heat           Follow up with MD:     [] Upon completion of therapy     [x] As needed      Recommendations:     [x]   Return to activity with home program    []   Return to activity with the following modifications:       []Post Rehab Program    []Join Independent aquatic program     []Return to/join local gym        Additional Comments: Continue with current home program, transitioning to aquatics as tolerated          Corlis Hatchet DPT CMTPT 2022 11:16 AM

## 2022-06-27 NOTE — PROGRESS NOTES
PT DISCHARGE DAILY NOTE AND [id number]    Date:2022  Patient name: Van Haynes Start of Care: 2022   Referral source: Florencio Barnes : 1955               Medical Diagnosis: Pain in right hip [M25.551]  Payor: Shanique Rodarte / Plan: [place] MEDICARE PART A & B / Product Type: Medicare /  Onset Date:2022               Treatment Diagnosis: Right hip pain s/p JOSIE   Prior Hospitalization: see medical history Provider#: [id number]   Medications: Verified on Patient summary List    Comorbidities: osteoarthritis, HTN, asthma, spinal cord stimulator, Right TKA (), RTC repair (), cervical fusion ()   Prior Level of Function: Pt with pain limited ambulation and standing prior to surgery  Visits from Start of Care: 8    Missed Visits: 0    Reporting Period : 2022 to 2022    [x]  Patient  Verified  Payor: VA MEDICARE / Plan: VA MEDICARE PART A & B / Product Type: Medicare /    In time:10:25  Out time:11:06  Total Treatment Time (min): 41  Visit #: 8 of 8    Medicare/BCBS Only   Total Timed Codes (min):  41 1:1 Treatment Time:  41       SUBJECTIVE  Pain Level (0-10 scale): 1  Any medication changes, allergies to medications, adverse drug reactions, diagnosis change, or new procedure performed?: [x] No    [] Yes (see summary sheet for update)  Subjective functional status/changes:   [] No changes reported  The pt reports she has been feeling pretty good and is set to return to water exercise.  Feels ready for D/C today    OBJECTIVE    23 min Therapeutic Exercise:  [] See flow sheet :   Rationale: increase ROM and increase strength to improve the patients ability to perform daily tasks     10 min Neuromuscular Re-education:  []  See flow sheet :   Rationale: increase strength, improve balance and increase proprioception  to improve the patients ability to perform ADLs with improved stability    8 min Manual Therapy:  Passive hip flexion/abd/ER to tolerance right hip Rationale: increase ROM and increase tissue extensibility to improve gait and transfers       With   [] TE   [] TA   [] neuro   [] other: Patient Education: [x] Review HEP    [] Progressed/Changed HEP based on:   [] positioning   [] body mechanics   [] transfers   [] heat/ice application    [] other:      Other Objective/Functional Measures: FOTO score increased to 67      Pain Level (0-10 scale) post treatment: 0    Summary of Care:  Short Term Goals: To be accomplished in 2 weeks:  1. Pt will demonstrate I and compliance with HEP to maximize therapeutic effect.              IE: HEP issued and instructed              Current: MET, pt. Reports completion of HEP as directed daily, except for one day since IE. 6/7/22.   2. Pt will demonstrate right hip flexion AROM > 100 deg for improved ease of dressing.              IE: 95 deg              Current: met 105 deg 6/16/2022  Long Term Goals: To be accomplished in 4 weeks:  1. Pt will demonstrate right hip flexion AROM 110 deg for improved ease of transfers.              IE: 95 deg              Current: near met 105 deg 6/16/2022 Met 110 deg 6/27/2022  2. Pt will demonstrate trunk flexion fingertips to ankles to shins for improved ease of household chores.  Lesly Graver: pt avoiding forward flexion currently              Current: Met 6/21/22 trunk flexion fingertips to shoes with no pain  3. Pt will demonstrate right hip strength 4+/5 for improved stability in stance.              IE: 4/5 flexion and abduction  Extension NT               Current: progressing 6/23/22 4+/5 abd, 4/5 hip flex  4.  Pt will demonstrate community ambulation without AD void of instability or compensation to improve quality of life.              IE: SPC with antalgia              Current: progressing 6/21/22 no AD around the home, intermittent use of SPC around the community    ASSESSMENT/Changes in Function: The pt has progressed very well with regards to hip mobility and is progressing well with strengthening. She is preparing to transition into community aquatics at this time. Will D/C from PT.     Thank you for this referral!     PLAN  []Discontinue therapy: []Patient has reached or is progressing toward set goals      []Patient is non-compliant or has abdicated      []Due to lack of appreciable progress towards set [address] Neena Gee 6/27/2022  10:32 AM

## 2022-07-14 ENCOUNTER — HOSPITAL ENCOUNTER (OUTPATIENT)
Dept: LAB | Age: 67
Discharge: HOME | End: 2022-07-14
Payer: MEDICARE

## 2022-07-14 ENCOUNTER — APPOINTMENT (OUTPATIENT)
Dept: INTERNAL MEDICINE CLINIC | Age: 67
End: 2022-07-14

## 2022-07-14 DIAGNOSIS — I10 PRIMARY HYPERTENSION: ICD-10-CM

## 2022-07-14 DIAGNOSIS — M05.79 RHEUMATOID ARTHRITIS INVOLVING MULTIPLE SITES WITH POSITIVE RHEUMATOID FACTOR (HCC): ICD-10-CM

## 2022-07-14 DIAGNOSIS — R73.9 HYPERGLYCEMIA: ICD-10-CM

## 2022-07-14 DIAGNOSIS — R82.71 ASYMPTOMATIC BACTERIURIA: ICD-10-CM

## 2022-07-14 DIAGNOSIS — E53.8 VITAMIN B12 DEFICIENCY: ICD-10-CM

## 2022-07-14 DIAGNOSIS — D50.9 IRON DEFICIENCY ANEMIA, UNSPECIFIED IRON DEFICIENCY ANEMIA TYPE: ICD-10-CM

## 2022-07-14 LAB
ALBUMIN SERPL-MCNC: 4 G/DL (ref 3.4–5)
ALBUMIN/GLOB SERPL: 1.1 {RATIO} (ref 0.8–1.7)
ALP SERPL-CCNC: 144 U/L (ref 45–117)
ALT SERPL-CCNC: 24 U/L (ref 13–56)
ANION GAP SERPL CALC-SCNC: 5 MMOL/L (ref 3–18)
AST SERPL-CCNC: 17 U/L (ref 10–38)
BASOPHILS # BLD: 0 K/UL (ref 0–0.1)
BASOPHILS NFR BLD: 1 % (ref 0–2)
BILIRUB SERPL-MCNC: 0.5 MG/DL (ref 0.2–1)
BUN SERPL-MCNC: 12 MG/DL (ref 7–18)
BUN/CREAT SERPL: 18 (ref 12–20)
CALCIUM SERPL-MCNC: 9.2 MG/DL (ref 8.5–10.1)
CHLORIDE SERPL-SCNC: 109 MMOL/L (ref 100–111)
CHOLEST SERPL-MCNC: 156 MG/DL
CO2 SERPL-SCNC: 28 MMOL/L (ref 21–32)
CREAT SERPL-MCNC: 0.67 MG/DL (ref 0.6–1.3)
CREAT UR-MCNC: 91 MG/DL (ref 30–125)
CRP SERPL-MCNC: 0.5 MG/DL (ref 0–0.3)
DIFFERENTIAL METHOD BLD: ABNORMAL
EOSINOPHIL # BLD: 0.2 K/UL (ref 0–0.4)
EOSINOPHIL NFR BLD: 2 % (ref 0–5)
ERYTHROCYTE [DISTWIDTH] IN BLOOD BY AUTOMATED COUNT: 14.5 % (ref 11.6–14.5)
EST. AVERAGE GLUCOSE BLD GHB EST-MCNC: 105 MG/DL
FERRITIN SERPL-MCNC: 81 NG/ML (ref 8–388)
GLOBULIN SER CALC-MCNC: 3.6 G/DL (ref 2–4)
GLUCOSE SERPL-MCNC: 97 MG/DL (ref 74–99)
HBA1C MFR BLD: 5.3 % (ref 4.2–5.6)
HCT VFR BLD AUTO: 44.6 % (ref 35–45)
HDLC SERPL-MCNC: 42 MG/DL (ref 40–60)
HDLC SERPL: 3.7 {RATIO} (ref 0–5)
HGB BLD-MCNC: 13.4 G/DL (ref 12–16)
IMM GRANULOCYTES # BLD AUTO: 0 K/UL (ref 0–0.04)
IMM GRANULOCYTES NFR BLD AUTO: 0 % (ref 0–0.5)
IRON SATN MFR SERPL: 15 % (ref 20–50)
IRON SERPL-MCNC: 47 UG/DL (ref 50–175)
LDLC SERPL CALC-MCNC: 86.2 MG/DL (ref 0–100)
LIPID PROFILE,FLP: NORMAL
LYMPHOCYTES # BLD: 1.7 K/UL (ref 0.9–3.6)
LYMPHOCYTES NFR BLD: 23 % (ref 21–52)
MCH RBC QN AUTO: 27.8 PG (ref 24–34)
MCHC RBC AUTO-ENTMCNC: 30 G/DL (ref 31–37)
MCV RBC AUTO: 92.5 FL (ref 78–100)
MICROALBUMIN UR-MCNC: 0.74 MG/DL (ref 0–3)
MICROALBUMIN/CREAT UR-RTO: 8 MG/G (ref 0–30)
MONOCYTES # BLD: 0.5 K/UL (ref 0.05–1.2)
MONOCYTES NFR BLD: 7 % (ref 3–10)
NEUTS SEG # BLD: 4.9 K/UL (ref 1.8–8)
NEUTS SEG NFR BLD: 67 % (ref 40–73)
NRBC # BLD: 0 K/UL (ref 0–0.01)
NRBC BLD-RTO: 0 PER 100 WBC
PLATELET # BLD AUTO: 283 K/UL (ref 135–420)
PMV BLD AUTO: 10 FL (ref 9.2–11.8)
POTASSIUM SERPL-SCNC: 4.3 MMOL/L (ref 3.5–5.5)
PROT SERPL-MCNC: 7.6 G/DL (ref 6.4–8.2)
RBC # BLD AUTO: 4.82 M/UL (ref 4.2–5.3)
SODIUM SERPL-SCNC: 142 MMOL/L (ref 136–145)
TIBC SERPL-MCNC: 319 UG/DL (ref 250–450)
TRIGL SERPL-MCNC: 139 MG/DL (ref ?–150)
VIT B12 SERPL-MCNC: 592 PG/ML (ref 211–911)
VLDLC SERPL CALC-MCNC: 27.8 MG/DL
WBC # BLD AUTO: 7.3 K/UL (ref 4.6–13.2)

## 2022-07-14 PROCEDURE — 85025 COMPLETE CBC W/AUTO DIFF WBC: CPT

## 2022-07-14 PROCEDURE — 86140 C-REACTIVE PROTEIN: CPT

## 2022-07-14 PROCEDURE — 86225 DNA ANTIBODY NATIVE: CPT

## 2022-07-14 PROCEDURE — 82607 VITAMIN B-12: CPT

## 2022-07-14 PROCEDURE — 83540 ASSAY OF IRON: CPT

## 2022-07-14 PROCEDURE — 80061 LIPID PANEL: CPT

## 2022-07-14 PROCEDURE — 83036 HEMOGLOBIN GLYCOSYLATED A1C: CPT

## 2022-07-14 PROCEDURE — 80053 COMPREHEN METABOLIC PANEL: CPT

## 2022-07-14 PROCEDURE — 82728 ASSAY OF FERRITIN: CPT

## 2022-07-14 PROCEDURE — 36415 COLL VENOUS BLD VENIPUNCTURE: CPT

## 2022-07-14 PROCEDURE — 82043 UR ALBUMIN QUANTITATIVE: CPT

## 2022-07-15 ENCOUNTER — OFFICE VISIT (OUTPATIENT)
Dept: ORTHOPEDIC SURGERY | Age: 67
End: 2022-07-15
Payer: MEDICARE

## 2022-07-15 VITALS — BODY MASS INDEX: 27.17 KG/M2 | HEIGHT: 70 IN | WEIGHT: 189.8 LBS | TEMPERATURE: 97.8 F

## 2022-07-15 DIAGNOSIS — Z96.641 STATUS POST RIGHT HIP REPLACEMENT: Primary | ICD-10-CM

## 2022-07-15 DIAGNOSIS — M17.12 ARTHRITIS OF LEFT KNEE: ICD-10-CM

## 2022-07-15 LAB — DSDNA AB SER-ACNC: <1 IU/ML (ref 0–9)

## 2022-07-15 PROCEDURE — 3017F COLORECTAL CA SCREEN DOC REV: CPT | Performed by: PHYSICIAN ASSISTANT

## 2022-07-15 PROCEDURE — G8756 NO BP MEASURE DOC: HCPCS | Performed by: PHYSICIAN ASSISTANT

## 2022-07-15 PROCEDURE — G9899 SCRN MAM PERF RSLTS DOC: HCPCS | Performed by: PHYSICIAN ASSISTANT

## 2022-07-15 PROCEDURE — 1101F PT FALLS ASSESS-DOCD LE1/YR: CPT | Performed by: PHYSICIAN ASSISTANT

## 2022-07-15 PROCEDURE — G8417 CALC BMI ABV UP PARAM F/U: HCPCS | Performed by: PHYSICIAN ASSISTANT

## 2022-07-15 PROCEDURE — 99213 OFFICE O/P EST LOW 20 MIN: CPT | Performed by: PHYSICIAN ASSISTANT

## 2022-07-15 PROCEDURE — G8427 DOCREV CUR MEDS BY ELIG CLIN: HCPCS | Performed by: PHYSICIAN ASSISTANT

## 2022-07-15 PROCEDURE — G8536 NO DOC ELDER MAL SCRN: HCPCS | Performed by: PHYSICIAN ASSISTANT

## 2022-07-15 PROCEDURE — 1090F PRES/ABSN URINE INCON ASSESS: CPT | Performed by: PHYSICIAN ASSISTANT

## 2022-07-15 PROCEDURE — G8432 DEP SCR NOT DOC, RNG: HCPCS | Performed by: PHYSICIAN ASSISTANT

## 2022-07-15 PROCEDURE — G8399 PT W/DXA RESULTS DOCUMENT: HCPCS | Performed by: PHYSICIAN ASSISTANT

## 2022-07-15 PROCEDURE — 73502 X-RAY EXAM HIP UNI 2-3 VIEWS: CPT | Performed by: PHYSICIAN ASSISTANT

## 2022-07-15 NOTE — PROGRESS NOTES
[address] [place] [address]  [phone]           Patient: Coretta Mckeon                MRN: [id number]       SSN: [id number]  YOB: 1955        AGE: 75 y.o. SEX: female  Body mass index is 27.23 kg/m². PCP: Phyllistine Dancer, MD  07/15/22      This office note has been dictated. REVIEW OF SYSTEMS:  Constitutional: Negative for fever, chills, weight loss and malaise/fatigue. HENT: Negative. Eyes: Negative. Respiratory: Negative. Cardiovascular: Negative. Gastrointestinal: No bowel incontinence or constipation. Genitourinary: No bladder incontinence or saddle anesthesia. Skin: Negative. Neurological: Negative. Endo/Heme/Allergies: Negative. Psychiatric/Behavioral: Negative. Musculoskeletal: As per HPI above.      Past Medical History:   Diagnosis Date    Adjacent segment disease C3/4 w/deg changes, poss stenosis, CT '18 6/22/2018    Allergic rhinitis     Anemia 2008    intermittant since then    Asthma     Back pain     Green's esophagus with esophagitis     Cervical radiculopathy     Chronic sinusitis 5/15/2012    DNS (deviated nasal septum)     Empyema     Foraminal stenosis of cervical region     C4-C5    GERD (gastroesophageal reflux disease)     Dr Deshaun David    Hx MRSA infection 8/11/2014    Hypertension     Hypertriglyceridemia     Insulin resistance 4/9/2012    Left knee pain     Lichen planus     Liver disease     ORTEGA-per pt, followed by Dr Gertrudis Rock Menopause     Age 51    MRSA (methicillin resistant Staphylococcus aureus) infection 2008    left lung    Nausea & vomiting     Restless leg syndrome     Rheumatoid arthritis ([address]     Spinal cord stimulator status 2016    Spinal stenosis of cervical region     C4-C5 and C5-C6    TMJ syndrome     Wears glasses     and contacts         Current Outpatient Medications:     amoxicillin (AMOXIL) 500 mg capsule, 4 tabs by mouth 1 hour prior to appointment, Disp: 4 Capsule, Rfl: 3    FeroSuL 325 mg (65 mg iron) tablet, take 1 tablet by mouth once daily BEFORE BREAKFAST, Disp: 90 Tablet, Rfl: 1    losartan (COZAAR) 50 mg tablet, TAKE 1 TABLET DAILY (Patient taking differently: TAKE 1 TABLET DAILY, oral), Disp: 90 Tablet, Rfl: 3    cyanocobalamin 1,000 mcg tablet, take 1 tablet by mouth once daily, Disp: 90 Tablet, Rfl: 3    albuterol (PROVENTIL HFA, VENTOLIN HFA, PROAIR HFA) 90 mcg/actuation inhaler, Take 1 Puff by inhalation every six (6) hours as needed for Wheezing., Disp: , Rfl:     abatacept (Orencia, with maltose,) 250 mg injection, by IntraVENous route. monthly, Disp: , Rfl:     clobetasoL (TEMOVATE) 0.05 % ointment, Apply  to affected area two (2) days a week. (Patient not taking: Reported on 6/14/2022), Disp: 45 g, Rfl: 3    multivit-min/iron/folic/lutein (CENTRUM SILVER WOMEN PO), Take 1 Tablet by mouth daily. not taking but in the  home, Disp: , Rfl:     krill/om-3/dha/epa/phospho/ast (MAXIMUM RED KRILL OMEGA-3 PO), Take 1 Caplet by mouth daily. not taking, Disp: , Rfl:     ascorbic acid, vitamin C, (VITAMIN C) 500 mg tablet, Take 1 Tab by mouth daily. (Patient not taking: Reported on 5/27/2022), Disp: 90 Tab, Rfl: 3    ESTRACE 0.01 % (0.1 mg/gram) vaginal cream, Place 1 gm in the vagina twice weekly (Patient taking differently: Insert 1 g into vagina every seven (7) days.), Disp: 42.5 g, Rfl: 2    cholecalciferol, vitamin D3, (VITAMIN D3 PO), Take 1 Tab by mouth daily. (Patient not taking: Reported on 5/27/2022), Disp: , Rfl:     acetaminophen (TYLENOL) 325 mg tablet, Take 325 mg by mouth every four (4) hours as needed for Pain., Disp: , Rfl:     triamcinolone (NASACORT AQ) 55 mcg nasal inhaler, 2 Sprays by Both Nostrils route daily. Indications: PERENNIAL ALLERGIC RHINITIS, Disp: , Rfl:     Dexlansoprazole (DEXILANT) 60 mg CpDM, Take 1 Cap by mouth Daily (before dinner). , Disp: , Rfl:    Cetirizine (ZYRTEC) 10 mg Cap, Take 10 mg by mouth daily. , Disp: , Rfl:     montelukast (SINGULAIR) 10 mg tablet, Take 10 mg by mouth daily. , Disp: , Rfl:     allergy injection, by SubCUTAneous route every thirty (30) days. , Disp: , Rfl:     budesonide-formoterol (SYMBICORT) 160-4.5 mcg/Actuation HFA inhaler, Take 2 Puffs by inhalation two (2) times a day., Disp: , Rfl:     Allergies   Allergen Reactions    Adhesive Tape-Silicones Other (comments)     Patient stated iv tape and tegaderm ok    Other Plant, Animal, Environmental Unable to Consolidated Doc     MOLD    Sulfasalazine Other (comments)     Could not taste anything, lightheadedness    Tape [Adhesive] Other (comments)     Blisters, use paper tape only  Patient states tegaderm and paper tape are okay    Mold Other (comments)    Chlorhexidine Towelette Itching and Other (comments)     \"stinging for a couple of hours\"    itching       Social History     Socioeconomic History    Marital status:      Spouse name: Not on file    Number of children: Not on file    Years of education: Not on file    Highest education level: Not on file   Occupational History    Not on file   Tobacco Use    Smoking status: Never Smoker    Smokeless tobacco: Never Used   Vaping Use    Vaping Use: Never used   Substance and Sexual Activity    Alcohol use: Not Currently     Alcohol/week: 0.0 standard drinks     Comment: 0-6 per year    Drug use: Yes     Types: Prescription, OTC    Sexual activity: Yes     Partners: Male     Comment: Menopausal since 2000   Other Topics Concern    Not on file   Social History Narrative    Retired      Social Determinants of Health     Financial Resource Strain:     Difficulty of Paying Living Expenses: Not on file   Food Insecurity:     Worried About [facility] in the Last Year: Not on file    [address] [place] in the Last Year: Not on file   Transportation Needs:     Lack of Transportation (Medical):  Not on file    Lack of Transportation (Non-Medical): Not on file   Physical Activity:     Days of Exercise per Week: Not on file    Minutes of Exercise per Session: Not on file   Stress:     Feeling of Stress : Not on file   Social Connections:     Frequency of Communication with Friends and Family: Not on file    Frequency of Social Gatherings with Friends and Family: Not on file    Attends [employer] Services: Not on file    Active Member of [address] [place] [address] or Organizations: Not on file    Attends Club or Organization Meetings: Not on file    Marital Status: Not on file   Intimate Partner Violence:     Fear of Current or Ex-Partner: Not on file    Emotionally Abused: Not on file    Physically Abused: Not on file    Sexually Abused: Not on file   Housing Stability:     Unable to Pay for Housing in the Last Year: Not on file    Number of [place] in the Last Year: Not on file    Unstable Housing in the Last Year: Not on file       Past Surgical History:   Procedure Laterality Date    HX CERVICAL FUSION  08/13/14    HX HEENT  1997    TMJ surgery    HX HIP REPLACEMENT Right 05/13/2022    HX KNEE REPLACEMENT Right 2020    HX LUMBAR LAMINECTOMY  Nov 1995    [facility]    [facility]  [facility]TH[facility]ED[facility] Right 1/2015    bunion removal from right foot    HX OTHER SURGICAL  2007    thoracentesis    HX OTHER SURGICAL  2008    chest tube    HX OTHER SURGICAL Bilateral 1997    TMJ surgery    HX OTHER SURGICAL  2012    sinus surg 2012-Dr Julian Ferguson.  HX OTHER SURGICAL  2016    spinal cord stimulator place for lumbar neuritis, good benefit    HX ROTATOR CUFF REPAIR Right 01/2019    HX TUBAL LIGATION      ID ANESTH,SURGERY OF SHOULDER  01/31/2019    ID COLONOSCOPY FLX DX W/COLLJ SPEC WHEN PFRMD  6-18-08    normal/azebmann           Patient seen evaluated today for her right total hip replacement. She is now approximately week status post surgery doing well. She said no troubles the wound. Pain is well controlled. She has finished up her outpatient physical therapy. She has begun aquatic therapy and has been doing this for the past 3 weeks. The main thing that is slowing her down is her left knee. Does have known advanced arthritis of the left knee. She had a previous right knee replacement done. Patient denies recent fevers, chills, chest pain, SOB, or injuries. No recent systemic changes noted. A 12-point review of systems is performed today. Pertinent positives are noted. All other systems reviewed and otherwise are negative. Physical exam: General: Alert and oriented x3, nad.  well-developed, well nourished. normal affect, AF. NC/AT, EOMI, neck supple, trachea midline, no JVD present. Breathing is non-labored. Semination of lower extremities reveals pain-free range of motion the hips. There is no pain to palpation the trochanter bursa. Neck straight leg raise. Negative calf tenderness. Negative Homans. No signs of DVT present. Examination of the left knee reveals skin intact. There is no erythema or ecchymosis noted. There are no signs of infection or cellulitis present. Findings are consistent with advanced arthritis left knee pain to palpation tricompartmentally but worse at the lateral compartment. There is crepitus anteriorly noted. Radiographs obtained in office today 7/15/2022 at the [address] [place] [address] location include AP pelvis, AP crosstable lateral the right hip shows a total hip components to be well fixed without evidence for loosening or fracture noted. Review of previous radiographs of the knee does confirm end-stage arthritis of bone-on-bone eburnation and valgus malalignment. Assessment: Status post right total hip replacement    Plan: At this point, the patient will continue her exercise program.  She will continue with aquatic therapy. Activities as tolerated. She will keep in mind her precautions.   We will see her back in the office about 6 weeks time for evaluation and strength check. She would like to move forward with getting her left knee replaced. The alternatives, risk, complications, expected, discussed patient understands wish to proceed.             JR Red ESPINOZA, PA-C, ATC

## 2022-07-21 ENCOUNTER — OFFICE VISIT (OUTPATIENT)
Dept: INTERNAL MEDICINE CLINIC | Age: 67
End: 2022-07-21
Payer: MEDICARE

## 2022-07-21 VITALS
OXYGEN SATURATION: 98 % | DIASTOLIC BLOOD PRESSURE: 84 MMHG | HEART RATE: 72 BPM | SYSTOLIC BLOOD PRESSURE: 137 MMHG | BODY MASS INDEX: 26.48 KG/M2 | RESPIRATION RATE: 16 BRPM | HEIGHT: 70 IN | TEMPERATURE: 97.7 F | WEIGHT: 185 LBS

## 2022-07-21 DIAGNOSIS — R73.9 HYPERGLYCEMIA: ICD-10-CM

## 2022-07-21 DIAGNOSIS — I10 PRIMARY HYPERTENSION: ICD-10-CM

## 2022-07-21 DIAGNOSIS — J45.40 MODERATE PERSISTENT ASTHMA WITHOUT COMPLICATION: Primary | ICD-10-CM

## 2022-07-21 DIAGNOSIS — M05.79 RHEUMATOID ARTHRITIS INVOLVING MULTIPLE SITES WITH POSITIVE RHEUMATOID FACTOR (HCC): ICD-10-CM

## 2022-07-21 DIAGNOSIS — M25.551 RIGHT HIP PAIN: ICD-10-CM

## 2022-07-21 PROCEDURE — 1123F ACP DISCUSS/DSCN MKR DOCD: CPT | Performed by: INTERNAL MEDICINE

## 2022-07-21 PROCEDURE — G9899 SCRN MAM PERF RSLTS DOC: HCPCS | Performed by: INTERNAL MEDICINE

## 2022-07-21 PROCEDURE — G8427 DOCREV CUR MEDS BY ELIG CLIN: HCPCS | Performed by: INTERNAL MEDICINE

## 2022-07-21 PROCEDURE — G8432 DEP SCR NOT DOC, RNG: HCPCS | Performed by: INTERNAL MEDICINE

## 2022-07-21 PROCEDURE — G0463 HOSPITAL OUTPT CLINIC VISIT: HCPCS | Performed by: INTERNAL MEDICINE

## 2022-07-21 PROCEDURE — 99214 OFFICE O/P EST MOD 30 MIN: CPT | Performed by: INTERNAL MEDICINE

## 2022-07-21 PROCEDURE — 3017F COLORECTAL CA SCREEN DOC REV: CPT | Performed by: INTERNAL MEDICINE

## 2022-07-21 PROCEDURE — 1090F PRES/ABSN URINE INCON ASSESS: CPT | Performed by: INTERNAL MEDICINE

## 2022-07-21 PROCEDURE — G8399 PT W/DXA RESULTS DOCUMENT: HCPCS | Performed by: INTERNAL MEDICINE

## 2022-07-21 PROCEDURE — G8754 DIAS BP LESS 90: HCPCS | Performed by: INTERNAL MEDICINE

## 2022-07-21 PROCEDURE — 1101F PT FALLS ASSESS-DOCD LE1/YR: CPT | Performed by: INTERNAL MEDICINE

## 2022-07-21 PROCEDURE — G8536 NO DOC ELDER MAL SCRN: HCPCS | Performed by: INTERNAL MEDICINE

## 2022-07-21 PROCEDURE — G8417 CALC BMI ABV UP PARAM F/U: HCPCS | Performed by: INTERNAL MEDICINE

## 2022-07-21 NOTE — PROGRESS NOTES
Lafaye Brunner presents today for   Chief Complaint   Patient presents with    Follow-up    Labs     7-14-22         1. \"Have you been to the ER, urgent care clinic since your last visit? Hospitalized since your last visit? \" no    2. \"Have you seen or consulted any other health care providers outside of the [address] [place] [address] since your last visit? \" yes     3. For patients aged 39-70: Has the patient had a colonoscopy / FIT/ Cologuard? Yes - no Care Gap present      If the patient is female:    4. For patients aged 41-77: Has the patient had a mammogram within the past 2 years? Yes - no Care Gap present  See top three    5. For patients aged 21-65: Has the patient had a pap smear?  Yes - no Care Gap present

## 2022-07-21 NOTE — PROGRESS NOTES
[facility]:  7/21/2022, MRN: [id number]      Anne-Marie Crawford is a 75 y.o. female and presents to clinic for Follow-up and Labs (7-14-22)      Subjective: The pt is a 75 female with h/o HTN, GERD with Green's esophagus (followed by ), DJD, lumbar post laminectomy syndrome s/p spinal cord stimulator surgery (followed by Sonal Bustos), cervical spinal stenosis, prediabetes (resolved), asthma (followed by , Pulmonology and ), HLD, lichens sclerosus, chronic sinusitis, vitamin B12 deficiency, and rheumatoid arthritis (Followed by ), vitamin D deficiency. 1.  Asthma: Status post visit with Dr. Dianne Beaver in between appointments. Albuterol use: rare. She is using her Symbicort and Singulair. She also gets allergy shots with Patrick Arenas, her allergist.  Her recent CBC is unremarkable for significant abnormalities. Today she reports: her sx are well controlled. 2.  Right hip pain: status post right total hip replacement surgery. She continues with physical therapy. Today she reports: she has no pain along her right hip but has some numbness present. 3.  Hypertension: Well-controlled on losartan. Her A1c is normal per her recent labs this month. She has a history of prediabetes. No microalbuminuria per her recent labs. 4. RA: Today she reports: Pain is 0/10. +[place]. Her CRP is 0.5. +Followed by Rheumatology.          Patient Active Problem List    Diagnosis Date Noted    Hip arthritis 05/13/2022    Arthritis of knee 02/03/2020    Adjacent segment disease C3/4 w/deg changes, poss stenosis, CT '18 14/31/5946    Lichen sclerosus et atrophicus 05/17/2018    Green esophagus 11/16/2017    Rheumatoid arthritis involving multiple sites 02/15/2017    Arthritis, lumbar spine 04/22/2016    Moderate persistent asthma without complication 40/59/5047    Lumbar post-laminectomy syndrome 12/10/2015    S/P cervical spinal fusion 08/13/2014    Cervical stenosis of spine 08/11/2014    GERD (gastroesophageal reflux disease) 08/11/2014    Hx MRSA infection 08/11/2014    Impaired glucose tolerance 06/30/2013    Hypertriglyceridemia 12/16/2012    DNS (deviated nasal septum)     HTN (hypertension) 06/28/2010    Iron deficiency anemia 06/28/2010    Vitamin B12 deficiency 06/28/2010    Vitamin D deficiency 06/28/2010       Current Outpatient Medications   Medication Sig Dispense Refill    amoxicillin (AMOXIL) 500 mg capsule 4 tabs by mouth 1 hour prior to appointment 4 Capsule 3    FeroSuL 325 mg (65 mg iron) tablet take 1 tablet by mouth once daily BEFORE BREAKFAST 90 Tablet 1    losartan (COZAAR) 50 mg tablet TAKE 1 TABLET DAILY (Patient taking differently: No sig reported) 90 Tablet 3    cyanocobalamin 1,000 mcg tablet take 1 tablet by mouth once daily 90 Tablet 3    albuterol (PROVENTIL HFA, VENTOLIN HFA, PROAIR HFA) 90 mcg/actuation inhaler Take 1 Puff by inhalation every six (6) hours as needed for Wheezing. abatacept (Orencia, with maltose,) 250 mg injection by IntraVENous route. monthly      clobetasoL (TEMOVATE) 0.05 % ointment Apply  to affected area two (2) days a week. 45 g 3    multivit-min/iron/folic/lutein (CENTRUM SILVER WOMEN PO) Take 1 Tablet by mouth daily. not taking but in the  home      krill/om-3/dha/epa/phospho/ast (MAXIMUM RED KRILL OMEGA-3 PO) Take 1 Caplet by mouth daily. not taking      ascorbic acid, vitamin C, (VITAMIN C) 500 mg tablet Take 1 Tab by mouth daily. 90 Tab 3    ESTRACE 0.01 % (0.1 mg/gram) vaginal cream Place 1 gm in the vagina twice weekly (Patient taking differently: Insert 1 g into vagina every seven (7) days.) 42.5 g 2    cholecalciferol, vitamin D3, (VITAMIN D3 PO) Take 1 Tablet by mouth daily. acetaminophen (TYLENOL) 325 mg tablet Take 325 mg by mouth every four (4) hours as needed for Pain.      triamcinolone (NASACORT AQ) 55 mcg nasal inhaler 2 Sprays by Both Nostrils route daily.  Indications: PERENNIAL ALLERGIC RHINITIS dexlansoprazole (DEXILANT) 60 mg CpDB capsule (delayed release) Take 1 Cap by mouth Daily (before dinner). Cetirizine 10 mg cap Take 10 mg by mouth daily. montelukast (SINGULAIR) 10 mg tablet Take 10 mg by mouth daily. allergy injection by SubCUTAneous route every thirty (30) days. budesonide-formoteroL (SYMBICORT) 160-4.5 mcg/actuation HFAA Take 2 Puffs by inhalation two (2) times a day.          Allergies   Allergen Reactions    Adhesive Tape-Silicones Other (comments)     Patient stated iv tape and tegaderm ok    Other Plant, Animal, Environmental Unable to Obtain     MOLD    Sulfasalazine Other (comments)     Could not taste anything, lightheadedness    Tape [Adhesive] Other (comments)     Blisters, use paper tape only  Patient states tegaderm and paper tape are okay    Mold Other (comments)    Chlorhexidine Towelette Itching and Other (comments)     \"stinging for a couple of hours\"    itching       Past Medical History:   Diagnosis Date    Adjacent segment disease C3/4 w/deg changes, poss stenosis, CT '18 6/22/2018    Allergic rhinitis     Anemia 2008    intermittant since then    Asthma     Back pain     Green's esophagus with esophagitis     Cervical radiculopathy     Chronic sinusitis 5/15/2012    DNS (deviated nasal septum)     Empyema     Foraminal stenosis of cervical region     C4-C5    GERD (gastroesophageal reflux disease)     Dr Haven Mckinley    Hx MRSA infection 8/11/2014    Hypertension     Hypertriglyceridemia     Insulin resistance 4/9/2012    Left knee pain     Lichen planus     Liver disease     ORTEGA-per pt, followed by Dr Nahomy Puentes    Menopause     Age 51    MRSA (methicillin resistant Staphylococcus aureus) infection 2008    left lung    Nausea & vomiting     Restless leg syndrome     Rheumatoid arthritis [address] [address]     Spinal cord stimulator status 2016    Spinal stenosis of cervical region     C4-C5 and C5-C6    TMJ syndrome     Wears glasses     and contacts       Past Surgical History:   Procedure Laterality Date    HX CERVICAL FUSION  08/13/2014    HX ENDOSCOPY      HX HEENT  1997    TMJ surgery    HX HIP REPLACEMENT Right 05/13/2022    HX KNEE REPLACEMENT Right 2020    HX LUMBAR LAMINECTOMY  62/3660    [address]    HX LUMBAR LAMINECTOMY  02/1997    [place]    HX ORTHOPAEDIC Right 01/2015    bunion removal from right foot    HX OTHER SURGICAL  2007    thoracentesis    HX OTHER SURGICAL  2008    chest tube    HX OTHER SURGICAL Bilateral 1997    TMJ surgery    HX OTHER SURGICAL  2012    sinus surg 2012-Dr Pardo. HX OTHER SURGICAL  2016    spinal cord stimulator place for lumbar neuritis, good benefit    HX ROTATOR CUFF REPAIR Right 01/2019    HX TUBAL LIGATION      AL ANESTH,SURGERY OF SHOULDER  01/31/2019    AL COLONOSCOPY FLX DX W/COLLJ SPEC WHEN PFRMD  06/18/2008    normal/duntemann       Family History   Problem Relation Age of Onset    Hypertension Mother     Diabetes Mother     Arthritis-rheumatoid Sister     Other Sister         Lupus    Cancer Sister 35        [place], uterus    Diabetes Sister     Other Brother         myocarditis    Heart Attack Brother     Heart Disease Brother     Heart Surgery Brother     Coronary Art Dis Father     Hypertension Father     Lung Disease Father     Cancer Sister 48        [place], breast    Breast Cancer Sister         Estrogen based    Diabetes Brother     Asthma Brother     Stroke Maternal Grandmother     Cancer Daughter        Social History     Tobacco Use    Smoking status: Never    Smokeless tobacco: Never   Substance Use Topics    Alcohol use: Not Currently     Alcohol/week: 0.0 standard drinks     Comment: 0-6 per year       ROS   Review of Systems   Constitutional:  Negative for chills and fever. HENT:  Negative for ear pain and sore throat. Eyes:  Negative for blurred vision and pain. Respiratory:  Negative for cough and shortness of breath. Cardiovascular:  Negative for chest pain.    Gastrointestinal:  Negative for abdominal pain, blood in stool and melena. Genitourinary:  Negative for dysuria and hematuria. Musculoskeletal:  Positive for joint pain. Negative for myalgias. Skin:  Negative for rash. Neurological:  Negative for headaches. Endo/Heme/Allergies:  Does not bruise/bleed easily. Psychiatric/Behavioral:  Negative for substance abuse. Objective     Vitals:    07/21/22 0941   Resp: 16   Temp: 97.7 °F (36.5 °C)   TempSrc: Temporal   Weight: 185 lb (83.9 kg)   Height: 5' 10\" (1.778 m)   PainSc:   0 - No pain       Physical Exam  Vitals and nursing note reviewed. HENT:      Head: Normocephalic and atraumatic. Right Ear: External ear normal.      Left Ear: External ear normal.   Eyes:      General: No scleral icterus. Right eye: No discharge. Left eye: No discharge. Conjunctiva/sclera: Conjunctivae normal.   Cardiovascular:      Rate and Rhythm: Normal rate and regular rhythm. Heart sounds: Normal heart sounds. No murmur heard. No friction rub. No gallop. Pulmonary:      Effort: Pulmonary effort is normal. No respiratory distress. Breath sounds: Normal breath sounds. No wheezing or rales. Abdominal:      General: Bowel sounds are normal. There is no distension. Palpations: Abdomen is soft. There is no mass. Tenderness: There is no abdominal tenderness. There is no guarding or rebound. Musculoskeletal:         General: No swelling (BUE) or tenderness (BUE). Cervical back: Neck supple. Comments: Her ROM along her hip jts has improved since her last apt. Lymphadenopathy:      Cervical: No cervical adenopathy. Skin:     General: Skin is warm and dry. Findings: No erythema or rash. Neurological:      Mental Status: She is alert. Motor: No abnormal muscle tone.       Gait: Gait normal.   Psychiatric:         Mood and Affect: Mood normal.       LABS   Data Review:   Lab Results   Component Value Date/Time    WBC 7.3 07/14/2022 09:22 AM    HGB 13.4 07/14/2022 09:22 AM    HCT 44.6 07/14/2022 09:22 AM    PLATELET 345 75/50/9459 09:22 AM    MCV 92.5 07/14/2022 09:22 AM       Lab Results   Component Value Date/Time    Sodium 142 07/14/2022 09:22 AM    Potassium 4.3 07/14/2022 09:22 AM    Chloride 109 07/14/2022 09:22 AM    CO2 28 07/14/2022 09:22 AM    Anion gap 5 07/14/2022 09:22 AM    Glucose 97 07/14/2022 09:22 AM    BUN 12 07/14/2022 09:22 AM    Creatinine 0.67 07/14/2022 09:22 AM    BUN/Creatinine ratio 18 07/14/2022 09:22 AM    GFR est AA >60 07/14/2022 09:22 AM    GFR est non-AA >60 07/14/2022 09:22 AM    Calcium 9.2 07/14/2022 09:22 AM       Lab Results   Component Value Date/Time    Cholesterol, total 156 07/14/2022 09:22 AM    HDL Cholesterol 42 07/14/2022 09:22 AM    LDL, calculated 86.2 07/14/2022 09:22 AM    VLDL, calculated 27.8 07/14/2022 09:22 AM    Triglyceride 139 07/14/2022 09:22 AM    CHOL/HDL Ratio 3.7 07/14/2022 09:22 AM       Lab Results   Component Value Date/Time    Hemoglobin A1c 5.3 07/14/2022 09:22 AM       Assessment/Plan:   1. Rheumatoid arthritis: Stable. - Continue medication per her rheumatologist.  - We will check basic labs including CMP, CBC, CRP, and anti-CCP antibody in April 2023.    2.  Right hip pain: Improved. - Activity as tolerated. 3.  Hypertension: Stable. -Return to clinic in April for a BP check. - I will check a urine protein screen, CMP, lipid panel, and A1c (given her history of an elevated blood glucose noted on a previous labs) in April 2023.    4.  Asthma: Well-controlled. - Continue to follow-up with: Allergist for further recommendations. - Continue with Rx as prescribed. - We will check a CBC in April 2023. There are no preventive care reminders to display for this patient. Lab review: labs are reviewed in the EHR    I have discussed the diagnosis with the patient and the intended plan as seen in the above orders.   The patient has received an after-visit summary and questions were answered concerning future plans. I have discussed medication side effects and warnings with the patient as well. I have reviewed the plan of care with the patient, accepted their input and they are in agreement with the treatment goals. All questions were answered. The patient understands the plan of care. Handouts provided today with above information. Pt instructed if symptoms worsen to call the office or report to the ED for continued care. Greater than 50% of the visit time was spent in counseling and/or coordination of care. Voice recognition was used to generate this report, which may have resulted in some phonetic based errors in grammar and contents. Even though attempts were made to correct all the mistakes, some may have been missed, and remained in the body of the document.           Jazz Coronado MD

## 2022-07-21 NOTE — PATIENT INSTRUCTIONS
Body Mass Index: Care Instructions  Your Care Instructions     Body mass index (BMI) can help you see if your weight is raising your risk for health problems. It uses a formula to compare how much you weigh with how tall you are. A BMI lower than 18.5 is considered underweight. A BMI between 18.5 and 24.9 is considered healthy. A BMI between 25 and 29.9 is considered overweight. A BMI of 30 or higher is considered obese. If your BMI is in the normal range, it means that you have a lower risk for weight-related health problems. If your BMI is in the overweight or obese range, you may be at increased risk for weight-related health problems, such as high blood pressure, heart disease, stroke, arthritis or joint pain, and diabetes. If your BMI is in the underweight range, you may be at increased risk for health problems such as fatigue, lower protection (immunity) against illness, muscle loss, bone loss, hair loss, and hormone problems. BMI is just one measure of your risk for weight-related health problems. You may be at higher risk for health problems if you are not active, you eat an unhealthy diet, or you drink too much alcohol or use tobacco products. Follow-up care is a key part of your treatment and safety. Be sure to make and go to all appointments, and call your doctor if you are having problems. It's also a good idea to know your test results and keep a list of the medicines you take. How can you care for yourself at home? Practice healthy eating habits. This includes eating plenty of fruits, vegetables, whole grains, lean protein, and low-fat dairy. If your doctor recommends it, get more exercise. Walking is a good choice. Bit by bit, increase the amount you walk every day. Try for at least 30 minutes on most days of the week. Do not smoke. Smoking can increase your risk for health problems. If you need help quitting, talk to your doctor about stop-smoking programs and medicines.  These can increase your chances of quitting for good. Limit alcohol to 2 drinks a day for men and 1 drink a day for women. Too much alcohol can cause health problems. If you have a BMI higher than 25  Your doctor may do other tests to check your risk for weight-related health problems. This may include measuring the distance around your waist. A waist measurement of more than 40 inches in men or 35 inches in women can increase the risk of weight-related health problems. Talk with your doctor about steps you can take to stay healthy or improve your health. You may need to make lifestyle changes to lose weight and stay healthy, such as changing your diet and getting regular exercise. If you have a BMI lower than 18.5  Your doctor may do other tests to check your risk for health problems. Talk with your doctor about steps you can take to stay healthy or improve your health. You may need to make lifestyle changes to gain or maintain weight and stay healthy, such as getting more healthy foods in your diet and doing exercises to build muscle. Where can you learn more? Go to http://www.garcia.com/  Enter S176 in the search box to learn more about \"Body Mass Index: Care Instructions. \"  Current as of: December 27, 2021               Content Version: 13.2  © 2998-9443 [employer]. Care instructions adapted under license by [employer] (which disclaims liability or warranty for this information). If you have questions about a medical condition or this instruction, always ask your healthcare professional. [place] [address] any warranty or liability for your use of this information. Home Blood Pressure Test: About This Test  What is it? A home blood pressure test allows you to keep track of your blood pressure at home. Blood pressure is a measure of the force of blood against the walls of your arteries.  Blood pressure readings include two numbers, such as 130/80 (say \"130 over 80\"). The first number is the systolic pressure. The second number is the diastolic pressure. Why is this test done? You may do this test at home to:  Find out if you have high blood pressure. Track your blood pressure if you have high blood pressure. Track how well medicine is working to reduce high blood pressure. Check how lifestyle changes, such as weight loss and exercise, are affecting blood pressure. How do you prepare for the test?  For at least 30 minutes before you take your blood pressure, don't exercise, drink caffeine, or smoke. Empty your bladder before the test. Sit quietly with your back straight and both feet on the floor for at least 5 minutes. This helps you take your blood pressure while you feel comfortable and relaxed. How is the test done? If your doctor recommends it, take your blood pressure twice a day. Take it in the morning and evening. Sit with your arm slightly bent and resting on a table so that your upper arm is at the same level as your heart. Use the same arm each time you take your blood pressure. Place the blood pressure cuff on the bare skin of your upper arm. You may have to roll up your sleeve, remove your arm from the sleeve, or take your shirt off. Wrap the blood pressure cuff around your upper arm so that the lower edge of the cuff is about 1 inch above the bend of your elbow. Do not move, talk, or text while you take your blood pressure. Follow the instructions that came with your blood pressure monitor. They might be different from the following. Press the on/off button on the automatic monitor. Then you may need to wait until the screen says the monitor is ready. Press the start button. The cuff will inflate and deflate by itself. Your blood pressure numbers will appear on the screen. Wait one minute and take your blood pressure again.   If your monitor does not automatically save your numbers, write them in your log book, along with the date and time. Follow-up care is a key part of your treatment and safety. Be sure to make and go to all appointments, and call your doctor if you are having problems. It's also a good idea to keep a list of the medicines you take. Where can you learn more? Go to http://www.garcia.com/  Enter C427 in the search box to learn more about \"Home Blood Pressure Test: About This Test.\"  Current as of: January 10, 2022               Content Version: 13.2  © 2006-2022 [employer]. Care instructions adapted under license by [employer] (which disclaims liability or warranty for this information). If you have questions about a medical condition or this instruction, always ask your healthcare professional. [address] any warranty or liability for your use of this information.

## 2022-07-22 ENCOUNTER — DOCUMENTATION ONLY (OUTPATIENT)
Dept: ORTHOPEDIC SURGERY | Age: 67
End: 2022-07-22

## 2022-07-22 NOTE — PROGRESS NOTES
Form received from [facility] for Dental Clearance via Fax at our [address] [place] [address] location. Blank form scanned into patient's chart. Form placed in forms bin at [address] [place] [address] location for completion.

## 2022-07-29 ENCOUNTER — TELEPHONE (OUTPATIENT)
Dept: ORTHOPEDIC SURGERY | Age: 67
End: 2022-07-29

## 2022-07-29 NOTE — TELEPHONE ENCOUNTER
Form for [facility] brought to [facility] requesting desired antibiotic regimen for lifetime dental procedures. Please advise regimen. Patient has Sulfasalazine allergy.

## 2022-08-26 ENCOUNTER — OFFICE VISIT (OUTPATIENT)
Dept: ORTHOPEDIC SURGERY | Age: 67
End: 2022-08-26
Payer: MEDICARE

## 2022-08-26 VITALS
OXYGEN SATURATION: 99 % | HEART RATE: 87 BPM | TEMPERATURE: 97.7 F | HEIGHT: 70 IN | RESPIRATION RATE: 16 BRPM | WEIGHT: 188 LBS | BODY MASS INDEX: 26.92 KG/M2

## 2022-08-26 DIAGNOSIS — Z96.641 STATUS POST RIGHT HIP REPLACEMENT: Primary | ICD-10-CM

## 2022-08-26 DIAGNOSIS — M17.12 ARTHRITIS OF LEFT KNEE: ICD-10-CM

## 2022-08-26 PROCEDURE — 1101F PT FALLS ASSESS-DOCD LE1/YR: CPT | Performed by: PHYSICIAN ASSISTANT

## 2022-08-26 PROCEDURE — 99213 OFFICE O/P EST LOW 20 MIN: CPT | Performed by: PHYSICIAN ASSISTANT

## 2022-08-26 PROCEDURE — G8417 CALC BMI ABV UP PARAM F/U: HCPCS | Performed by: PHYSICIAN ASSISTANT

## 2022-08-26 PROCEDURE — G8432 DEP SCR NOT DOC, RNG: HCPCS | Performed by: PHYSICIAN ASSISTANT

## 2022-08-26 PROCEDURE — 1090F PRES/ABSN URINE INCON ASSESS: CPT | Performed by: PHYSICIAN ASSISTANT

## 2022-08-26 PROCEDURE — G9899 SCRN MAM PERF RSLTS DOC: HCPCS | Performed by: PHYSICIAN ASSISTANT

## 2022-08-26 PROCEDURE — 3017F COLORECTAL CA SCREEN DOC REV: CPT | Performed by: PHYSICIAN ASSISTANT

## 2022-08-26 PROCEDURE — 1123F ACP DISCUSS/DSCN MKR DOCD: CPT | Performed by: PHYSICIAN ASSISTANT

## 2022-08-26 PROCEDURE — G8399 PT W/DXA RESULTS DOCUMENT: HCPCS | Performed by: PHYSICIAN ASSISTANT

## 2022-08-26 PROCEDURE — G8756 NO BP MEASURE DOC: HCPCS | Performed by: PHYSICIAN ASSISTANT

## 2022-08-26 PROCEDURE — G8427 DOCREV CUR MEDS BY ELIG CLIN: HCPCS | Performed by: PHYSICIAN ASSISTANT

## 2022-08-26 PROCEDURE — G8536 NO DOC ELDER MAL SCRN: HCPCS | Performed by: PHYSICIAN ASSISTANT

## 2022-08-26 NOTE — PROGRESS NOTES
[address] [place] [address]  [phone]           Patient: Miriam Mayberry                MRN: [id number]       SSN: [id number]  YOB: 1955        AGE: 75 y.o. SEX: female  Body mass index is 26.98 kg/m². PCP: Garry Charles MD  08/26/22      This office note has been dictated. REVIEW OF SYSTEMS:  Constitutional: Negative for fever, chills, weight loss and malaise/fatigue. HENT: Negative. Eyes: Negative. Respiratory: Negative. Cardiovascular: Negative. Gastrointestinal: No bowel incontinence or constipation. Genitourinary: No bladder incontinence or saddle anesthesia. Skin: Negative. Neurological: Negative. Endo/Heme/Allergies: Negative. Psychiatric/Behavioral: Negative. Musculoskeletal: As per HPI above.      Past Medical History:   Diagnosis Date    Adjacent segment disease C3/4 w/deg changes, poss stenosis, CT '18 6/22/2018    Allergic rhinitis     Anemia 2008    intermittant since then    Asthma     Back pain     Green's esophagus with esophagitis     Cervical radiculopathy     Chronic sinusitis 5/15/2012    DNS (deviated nasal septum)     Empyema     Foraminal stenosis of cervical region     C4-C5    GERD (gastroesophageal reflux disease)     Dr Kendra Oakes    Hx MRSA infection 8/11/2014    Hypertension     Hypertriglyceridemia     Insulin resistance 4/9/2012    Left knee pain     Lichen planus     Liver disease     ORTEGA-per pt, followed by Dr Donna Coleman    Menopause     Age 51    MRSA (methicillin resistant Staphylococcus aureus) infection 2008    left lung    Nausea & vomiting     Restless leg syndrome     Rheumatoid arthritis ([address]     Spinal cord stimulator status 2016    Spinal stenosis of cervical region     C4-C5 and C5-C6    TMJ syndrome     Wears glasses     and contacts         Current Outpatient Medications:     FeroSuL 325 mg (65 mg iron) tablet, take 1 tablet by mouth once daily BEFORE BREAKFAST, Disp: 90 Tablet, Rfl: 1    losartan (COZAAR) 50 mg tablet, TAKE 1 TABLET DAILY (Patient taking differently: No sig reported), Disp: 90 Tablet, Rfl: 3    cyanocobalamin 1,000 mcg tablet, take 1 tablet by mouth once daily, Disp: 90 Tablet, Rfl: 3    albuterol (PROVENTIL HFA, VENTOLIN HFA, PROAIR HFA) 90 mcg/actuation inhaler, Take 1 Puff by inhalation every six (6) hours as needed for Wheezing., Disp: , Rfl:     abatacept (Orencia, with maltose,) 250 mg injection, by IntraVENous route. monthly, Disp: , Rfl:     clobetasoL (TEMOVATE) 0.05 % ointment, Apply  to affected area two (2) days a week., Disp: 45 g, Rfl: 3    multivit-min/iron/folic/lutein (CENTRUM SILVER WOMEN PO), Take 1 Tablet by mouth daily. not taking but in the  home, Disp: , Rfl:     krill/om-3/dha/epa/phospho/ast (MAXIMUM RED KRILL OMEGA-3 PO), Take 1 Caplet by mouth daily. not taking, Disp: , Rfl:     ascorbic acid, vitamin C, (VITAMIN C) 500 mg tablet, Take 1 Tab by mouth daily. , Disp: 90 Tab, Rfl: 3    cholecalciferol, vitamin D3, (VITAMIN D3 PO), Take 1 Tablet by mouth daily. , Disp: , Rfl:     acetaminophen (TYLENOL) 325 mg tablet, Take 325 mg by mouth every four (4) hours as needed for Pain., Disp: , Rfl:     triamcinolone (NASACORT AQ) 55 mcg nasal inhaler, 2 Sprays by Both Nostrils route daily. Indications: PERENNIAL ALLERGIC RHINITIS, Disp: , Rfl:     dexlansoprazole (DEXILANT) 60 mg CpDB capsule (delayed release), Take 1 Cap by mouth Daily (before dinner). , Disp: , Rfl:     Cetirizine 10 mg cap, Take 10 mg by mouth daily. , Disp: , Rfl:     montelukast (SINGULAIR) 10 mg tablet, Take 10 mg by mouth daily. , Disp: , Rfl:     allergy injection, by SubCUTAneous route every thirty (30) days. , Disp: , Rfl:     budesonide-formoteroL (SYMBICORT) 160-4.5 mcg/actuation HFAA, Take 2 Puffs by inhalation two (2) times a day., Disp: , Rfl:     Allergies   Allergen Reactions    Adhesive Tape-Silicones Other (comments)     Patient stated iv tape and tegaderm ok    Other Plant, Animal, Environmental Unable to Obtain     MOLD    Sulfasalazine Other (comments)     Could not taste anything, lightheadedness    Tape [Adhesive] Other (comments)     Blisters, use paper tape only  Patient states tegaderm and paper tape are okay    Mold Other (comments)    Chlorhexidine Towelette Itching and Other (comments)     \"stinging for a couple of hours\"    itching       Social History     Socioeconomic History    Marital status:      Spouse name: Not on file    Number of children: Not on file    Years of education: Not on file    Highest education level: Not on file   Occupational History    Not on file   Tobacco Use    Smoking status: Never    Smokeless tobacco: Never   Vaping Use    Vaping Use: Never used   Substance and Sexual Activity    Alcohol use: Not Currently     Alcohol/week: 0.0 standard drinks     Comment: 0-6 per year    Drug use: Yes     Types: Prescription, OTC    Sexual activity: Yes     Partners: Male     Comment: 20+ years of menopause   Other Topics Concern    Not on file   Social History Narrative    Retired      Social Determinants of Health     Financial Resource Strain: Not on file   Food Insecurity: Not on file   Transportation Needs: Not on file   Physical Activity: Not on file   Stress: Not on file   Social Connections: Not on file   Intimate Partner Violence: Not on file   Housing Stability: Not on file       Past Surgical History:   Procedure Laterality Date    HX CERVICAL FUSION  08/13/2014    HX ENDOSCOPY      HX HEENT  1997    TMJ surgery    HX HIP REPLACEMENT Right 05/13/2022    HX KNEE REPLACEMENT Right 2020    HX LUMBAR LAMINECTOMY  11/1995    [address]    HX LUMBAR LAMINECTOMY  02/1997    [facility]    HX ORTHOPAEDIC Right 01/2015    bunion removal from right foot    HX OTHER SURGICAL  2007    thoracentesis    HX OTHER SURGICAL  2008    chest tube    HX OTHER SURGICAL Bilateral 1997    TMJ surgery    HX OTHER SURGICAL  2012 sinus surg 2012-Dr Pardo. HX OTHER SURGICAL  2016    spinal cord stimulator place for lumbar neuritis, good benefit    HX ROTATOR CUFF REPAIR Right 01/2019    HX TUBAL LIGATION      ID ANESTH,SURGERY OF SHOULDER  01/31/2019    ID COLONOSCOPY FLX DX W/COLLJ SPEC WHEN PFRMD  06/18/2008    normal/[place]         Patient seen evaluated today for her hips and knees. She is approximate 3-month status post right total hip replacement surgery. She has also had right knee replacement done about 2 years ago. She is done very well with this. She is scheduled for a left total knee replacement upcoming in February. Has had no injuries. No fevers or chills. She denies any start of pain. No feelings of instability. Patient denies recent fevers, chills, chest pain, SOB, or injuries. No recent systemic changes noted. A 12-point review of systems is performed today. Pertinent positives are noted. All other systems reviewed and otherwise are negative. Physical exam: General: Alert and oriented x3, nad.  well-developed, well nourished. normal affect, AF. NC/AT, EOMI, neck supple, trachea midline, no JVD present. Breathing is non-labored. Examination of lower extremities with pain-free range of motion the hips. There is no pain to palpation trochanter bursa. Neck straight leg raise. Negative calf tenderness. Negative Homans. No signs of DVT present. The right knee reveal skin intact. The surgical wound is nicely. There are no signs for infection or cellulitis present. She has full range of motion. Excellent stability. Patella tracks nicely without rubs or crepitus noted. Left knee reveals skin intact. There is no erythema, ecchymosis or warmth. There are no signs for infection or cellulitis present. She does have findings consistent with advanced arthritis of the left knee with pain to palpation tricompartmentally and crepitus arising from the anterior compartment.     Assessment: #1 status post right total hip replacement, #2 status post right knee replacement, #3 left knee end-stage arthritis    Plan: At this point, the patient will continue activity as tolerated. She will continue with her home exercise program.  We will plan on seeing her back in the office for preoperative history and physical prior to her left knee replacement.               JR Red ESPINOZA, PA-C, ATC

## 2022-09-21 ENCOUNTER — HOSPITAL ENCOUNTER (OUTPATIENT)
Dept: LAB | Age: 67
Discharge: HOME | End: 2022-09-21
Payer: MEDICARE

## 2022-09-21 ENCOUNTER — OFFICE VISIT (OUTPATIENT)
Dept: INTERNAL MEDICINE CLINIC | Age: 67
End: 2022-09-21
Payer: MEDICARE

## 2022-09-21 ENCOUNTER — TELEPHONE (OUTPATIENT)
Dept: INTERNAL MEDICINE CLINIC | Age: 67
End: 2022-09-21

## 2022-09-21 VITALS
RESPIRATION RATE: 16 BRPM | BODY MASS INDEX: 26.34 KG/M2 | TEMPERATURE: 97.8 F | OXYGEN SATURATION: 95 % | HEIGHT: 70 IN | SYSTOLIC BLOOD PRESSURE: 120 MMHG | HEART RATE: 84 BPM | DIASTOLIC BLOOD PRESSURE: 72 MMHG | WEIGHT: 184 LBS

## 2022-09-21 DIAGNOSIS — N39.0 URINARY TRACT INFECTION WITHOUT HEMATURIA, SITE UNSPECIFIED: ICD-10-CM

## 2022-09-21 DIAGNOSIS — N39.0 URINARY TRACT INFECTION WITHOUT HEMATURIA, SITE UNSPECIFIED: Primary | ICD-10-CM

## 2022-09-21 LAB
BILIRUB UR QL STRIP: NEGATIVE
GLUCOSE UR-MCNC: NEGATIVE MG/DL
KETONES P FAST UR STRIP-MCNC: NEGATIVE MG/DL
PH UR STRIP: 6.5 [PH] (ref 4.6–8)
PROT UR QL STRIP: NORMAL
SP GR UR STRIP: 1.01 (ref 1–1.03)
UA UROBILINOGEN AMB POC: NORMAL (ref 0.2–1)
URINALYSIS CLARITY POC: CLEAR
URINALYSIS COLOR POC: YELLOW
URINE BLOOD POC: NORMAL
URINE LEUKOCYTES POC: NORMAL
URINE NITRITES POC: NEGATIVE

## 2022-09-21 PROCEDURE — G8417 CALC BMI ABV UP PARAM F/U: HCPCS | Performed by: INTERNAL MEDICINE

## 2022-09-21 PROCEDURE — G8536 NO DOC ELDER MAL SCRN: HCPCS | Performed by: INTERNAL MEDICINE

## 2022-09-21 PROCEDURE — 1090F PRES/ABSN URINE INCON ASSESS: CPT | Performed by: INTERNAL MEDICINE

## 2022-09-21 PROCEDURE — G8432 DEP SCR NOT DOC, RNG: HCPCS | Performed by: INTERNAL MEDICINE

## 2022-09-21 PROCEDURE — G8427 DOCREV CUR MEDS BY ELIG CLIN: HCPCS | Performed by: INTERNAL MEDICINE

## 2022-09-21 PROCEDURE — 1101F PT FALLS ASSESS-DOCD LE1/YR: CPT | Performed by: INTERNAL MEDICINE

## 2022-09-21 PROCEDURE — G8752 SYS BP LESS 140: HCPCS | Performed by: INTERNAL MEDICINE

## 2022-09-21 PROCEDURE — 81003 URINALYSIS AUTO W/O SCOPE: CPT | Performed by: INTERNAL MEDICINE

## 2022-09-21 PROCEDURE — G9899 SCRN MAM PERF RSLTS DOC: HCPCS | Performed by: INTERNAL MEDICINE

## 2022-09-21 PROCEDURE — 87086 URINE CULTURE/COLONY COUNT: CPT

## 2022-09-21 PROCEDURE — 99213 OFFICE O/P EST LOW 20 MIN: CPT | Performed by: INTERNAL MEDICINE

## 2022-09-21 PROCEDURE — 1123F ACP DISCUSS/DSCN MKR DOCD: CPT | Performed by: INTERNAL MEDICINE

## 2022-09-21 PROCEDURE — G8399 PT W/DXA RESULTS DOCUMENT: HCPCS | Performed by: INTERNAL MEDICINE

## 2022-09-21 PROCEDURE — G8754 DIAS BP LESS 90: HCPCS | Performed by: INTERNAL MEDICINE

## 2022-09-21 PROCEDURE — 3017F COLORECTAL CA SCREEN DOC REV: CPT | Performed by: INTERNAL MEDICINE

## 2022-09-21 PROCEDURE — G0463 HOSPITAL OUTPT CLINIC VISIT: HCPCS | Performed by: INTERNAL MEDICINE

## 2022-09-21 RX ORDER — AMOXICILLIN AND CLAVULANATE POTASSIUM 875; 125 MG/1; MG/1
1 TABLET, FILM COATED ORAL EVERY 12 HOURS
Qty: 14 TABLET | Refills: 0 | Status: SHIPPED | OUTPATIENT
Start: 2022-09-21 | End: 2022-09-28

## 2022-09-21 NOTE — PROGRESS NOTES
INTERNISTS [facility]:  9/21/2022, MRN: [id number]      Amado Maddox is a 75 y.o. female and presents to clinic for Urinary Frequency (Urgency with burning when urinating. Patient states symptoms started  09/17)      Subjective: The pt is a 75 female with h/o HTN, GERD with Green's esophagus (followed by ), DJD, lumbar post laminectomy syndrome s/p spinal cord stimulator surgery (followed by Alejandro Purvis), cervical spinal stenosis, prediabetes (resolved), asthma (followed by , Pulmonology and ), HLD, lichens sclerosus, chronic sinusitis, vitamin B12 deficiency, and rheumatoid arthritis (Followed by ), vitamin D deficiency. UTI: She is reporting a 4-day history of urgency and dysuria. Abdominal pain: none at the time of her visit. Flank pain: none. Hematuria: none. No alleviating factors are known. Fever: none. She is subsequently treated for rheumatoid arthritis with Orencia. No relief with antihistamine use. She had her last Orencia infusion within the past week.         Patient Active Problem List    Diagnosis Date Noted    Hip arthritis 05/13/2022    Arthritis of knee 02/03/2020    Adjacent segment disease C3/4 w/deg changes, poss stenosis, CT '18 90/12/7186    Lichen sclerosus et atrophicus 05/17/2018    Green esophagus 11/16/2017    Rheumatoid arthritis involving multiple sites 02/15/2017    Arthritis, lumbar spine 04/22/2016    Moderate persistent asthma without complication 57/29/3662    Lumbar post-laminectomy syndrome 12/10/2015    S/P cervical spinal fusion 08/13/2014    Cervical stenosis of spine 08/11/2014    GERD (gastroesophageal reflux disease) 08/11/2014    Hx MRSA infection 08/11/2014    Impaired glucose tolerance 06/30/2013    Hypertriglyceridemia 12/16/2012    DNS (deviated nasal septum)     HTN (hypertension) 06/28/2010    Iron deficiency anemia 06/28/2010    Vitamin B12 deficiency 06/28/2010    Vitamin D deficiency 06/28/2010       Current Outpatient Medications   Medication Sig Dispense Refill    FeroSuL 325 mg (65 mg iron) tablet take 1 tablet by mouth once daily BEFORE BREAKFAST 90 Tablet 1    losartan (COZAAR) 50 mg tablet TAKE 1 TABLET DAILY (Patient taking differently: No sig reported) 90 Tablet 3    cyanocobalamin 1,000 mcg tablet take 1 tablet by mouth once daily 90 Tablet 3    albuterol (PROVENTIL HFA, VENTOLIN HFA, PROAIR HFA) 90 mcg/actuation inhaler Take 1 Puff by inhalation every six (6) hours as needed for Wheezing. abatacept (Orencia, with maltose,) 250 mg injection by IntraVENous route. monthly      clobetasoL (TEMOVATE) 0.05 % ointment Apply  to affected area two (2) days a week. 45 g 3    multivit-min/iron/folic/lutein (CENTRUM SILVER WOMEN PO) Take 1 Tablet by mouth daily. not taking but in the  home      krill/om-3/dha/epa/phospho/ast (MAXIMUM RED KRILL OMEGA-3 PO) Take 1 Caplet by mouth daily. not taking      ascorbic acid, vitamin C, (VITAMIN C) 500 mg tablet Take 1 Tab by mouth daily. 90 Tab 3    cholecalciferol, vitamin D3, (VITAMIN D3 PO) Take 1 Tablet by mouth daily. acetaminophen (TYLENOL) 325 mg tablet Take 325 mg by mouth every four (4) hours as needed for Pain.      triamcinolone (NASACORT AQ) 55 mcg nasal inhaler 2 Sprays by Both Nostrils route daily. Indications: PERENNIAL ALLERGIC RHINITIS      dexlansoprazole (DEXILANT) 60 mg CpDB capsule (delayed release) Take 1 Cap by mouth Daily (before dinner). Cetirizine 10 mg cap Take 10 mg by mouth daily. montelukast (SINGULAIR) 10 mg tablet Take 10 mg by mouth daily. allergy injection by SubCUTAneous route every thirty (30) days. budesonide-formoteroL (SYMBICORT) 160-4.5 mcg/actuation HFAA Take 2 Puffs by inhalation two (2) times a day.          Allergies   Allergen Reactions    Adhesive Tape-Silicones Other (comments)     Patient stated iv tape and tegaderm ok    Other Plant, Animal, Environmental Unable to Obtain     MOLD    Sulfasalazine Other (comments)     Could not taste anything, lightheadedness    Tape [Adhesive] Other (comments)     Blisters, use paper tape only  Patient states tegaderm and paper tape are okay    Mold Other (comments)    Chlorhexidine Towelette Itching and Other (comments)     \"stinging for a couple of hours\"    itching       Past Medical History:   Diagnosis Date    Adjacent segment disease C3/4 w/deg changes, poss stenosis, CT '18 6/22/2018    Allergic rhinitis     Anemia 2008    intermittant since then    Asthma     Back pain     Green's esophagus with esophagitis     Cervical radiculopathy     Chronic sinusitis 5/15/2012    DNS (deviated nasal septum)     Empyema     Foraminal stenosis of cervical region     C4-C5    GERD (gastroesophageal reflux disease)     Dr Earl Mins    Hx MRSA infection 8/11/2014    Hypertension     Hypertriglyceridemia     Insulin resistance 4/9/2012    Left knee pain     Lichen planus     Liver disease     ORTEGA-per pt, followed by Dr Lizzy Riggins    Menopause     Age 51    MRSA (methicillin resistant Staphylococcus aureus) infection 2008    left lung    Nausea & vomiting     Restless leg syndrome     Rheumatoid arthritis ([address]     Spinal cord stimulator status 2016    Spinal stenosis of cervical region     C4-C5 and C5-C6    TMJ syndrome     Wears glasses     and contacts       Past Surgical History:   Procedure Laterality Date    HX CERVICAL FUSION  08/13/2014    HX ENDOSCOPY      HX HEENT  1997    TMJ surgery    HX HIP REPLACEMENT Right 05/13/2022    HX KNEE REPLACEMENT Right 2020    HX LUMBAR LAMINECTOMY  11/1995    [facility]    HX LUMBAR LAMINECTOMY  02/1997    [facility]    HX ORTHOPAEDIC Right 01/2015    bunion removal from right foot    HX OTHER SURGICAL  2007    thoracentesis    HX OTHER SURGICAL  2008    chest tube    HX OTHER SURGICAL Bilateral 1997    TMJ surgery    HX OTHER SURGICAL  2012    sinus surg 2012-Dr Pardo.     HX OTHER SURGICAL  2016    spinal cord stimulator place for lumbar neuritis, good benefit HX ROTATOR CUFF REPAIR Right 01/2019    HX TUBAL LIGATION      [place] ANESTH,SURGERY OF SHOULDER  01/31/2019    [place] COLONOSCOPY FLX DX W/COLLJ SPEC WHEN PFRMD  06/18/2008    normal/[facility]       Family History   Problem Relation Age of Onset    Hypertension Mother     Diabetes Mother     Arthritis-rheumatoid Sister     Other Sister         Lupus    Cancer Sister 35        [place], uterus    Diabetes Sister     Other Brother         myocarditis    Heart Attack Brother     Heart Disease Brother     Heart Surgery Brother     Coronary Art Dis Father     Hypertension Father     Lung Disease Father     Cancer Sister 48        [place], breast    Breast Cancer Sister         Estrogen based    Diabetes Brother     Asthma Brother     Stroke Maternal Grandmother     Cancer Daughter        Social History     Tobacco Use    Smoking status: Never    Smokeless tobacco: Never   Substance Use Topics    Alcohol use: Not Currently     Alcohol/week: 0.0 standard drinks     Comment: 0-6 per year       ROS   Review of Systems   Constitutional:  Negative for chills and fever. HENT:  Negative for ear pain and sore throat. Eyes:  Negative for blurred vision and pain. Respiratory:  Negative for cough and shortness of breath. Cardiovascular:  Negative for chest pain. Gastrointestinal:  Negative for abdominal pain, blood in stool and melena. Genitourinary:  Positive for dysuria, frequency and urgency. Negative for flank pain and hematuria. Musculoskeletal:  Negative for myalgias. Skin:  Negative for rash. Neurological:  Negative for headaches. Endo/Heme/Allergies:  Does not bruise/bleed easily. Psychiatric/Behavioral:  Negative for substance abuse. Objective     Vitals:    09/21/22 1028   BP: 120/72   Pulse: 84   Resp: 16   Temp: 97.8 °F (36.6 °C)   TempSrc: Temporal   SpO2: 95%   Weight: 184 lb (83.5 kg)   Height: 5' 10\" (1.778 m)       Physical Exam  Vitals and nursing note reviewed.    HENT:      Head: Normocephalic and atraumatic. Right Ear: External ear normal.      Left Ear: External ear normal.   Eyes:      General: No scleral icterus. Right eye: No discharge. Left eye: No discharge. Conjunctiva/sclera: Conjunctivae normal.   Cardiovascular:      Rate and Rhythm: Normal rate and regular rhythm. Heart sounds: Normal heart sounds. No murmur heard. No friction rub. No gallop. Pulmonary:      Effort: Pulmonary effort is normal. No respiratory distress. Breath sounds: Normal breath sounds. No wheezing or rales. Abdominal:      General: Bowel sounds are normal. There is no distension. Palpations: Abdomen is soft. There is no mass. Tenderness: There is no abdominal tenderness. There is no right CVA tenderness, left CVA tenderness, guarding or rebound. Musculoskeletal:         General: No swelling (BUE) or tenderness (BUE). Cervical back: Neck supple. Lymphadenopathy:      Cervical: No cervical adenopathy. Skin:     General: Skin is warm and dry. Findings: No erythema or rash. Neurological:      Mental Status: She is alert. Motor: No abnormal muscle tone.       Gait: Gait normal.   Psychiatric:         Mood and Affect: Mood normal.       LABS   Data Review:   Lab Results   Component Value Date/Time    WBC 7.3 07/14/2022 09:22 AM    HGB 13.4 07/14/2022 09:22 AM    HCT 44.6 07/14/2022 09:22 AM    PLATELET 738 07/59/9630 09:22 AM    MCV 92.5 07/14/2022 09:22 AM       Lab Results   Component Value Date/Time    Sodium 142 07/14/2022 09:22 AM    Potassium 4.3 07/14/2022 09:22 AM    Chloride 109 07/14/2022 09:22 AM    CO2 28 07/14/2022 09:22 AM    Anion gap 5 07/14/2022 09:22 AM    Glucose 97 07/14/2022 09:22 AM    BUN 12 07/14/2022 09:22 AM    Creatinine 0.67 07/14/2022 09:22 AM    BUN/Creatinine ratio 18 07/14/2022 09:22 AM    GFR est AA >60 07/14/2022 09:22 AM    GFR est non-AA >60 07/14/2022 09:22 AM    Calcium 9.2 07/14/2022 09:22 AM       Lab Results   Component Value Date/Time    Cholesterol, total 156 07/14/2022 09:22 AM    HDL Cholesterol 42 07/14/2022 09:22 AM    LDL, calculated 86.2 07/14/2022 09:22 AM    VLDL, calculated 27.8 07/14/2022 09:22 AM    Triglyceride 139 07/14/2022 09:22 AM    CHOL/HDL Ratio 3.7 07/14/2022 09:22 AM       Lab Results   Component Value Date/Time    Hemoglobin A1c 5.3 07/14/2022 09:22 AM       Assessment/Plan:   Urinary tract infection without hematuria: Her urinalysis result shows positive blood, protein, and leukocyte esterase. It is negative for nitrite. Status post recent Orencia infusion.  -Ordering a course of Augmentin. I instructed her to notify me if her symptoms recur after she completes 7 days of Augmentin. If that happens, she will need a longer course. - Ordering a urine culture. ORDERS:  - amoxicillin-clavulanate (Augmentin) 875-125 mg per tablet; Take 1 Tablet by mouth every twelve (12) hours for 7 days. Dispense: 14 Tablet; Refill: 0  - CULTURE, URINE; Future  - AMB POC URINALYSIS DIP STICK AUTO W/O MICRO        Health Maintenance Due   Topic Date Due    Breast Cancer Screen Mammogram  09/23/2022     Lab review: labs are reviewed and ordered as mentioned above    I have discussed the diagnosis with the patient and the intended plan as seen in the above orders. The patient has received an after-visit summary and questions were answered concerning future plans. I have discussed medication side effects and warnings with the patient as well. I have reviewed the plan of care with the patient, accepted their input and they are in agreement with the treatment goals. All questions were answered. The patient understands the plan of care. Handouts provided today with above information. Pt instructed if symptoms worsen to call the office or report to the ED for continued care. Greater than 50% of the visit time was spent in counseling and/or coordination of care.       Voice recognition was used to generate this report, which may have resulted in some phonetic based errors in grammar and contents. Even though attempts were made to correct all the mistakes, some may have been missed, and remained in the body of the document.           Cailin Ricks MD

## 2022-09-21 NOTE — PROGRESS NOTES
Megha Mccain presents today for   Chief Complaint   Patient presents with    Urinary Frequency     Urgency with burning when urinating. Patient states symptoms started  09/17       1. \"Have you been to the ER, urgent care clinic since your last visit? Hospitalized since your last visit? \" no    2. \"Have you seen or consulted any other health care providers outside of the [address] [place] [address] since your last visit? \" no     3. For patients aged 39-70: Has the patient had a colonoscopy / FIT/ Cologuard? Yes - no Care Gap present      If the patient is female:    4. For patients aged 41-77: Has the patient had a mammogram within the past 2 years? Yes - no Care Gap present  See top three    5. For patients aged 21-65: Has the patient had a pap smear?  NA - based on age or sex

## 2022-09-22 LAB
BACTERIA SPEC CULT: NORMAL
SERVICE CMNT-IMP: NORMAL

## 2022-09-26 DIAGNOSIS — Z12.31 ENCOUNTER FOR SCREENING MAMMOGRAM FOR BREAST CANCER: ICD-10-CM

## 2022-10-17 ENCOUNTER — HOSPITAL ENCOUNTER (OUTPATIENT)
Dept: MAMMOGRAPHY | Age: 67
Discharge: HOME | End: 2022-10-17
Attending: INTERNAL MEDICINE
Payer: MEDICARE

## 2022-10-17 PROCEDURE — 77063 BREAST TOMOSYNTHESIS BI: CPT

## 2022-11-29 RX ORDER — FERROUS SULFATE TAB 325 MG (65 MG ELEMENTAL FE) 325 (65 FE) MG
TAB ORAL
Qty: 90 TABLET | Refills: 1 | Status: SHIPPED | OUTPATIENT
Start: 2022-11-29

## 2022-12-20 ENCOUNTER — OFFICE VISIT (OUTPATIENT)
Dept: PULMONOLOGY | Age: 67
End: 2022-12-20
Payer: MEDICARE

## 2022-12-20 VITALS
RESPIRATION RATE: 22 BRPM | BODY MASS INDEX: 26.07 KG/M2 | HEIGHT: 70 IN | OXYGEN SATURATION: 96 % | DIASTOLIC BLOOD PRESSURE: 90 MMHG | TEMPERATURE: 97.4 F | SYSTOLIC BLOOD PRESSURE: 148 MMHG | HEART RATE: 74 BPM | WEIGHT: 182.1 LBS

## 2022-12-20 DIAGNOSIS — K21.9 GASTROESOPHAGEAL REFLUX DISEASE WITHOUT ESOPHAGITIS: Chronic | ICD-10-CM

## 2022-12-20 DIAGNOSIS — M05.79 RHEUMATOID ARTHRITIS INVOLVING MULTIPLE SITES WITH POSITIVE RHEUMATOID FACTOR (HCC): ICD-10-CM

## 2022-12-20 DIAGNOSIS — J45.40 MODERATE PERSISTENT ASTHMA WITHOUT COMPLICATION: Primary | ICD-10-CM

## 2022-12-20 PROCEDURE — 99214 OFFICE O/P EST MOD 30 MIN: CPT | Performed by: INTERNAL MEDICINE

## 2022-12-20 PROCEDURE — G8427 DOCREV CUR MEDS BY ELIG CLIN: HCPCS | Performed by: INTERNAL MEDICINE

## 2022-12-20 PROCEDURE — G8399 PT W/DXA RESULTS DOCUMENT: HCPCS | Performed by: INTERNAL MEDICINE

## 2022-12-20 PROCEDURE — G8536 NO DOC ELDER MAL SCRN: HCPCS | Performed by: INTERNAL MEDICINE

## 2022-12-20 PROCEDURE — 3078F DIAST BP <80 MM HG: CPT | Performed by: INTERNAL MEDICINE

## 2022-12-20 PROCEDURE — G8753 SYS BP > OR = 140: HCPCS | Performed by: INTERNAL MEDICINE

## 2022-12-20 PROCEDURE — G9899 SCRN MAM PERF RSLTS DOC: HCPCS | Performed by: INTERNAL MEDICINE

## 2022-12-20 PROCEDURE — 3017F COLORECTAL CA SCREEN DOC REV: CPT | Performed by: INTERNAL MEDICINE

## 2022-12-20 PROCEDURE — G8432 DEP SCR NOT DOC, RNG: HCPCS | Performed by: INTERNAL MEDICINE

## 2022-12-20 PROCEDURE — 1123F ACP DISCUSS/DSCN MKR DOCD: CPT | Performed by: INTERNAL MEDICINE

## 2022-12-20 PROCEDURE — 1090F PRES/ABSN URINE INCON ASSESS: CPT | Performed by: INTERNAL MEDICINE

## 2022-12-20 PROCEDURE — 3074F SYST BP LT 130 MM HG: CPT | Performed by: INTERNAL MEDICINE

## 2022-12-20 PROCEDURE — G8417 CALC BMI ABV UP PARAM F/U: HCPCS | Performed by: INTERNAL MEDICINE

## 2022-12-20 PROCEDURE — 1101F PT FALLS ASSESS-DOCD LE1/YR: CPT | Performed by: INTERNAL MEDICINE

## 2022-12-20 PROCEDURE — G8754 DIAS BP LESS 90: HCPCS | Performed by: INTERNAL MEDICINE

## 2022-12-20 NOTE — LETTER
12/20/2022    Patient: Yandel Rodriguez   YOB: 1955   Date of Visit: 12/20/2022     Jose Miguel Peoples, [address]  [address]  [id number] [place] [id number]  Via In Basket    Dear Jose Miguel Peoples MD,      Thank you for referring Ms. Jessi Doss to [address] [place] [address] for evaluation. My notes for this consultation are attached. If you have questions, please do not hesitate to call me. I look forward to following your patient along with you.       Sincerely,    Isaac Boogie MD

## 2022-12-20 NOTE — PROGRESS NOTES
Sissy Oscar presents today for   Chief Complaint   Patient presents with    Asthma    Follow-up       Is someone accompanying this pt? No    Is the patient using any DME equipment during OV? No   -DME Company NA    Depression Screening:  3 most recent PHQ Screens 8/26/2022   PHQ Not Done -   Little interest or pleasure in doing things Not at all   Feeling down, depressed, irritable, or hopeless Not at all   Total Score PHQ 2 0       Learning Assessment:  Learning Assessment 3/4/2022   PRIMARY LEARNER Patient   HIGHEST LEVEL OF EDUCATION - PRIMARY LEARNER  -   BARRIERS PRIMARY LEARNER -   CO-LEARNER CAREGIVER -   PRIMARY LANGUAGE ENGLISH   LEARNER PREFERENCE PRIMARY DEMONSTRATION     -     -     -     -   ANSWERED BY patient     -   RELATIONSHIP SELF       Abuse Screening:  Abuse Screening Questionnaire 9/21/2022   Do you ever feel afraid of your partner? N   Are you in a relationship with someone who physically or mentally threatens you? N   Is it safe for you to go home? Y       Fall Risk  Fall Risk Assessment, last 12 mths 9/21/2022   Able to walk? Yes   Fall in past 12 months? 0   Do you feel unsteady? 0   Are you worried about falling 0   Number of falls in past 12 months -   Fall with injury? -         Coordination of Care:  1. Have you been to the ER, urgent care clinic since your last visit? Hospitalized since your last visit? NO    2. Have you seen or consulted any other health care providers outside of the [address] [place] [address] since your last visit? Include any pap smears or colon screening.  No

## 2022-12-20 NOTE — PROGRESS NOTES
[facility]   Pulmonary, Critical Care, and Sleep Medicine     Reason for Evaluation: follow up asthma    12/20/22     Patient reports doing well with her asthma control.-She continues to use Singulair, Symbicort and receives allergen injections by Dr. Jerardo Scherer is retiring and will transition her care to a new allergist who is taking the practice  Does not need much rescue medications  She has not had any exacerbations  She tolerated hip surgery without any complications and plans to have a knee replacement-February 2023 Dr. Dennis Retana for Green's esophagus  She has been on Orencia infusions for her rheumatoid arthritis  Denies wheezing. Occasional sneezing. Feels better overall with no recent set backs. No cough, congestion or wheezing. She denied any hemoptysis, change in her appetite or weight. Has not needed any prednisone tapers. No Er visits or urgent care visits in the interval since I last saw her      Allergies:  No known drug allergies. Current Outpatient Medications:     FeroSuL 325 mg (65 mg iron) tablet, take 1 tablet by mouth once daily before breakfast, Disp: 90 Tablet, Rfl: 1    losartan (COZAAR) 50 mg tablet, TAKE 1 TABLET DAILY (Patient taking differently: No sig reported), Disp: 90 Tablet, Rfl: 3    cyanocobalamin 1,000 mcg tablet, take 1 tablet by mouth once daily, Disp: 90 Tablet, Rfl: 3    albuterol (PROVENTIL HFA, VENTOLIN HFA, PROAIR HFA) 90 mcg/actuation inhaler, Take 1 Puff by inhalation every six (6) hours as needed for Wheezing., Disp: , Rfl:     abatacept (Orencia, with maltose,) 250 mg injection, by IntraVENous route. monthly, Disp: , Rfl:     clobetasoL (TEMOVATE) 0.05 % ointment, Apply  to affected area two (2) days a week., Disp: 45 g, Rfl: 3    multivit-min/iron/folic/lutein (CENTRUM SILVER WOMEN PO), Take 1 Tablet by mouth daily.  not taking but in the  home, Disp: , Rfl:     krill/om-3/dha/epa/phospho/ast (MAXIMUM RED KRILL OMEGA-3 PO), Take 1 Caplet by mouth daily. not taking, Disp: , Rfl:     ascorbic acid, vitamin C, (VITAMIN C) 500 mg tablet, Take 1 Tab by mouth daily. , Disp: 90 Tab, Rfl: 3    cholecalciferol, vitamin D3, (VITAMIN D3 PO), Take 1 Tablet by mouth daily. , Disp: , Rfl:     acetaminophen (TYLENOL) 325 mg tablet, Take 325 mg by mouth every four (4) hours as needed for Pain., Disp: , Rfl:     triamcinolone (NASACORT AQ) 55 mcg nasal inhaler, 2 Sprays by Both Nostrils route daily. Indications: PERENNIAL ALLERGIC RHINITIS, Disp: , Rfl:     dexlansoprazole (DEXILANT) 60 mg CpDB capsule (delayed release), Take 1 Cap by mouth Daily (before dinner). , Disp: , Rfl:     Cetirizine 10 mg cap, Take 10 mg by mouth daily. , Disp: , Rfl:     montelukast (SINGULAIR) 10 mg tablet, Take 10 mg by mouth daily. , Disp: , Rfl:     allergy injection, by SubCUTAneous route every thirty (30) days. , Disp: , Rfl:     budesonide-formoteroL (SYMBICORT) 160-4.5 mcg/actuation HFAA, Take 2 Puffs by inhalation two (2) times a day., Disp: , Rfl:        Physical Examination:       HEENT:    Normocephalic, atraumatic. Pupils equal, round, reactive to light and accommodation. Sclerae white. Conjunctivae pale. Oral exam shows no thrush,  without any exudate or mass. Nasal mucous membranes are without any erythema or edema. Neck:  Thick, supple, no JVD, normal thyroid, no adenopathy, no other masses palpable. Cardiovascular:  S1, S2, regular rate and rhythm without any murmurs, rubs or gallops. Chest:  Equal air entry without wheezing. There is no dullness to percussion. No tenderness on palpation. No rash on inspection   Abdomen:  Obese, bowel sounds normoactive, soft, nontender without any organomegaly. Tympanic to percussion. Extremities:  Without any clubbing, cyanosis or edema. No calf tenderness on palpation. Skin:  Dry, warm to touch. No rash on inspection.        PFT:  Spirometry-1/13/2022  FVC reduced to 67% predicted and improved to 78% postbronchodilator  FEV1 reduced to 47% predicted and improves to 57% postbronchodilator  Midexpiratory flow rate is reduced to 23% predicted and improves to 28% postbronchodilator  FEV1 FVC ratio is reduced  Studies consistent with moderate to severe obstructive defect with bronchodilator response. Compared to previous study of 2009 there is no significant change in flows    XR Results (most recent):  Results from Hospital Encounter encounter on 05/13/22    XR HIP RT W OR WO PELV 2-3 VWS    Narrative  Right hip, 2 views    HISTORY: Postop. Total right hip replacement with unremarkable prosthesis placement and  alignment. No acute fracture. Impression  As described      Impression:     Asthma- better controlled. With no exacerbations over past  3 + years after sinus surgery. Well maintained at baseline on Singulair and Symbicort  SOB- multifactorial-improved  Recurrent sinusitis  She had in the interval nasal septal deviation repair surgery to correct any anatomical defect causing her frequent sinus and respiratory infections. She still continues to receive allergen injections  Esophageal stricture with reflux. Now diagnosed with Green's esophagus. On Dexilent  Rheumatoid arthritis- rheumatology following on Orencia infusions  Elevated LFTs-believed to be related to methotrexate-GI following  Preop-knee replacement scheduled for February 2023-Dr. Carlisle    Recommendations:      Continue current maintenance therapy-Singulair and Symbicort  The patient will continue her as needed albuterol  Continue trigger control- PPI for Green's, allergy immunotherapy and controller meds  Commended on compliance and excellent control  Health maintenance- up to date on vaccines  Environmental control  Periodic PFT, imaging.   From pulmonary standpoint patient should be able to undergo knee surgery without any additional concerns  She plans to continue her allergy injections with a new allergist who is replacing  [employer] provided.   Follow up in 1 year or sooner should there be any change      Kay Felder MD

## 2023-01-10 ENCOUNTER — TELEPHONE (OUTPATIENT)
Dept: PULMONOLOGY | Age: 68
End: 2023-01-10

## 2023-01-10 RX ORDER — AMOXICILLIN AND CLAVULANATE POTASSIUM 875; 125 MG/1; MG/1
1 TABLET, FILM COATED ORAL EVERY 12 HOURS
Qty: 20 TABLET | Refills: 0 | Status: SHIPPED | OUTPATIENT
Start: 2023-01-10

## 2023-01-10 NOTE — TELEPHONE ENCOUNTER
Pt calling to speak with Dr. Ravi Isabel or her nurse. Pt states that she has been dealing with a sinus infection since January 1. She stated that this morning she also had a low grade fever of 99.5.  Please advise [phone]

## 2023-01-10 NOTE — TELEPHONE ENCOUNTER
Patient c/o drainage down throat with cough. Yellow/green sputum. HA. Low grade temp 99.5. Covid test negative 2 days ago. Patient feels she has sinus infection.    Samreen S Alicia Davis

## 2023-01-24 DIAGNOSIS — Z01.818 PRE-OP TESTING: Primary | ICD-10-CM

## 2023-01-24 DIAGNOSIS — M17.12 PRIMARY OSTEOARTHRITIS OF LEFT KNEE: ICD-10-CM

## 2023-02-01 ENCOUNTER — HOSPITAL ENCOUNTER (OUTPATIENT)
Dept: PREADMISSION TESTING | Age: 68
Discharge: HOME | End: 2023-02-01
Payer: MEDICARE

## 2023-02-01 ENCOUNTER — HOSPITAL ENCOUNTER (OUTPATIENT)
Dept: GENERAL RADIOLOGY | Age: 68
Discharge: HOME | End: 2023-02-01
Payer: MEDICARE

## 2023-02-01 DIAGNOSIS — M17.12 PRIMARY OSTEOARTHRITIS OF LEFT KNEE: ICD-10-CM

## 2023-02-01 DIAGNOSIS — Z01.818 PRE-OP TESTING: ICD-10-CM

## 2023-02-01 LAB
ALBUMIN SERPL-MCNC: 3.6 G/DL (ref 3.4–5)
ANION GAP SERPL CALC-SCNC: 3 MMOL/L (ref 3–18)
APPEARANCE UR: CLEAR
APTT PPP: 31.7 SEC (ref 23–36.4)
ATRIAL RATE: 78 BPM
BACTERIA URNS QL MICRO: NEGATIVE /HPF
BASOPHILS # BLD: 0 K/UL (ref 0–0.1)
BASOPHILS NFR BLD: 1 % (ref 0–2)
BILIRUB UR QL: NEGATIVE
BUN SERPL-MCNC: 4 MG/DL (ref 7–18)
BUN/CREAT SERPL: 7 (ref 12–20)
CALCIUM SERPL-MCNC: 9.2 MG/DL (ref 8.5–10.1)
CALCULATED P AXIS, ECG09: 52 DEGREES
CALCULATED R AXIS, ECG10: -9 DEGREES
CALCULATED T AXIS, ECG11: 44 DEGREES
CHLORIDE SERPL-SCNC: 112 MMOL/L (ref 100–111)
CO2 SERPL-SCNC: 26 MMOL/L (ref 21–32)
COLOR UR: YELLOW
CREAT SERPL-MCNC: 0.55 MG/DL (ref 0.6–1.3)
DIAGNOSIS, 93000: NORMAL
DIFFERENTIAL METHOD BLD: NORMAL
EOSINOPHIL # BLD: 0.3 K/UL (ref 0–0.4)
EOSINOPHIL NFR BLD: 5 % (ref 0–5)
EPITH CASTS URNS QL MICRO: NORMAL /LPF (ref 0–5)
ERYTHROCYTE [DISTWIDTH] IN BLOOD BY AUTOMATED COUNT: 13.6 % (ref 11.6–14.5)
EST. AVERAGE GLUCOSE BLD GHB EST-MCNC: 117 MG/DL
GLUCOSE SERPL-MCNC: 85 MG/DL (ref 74–99)
GLUCOSE UR STRIP.AUTO-MCNC: NEGATIVE MG/DL
HBA1C MFR BLD: 5.7 % (ref 4.2–5.6)
HCT VFR BLD AUTO: 41.2 % (ref 35–45)
HGB BLD-MCNC: 13.4 G/DL (ref 12–16)
HGB UR QL STRIP: NEGATIVE
IMM GRANULOCYTES # BLD AUTO: 0 K/UL (ref 0–0.04)
IMM GRANULOCYTES NFR BLD AUTO: 0 % (ref 0–0.5)
INR PPP: 1 (ref 0.8–1.2)
KETONES UR QL STRIP.AUTO: NEGATIVE MG/DL
LEUKOCYTE ESTERASE UR QL STRIP.AUTO: ABNORMAL
LYMPHOCYTES # BLD: 1.5 K/UL (ref 0.9–3.6)
LYMPHOCYTES NFR BLD: 26 % (ref 21–52)
MCH RBC QN AUTO: 29.8 PG (ref 24–34)
MCHC RBC AUTO-ENTMCNC: 32.5 G/DL (ref 31–37)
MCV RBC AUTO: 91.8 FL (ref 78–100)
MONOCYTES # BLD: 0.5 K/UL (ref 0.05–1.2)
MONOCYTES NFR BLD: 8 % (ref 3–10)
NEUTS SEG # BLD: 3.5 K/UL (ref 1.8–8)
NEUTS SEG NFR BLD: 60 % (ref 40–73)
NITRITE UR QL STRIP.AUTO: NEGATIVE
NRBC # BLD: 0 K/UL (ref 0–0.01)
NRBC BLD-RTO: 0 PER 100 WBC
P-R INTERVAL, ECG05: 110 MS
PH UR STRIP: 6 (ref 5–8)
PLATELET # BLD AUTO: 236 K/UL (ref 135–420)
PMV BLD AUTO: 9.5 FL (ref 9.2–11.8)
POTASSIUM SERPL-SCNC: 4 MMOL/L (ref 3.5–5.5)
PROT UR STRIP-MCNC: NEGATIVE MG/DL
PROTHROMBIN TIME: 14.1 SEC (ref 11.5–15.2)
Q-T INTERVAL, ECG07: 406 MS
QRS DURATION, ECG06: 96 MS
QTC CALCULATION (BEZET), ECG08: 462 MS
RBC # BLD AUTO: 4.49 M/UL (ref 4.2–5.3)
RBC #/AREA URNS HPF: NORMAL /HPF (ref 0–5)
SODIUM SERPL-SCNC: 141 MMOL/L (ref 136–145)
SP GR UR REFRACTOMETRY: 1.01 (ref 1–1.03)
UROBILINOGEN UR QL STRIP.AUTO: 0.2 EU/DL (ref 0.2–1)
VENTRICULAR RATE, ECG03: 78 BPM
WBC # BLD AUTO: 5.9 K/UL (ref 4.6–13.2)
WBC URNS QL MICRO: NORMAL /HPF (ref 0–4)

## 2023-02-01 PROCEDURE — 85610 PROTHROMBIN TIME: CPT

## 2023-02-01 PROCEDURE — 85025 COMPLETE CBC W/AUTO DIFF WBC: CPT

## 2023-02-01 PROCEDURE — 83036 HEMOGLOBIN GLYCOSYLATED A1C: CPT

## 2023-02-01 PROCEDURE — 81001 URINALYSIS AUTO W/SCOPE: CPT

## 2023-02-01 PROCEDURE — 80048 BASIC METABOLIC PNL TOTAL CA: CPT

## 2023-02-01 PROCEDURE — 82040 ASSAY OF SERUM ALBUMIN: CPT

## 2023-02-01 PROCEDURE — 87086 URINE CULTURE/COLONY COUNT: CPT

## 2023-02-01 PROCEDURE — 85730 THROMBOPLASTIN TIME PARTIAL: CPT

## 2023-02-01 PROCEDURE — 36415 COLL VENOUS BLD VENIPUNCTURE: CPT

## 2023-02-01 PROCEDURE — 71046 X-RAY EXAM CHEST 2 VIEWS: CPT

## 2023-02-01 PROCEDURE — 93005 ELECTROCARDIOGRAM TRACING: CPT

## 2023-02-02 LAB
BACTERIA SPEC CULT: NORMAL
SERVICE CMNT-IMP: NORMAL

## 2023-02-03 DIAGNOSIS — I10 PRIMARY HYPERTENSION: Primary | ICD-10-CM

## 2023-02-03 DIAGNOSIS — M05.79 RHEUMATOID ARTHRITIS INVOLVING MULTIPLE SITES WITH POSITIVE RHEUMATOID FACTOR (HCC): ICD-10-CM

## 2023-02-05 DIAGNOSIS — R73.9 HYPERGLYCEMIA: Primary | ICD-10-CM

## 2023-02-05 DIAGNOSIS — M05.79 RHEUMATOID ARTHRITIS INVOLVING MULTIPLE SITES WITH POSITIVE RHEUMATOID FACTOR (HCC): ICD-10-CM

## 2023-02-05 DIAGNOSIS — I10 PRIMARY HYPERTENSION: Primary | ICD-10-CM

## 2023-02-06 DIAGNOSIS — M05.79 RHEUMATOID ARTHRITIS INVOLVING MULTIPLE SITES WITH POSITIVE RHEUMATOID FACTOR (HCC): Primary | ICD-10-CM

## 2023-02-06 DIAGNOSIS — G89.29 CHRONIC PAIN OF LEFT KNEE: Primary | ICD-10-CM

## 2023-02-06 DIAGNOSIS — J45.40 MODERATE PERSISTENT ASTHMA WITHOUT COMPLICATION: ICD-10-CM

## 2023-02-06 DIAGNOSIS — M25.562 CHRONIC PAIN OF LEFT KNEE: Primary | ICD-10-CM

## 2023-02-07 DIAGNOSIS — I10 PRIMARY HYPERTENSION: Primary | ICD-10-CM

## 2023-02-07 DIAGNOSIS — M05.79 RHEUMATOID ARTHRITIS INVOLVING MULTIPLE SITES WITH POSITIVE RHEUMATOID FACTOR (HCC): Primary | ICD-10-CM

## 2023-02-08 ENCOUNTER — OFFICE VISIT (OUTPATIENT)
Dept: ORTHOPEDIC SURGERY | Age: 68
End: 2023-02-08
Payer: COMMERCIAL

## 2023-02-08 VITALS
HEIGHT: 70 IN | BODY MASS INDEX: 25.77 KG/M2 | HEART RATE: 79 BPM | OXYGEN SATURATION: 97 % | WEIGHT: 180 LBS | SYSTOLIC BLOOD PRESSURE: 126 MMHG | DIASTOLIC BLOOD PRESSURE: 84 MMHG

## 2023-02-08 DIAGNOSIS — Z01.818 PRE-OP EXAM: Primary | ICD-10-CM

## 2023-02-08 DIAGNOSIS — M17.12 ARTHRITIS OF LEFT KNEE: ICD-10-CM

## 2023-02-08 RX ORDER — ACETAMINOPHEN 325 MG/1
1000 TABLET ORAL ONCE
OUTPATIENT
Start: 2023-02-08 | End: 2023-02-08

## 2023-02-08 RX ORDER — DEXAMETHASONE SODIUM PHOSPHATE 4 MG/ML
8 INJECTION, SOLUTION INTRA-ARTICULAR; INTRALESIONAL; INTRAMUSCULAR; INTRAVENOUS; SOFT TISSUE
OUTPATIENT
Start: 2023-02-08 | End: 2023-02-08

## 2023-02-08 RX ORDER — PREGABALIN 25 MG/1
75 CAPSULE ORAL ONCE
OUTPATIENT
Start: 2023-02-08 | End: 2023-02-08

## 2023-02-08 RX ORDER — NITROFURANTOIN 25; 75 MG/1; MG/1
100 CAPSULE ORAL 2 TIMES DAILY
Qty: 10 CAPSULE | Refills: 0 | Status: SHIPPED | OUTPATIENT
Start: 2023-02-08

## 2023-02-08 NOTE — PATIENT INSTRUCTIONS
Patient: Delroy Rodarte                MRN: [id number]       SSN: [id number]  YOB: 1955        AGE: 75 y.o. SEX: female  There is no height or weight on file to calculate BMI.    02/08/23    DO:  1:  Sit with the leg out straight 5-10 min every hour while awake. Keep the toes pointed       up towards the carina. 2.  Bend your knee 5-10 min every hour. Bend it to the point of pain and hold it for 5-10       Min. 3.  ICE your knee 20 min every hour    4. You can expect to have swelling and discomfort in your thigh down to your knee. Swelling may extend to your foot. You may have bruising from the thigh to the foot as well. Some numbness can be expected to the outside part of the knee. Wear the compression stockings and elevate your leg. DO NOT:    1. Do not place anything under your knee to prop it up  2. Do not sit in the recliner chair.

## 2023-02-08 NOTE — H&P
[address] [facility], [address] [facility]  [phone]           HISTORY & PHYSICAL      Patient: Oracio Maldonado                MRN: [id number]       SSN: [id number]  YOB: 1955        AGE: 75 y.o. SEX: female  Body mass index is 25.83 kg/m². PCP: Mabel Nieves MD  02/08/23      CC: left knee end stage OA  Problem List Items Addressed This Visit    None  Visit Diagnoses       Pre-op exam    -  Primary    Relevant Medications    nitrofurantoin, macrocrystal-monohydrate, (MACROBID) 100 mg capsule    Arthritis of left knee                  HPI:  The patient is a pleasant 75 y.o. whom has end stage OA of their Left knee and has failed conservative treatment including but not limited to NSAIDS, cortisone injections, viscosupplementation, PT, and pain medicine. Due to the current findings and affected activities of daily living, surgical intervention is indicated. The alternatives, risks, complications, as well as expected outcome were discussed. These include but are not limited to infection, blood loss, need for blood transfusion, neurovascular damage, DVT, PE,  post-op stiffness and pain, leg length discrepancy, dislocation, anesthetic complications, prothesis longevity, need for more surgery, MI, stroke, and even death. The patient understands and wishes to proceed with surgery.       Past Medical History:   Diagnosis Date    Adjacent segment disease C3/4 w/deg changes, poss stenosis, CT '18 6/22/2018    Allergic rhinitis     Anemia 2008    intermittant since then    Asthma     Back pain     Green's esophagus with esophagitis     Cervical radiculopathy     Chronic sinusitis 5/15/2012    DNS (deviated nasal septum)     Empyema     Foraminal stenosis of cervical region     C4-C5    GERD (gastroesophageal reflux disease)     Dr Madison Cunningham    Hx MRSA infection 8/11/2014    Hypertension     Hypertriglyceridemia     Insulin resistance 4/9/2012    Left knee pain     Lichen planus     Liver disease     ORTEGA-per pt, followed by Dr Rudi Castellanos    Menopause     Age 51    MRSA (methicillin resistant Staphylococcus aureus) infection 2008    left lung    Nausea & vomiting     Restless leg syndrome     Rheumatoid arthritis ([facility] [address]     Spinal cord stimulator status 2016    Spinal stenosis of cervical region     C4-C5 and C5-C6    TMJ syndrome     Wears glasses     and contacts         Current Outpatient Medications:     nitrofurantoin, macrocrystal-monohydrate, (MACROBID) 100 mg capsule, Take 1 Capsule by mouth two (2) times a day., Disp: 10 Capsule, Rfl: 0    amoxicillin-clavulanate (AUGMENTIN) 875-125 mg per tablet, Take 1 Tablet by mouth every twelve (12) hours. , Disp: 20 Tablet, Rfl: 0    FeroSuL 325 mg (65 mg iron) tablet, take 1 tablet by mouth once daily before breakfast, Disp: 90 Tablet, Rfl: 1    losartan (COZAAR) 50 mg tablet, TAKE 1 TABLET DAILY (Patient taking differently: No sig reported), Disp: 90 Tablet, Rfl: 3    cyanocobalamin 1,000 mcg tablet, take 1 tablet by mouth once daily, Disp: 90 Tablet, Rfl: 3    albuterol (PROVENTIL HFA, VENTOLIN HFA, PROAIR HFA) 90 mcg/actuation inhaler, Take 1 Puff by inhalation every six (6) hours as needed for Wheezing., Disp: , Rfl:     abatacept (Orencia, with maltose,) 250 mg injection, by IntraVENous route. monthly, Disp: , Rfl:     clobetasoL (TEMOVATE) 0.05 % ointment, Apply  to affected area two (2) days a week., Disp: 45 g, Rfl: 3    multivit-min/iron/folic/lutein (CENTRUM SILVER WOMEN PO), Take 1 Tablet by mouth daily. not taking but in the  home, Disp: , Rfl:     krill/om-3/dha/epa/phospho/ast (MAXIMUM RED KRILL OMEGA-3 PO), Take 1 Caplet by mouth daily. not taking, Disp: , Rfl:     ascorbic acid, vitamin C, (VITAMIN C) 500 mg tablet, Take 1 Tab by mouth daily. , Disp: 90 Tab, Rfl: 3    cholecalciferol, vitamin D3, (VITAMIN D3 PO), Take 1 Tablet by mouth daily. , Disp: , Rfl:     acetaminophen (TYLENOL) 325 mg tablet, Take 325 mg by mouth every four (4) hours as needed for Pain., Disp: , Rfl:     triamcinolone (NASACORT AQ) 55 mcg nasal inhaler, 2 Sprays by Both Nostrils route daily. Indications: PERENNIAL ALLERGIC RHINITIS, Disp: , Rfl:     dexlansoprazole (DEXILANT) 60 mg CpDB capsule (delayed release), Take 1 Cap by mouth Daily (before dinner). , Disp: , Rfl:     Cetirizine 10 mg cap, Take 10 mg by mouth daily. , Disp: , Rfl:     montelukast (SINGULAIR) 10 mg tablet, Take 10 mg by mouth daily. , Disp: , Rfl:     allergy injection, by SubCUTAneous route every thirty (30) days. , Disp: , Rfl:     budesonide-formoteroL (SYMBICORT) 160-4.5 mcg/actuation HFAA, Take 2 Puffs by inhalation two (2) times a day., Disp: , Rfl:     Allergies   Allergen Reactions    Adhesive Tape-Silicones Other (comments)     Patient stated iv tape and tegaderm ok    Other Plant, Animal, Environmental Unable to Obtain     MOLD    Sulfasalazine Other (comments)     Could not taste anything, lightheadedness    Tape [Adhesive] Other (comments)     Blisters, use paper tape only  Patient states tegaderm and paper tape are okay    Mold Other (comments)    Chlorhexidine Towelette Itching and Other (comments)     \"stinging for a couple of hours\"    itching       Social History     Socioeconomic History    Marital status:      Spouse name: Not on file    Number of children: Not on file    Years of education: Not on file    Highest education level: Not on file   Occupational History    Not on file   Tobacco Use    Smoking status: Never    Smokeless tobacco: Never   Vaping Use    Vaping Use: Never used   Substance and Sexual Activity    Alcohol use: Not Currently     Alcohol/week: 0.0 standard drinks     Comment: 0-6 per year    Drug use: Yes     Types: Prescription, OTC    Sexual activity: Yes     Partners: Male     Comment: 20+ years of menopause   Other Topics Concern    Not on file   Social History Narrative    Retired  Social Determinants of Health     Financial Resource Strain: Not on file   Food Insecurity: Not on file   Transportation Needs: Not on file   Physical Activity: Not on file   Stress: Not on file   Social Connections: Not on file   Intimate Partner Violence: Not on file   Housing Stability: Not on file       Past Surgical History:   Procedure Laterality Date    HX CERVICAL FUSION  08/13/2014    HX ENDOSCOPY      HX HEENT  1997    TMJ surgery    HX HIP REPLACEMENT Right 05/13/2022    HX KNEE REPLACEMENT Right 2020    HX LUMBAR LAMINECTOMY  11/1995    [address]    HX LUMBAR LAMINECTOMY  02/1997    [place]    HX ORTHOPAEDIC Right 01/2015    bunion removal from right foot    HX OTHER SURGICAL  2007    thoracentesis    HX OTHER SURGICAL  2008    chest tube    HX OTHER SURGICAL Bilateral 1997    TMJ surgery    HX OTHER SURGICAL  2012    sinus surg 2012-Dr Pardo. HX OTHER SURGICAL  2016    spinal cord stimulator place for lumbar neuritis, good benefit    HX ROTATOR CUFF REPAIR Right 01/2019    HX TUBAL LIGATION      MA ANES NRV MUSC TNDN FSCIA BURSA SHOULDER & AXILLA  01/31/2019    MA COLONOSCOPY FLX DX W/COLLJ SPEC WHEN PFRMD  06/18/2008    normal/duntemann       Family History:  Non-contributory. PE:  Visit Vitals  /84   Pulse 79   Ht 5' 10\" (1.778 m)   Wt 180 lb (81.6 kg)   SpO2 97%   BMI 25.83 kg/m²     A&O X3, NAD, well develop, well nourished  Heart: S1-S2, rrr  Lungs: CTA bilat  Abd: soft, nt, nt, + bs in all quadrants  Ext:  Pos distal pulses to DP, PT      X-ray: Radiographs of the left knee obtained 2/6/20[address] [place] include AP, lateral, skyline, 3 view standing views confirms end-stage arthritis of bone to bone eburnation of malalignment. Labs: labs were reviewed and wnl. Ua pos, treated with macrobid    A:  Left  knee end stage OA    P:  At this point we will move forward with surgery.   Again, the alternatives, risks, complications, as well as expected outcome were discussed and the patient wishes to proceed with surgery. Pt has been instructed to stop aspirin, nsaids, rheumatologic medications and blood thinners. They have also been instructed to continue on any heart and bp meds and to take them the morning of surgery with sips of water. The patient was counseled at length about the risks of latha Covid-19 during their perioperative period and any recovery window from their procedure. The patient was made aware that latha Covid-19  may worsen their prognosis for recovering from their procedure and lend to a higher morbidity and/or mortality risk. All material risks, benefits, and reasonable alternatives including postponing the procedure were discussed. The patient does  wish to proceed with the procedure at this time.                 Arnulfo Peña

## 2023-02-14 ENCOUNTER — OFFICE VISIT (OUTPATIENT)
Age: 68
End: 2023-02-14
Payer: MEDICARE

## 2023-02-14 VITALS
TEMPERATURE: 98.2 F | SYSTOLIC BLOOD PRESSURE: 105 MMHG | BODY MASS INDEX: 25.77 KG/M2 | HEART RATE: 84 BPM | OXYGEN SATURATION: 96 % | WEIGHT: 180 LBS | DIASTOLIC BLOOD PRESSURE: 73 MMHG | HEIGHT: 70 IN | RESPIRATION RATE: 12 BRPM

## 2023-02-14 DIAGNOSIS — M05.79 RHEUMATOID ARTHRITIS WITH RHEUMATOID FACTOR OF MULTIPLE SITES WITHOUT ORGAN OR SYSTEMS INVOLVEMENT (HCC): ICD-10-CM

## 2023-02-14 DIAGNOSIS — I10 ESSENTIAL (PRIMARY) HYPERTENSION: ICD-10-CM

## 2023-02-14 DIAGNOSIS — M25.562 CHRONIC PAIN OF LEFT KNEE: ICD-10-CM

## 2023-02-14 DIAGNOSIS — G89.29 CHRONIC PAIN OF LEFT KNEE: ICD-10-CM

## 2023-02-14 DIAGNOSIS — J45.40 MODERATE PERSISTENT ASTHMA, UNCOMPLICATED: ICD-10-CM

## 2023-02-14 DIAGNOSIS — R82.90 ABNORMAL URINALYSIS: ICD-10-CM

## 2023-02-14 DIAGNOSIS — R73.9 HYPERGLYCEMIA, UNSPECIFIED: Primary | ICD-10-CM

## 2023-02-14 DIAGNOSIS — E78.5 HYPERLIPIDEMIA, UNSPECIFIED HYPERLIPIDEMIA TYPE: ICD-10-CM

## 2023-02-14 PROCEDURE — G8399 PT W/DXA RESULTS DOCUMENT: HCPCS | Performed by: INTERNAL MEDICINE

## 2023-02-14 PROCEDURE — 1036F TOBACCO NON-USER: CPT | Performed by: INTERNAL MEDICINE

## 2023-02-14 PROCEDURE — 99214 OFFICE O/P EST MOD 30 MIN: CPT | Performed by: INTERNAL MEDICINE

## 2023-02-14 PROCEDURE — G8417 CALC BMI ABV UP PARAM F/U: HCPCS | Performed by: INTERNAL MEDICINE

## 2023-02-14 PROCEDURE — 3078F DIAST BP <80 MM HG: CPT | Performed by: INTERNAL MEDICINE

## 2023-02-14 PROCEDURE — G8483 FLU IMM NO ADMIN DOC REA: HCPCS | Performed by: INTERNAL MEDICINE

## 2023-02-14 PROCEDURE — 1123F ACP DISCUSS/DSCN MKR DOCD: CPT | Performed by: INTERNAL MEDICINE

## 2023-02-14 PROCEDURE — 1090F PRES/ABSN URINE INCON ASSESS: CPT | Performed by: INTERNAL MEDICINE

## 2023-02-14 PROCEDURE — 3074F SYST BP LT 130 MM HG: CPT | Performed by: INTERNAL MEDICINE

## 2023-02-14 PROCEDURE — 3017F COLORECTAL CA SCREEN DOC REV: CPT | Performed by: INTERNAL MEDICINE

## 2023-02-14 PROCEDURE — G8427 DOCREV CUR MEDS BY ELIG CLIN: HCPCS | Performed by: INTERNAL MEDICINE

## 2023-02-14 RX ORDER — LANOLIN ALCOHOL/MO/W.PET/CERES
CREAM (GRAM) TOPICAL
COMMUNITY
Start: 2023-01-02

## 2023-02-14 RX ORDER — MONTELUKAST SODIUM 10 MG/1
TABLET ORAL NIGHTLY
COMMUNITY
Start: 2022-12-16

## 2023-02-14 RX ORDER — TRIAMCINOLONE ACETONIDE 55 UG/1
2 SPRAY, METERED NASAL DAILY
COMMUNITY

## 2023-02-14 RX ORDER — LOSARTAN POTASSIUM 25 MG/1
25 TABLET ORAL DAILY
Qty: 90 TABLET | Refills: 3 | Status: SHIPPED | OUTPATIENT
Start: 2023-02-14

## 2023-02-14 RX ORDER — FERROUS SULFATE 325(65) MG
TABLET ORAL
COMMUNITY
Start: 2022-11-29

## 2023-02-14 RX ORDER — ALBUTEROL SULFATE 90 UG/1
1 AEROSOL, METERED RESPIRATORY (INHALATION) EVERY 6 HOURS PRN
COMMUNITY

## 2023-02-14 RX ORDER — DEXLANSOPRAZOLE 60 MG/1
60 CAPSULE, DELAYED RELEASE ORAL DAILY
COMMUNITY
Start: 2022-12-31

## 2023-02-14 RX ORDER — CLOBETASOL PROPIONATE 0.5 MG/G
OINTMENT TOPICAL
COMMUNITY
Start: 2020-12-03

## 2023-02-14 RX ORDER — BUDESONIDE AND FORMOTEROL FUMARATE DIHYDRATE 160; 4.5 UG/1; UG/1
2 AEROSOL RESPIRATORY (INHALATION) 2 TIMES DAILY
COMMUNITY

## 2023-02-14 RX ORDER — LOSARTAN POTASSIUM 50 MG/1
50 TABLET ORAL DAILY
COMMUNITY
Start: 2022-12-31 | End: 2023-02-14 | Stop reason: SDUPTHER

## 2023-02-14 RX ORDER — ACETAMINOPHEN 325 MG/1
325 TABLET ORAL EVERY 4 HOURS PRN
COMMUNITY

## 2023-02-14 RX ORDER — ASCORBIC ACID 500 MG
500 TABLET ORAL DAILY
COMMUNITY
Start: 2019-12-03

## 2023-02-14 RX ORDER — NITROFURANTOIN 25; 75 MG/1; MG/1
CAPSULE ORAL
COMMUNITY
Start: 2023-02-08

## 2023-02-14 RX ORDER — ESTRADIOL 0.1 MG/G
2 CREAM VAGINAL DAILY
COMMUNITY

## 2023-02-14 SDOH — ECONOMIC STABILITY: INCOME INSECURITY: HOW HARD IS IT FOR YOU TO PAY FOR THE VERY BASICS LIKE FOOD, HOUSING, MEDICAL CARE, AND HEATING?: PATIENT DECLINED

## 2023-02-14 ASSESSMENT — ENCOUNTER SYMPTOMS
EYE PAIN: 0
DIARRHEA: 0
ANAL BLEEDING: 0
SHORTNESS OF BREATH: 0
BLOOD IN STOOL: 0
COUGH: 0
ABDOMINAL PAIN: 1
SORE THROAT: 0

## 2023-02-14 ASSESSMENT — PATIENT HEALTH QUESTIONNAIRE - PHQ9
1. LITTLE INTEREST OR PLEASURE IN DOING THINGS: 0
2. FEELING DOWN, DEPRESSED OR HOPELESS: 0
SUM OF ALL RESPONSES TO PHQ QUESTIONS 1-9: 0
SUM OF ALL RESPONSES TO PHQ9 QUESTIONS 1 & 2: 0

## 2023-02-14 NOTE — PROGRESS NOTES
[facility]:   Preoperative Evaluation    Date of Exam: 02/14/23    MRN: [id number]    Evette Oneal  Is a 75 y.o.  female  who presents for preoperative evaluation and management. Chief Complaint   Patient presents with    Pre-op Exam     Preop scheduled for Left total Knee Arthroplasty on 2-20-23 w/DR. Dorado         Subjective: The pt is a 75 female with h/o HTN, GERD with French's esophagus (followed by ), DJD, lumbar post laminectomy syndrome s/p spinal cord stimulator surgery (followed by Retta Hatchet), cervical spinal stenosis, prediabetes (resolved), asthma (followed by , Pulmonology and ), HLD, lichens sclerosus, chronic sinusitis, vitamin B12 deficiency, and rheumatoid arthritis (Followed by ), prediabetes, vitamin D deficiency. 1.  Prediabetes/Hyperglycemia: New diagnosis. Her most recent labs show that her A1c is 5.7.    2.  Asthma: Well-controlled. Follow Dr. Sly Atkinson with Pulmonology. Taking Symbicort, Singulair, and Zyrtec. She had a flareup last month at which time she was treated with Augmentin by Dr. Sly Atkinson. Her symptoms resolved but soon after she developed some diarrhea. Diarrhea symptoms resolved but she has some lower abdominal cramping since taking Augmentin. Symptoms are improving. No rectal bleeding. 3.  Hypertension: Blood pressure is low at 105/73. +Taking losartan 50mg daily. Wt Readings from Last 3 Encounters:   02/14/23 180 lb (81.6 kg)   02/08/23 180 lb (81.6 kg)   12/20/22 182 lb 1.6 oz (82.6 kg)     4. Positive Leukocyte Esterase in Urine: Her recent urinalysis results. She is negative for nitrite and bacteria. Nevertheless, she was placed on a course of Macrobid. She is asymptomatic. 5.  Chronic left knee pain and RA: From OA. She brought a radiographs today which we reviewed in the office. She has findings consistent with osteoarthritis. There are some questionable areas of bony erosions.   She also has underlying rheumatoid arthritis, treated by her rheumatologist with Orencia      General Information:  Procedure/Surgery: Left knee replacement surgery  Date of Procedure/Surgery: 2/20/23  Surgeon: Kulwinder Loaiza  Primary Physician: Joe Newman MD  Surgery status: Elective  Surgery risk: Intermediate (head/neck, intraperitoneal, intrathoracic, orthopedic, and prostate    Cardiovascular Risk Factors:  1. Coronary revascularization within 5 years: no  2. Recurrent chest pain: no  3. Shortness of breath:  no  4. Recent coronary evaluation/stress test/angiogram:  no  5. Recent MI (less than 1 month ago):  no  6. Prior MI (by way of history or pathological waves):  no  7. Compensated CHF or h/o CHF:  no  8. Severe valvular disease:  no  9. Decompensated CHF:  no  10. High-grade atrioventricular block:  no  11. Arrhythmia:  no    Other Risk Factors:  1. Diabetes hx:  no  2. H/o CVA:  no  3. Uncontrolled hypertension:  no  4, Advanced age:  yes  5. Low functional capacity:  no  6. Recent use of: No recent use of aspirin (ASA), NSAIDS or steroids  7. Tetanus up to date: tetanus re-vaccination not indicated  8. Anesthesia Complications: None  9. History of abnormal bleeding : None  10. History of Blood Transfusions: no  11. Health Care Directive or Living Will:  yes  12.  Latex Allergy: no      Problem List:     Patient Active Problem List    Diagnosis Date Noted    Hip arthritis 05/13/2022    Arthritis of knee 91/14/2271    Lichen sclerosus et atrophicus 05/17/2018    French esophagus 11/16/2017    Rheumatoid arthritis involving multiple sites ([facility] [address] 02/15/2017    Arthritis, lumbar spine 04/22/2016    Moderate persistent asthma without complication 36/28/9792    Lumbar post-laminectomy syndrome 12/10/2015    S/P cervical spinal fusion 08/13/2014    GERD (gastroesophageal reflux disease) 08/11/2014    Cervical stenosis of spine 08/11/2014    Hx MRSA infection 08/11/2014    Impaired glucose tolerance 06/30/2013 Hypertriglyceridemia 12/16/2012    DNS (deviated nasal septum)     Vitamin B12 deficiency 06/28/2010    Vitamin D deficiency 06/28/2010    Iron deficiency anemia 06/28/2010    HTN (hypertension) 06/28/2010     Medical History:     Past Medical History:   Diagnosis Date    Adjacent segment disease with spinal stenosis 6/22/2018    Allergic rhinitis     Anemia 2008    intermittant since then    Asthma     Back pain     French's esophagus with esophagitis     Cervical radiculopathy     Chronic sinusitis 5/15/2012    DNS (deviated nasal septum)     Empyema (HCC)     Foraminal stenosis of cervical region     C4-C5    GERD (gastroesophageal reflux disease)     Dr Jin Mcdowell    Hx MRSA infection 8/11/2014    Hypertension     Hypertriglyceridemia     Insulin resistance 4/9/2012    Left knee pain     Lichen planus     Liver disease     LOPEZ-per pt, followed by Dr Shanice Donovan    Menopause     Age 51    MRSA (methicillin resistant Staphylococcus aureus) infection 2008    left lung    Nausea & vomiting     Restless leg syndrome     Rheumatoid arthritis ([address]     Spinal cord stimulator status 2016    Spinal stenosis of cervical region     C4-C5 and C5-C6    TMJ syndrome     Wears glasses     and contacts     Allergies:      Allergies   Allergen Reactions    Adhesive Tape Other (See Comments)     Patient stated iv tape and tegaderm ok  Blisters, use paper tape only  Patient states tegaderm and paper tape are okay      Sulfasalazine Other (See Comments)     Could not taste anything, lightheadedness    Molds & Smuts Other (See Comments)    Chlorhexidine Itching and Other (See Comments)     \"stinging for a couple of hours\"    itching      Medications:     Current Outpatient Medications   Medication Sig    albuterol sulfate HFA (PROVENTIL;VENTOLIN;PROAIR) 108 (90 Base) MCG/ACT inhaler Inhale 1 puff into the lungs every 6 hours as needed    acetaminophen (TYLENOL) 325 MG tablet Take 325 mg by mouth every 4 hours as needed    abatacept (ORENCIA) 250 MG injection Infuse intravenously    Multiple Vitamins-Minerals (CENTRUM SILVER 50+WOMEN PO) Take 1 tablet by mouth daily    KRILL OIL OMEGA-3 PO Take by mouth daily    Cholecalciferol (VITAMIN D3 PO) Take 1 tablet by mouth daily    clobetasol (TEMOVATE) 0.05 % ointment Apply topically    vitamin B-12 (CYANOCOBALAMIN) 1000 MCG tablet take 1 tablet by mouth once daily    dexlansoprazole (DEXILANT) 60 MG CPDR delayed release capsule Take 60 mg by mouth daily    FEROSUL 325 (65 Fe) MG tablet take 1 tablet by mouth once daily before breakfast    montelukast (SINGULAIR) 10 MG tablet     nitrofurantoin, macrocrystal-monohydrate, (MACROBID) 100 MG capsule take 1 capsule by mouth twice a day    triamcinolone (NASACORT) 55 MCG/ACT nasal inhaler 2 sprays by Nasal route daily    budesonide-formoterol (SYMBICORT) 160-4.5 MCG/ACT AERO Inhale 2 puffs into the lungs 2 times daily    ascorbic acid (VITAMIN C) 500 MG tablet Take 500 mg by mouth daily    Cetirizine HCl 10 MG CAPS Take 10 mg by mouth daily    estradiol (ESTRACE) 0.1 MG/GM vaginal cream Place 2 g vaginally daily    losartan (COZAAR) 25 MG tablet Take 1 tablet by mouth daily     No current facility-administered medications for this visit. Surgical History:     Past Surgical History:   Procedure Laterality Date    ANESTH,SURGERY OF SHOULDER  01/31/2019    CERVICAL FUSION  08/13/2014    COLONOSCOPY FLX DX W/COLLJ SPEC WHEN PFRMD  06/18/2008    normal/duntemann    HEENT  1997    TMJ surgery    LUMBAR LAMINECTOMY  11/1995    [address]    LUMBAR LAMINECTOMY  02/1997    [place]    ORTHOPEDIC SURGERY Right 01/2015    bunion removal from right foot    OTHER SURGICAL HISTORY  2007    thoracentesis    OTHER SURGICAL HISTORY  2008    chest tube    OTHER SURGICAL HISTORY Bilateral 1997    TMJ surgery    OTHER SURGICAL HISTORY  2012    sinus surg 2012-Dr Bonilla.     OTHER SURGICAL HISTORY  2016    spinal cord stimulator place for lumbar neuritis, good benefit    ROTATOR CUFF REPAIR Right 01/2019    TOTAL HIP ARTHROPLASTY Right 05/13/2022    TOTAL KNEE ARTHROPLASTY Right 2020    TUBAL LIGATION      UPPER GASTROINTESTINAL ENDOSCOPY       Social History:     Social History     Socioeconomic History    Marital status:      Spouse name: None    Number of children: None    Years of education: None    Highest education level: None   Tobacco Use    Smoking status: Never     Passive exposure: Never    Smokeless tobacco: Never   Vaping Use    Vaping Use: Never used   Substance and Sexual Activity    Alcohol use: Not Currently     Alcohol/week: 0.0 standard drinks    Drug use: Yes     Types: OTC, Prescription    Sexual activity: Yes     Partners: Male     Comment: 20+ years of menopause   Social History Narrative    Retired      Social Determinants of Health     Financial Resource Strain: Unknown    Difficulty of Paying Living Expenses: Patient refused         REVIEW OF SYSTEMS:  Review of Systems   Constitutional:  Negative for fever. HENT:  Negative for ear pain and sore throat. Eyes:  Negative for pain and visual disturbance. Respiratory:  Negative for cough and shortness of breath. Cardiovascular:  Negative for chest pain. Gastrointestinal:  Positive for abdominal pain (improving). Negative for anal bleeding, blood in stool and diarrhea. Genitourinary:  Negative for dysuria and hematuria. Musculoskeletal:  Positive for arthralgias. Negative for myalgias. Skin:  Negative for rash. Neurological:  Negative for headaches. Hematological:  Does not bruise/bleed easily. Psychiatric/Behavioral:  Negative for suicidal ideas. Objective:   /73   Pulse 84   Temp 98.2 °F (36.8 °C) (Temporal)   Resp 12   Ht 5' 10\" (1.778 m)   Wt 180 lb (81.6 kg)   SpO2 96%   BMI 25.83 kg/m²      Physical Exam  Constitutional:       Appearance: Normal appearance. HENT:      Head: Normocephalic and atraumatic.       Right Ear: External ear normal.      Left Ear: External ear normal.   Eyes:      General: No scleral icterus. Right eye: No discharge. Left eye: No discharge. Conjunctiva/sclera: Conjunctivae normal.   Cardiovascular:      Rate and Rhythm: Normal rate and regular rhythm. Pulses: Normal pulses. Heart sounds: Normal heart sounds. Pulmonary:      Effort: Pulmonary effort is normal.      Breath sounds: Normal breath sounds. Abdominal:      General: Abdomen is flat. Bowel sounds are normal. There is no distension. Palpations: Abdomen is soft. Tenderness: There is no abdominal tenderness. Musculoskeletal:         General: No swelling (BUE) or tenderness (BUE). Cervical back: Neck supple. Comments: She has some crepitus with flexion and extension along her left knee joint. She has tenderness to palpation along her posterior knee joint but not anteriorly. Lymphadenopathy:      Cervical: No cervical adenopathy. Skin:     General: Skin is warm and dry. Findings: No erythema. Neurological:      Mental Status: She is alert. Mental status is at baseline. Gait: Gait normal.   Psychiatric:         Mood and Affect: Mood normal.           DIAGNOSTICS:   1. EKG: EKG FINDINGS -sinus rhythm. She has some premature ventricular complexes. 2. CXR: Unremarkable for acute pathology. 3. Labs: Her creatinine is normal.  Her A1c is 5.7 as mentioned in the HPI. Her CBC is unremarkable. 4. Left knee Xray: brought in by patient. We reviewed the images. Findings are consistent with severe osteoarthritis along her left knee with some questionable areas laterally for erosions. Assessment/Plan:   1. Rheumatoid arthritis with rheumatoid factor: Stable. -Continue with Rx per her rheumatologist.    2. Hyperglycemia/Prediabetes: New diagnosis. -I encouraged her to maintain a low-carb diet. - We will check an A1c in 6 months. I discussed with her A1c is. All questions were answered.     3. Essential (primary) hypertension and H/o HLD: Her BP is low at 105/73.  -Reducing her losartan dose from 50 mg daily to 25 mg daily. 4.  Asthma: Stable. - Continue with inhaler therapy as prescribed by her pulmonologist.    5. Positive Leukocyte Esterase on UA: Asymptomatic. Her contamination? -Okay to complete Macrobid course    6. Left knee pain: From OA.  - She is cleared for her upcoming knee replacement surgery. Diagnosis Orders   1. Hyperglycemia, unspecified  Lipid Panel    Microalbumin / Creatinine Urine Ratio      2. Rheumatoid arthritis with rheumatoid factor of multiple sites without organ or systems involvement ([facility] [address]        3. Hyperlipidemia, unspecified hyperlipidemia type  Lipid Panel      4. Chronic pain of left knee        5. Essential (primary) hypertension  losartan (COZAAR) 25 MG tablet      6. Moderate persistent asthma, uncomplicated        7. Abnormal urinalysis              Preoperative Assessment: No contraindications to planned surgery   Orders/studies that need to be obtained prior to surgical clearance: none    Pt is to undergo an intermediate risk procedure with a low cardiac risk based on current history. =Labs and imaging within acceptable range. No contraindications to planned surgery. Discontinue NSAIDS 1 week prior to surgical procedure. Follow up as scheduled post operatively. I have discussed the plan of care with the patient. The patient has received an after-visit summary and questions were answered concerning future plans. I have discussed medication side effects and warnings with the patient as well. All questions were answered. The patient understands the plan of care. Handouts provided today with the above information. Pt instructed if symptoms worsen to call the office or report to the ED for continued care. Greater than 50% of the visit time was spent in counseling and/or coordination of care.         Dr. Ericka Hong  [facility] [place], [address]  Phone: [phone]  Fax: [phone]

## 2023-02-14 NOTE — PROGRESS NOTES
Jayme Freeman presents today for   Chief Complaint   Patient presents with    Pre-op Exam     Preop scheduled for Left total Knee Arthroplasty on 2-20-23 w/DR. Dorado               1. \"Have you been to the ER, urgent care clinic since your last visit? Hospitalized since your last visit? \" no    2. \"Have you seen or consulted any other health care providers outside of the [address] [place] [address] since your last visit? \" yes     3. For patients aged 39-70: Has the patient had a colonoscopy / FIT/ Cologuard? Yes - no Care Gap present      If the patient is female:    4. For patients aged 41-77: Has the patient had a mammogram within the past 2 years? Yes - no Care Gap present      5. For patients aged 21-65: Has the patient had a pap smear?  Yes - no Care Gap present

## 2023-02-15 ENCOUNTER — HOSPITAL ENCOUNTER (OUTPATIENT)
Facility: HOSPITAL | Age: 68
Discharge: HOME | End: 2023-02-18
Payer: MEDICARE

## 2023-02-15 LAB
ABO + RH BLD: NORMAL
BLOOD GROUP ANTIBODIES SERPL: NORMAL
SPECIMEN EXP DATE BLD: NORMAL

## 2023-02-15 PROCEDURE — 86850 RBC ANTIBODY SCREEN: CPT

## 2023-02-15 PROCEDURE — 36415 COLL VENOUS BLD VENIPUNCTURE: CPT

## 2023-02-17 ENCOUNTER — ANESTHESIA EVENT (OUTPATIENT)
Facility: HOSPITAL | Age: 68
End: 2023-02-17
Payer: MEDICARE

## 2023-02-17 NOTE — NURSE NAVIGATOR
Call placed to patient, ID verified x 2. Patient  has decided with their surgeon to have a total knee replacement to decrease  pain and improve mobility . Topics discussed included surgery preparation, what to expect the day of surgery, medications, physical and occupational therapy, and discharge planning. It was discussed that this is considered an elective surgery and that prior to the surgery  decisions such as arranging for help at home once they are discharged needs to be made. Patient agreed to get home ready for surgery and to have a ride arranged to go home. She identifies her  as her support system, has all required DME, and would like to go home day of surgery after passing PT on 2 surgical. Instructions were given for CHG bathing. Patient will complete the procedure the morning of surgery. She denies any rashes, bruises or open areas to her skin at this time. Patient was reminded not to apply any deoderant, perfumes, makeup or lotions to skin the morning of surgery. They will remain NPO after midnight the night before surgery and will take only the medications as instructed to take by her surgeon the morning of surgery with a sip of water. Patient verbalized that she will take her losartan and her inhaler the morning of surgery with a sip of water. A total knee replacement education  book will be provided to patient post op prior to discharge. Education regarding the importance of early and frequent ambulation to avoid surgical complications and to assist with pain was provided. Recommended the use of ice to assist with pain and swelling post op for 20 minutes an hour, not to be placed directly on her skin. Patient verbalized understanding of all information provided. Opportunity was given to ask questions and phone number of the Orthopaedic   was given for any questions or concerns that may arise later.

## 2023-02-20 ENCOUNTER — HOME HEALTH ADMISSION (OUTPATIENT)
Age: 68
End: 2023-02-20
Payer: MEDICARE

## 2023-02-20 ENCOUNTER — APPOINTMENT (OUTPATIENT)
Facility: HOSPITAL | Age: 68
End: 2023-02-20
Attending: ORTHOPAEDIC SURGERY
Payer: MEDICARE

## 2023-02-20 ENCOUNTER — ANESTHESIA (OUTPATIENT)
Facility: HOSPITAL | Age: 68
End: 2023-02-20
Payer: MEDICARE

## 2023-02-20 ENCOUNTER — HOSPITAL ENCOUNTER (OUTPATIENT)
Facility: HOSPITAL | Age: 68
Setting detail: OUTPATIENT SURGERY
Discharge: HOME HEALTH | End: 2023-02-20
Attending: ORTHOPAEDIC SURGERY | Admitting: ORTHOPAEDIC SURGERY
Payer: MEDICARE

## 2023-02-20 VITALS
HEIGHT: 70 IN | OXYGEN SATURATION: 95 % | DIASTOLIC BLOOD PRESSURE: 76 MMHG | SYSTOLIC BLOOD PRESSURE: 128 MMHG | WEIGHT: 180 LBS | BODY MASS INDEX: 25.77 KG/M2 | HEART RATE: 84 BPM | RESPIRATION RATE: 17 BRPM | TEMPERATURE: 97.7 F

## 2023-02-20 DIAGNOSIS — M17.12 ARTHRITIS OF LEFT KNEE: Primary | ICD-10-CM

## 2023-02-20 PROCEDURE — G0378 HOSPITAL OBSERVATION PER HR: HCPCS

## 2023-02-20 PROCEDURE — 6360000002 HC RX W HCPCS: Performed by: ORTHOPAEDIC SURGERY

## 2023-02-20 PROCEDURE — 2709999900 HC NON-CHARGEABLE SUPPLY: Performed by: ORTHOPAEDIC SURGERY

## 2023-02-20 PROCEDURE — 7100000001 HC PACU RECOVERY - ADDTL 15 MIN: Performed by: ORTHOPAEDIC SURGERY

## 2023-02-20 PROCEDURE — 3700000000 HC ANESTHESIA ATTENDED CARE: Performed by: ORTHOPAEDIC SURGERY

## 2023-02-20 PROCEDURE — 64447 NJX AA&/STRD FEMORAL NRV IMG: CPT | Performed by: ANESTHESIOLOGY

## 2023-02-20 PROCEDURE — 2580000003 HC RX 258: Performed by: ORTHOPAEDIC SURGERY

## 2023-02-20 PROCEDURE — C1713 ANCHOR/SCREW BN/BN,TIS/BN: HCPCS | Performed by: ORTHOPAEDIC SURGERY

## 2023-02-20 PROCEDURE — 64450 NJX AA&/STRD OTHER PN/BRANCH: CPT | Performed by: ORTHOPAEDIC SURGERY

## 2023-02-20 PROCEDURE — 2500000003 HC RX 250 WO HCPCS: Performed by: NURSE ANESTHETIST, CERTIFIED REGISTERED

## 2023-02-20 PROCEDURE — 6360000002 HC RX W HCPCS: Performed by: ANESTHESIOLOGY

## 2023-02-20 PROCEDURE — 2580000003 HC RX 258: Performed by: NURSE ANESTHETIST, CERTIFIED REGISTERED

## 2023-02-20 PROCEDURE — 3700000001 HC ADD 15 MINUTES (ANESTHESIA): Performed by: ORTHOPAEDIC SURGERY

## 2023-02-20 PROCEDURE — 6360000002 HC RX W HCPCS: Performed by: NURSE ANESTHETIST, CERTIFIED REGISTERED

## 2023-02-20 PROCEDURE — 97161 PT EVAL LOW COMPLEX 20 MIN: CPT

## 2023-02-20 PROCEDURE — 73560 X-RAY EXAM OF KNEE 1 OR 2: CPT

## 2023-02-20 PROCEDURE — 7100000000 HC PACU RECOVERY - FIRST 15 MIN: Performed by: ORTHOPAEDIC SURGERY

## 2023-02-20 PROCEDURE — C1776 JOINT DEVICE (IMPLANTABLE): HCPCS | Performed by: ORTHOPAEDIC SURGERY

## 2023-02-20 PROCEDURE — A4217 STERILE WATER/SALINE, 500 ML: HCPCS | Performed by: ORTHOPAEDIC SURGERY

## 2023-02-20 PROCEDURE — 97116 GAIT TRAINING THERAPY: CPT

## 2023-02-20 PROCEDURE — 2720000010 HC SURG SUPPLY STERILE: Performed by: ORTHOPAEDIC SURGERY

## 2023-02-20 PROCEDURE — A4216 STERILE WATER/SALINE, 10 ML: HCPCS | Performed by: ORTHOPAEDIC SURGERY

## 2023-02-20 PROCEDURE — 6370000000 HC RX 637 (ALT 250 FOR IP): Performed by: ORTHOPAEDIC SURGERY

## 2023-02-20 PROCEDURE — 3600000003 HC SURGERY LEVEL 3 BASE: Performed by: ORTHOPAEDIC SURGERY

## 2023-02-20 PROCEDURE — 2500000003 HC RX 250 WO HCPCS: Performed by: ORTHOPAEDIC SURGERY

## 2023-02-20 PROCEDURE — 3600000013 HC SURGERY LEVEL 3 ADDTL 15MIN: Performed by: ORTHOPAEDIC SURGERY

## 2023-02-20 DEVICE — CEMENT BNE 20ML 41GM FULL DOSE PMMA W/ TOBRA M VISC RADPQ: Type: IMPLANTABLE DEVICE | Site: KNEE | Status: FUNCTIONAL

## 2023-02-20 DEVICE — GENESIS TROCHLEAR PIN 1/8 X 3
Type: IMPLANTABLE DEVICE | Site: KNEE | Status: FUNCTIONAL
Brand: GENESIS

## 2023-02-20 DEVICE — IMPLANT PATELLAR DIA38MM THK11MM X3 ASYMMETRIC TRIATHLON: Type: IMPLANTABLE DEVICE | Site: KNEE | Status: FUNCTIONAL

## 2023-02-20 DEVICE — GENESIS II OXINIUM POSTERIOR                                    STABILIZED FEMORAL LEFT SIZE 7
Type: IMPLANTABLE DEVICE | Site: KNEE | Status: FUNCTIONAL
Brand: GENESIS II

## 2023-02-20 DEVICE — LEGION POSTERIOR STABILIZED HIGH                                    FLEX HIGHLY CROSS LINKED                                    POLYETHYLENE SIZE 5-6 11MM
Type: IMPLANTABLE DEVICE | Site: KNEE | Status: FUNCTIONAL
Brand: LEGION

## 2023-02-20 DEVICE — GENESIS II NON-POROUS TIBIAL                                    BASEPLATE SIZE 6 LT
Type: IMPLANTABLE DEVICE | Site: KNEE | Status: FUNCTIONAL
Brand: GENESIS II

## 2023-02-20 DEVICE — COMPONENT KNEE CMNTLS K1 PREMIER: Type: IMPLANTABLE DEVICE | Site: KNEE | Status: FUNCTIONAL

## 2023-02-20 RX ORDER — HYDROCODONE BITARTRATE AND ACETAMINOPHEN 10; 325 MG/1; MG/1
1-2 TABLET ORAL EVERY 6 HOURS PRN
Qty: 56 TABLET | Refills: 0 | Status: SHIPPED | OUTPATIENT
Start: 2023-02-20 | End: 2023-02-27

## 2023-02-20 RX ORDER — SODIUM CHLORIDE 9 MG/ML
INJECTION, SOLUTION INTRAVENOUS PRN
Status: DISCONTINUED | OUTPATIENT
Start: 2023-02-20 | End: 2023-02-20 | Stop reason: HOSPADM

## 2023-02-20 RX ORDER — NITROFURANTOIN 25; 75 MG/1; MG/1
100 CAPSULE ORAL EVERY 12 HOURS SCHEDULED
Status: DISCONTINUED | OUTPATIENT
Start: 2023-02-20 | End: 2023-02-20 | Stop reason: HOSPADM

## 2023-02-20 RX ORDER — GLYCOPYRROLATE 0.2 MG/ML
INJECTION INTRAMUSCULAR; INTRAVENOUS PRN
Status: DISCONTINUED | OUTPATIENT
Start: 2023-02-20 | End: 2023-02-20 | Stop reason: SDUPTHER

## 2023-02-20 RX ORDER — FLUTICASONE PROPIONATE 50 MCG
2 SPRAY, SUSPENSION (ML) NASAL DAILY
Status: DISCONTINUED | OUTPATIENT
Start: 2023-02-20 | End: 2023-02-20 | Stop reason: HOSPADM

## 2023-02-20 RX ORDER — FENTANYL CITRATE 50 UG/ML
100 INJECTION, SOLUTION INTRAMUSCULAR; INTRAVENOUS ONCE
Status: DISCONTINUED | OUTPATIENT
Start: 2023-02-20 | End: 2023-02-20 | Stop reason: HOSPADM

## 2023-02-20 RX ORDER — FENTANYL CITRATE 50 UG/ML
50 INJECTION, SOLUTION INTRAMUSCULAR; INTRAVENOUS EVERY 5 MIN PRN
Status: DISCONTINUED | OUTPATIENT
Start: 2023-02-20 | End: 2023-02-20 | Stop reason: HOSPADM

## 2023-02-20 RX ORDER — KETOROLAC TROMETHAMINE 15 MG/ML
15 INJECTION, SOLUTION INTRAMUSCULAR; INTRAVENOUS EVERY 6 HOURS
Status: DISCONTINUED | OUTPATIENT
Start: 2023-02-20 | End: 2023-02-20 | Stop reason: HOSPADM

## 2023-02-20 RX ORDER — BUDESONIDE AND FORMOTEROL FUMARATE DIHYDRATE 160; 4.5 UG/1; UG/1
2 AEROSOL RESPIRATORY (INHALATION) 2 TIMES DAILY
Status: DISCONTINUED | OUTPATIENT
Start: 2023-02-20 | End: 2023-02-20 | Stop reason: CLARIF

## 2023-02-20 RX ORDER — SODIUM CHLORIDE 0.9 % (FLUSH) 0.9 %
5-40 SYRINGE (ML) INJECTION PRN
Status: DISCONTINUED | OUTPATIENT
Start: 2023-02-20 | End: 2023-02-20 | Stop reason: HOSPADM

## 2023-02-20 RX ORDER — APREPITANT 40 MG/1
40 CAPSULE ORAL ONCE
Status: DISCONTINUED | OUTPATIENT
Start: 2023-02-20 | End: 2023-02-20 | Stop reason: HOSPADM

## 2023-02-20 RX ORDER — OXYCODONE HYDROCHLORIDE 5 MG/1
5 TABLET ORAL
Status: DISCONTINUED | OUTPATIENT
Start: 2023-02-20 | End: 2023-02-20 | Stop reason: HOSPADM

## 2023-02-20 RX ORDER — SODIUM CHLORIDE 0.9 % (FLUSH) 0.9 %
5-40 SYRINGE (ML) INJECTION EVERY 12 HOURS SCHEDULED
Status: DISCONTINUED | OUTPATIENT
Start: 2023-02-20 | End: 2023-02-20 | Stop reason: HOSPADM

## 2023-02-20 RX ORDER — LIDOCAINE HYDROCHLORIDE 10 MG/ML
5 INJECTION, SOLUTION INFILTRATION; PERINEURAL ONCE
Status: DISCONTINUED | OUTPATIENT
Start: 2023-02-20 | End: 2023-02-20 | Stop reason: HOSPADM

## 2023-02-20 RX ORDER — DOCUSATE SODIUM 100 MG/1
100 CAPSULE, LIQUID FILLED ORAL 2 TIMES DAILY
Qty: 60 CAPSULE | Refills: 1 | Status: SHIPPED | OUTPATIENT
Start: 2023-02-20 | End: 2023-03-22

## 2023-02-20 RX ORDER — ACETAMINOPHEN 500 MG
1000 TABLET ORAL EVERY 6 HOURS
Status: DISCONTINUED | OUTPATIENT
Start: 2023-02-20 | End: 2023-02-20 | Stop reason: HOSPADM

## 2023-02-20 RX ORDER — EPHEDRINE SULFATE/0.9% NACL/PF 25 MG/5 ML
SYRINGE (ML) INTRAVENOUS PRN
Status: DISCONTINUED | OUTPATIENT
Start: 2023-02-20 | End: 2023-02-20 | Stop reason: SDUPTHER

## 2023-02-20 RX ORDER — ONDANSETRON 2 MG/ML
4 INJECTION INTRAMUSCULAR; INTRAVENOUS
Status: DISCONTINUED | OUTPATIENT
Start: 2023-02-20 | End: 2023-02-20 | Stop reason: HOSPADM

## 2023-02-20 RX ORDER — ROPIVACAINE HYDROCHLORIDE 5 MG/ML
30 INJECTION, SOLUTION EPIDURAL; INFILTRATION; PERINEURAL ONCE
Status: DISCONTINUED | OUTPATIENT
Start: 2023-02-20 | End: 2023-02-20 | Stop reason: HOSPADM

## 2023-02-20 RX ORDER — ONDANSETRON 4 MG/1
4 TABLET, ORALLY DISINTEGRATING ORAL EVERY 6 HOURS PRN
Status: DISCONTINUED | OUTPATIENT
Start: 2023-02-20 | End: 2023-02-20 | Stop reason: HOSPADM

## 2023-02-20 RX ORDER — HYDROMORPHONE HYDROCHLORIDE 1 MG/ML
0.5 INJECTION, SOLUTION INTRAMUSCULAR; INTRAVENOUS; SUBCUTANEOUS EVERY 10 MIN PRN
Status: DISCONTINUED | OUTPATIENT
Start: 2023-02-20 | End: 2023-02-20 | Stop reason: HOSPADM

## 2023-02-20 RX ORDER — ALBUTEROL SULFATE 90 UG/1
1 AEROSOL, METERED RESPIRATORY (INHALATION) EVERY 6 HOURS PRN
Status: DISCONTINUED | OUTPATIENT
Start: 2023-02-20 | End: 2023-02-20 | Stop reason: HOSPADM

## 2023-02-20 RX ORDER — ASPIRIN 81 MG/1
81 TABLET ORAL 2 TIMES DAILY
Qty: 60 TABLET | Refills: 1 | Status: SHIPPED | OUTPATIENT
Start: 2023-02-20

## 2023-02-20 RX ORDER — FENTANYL CITRATE 50 UG/ML
INJECTION, SOLUTION INTRAMUSCULAR; INTRAVENOUS PRN
Status: DISCONTINUED | OUTPATIENT
Start: 2023-02-20 | End: 2023-02-20 | Stop reason: SDUPTHER

## 2023-02-20 RX ORDER — PANTOPRAZOLE SODIUM 40 MG/1
40 TABLET, DELAYED RELEASE ORAL
Status: DISCONTINUED | OUTPATIENT
Start: 2023-02-21 | End: 2023-02-20 | Stop reason: HOSPADM

## 2023-02-20 RX ORDER — ONDANSETRON 4 MG/1
4 TABLET, FILM COATED ORAL EVERY 8 HOURS PRN
Qty: 30 TABLET | Refills: 2 | Status: SHIPPED | OUTPATIENT
Start: 2023-02-20

## 2023-02-20 RX ORDER — ONDANSETRON 2 MG/ML
INJECTION INTRAMUSCULAR; INTRAVENOUS PRN
Status: DISCONTINUED | OUTPATIENT
Start: 2023-02-20 | End: 2023-02-20 | Stop reason: SDUPTHER

## 2023-02-20 RX ORDER — SODIUM CHLORIDE 9 MG/ML
INJECTION, SOLUTION INTRAVENOUS CONTINUOUS
Status: DISCONTINUED | OUTPATIENT
Start: 2023-02-20 | End: 2023-02-20 | Stop reason: HOSPADM

## 2023-02-20 RX ORDER — OXYCODONE HYDROCHLORIDE 5 MG/1
10 TABLET ORAL
Status: DISCONTINUED | OUTPATIENT
Start: 2023-02-20 | End: 2023-02-20 | Stop reason: HOSPADM

## 2023-02-20 RX ORDER — DEXAMETHASONE SODIUM PHOSPHATE 10 MG/ML
8 INJECTION, SOLUTION INTRAMUSCULAR; INTRAVENOUS ONCE
Status: DISCONTINUED | OUTPATIENT
Start: 2023-02-20 | End: 2023-02-20 | Stop reason: HOSPADM

## 2023-02-20 RX ORDER — ARFORMOTEROL TARTRATE 15 UG/2ML
15 SOLUTION RESPIRATORY (INHALATION) 2 TIMES DAILY
Status: DISCONTINUED | OUTPATIENT
Start: 2023-02-20 | End: 2023-02-20 | Stop reason: HOSPADM

## 2023-02-20 RX ORDER — DEXAMETHASONE SODIUM PHOSPHATE 4 MG/ML
INJECTION, SOLUTION INTRA-ARTICULAR; INTRALESIONAL; INTRAMUSCULAR; INTRAVENOUS; SOFT TISSUE PRN
Status: DISCONTINUED | OUTPATIENT
Start: 2023-02-20 | End: 2023-02-20 | Stop reason: SDUPTHER

## 2023-02-20 RX ORDER — SUCCINYLCHOLINE/SOD CL,ISO/PF 100 MG/5ML
SYRINGE (ML) INTRAVENOUS PRN
Status: DISCONTINUED | OUTPATIENT
Start: 2023-02-20 | End: 2023-02-20 | Stop reason: SDUPTHER

## 2023-02-20 RX ORDER — SODIUM CHLORIDE, SODIUM LACTATE, POTASSIUM CHLORIDE, CALCIUM CHLORIDE 600; 310; 30; 20 MG/100ML; MG/100ML; MG/100ML; MG/100ML
INJECTION, SOLUTION INTRAVENOUS CONTINUOUS
Status: DISCONTINUED | OUTPATIENT
Start: 2023-02-20 | End: 2023-02-20 | Stop reason: HOSPADM

## 2023-02-20 RX ORDER — MONTELUKAST SODIUM 10 MG/1
10 TABLET ORAL NIGHTLY
Status: DISCONTINUED | OUTPATIENT
Start: 2023-02-20 | End: 2023-02-20 | Stop reason: HOSPADM

## 2023-02-20 RX ORDER — PREGABALIN 50 MG/1
50 CAPSULE ORAL 2 TIMES DAILY
Status: DISCONTINUED | OUTPATIENT
Start: 2023-02-20 | End: 2023-02-20 | Stop reason: HOSPADM

## 2023-02-20 RX ORDER — NEOSTIGMINE METHYLSULFATE 1 MG/ML
INJECTION, SOLUTION INTRAVENOUS PRN
Status: DISCONTINUED | OUTPATIENT
Start: 2023-02-20 | End: 2023-02-20 | Stop reason: SDUPTHER

## 2023-02-20 RX ORDER — BUDESONIDE 0.25 MG/2ML
0.25 INHALANT ORAL 2 TIMES DAILY
Status: DISCONTINUED | OUTPATIENT
Start: 2023-02-20 | End: 2023-02-20 | Stop reason: HOSPADM

## 2023-02-20 RX ORDER — CEFAZOLIN SODIUM 1 G/3ML
2000 INJECTION, POWDER, FOR SOLUTION INTRAMUSCULAR; INTRAVENOUS ONCE
Status: COMPLETED | OUTPATIENT
Start: 2023-02-20 | End: 2023-02-20

## 2023-02-20 RX ORDER — ACETAMINOPHEN 500 MG
1000 TABLET ORAL ONCE
Status: DISCONTINUED | OUTPATIENT
Start: 2023-02-20 | End: 2023-02-20 | Stop reason: HOSPADM

## 2023-02-20 RX ORDER — MIDAZOLAM HYDROCHLORIDE 2 MG/2ML
2 INJECTION, SOLUTION INTRAMUSCULAR; INTRAVENOUS ONCE
Status: DISCONTINUED | OUTPATIENT
Start: 2023-02-20 | End: 2023-02-20 | Stop reason: HOSPADM

## 2023-02-20 RX ORDER — CEFADROXIL 500 MG/1
500 CAPSULE ORAL 2 TIMES DAILY
Qty: 20 CAPSULE | Refills: 0 | Status: SHIPPED | OUTPATIENT
Start: 2023-02-20 | End: 2023-03-02

## 2023-02-20 RX ORDER — FAMOTIDINE 20 MG/1
20 TABLET, FILM COATED ORAL ONCE
Status: DISCONTINUED | OUTPATIENT
Start: 2023-02-20 | End: 2023-02-20 | Stop reason: HOSPADM

## 2023-02-20 RX ORDER — FAMOTIDINE 20 MG/1
20 TABLET, FILM COATED ORAL 2 TIMES DAILY
Status: DISCONTINUED | OUTPATIENT
Start: 2023-02-20 | End: 2023-02-20 | Stop reason: HOSPADM

## 2023-02-20 RX ORDER — ROPIVACAINE HYDROCHLORIDE 2 MG/ML
INJECTION, SOLUTION EPIDURAL; INFILTRATION; PERINEURAL
Status: COMPLETED | OUTPATIENT
Start: 2023-02-20 | End: 2023-02-20

## 2023-02-20 RX ORDER — POLYETHYLENE GLYCOL 3350 17 G/17G
17 POWDER, FOR SOLUTION ORAL DAILY PRN
Status: DISCONTINUED | OUTPATIENT
Start: 2023-02-20 | End: 2023-02-20 | Stop reason: HOSPADM

## 2023-02-20 RX ORDER — SENNA AND DOCUSATE SODIUM 50; 8.6 MG/1; MG/1
1 TABLET, FILM COATED ORAL 2 TIMES DAILY
Status: DISCONTINUED | OUTPATIENT
Start: 2023-02-20 | End: 2023-02-20 | Stop reason: HOSPADM

## 2023-02-20 RX ORDER — ASPIRIN 81 MG/1
81 TABLET ORAL 2 TIMES DAILY
Status: DISCONTINUED | OUTPATIENT
Start: 2023-02-21 | End: 2023-02-20 | Stop reason: HOSPADM

## 2023-02-20 RX ORDER — PROCHLORPERAZINE EDISYLATE 5 MG/ML
10 INJECTION INTRAMUSCULAR; INTRAVENOUS
Status: COMPLETED | OUTPATIENT
Start: 2023-02-20 | End: 2023-02-20

## 2023-02-20 RX ORDER — ROCURONIUM BROMIDE 10 MG/ML
INJECTION, SOLUTION INTRAVENOUS PRN
Status: DISCONTINUED | OUTPATIENT
Start: 2023-02-20 | End: 2023-02-20 | Stop reason: SDUPTHER

## 2023-02-20 RX ORDER — ONDANSETRON 2 MG/ML
4 INJECTION INTRAMUSCULAR; INTRAVENOUS EVERY 6 HOURS PRN
Status: DISCONTINUED | OUTPATIENT
Start: 2023-02-20 | End: 2023-02-20 | Stop reason: HOSPADM

## 2023-02-20 RX ORDER — LIDOCAINE HYDROCHLORIDE 20 MG/ML
INJECTION, SOLUTION EPIDURAL; INFILTRATION; INTRACAUDAL; PERINEURAL PRN
Status: DISCONTINUED | OUTPATIENT
Start: 2023-02-20 | End: 2023-02-20 | Stop reason: SDUPTHER

## 2023-02-20 RX ORDER — LOSARTAN POTASSIUM 25 MG/1
25 TABLET ORAL DAILY
Status: DISCONTINUED | OUTPATIENT
Start: 2023-02-21 | End: 2023-02-20 | Stop reason: HOSPADM

## 2023-02-20 RX ADMIN — FENTANYL CITRATE 50 MCG: 50 INJECTION, SOLUTION INTRAMUSCULAR; INTRAVENOUS at 10:27

## 2023-02-20 RX ADMIN — TRANEXAMIC ACID 1000 MG: 100 INJECTION, SOLUTION INTRAVENOUS at 09:27

## 2023-02-20 RX ADMIN — FAMOTIDINE 20 MG: 10 INJECTION, SOLUTION INTRAVENOUS at 12:18

## 2023-02-20 RX ADMIN — FENTANYL CITRATE 50 MCG: 50 INJECTION, SOLUTION INTRAMUSCULAR; INTRAVENOUS at 08:02

## 2023-02-20 RX ADMIN — SODIUM CHLORIDE, SODIUM LACTATE, POTASSIUM CHLORIDE, AND CALCIUM CHLORIDE: 600; 310; 30; 20 INJECTION, SOLUTION INTRAVENOUS at 09:38

## 2023-02-20 RX ADMIN — LIDOCAINE HYDROCHLORIDE 80 MG: 20 INJECTION, SOLUTION EPIDURAL; INFILTRATION; INTRACAUDAL; PERINEURAL at 08:02

## 2023-02-20 RX ADMIN — TRANEXAMIC ACID 1000 MG: 100 INJECTION, SOLUTION INTRAVENOUS at 08:19

## 2023-02-20 RX ADMIN — ROCURONIUM BROMIDE 10 MG: 10 INJECTION, SOLUTION INTRAVENOUS at 08:45

## 2023-02-20 RX ADMIN — PREGABALIN 50 MG: 50 CAPSULE ORAL at 12:19

## 2023-02-20 RX ADMIN — LIDOCAINE HYDROCHLORIDE 20 MG: 20 INJECTION, SOLUTION EPIDURAL; INFILTRATION; INTRACAUDAL; PERINEURAL at 09:48

## 2023-02-20 RX ADMIN — CEFAZOLIN 2 G: 330 INJECTION, POWDER, FOR SOLUTION INTRAMUSCULAR; INTRAVENOUS at 07:08

## 2023-02-20 RX ADMIN — DEXAMETHASONE SODIUM PHOSPHATE 8 MG: 4 INJECTION, SOLUTION INTRAMUSCULAR; INTRAVENOUS at 08:18

## 2023-02-20 RX ADMIN — GLYCOPYRROLATE 0.4 MG: 0.2 INJECTION, SOLUTION INTRAMUSCULAR; INTRAVENOUS at 09:40

## 2023-02-20 RX ADMIN — ONDANSETRON 4 MG: 2 INJECTION INTRAMUSCULAR; INTRAVENOUS at 09:26

## 2023-02-20 RX ADMIN — ROCURONIUM BROMIDE 5 MG: 10 INJECTION, SOLUTION INTRAVENOUS at 08:02

## 2023-02-20 RX ADMIN — PROCHLORPERAZINE EDISYLATE 10 MG: 5 INJECTION INTRAMUSCULAR; INTRAVENOUS at 10:32

## 2023-02-20 RX ADMIN — Medication 10 MG: at 09:21

## 2023-02-20 RX ADMIN — ROCURONIUM BROMIDE 25 MG: 10 INJECTION, SOLUTION INTRAVENOUS at 08:07

## 2023-02-20 RX ADMIN — Medication 100 MG: at 08:02

## 2023-02-20 RX ADMIN — FENTANYL CITRATE 50 MCG: 50 INJECTION, SOLUTION INTRAMUSCULAR; INTRAVENOUS at 08:25

## 2023-02-20 RX ADMIN — DOCUSATE SODIUM 50 MG AND SENNOSIDES 8.6 MG 1 TABLET: 8.6; 5 TABLET, FILM COATED ORAL at 12:18

## 2023-02-20 RX ADMIN — NEOSTIGMINE METHYLSULFATE 3 MG: 1 INJECTION, SOLUTION INTRAVENOUS at 09:40

## 2023-02-20 RX ADMIN — Medication 5 MG: at 09:42

## 2023-02-20 RX ADMIN — KETOROLAC TROMETHAMINE 15 MG: 15 INJECTION, SOLUTION INTRAMUSCULAR; INTRAVENOUS at 12:20

## 2023-02-20 RX ADMIN — ROCURONIUM BROMIDE 10 MG: 10 INJECTION, SOLUTION INTRAVENOUS at 08:23

## 2023-02-20 RX ADMIN — SODIUM CHLORIDE, SODIUM LACTATE, POTASSIUM CHLORIDE, AND CALCIUM CHLORIDE: 600; 310; 30; 20 INJECTION, SOLUTION INTRAVENOUS at 07:54

## 2023-02-20 RX ADMIN — ACETAMINOPHEN 1000 MG: 500 TABLET ORAL at 12:18

## 2023-02-20 RX ADMIN — ROPIVACAINE HYDROCHLORIDE 30 ML: 2 INJECTION, SOLUTION EPIDURAL; INFILTRATION at 07:20

## 2023-02-20 ASSESSMENT — PAIN SCALES - GENERAL: PAINLEVEL_OUTOF10: 4

## 2023-02-20 ASSESSMENT — PAIN DESCRIPTION - ORIENTATION: ORIENTATION: LEFT

## 2023-02-20 ASSESSMENT — PAIN DESCRIPTION - DESCRIPTORS: DESCRIPTORS: SORE

## 2023-02-20 ASSESSMENT — PAIN - FUNCTIONAL ASSESSMENT: PAIN_FUNCTIONAL_ASSESSMENT: 0-10

## 2023-02-20 ASSESSMENT — PAIN DESCRIPTION - LOCATION: LOCATION: KNEE

## 2023-02-20 NOTE — NURSE NAVIGATOR
Rounded on patient s/p left total knee replacement with Dr. Marisela Moreland, [facility] 02/20/2023. Patient observed to be alert and oriented x 3, sitting up  in bed. She denies chest pain, shortness of breath, nausea, vomiting or calf pain. She is rating pain 2/10 at this time to left knee. She has full feeling to her left knee and is able to perform a straight leg raise with left leg. Per patient she has already gotten up to void with standby assist and use of rolling walker. She states that she experienced a bit of feeling off balance but felt that was more from the ice pack being still being on, rather than feeling lightheaded. Encouraged patient to drink some gingerale and crackers provided. Total knee replacement education book provided to patient along with education regarding the use of incentive spirometry. Post operative showering reviewed with patient. Patient educated that she may remove ace wrap tomorrow to shower. No tubs or submersions in water. Dressing underneath observed to be clean, dry and intact. Patient reminded that this remains in place unless changed by home health or by Dr. Marisela Moreland at her two week post op. Encouraged hourly ambulation 10 minutes every hour and icing 20 minutes hourly not to be placed directly on her skin to help with pain and stiffness. Patient verbalized understanding of all information provided. Patient wishes to discharge home today. She has a strong support system at home and all required DME. She will discharge home with home health and home physical therapy if she cleared Pt/ OT safe for discharge.

## 2023-02-20 NOTE — OP NOTE
[address] [place] [address]   OPERATIVE REPORT    Name:  Bi Portillo  MR#:   [id number]  :  1955  ACCOUNT #:  [de-identified]  DATE OF SERVICE:  2023    PREOPERATIVE DIAGNOSES:  End-stage arthritis of the left knee with rheumatoid arthritis, osteopenia/osteoporosis, and end-stage arthritis with valgus deformity of the left knee. POSTOPERATIVE DIAGNOSES:  End-stage arthritis of the left knee with rheumatoid arthritis, osteopenia/osteoporosis, and end-stage arthritis with valgus deformity of the left knee. PROCEDURE PERFORMED:  Left total knee replacement using the [employer] system with a size 7 left posterior-stabilized Sheri II Oxinium femoral component, size 6 tibia, a size 5-6 11 posterior-stabilized high flexion insert, and a 38 asymmetric patella and rebalancing of knee. SURGEON:  Carolyn Apple MD    FIRST ASSISTANT:  Ghulam Cast. SECOND ASSISTANT:  Priscila Allen. ANESTHETIST:  Dr. Leon Service. ANESTHESIA:  Preoperative nerve block with light general.    COMPLICATIONS:  No complications. SPECIMENS REMOVED:  No specimen. IMPLANTS:  As above mentioned. ESTIMATED BLOOD LOSS:  Less than 50 mL. Ghulam Cast was the first assistant who assisted with all phases of surgery commencing with patient positioning, patient prep, patient drape, leg positioning during surgery, retracting, assisting with the surgery itself, closure, dressing placement, and transfer. PROCEDURE:  After the anesthetic was successfully induced, it was confirmed the patient did receive her antibiotics and a time-out performed. Midline incision. Knee debrided in the usual fashion. Femoral canal aspirated, lavaged, and re-aspirated prior to instrumentation, cut for 5 degrees. Crab claw was utilized to prevent undercutting. All cuts checked for trueness and squareness, and all soft tissue structures protected during the sawing process.   An extra 2 was taken off the distal femur to help correct the fixed flexion deformity, and a modified gap balancing technique would be performed. After preliminary femoral cuts, we switched our attention to the tibia. External alignment guide, resected enough to get a good clean-up cut without skive, protecting neurologic structures and soft tissue structures during the sawing process. Removed posterior osteophytes with a curved osteotome, gap balanced the knee between medial and lateral flexion and extension thus confirming correct femoral rotation. Aquamantys, Exparel cocktail posteriorly. Femoral finishing followed by placement of the components to set the tibial rotation which was marked and later re-punched. Resurfaced the patella, restoring patellar fitness anatomically and using a rongeur to smooth out the edges. With all the trial components in place, we checked the overall alignment, range of motion, soft tissue balance, patellar tracking, stability and alignment, all of which we were delighted with. Fashioned a bone plug for the femoral canal.  Cemented in the knee removing all extraneous cement holding in full extension. Cement was fully cured. Further cement removal.  Pulse lavage and trialing. I was happiest with the 11, locked it in place again reducing the knee. We did dilute Betadine protocol and a hybrid fixation of the extensor mechanism with figure-of-eight #2 and also Stratafix. Fully flexed the knee prior to closure of the skin. Delighted with the case. The patient brought to recovery room in stable condition.       Obinna Low MD [id number]  D:  02/20/2023 9:50  T:  02/20/2023 16:16  JOB #:  [id number]

## 2023-02-20 NOTE — ANESTHESIA PRE PROCEDURE
Department of Anesthesiology  Preprocedure Note       Name:  Jerman Martinez   Age:  75 y.o.  :  1955                                          MRN:  [id number]         Date:  2023      Surgeon: Cornelio Leary):  Kayy Ivey MD    Procedure: Procedure(s):  LEFT TOTAL KNEE ARTHROPLASTY; ALLISON [employer] NEPHEW; Bonna Peek TO ASSIST; NERVE BLOCK; 23 HR    Medications prior to admission:   Prior to Admission medications    Medication Sig Start Date End Date Taking?  Authorizing Provider   albuterol sulfate HFA (PROVENTIL;VENTOLIN;PROAIR) 108 (90 Base) MCG/ACT inhaler Inhale 1 puff into the lungs every 6 hours as needed    Historical Provider, MD   acetaminophen (TYLENOL) 325 MG tablet Take 325 mg by mouth every 4 hours as needed    Historical Provider, MD   abatacept (ORENCIA) 250 MG injection Infuse intravenously Monthly    Historical Provider, MD   Multiple Vitamins-Minerals (CENTRUM SILVER 50+WOMEN PO) Take 1 tablet by mouth daily    Historical Provider, MD   KRILL OIL OMEGA-3 PO Take by mouth daily    Historical Provider, MD   Cholecalciferol (VITAMIN D3 PO) Take 1 tablet by mouth daily    Historical Provider, MD   clobetasol (TEMOVATE) 0.05 % ointment Apply topically 12/3/20   Historical Provider, MD   vitamin B-12 (CYANOCOBALAMIN) 1000 MCG tablet take 1 tablet by mouth once daily 23   Historical Provider, MD   dexlansoprazole (DEXILANT) 60 MG CPDR delayed release capsule Take 60 mg by mouth daily 22   Historical Provider, MD   FEROSUL 325 (65 Fe) MG tablet take 1 tablet by mouth once daily before breakfast 22   Historical Provider, MD   montelukast (SINGULAIR) 10 MG tablet nightly 22   Historical Provider, MD   nitrofurantoin, macrocrystal-monohydrate, (MACROBID) 100 MG capsule take 1 capsule by mouth twice a day 23   Historical Provider, MD   triamcinolone (NASACORT) 55 MCG/ACT nasal inhaler 2 sprays by Nasal route daily    Historical Provider, MD   budesonide-formoterol (SYMBICORT) 160-4.5 MCG/ACT AERO Inhale 2 puffs into the lungs 2 times daily    Historical Provider, MD   ascorbic acid (VITAMIN C) 500 MG tablet Take 500 mg by mouth daily 12/3/19   Historical Provider, MD   Cetirizine HCl 10 MG CAPS Take 10 mg by mouth daily    Historical Provider, MD   estradiol (ESTRACE) 0.1 MG/GM vaginal cream Place 2 g vaginally daily    Historical Provider, MD   losartan (COZAAR) 25 MG tablet Take 1 tablet by mouth daily 2/14/23   Katie Kimble MD       Current medications:    Current Facility-Administered Medications   Medication Dose Route Frequency Provider Last Rate Last Admin    sodium chloride flush 0.9 % injection 5-40 mL  5-40 mL IntraVENous 2 times per day [facility], APRN - CRNA        sodium chloride flush 0.9 % injection 5-40 mL  5-40 mL IntraVENous PRN [facility], APRN - CRNA        0.9 % sodium chloride infusion   IntraVENous PRN [facility], APRN - CRNA        famotidine (PEPCID) tablet 20 mg  20 mg Oral Once [facility], APRN - CRNA        lactated ringers IV soln infusion   IntraVENous Continuous [facility], APRN - CRNA        ropivacaine (NAROPIN) 0.5% injection 30 mL  30 mL Infiltration Once [facility], APRN - CRNA        lidocaine 1 % injection 5 mL  5 mL IntraDERmal Once [facility], APRN - CRNA        fentaNYL (SUBLIMAZE) injection 100 mcg  100 mcg IntraVENous Once [facility], APRN - CRNA        midazolam (VERSED) PF injection 2 mg/2 mL  2 mg IntraVENous Once [facility], APRN - CRNA        acetaminophen (TYLENOL) tablet 1,000 mg  1,000 mg Oral Once Mahad Barkley MD        pregabalin (LYRICA) capsule 50 mg  50 mg Oral BID Mahad Barkley MD        dexamethasone (DECADRON) (PF) 10 mg/mL injection  8 mg IntraVENous Once Mahad Barkley MD        aprepitant (EMEND) capsule 40 mg  40 mg Oral Once [facility], APRN - [facility]         Current Outpatient Medications   Medication Sig Dispense Refill    albuterol sulfate HFA (PROVENTIL;VENTOLIN;PROAIR) 108 (90 Base) MCG/ACT inhaler Inhale 1 puff into the lungs every 6 hours as needed      acetaminophen (TYLENOL) 325 MG tablet Take 325 mg by mouth every 4 hours as needed      abatacept (ORENCIA) 250 MG injection Infuse intravenously Monthly      Multiple Vitamins-Minerals (CENTRUM SILVER 50+WOMEN PO) Take 1 tablet by mouth daily      KRILL OIL OMEGA-3 PO Take by mouth daily      Cholecalciferol (VITAMIN D3 PO) Take 1 tablet by mouth daily      clobetasol (TEMOVATE) 0.05 % ointment Apply topically      vitamin B-12 (CYANOCOBALAMIN) 1000 MCG tablet take 1 tablet by mouth once daily      dexlansoprazole (DEXILANT) 60 MG CPDR delayed release capsule Take 60 mg by mouth daily      FEROSUL 325 (65 Fe) MG tablet take 1 tablet by mouth once daily before breakfast      montelukast (SINGULAIR) 10 MG tablet nightly      nitrofurantoin, macrocrystal-monohydrate, (MACROBID) 100 MG capsule take 1 capsule by mouth twice a day      triamcinolone (NASACORT) 55 MCG/ACT nasal inhaler 2 sprays by Nasal route daily      budesonide-formoterol (SYMBICORT) 160-4.5 MCG/ACT AERO Inhale 2 puffs into the lungs 2 times daily      ascorbic acid (VITAMIN C) 500 MG tablet Take 500 mg by mouth daily      Cetirizine HCl 10 MG CAPS Take 10 mg by mouth daily      estradiol (ESTRACE) 0.1 MG/GM vaginal cream Place 2 g vaginally daily      losartan (COZAAR) 25 MG tablet Take 1 tablet by mouth daily 90 tablet 3       Allergies:     Allergies   Allergen Reactions    Adhesive Tape Other (See Comments)     Patient stated iv tape and tegaderm ok  Blisters, use paper tape only  Patient states tegaderm and paper tape are okay      Sulfasalazine Other (See Comments)     Could not taste anything, lightheadedness    Molds & Smuts Other (See Comments)    Chlorhexidine Itching and Other (See Comments)     \"stinging for a couple of hours\"    itching       Problem List:    Patient Active Problem List   Diagnosis Code  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    S/P cervical spinal fusion Z98.1    Rheumatoid arthritis involving multiple sites (HCC) M06.9    DNS (deviated nasal septum) J34.2    GERD (gastroesophageal reflux disease) K21.9    Hypertriglyceridemia E78.1    Arthritis, lumbar spine M47.816    Impaired glucose tolerance R73.02    Cervical stenosis of spine M48.02    Vitamin B12 deficiency E53.8    Vitamin D deficiency E55.9    Iron deficiency anemia R42.4    Lichen sclerosus et atrophicus L90.0    Moderate persistent asthma without complication Z09.30    Hx MRSA infection Z86.14    Arthritis of knee M17.10    Lumbar post-laminectomy syndrome M96.1    HTN (hypertension) I10    Hip arthritis M16.10       Past Medical History:        Diagnosis Date    Adjacent segment disease with spinal stenosis 6/22/2018    Allergic rhinitis     Anemia 2008    intermittant since then    Asthma     Back pain     French's esophagus with esophagitis     Cervical radiculopathy     Chronic sinusitis 5/15/2012    DNS (deviated nasal septum)     Empyema (HCC)     Foraminal stenosis of cervical region     C4-C5    GERD (gastroesophageal reflux disease)     Dr Arbutus Simmonds    Hx MRSA infection 8/11/2014    Hypertension     Hypertriglyceridemia     Insulin resistance 4/9/2012    Left knee pain     Lichen planus     Liver disease     LOPEZ-per pt, followed by Dr Amish Burks Menopause     Age 51    MRSA (methicillin resistant Staphylococcus aureus) infection 2008    left lung    Nausea & vomiting     Restless leg syndrome     Rheumatoid arthritis ([address]     Spinal cord stimulator status 2016    Spinal stenosis of cervical region     C4-C5 and C5-C6    TMJ syndrome     Wears glasses     and contacts       Past Surgical History:        Procedure Laterality Date    ANESTH,SURGERY OF SHOULDER  01/31/2019    BUNIONECTOMY      CERVICAL FUSION  08/13/2014    COLONOSCOPY FLX DX W/COLLJ SPEC WHEN PFRMD  06/18/2008 normal/[facility]   • HEENT  1997    TMJ surgery   • LUMBAR LAMINECTOMY  11/1995    [facility]   • LUMBAR LAMINECTOMY  02/1997    [facility]   • ORTHOPEDIC SURGERY Right 01/2015    bunion removal from right foot   • OTHER SURGICAL HISTORY  2007    thoracentesis   • OTHER SURGICAL HISTORY  2008    chest tube   • OTHER SURGICAL HISTORY Bilateral 1997    TMJ surgery   • OTHER SURGICAL HISTORY  2012    sinus surg 2012-Dr Bonilla.   • OTHER SURGICAL HISTORY  2016    spinal cord stimulator place for lumbar neuritis, good benefit   • ROTATOR CUFF REPAIR Right 01/2019   • TOTAL HIP ARTHROPLASTY Right 05/13/2022   • TOTAL KNEE ARTHROPLASTY Right 2020   • TUBAL LIGATION     • UPPER GASTROINTESTINAL ENDOSCOPY         Social History:    Social History     Tobacco Use   • Smoking status: Never     Passive exposure: Never   • Smokeless tobacco: Never   Substance Use Topics   • Alcohol use: Not Currently     Alcohol/week: 0.0 standard drinks                                Counseling given: Not Answered      Vital Signs (Current): There were no vitals filed for this visit.                                           BP Readings from Last 3 Encounters:   02/14/23 105/73   02/08/23 126/84   12/20/22 (!) 148/90       NPO Status:                                                                            Date of last solid food consumption: 02/19/23    BMI:   Wt Readings from Last 3 Encounters:   02/14/23 180 lb (81.6 kg)   02/08/23 180 lb (81.6 kg)   12/20/22 182 lb 1.6 oz (82.6 kg)     There is no height or weight on file to calculate BMI.    CBC:   Lab Results   Component Value Date/Time    WBC 5.9 02/01/2023 09:41 AM    RBC 4.49 02/01/2023 09:41 AM    HGB 13.4 02/01/2023 09:41 AM    HCT 41.2 02/01/2023 09:41 AM    MCV 91.8 02/01/2023 09:41 AM    RDW 13.6 02/01/2023 09:41 AM     02/01/2023 09:41 AM       CMP:   Lab Results   Component Value Date/Time     02/01/2023 09:41 AM    K 4.0 02/01/2023 09:41 AM     02/01/2023 09:41 AM    CO2  26 02/01/2023 09:41 AM    BUN 4 02/01/2023 09:41 AM    CREATININE 0.55 02/01/2023 09:41 AM    GFRAA >60 07/14/2022 09:22 AM    AGRATIO 1.1 07/14/2022 09:22 AM    GLUCOSE 85 02/01/2023 09:41 AM    PROT 7.6 07/14/2022 09:22 AM    CALCIUM 9.2 02/01/2023 09:41 AM    BILITOT 0.5 07/14/2022 09:22 AM    ALKPHOS 144 07/14/2022 09:22 AM    AST 17 07/14/2022 09:22 AM    ALT 24 07/14/2022 09:22 AM       POC Tests: No results for input(s): POCGLU, POCNA, POCK, POCCL, POCBUN, POCHEMO, POCHCT in the last 72 hours. Coags:   Lab Results   Component Value Date/Time    PROTIME 14.1 02/01/2023 09:41 AM    INR 1.0 02/01/2023 09:41 AM    INR 2.4 05/23/2022 01:10 PM    APTT 31.7 02/01/2023 09:41 AM       HCG (If Applicable): No results found for: PREGTESTUR, PREGSERUM, HCG, HCGQUANT     ABGs: No results found for: PHART, PO2ART, SGJ5NOM, TSI2ZYA, BEART, S2ILDZWR     Type & Screen (If Applicable):  No results found for: LABABO, LABRH    Drug/Infectious Status (If Applicable):  Lab Results   Component Value Date/Time    HEPCAB 0.05 06/24/2016 11:30 AM    HEPCAB NEGATIVE 06/24/2016 11:30 AM       COVID-19 Screening (If Applicable): No results found for: COVID19        Anesthesia Evaluation    Airway: Mallampati: II       Mouth opening: < 3 FB   Dental: normal exam         Pulmonary: breath sounds clear to auscultation  (+) asthma: exercise-induced asthma,           Patient did not smoke on day of surgery. Cardiovascular:  Exercise tolerance: good (>4 METS),   (+) hypertension: moderate,         Rhythm: regular  Rate: normal                    Neuro/Psych:   Negative Neuro/Psych ROS              GI/Hepatic/Renal:   (+) GERD:, liver disease:,           Endo/Other:    (+) : arthritis: OA and rheumatoid. , . Pt had PAT visit. ROS comment: Left hand swollen from RA today. Abdominal:             Vascular: negative vascular ROS.          Other Findings:           Anesthesia Plan      general and regional     ASA 3 Induction: intravenous. MIPS: Postoperative opioids intended and Prophylactic antiemetics administered. Anesthetic plan and risks discussed with patient. Plan discussed with CRNA.     Attending anesthesiologist reviewed and agrees with Preprocedure content      Post-op pain plan if not by surgeon: single peripheral nerve block            Colleen Hopkins MD   2/20/2023

## 2023-02-20 NOTE — PROGRESS NOTES
Discharge/ Transition Planning      Spoke with Mary Farah with [facility] . [facility] already aware of order and Catherine hanley to speak with patient .      Updated AVS with home health info

## 2023-02-20 NOTE — NURSE NAVIGATOR
Patient has cleared PT safe for discharge , has all required DME at home and a strong support system, pain well controlled, education completed, dressing clean, dry and intact. Home health and home physical therapy have been set up. Case management has signed off. Patient may discharge home.

## 2023-02-20 NOTE — PERIOP NOTE
Dr. Bonnie Panchal is aware of 3+ edema left hand, Dr. Bonnie Panchal at bedside,  will proceed with surgery per dr. Stefan Raymundo (PA) is aware no celebrex given R/T Sulfa Allergy

## 2023-02-20 NOTE — PROGRESS NOTES
Advance Care Planning     Advance Care Planning Inpatient Note  Spiritual Care Department    Today's Date: 2/20/2023  Unit: [facility]    Received request from admission screening. Upon review of chart and communication with care team, patient's decision making abilities are not in question. . Patient and Spouse was/were present in the room during visit. Goals of ACP Conversation:  Discuss advance care planning documents    Health Care Decision Makers:       Primary Decision Maker: Siddhartha Weir Spouse - [phone]    Secondary Decision Maker: Sina Staff - Child    Supplemental (Other) Decision Maker: Ivis Bran - Child  Summary:  Verified Documents  Updated Healthcare Decision Maker    Advance Care Planning Documents (Patient Wishes):  Healthcare Power of /Advance Directive Appointment of [address]  Living Will/Advance Directive     Assessment:     02/20/23 8902   Encounter Summary   Encounter Overview/Reason  Advance Care Planning; Initial Encounter   Service Provided For: Patient and family together   Referral/Consult From: Other    Support System Spouse; Children;Family members   Last Encounter  02/20/23  (Pre-op-SA-KP)   Begin Time 0715   End Time  0725   Total Time Calculated 10 min   Encounter    Type Initial Screen/Assessment   Spiritual/Emotional needs   Type Spiritual Support   Advance Care Planning   Type ACP conversation; Completed AD/ACP document(s)   Assessment/Intervention/Outcome   Assessment Coping   Intervention Prayer (assurance of)/[place]   Outcome Comfort       Interventions:  Assisted in the completion of documents according to patient's wishes at this time    Care Preferences Communicated:   No    Outcomes/Plan:  ACP Discussion: Completed  Existing advance directive reviewed with patient; no changes to patient's previously recorded wishes.     Electronically signed by Amrit Patel, [address] on 2/20/2023 at 8:07 AM

## 2023-02-20 NOTE — PROGRESS NOTES
Pulmicort nebs + Arformoterol Nebs were interchanged for Symbicort inhaler per Judy 98. Alisson BROWN. Ph

## 2023-02-20 NOTE — HOME CARE
Received home health referral for [facility] for SN,PT,Estrada Knee protocol ; Discharge order noted for today; spoke to patient in room,Verified demographics,explained [facility] services and answered all questions and left patient with JERMAINE [facility] contact card ; Patient states she has DME: RW, RTS,shower chair and cane; [facility] referral processed to [facility] central Intake and scheduling . LINCOLN CONRAD LPN.

## 2023-02-20 NOTE — DISCHARGE SUMMARY
2/20/2023  6:16 AM    2/20/2023, 9:57 AM    Primary Dx:left Orthopedic / Rheumatologic: Total Knee Replacement  Secondary Dx: Etiological Diagnoses: none    HPI:  Pt has end stage OA of their left knee and had failed conservative treatment. Due to the current findings and affected activity of daily living surgical intervention is indicated.   The alternatives, risks, complications as well as expected outcome were discussed, the patient understands and wishes to proceed with surgery    Past Medical History:   Diagnosis Date    Adjacent segment disease with spinal stenosis 6/22/2018    Allergic rhinitis     Anemia 2008    intermittant since then    Asthma     Back pain     French's esophagus with esophagitis     Cervical radiculopathy     Chronic sinusitis 5/15/2012    DNS (deviated nasal septum)     Empyema ([facility] [address]     Foraminal stenosis of cervical region     C4-C5    GERD (gastroesophageal reflux disease)     Dr Keller Bone    Hx MRSA infection 8/11/2014    Hypertension     Hypertriglyceridemia     Insulin resistance 4/9/2012    Left knee pain     Lichen planus     Liver disease     LOPEZ-per pt, followed by Dr Nany Paiz    Menopause     Age 51    MRSA (methicillin resistant Staphylococcus aureus) infection 2008    left lung    Nausea & vomiting     Restless leg syndrome     Rheumatoid arthritis ([facility] [address]     Spinal cord stimulator status 2016    Spinal stenosis of cervical region     C4-C5 and C5-C6    TMJ syndrome     Wears glasses     and contacts         Current Facility-Administered Medications:     acetaminophen (TYLENOL) tablet 1,000 mg, 1,000 mg, Oral, Once, Mary Guzman MD    pregabalin (LYRICA) capsule 50 mg, 50 mg, Oral, BID, Mary Guzman MD    dexamethasone (DECADRON) (PF) 10 mg/mL injection, 8 mg, IntraVENous, Once, Mary Guzman MD    Facility-Administered Medications Ordered in Other Encounters:     lidocaine PF 2 % injection, , IntraVENous, PRN, LEAH Lilly - CRNA, 20 mg at 02/20/23 0948    fentaNYL (SUBLIMAZE) injection, , IntraVENous, PRN, Alfonza Infield, APRN - CRNA, 50 mcg at 02/20/23 0825    rocuronium (ZEMURON) injection, , IntraVENous, PRN, Alfonza Infield, APRN - CRNA, 10 mg at 02/20/23 0845    succinylcholine chloride (ANECTINE) injection, , IntraVENous, PRN, Alfonza Infield, APRN - CRNA, 100 mg at 02/20/23 0802    dexamethasone 4 mg/mL, , IntraVENous, PRN, Alfonza Infield, APRN - CRNA, 8 mg at 02/20/23 0818    ePHEDrine 5 mg/mL in sodium chloride 0.9% PF injection, , IntraVENous, PRN, Alfonza Infield, APRN - CRNA, 5 mg at 02/20/23 0942    ondansetron (ZOFRAN) injection, , IntraVENous, PRN, Alfonza Infield, APRN - CRNA, 4 mg at 02/20/23 0926    neostigmine (PROSTIGMINE) injection, , IntraVENous, PRN, Alfonza Infield, APRN - CRNA, 3 mg at 02/20/23 0940    glycopyrrolate 0.2 mg/mL, , IntraVENous, PRN, Alfonza Infield, APRN - CRNA, 0.4 mg at 02/20/23 0940    Adhesive tape, Sulfasalazine, Molds & smuts, and Chlorhexidine    Physical Exam:  General A&O x3 NAD, well developed, well nourished, normal affect  Heart: S1-S2, RRR  Lungs: CTA Bilat  Abd: soft NT, ND  Ext: n/v intact    Hospital Course:    Pt. Had leftOrthopedic / Rheumatologic: Total Knee Replacement    Post -op Course: The patient tolerated the procedure well. Pt. Was place on Abx pre and post-op for prophylaxis against infection as well as aspirin post-op for prophylaxis against DVT. Vitals signs remained stable, remained af. The wound wasclean, dry, no drainage. Pain was well controlled. Pt. Had negative calf tenderness or swelling, no evidence for DVT. Patient had PT/OT consult for evaluation and treatment.     CBC  Lab Results   Component Value Date/Time    WBC 5.9 02/01/2023 09:41 AM    RBC 4.49 02/01/2023 09:41 AM    HCT 41.2 02/01/2023 09:41 AM    MCV 91.8 02/01/2023 09:41 AM    MCH 29.8 02/01/2023 09:41 AM    MCHC 32.5 02/01/2023 09:41 AM    RDW 13.6 02/01/2023 09:41 AM    MPV 9.5 02/01/2023 09:41 AM     Coagulation  Lab Results   Component Value Date    INR 1.0 02/01/2023    APTT 31.7 02/01/2023      Basic Metabolic Profile  Lab Results   Component Value Date     02/01/2023    CO2 26 02/01/2023    BUN 4 (L) 02/01/2023    GLUCOSE 85 02/01/2023    CALCIUM 9.2 02/01/2023       Discharge Meds:  Current Discharge Medication List        START taking these medications    Details   aspirin EC 81 MG EC tablet Take 1 tablet by mouth 2 times daily  Qty: 60 tablet, Refills: 1      docusate sodium (COLACE) 100 MG capsule Take 1 capsule by mouth 2 times daily  Qty: 60 capsule, Refills: 1      HYDROcodone-acetaminophen (NORCO)  MG per tablet Take 1-2 tablets by mouth every 6 hours as needed for Pain for up to 7 days.  Max Daily Amount: 8 tablets  Qty: 56 tablet, Refills: 0    Comments: Reduce doses taken as pain becomes manageable  Associated Diagnoses: Arthritis of left knee      ondansetron (ZOFRAN) 4 MG tablet Take 1 tablet by mouth every 8 hours as needed for Nausea or Vomiting  Qty: 30 tablet, Refills: 2      cefadroxil (DURICEF) 500 MG capsule Take 1 capsule by mouth 2 times daily for 10 days  Qty: 20 capsule, Refills: 0           CONTINUE these medications which have NOT CHANGED    Details   albuterol sulfate HFA (PROVENTIL;VENTOLIN;PROAIR) 108 (90 Base) MCG/ACT inhaler Inhale 1 puff into the lungs every 6 hours as needed      acetaminophen (TYLENOL) 325 MG tablet Take 325 mg by mouth every 4 hours as needed      abatacept (ORENCIA) 250 MG injection Infuse intravenously Monthly      Multiple Vitamins-Minerals (CENTRUM SILVER 50+WOMEN PO) Take 1 tablet by mouth daily      KRILL OIL OMEGA-3 PO Take by mouth daily      Cholecalciferol (VITAMIN D3 PO) Take 1 tablet by mouth daily      clobetasol (TEMOVATE) 0.05 % ointment Apply topically      vitamin B-12 (CYANOCOBALAMIN) 1000 MCG tablet take 1 tablet by mouth once daily      dexlansoprazole (DEXILANT) 60 MG CPDR delayed release capsule Take 60 mg by mouth daily      FEROSUL 325 (65 Fe) MG tablet take 1 tablet by mouth once daily before breakfast      montelukast (SINGULAIR) 10 MG tablet nightly      triamcinolone (NASACORT) 55 MCG/ACT nasal inhaler 2 sprays by Nasal route daily      budesonide-formoterol (SYMBICORT) 160-4.5 MCG/ACT AERO Inhale 2 puffs into the lungs 2 times daily      ascorbic acid (VITAMIN C) 500 MG tablet Take 500 mg by mouth daily      Cetirizine HCl 10 MG CAPS Take 10 mg by mouth daily      estradiol (ESTRACE) 0.1 MG/GM vaginal cream Place 2 g vaginally daily      losartan (COZAAR) 25 MG tablet Take 1 tablet by mouth daily  Qty: 90 tablet, Refills: 3    Associated Diagnoses: Essential (primary) hypertension           STOP taking these medications       nitrofurantoin, macrocrystal-monohydrate, (MACROBID) 100 MG capsule Comments:   Reason for Stopping:               Discharge Plan:  The patient will be d/c'd to home, total knee protocol, WBAT. [facility]. Total joint protocol. Pt safe for homebound transfer, after being cleared by PT, sp Total joint replacement. A walker, bedside commode, and shower chair will be utilized for ADL's. Follow up with Dr. Carito Zhang in 14 days. Call with any questions or concerns.

## 2023-02-20 NOTE — ANESTHESIA POSTPROCEDURE EVALUATION
Department of Anesthesiology  Postprocedure Note    Patient: Blossom Perez  MRN: [id number]  YOB: 1955  Date of evaluation: 2/20/2023      Procedure Summary     Date: 02/20/23 Room / Location: [facility] MAIN 07 / [facility] MAIN OR    Anesthesia Start: 3113 Anesthesia Stop: 2033    Procedure: LEFT TOTAL KNEE ARTHROPLASTY (Left: Knee) Diagnosis:       Osteoarthritis of left knee, unspecified osteoarthritis type      (Osteoarthritis of left knee, unspecified osteoarthritis type [M17.12])    Surgeons: Vanessa Jones MD Responsible Provider: Alec Castorena MD    Anesthesia Type: General, Regional ASA Status: 3          Anesthesia Type: General, Regional    Neftali Phase I: Neftali Score: 7    Neftali Phase II:        Anesthesia Post Evaluation    Patient location during evaluation: PACU  Patient participation: complete - patient participated  Level of consciousness: awake  Airway patency: patent  Nausea & Vomiting: no vomiting  Complications: no  Cardiovascular status: blood pressure returned to baseline  Respiratory status: acceptable  Hydration status: stable  Multimodal analgesia pain management approach

## 2023-02-20 NOTE — ANESTHESIA PROCEDURE NOTES
Peripheral Block    Patient location during procedure: pre-op  Reason for block: post-op pain management and at surgeon's request  Start time: 2/20/2023 7:20 AM  End time: 2/20/2023 7:25 AM  Staffing  Performed: anesthesiologist   Anesthesiologist: Jaimie Shabazz MD  Preanesthetic Checklist  Completed: patient identified, IV checked, site marked, risks and benefits discussed, surgical/procedural consents, equipment checked, pre-op evaluation, timeout performed, anesthesia consent given, oxygen available, monitors applied/VS acknowledged, fire risk safety assessment completed and verbalized and blood product R/B/A discussed and consented  Peripheral Block   Patient position: supine  Prep: ChloraPrep  Provider prep: sterile gloves  Patient monitoring: cardiac monitor, continuous pulse ox, frequent blood pressure checks, IV access, oxygen and responsive to questions  Block type: Saphenous  Laterality: left  Injection technique: single-shot  Guidance: ultrasound guided  Local infiltration: ropivacaine  Local infiltration: ropivacaine    Needle   Needle type: insulated echogenic nerve stimulator needle   Needle gauge: 20 G  Needle localization: ultrasound guidance  Needle length: 8 cm  Assessment   Injection assessment: negative aspiration for heme, no paresthesia on injection, local visualized surrounding nerve on ultrasound and no intravascular symptoms  Slow fractionated injection: yes  Hemodynamics: stable  Real-time US image taken/store: yes  Outcomes: patient tolerated procedure well    Medications Administered  ropivacaine (NAROPIN) injection 0.2% - Perineural   30 mL - 2/20/2023 7:20:00 AM

## 2023-02-20 NOTE — BRIEF OP NOTE
Brief Postoperative Note      Patient: Jose Carvajal  YOB: 1955  MRN: [id number]    Date of Procedure: 2/20/2023    Pre-Op Diagnosis: Osteoarthritis of left knee, unspecified osteoarthritis type [M17.12]    Post-Op Diagnosis: Same       Procedure(s):  LEFT TOTAL KNEE ARTHROPLASTY    Surgeon(s):  Zayda Rizo MD    Assistant:  Surgical Assistant: Austin Johnson  Physician Assistant: Nichole Alston PA-C    Anesthesia: General    Estimated Blood Loss (mL): less than 50     Complications: None    Specimens:   * No specimens in log *    Implants:  Implant Name Type Inv. Item Serial No.  Lot No. LRB No. Used Action   CEMENT BNE 20ML 41GM FULL DOSE PMMA W/ TOBRA M VISC RADPQ - [id number]  CEMENT BNE 20ML 41GM FULL DOSE PMMA W/ TOBRA M VISC RADPQ  [employer] [facility] [id number] Left 2 Implanted   PIN FIX L3IN DIA1/8IN S STL TRCR CNTR PNT COMPASS - [id number]  PIN FIX L3IN DIA1/8IN S STL TRCR CNTR PNT [facility] [facility]  Left 1 Implanted   BASEPLATE TIB SZ 6 [id number] ML77MM THK2. 3MM L KNEE TI ALLY NP - [id number]  BASEPLATE TIB SZ 6 [id number] ML77MM THK2. 3MM L KNEE TI ALLY NP  [facility] [employer]- [id number] Left 1 Implanted   COMPONENT FEM SZ 7 L KNEE OXINIUM POST STBL NP ANTHONY STEMLESS - [id number]  COMPONENT FEM SZ 7 L KNEE OXINIUM POST STBL NP ANTHONY STEMLESS  [facility] [employer]- [id number] Left 1 Implanted   IMPLANT PATELLAR [id number] X3 ASYMMETRIC TRIATHLON - [id number]  IMPLANT PATELLAR [id number] X3 ASYMMETRIC TRIATHLON  [employer] [facility] [id number] Left 1 Implanted   INSERT TIB SZ 5-6 [id number] KNEE XLPE POST STBL HI FLX LEGION - [id number]  INSERT TIB SZ 5-6 [id number] KNEE XLPE POST STBL HI FLX LEGION  [employer] Kathi Wolf [id number] Left 1 Implanted         Drains: * No LDAs found *    Findings: same    Electronically signed by Syed Mendez MD on 2/20/2023 at 9:28 AM

## 2023-02-20 NOTE — PROGRESS NOTES
Physical Therapy    PHYSICAL THERAPY EVALUATION/DISCHARGE    Patient: Josh Vega (32 y.o. female)  Date: 2/20/2023  Primary Diagnosis: Osteoarthritis of left knee, unspecified osteoarthritis type [M17.12]  Procedure(s) (LRB):  LEFT TOTAL KNEE ARTHROPLASTY (Left) Day of Surgery   Precautions: Fall Risk  PLOF:   Independent     ASSESSMENT AND RECOMMENDATIONS:  Patient is a 75 y.o. female s/p left TKA. Seen POD#0 for physical therapy evaluation. Educated patient on exercises, transfers, and use of modalities, including ice and towel roll. Patient transfers for supine to sit mod I. Strong left quad contraction. She transfers to standing at [facility] with supervision. Ambulates 250 ft with reciprocal steady gait. She has no difficulty with stair negotiation. Cued to slow down to reduce risk of falling when. Patient demonstrates safety with mobility using RW and is cleared from PT to return home. Patient does not require further skilled physical therapy intervention at this level of care. Further Equipment Recommendations for Discharge: RW       AMPAC:    Current research shows that an AM-PAC score of 18 (14 without stairs) or greater is associated with a discharge to the patient's home setting. Based on an AM-PAC score of 19/24 (or **/20 if omitting stairs) and their current functional mobility deficits, it is recommended that the patient have 2-3 sessions per week of Physical Therapy at d/c to increase the patient's independence. This AMPAC score should be considered in conjunction with interdisciplinary team recommendations to determine the most appropriate discharge setting. Patient's social support, diagnosis, medical stability, and prior level of function should also be taken into consideration. SUBJECTIVE:   Patient stated  I had my other hip and knee done.     OBJECTIVE DATA SUMMARY:     Past Medical History:   Diagnosis Date    Adjacent segment disease with spinal stenosis 6/22/2018    Allergic rhinitis     Anemia 2008    intermittant since then    Asthma     Back pain     French's esophagus with esophagitis     Cervical radiculopathy     Chronic sinusitis 5/15/2012    DNS (deviated nasal septum)     Empyema (HCC)     Foraminal stenosis of cervical region     C4-C5    GERD (gastroesophageal reflux disease)     Dr Carleen Ramos    Hx MRSA infection 8/11/2014    Hypertension     Hypertriglyceridemia     Insulin resistance 4/9/2012    Left knee pain     Lichen planus     Liver disease     LOPEZ-per pt, followed by Dr Kiah Bran    Menopause     Age 51    MRSA (methicillin resistant Staphylococcus aureus) infection 2008    left lung    Nausea & vomiting     Restless leg syndrome     Rheumatoid arthritis ([address]     Spinal cord stimulator status 2016    Spinal stenosis of cervical region     C4-C5 and C5-C6    TMJ syndrome     Wears glasses     and contacts     Past Surgical History:   Procedure Laterality Date    ANESTH,SURGERY OF SHOULDER  01/31/2019    BUNIONECTOMY      CERVICAL FUSION  08/13/2014    COLONOSCOPY FLX DX W/COLLJ SPEC WHEN PFRMD  06/18/2008    normal/[place]    HEENT  1997    TMJ surgery    LUMBAR LAMINECTOMY  11/1995    [address]    LUMBAR LAMINECTOMY  02/1997    [place]    ORTHOPEDIC SURGERY Right 01/2015    bunion removal from right foot    OTHER SURGICAL HISTORY  2007    thoracentesis    OTHER SURGICAL HISTORY  2008    chest tube    OTHER SURGICAL HISTORY Bilateral 1997    TMJ surgery    OTHER SURGICAL HISTORY  2012    sinus surg 2012-Dr Alex Cain.     OTHER SURGICAL HISTORY  2016    spinal cord stimulator place for lumbar neuritis, good benefit    ROTATOR CUFF REPAIR Right 01/2019    TOTAL HIP ARTHROPLASTY Right 05/13/2022    TOTAL KNEE ARTHROPLASTY Right 2020    TUBAL LIGATION      UPPER GASTROINTESTINAL ENDOSCOPY       Barriers to Learning/Limitations: None  Compensate with: visual, verbal, tactile, kinesthetic cues/model  Home Situation:  Social/Functional History  Lives With: Spouse  Type of Home: [facility] Layout: One level  Home Access: Stairs to enter with rails  Entrance Stairs - Number of Steps: 3  Home Equipment: Rollator, Walker, rolling  ADL Assistance: Independent  Ambulation Assistance: Independent    Strength:    Strength: Generally decreased, functional    Tone & Sensation:   Tone: Normal  Sensation: Intact    Range Of Motion:  AROM: Within functional limits       Functional Mobility:  Bed Mobility:     Bed Mobility Training  Bed Mobility Training: Yes  Supine to Sit: Modified independent  Scooting: Modified independent  Transfers:     Transfer Training  Transfer Training: Yes  Sit to Stand: Supervision  Stand to Sit: Supervision  Balance:      Balance  Sitting: Intact  Standing: With support    Ambulation/Gait Training:    Gait  Overall Level of Assistance: Supervision  Interventions: Verbal cues  Speed/Lisa: Accelerated  Gait Abnormalities: Antalgic  Distance (ft): 250 Feet  Rail Use: Both  Stairs - Level of Assistance: Supervision  Number of Stairs Trained: 4                 Therapeutic Exercises/Neuromuscular Re-education:     Pain:  Pain level pre-treatment: 1/10   Pain level post-treatment: 1/10   Pain Intervention(s): Medication (see MAR); Rest, Ice, Repositioning  Response to intervention: Nurse notified, See doc flow    Activity Tolerance:   Activity Tolerance: Patient tolerated treatment well  Please refer to the flowsheet for vital signs taken during this treatment. After treatment:   [x]         Patient left in no apparent distress sitting up in chair  []         Patient left in no apparent distress in bed  [x]         Call bell left within reach  [x]         Nursing notified  []         Caregiver present  []         Bed alarm activated  []         SCDs applied    COMMUNICATION/EDUCATION:   Patient Education  Education Given To: Patient  Education Provided: Role of Therapy;Plan of Care;Home Exercise Program;Precautions;Transfer Training; Fall Prevention Strategies  Education Method: Demonstration; Teach Back  Barriers to Learning: None  Education Outcome: Verbalized understanding    Thank you for this referral.  Michael Neff, PT  Minutes:23      Eval Complexity: [facility] [place] AM-PAC® Basic Mobility Inpatient Short Form (6-Clicks) Version 2    How much HELP from another person does the patient currently need    (If the patient hasn't done an activity recently, how much help from another person do you think he/she would need if he/she tried?)   Total (Total A or Dep)   A Lot  (Mod to Max A)   A Little (Sup or Min A)   None (Mod I to I)   Turning from your back to your side while in a flat bed without using bedrails? [] 1 [] 2 [] 3 [x] 4   2. Moving from lying on your back to sitting on the side of a flat bed without using bedrails? [] 1 [] 2 [x] 3 [] 4   3. Moving to and from a bed to a chair (including a wheelchair)? [] 1 [] 2 [x] 3 [] 4   4. Standing up from a chair using your arms (e.g., wheelchair, or bedside chair)? [] 1 [] 2 [x] 3 [] 4   5. Walking in hospital room? [] 1 [] 2 [x] 3 [] 4   6. Climbing 3-5 steps with a railing?+   [] 1 [] 2 [x] 3 [] 4   +If stair climbing cannot be assessed, skip item #6. Sum responses from items 1-5.

## 2023-02-20 NOTE — PERIOP NOTE
TRANSFER - OUT REPORT:    Verbal report given to SHEREE LOZANO on [employer]  being transferred to [facility] for routine post-op       Report consisted of patient's Situation, Background, Assessment and   Recommendations(SBAR). Information from the following report(s) Nurse Handoff Report and MAR was reviewed with the receiving nurse. [place] Assessment: No data recorded  Lines:   Peripheral IV 02/20/23 Right Hand (Active)   Site Assessment Clean, dry & intact 02/20/23 1012   Line Status Infusing 02/20/23 1012   Phlebitis Assessment No symptoms 02/20/23 1012   Infiltration Assessment 0 02/20/23 1012   Dressing Status Clean, dry & intact 02/20/23 1012   Dressing Type Transparent 02/20/23 1012        Opportunity for questions and clarification was provided.       Patient transported with:  Esdras Argueta

## 2023-02-21 ENCOUNTER — HOME CARE VISIT (OUTPATIENT)
Age: 68
End: 2023-02-21

## 2023-02-21 VITALS
TEMPERATURE: 97.7 F | HEART RATE: 96 BPM | OXYGEN SATURATION: 97 % | SYSTOLIC BLOOD PRESSURE: 118 MMHG | DIASTOLIC BLOOD PRESSURE: 60 MMHG | RESPIRATION RATE: 16 BRPM

## 2023-02-21 PROCEDURE — 0221000100 HH NO PAY CLAIM PROCEDURE

## 2023-02-21 PROCEDURE — G0151 HHCP-SERV OF PT,EA 15 MIN: HCPCS

## 2023-02-21 ASSESSMENT — ENCOUNTER SYMPTOMS: PAIN LOCATION - PAIN QUALITY: SHARP

## 2023-02-21 NOTE — Clinical Note
Discussed PLOC with LPTA working on strength and AROM L LE. Work on transfer training to include car transfers. Work on stair training and dynamic standing balance re education.  Work on gait training with the least restrictive A DEV on flat and uneven surfaces

## 2023-02-21 NOTE — Clinical Note
Dr Maninder Rodriguez office notifed that PT Admit completed 2/21/23. Medications reconciled. Pt did not have cefadroxil in home was picking up at pharmacy today 2/21/22  The following education was provided regarding medications, medication interactions, and look alike medications. Medications are effective at this time. no severe interactions noted. Pt [facility] educated to take [address] as prescribed Instructed patient/caregiver to take exact amount of narcotics prescribed, signs and symptoms of oversedation, notify SN/PT if oversedated. May cause constipation, notify SN/PT if no BM x 3 days. Pt was educated to take aspirin as prescribed. Instructed patient/caregiver to notify SN/PT of any signs and symptoms of antiplatelet adverse effects including SOB, bleeding longer/heavier with cuts, bruising, nose bleeds, and/or upset stomach. Pt was educated to take cefadroxil as prescribed when she recieves it. Pt presents with decreased stength and AROM L LE. A required with all transfers and stairs. Pt is amb limited distances with RW with A. Pt presents with decreased dynamic standing balance. SN has been ordered to assist with medicaton and pain management along with genreal wound care. Pt will be seen to establish and upgrade HEP. Gait training with  the least restrictive A DEV on flat and uneven surfaces. Transfer training. Stair training. Dynamic standing balance re -education. Reinforce general safety precautions.

## 2023-02-21 NOTE — HOME HEALTH
PMHx: List of Comorbidities:    Adjacent segment disease with spinal stenosis 6/22/2018    Allergic rhinitis      Anemia 2008      intermittant since then    Asthma      Back pain      French's esophagus with esophagitis      Cervical radiculopathy      Chronic sinusitis 5/15/2012    DNS (deviated nasal septum)      Empyema (HCC)      Foraminal stenosis of cervical region        C4-C5    GERD (gastroesophageal reflux disease)        Dr Rika Sutherland Hx MRSA infection 8/11/2014    Hypertension      Hypertriglyceridemia      Insulin resistance 4/9/2012    Left knee pain      Lichen planus      Liver disease        LOPEZ-per pt, followed by Dr Theresa Dee Menopause        Age 51    MRSA (methicillin resistant Staphylococcus aureus) infection 2008      left lung    Nausea & vomiting      Restless leg syndrome      Rheumatoid arthritis ([facility] [address]      Spinal cord stimulator status 2016    Spinal stenosis of cervical region        C4-C5 and C5-C6    TMJ syndrome      Wears glasses        and contacts   pt is S/P L TKR and is WBAT     SUBJECTIVE: I think that I am doing pretty well   LIVING SITUATION: Pt lives with  in a single level home with 2 steps to enter with 1 HR   REQUIRES CAREGIVER ASSISTANCE FOR: transportation, medications, ADLS, IADLS   PLOF: Pt was I with amb without A DEV. Pt was I with dressing and bathing  MEDICATIONS REVIEWED AND RECONCILED  Medications reconciled. Pt did not have cefadroxil in home was picking up at pharmacy today 2/21/22  The following education was provided regarding medications, medication interactions, and look alike medications. Medications are effective at this time. no severe interactions noted. Pt [facility] educated to take [address] as prescribed Instructed patient/caregiver to take exact amount of narcotics prescribed, signs and symptoms of oversedation, notify SN/PT if oversedated. May cause constipation, notify SN/PT if no BM x 3 days.   Pt was educated to take aspirin as prescribed. Instructed patient/caregiver to notify SN/PT of any signs and symptoms of antiplatelet adverse effects including SOB, bleeding longer/heavier with cuts, bruising, nose bleeds, and/or upset stomach. Pt was educated to take cefadroxil as prescribed when she recieves it. Instructed patient/caregiver to notify SN/PT of any adverse effects of antibiotic medications including rash, dizziness, nausea, diarrhea, or yeast infections. NEXT MD APPT: Dr Adames Lot 3/9/23   Dr Willy Hammonds PCP in April   EQUIPMENT:RW, [employer], commode raiser, shower chair,   ROM:  L knee in sitting 0-100 degrees   STRENGTH: R hip flexors, quads hamstrings DF/PF 5/5  R hip flexors 3/5 R quads and hamstring 3/5 L DF/PF 5/5   WOUNDS:L knee bandage clean and intact min amount of dried bloody drainage noted. swelling and bruising noted on medial and lateral sides of bandage. No signs or symptoms of infection noted. Next dressing change is 2/27/23 Per MD orders Patient will be discharged with Aquacel AG dressing on surgical incision. Dressing to be removed on POD 7, replace with Mepilex for 7 days. If dressing has 60-65% drainage, dressing to be removed and replaced with M epilex dressing for 7 days and PRN until follow up appointment  BED MOBILITY:supine <> sit MOD I   TRANSFERS:sit to stand from chair with arms x 2 from bed and commode with a raiser all with SBA. Pt uses B UE support for all transfers and extends L LE fwd slightly on all transfers   GAIT: Pt amb 25ftx4 with RW with reciprocal gait pattern with SBA B feet do clear the floor during swing phase of gait and pt has decreased L knee flexion during swng phase of gait   STAIRS:NA  BALANCE: Pt scored 19/28 on Tinetti Balance Assessment placing pt at med risk for falls.    PATIENT RESPONE TO TX: Pt pain level remained the same throughout tx session  PATIENT LEVEL OF UNDERSTANDING OF EDUCATION PROVIDED Pt demonstrated and verbalized use of RW at all times when amb for safety PATIENT EDUCATION PROVIDED THIS VISIT: safety, HEP, walking, deep breathing, Educated pt to use RW at all times when amb for safety. Educated pt to elevate and ice  L LE throughout the day 20 min on 1Hr off. During waking hrs with a barrier between skin  and Ice to protect skin. Educated pt to change position every HR for pressure relief. HEP consisting of:  1. Walking every hour during the day with RW  2. supine therex L LE AP,QS, SLR, HS, Hamstrings sets, SAQ, seated knee flexion3 daily x 10  Written HEP issued, patient/caregiver verbalized understanding. CONTINUED NEED FOR THE FOLLOWING SKILLS: HH PT is medically necessary to address pain, decreased ROM, decreased strength, increased swelling, decreased independence with functional transfers, impaired gait, impaired stair negotiation, and impaired balance in order to improve functional independence, quality of life, return to PLOF, and reduce the risk for falls. ASSESSMENT: Pt presents with decreased stength and AROM L LE. A required with all transfers and stairs. Pt is amb limited distances with RW with A. Pt presents with decreased dynamic standing balance. SN has been ordered to assist with medicaton and pain management along with genreal wound care   PLAN: Pt will be seen to establish and upgrade HEP. Gait training with  the least restrictive A DEV on flat and uneven surfaces. Transfer training. Stair training. Dynamic standing balance re -education. Reinforce general safety precautions. DISCHARGE PLANNING DISCUSSED: Discharge to self and family under MD supervision once all goals have been met or patient has reached max potential. Patient/caregiver verbalized understanding. Dr Carito Zhang office notifed that PT Admit completed 2/21/23. Medications reconciled. Pt did not have cefadroxil in home was picking up at pharmacy today 2/21/22  The following education was provided regarding medications, medication interactions, and look alike medications. Medications are effective at this time.  no severe interactions noted. Pt waa educated to take Norco as prescribed Instructed patient/caregiver to take exact amount of narcotics prescribed, signs and symptoms of oversedation, notify SN/PT if oversedated.  May cause constipation, notify SN/PT if no BM x 3 days.  Pt was educated to take aspirin as prescribed. Instructed patient/caregiver to notify SN/PT of any signs and symptoms of antiplatelet adverse effects including SOB, bleeding longer/heavier with cuts, bruising, nose bleeds, and/or upset stomach.  Pt was educated to take cefadroxil as prescribed when she recieves it. Pt presents with decreased stength and AROM L LE. A required with all transfers and stairs. Pt is amb limited distances with RW with A. Pt presents with decreased dynamic standing balance. SN has been ordered to assist with medicaton and pain management along with genreal wound care. Pt will be seen to establish and upgrade HEP. Gait training with  the least restrictive A DEV on flat and uneven surfaces. Transfer training. Stair training. Dynamic standing balance re -education. Reinforce general safety precautions.

## 2023-02-22 ENCOUNTER — APPOINTMENT (OUTPATIENT)
Facility: HOSPITAL | Age: 68
End: 2023-02-22
Payer: MEDICARE

## 2023-02-22 ENCOUNTER — HOSPITAL ENCOUNTER (EMERGENCY)
Facility: HOSPITAL | Age: 68
Discharge: HOME | End: 2023-02-22
Attending: EMERGENCY MEDICINE | Admitting: EMERGENCY MEDICINE
Payer: MEDICARE

## 2023-02-22 ENCOUNTER — HOME CARE VISIT (OUTPATIENT)
Age: 68
End: 2023-02-22

## 2023-02-22 VITALS
OXYGEN SATURATION: 99 % | SYSTOLIC BLOOD PRESSURE: 105 MMHG | HEART RATE: 84 BPM | DIASTOLIC BLOOD PRESSURE: 80 MMHG | TEMPERATURE: 97.3 F | RESPIRATION RATE: 17 BRPM

## 2023-02-22 VITALS
HEART RATE: 78 BPM | HEIGHT: 70 IN | SYSTOLIC BLOOD PRESSURE: 125 MMHG | RESPIRATION RATE: 18 BRPM | OXYGEN SATURATION: 98 % | WEIGHT: 173 LBS | DIASTOLIC BLOOD PRESSURE: 67 MMHG | BODY MASS INDEX: 24.77 KG/M2 | TEMPERATURE: 97.2 F

## 2023-02-22 DIAGNOSIS — T81.9XXA COMPLICATION OF PROCEDURE, INITIAL ENCOUNTER: Primary | ICD-10-CM

## 2023-02-22 LAB
ANION GAP SERPL CALC-SCNC: 4 MMOL/L (ref 3–18)
BASOPHILS # BLD: 0.1 K/UL (ref 0–0.1)
BASOPHILS NFR BLD: 1 % (ref 0–2)
BUN SERPL-MCNC: 17 MG/DL (ref 7–18)
BUN/CREAT SERPL: 24 (ref 12–20)
CALCIUM SERPL-MCNC: 8.8 MG/DL (ref 8.5–10.1)
CHLORIDE SERPL-SCNC: 106 MMOL/L (ref 100–111)
CO2 SERPL-SCNC: 26 MMOL/L (ref 21–32)
CREAT SERPL-MCNC: 0.71 MG/DL (ref 0.6–1.3)
CRP SERPL-MCNC: 8.1 MG/DL (ref 0–0.3)
DIFFERENTIAL METHOD BLD: ABNORMAL
EOSINOPHIL # BLD: 0.2 K/UL (ref 0–0.4)
EOSINOPHIL NFR BLD: 3 % (ref 0–5)
ERYTHROCYTE [DISTWIDTH] IN BLOOD BY AUTOMATED COUNT: 12.9 % (ref 11.6–14.5)
ERYTHROCYTE [SEDIMENTATION RATE] IN BLOOD: 40 MM/HR (ref 0–30)
GLUCOSE SERPL-MCNC: 113 MG/DL (ref 74–99)
HCT VFR BLD AUTO: 34.3 % (ref 35–45)
HGB BLD-MCNC: 11 G/DL (ref 12–16)
IMM GRANULOCYTES # BLD AUTO: 0 K/UL (ref 0–0.04)
IMM GRANULOCYTES NFR BLD AUTO: 1 % (ref 0–0.5)
LYMPHOCYTES # BLD: 1.4 K/UL (ref 0.9–3.6)
LYMPHOCYTES NFR BLD: 18 % (ref 21–52)
MCH RBC QN AUTO: 29 PG (ref 24–34)
MCHC RBC AUTO-ENTMCNC: 32.1 G/DL (ref 31–37)
MCV RBC AUTO: 90.5 FL (ref 78–100)
MONOCYTES # BLD: 1 K/UL (ref 0.05–1.2)
MONOCYTES NFR BLD: 12 % (ref 3–10)
NEUTS SEG # BLD: 5.2 K/UL (ref 1.8–8)
NEUTS SEG NFR BLD: 66 % (ref 40–73)
NRBC # BLD: 0 K/UL (ref 0–0.01)
NRBC BLD-RTO: 0 PER 100 WBC
PLATELET # BLD AUTO: 356 K/UL (ref 135–420)
PMV BLD AUTO: 8.5 FL (ref 9.2–11.8)
POTASSIUM SERPL-SCNC: 4.1 MMOL/L (ref 3.5–5.5)
RBC # BLD AUTO: 3.79 M/UL (ref 4.2–5.3)
SODIUM SERPL-SCNC: 136 MMOL/L (ref 136–145)
WBC # BLD AUTO: 7.9 K/UL (ref 4.6–13.2)

## 2023-02-22 PROCEDURE — 86140 C-REACTIVE PROTEIN: CPT

## 2023-02-22 PROCEDURE — 6360000002 HC RX W HCPCS: Performed by: EMERGENCY MEDICINE

## 2023-02-22 PROCEDURE — 99284 EMERGENCY DEPT VISIT MOD MDM: CPT

## 2023-02-22 PROCEDURE — G0299 HHS/HOSPICE OF RN EA 15 MIN: HCPCS

## 2023-02-22 PROCEDURE — 73562 X-RAY EXAM OF KNEE 3: CPT

## 2023-02-22 PROCEDURE — 96374 THER/PROPH/DIAG INJ IV PUSH: CPT

## 2023-02-22 PROCEDURE — 85025 COMPLETE CBC W/AUTO DIFF WBC: CPT

## 2023-02-22 PROCEDURE — 80048 BASIC METABOLIC PNL TOTAL CA: CPT

## 2023-02-22 PROCEDURE — 85652 RBC SED RATE AUTOMATED: CPT

## 2023-02-22 RX ORDER — MORPHINE SULFATE 4 MG/ML
4 INJECTION, SOLUTION INTRAMUSCULAR; INTRAVENOUS
Status: COMPLETED | OUTPATIENT
Start: 2023-02-22 | End: 2023-02-22

## 2023-02-22 RX ADMIN — MORPHINE SULFATE 4 MG: 4 INJECTION, SOLUTION INTRAMUSCULAR; INTRAVENOUS at 04:33

## 2023-02-22 ASSESSMENT — PAIN SCALES - GENERAL
PAINLEVEL_OUTOF10: 6
PAINLEVEL_OUTOF10: 5

## 2023-02-22 ASSESSMENT — PAIN DESCRIPTION - LOCATION
LOCATION: KNEE
LOCATION: KNEE

## 2023-02-22 ASSESSMENT — PAIN DESCRIPTION - ORIENTATION
ORIENTATION: LEFT
ORIENTATION: LEFT

## 2023-02-22 ASSESSMENT — ENCOUNTER SYMPTOMS
PAIN LOCATION - PAIN QUALITY: SORE, ACHE
DYSPNEA ACTIVITY LEVEL: AFTER AMBULATING 10 - 20 FT

## 2023-02-22 ASSESSMENT — PAIN DESCRIPTION - DESCRIPTORS: DESCRIPTORS: THROBBING

## 2023-02-22 ASSESSMENT — PAIN - FUNCTIONAL ASSESSMENT: PAIN_FUNCTIONAL_ASSESSMENT: 0-10

## 2023-02-22 NOTE — ED TRIAGE NOTES
A&O female s/p left knee replacement on 2/20. Has had increased pain and bleeding from surgical site since completing PT yesterday.

## 2023-02-22 NOTE — ED PROVIDER NOTES
EMERGENCY DEPARTMENT HISTORY AND PHYSICAL EXAM      Patient Name: Zahra Silvestre  MRN: [id number]  YOB: 1955  Provider: Kathy Castellanos MD  PCP: Elroy Goldmann, MD   Time/Date of evaluation: 4:28 AM EST on 2/22/23    History of Presenting Illness     Chief Complaint   Patient presents with    Post-op Problem       History Provided By: Patient and Patient's      History Rivas Swanson):   Zahra Silvestre is a 75 y.o. female with a PMHX of past medical history as listed below, including left knee replacement since this past Monday,  who presents to the emergency department  by POV C/O increasing pain and bleeding from her surgical site. Her increasing pain and bleeding began after she started physical therapy on Tuesday morning. She has been having increasing discomfort throughout the day since his therapy. Right after physical therapy, she had about a 2 inch streak of blood on her dressing. At 1:00 AM, she woke up with blood running all over the bed. She is also having increasing pain in her knee, increasing swelling, and pain in the middle of her calf.         Past History     Past Medical History:  Past Medical History:   Diagnosis Date    Adjacent segment disease with spinal stenosis 6/22/2018    Allergic rhinitis     Anemia 2008    intermittant since then    Asthma     Back pain     French's esophagus with esophagitis     Cervical radiculopathy     Chronic sinusitis 5/15/2012    DNS (deviated nasal septum)     Empyema (HCC)     Foraminal stenosis of cervical region     C4-C5    GERD (gastroesophageal reflux disease)     Dr Mk Alcaraz    Hx MRSA infection 8/11/2014    Hypertension     Hypertriglyceridemia     Insulin resistance 4/9/2012    Left knee pain     Lichen planus     Liver disease     LOPEZ-per pt, followed by Dr Kemi Espino    Menopause     Age 51    MRSA (methicillin resistant Staphylococcus aureus) infection 2008    left lung    Nausea & vomiting     Restless leg syndrome Rheumatoid arthritis ([facility] [address]     Spinal cord stimulator status 2016    Spinal stenosis of cervical region     C4-C5 and C5-C6    TMJ syndrome     Wears glasses     and contacts       Past Surgical History:  Past Surgical History:   Procedure Laterality Date    ANESTH,SURGERY OF SHOULDER  01/31/2019    BUNIONECTOMY      CERVICAL FUSION  08/13/2014    COLONOSCOPY FLX DX W/COLLJ SPEC WHEN PFRMD  06/18/2008    normal/[facility]    HEENT  1997    TMJ surgery    LUMBAR LAMINECTOMY  11/1995    [address]    LUMBAR LAMINECTOMY  02/1997    [place]    ORTHOPEDIC SURGERY Right 01/2015    bunion removal from right foot    OTHER SURGICAL HISTORY  2007    thoracentesis    OTHER SURGICAL HISTORY  2008    chest tube    OTHER SURGICAL HISTORY Bilateral 1997    TMJ surgery    OTHER SURGICAL HISTORY  2012    sinus surg 2012-Dr Bonilla.     OTHER SURGICAL HISTORY  2016    spinal cord stimulator place for lumbar neuritis, good benefit    ROTATOR CUFF REPAIR Right 01/2019    TOTAL HIP ARTHROPLASTY Right 05/13/2022    TOTAL KNEE ARTHROPLASTY Right 2020    TOTAL KNEE ARTHROPLASTY Left 2/20/2023    LEFT TOTAL KNEE ARTHROPLASTY performed by Keyon Reynolds MD at [address] ENDOSCOPY         Family History:  Family History   Problem Relation Age of Onset    Cancer Sister 35        CA, uterus    Cancer Daughter     Stroke Maternal Grandmother     Asthma Brother     Diabetes Brother     Breast Cancer Sister         Estrogen based    Cancer Sister 48        [place], breast    Lung Disease Father     Hypertension Father     Coronary Art Dis Father     Heart Surgery Brother     Diabetes Mother     Rheum Arthritis Sister     Other Sister         Lupus    Hypertension Mother     Diabetes Sister     Other Brother         myocarditis    Heart Attack Brother     Heart Disease Brother        Social History:  Social History     Tobacco Use    Smoking status: Never     Passive exposure: Never    Smokeless tobacco: Never   Vaping Use    Vaping Use: Never used   Substance Use Topics    Alcohol use: Not Currently     Alcohol/week: 0.0 standard drinks    Drug use: Yes     Types: OTC, Prescription       Medications:  No current facility-administered medications for this encounter. Current Outpatient Medications   Medication Sig Dispense Refill    aspirin EC 81 MG EC tablet Take 1 tablet by mouth 2 times daily 60 tablet 1    docusate sodium (COLACE) 100 MG capsule Take 1 capsule by mouth 2 times daily 60 capsule 1    HYDROcodone-acetaminophen (NORCO)  MG per tablet Take 1-2 tablets by mouth every 6 hours as needed for Pain for up to 7 days.  Max Daily Amount: 8 tablets 56 tablet 0    ondansetron (ZOFRAN) 4 MG tablet Take 1 tablet by mouth every 8 hours as needed for Nausea or Vomiting 30 tablet 2    cefadroxil (DURICEF) 500 MG capsule Take 1 capsule by mouth 2 times daily for 10 days 20 capsule 0    albuterol sulfate HFA (PROVENTIL;VENTOLIN;PROAIR) 108 (90 Base) MCG/ACT inhaler Inhale 1 puff into the lungs every 6 hours as needed (Patient not taking: Reported on 2/20/2023)      acetaminophen (TYLENOL) 325 MG tablet Take 325 mg by mouth every 4 hours as needed      abatacept (ORENCIA) 250 MG injection Infuse intravenously Monthly      Multiple Vitamins-Minerals (CENTRUM SILVER 50+WOMEN PO) Take 1 tablet by mouth daily      KRILL OIL OMEGA-3 PO Take by mouth daily      Cholecalciferol (VITAMIN D3 PO) Take 1 tablet by mouth daily      clobetasol (TEMOVATE) 0.05 % ointment Apply 1 each topically daily apply a thin layer      vitamin B-12 (CYANOCOBALAMIN) 1000 MCG tablet take 1 tablet by mouth once daily      dexlansoprazole (DEXILANT) 60 MG CPDR delayed release capsule Take 60 mg by mouth daily      FEROSUL 325 (65 Fe) MG tablet take 1 tablet by mouth once daily before breakfast      montelukast (SINGULAIR) 10 MG tablet Take 10 mg by mouth nightly      triamcinolone (NASACORT) 55 MCG/ACT nasal inhaler 2 sprays by Nasal route daily budesonide-formoterol (SYMBICORT) 160-4.5 MCG/ACT AERO Inhale 2 puffs into the lungs 2 times daily      ascorbic acid (VITAMIN C) 500 MG tablet Take 500 mg by mouth daily      Cetirizine HCl 10 MG CAPS Take 10 mg by mouth daily      estradiol (ESTRACE) 0.1 MG/GM vaginal cream Place 2 g vaginally daily      losartan (COZAAR) 25 MG tablet Take 1 tablet by mouth daily 90 tablet 3       Allergies:  Allergies   Allergen Reactions    Adhesive Tape Other (See Comments)     Patient stated iv tape and tegaderm ok  Blisters, use paper tape only  Patient states tegaderm and paper tape are okay      Sulfasalazine Other (See Comments)     Could not taste anything, lightheadedness    Molds & Smuts Other (See Comments)    Chlorhexidine Itching and Other (See Comments)     \"stinging for a couple of hours\"    itching       Social Determinants of Health:  Social Determinants of Health     Tobacco Use: Low Risk     Smoking Tobacco Use: Never    Smokeless Tobacco Use: Never    Passive Exposure: Never   Alcohol Use: Not on file   Financial Resource Strain: Unknown    Difficulty of Paying Living Expenses: Patient refused   Food Insecurity: Not on file   Transportation Needs: Not on file   Physical Activity: Not on file   Stress: Not on file   Social Connections: Not on file   Intimate Partner Violence: Not on file   Depression: Not at risk    PHQ-2 Score: 0   Housing Stability: Not on file       Review of Systems     Negative except as listed above in HPI.    Physical Exam     Vitals:    02/22/23 0159   BP: 125/67   Pulse: 78   Resp: 18   Temp: 97.2 °F (36.2 °C)   TempSrc: Temporal   SpO2: 98%   Weight: 173 lb (78.5 kg)   Height: 5' 10\" (1.778 m)       Constitutional: Non-toxic appearing, moderate distress  Head: Normocephalic, Atraumatic  Neck: Supple  Cardiovascular: Regular rate and rhythm, no murmurs, rubs, or gallops  Chest: Normal work of breathing and chest excursion bilaterally  Lungs: Clear to ausculation bilaterally  Abdomen:  Soft, non tender, non distended, normoactive bowel sounds  Back: No evidence of trauma or deformity  Extremities: No evidence of trauma or deformity, no LE edema  Skin: Warm and dry, normal cap refill  Neuro: Alert and appropriate, CN intact, normal speech, strength and sensation full and symmetric bilaterally, normal gait, normal coordination  Psychiatric: Normal mood and affect     OR    Physical Exam      Diagnostic Study Results     Labs:  No results found for this or any previous visit (from the past 12 hour(s)). Radiologic Studies:   XR KNEE LEFT (3 VIEWS)   Final Result      Status post total knee arthroplasty, as described. Mild-to-moderate effusion. Hardware alignment is unchanged. X-ray left knee by my read: Hardware intact. Staples over top of wound. EKG interpretation: (Preliminary)  EKG read by Dr. Suzy Monsivais at None       Procedures     Procedures    ED Course     4:28 AM EST KRIS Monsivais MD) am the first provider for this patient. Initial assessment performed. I reviewed the vital signs, available nursing notes, past medical history, past surgical history, family history and social history. The patients presenting problems have been discussed, and they are in agreement with the care plan formulated and outlined with them. I have encouraged them to ask questions as they arise throughout their visit. Records Reviewed: Nursing Notes, Old Medical Records, and Clinical Records from a Different Specialty (orthopedics)    Is this patient to be included in the SEP-1 core measure due to severe sepsis or septic shock? No Exclusion criteria - the patient is NOT to be included for SEP-1 Core Measure due to:  Infection is not suspected    MEDICATIONS ADMINISTERED IN THE ED:  Medications   morphine sulfate (PF) injection 4 mg (4 mg IntraVENous Given 2/22/23 0433)            Medical Decision Making     CC/HPI Summary, DDx, ED Course, and Reassessment: The patient is a 68-year-old woman who presents the ED today with increasing bleeding from her knee after doing physical therapy yesterday morning.  She initially had surgery for a total knee replacement this past Monday.  She is currently taking aspirin for DVT prophylaxis.  On exam, her wound is completely intact.  She does have some erythema and ecchymosis surrounding her knee.  She does have some venous oozing from the wound.      I doubt that she has an infection based on her white blood cell count, temperature, and ESR and CRP.    Her x-ray looks appropriate for postop.    I spoke with Dr. Dorado.  He would like us to tell the patient to hold her aspirin today and follow-up with Dr. Dorado tomorrow in the office.  Return precautions have been given.    Disposition Considerations (tests considered but not done, Admit vs D/C, Shared Decision Making, Pt Expectation of Test or Tx.): 0      Critical Care Time:     0    Neisha Hernandez MD    Diagnosis and Disposition     I Neisha Hernandez MD am the primary clinician of record.        DIAGNOSIS:   1. Complication of procedure, initial encounter        DISPOSITION Decision To Discharge 02/22/2023 05:52:58 AM      PATIENT REFERRED TO:  Jamir Dorado MD  [address] [place] [id number]  [phone]    Schedule an appointment as soon as possible for a visit in 1 day      [facility] EMERGENCY DEPT  [address] [place] [id number] [phone]    As needed, If symptoms worsen    DISCHARGE MEDICATIONS:  Discharge Medication List as of 2/22/2023  6:21 AM          DISCONTINUED MEDICATIONS:  Discharge Medication List as of 2/22/2023  6:21 AM                 (Please note that portions of this note were completed with a voice recognition program.  Efforts were made to edit the dictations but occasionally words are mis-transcribed.)    Neisha Hernandez MD (electronically signed)        Dragon Disclaimer     Please note that this dictation was completed with Jamin, the computer voice  recognition software. Quite often unanticipated grammatical, syntax, homophones, and other interpretive errors are inadvertently transcribed by the computer software. Please disregard these errors. Please excuse any errors that have escaped final proofreading.         Mat Summers MD  02/22/23 9550

## 2023-02-22 NOTE — DISCHARGE INSTRUCTIONS
Call Dr. Lenell Fothergill office this morning. He would like you to hold your aspirin today. He wants to see you in his office tomorrow.

## 2023-02-23 ENCOUNTER — HOME CARE VISIT (OUTPATIENT)
Age: 68
End: 2023-02-23

## 2023-02-23 ENCOUNTER — OFFICE VISIT (OUTPATIENT)
Age: 68
End: 2023-02-23

## 2023-02-23 VITALS — HEIGHT: 70 IN | TEMPERATURE: 97.8 F | WEIGHT: 173 LBS | BODY MASS INDEX: 24.77 KG/M2

## 2023-02-23 DIAGNOSIS — Z96.652 S/P TOTAL KNEE REPLACEMENT, LEFT: Primary | ICD-10-CM

## 2023-02-23 PROCEDURE — 99024 POSTOP FOLLOW-UP VISIT: CPT | Performed by: PHYSICIAN ASSISTANT

## 2023-02-23 PROCEDURE — G0157 HHC PT ASSISTANT EA 15: HCPCS

## 2023-02-23 NOTE — PROGRESS NOTES
Patient: Nicolette Christian                MRN: [id number]       SSN: [id number]  YOB: 1955        AGE: 75 y.o. SEX: female  Body mass index is 24.82 kg/m². PCP: Ganesh Deras MD  02/23/23      This office note has been dictated. REVIEW OF SYSTEMS:  Constitutional: Negative for fever, chills, weight loss and malaise/fatigue. HENT: Negative. Eyes: Negative. Respiratory: Negative. Cardiovascular: Negative. Gastrointestinal: No bowel incontinence or constipation. Genitourinary: No bladder incontinence or saddle anesthesia. Skin: Negative. Neurological: Negative. Endo/Heme/Allergies: Negative. Psychiatric/Behavioral: Negative. Musculoskeletal: As per HPI above. Past Medical History:   Diagnosis Date    Adjacent segment disease with spinal stenosis 6/22/2018    Allergic rhinitis     Anemia 2008    intermittant since then    Asthma     Back pain     French's esophagus with esophagitis     Cervical radiculopathy     Chronic sinusitis 5/15/2012    DNS (deviated nasal septum)     Empyema (HCC)     Foraminal stenosis of cervical region     C4-C5    GERD (gastroesophageal reflux disease)     Dr Newton Lose    Hx MRSA infection 8/11/2014    Hypertension     Hypertriglyceridemia     Insulin resistance 4/9/2012    Left knee pain     Lichen planus     Liver disease     LOPEZ-per pt, followed by Dr Brody Garcia    Menopause     Age 51    MRSA (methicillin resistant Staphylococcus aureus) infection 2008    left lung    Nausea & vomiting     Restless leg syndrome     Rheumatoid arthritis ([facility] [address]     Spinal cord stimulator status 2016    Spinal stenosis of cervical region     C4-C5 and C5-C6    TMJ syndrome     Wears glasses     and contacts         Current Outpatient Medications:     polyethylene glycol (GLYCOLAX) 17 GM/SCOOP powder, Take 17 g by mouth daily as needed (constipation). , Disp: , Rfl:     aspirin EC 81 MG EC tablet, Take 1 tablet by mouth 2 times daily, Disp: 60 tablet, Rfl: 1    docusate sodium (COLACE) 100 MG capsule, Take 1 capsule by mouth 2 times daily, Disp: 60 capsule, Rfl: 1    HYDROcodone-acetaminophen (NORCO)  MG per tablet, Take 1-2 tablets by mouth every 6 hours as needed for Pain for up to 7 days.  Max Daily Amount: 8 tablets, Disp: 56 tablet, Rfl: 0    ondansetron (ZOFRAN) 4 MG tablet, Take 1 tablet by mouth every 8 hours as needed for Nausea or Vomiting, Disp: 30 tablet, Rfl: 2    cefadroxil (DURICEF) 500 MG capsule, Take 1 capsule by mouth 2 times daily for 10 days, Disp: 20 capsule, Rfl: 0    albuterol sulfate HFA (PROVENTIL;VENTOLIN;PROAIR) 108 (90 Base) MCG/ACT inhaler, Inhale 1 puff into the lungs every 6 hours as needed (Patient not taking: Reported on 2/20/2023), Disp: , Rfl:     acetaminophen (TYLENOL) 325 MG tablet, Take 325 mg by mouth every 4 hours as needed, Disp: , Rfl:     abatacept (ORENCIA) 250 MG injection, Infuse intravenously Monthly, Disp: , Rfl:     Multiple Vitamins-Minerals (CENTRUM SILVER 50+WOMEN PO), Take 1 tablet by mouth daily, Disp: , Rfl:     KRILL OIL OMEGA-3 PO, Take by mouth daily, Disp: , Rfl:     Cholecalciferol (VITAMIN D3 PO), Take 1 tablet by mouth daily, Disp: , Rfl:     clobetasol (TEMOVATE) 0.05 % ointment, Apply 1 each topically daily apply a thin layer, Disp: , Rfl:     vitamin B-12 (CYANOCOBALAMIN) 1000 MCG tablet, take 1 tablet by mouth once daily, Disp: , Rfl:     dexlansoprazole (DEXILANT) 60 MG CPDR delayed release capsule, Take 60 mg by mouth daily, Disp: , Rfl:     FEROSUL 325 (65 Fe) MG tablet, take 1 tablet by mouth once daily before breakfast, Disp: , Rfl:     montelukast (SINGULAIR) 10 MG tablet, Take 10 mg by mouth nightly, Disp: , Rfl:     triamcinolone (NASACORT) 55 MCG/ACT nasal inhaler, 2 sprays by Nasal route daily, Disp: , Rfl:     budesonide-formoterol (SYMBICORT) 160-4.5 MCG/ACT AERO, Inhale 2 puffs into the lungs 2 times daily, Disp: , Rfl:     ascorbic acid (VITAMIN C) 500 MG tablet, Take 500 mg by mouth daily, Disp: , Rfl:     Cetirizine HCl 10 MG CAPS, Take 10 mg by mouth daily, Disp: , Rfl:     estradiol (ESTRACE) 0.1 MG/GM vaginal cream, Place 2 g vaginally daily, Disp: , Rfl:     losartan (COZAAR) 25 MG tablet, Take 1 tablet by mouth daily, Disp: 90 tablet, Rfl: 3    Allergies   Allergen Reactions    Adhesive Tape Other (See Comments)     Patient stated iv tape and tegaderm ok  Blisters, use paper tape only  Patient states tegaderm and paper tape are okay      Sulfasalazine Other (See Comments)     Could not taste anything, lightheadedness    Molds & Smuts Other (See Comments)    Chlorhexidine Itching and Other (See Comments)     \"stinging for a couple of hours\"    itching       Social History     Socioeconomic History    Marital status:      Spouse name: Not on file    Number of children: Not on file    Years of education: Not on file    Highest education level: Not on file   Occupational History    Not on file   Tobacco Use    Smoking status: Never     Passive exposure: Never    Smokeless tobacco: Never   Vaping Use    Vaping Use: Never used   Substance and Sexual Activity    Alcohol use: Not Currently     Alcohol/week: 0.0 standard drinks    Drug use: Yes     Types: OTC, Prescription    Sexual activity: Yes     Partners: Male     Comment: 20+ years of menopause   Other Topics Concern    Not on file   Social History Narrative    Retired      Social Determinants of Health     Financial Resource Strain: Unknown    Difficulty of Paying Living Expenses: Patient refused   Food Insecurity: Not on file   Transportation Needs: Not on file   Physical Activity: Not on file   Stress: Not on file   Social Connections: Not on file   Intimate Partner Violence: Not on file   Housing Stability: Not on file       Past Surgical History:   Procedure Laterality Date    ANESTH,SURGERY OF SHOULDER  01/31/2019    BUNIONECTOMY      CERVICAL FUSION  08/13/2014    COLONOSCOPY FLX DX W/COLLJ [facility] WHEN PFRMD  06/18/2008    normal/[facility]    HEENT  1997    TMJ surgery    LUMBAR LAMINECTOMY  11/1995    [facility]    LUMBAR LAMINECTOMY  02/1997    [facility]    ORTHOPEDIC SURGERY Right 01/2015    bunion removal from right foot    OTHER SURGICAL HISTORY  2007    thoracentesis    OTHER SURGICAL HISTORY  2008    chest tube    OTHER SURGICAL HISTORY Bilateral 1997    TMJ surgery    OTHER SURGICAL HISTORY  2012    sinus surg 2012-Dr Syed Mendez. OTHER SURGICAL HISTORY  2016    spinal cord stimulator place for lumbar neuritis, good benefit    ROTATOR CUFF REPAIR Right 01/2019    TOTAL HIP ARTHROPLASTY Right 05/13/2022    TOTAL KNEE ARTHROPLASTY Right 2020    TOTAL KNEE ARTHROPLASTY Left 2/20/2023    LEFT TOTAL KNEE ARTHROPLASTY performed by Zayda Rizo MD at [address] ENDOSCOPY             Patient seen evaluated today for her left knee. She is now 3 days status post total knee replacement surgery. She did physical therapy at home and states that the therapist pushed her little bit too hard. This caused her to have a little bit of bleeding underneath the dressing. It did eventually seep out from underneath the dressing. This bothered her and she went to the emergency room. She denies any fevers or chills. She does remain on her postop medications including aspirin, Percocet, Duricef. Patient denies recent fevers, chills, chest pain, SOB, or injuries. No recent systemic changes noted. A 12-point review of systems is performed today. Pertinent positives are noted. All other systems reviewed and otherwise are negative. Physical exam: General: Alert and oriented x3, nad.  well-developed, well nourished. normal affect, AF. NC/AT, EOMI, neck supple, trachea midline, no JVD present. Breathing is non-labored. Examination of the left knee reveals skin intact. The surgical wound looks good. She does have some mild ecchymosis noted. There is minimal effusion. There are no obvious defects in sensor mechanism. She is able to hold a straight leg raise without complications. She has near full extension. She bends to about 70. Negative calf tenderness. Negative Homans. No signs of DVT present. Assessment: Status post left total knee replacement    Plan: At this point, the wound was cut with alcohol and a dressing was placed. She is instructed to stop her aspirin. She will continue with ice therapy. She is instructed on the importance of range of motion activities on an hourly basis. She will continue with home health physical therapy. She will continue on her postop antibiotics. Pain medicine as needed. We will see her back in the office next week for reevaluation. They will call with any questions or concerns that should arise.             JR Shayne MCCLENDON, PAPauletteC, ATC

## 2023-02-23 NOTE — HOME HEALTH
Skilled reason for visit: s/p L TKA requiring dressing changes and incisional monitoring  Caregiver involvement: her  who is able to assist with bathing, dressing, walking, turn in bed, bathroom, meal prep and setup, medication management, grocery shopping, household chores and transportation to MD appointment. Medications reviewed and all medications are available in the home this visit. The following education was provided regarding medications: reviewed side effects, purposes, dosage, frequencies. MD notified of any discrepancies/look a-like medications/medication interactions: NA  High risk medication teaching regarding anticoagulants, hyperglycemic agents or opiod narcotics performed (specify) norco, duricef- nstructed patient/caregiver to notify SN/PT of signs and symptoms of anticoagulant adverse effects including bleeding gums, blood in urine or stool, easily bruising. Instructed on importance of fall prevention due to risk for bleeding. Instructed patient/caregiver to notify SN/PT of any adverse effects of antibiotic medications including rash, dizziness, nausea, diarrhea, or yeast infections. Medications are effective at this time. Home health supplies by type and quantity ordered/delivered this visit include: admitting clinician ordered dressings. Patient education provided this visit: patient/cg instructed to monitor for edema/increase in edema, to elevate extremity when edema occurs and to notify md if edema exceeds normal limits for patient, normal post op edema to left leg noted at this time. patient made aware to monitor for s/s of infection [increased swelling, increased redness around site, increased pain, foul smelling drainage, fever] aware who to report to/when. no s/s of infection noted. encouraged patient to get three nutritional meals daily and to stay hydrated, drink plenty of fluids. pt aware to keep dressing clean, dry and intact as ordered.  Instructed patient/caregiver on low salt diet- patient made aware to restrict sodium intake to 2 g/day, to reduce sodium if weight begins to increase, educated on tips to limit sodium (avoiding foods high in sodium, getting rid of salt shaker, using herbs/spices, using fresh foods, avoiding salt substitutes which may contain potassium, buying foods labeled low-sodium or sodium free), reading food labels, and making changes slowly to give taste buds time to adjust.  discussed importance of monitoring blood pressure daily and recording for review, discussed hypertension, causes/long term effects of uncontrolled hypertension. pt denies any s/s of hypertension at this time- instructed to monitor for blurred vision, strong headaches, fatigue, confusion, etc. discussed fall precautions in detail- having lighted hallways, removing throw rugs, monitoring medication that may alter mental status. patient aware to turn every 2 hours and to keep pressure off of bony prominences, to monitor for any pressure ulcer development/worsening. Sharps education provided: na    Patient level of understanding of education provided: patient has a good understanding of education that was provided at this time by engaging in all education provided and is able to verbalize understanding, pt denies any questions or concerns at this time    Skilled Care Performed this visit: full body assessment and education as above. gauze pressure dressing is intact, surrounding skin is clean dry and intact at this time.  patient had bleeding last night and went to ER where she was evaluated by surgical team- patient is going to MD tomorrow, dressing left intact due to no drainage at this time and will be assessed by MD tomorrow- next scheduled dressing change is Monday 2/27/23 unless otherwise instructed by MD    Patient response to procedure performed:  patient tolerated assessment with no signs of discomfort, grimacing or pain, no complications or concerns noted. Agency Progress toward goals: goals/teaching intiated. unable to assess progression of goals at this time due to being initial visit. Patient's Progress towards personal goals: unable to assess progression of personal goals at this time due to being initial visit. Home exercise program: patient instructed to perform sob hep 4-5 x daily and prn for sob, to promote lung expansion. pt also encouraged to use ICS q 2 hours and to perform sob hep during therapy. Continued need for the following skills: Nursing and Physical Therapy. Plan for next visit: dressing change    Patient and/or caregiver notified and agrees to changes in the Plan of Care: N/A. The following discharge planning was discussed with the pt/caregiver: Patient will be discharged once education has completed, patient is medically stable and pt/cg are able to independently manage dressing changes/ incisional care.

## 2023-02-24 ENCOUNTER — FOLLOWUP TELEPHONE ENCOUNTER (OUTPATIENT)
Facility: HOSPITAL | Age: 68
End: 2023-02-24

## 2023-02-24 ENCOUNTER — HOME CARE VISIT (OUTPATIENT)
Age: 68
End: 2023-02-24

## 2023-02-24 VITALS
TEMPERATURE: 98.7 F | HEART RATE: 89 BPM | DIASTOLIC BLOOD PRESSURE: 80 MMHG | SYSTOLIC BLOOD PRESSURE: 129 MMHG | OXYGEN SATURATION: 96 %

## 2023-02-24 PROCEDURE — G0157 HHC PT ASSISTANT EA 15: HCPCS

## 2023-02-24 ASSESSMENT — ENCOUNTER SYMPTOMS: PAIN LOCATION - PAIN QUALITY: ACHING, STIFF

## 2023-02-24 NOTE — TELEPHONE ENCOUNTER
Attempted to call patient x 2. Patient has not returned calls despite two vm left. She has been seen by KERON Macdonald and has returned to ER for dressing change. Will continue to follow office notes. Patient appears to be progressing with normal recovery.

## 2023-02-24 NOTE — HOME HEALTH
SUBJECTIVE: Pt reports minimal pain in left, pt was seen by Andrei Mehta today and instruction to continue with [place] [facility] as ordered, Pt concerned that she has declined since [facility], pt denies falls or changes in medications     CAREGIVER INVOLVEMENT/ASSISTANCE NEEDED FOR: Spouse is available to assist as needed     OBJECTIVE: See interventions  PATIENT EDUCATION PROVIDED THIS VISIT: Pt educated in pain management, fall prevention and s/s of infection    PATIENT RESPONSE TO EDUCATION: Pt verbalizes understanding of education    PATIENT RESPONSE TO TREATMENT/ASSESSMENT OF PROGRESS TOWARD GOALS:  Pt presents with limited Left knee ROM and quad weakness resulting In instability and increased risk for falls. Instruction and assistance needed for transfers to improve knee flexion and safety, gait requires use of an assistive device and VCs to improve quality of gait. Established HEP pt instructed to perform 3x day walk every hour. Pt would benefit from PT to improve ROM and Strength to return to PLOF. Pt reports slight decrease in pain following tx session  PLAN FOR NEXT VISIT: Next visit will review HEP and Education will address safety with transfers,  gait with appropriate assistive device and ROM   DISCHARGE PLANS: POC and Discharge plans discussed pt understands and agrees eventual DC once goals have been met or max HC benefits have been achieved.  Will continue daily PT x 14 day, 11 visits remaining, anticipated DC date of 11/3/22

## 2023-02-25 ENCOUNTER — HOME CARE VISIT (OUTPATIENT)
Age: 68
End: 2023-02-25
Payer: MEDICARE

## 2023-02-25 VITALS
TEMPERATURE: 98 F | SYSTOLIC BLOOD PRESSURE: 124 MMHG | OXYGEN SATURATION: 97 % | DIASTOLIC BLOOD PRESSURE: 79 MMHG | HEART RATE: 74 BPM

## 2023-02-25 PROCEDURE — G0157 HHC PT ASSISTANT EA 15: HCPCS

## 2023-02-25 ASSESSMENT — ENCOUNTER SYMPTOMS: PAIN LOCATION - PAIN QUALITY: ACHING

## 2023-02-25 NOTE — HOME HEALTH
SUBJECTIVE:  Pt states she is doing much better, minimal pain reported, no changes in medicaitons or falls reported    CAREGIVER INVOLVEMENT/ASSISTANCE NEEDED FOR: Spouse is available to assist as needed     OBJECTIVE: See interventions  PATIENT EDUCATION PROVIDED THIS VISIT: Pt educated in pain management, fall prevention, s/s of infection and antibiotic therapy  PATIENT RESPONSE TO EDUCATION: Pt verbalizes understanding of education    PATIENT RESPONSE TO TREATMENT/ASSESSMENT OF PROGRESS TOWARD GOALS: Instruction provided to upgrade HEP pt was able to return demonstrate without pain. Ind bed mobility, instruction to improve knee flexion durig swing phase of gait with good return demostration. Cuing needed for proper sequencing for stair negotiation with use of FWW. VCs provided to improve safety with sit to stand transfers. Pt Demostrates improved left knee ROM this visit. Reports slight increse in pain following tx session instructed to ice and elevate x 20 minutes. PLAN FOR NEXT VISIT: Upgrade HEP as tolerated address balance and ROM   DISCHARGE PLANS: POC and Discharge plans discussed pt understands and agrees eventual DC once goals have been met or max HC benefits have been achieved.  Will continue daily PT x 14 day, 10 visits remaining, anticipated DC date of 11/3/22

## 2023-02-26 ENCOUNTER — HOME CARE VISIT (OUTPATIENT)
Age: 68
End: 2023-02-26
Payer: MEDICARE

## 2023-02-26 PROCEDURE — G0157 HHC PT ASSISTANT EA 15: HCPCS

## 2023-02-27 ENCOUNTER — HOME CARE VISIT (OUTPATIENT)
Age: 68
End: 2023-02-27
Payer: MEDICARE

## 2023-02-27 VITALS
HEART RATE: 90 BPM | SYSTOLIC BLOOD PRESSURE: 124 MMHG | OXYGEN SATURATION: 98 % | TEMPERATURE: 97.8 F | DIASTOLIC BLOOD PRESSURE: 79 MMHG

## 2023-02-27 VITALS
SYSTOLIC BLOOD PRESSURE: 131 MMHG | DIASTOLIC BLOOD PRESSURE: 84 MMHG | HEART RATE: 76 BPM | OXYGEN SATURATION: 98 % | TEMPERATURE: 97.7 F | RESPIRATION RATE: 16 BRPM

## 2023-02-27 VITALS
SYSTOLIC BLOOD PRESSURE: 108 MMHG | HEART RATE: 77 BPM | RESPIRATION RATE: 14 BRPM | SYSTOLIC BLOOD PRESSURE: 116 MMHG | TEMPERATURE: 98.8 F | HEART RATE: 87 BPM | DIASTOLIC BLOOD PRESSURE: 70 MMHG | TEMPERATURE: 98.7 F | OXYGEN SATURATION: 96 % | RESPIRATION RATE: 13 BRPM | DIASTOLIC BLOOD PRESSURE: 62 MMHG | OXYGEN SATURATION: 97 %

## 2023-02-27 PROCEDURE — G0157 HHC PT ASSISTANT EA 15: HCPCS

## 2023-02-27 PROCEDURE — G0299 HHS/HOSPICE OF RN EA 15 MIN: HCPCS

## 2023-02-27 ASSESSMENT — ENCOUNTER SYMPTOMS
PAIN LOCATION - PAIN QUALITY: SORENESS, TIGHTNESS
PAIN LOCATION - PAIN QUALITY: SORENESS, TIGHTNESS
PAIN LOCATION - PAIN QUALITY: ACHING, STIFF
PAIN LOCATION - PAIN QUALITY: SORE, ACHE
DYSPNEA ACTIVITY LEVEL: AFTER AMBULATING 10 - 20 FT

## 2023-02-27 NOTE — HOME HEALTH
SUBJECTIVE:  Pt states she is doing much better, minimal pain reported, no changes in medicaitons or falls reported    CAREGIVER INVOLVEMENT/ASSISTANCE NEEDED FOR: Spouse is available to assist as needed     OBJECTIVE: See intervention  PATIENT EDUCATION PROVIDED THIS VISIT: Pt educated in fall prevention, pain management and risk and complications associated with use of opiods  PATIENT RESPONSE TO EDUCATION: Pt verbalizes understanding of education    PATIENT RESPONSE TO TREATMENT/ASSESSMENT OF PROGRESS TOWARD GOALS:  HEP upgraded to standing instruction/Demo provided with good return demostration, progressed to [facility] VC/Demo to improve quality of gait and left knee flexion during swing phase of gait,  sit to stand transfers require VCs to encourage knee flexion, pt extends left knee to stand, Pt requires continued tng to improve quality of gait, safety with sit to stand transfers, ROM and strenght, demonstrates improved balance Tinetti increaed by 2 points. NO change in pain reported, c/o some fatigue following tx session   PLAN FOR NEXT VISIT: Sup visit scheduled with PT tomorrow   DISCHARGE PLANS: POC and Discharge plans discussed pt understands and agrees eventual DC once goals have been met or max HC benefits have been achieved.  Will continue daily PT x 14 day, 7 visits remaining, anticipated DC date of 11/3/22

## 2023-02-28 ENCOUNTER — HOME CARE VISIT (OUTPATIENT)
Age: 68
End: 2023-02-28
Payer: MEDICARE

## 2023-02-28 PROCEDURE — G0151 HHCP-SERV OF PT,EA 15 MIN: HCPCS

## 2023-02-28 NOTE — HOME HEALTH
Skilled reason for visit: s/p L TKA requiring dressing changes and incisional monitoring   Caregiver involvement: her  who is able to assist with bathing, dressing, walking, turn in bed, bathroom, meal prep and setup, medication management, grocery shopping, household chores and transportation to MD appointment. Medications reviewed and all medications are available in the home this visit. Patient denies any medication changes at this time of assessment. The following education was provided regarding medications: reviewed side effects, purposes, dosage, frequencies. MD notified of any discrepancies/look a-like medications/medication interactions: NA   High risk medication teaching regarding anticoagulants, hyperglycemic agents or opiod narcotics performed (specify) norco, duricef- instructed patient/caregiver to notify SN/PT of signs and symptoms of anticoagulant adverse effects including bleeding gums, blood in urine or stool, easily bruising. Instructed on importance of fall prevention due to risk for bleeding. Instructed patient/caregiver to notify SN/PT of any adverse effects of antibiotic medications including rash, dizziness, nausea, diarrhea, or yeast infections. Medications are effective at this time. Home health supplies by type and quantity ordered/delivered this visit include: pt has mepilex dressing. Patient education provided this visit: patient/cg reminded to monitor for edema/increase in edema, to elevate extremity when edema occurs and to notify md if edema exceeds normal limits for patient, normal post op edema to left leg noted at this time. patient aware to monitor for s/s of infection [increased swelling, increased redness around site, increased pain, foul smelling drainage, fever] aware who to report to/when. no s/s of infection noted. encouraged patient to get three nutritional meals daily and to stay hydrated, drink plenty of fluids.  pt aware to keep dressing clean, dry and intact as ordered. Instructed patient/caregiver on low salt diet- patient made aware to restrict sodium intake to 2 g/day, to reduce sodium if weight begins to increase, educated on tips to limit sodium (avoiding foods high in sodium, getting rid of salt shaker, using herbs/spices, using fresh foods, avoiding salt substitutes which may contain potassium, buying foods labeled low-sodium or sodium free), reading food labels, and making changes slowly to give taste buds time to adjust. discussed importance of monitoring blood pressure daily and recording for review, discussed hypertension, causes/long term effects of uncontrolled hypertension. pt denies any s/s of hypertension at this time- instructed to monitor for blurred vision, strong headaches, fatigue, confusion, etc. discussed fall precautions in detail- having lighted hallways, removing throw rugs, monitoring medication that may alter mental status. patient aware to turn every 2 hours and to keep pressure off of bony prominences, to monitor for any pressure ulcer development/worsening. Sharps education provided: na   Patient level of understanding of education provided: patient has a good understanding of education that was provided at this time by engaging in all education provided and is able to verbalize understanding, pt denies any questions or concerns at this time   Skilled Care Performed this visit: full body assessment and education as above. current foam dressing to left knee is clean dry and intact, due to be changed on 3/2/23 because patient had dressing changed at MD office on last Thursday. patient going back to MD on Friday, requesting dressing to stay intact until this visit and declines dressing removal on today's visit. Patient response to procedure performed: patient tolerated assessment with no signs of discomfort, grimacing or pain, no complications or concerns noted. Agency Progress toward goals: goals/teaching reviewed.  patient is progressing towards goals at this time, skilled need to continue monitoring for dressing changes and incisional care. Patient's Progress towards personal goals: pt reporting they are progressing towards their goals at this time, feeling better every day. Home exercise program: patient instructed to perform sob hep 4-5 x daily and prn for sob, to promote lung expansion. pt also encouraged to use ICS q 2 hours and to perform sob hep during therapy. Continued need for the following skills: Nursing and Physical Therapy. Plan for next visit: dressing change   Patient and/or caregiver notified and agrees to changes in the Plan of Care: N/A. The following discharge planning was discussed with the pt/caregiver: Patient will be discharged once education has completed, patient is medically stable and pt/cg are able to independently manage dressing changes/ incisional care.

## 2023-02-28 NOTE — HOME HEALTH
Subjective: Patient relays that she has been able to complete her given HEP as instructed as well as ambulate around the home often. She states that she is pleased that her pain is not as bad as it was a couple days ago. Objective: Skilled home health physical therapy interventions completed today listed in care plan section. Interventions performed today to assist in return to prior level of function, address deficits as apparent upon time of initial evaluation, return to community and personal activities, and progress further towards goals as previously established in plan of care. There are not any changes to medications upon timing of this visit. Home health supplies were not ordered/delivered today. Visual inspection finds dressing intact with minimal drainage noted into dressing and no other signs/symptoms of infection. Assessment:  Patient is progressing towards goals as previously established in POC with skilled home health physical therapy services at this time as made apparent by ability to complete standing exercises today inlcuding retrostepping and lateral stepping. Family/caregiver spouse involvement is present/set-up at this point in time and assists with meals, transport, and general encouragement. Patient knowledgeable of HEP and should be capable of its safe and effective completion as instructed. Plan:  Discharge planning discussed at this visit regarding eventual discharge once patient has met goals and/or met maximum benefit from home health skilled PT services with patient understanding and agreeable at this time. Continued need for skilled home health PT at this time to address deficits, reduce risk of falls, and obtain goals as previously established per plan of care. Further gait training outside as weather permits. Maximize range of motion gains as tolerated.

## 2023-02-28 NOTE — HOME HEALTH
Subjective: Patient pleased to report that overall she feels like her post surgical ecchymosis is improving left leg but complains of continued edema. She states that she attempted some of the retrowalking and lateral stepping on her own and she did well with doing so. Objective: Skilled home health physical therapy interventions completed today listed in care plan section. Interventions performed today to assist in return to prior level of function, address deficits as apparent upon time of initial evaluation, return to community and personal activities, and progress further towards goals as previously established in plan of care. There are not any changes to medications upon timing of this visit. Home health supplies were not ordered/delivered today. Visual inspection finds dressing intact with minimal drainage noted into dressing and no other signs/symptoms of infection. Lower extremity edema left leg grade 2+/3 non-pitting    Assessment:  Patient is progressing towards goals as previously established in [address] with skilled home health physical therapy services at this time as made apparent by ability to navigate steps to exit/enter home today, ambulate down sloped driveway and ambulate a further distance outside today overall. Family/caregiver spouse involvement is present/set-up at this point in time and assists with meals, transport, and general encouragement. Patient agreeable to attempting one outside ambulation attempt a day if weather permits to continue to improve endurance and overall mobility. Post surgical ecchymosis improving left leg. Plan:  Discharge planning discussed at this visit regarding eventual discharge once patient has met goals and/or met maximum benefit from home health skilled PT services with patient understanding and agreeable at this time.  Continued need for skilled home health PT at this time to address deficits, reduce risk of falls, and obtain goals as previously established per plan of care. Continue to improve upon ambulation distance. Monitor edema.

## 2023-03-01 ENCOUNTER — HOME CARE VISIT (OUTPATIENT)
Age: 68
End: 2023-03-01
Payer: MEDICARE

## 2023-03-01 VITALS
SYSTOLIC BLOOD PRESSURE: 110 MMHG | TEMPERATURE: 98 F | OXYGEN SATURATION: 94 % | DIASTOLIC BLOOD PRESSURE: 66 MMHG | HEART RATE: 84 BPM

## 2023-03-01 VITALS
SYSTOLIC BLOOD PRESSURE: 123 MMHG | HEART RATE: 82 BPM | DIASTOLIC BLOOD PRESSURE: 79 MMHG | TEMPERATURE: 98.2 F | OXYGEN SATURATION: 97 %

## 2023-03-01 PROCEDURE — G0157 HHC PT ASSISTANT EA 15: HCPCS

## 2023-03-01 ASSESSMENT — ENCOUNTER SYMPTOMS: PAIN LOCATION - PAIN QUALITY: SHARP

## 2023-03-01 NOTE — HOME HEALTH
SUBJECTIVE: Patient reports mild pain. CAREGIVER INVOLVEMENT/ASSISTANCE NEEDED FOR: Transportation, shopping. HOME HEALTH SUPPLIES BY TYPE AND QUANTITY ORDERED/DELIVERED THIS VISIT INCLUDE: n/a  OBJECTIVE:  See interventions. PATIENT RESPONSE TO TREATMENT:  Patient was fatigued but in no apparent distress,  No change in pain per patient. PATIENT LEVEL OF UNDERSTANDING OF EDUCATION PROVIDED: Ed re:  progress made, proper gait,  improved balance, gait and strength as well as AROM. ASSESSMENT OF PROGRESS TOWARD GOALS:   Improving gait with B step through pattern and good L knee flexion in swing, even step lengths, very mild gait deviation. FWW. Transfers progressing with min VC to keep feet togehter (not kick L leg out), and improving safety. AROM and PROM improving to 0/9 today with minimal warm up. Balance:  Tinetti 22/28 improved from initial 19/28 points. CONTINUED NEED FOR THE FOLLOWING SKILLS: HH PT is necessary in order to provide skilled interventions to porgress the L knee strength and AROM properly and prepare for more independent balance and strength. PLAN FOR NEXT VISIT: Git training, strengthening  THE FOLLOWING DISCHARGE PLANNING WAS DISCUSSED WITH THE PATIENT/CAREGIVER:   Discharge patient to self, CG and physician when goals are met or max potential achieved.

## 2023-03-01 NOTE — HOME HEALTH
SUBJECTIVE: Pt c/o 4/10 right knee pain, limited use of narcotics, no changes in medications, no falls reported    CAREGIVER INVOLVEMENT/ASSISTANCE NEEDED FOR: Pt Ind with ADLs, Meal prep   OBJECTIVE: See interventions  PATIENT EDUCATION PROVIDED THIS VISIT: Reviewed pain management, s/s of infection and use of/complications associated with anticoagulants    PATIENT RESPONSE TO EDUCATION: Pt verbalizes understanding of education    PATIENT RESPONSE TO TREATMENT/ASSESSMENT OF PROGRESS TOWARD GOALS:  Pt presents with limited ROM in left knee, peformed gait tng outside on uneven surfaces w/ SPC VCs to increse hip flexion, knee flexion and toe off with minmal return demonstration today, gait tng on even surfaces w/ instruction for equal step length. instructed in standing HEP with focus on tech, avoiding hip hiking and accessory movements. VCs to flex left knee for sit to stand transfers improved following VCs to correct. Pt reports decreased left knee pain following tx session 2/10 continues to progress well toward established goals. PLAN FOR NEXT VISIT: Next visit will review HEP, ROM and Balance   DISCHARGE PLANS: POC and Discharge plans discussed pt understands and agrees eventual DC once goals have been met or max HC benefits have been achieved.  Will continue daily PT x 14 days 6 visits remaining, anticipated DC date of 11/3/22

## 2023-03-02 ENCOUNTER — HOME CARE VISIT (OUTPATIENT)
Age: 68
End: 2023-03-02
Payer: MEDICARE

## 2023-03-02 PROCEDURE — G0157 HHC PT ASSISTANT EA 15: HCPCS

## 2023-03-03 ENCOUNTER — OFFICE VISIT (OUTPATIENT)
Age: 68
End: 2023-03-03

## 2023-03-03 ENCOUNTER — HOME CARE VISIT (OUTPATIENT)
Age: 68
End: 2023-03-03
Payer: MEDICARE

## 2023-03-03 VITALS
HEART RATE: 90 BPM | TEMPERATURE: 97.7 F | OXYGEN SATURATION: 97 % | OXYGEN SATURATION: 97 % | DIASTOLIC BLOOD PRESSURE: 85 MMHG | TEMPERATURE: 98.4 F | SYSTOLIC BLOOD PRESSURE: 131 MMHG | SYSTOLIC BLOOD PRESSURE: 140 MMHG | DIASTOLIC BLOOD PRESSURE: 85 MMHG | HEART RATE: 77 BPM

## 2023-03-03 DIAGNOSIS — Z96.652 PRESENCE OF LEFT ARTIFICIAL KNEE JOINT: Primary | ICD-10-CM

## 2023-03-03 PROCEDURE — G0157 HHC PT ASSISTANT EA 15: HCPCS

## 2023-03-03 PROCEDURE — 99024 POSTOP FOLLOW-UP VISIT: CPT | Performed by: PHYSICIAN ASSISTANT

## 2023-03-03 RX ORDER — HYDROCODONE BITARTRATE AND ACETAMINOPHEN 10; 325 MG/1; MG/1
1 TABLET ORAL EVERY 6 HOURS PRN
Qty: 28 TABLET | Refills: 0 | Status: CANCELLED | OUTPATIENT
Start: 2023-03-03 | End: 2023-03-10

## 2023-03-03 ASSESSMENT — ENCOUNTER SYMPTOMS
PAIN LOCATION - PAIN QUALITY: SORE, STIFF
PAIN LOCATION - PAIN QUALITY: SORE. STIFF

## 2023-03-03 NOTE — PROGRESS NOTES
Patient: Esther Leonard                MRN: [id number]       SSN: [id number]  YOB: 1955        AGE: 67 y.o.        SEX: female  There is no height or weight on file to calculate BMI.    PCP: Sandra Matos MD  03/03/23      This office note has been dictated.      REVIEW OF SYSTEMS:  Constitutional: Negative for fever, chills, weight loss and malaise/fatigue.   HENT: Negative.    Eyes: Negative.    Respiratory: Negative.   Cardiovascular: Negative.   Gastrointestinal: No bowel incontinence or constipation.  Genitourinary: No bladder incontinence or saddle anesthesia.  Skin: Negative.   Neurological: Negative.    Endo/Heme/Allergies: Negative.    Psychiatric/Behavioral: Negative.  Musculoskeletal: As per HPI above.     Past Medical History:   Diagnosis Date    Adjacent segment disease with spinal stenosis 6/22/2018    Allergic rhinitis     Anemia 2008    intermittant since then    Asthma     Back pain     French's esophagus with esophagitis     Cervical radiculopathy     Chronic sinusitis 5/15/2012    DNS (deviated nasal septum)     Empyema (HCC)     Foraminal stenosis of cervical region     C4-C5    GERD (gastroesophageal reflux disease)     Dr Shah    Hx MRSA infection 8/11/2014    Hypertension     Hypertriglyceridemia     Insulin resistance 4/9/2012    Left knee pain     Lichen planus     Liver disease     LOPEZ-per pt, followed by Dr Connell    Menopause     Age 46    MRSA (methicillin resistant Staphylococcus aureus) infection 2008    left lung    Nausea & vomiting     Restless leg syndrome     Rheumatoid arthritis (HCC)     Spinal cord stimulator status 2016    Spinal stenosis of cervical region     C4-C5 and C5-C6    TMJ syndrome     Wears glasses     and contacts         Current Outpatient Medications:     polyethylene glycol (GLYCOLAX) 17 GM/SCOOP powder, Take 17 g by mouth daily as needed (constipation)., Disp: , Rfl:     aspirin EC 81 MG EC tablet, Take 1 tablet by  mouth 2 times daily, Disp: 60 tablet, Rfl: 1    docusate sodium (COLACE) 100 MG capsule, Take 1 capsule by mouth 2 times daily, Disp: 60 capsule, Rfl: 1    ondansetron (ZOFRAN) 4 MG tablet, Take 1 tablet by mouth every 8 hours as needed for Nausea or Vomiting, Disp: 30 tablet, Rfl: 2    acetaminophen (TYLENOL) 325 MG tablet, Take 325 mg by mouth every 4 hours as needed, Disp: , Rfl:     abatacept (ORENCIA) 250 MG injection, Infuse intravenously Monthly, Disp: , Rfl:     Multiple Vitamins-Minerals (CENTRUM SILVER 50+WOMEN PO), Take 1 tablet by mouth daily, Disp: , Rfl:     KRILL OIL OMEGA-3 PO, Take by mouth daily, Disp: , Rfl:     Cholecalciferol (VITAMIN D3 PO), Take 1 tablet by mouth daily, Disp: , Rfl:     clobetasol (TEMOVATE) 0.05 % ointment, Apply 1 each topically daily apply a thin layer, Disp: , Rfl:     vitamin B-12 (CYANOCOBALAMIN) 1000 MCG tablet, take 1 tablet by mouth once daily, Disp: , Rfl:     dexlansoprazole (DEXILANT) 60 MG CPDR delayed release capsule, Take 60 mg by mouth daily, Disp: , Rfl:     FEROSUL 325 (65 Fe) MG tablet, take 1 tablet by mouth once daily before breakfast, Disp: , Rfl:     montelukast (SINGULAIR) 10 MG tablet, Take 10 mg by mouth nightly, Disp: , Rfl:     triamcinolone (NASACORT) 55 MCG/ACT nasal inhaler, 2 sprays by Nasal route daily, Disp: , Rfl:     budesonide-formoterol (SYMBICORT) 160-4.5 MCG/ACT AERO, Inhale 2 puffs into the lungs 2 times daily, Disp: , Rfl:     ascorbic acid (VITAMIN C) 500 MG tablet, Take 500 mg by mouth daily, Disp: , Rfl:     Cetirizine HCl 10 MG CAPS, Take 10 mg by mouth daily, Disp: , Rfl:     estradiol (ESTRACE) 0.1 MG/GM vaginal cream, Place 2 g vaginally daily, Disp: , Rfl:     losartan (COZAAR) 25 MG tablet, Take 1 tablet by mouth daily, Disp: 90 tablet, Rfl: 3    albuterol sulfate HFA (PROVENTIL;VENTOLIN;PROAIR) 108 (90 Base) MCG/ACT inhaler, Inhale 1 puff into the lungs every 6 hours as needed (Patient not taking: No sig reported), Disp: , Rfl:     Allergies   Allergen Reactions    Adhesive Tape Other (See Comments)     Patient stated iv tape and tegaderm ok  Blisters, use paper tape only  Patient states tegaderm and paper tape are okay      Sulfasalazine Other (See Comments)     Could not taste anything, lightheadedness    Molds & Smuts Other (See Comments)    Chlorhexidine Itching and Other (See Comments)     \"stinging for a couple of hours\"    itching       Social History     Socioeconomic History    Marital status:      Spouse name: Not on file    Number of children: Not on file    Years of education: Not on file    Highest education level: Not on file   Occupational History    Not on file   Tobacco Use    Smoking status: Never     Passive exposure: Never    Smokeless tobacco: Never   Vaping Use    Vaping Use: Never used   Substance and Sexual Activity    Alcohol use: Not Currently     Alcohol/week: 0.0 standard drinks    Drug use: Yes     Types: OTC, Prescription    Sexual activity: Yes     Partners: Male     Comment: 20+ years of menopause   Other Topics Concern    Not on file   Social History Narrative    Retired      Social Determinants of Health     Financial Resource Strain: Unknown    Difficulty of Paying Living Expenses: Patient refused   Food Insecurity: Not on file   Transportation Needs: Not on file   Physical Activity: Not on file   Stress: Not on file   Social Connections: Not on file   Intimate Partner Violence: Not on file   Housing Stability: Not on file       Past Surgical History:   Procedure Laterality Date    ANESTH,SURGERY OF SHOULDER  01/31/2019    BUNIONECTOMY      CERVICAL FUSION  08/13/2014    COLONOSCOPY FLX DX W/COLLJ SPEC WHEN PFRMD  06/18/2008    normal/[facility]    HEENT  1997    TMJ surgery    JOINT REPLACEMENT      KNEE SURGERY      LUMBAR LAMINECTOMY  11/1995    [facility]    LUMBAR LAMINECTOMY  02/1997    [place]    ORTHOPEDIC SURGERY Right 01/2015    bunion removal from right foot    OTHER SURGICAL HISTORY  2007    thoracentesis    OTHER SURGICAL HISTORY  2008    chest tube    OTHER SURGICAL HISTORY Bilateral 1997    TMJ surgery    OTHER SURGICAL HISTORY  2012    sinus surg 2012-Dr Drew Moore. OTHER SURGICAL HISTORY  2016    spinal cord stimulator place for lumbar neuritis, good benefit    ROTATOR CUFF REPAIR Right 01/2019    TOTAL HIP ARTHROPLASTY Right 05/13/2022    TOTAL KNEE ARTHROPLASTY Right 2020    TOTAL KNEE ARTHROPLASTY Left 02/20/2023    LEFT TOTAL KNEE ARTHROPLASTY performed by Christina Stewart MD at [address] ENDOSCOPY               Patient seen evaluated today for her left knee. She is status post left total knee placement surgery. She is now 11 days out of surgery. To have a little bit of oozing from the wound upon the last visit. She was placed on prophylactic antibiotics which she has finished up. She has had no more oozing from the knee. She had her aspirin held. She denies any chest pain or shortness of breath. She is doing well with home physical therapy as well as her range of motion activities. Patient denies recent fevers, chills, chest pain, SOB, or injuries. No recent systemic changes noted. A 12-point review of systems is performed today. Pertinent positives are noted. All other systems reviewed and otherwise are negative. Physical exam: General: Alert and oriented x3, nad.  well-developed, well nourished. normal affect, AF. NC/AT, EOMI, neck supple, trachea midline, no JVD present. Breathing is non-labored. Examination of the left knee reveals skin intact. The surgical wound is healing nicely. There is no erythema however does have some resolving ecchymosis. There is minimal swelling. Does have some edema distally. She has negative calf tenderness. Negative Homans. No signs for an acute DVT present. Range of motion is 0 to approximate 105 degrees.     Assessment: Status post left total knee replacement    Plan: At this point, we discussed treatment options. The wound was cleaned with alcohol and new dressing was placed. She will continue with ice therapy. She will continue with a home exercise program and home health physical therapy. I have asked her to go back on aspirin every other day. We will get a ultrasound of her left lower extremity to rule out DVT.   We will see her back next week for reevaluation and staple removal.          JR Shayne MCCLENDON, PA-C, ATC

## 2023-03-04 ENCOUNTER — HOME CARE VISIT (OUTPATIENT)
Age: 68
End: 2023-03-04
Payer: MEDICARE

## 2023-03-04 PROCEDURE — G0157 HHC PT ASSISTANT EA 15: HCPCS

## 2023-03-04 NOTE — HOME HEALTH
SUBJECTIVE: Pt c/o minimal left knee pain, no changes in medications, no falls reported    CAREGIVER INVOLVEMENT/ASSISTANCE NEEDED FOR: Pt Ind with ADLs  OBJECTIVE: See interventions  PATIENT EDUCATION PROVIDED THIS VISIT: Reviewed pain management, s/s of infection and use of/complications associated with anticoagulants    PATIENT RESPONSE TO EDUCATION: Pt able to teach back education  PATIENT RESPONSE TO TREATMENT/ASSESSMENT OF PROGRESS TOWARD GOALS: Instructed in gait tng on even surfaces w/SPC instruction to improve stride length and knee flexion, Ind sit to stand transfes from multiple surfaces improved left knee flexion, HEP requires VCs to improve tech with good return demostration. Pt progressing well toward established goals, no target for anticipated DC  PLAN FOR NEXT VISIT: Next visit will review HEP, ROM and Balance   DISCHARGE PLANS: POC and Discharge plans discussed pt understands and agrees eventual DC once goals have been met or max HC benefits have been achieved.  4 visits remaining DC scheduled for 3/6/2023

## 2023-03-04 NOTE — HOME HEALTH
SUBJECTIVE: Pt c/o 1/10 left knee pain, no falls no changes in medications     CAREGIVER INVOLVEMENT/ASSISTANCE NEEDED FOR: Ind ADLs  OBJECTIVE: See interventions  PATIENT EDUCATION PROVIDED THIS VISIT: Reviwed pain mangement, s/s infection and complications associated with use of opioids    PATIENT RESPONSE TO EDUCATION: Pt able to teach back of education    PATIENT RESPONSE TO TREATMENT/ASSESSMENT OF PROGRESS TOWARD GOALS: Pt progressing well toward established goals TINETTI 25/28 increased by 6 points since start of care, FTSTS 20 sec. Pt instructed in standing therex increased to 20 reps pt tolerated with minmal VCs to improve tech. Instructed in gait tng on even surfaces w/ SPC VCs to improve quality of gait. Seated ROM with VCs for 10 second hold pt demomstrates improved left knee ROM 0-102. Improved knee flexion with sit to stand transfers. FU appt is this afternoon. PLAN FOR NEXT VISIT: Address strength, gait, balance and ROM   DISCHARGE PLANS: POC and Discharge plans discussed pt understands and agrees eventual DC once goals have been met or max HC benefits have been achieved.  3 visits remaining anticiapted DC 3/6/2023

## 2023-03-05 ENCOUNTER — HOME CARE VISIT (OUTPATIENT)
Age: 68
End: 2023-03-05
Payer: MEDICARE

## 2023-03-05 VITALS
TEMPERATURE: 98.4 F | TEMPERATURE: 98.7 F | DIASTOLIC BLOOD PRESSURE: 84 MMHG | SYSTOLIC BLOOD PRESSURE: 122 MMHG | OXYGEN SATURATION: 98 % | DIASTOLIC BLOOD PRESSURE: 87 MMHG | HEART RATE: 78 BPM | SYSTOLIC BLOOD PRESSURE: 127 MMHG | HEART RATE: 85 BPM | OXYGEN SATURATION: 98 %

## 2023-03-05 PROCEDURE — G0157 HHC PT ASSISTANT EA 15: HCPCS

## 2023-03-05 ASSESSMENT — ENCOUNTER SYMPTOMS: PAIN LOCATION - PAIN QUALITY: SORE, STIFF

## 2023-03-06 ENCOUNTER — HOME CARE VISIT (OUTPATIENT)
Age: 68
End: 2023-03-06
Payer: MEDICARE

## 2023-03-06 PROCEDURE — G0151 HHCP-SERV OF PT,EA 15 MIN: HCPCS

## 2023-03-06 NOTE — HOME HEALTH
SUBJECTIVE: Pt reports good pain management, no changes in medications, no falls reported    CAREGIVER INVOLVEMENT/ASSISTANCE NEEDED FOR: Pt is Ind with ADLs  OBJECTIVE: See interventions  PATIENT EDUCATION PROVIDED THIS VISIT: Reviewed pain management and s/s of infection    PATIENT RESPONSE TO EDUCATION: Pt able to teach back education  Assessment and Summary of Care:  Patient's current functional status before discharge is as follows  Strength: RLE 5/5, LLE 4/5  ROM:  Left Knee 0-107  Bed Mobility: Ind bed mobility  Transfers: Ind transfers from multiple surfaces in home  Gait/WC mobility: x 600ft outside on uneven surfaces w/SPC Sup provided  Stairs:  Mod I with SPC/Rail  Special Tests: TINETTI   25/28  Recommendations: continue in outpatient setting

## 2023-03-07 VITALS
TEMPERATURE: 97.7 F | DIASTOLIC BLOOD PRESSURE: 80 MMHG | SYSTOLIC BLOOD PRESSURE: 130 MMHG | HEART RATE: 73 BPM | OXYGEN SATURATION: 99 %

## 2023-03-07 ASSESSMENT — ENCOUNTER SYMPTOMS: PAIN LOCATION - PAIN QUALITY: ACHE

## 2023-03-08 ENCOUNTER — HOME CARE VISIT (OUTPATIENT)
Age: 68
End: 2023-03-08
Payer: MEDICARE

## 2023-03-08 VITALS
RESPIRATION RATE: 16 BRPM | TEMPERATURE: 97 F | HEART RATE: 86 BPM | SYSTOLIC BLOOD PRESSURE: 136 MMHG | DIASTOLIC BLOOD PRESSURE: 68 MMHG | OXYGEN SATURATION: 99 %

## 2023-03-08 PROCEDURE — G0299 HHS/HOSPICE OF RN EA 15 MIN: HCPCS

## 2023-03-08 ASSESSMENT — ENCOUNTER SYMPTOMS
DYSPNEA ACTIVITY LEVEL: AFTER AMBULATING 10 - 20 FT
PAIN LOCATION - PAIN QUALITY: SORE ACHE

## 2023-03-09 ENCOUNTER — OFFICE VISIT (OUTPATIENT)
Age: 68
End: 2023-03-09

## 2023-03-09 VITALS — HEIGHT: 70 IN | BODY MASS INDEX: 24.77 KG/M2 | WEIGHT: 173 LBS

## 2023-03-09 DIAGNOSIS — Z48.02 ENCOUNTER FOR STAPLE REMOVAL: ICD-10-CM

## 2023-03-09 DIAGNOSIS — Z96.652 PRESENCE OF LEFT ARTIFICIAL KNEE JOINT: Primary | ICD-10-CM

## 2023-03-09 PROCEDURE — 99024 POSTOP FOLLOW-UP VISIT: CPT | Performed by: PHYSICIAN ASSISTANT

## 2023-03-09 RX ORDER — HYDROCODONE BITARTRATE AND ACETAMINOPHEN 10; 325 MG/1; MG/1
1 TABLET ORAL EVERY 6 HOURS PRN
Qty: 28 TABLET | Refills: 0 | Status: CANCELLED | OUTPATIENT
Start: 2023-03-09 | End: 2023-03-16

## 2023-03-09 NOTE — HOME HEALTH
Skilled reason for visit: s/p L TKA requiring dressing changes and incisional monitoring   Caregiver involvement: her  who is able to assist with bathing, dressing, walking, turn in bed, bathroom, meal prep and setup, medication management, grocery shopping, household chores and transportation to MD appointment. Medications reconciled and all medications are available in the home this visit. Patient denies any medication changes at this time of assessment. The following education was provided regarding medications: reviewed side effects, purposes, dosage, frequencies. MD notified of any discrepancies/look a-like medications/medication interactions: NA   High risk medication teaching regarding anticoagulants, hyperglycemic agents or opiod narcotics performed (specify) NA, completed all high risk medications. Medications are effective at this time. Home health supplies by type and quantity ordered/delivered this visit include: no longer will require dressing changes. Patient education provided this visit: patient/cg aware to monitor for edema/increase in edema, to elevate extremity when edema occurs and to notify md if edema exceeds normal limits for patient, normal post op edema to left leg noted at this time. patient aware to monitor for s/s of infection [increased swelling, increased redness around site, increased pain, foul smelling drainage, fever] aware who to report to/when. no s/s of infection noted. encouraged patient to get three nutritional meals daily and to stay hydrated, drink plenty of fluids. pt aware to keep dressing clean, dry and intact as ordered.  Instructed patient/caregiver on low salt diet- patient made aware to restrict sodium intake to 2 g/day, to reduce sodium if weight begins to increase, educated on tips to limit sodium (avoiding foods high in sodium, getting rid of salt shaker, using herbs/spices, using fresh foods, avoiding salt substitutes which may contain potassium, buying foods labeled low-sodium or sodium free), reading food labels, and making changes slowly to give taste buds time to adjust. aware of importance of monitoring blood pressure daily and recording for review, discussed hypertension, causes/long term effects of uncontrolled hypertension. pt denies any s/s of hypertension at this time- instructed to monitor for blurred vision, strong headaches, fatigue, confusion, etc. discussed fall precautions in detail- having lighted hallways, removing throw rugs, monitoring medication that may alter mental status. patient aware to turn every 2 hours and to keep pressure off of bony prominences, to monitor for any pressure ulcer development/worsening. Sharps education provided: na   Patient level of understanding of education provided: patient has a good understanding of education that was provided at this time by engaging in all education provided and is able to verbalize understanding, pt denies any questions or concerns at this time   Skilled Care Performed this visit: full body assessment and education as above. current foam dressing to left knee is clean dry and intact, due to be changed tomorrow at MD office for staple removal because patient had dressing changed at MD office on last Friday. patient requesting dressing to stay intact until this visit and declines dressing removal on today's visit. Patient response to procedure performed: patient tolerated assessment with no signs of discomfort, grimacing or pain, no complications or concerns noted. Agency Progress toward goals: Patient was seen for left knee requiring dressing changes and incisional care. [facility]. Pt had PT therapy services. Dressing to left knee is clean dry and intact today with no s/s of infection noted, patient had dressing last changed on Friday and is seeing MD for staple removal tomorrow- no need for dressing removal today.  Pt is meeting all nursing goals and is requesting final DC. Pt/cg aware of disease process, s/s to monitor for, who to report to/when. Patient/cg is able to verbalize understanding of all medications at this time: side effects, purposes, dosages, frequencies. SN reviewed in detail all medications with patient/cg. No issues noted at discharge. Patient is aware to follow up with PCP and other MD's. Management aware of DC and approved. Patient's Progress towards personal goals: pt reporting they have met their goals for [facility] at this time. Home exercise program: patient aware to perform sob hep 4-5 x daily and prn for sob, to promote lung expansion. pt also encouraged to use ICS q 2 hours and to perform sob hep during therapy. Continued need for the following skills: None  Plan for next visit: NA  Patient and/or caregiver notified and agrees to changes in the Plan of Care: N/A. The following discharge planning was discussed with the pt/caregiver: Patient will be discharged once education has completed, patient is medically stable and pt/cg are able to independently manage dressing changes/ incisional care.

## 2023-03-09 NOTE — CASE COMMUNICATION
Patient seen 3/8/23 for final DC from [facility]- see below:    Patient was seen for left knee requiring dressing changes and incisional care. Pt remaining stable during [facility] services. Pt had PT therapy services. Dressing to left knee is clean dry and intact today with no s/s of infection noted, patient had dressing last changed on Friday and is seeing MD for staple removal tomorrow- no need for dressing removal today. Pt is meeting all  nursing goals and is requesting final DC. Pt/cg aware of disease process, s/s to monitor for, who to report to/when. Patient/cg is able to verbalize understanding of all medications at this time: side effects, purposes, dosages, frequencies. SN reviewed in detail all medications with patient/cg. No issues noted at discharge. Patient is aware to follow up with PCP and other MD's. Management aware of DC and approved. Please call #[phone] with any questions or concerns. Thank you!   Fe Ochoa, RN, BSN

## 2023-03-09 NOTE — PROGRESS NOTES
Patient: Pushpa Cisneros                MRN: [id number]       SSN: [id number]  YOB: 1955        AGE: 75 y.o. SEX: female  Body mass index is 24.82 kg/m². PCP: Sam Tran MD  03/09/23      This office note has been dictated. REVIEW OF SYSTEMS:  Constitutional: Negative for fever, chills, weight loss and malaise/fatigue. HENT: Negative. Eyes: Negative. Respiratory: Negative. Cardiovascular: Negative. Gastrointestinal: No bowel incontinence or constipation. Genitourinary: No bladder incontinence or saddle anesthesia. Skin: Negative. Neurological: Negative. Endo/Heme/Allergies: Negative. Psychiatric/Behavioral: Negative. Musculoskeletal: As per HPI above. Past Medical History:   Diagnosis Date    Adjacent segment disease with spinal stenosis 6/22/2018    Allergic rhinitis     Anemia 2008    intermittant since then    Asthma     Back pain     French's esophagus with esophagitis     Cervical radiculopathy     Chronic sinusitis 5/15/2012    DNS (deviated nasal septum)     Empyema (HCC)     Foraminal stenosis of cervical region     C4-C5    GERD (gastroesophageal reflux disease)     Dr Dolores Garces    Hx MRSA infection 8/11/2014    Hypertension     Hypertriglyceridemia     Insulin resistance 4/9/2012    Left knee pain     Lichen planus     Liver disease     LOPEZ-per pt, followed by Dr Norris Moise    Menopause     Age 51    MRSA (methicillin resistant Staphylococcus aureus) infection 2008    left lung    Nausea & vomiting     Restless leg syndrome     Rheumatoid arthritis ([facility] [address]     Spinal cord stimulator status 2016    Spinal stenosis of cervical region     C4-C5 and C5-C6    TMJ syndrome     Wears glasses     and contacts         Current Outpatient Medications:     polyethylene glycol (GLYCOLAX) 17 GM/SCOOP powder, Take 17 g by mouth daily as needed (constipation). , Disp: , Rfl:     aspirin EC 81 MG EC tablet, Take 1 tablet by mouth 2 times daily, Disp: 60 tablet, Rfl: 1    docusate sodium (COLACE) 100 MG capsule, Take 1 capsule by mouth 2 times daily, Disp: 60 capsule, Rfl: 1    ondansetron (ZOFRAN) 4 MG tablet, Take 1 tablet by mouth every 8 hours as needed for Nausea or Vomiting, Disp: 30 tablet, Rfl: 2    albuterol sulfate HFA (PROVENTIL;VENTOLIN;PROAIR) 108 (90 Base) MCG/ACT inhaler, Inhale 1 puff into the lungs every 6 hours as needed (Patient not taking: No sig reported), Disp: , Rfl:     acetaminophen (TYLENOL) 325 MG tablet, Take 325 mg by mouth every 4 hours as needed, Disp: , Rfl:     abatacept (ORENCIA) 250 MG injection, Infuse intravenously Monthly, Disp: , Rfl:     Multiple Vitamins-Minerals (CENTRUM SILVER 50+WOMEN PO), Take 1 tablet by mouth daily, Disp: , Rfl:     KRILL OIL OMEGA-3 PO, Take by mouth daily, Disp: , Rfl:     Cholecalciferol (VITAMIN D3 PO), Take 1 tablet by mouth daily, Disp: , Rfl:     clobetasol (TEMOVATE) 0.05 % ointment, Apply 1 each topically daily apply a thin layer, Disp: , Rfl:     vitamin B-12 (CYANOCOBALAMIN) 1000 MCG tablet, take 1 tablet by mouth once daily, Disp: , Rfl:     dexlansoprazole (DEXILANT) 60 MG CPDR delayed release capsule, Take 60 mg by mouth daily, Disp: , Rfl:     FEROSUL 325 (65 Fe) MG tablet, take 1 tablet by mouth once daily before breakfast, Disp: , Rfl:     montelukast (SINGULAIR) 10 MG tablet, Take 10 mg by mouth nightly, Disp: , Rfl:     triamcinolone (NASACORT) 55 MCG/ACT nasal inhaler, 2 sprays by Nasal route daily, Disp: , Rfl:     budesonide-formoterol (SYMBICORT) 160-4.5 MCG/ACT AERO, Inhale 2 puffs into the lungs 2 times daily, Disp: , Rfl:     ascorbic acid (VITAMIN C) 500 MG tablet, Take 500 mg by mouth daily, Disp: , Rfl:     Cetirizine HCl 10 MG CAPS, Take 10 mg by mouth daily, Disp: , Rfl:     estradiol (ESTRACE) 0.1 MG/GM vaginal cream, Place 2 g vaginally daily, Disp: , Rfl:     losartan (COZAAR) 25 MG tablet, Take 1 tablet by mouth daily, Disp: 90 tablet, Rfl: 3    Allergies   Allergen Adequate: hears normal conversation without difficulty Reactions    Adhesive Tape Other (See Comments)     Patient stated iv tape and tegaderm ok  Blisters, use paper tape only  Patient states tegaderm and paper tape are okay      Sulfasalazine Other (See Comments)     Could not taste anything, lightheadedness    Molds & Smuts Other (See Comments)    Chlorhexidine Itching and Other (See Comments)     \"stinging for a couple of hours\"    itching       Social History     Socioeconomic History    Marital status:      Spouse name: Not on file    Number of children: Not on file    Years of education: Not on file    Highest education level: Not on file   Occupational History    Not on file   Tobacco Use    Smoking status: Never     Passive exposure: Never    Smokeless tobacco: Never   Vaping Use    Vaping Use: Never used   Substance and Sexual Activity    Alcohol use: Not Currently     Alcohol/week: 0.0 standard drinks    Drug use: Yes     Types: OTC, Prescription    Sexual activity: Yes     Partners: Male     Comment: 20+ years of menopause   Other Topics Concern    Not on file   Social History Narrative    Retired      Social Determinants of Health     Financial Resource Strain: Unknown    Difficulty of Paying Living Expenses: Patient refused   Food Insecurity: Not on file   Transportation Needs: Not on file   Physical Activity: Not on file   Stress: Not on file   Social Connections: Not on file   Intimate Partner Violence: Not on file   Housing Stability: Not on file       Past Surgical History:   Procedure Laterality Date    ANESTH,SURGERY OF SHOULDER  01/31/2019    [facility]  08/13/2014    COLONOSCOPY FLX DX W/COLLJ SPEC WHEN PFRMD  06/18/2008    normal/[facility]    HEENT  1997    TMJ surgery    JOINT REPLACEMENT      KNEE SURGERY      LUMBAR LAMINECTOMY  11/1995    [facility]    LUMBAR LAMINECTOMY  02/1997    [place]    ORTHOPEDIC SURGERY Right 01/2015    bunion removal from right foot    OTHER SURGICAL HISTORY  2007    thoracentesis OTHER SURGICAL HISTORY  2008    chest tube    OTHER SURGICAL HISTORY Bilateral 1997    TMJ surgery    OTHER SURGICAL HISTORY  2012    sinus surg 2012-Dr [facility]. OTHER SURGICAL HISTORY  2016    spinal cord stimulator place for lumbar neuritis, good benefit    ROTATOR CUFF REPAIR Right 01/2019    TOTAL HIP ARTHROPLASTY Right 05/13/2022    TOTAL KNEE ARTHROPLASTY Right 2020    TOTAL KNEE ARTHROPLASTY Left 02/20/2023    LEFT TOTAL KNEE ARTHROPLASTY performed by Isaac Esparza MD at [address] ENDOSCOPY             Patient seen evaluated today for her left thumb replacement. She is now approximately 17 days status post surgery doing quite well. I did see her last week as she had a little bit of oozing from the knee which has stopped. She had no other issues with the knee. She is working hard in physical therapy as well as on her own. She denies any injuries or falls. She is overall very happy with her knee replacement. She is taking no pain medicine. Patient denies recent fevers, chills, chest pain, SOB, or injuries. No recent systemic changes noted. A 12-point review of systems is performed today. Pertinent positives are noted. All other systems reviewed and otherwise are negative. Physical exam: General: Alert and oriented x3, nad.  well-developed, well nourished. normal affect, AF. NC/AT, EOMI, neck supple, trachea midline, no JVD present. Breathing is non-labored. Examination of the lower extremities and left knee reveals skin intact. There is no erythema. She does have a little resolving ecchymosis to the mid aspect of the knee. She has negative effusion. There are no defects in the extensor mechanism noted. She has full extension. She flexes to 105. Patella tracks nicely without rubs or crêpes noted. Negative calf tenderness. Negative Homans. No signs of DVT present. Assessment: Status post left total knee replacement    Plan:  At this point, the staples were removed and replaced with Steri-Strips without complications. She will be set up with outpatient physical therapy. She can discontinue her aspirin. She will back on her normal meds. She will continue with ice therapy. She will continue with a home exercise program to work on range of motion activities. We will see her back in the office in 3 weeks time for evaluation, range of motion check and x-rays.             JR Shayne MCCLENDON PA-C, ATC

## 2023-03-13 ENCOUNTER — HOSPITAL ENCOUNTER (OUTPATIENT)
Facility: HOSPITAL | Age: 68
Setting detail: RECURRING SERIES
Discharge: HOME | End: 2023-03-16
Payer: MEDICARE

## 2023-03-13 PROCEDURE — 97530 THERAPEUTIC ACTIVITIES: CPT

## 2023-03-13 PROCEDURE — 97110 THERAPEUTIC EXERCISES: CPT

## 2023-03-13 PROCEDURE — 97162 PT EVAL MOD COMPLEX 30 MIN: CPT

## 2023-03-13 NOTE — PROGRESS NOTES
[address] [place], [address] :[phone] Fx: [phone]    PLAN OF CARE/ Statement of Necessity for Physical Therapy Services           Patient name: Caio Monzon Start of Care: 3/13/2023   Referral source: Emi Brooks PA-C : 1955    Medical Diagnosis: Left knee pain [M25.562]       Onset Date: 2023    Treatment Diagnosis: M25.562  LEFT KNEE PAIN                                      Prior Hospitalization: see medical history Provider#: [id number]   Medications: Verified on Patient Summary List   Comorbidities: Arthritis, Left TKR, right TKR, B THR, rheumatoid arthritis, asthma, HTN, arthritis,   Prior Level of Function: The patient reports that she had pain walking and peforming ADLs prior to surgery. The Plan of Care and following information is based on the information from the initial evaluation. Assessment / key information:  The patient is a 75year old female s/p left TKR performed on 2023. She denies complications post-operatively with exception of having bleeding after her first PT session in her IE and having to go to the ER. She presents with compression stockings donned today with steri-strips in place. Precautions were reviewed concerning no submerging her knee at this time. Upon examination the patient presents with lacking 3 degrees extension from neutral to 103 degrees flexion actively of her left knee. The patient's edema is noted to be 43 cm mid patella (right 38.5 cm). The patient has impairments consisting of pain, decreased ROM, creased flexibility, decreased strength, limited ADL ease, and decreased quality of life. The patient will benefit from skilled PT in order to address the aforementioned impairments.        Evaluation Complexity:  History:  HIGH Complexity :3+ comorbidities / personal factors will impact the outcome/ POC ; Examination:  MEDIUM Complexity : 3 Standardized tests and measures addressin body structure, function, activity limitation and / or participation in recreation  ;Presentation:  MEDIUM Complexity : Evolving with changing characteristics  ; Clinical Decision Making:  MEDIUM Complexity : FOTO score of 26-74 FOTO score = an established functional score where 100 = no disability  Overall Complexity Rating: MEDIUM  Problem List: pain affecting function, decrease ROM, decrease strength, edema affection function, impaired gait/balance, decrease ADL/functional abilities, decrease activity tolerance, decrease flexibility/joint mobility, and decrease transfer abilities    Treatment Plan may include any combination of the followin Therapeutic Exercise, 96962 Neuromuscular Re-Education, 65003 Manual Therapy, 50071 Therapeutic Activity, 00563 Self Care/Home Management, 76090 Electrical Stim unattended, and 87890 Vasopneumatic Device  Vasopnuematic compression justification:  Per bilateral girth measures taken and listed above the edema is considered significant and having an impact on the patient's strength, balance, gait, transfers, self care, and ADLs  Patient / Family readiness to learn indicated by: asking questions, trying to perform skills, interest, return verbalization , and return demonstration   Persons(s) to be included in education: patient (P)  Barriers to Learning/Limitations: none  Measures taken if barriers to learning present: None  Patient Goal (s): regain movement  Patient Self Reported Health Status: good  Rehabilitation Potential: good    Short Term Goals: To be accomplished in 2 weeks  The patient will demonstrate independence and compliance with HEP to maximize therapeutic benefit. The patient will improve left knee PROM to neutral to improve ease of ambulation efficiency. Long Term Goals: To be accomplished in 8 weeks  The patient will improve FOTO score to 80 to improve quality of life.   The patient will improve left knee flexion to 120 degrees to improve ease of stair negotiation. The patient will improve circumferential measure at mid patella of left knee to 40 cm to improve stability in stance due to decrease in quad inhibition. 4. The patient will report no difficulty walking 1 mile to improve ease of community ambulation. Frequency / Duration: Patient to be seen 3 times per week for 4 weeks  Goals will be assigned and reassessed every 10 visits/ 30 days per guidelines . Patient/ Caregiver education and instruction: Diagnosis, prognosis, self care, activity modification, and exercises [x]  Plan of care has been reviewed with PTA    Certification Period: 3/13/2023 - 6/10/2023    Phillip Rodriguez, PT       3/13/2023       8:51 PM    I certify that the above Therapy Services are being furnished while the patient is under my care. I agree with the treatment plan and certify that this therapy is necessary. Physician's Signature:_________________________   DATE:_________   TIME:________                           Kp Trejo PA-C  Insurance: Payor: MEDICARE / Plan: MEDICARE PART A AND B / Product Type: *No Product type* /      ** Signature, Date and Time must be completed for valid certification **  [facility] or you may fax the signed copy to [phone]. Thank you.

## 2023-03-13 NOTE — PROGRESS NOTES
PHYSICAL / OCCUPATIONAL THERAPY - DAILY TREATMENT NOTE (updated )    Patient Name: Martha Bassett    Date: 3/13/2023    : 1955  Insurance: Payor: MEDICARE / Plan: MEDICARE PART A AND B / Product Type: *No Product type* /      Patient  verified Yes     Visit #   Current / Total 1 12   Time   In / Out 12:32 1:12   Pain   In / Out 0/10 0/10   Subjective Functional Status/Changes: The patient has a chief complaint of stiffness and swelling of left knee s/p left TKR. Changes to:  Meds, Allergies, Med Hx, Sx Hx? If yes, update Summary List no       TREATMENT AREA =  Left knee pain [M25.562]    OBJECTIVE  Therapeutic Procedures: Tx Min Billable or 1:1 Min (if diff from Tx Min) Procedure, Rationale, Specifics   15 15 35017 Therapeutic Exercise (timed):  increase ROM, strength, coordination, balance, and proprioception to improve patient's ability to progress to PLOF and address remaining functional goals. (see flow sheet as applicable)     Details if applicable:        72826 Self Care/Home Management (timed):  improve patient knowledge and understanding of pain reducing techniques, positioning, activity modification, diagnosis/prognosis, and physical therapy expectations, procedures and progression  to improve patient's ability to progress to PLOF and address remaining functional goals. (see flow sheet as applicable)     Details if applicable:   knee position instructions, structures of emphasis to maximize flexibility/tissue extensibility. Ambulation/walking recommendations, holding excessive walking in order to reduce degree of edema.     34 34 [address] Reminder: bill using total billable min of TIMED therapeutic procedures (example: do not include dry needle or estim unattended, both untimed codes, in totals to left)  8-22 min = 1 unit; 23-37 min = 2 units; 38-52 min = 3 units; 53-67 min = 4 units; 68-82 min = 5 units   Total Total     [x]  Patient Education billed concurrently with other procedures   [x] Review HEP    [] Progressed/Changed HEP, detail:    [] Other detail:       Objective Information/Functional Measures/Assessment    See POC    Patient will continue to benefit from skilled PT / OT services to modify and progress therapeutic interventions, analyze and address functional mobility deficits, analyze and address ROM deficits, analyze and address strength deficits, analyze and address soft tissue restrictions, analyze and cue for proper movement patterns, analyze and modify for postural abnormalities, and instruct in home and community integration to address functional deficits and attain remaining goals. Progress toward goals / Updated goals:  []  See Progress Note/Recertification     Short Term Goals: To be accomplished in 2 weeks  The patient will demonstrate independence and compliance with HEP to maximize therapeutic benefit. IE: issued HEP  The patient will improve left knee PROM to neutral to improve ease of ambulation efficiency. IE: lacking 3 degrees  Long Term Goals: To be accomplished in 8 weeks  The patient will improve FOTO score to 80 to improve quality of life. IE: 61  The patient will improve left knee flexion to 120 degrees to improve ease of stair negotiation. IE: 103 degrees actively  The patient will improve circumferential measure at mid patella of left knee to 40 cm to improve stability in stance due to decrease in quad inhibition. IE: 43.5 cm mid patella  4. The patient will report no difficulty walking 1 mile to improve ease of community ambulation.     IE: a little bit of difficulty    PLAN  Yes  Continue plan of care  []  Upgrade activities as tolerated  []  Discharge due to :  []  Other:    Shasha Del Valle PT    3/13/2023    1:47 PM    Future Appointments   Date Time Provider Noe Villagran   3/15/2023  1:30 PM [facility]   3/20/2023 10:30 AM Shasha Del Valle PT [facility]   3/22/2023 10:30 AM Mendoza Tellez Winter, PTA [facility]   3/27/2023 12:30 PM Salome Feliciano, PTA [facility]   3/29/2023 12:30 PM Crescencio Portillo, PTA [facility]   3/30/2023  2:20 PM Jonas Bella PA-C [facility]   4/3/2023 12:30 PM Valerie Doherty, PTA [facility]   4/5/2023 10:30 AM Ranjan Torres, PT [facility]   4/10/2023 11:30 AM Ranjan Torres, PT [facility]   4/13/2023  8:05 AM [facility] LAB VISIT [facility]   4/20/2023  9:40 AM Sandra Matos MD [facility]

## 2023-03-15 ENCOUNTER — HOSPITAL ENCOUNTER (OUTPATIENT)
Facility: HOSPITAL | Age: 68
Setting detail: RECURRING SERIES
Discharge: HOME | End: 2023-03-18
Payer: MEDICARE

## 2023-03-15 PROCEDURE — 97110 THERAPEUTIC EXERCISES: CPT

## 2023-03-15 PROCEDURE — 97140 MANUAL THERAPY 1/> REGIONS: CPT

## 2023-03-15 NOTE — PROGRESS NOTES
PHYSICAL / OCCUPATIONAL THERAPY - DAILY TREATMENT NOTE (updated )    Patient Name: Sonya Gonzales    Date: 3/15/2023    : 1955  Insurance: Payor: MEDICARE / Plan: MEDICARE PART A AND B / Product Type: *No Product type* /      Patient  verified Yes     Visit #   Current / Total 2 12   Time   In / Out 1:30 2:27   Pain   In / Out 0 57   Subjective Functional Status/Changes: Pt reports not having pain   Changes to:  Meds, Allergies, Med Hx, Sx Hx? If yes, update Summary List no       TREATMENT AREA =  Left knee pain [M25.562]    OBJECTIVE    Modalities Rationale:     decrease inflammation and decrease pain to improve patient's ability to progress to PLOF and address remaining functional goals. min [] Estim Unattended, type/location:                                      []  w/ice    []  w/heat    min [] Estim Attended, type/location:                                     []  w/US     []  w/ice    []  w/heat    []  TENS insruct      min []  Mechanical Traction: type/lbs                   []  pro   []  sup   []  int   []  cont    []  before manual    []  after manual    min []  Ultrasound, settings/location:      min []  Iontophoresis w/ dexamethasone, location:                                               []  take home patch       []  in clinic        min  unbilled []  Ice     []  Heat    location/position:     min []  Paraffin,  details:    10 min [x]  Vasopneumatic Device, press/temp: LP/LT    min []  Gabriel Le / Lorena Mailman: If using vaso (only need to measure limb vaso being performed on)      pre-treatment girth :       post-treatment girth :       measured at (landmark location) :      min []  Other:    Skin assessment post-treatment (if applicable):    []  intact    []  redness- no adverse reaction                 []redness - adverse reaction:         Therapeutic Procedures:   Tx Min Billable or 1:1 Min (if diff from Tx Min) Procedure, Rationale, Specifics   37  50187 Therapeutic Exercise (timed):  increase ROM, strength, coordination, balance, and proprioception to improve patient's ability to progress to PLOF and address remaining functional goals. (see flow sheet as applicable)     Details if applicable:       10  93267 Manual Therapy (timed):  decrease pain, increase ROM, and increase tissue extensibility to improve patient's ability to progress to PLOF and address remaining functional goals. The manual therapy interventions were performed at a separate and distinct time from the therapeutic activities interventions . (see flow sheet as applicable)     Details if applicable:  patella mobs, tib fme jt mobs for knee flex/ext, STM to distal quad, PROM knee flex/ext   52  [facility] BC Totals Reminder: bill using total billable min of TIMED therapeutic procedures (example: do not include dry needle or estim unattended, both untimed codes, in totals to left)  8-22 min = 1 unit; 23-37 min = 2 units; 38-52 min = 3 units; 53-67 min = 4 units; 68-82 min = 5 units   Total Total     TOTAL TREATMENT TIME:        57     [x]  Patient Education billed concurrently with other procedures   [x] Review HEP    [] Progressed/Changed HEP, detail:    [] Other detail:       Objective Information/Functional Measures/Assessment    Initiated treatment program per flow sheet. Pt reports compliance with HEP. PROM knee flex to 115* with good tolerance. On first rep of mini squat, pt reports pain at lateral quad, attempted one more rep with vc's for technique but pain persisted so remaining reps were held.      Patient will continue to benefit from skilled PT / OT services to modify and progress therapeutic interventions, analyze and address functional mobility deficits, analyze and address ROM deficits, analyze and address strength deficits, analyze and address soft tissue restrictions, analyze and cue for proper movement patterns, analyze and modify for postural abnormalities, and analyze and address imbalance/dizziness to address functional deficits and attain remaining goals. Progress toward goals / Updated goals:  []  See Progress Note/Recertification    Short Term Goals: To be accomplished in 2 weeks  The patient will demonstrate independence and compliance with HEP to maximize therapeutic benefit. IE: issued HEP   Current: Met, pt reports compliance (3/15/2023)  The patient will improve left knee PROM to neutral to improve ease of ambulation efficiency. IE: lacking 3 degrees  Long Term Goals: To be accomplished in 8 weeks  The patient will improve FOTO score to 80 to improve quality of life. IE: 61  The patient will improve left knee flexion to 120 degrees to improve ease of stair negotiation. IE: 103 degrees actively  The patient will improve circumferential measure at mid patella of left knee to 40 cm to improve stability in stance due to decrease in quad inhibition. IE: 43.5 cm mid patella  4. The patient will report no difficulty walking 1 mile to improve ease of community ambulation.                   IE: a little bit of difficulty    PLAN  Yes  Continue plan of care  [x]  Upgrade activities as tolerated  []  Discharge due to :  []  Other:    Jayla Porras PTA    3/15/2023    12:21 PM    Future Appointments   Date Time Provider Noe Villagran   3/15/2023  1:30 PM Jayla Porras, PTA [facility]   3/20/2023 10:30 AM Atlee Pals, PT [facility]   3/22/2023 10:30 AM Esther Bella, PTA [facility]   3/27/2023 12:30 PM Martin Vega, PTA [facility]   3/29/2023 12:30 PM Odalys Rothman, PTA [facility]   3/30/2023  2:20 PM [facility]   4/3/2023 12:30 PM Kylah Parra, PTA [facility]   4/5/2023 10:30 AM Atlee Pals, PT [facility]   4/10/2023 11:30 AM Atlee Pals, PT [facility]   4/13/2023  8:05 AM [facility]   4/20/2023  9:40 AM Bailey Barone Gaby Carvalho MD [facility]

## 2023-03-17 ENCOUNTER — APPOINTMENT (OUTPATIENT)
Facility: HOSPITAL | Age: 68
End: 2023-03-17
Payer: MEDICARE

## 2023-03-20 ENCOUNTER — HOSPITAL ENCOUNTER (OUTPATIENT)
Facility: HOSPITAL | Age: 68
Setting detail: RECURRING SERIES
Discharge: HOME | End: 2023-03-23
Payer: MEDICARE

## 2023-03-20 PROCEDURE — 97110 THERAPEUTIC EXERCISES: CPT

## 2023-03-20 PROCEDURE — 97016 VASOPNEUMATIC DEVICE THERAPY: CPT

## 2023-03-20 PROCEDURE — 97112 NEUROMUSCULAR REEDUCATION: CPT

## 2023-03-20 PROCEDURE — 97140 MANUAL THERAPY 1/> REGIONS: CPT

## 2023-03-20 NOTE — PROGRESS NOTES
extremities in order to progress to PLOF and address remaining functional goals. (see flow sheet as applicable)     Details if applicable:  hip x 3, step ups for quad activation,    8 8 94711 Manual Therapy (timed):  decrease pain, increase ROM, increase tissue extensibility, and decrease trigger points to improve patient's ability to progress to PLOF and address remaining functional goals. The manual therapy interventions were performed at a separate and distinct time from the therapeutic activities interventions . (see flow sheet as applicable)     Details if applicable:  left tibiofemoral flexion/extension mobs grade III performed with the patient in supine. 36 40 [facility] Totals Reminder: bill using total billable min of TIMED therapeutic procedures (example: do not include dry needle or estim unattended, both untimed codes, in totals to left)  8-22 min = 1 unit; 23-37 min = 2 units; 38-52 min = 3 units; 53-67 min = 4 units; 68-82 min = 5 units   Total Total     TOTAL TREATMENT TIME:        50     [x]  Patient Education billed concurrently with other procedures   [x] Review HEP    [] Progressed/Changed HEP, detail:    [] Other detail:       Objective Information/Functional Measures/Assessment    Left knee AROM: 1.5 degrees (lacking extension) to 113.5 degrees flexion. (124 degrees AROM flexion post manual)    Left knee circumferential measure: 41.5 cm    The patient is progressing well with PT noting improvements AROM as well as ease of gait and ambulation efficiency. She will continue to benefit from PT to reduce edema, improve quad strength, and maximize ROM for improved quality of life.      Patient will continue to benefit from skilled PT / OT services to modify and progress therapeutic interventions, analyze and address functional mobility deficits, analyze and address ROM deficits, analyze and address strength deficits, analyze and address soft tissue restrictions, analyze and cue for proper movement

## 2023-03-22 ENCOUNTER — HOSPITAL ENCOUNTER (OUTPATIENT)
Facility: HOSPITAL | Age: 68
Setting detail: RECURRING SERIES
Discharge: HOME | End: 2023-03-25
Payer: MEDICARE

## 2023-03-22 PROCEDURE — 97110 THERAPEUTIC EXERCISES: CPT

## 2023-03-22 PROCEDURE — 97140 MANUAL THERAPY 1/> REGIONS: CPT

## 2023-03-22 NOTE — PROGRESS NOTES
analyze and address ROM deficits, analyze and address strength deficits, analyze and address soft tissue restrictions, analyze and cue for proper movement patterns, and analyze and address imbalance/dizziness to address functional deficits and attain remaining goals. Progress toward goals / Updated goals:  []  See Progress Note/Recertification    Short Term Goals: To be accomplished in 2 weeks  The patient will demonstrate independence and compliance with HEP to maximize therapeutic benefit. IE: issued HEP              Current: Met, pt reports compliance (3/15/2023)  The patient will improve left knee PROM to neutral to improve ease of ambulation efficiency. IE: lacking 3 degrees              Current: progressed to lacking 1.5 degrees 3/20/2023  Long Term Goals: To be accomplished in 8 weeks  The patient will improve FOTO score to 80 to improve quality of life. IE: 61  The patient will improve left knee flexion to 120 degrees to improve ease of stair negotiation. IE: 103 degrees actively              Current: Met 124 degrees post manual  The patient will improve circumferential measure at mid patella of left knee to 40 cm to improve stability in stance due to decrease in quad inhibition. IE: 43.5 cm mid patella              Current: progressed to 41.5 cm 3/20/2023  4. The patient will report no difficulty walking 1 mile to improve ease of community ambulation.                   IE: a little bit of difficulty    PLAN  Yes  Continue plan of care  [x]  Upgrade activities as tolerated  []  Discharge due to :  []  Other:    Jennifer Proctor PTA    3/22/2023    10:27 AM    Future Appointments   Date Time Provider Noe Villagran   3/22/2023 10:30 AM Jennifer Proctor PTA [facility]   3/27/2023 12:30 PM Darron Clement PTA [facility]   3/29/2023 12:30 PM Jose G Russell PTA [facility]   3/30/2023  2:20 PM Georgina Wei PA-C

## 2023-03-23 RX ORDER — LOSARTAN POTASSIUM 25 MG/1
25 TABLET ORAL DAILY
Qty: 90 TABLET | Refills: 1 | Status: SHIPPED | OUTPATIENT
Start: 2023-03-23

## 2023-03-23 RX ORDER — LOSARTAN POTASSIUM 50 MG/1
TABLET ORAL
Qty: 90 TABLET | Refills: 3 | Status: SHIPPED | OUTPATIENT
Start: 2023-03-23 | End: 2023-03-23 | Stop reason: ALTCHOICE

## 2023-03-27 ENCOUNTER — HOSPITAL ENCOUNTER (OUTPATIENT)
Facility: HOSPITAL | Age: 68
Setting detail: RECURRING SERIES
Discharge: HOME | End: 2023-03-30
Payer: MEDICARE

## 2023-03-27 PROCEDURE — 97110 THERAPEUTIC EXERCISES: CPT

## 2023-03-27 PROCEDURE — 97140 MANUAL THERAPY 1/> REGIONS: CPT

## 2023-03-27 PROCEDURE — 97112 NEUROMUSCULAR REEDUCATION: CPT

## 2023-03-27 NOTE — PROGRESS NOTES
Therapeutic Exercise (timed):  increase ROM, strength, coordination, balance, and proprioception to improve patient's ability to progress to PLOF and address remaining functional goals. (see flow sheet as applicable)     Details if applicable:       10 10 82165 Neuromuscular Re-Education (timed):  improve balance, coordination, kinesthetic sense, posture, core stability and proprioception to improve patient's ability to develop conscious control of individual muscles and awareness of position of extremities in order to progress to PLOF and address remaining functional goals. (see flow sheet as applicable)     Details if applicable:     8 8 45331 Manual Therapy (timed):  decrease pain, increase ROM, and increase tissue extensibility to improve patient's ability to progress to PLOF and address remaining functional goals. The manual therapy interventions were performed at a separate and distinct time from the therapeutic activities interventions . (see flow sheet as applicable)     Details if applicable:     41 45 [facility] Totals Reminder: bill using total billable min of TIMED therapeutic procedures (example: do not include dry needle or estim unattended, both untimed codes, in totals to left)  8-22 min = 1 unit; 23-37 min = 2 units; 38-52 min = 3 units; 53-67 min = 4 units; 68-82 min = 5 units   Total Total     TOTAL TREATMENT TIME:        51     [x]  Patient Education billed concurrently with other procedures   [x] Review HEP    [] Progressed/Changed HEP, detail:    [] Other detail:       Objective Information/Functional Measures/Assessment    AROM left knee flexion (before manual): 128; (After Manual) 130 degrees.       Patient will continue to benefit from skilled PT / OT services to modify and progress therapeutic interventions, analyze and address functional mobility deficits, analyze and address ROM deficits, analyze and address strength deficits, analyze and address soft tissue restrictions, analyze and cue for

## 2023-03-29 ENCOUNTER — HOSPITAL ENCOUNTER (OUTPATIENT)
Facility: HOSPITAL | Age: 68
Setting detail: RECURRING SERIES
Discharge: HOME | End: 2023-04-01
Payer: MEDICARE

## 2023-03-29 PROCEDURE — 97110 THERAPEUTIC EXERCISES: CPT

## 2023-03-29 PROCEDURE — 97016 VASOPNEUMATIC DEVICE THERAPY: CPT

## 2023-03-29 PROCEDURE — 97140 MANUAL THERAPY 1/> REGIONS: CPT

## 2023-03-29 PROCEDURE — 97112 NEUROMUSCULAR REEDUCATION: CPT

## 2023-03-29 NOTE — PROGRESS NOTES
PHYSICAL / OCCUPATIONAL THERAPY - DAILY TREATMENT NOTE (updated )    Patient Name: De La Cruz Organ    Date: 3/29/2023    : 1955  Insurance: Payor: MEDICARE / Plan: MEDICARE PART A AND B / Product Type: *No Product type* /      Patient  verified Yes     Visit #   Current / Total 6 12   Time   In / Out 12:26 1:17   Pain   In / Out <1/10 0/10   Subjective Functional Status/Changes: \"Doing okay, pain is less than a 1/10. \"   Changes to:  Meds, Allergies, Med Hx, Sx Hx? If yes, update Summary List no       TREATMENT AREA =  Left knee pain [M25.562]    OBJECTIVE    Modalities Rationale:     decrease inflammation and decrease pain to improve patient's ability to progress to PLOF and address remaining functional goals. min [] Estim Unattended, type/location:                                      []  w/ice    []  w/heat    min [] Estim Attended, type/location:                                     []  w/US     []  w/ice    []  w/heat    []  TENS insruct      min []  Mechanical Traction: type/lbs                   []  pro   []  sup   []  int   []  cont    []  before manual    []  after manual    min []  Ultrasound, settings/location:      min []  Iontophoresis w/ dexamethasone, location:                                               []  take home patch       []  in clinic        min  unbilled []  Ice     []  Heat    location/position:     min []  Paraffin,  details:    10 min []  Vasopneumatic Device, press/temp:  LP/LT - Pt supine    min []  Fer Aguilera / Kelley Spell: If using vaso (only need to measure limb vaso being performed on)      pre-treatment girth : 41.5cm      post-treatment girth : 41.5cm      measured at (landmark location) : mid-patella      min []  Other:    Skin assessment post-treatment (if applicable):    []  intact    []  redness- no adverse reaction                 []redness - adverse reaction:         Therapeutic Procedures:   Tx Min Billable or 1:1 Min (if diff from [employer]) Procedure,

## 2023-03-30 ENCOUNTER — TELEPHONE (OUTPATIENT)
Age: 68
End: 2023-03-30

## 2023-04-03 ENCOUNTER — HOSPITAL ENCOUNTER (OUTPATIENT)
Facility: HOSPITAL | Age: 68
Setting detail: RECURRING SERIES
Discharge: HOME | End: 2023-04-06
Payer: MEDICARE

## 2023-04-03 PROCEDURE — 97110 THERAPEUTIC EXERCISES: CPT

## 2023-04-03 PROCEDURE — 97140 MANUAL THERAPY 1/> REGIONS: CPT

## 2023-04-03 NOTE — PROGRESS NOTES
Therapeutic Exercise (timed):  increase ROM, strength, coordination, balance, and proprioception to improve patient's ability to progress to PLOF and address remaining functional goals. (see flow sheet as applicable)     Details if applicable:       8 8 44719 Neuromuscular Re-Education (timed):  improve balance, coordination, kinesthetic sense, posture, core stability and proprioception to improve patient's ability to develop conscious control of individual muscles and awareness of position of extremities in order to progress to PLOF and address remaining functional goals. (see flow sheet as applicable)     Details if applicable:     8 8 70697 Manual Therapy (timed):  decrease pain, increase ROM, and increase tissue extensibility to improve patient's ability to progress to PLOF and address remaining functional goals. The manual therapy interventions were performed at a separate and distinct time from the therapeutic activities interventions . (see flow sheet as applicable)     Details if applicable:   Left patellar mobs, tib-fem flex/ext mobs. PROM end range flexion. STM/DTM Left distal quads and ITB. PT supine. Details if applicable:            Details if applicable:     41 42 [facility] Totals Reminder: bill using total billable min of TIMED therapeutic procedures (example: do not include dry needle or estim unattended, both untimed codes, in totals to left)  8-22 min = 1 unit; 23-37 min = 2 units; 38-52 min = 3 units; 53-67 min = 4 units; 68-82 min = 5 units   Total Total     TOTAL TREATMENT TIME:        52     [x]  Patient Education billed concurrently with other procedures   [x] Review HEP    [] Progressed/Changed HEP, detail:    [] Other detail:       Objective Information/Functional Measures/Assessment  Cueing to decrease valgus of the B knee with squats with pt displaying good carryover. Limited mobility noted with squat exercise more so due to the right hip rather than the left knee.  The pt continues

## 2023-04-05 ENCOUNTER — APPOINTMENT (OUTPATIENT)
Facility: HOSPITAL | Age: 68
End: 2023-04-05
Payer: MEDICARE

## 2023-04-05 ENCOUNTER — OFFICE VISIT (OUTPATIENT)
Age: 68
End: 2023-04-05
Payer: MEDICARE

## 2023-04-05 VITALS — WEIGHT: 170 LBS | RESPIRATION RATE: 18 BRPM | HEIGHT: 70 IN | BODY MASS INDEX: 24.34 KG/M2

## 2023-04-05 DIAGNOSIS — Z96.652 PRESENCE OF LEFT ARTIFICIAL KNEE JOINT: Primary | ICD-10-CM

## 2023-04-05 PROCEDURE — 73562 X-RAY EXAM OF KNEE 3: CPT | Performed by: PHYSICIAN ASSISTANT

## 2023-04-05 PROCEDURE — 99024 POSTOP FOLLOW-UP VISIT: CPT | Performed by: PHYSICIAN ASSISTANT

## 2023-04-05 NOTE — PATIENT INSTRUCTIONS
Patient: Josh Vega                MRN: [id number]       SSN:   YOB: 1955        AGE: 75 y.o. SEX: female  There is no height or weight on file to calculate BMI.    04/05/23    DO:  1:  Sit with the leg out straight 5-10 min every hour while awake. Keep the toes pointed  up towards the rene. 2.  Bend your knee 5-10 min every hour. Bend it to the point of pain and hold it for 5-10 Min. 3.  ICE your knee 20 min every hour    4. You can expect to have swelling and discomfort in your thigh down to your knee. Swelling may extend to your foot. You may have bruising from the thigh to the foot as well. Some numbness can be expected to the outside part of the knee. Wear the compression stockings and elevate your leg. DO NOT:    1. Do not place anything under your knee to prop it up  2. Do not sit in the recliner chair.

## 2023-04-05 NOTE — PROGRESS NOTES
total knee replacement    Plan: At this point, the patient is done extremely well with her knee replacement. She will continue and finish up her physical therapy and then continue with a home exercise program.  She will continue with ice therapy. She will continue with Tylenol and ibuprofen as needed. We will see her back in a month's time for reevaluation.               JR Shayne MCCLENDON, PAPauletteC, ATC

## 2023-04-13 ENCOUNTER — HOSPITAL ENCOUNTER (OUTPATIENT)
Facility: HOSPITAL | Age: 68
Setting detail: SPECIMEN
Discharge: HOME | End: 2023-04-16
Payer: MEDICARE

## 2023-04-13 DIAGNOSIS — R73.9 HYPERGLYCEMIA, UNSPECIFIED: ICD-10-CM

## 2023-04-13 DIAGNOSIS — E78.5 HYPERLIPIDEMIA, UNSPECIFIED HYPERLIPIDEMIA TYPE: ICD-10-CM

## 2023-04-13 DIAGNOSIS — R73.9 HYPERGLYCEMIA: ICD-10-CM

## 2023-04-13 LAB
CHOLEST SERPL-MCNC: 156 MG/DL
CREAT UR-MCNC: 47 MG/DL (ref 30–125)
EST. AVERAGE GLUCOSE BLD GHB EST-MCNC: 97 MG/DL
HBA1C MFR BLD: 5 % (ref 4.2–5.6)
HDLC SERPL-MCNC: 50 MG/DL (ref 40–60)
HDLC SERPL: 3.1 (ref 0–5)
LDLC SERPL CALC-MCNC: 82.2 MG/DL (ref 0–100)
LIPID PANEL: NORMAL
MICROALBUMIN UR-MCNC: 0.73 MG/DL (ref 0–3)
MICROALBUMIN/CREAT UR-RTO: 16 MG/G (ref 0–30)
TRIGL SERPL-MCNC: 119 MG/DL
VLDLC SERPL CALC-MCNC: 23.8 MG/DL

## 2023-04-13 PROCEDURE — 83036 HEMOGLOBIN GLYCOSYLATED A1C: CPT

## 2023-04-13 PROCEDURE — 80061 LIPID PANEL: CPT

## 2023-04-13 PROCEDURE — 82043 UR ALBUMIN QUANTITATIVE: CPT

## 2023-04-13 PROCEDURE — 36415 COLL VENOUS BLD VENIPUNCTURE: CPT

## 2023-05-01 ENCOUNTER — OFFICE VISIT (OUTPATIENT)
Age: 68
End: 2023-05-01
Payer: MEDICARE

## 2023-05-01 VITALS
OXYGEN SATURATION: 98 % | HEIGHT: 70 IN | SYSTOLIC BLOOD PRESSURE: 138 MMHG | BODY MASS INDEX: 24.05 KG/M2 | HEART RATE: 83 BPM | RESPIRATION RATE: 12 BRPM | TEMPERATURE: 98.1 F | WEIGHT: 168 LBS | DIASTOLIC BLOOD PRESSURE: 82 MMHG

## 2023-05-01 DIAGNOSIS — Z71.89 ADVANCE CARE PLANNING: ICD-10-CM

## 2023-05-01 DIAGNOSIS — E78.5 HYPERLIPIDEMIA, UNSPECIFIED HYPERLIPIDEMIA TYPE: ICD-10-CM

## 2023-05-01 DIAGNOSIS — M79.641 BILATERAL HAND PAIN: Primary | ICD-10-CM

## 2023-05-01 DIAGNOSIS — Z00.00 MEDICARE ANNUAL WELLNESS VISIT, SUBSEQUENT: ICD-10-CM

## 2023-05-01 DIAGNOSIS — G89.29 CHRONIC PAIN OF LEFT KNEE: ICD-10-CM

## 2023-05-01 DIAGNOSIS — M85.80 OSTEOPENIA, UNSPECIFIED LOCATION: ICD-10-CM

## 2023-05-01 DIAGNOSIS — I10 ESSENTIAL (PRIMARY) HYPERTENSION: ICD-10-CM

## 2023-05-01 DIAGNOSIS — M79.642 BILATERAL HAND PAIN: Primary | ICD-10-CM

## 2023-05-01 DIAGNOSIS — M25.562 CHRONIC PAIN OF LEFT KNEE: ICD-10-CM

## 2023-05-01 DIAGNOSIS — Z78.0 POSTMENOPAUSAL: ICD-10-CM

## 2023-05-01 DIAGNOSIS — J45.40 MODERATE PERSISTENT ASTHMA, UNCOMPLICATED: ICD-10-CM

## 2023-05-01 DIAGNOSIS — R73.9 HYPERGLYCEMIA, UNSPECIFIED: ICD-10-CM

## 2023-05-01 DIAGNOSIS — M05.79 RHEUMATOID ARTHRITIS WITH RHEUMATOID FACTOR OF MULTIPLE SITES WITHOUT ORGAN OR SYSTEMS INVOLVEMENT (HCC): ICD-10-CM

## 2023-05-01 PROCEDURE — 3078F DIAST BP <80 MM HG: CPT | Performed by: INTERNAL MEDICINE

## 2023-05-01 PROCEDURE — 99214 OFFICE O/P EST MOD 30 MIN: CPT | Performed by: INTERNAL MEDICINE

## 2023-05-01 PROCEDURE — G8427 DOCREV CUR MEDS BY ELIG CLIN: HCPCS | Performed by: INTERNAL MEDICINE

## 2023-05-01 PROCEDURE — 3017F COLORECTAL CA SCREEN DOC REV: CPT | Performed by: INTERNAL MEDICINE

## 2023-05-01 PROCEDURE — 1123F ACP DISCUSS/DSCN MKR DOCD: CPT | Performed by: INTERNAL MEDICINE

## 2023-05-01 PROCEDURE — 3074F SYST BP LT 130 MM HG: CPT | Performed by: INTERNAL MEDICINE

## 2023-05-01 PROCEDURE — G8420 CALC BMI NORM PARAMETERS: HCPCS | Performed by: INTERNAL MEDICINE

## 2023-05-01 PROCEDURE — 1090F PRES/ABSN URINE INCON ASSESS: CPT | Performed by: INTERNAL MEDICINE

## 2023-05-01 PROCEDURE — 99211 OFF/OP EST MAY X REQ PHY/QHP: CPT | Performed by: INTERNAL MEDICINE

## 2023-05-01 PROCEDURE — G0439 PPPS, SUBSEQ VISIT: HCPCS | Performed by: INTERNAL MEDICINE

## 2023-05-01 PROCEDURE — 1036F TOBACCO NON-USER: CPT | Performed by: INTERNAL MEDICINE

## 2023-05-01 PROCEDURE — G8399 PT W/DXA RESULTS DOCUMENT: HCPCS | Performed by: INTERNAL MEDICINE

## 2023-05-01 RX ORDER — LOSARTAN POTASSIUM 50 MG/1
TABLET ORAL
COMMUNITY
Start: 2023-03-23 | End: 2023-05-10 | Stop reason: SDUPTHER

## 2023-05-01 SDOH — ECONOMIC STABILITY: FOOD INSECURITY: WITHIN THE PAST 12 MONTHS, YOU WORRIED THAT YOUR FOOD WOULD RUN OUT BEFORE YOU GOT MONEY TO BUY MORE.: NEVER TRUE

## 2023-05-01 SDOH — ECONOMIC STABILITY: FOOD INSECURITY: WITHIN THE PAST 12 MONTHS, THE FOOD YOU BOUGHT JUST DIDN'T LAST AND YOU DIDN'T HAVE MONEY TO GET MORE.: NEVER TRUE

## 2023-05-01 SDOH — ECONOMIC STABILITY: INCOME INSECURITY: HOW HARD IS IT FOR YOU TO PAY FOR THE VERY BASICS LIKE FOOD, HOUSING, MEDICAL CARE, AND HEATING?: NOT HARD AT ALL

## 2023-05-01 SDOH — ECONOMIC STABILITY: HOUSING INSECURITY
IN THE LAST 12 MONTHS, WAS THERE A TIME WHEN YOU DID NOT HAVE A STEADY PLACE TO SLEEP OR SLEPT IN A SHELTER (INCLUDING NOW)?: NO

## 2023-05-01 ASSESSMENT — PATIENT HEALTH QUESTIONNAIRE - PHQ9
SUM OF ALL RESPONSES TO PHQ QUESTIONS 1-9: 0
1. LITTLE INTEREST OR PLEASURE IN DOING THINGS: 0
SUM OF ALL RESPONSES TO PHQ QUESTIONS 1-9: 0
2. FEELING DOWN, DEPRESSED OR HOPELESS: 0
SUM OF ALL RESPONSES TO PHQ QUESTIONS 1-9: 0
SUM OF ALL RESPONSES TO PHQ QUESTIONS 1-9: 0
SUM OF ALL RESPONSES TO PHQ9 QUESTIONS 1 & 2: 0

## 2023-05-01 ASSESSMENT — ENCOUNTER SYMPTOMS
ABDOMINAL PAIN: 0
EYE PAIN: 0
SHORTNESS OF BREATH: 0
COUGH: 0
ANAL BLEEDING: 0
SORE THROAT: 0
BLOOD IN STOOL: 0

## 2023-05-01 ASSESSMENT — LIFESTYLE VARIABLES
HOW MANY STANDARD DRINKS CONTAINING ALCOHOL DO YOU HAVE ON A TYPICAL DAY: PATIENT DOES NOT DRINK
HOW OFTEN DO YOU HAVE A DRINK CONTAINING ALCOHOL: NEVER

## 2023-05-04 ENCOUNTER — OFFICE VISIT (OUTPATIENT)
Age: 68
End: 2023-05-04

## 2023-05-04 VITALS — WEIGHT: 168 LBS | HEIGHT: 70 IN | BODY MASS INDEX: 24.05 KG/M2

## 2023-05-04 DIAGNOSIS — Z96.652 PRESENCE OF LEFT ARTIFICIAL KNEE JOINT: Primary | ICD-10-CM

## 2023-05-04 PROCEDURE — 99024 POSTOP FOLLOW-UP VISIT: CPT | Performed by: PHYSICIAN ASSISTANT

## 2023-05-04 NOTE — PROGRESS NOTES
by mouth daily, Disp: 90 tablet, Rfl: 1    polyethylene glycol (GLYCOLAX) 17 GM/SCOOP powder, Take 17 g by mouth daily as needed (constipation), Disp: , Rfl:     aspirin EC 81 MG EC tablet, Take 1 tablet by mouth 2 times daily, Disp: 60 tablet, Rfl: 1    ondansetron (ZOFRAN) 4 MG tablet, Take 1 tablet by mouth every 8 hours as needed for Nausea or Vomiting, Disp: 30 tablet, Rfl: 2    albuterol sulfate HFA (PROVENTIL;VENTOLIN;PROAIR) 108 (90 Base) MCG/ACT inhaler, Inhale 1 puff into the lungs every 6 hours as needed (Patient not taking: Reported on 5/1/2023), Disp: , Rfl:     acetaminophen (TYLENOL) 325 MG tablet, Take 1 tablet by mouth every 4 hours as needed, Disp: , Rfl:     abatacept (ORENCIA) 250 MG injection, Infuse intravenously Monthly, Disp: , Rfl:     Multiple Vitamins-Minerals (CENTRUM SILVER 50+WOMEN PO), Take 1 tablet by mouth daily, Disp: , Rfl:     KRILL OIL OMEGA-3 PO, Take by mouth daily, Disp: , Rfl:     Cholecalciferol (VITAMIN D3 PO), Take 1 tablet by mouth daily, Disp: , Rfl:     clobetasol (TEMOVATE) 0.05 % ointment, Apply 1 each topically daily apply a thin layer, Disp: , Rfl:     vitamin B-12 (CYANOCOBALAMIN) 1000 MCG tablet, take 1 tablet by mouth once daily, Disp: , Rfl:     dexlansoprazole (DEXILANT) 60 MG CPDR delayed release capsule, Take 1 capsule by mouth daily, Disp: , Rfl:     FEROSUL 325 (65 Fe) MG tablet, take 1 tablet by mouth once daily before breakfast, Disp: , Rfl:     montelukast (SINGULAIR) 10 MG tablet, Take 1 tablet by mouth nightly, Disp: , Rfl:     triamcinolone (NASACORT) 55 MCG/ACT nasal inhaler, 2 sprays by Nasal route daily, Disp: , Rfl:     budesonide-formoterol (SYMBICORT) 160-4.5 MCG/ACT AERO, Inhale 2 puffs into the lungs 2 times daily, Disp: , Rfl:     ascorbic acid (VITAMIN C) 500 MG tablet, Take 1 tablet by mouth daily, Disp: , Rfl:     Cetirizine HCl 10 MG CAPS, Take 10 mg by mouth daily, Disp: , Rfl:     estradiol (ESTRACE) 0.1 MG/GM vaginal cream, Place 2 g

## 2023-05-10 PROBLEM — M85.80 OSTEOPENIA: Status: ACTIVE | Noted: 2023-05-10

## 2023-05-11 ENCOUNTER — HOSPITAL ENCOUNTER (OUTPATIENT)
Facility: HOSPITAL | Age: 68
Discharge: HOME | End: 2023-05-11
Payer: MEDICARE

## 2023-05-11 DIAGNOSIS — Z78.0 POSTMENOPAUSAL: ICD-10-CM

## 2023-05-11 PROCEDURE — 77080 DXA BONE DENSITY AXIAL: CPT

## 2023-05-11 NOTE — PATIENT INSTRUCTIONS
Substance Use Disorder: Care Instructions  Overview     You can improve your life and health by stopping your use of alcohol or drugs. When you don't drink or use drugs, you may feel and sleep better. You may get along better with your family, friends, and coworkers. There are medicines and programs that can help with substance use disorder. How can you care for yourself at home? If you have been given medicine to help keep you sober or reduce your cravings, be sure to take it exactly as prescribed. Talk to your doctor about programs that can help you stop using drugs or drinking alcohol. Do not tempt yourself by keeping alcohol or drugs in your home. Learn how to say no when other people drink or use drugs. Or don't spend time with people who drink or use drugs. Use the time and money spent on drinking or drugs to do something fun with your family or friends. Preventing a relapse  Do not drink alcohol or use drugs at all. Using any amount of alcohol or drugs greatly increases your risk for relapse. Seek help from organizations such as Alcoholics Anonymous, Narcotics Anonymous, or treatment facilities if you feel the need to drink alcohol or use drugs again. Remember that recovery is a lifelong process. Stay away from situations, friends, or places that may lead you to drink or use drugs. Have a plan to spot and deal with relapse. Learn to recognize changes in your thinking that lead you to drink or use drugs. These are warning signs. Get help before you start to drink or use drugs again. Get help as soon as you can if you relapse. Some people make a plan with another person that outlines what they want that person to do for them if they relapse. The plan usually includes how to handle the relapse and who to notify in case of relapse. Don't give up. Remember that a relapse does not mean that you have failed. Use the experience to learn the triggers that lead you to drink or use drugs.  Then quit

## 2023-05-11 NOTE — ACP (ADVANCE CARE PLANNING)
Advance Care Planning     General Advance Care Planning (ACP) Conversation    Date of Conversation: 5/1/23    Conducted with: Patient with Decision Making Capacity    Healthcare Decision Maker:  She has no changes to make to her preexisting advance directive. Content/Action Overview:   Has ACP document(s) on file - reflects the patient's care preferences  Length of Voluntary ACP Conversation in minutes:  <16 minutes (Non-Billable)    Olivia Sam MD

## 2023-05-18 ENCOUNTER — TELEPHONE (OUTPATIENT)
Age: 68
End: 2023-05-18

## 2023-05-18 NOTE — TELEPHONE ENCOUNTER
----- Message from Shakila Friedman MD sent at 5/18/2023 12:14 AM EDT -----  Please let her know that her bone density study still shows findings consistent with osteopenia. Her bone density has worsened but is still in the osteopenic range. Please forward these results to her rheumatologist.  This study should be repeated in 2 years.     Dr. Shakila Friedman  [facility] [address], [address] [place] [address] Str.  Phone: [phone]  Fax: [phone]

## 2023-05-26 RX ORDER — FERROUS SULFATE 325(65) MG
TABLET ORAL
Qty: 90 TABLET | Refills: 2 | Status: SHIPPED | OUTPATIENT
Start: 2023-05-26

## 2023-08-10 ENCOUNTER — OFFICE VISIT (OUTPATIENT)
Age: 68
End: 2023-08-10
Payer: MEDICARE

## 2023-08-10 VITALS — WEIGHT: 170 LBS | HEIGHT: 67 IN | BODY MASS INDEX: 26.68 KG/M2

## 2023-08-10 DIAGNOSIS — Z96.652 PRESENCE OF LEFT ARTIFICIAL KNEE JOINT: Primary | ICD-10-CM

## 2023-08-10 DIAGNOSIS — Z96.651 PRESENCE OF RIGHT ARTIFICIAL KNEE JOINT: ICD-10-CM

## 2023-08-10 PROCEDURE — 1123F ACP DISCUSS/DSCN MKR DOCD: CPT | Performed by: PHYSICIAN ASSISTANT

## 2023-08-10 PROCEDURE — G8427 DOCREV CUR MEDS BY ELIG CLIN: HCPCS | Performed by: PHYSICIAN ASSISTANT

## 2023-08-10 PROCEDURE — 1036F TOBACCO NON-USER: CPT | Performed by: PHYSICIAN ASSISTANT

## 2023-08-10 PROCEDURE — 3017F COLORECTAL CA SCREEN DOC REV: CPT | Performed by: PHYSICIAN ASSISTANT

## 2023-08-10 PROCEDURE — 99213 OFFICE O/P EST LOW 20 MIN: CPT | Performed by: PHYSICIAN ASSISTANT

## 2023-08-10 PROCEDURE — G8399 PT W/DXA RESULTS DOCUMENT: HCPCS | Performed by: PHYSICIAN ASSISTANT

## 2023-08-10 PROCEDURE — 1090F PRES/ABSN URINE INCON ASSESS: CPT | Performed by: PHYSICIAN ASSISTANT

## 2023-08-10 PROCEDURE — G8417 CALC BMI ABV UP PARAM F/U: HCPCS | Performed by: PHYSICIAN ASSISTANT

## 2023-10-19 RX ORDER — LANOLIN ALCOHOL/MO/W.PET/CERES
1000 CREAM (GRAM) TOPICAL DAILY
Qty: 90 TABLET | Refills: 1 | Status: SHIPPED | OUTPATIENT
Start: 2023-10-19

## 2023-10-24 ENCOUNTER — TRANSCRIBE ORDERS (OUTPATIENT)
Facility: HOSPITAL | Age: 68
End: 2023-10-24

## 2023-10-24 DIAGNOSIS — Z12.31 SCREENING MAMMOGRAM FOR HIGH-RISK PATIENT: Primary | ICD-10-CM

## 2023-10-31 ENCOUNTER — HOSPITAL ENCOUNTER (OUTPATIENT)
Facility: HOSPITAL | Age: 68
Setting detail: SPECIMEN
Discharge: HOME | End: 2023-11-03
Payer: MEDICARE

## 2023-10-31 DIAGNOSIS — M25.562 CHRONIC PAIN OF LEFT KNEE: ICD-10-CM

## 2023-10-31 DIAGNOSIS — G89.29 CHRONIC PAIN OF LEFT KNEE: ICD-10-CM

## 2023-10-31 DIAGNOSIS — I10 ESSENTIAL (PRIMARY) HYPERTENSION: ICD-10-CM

## 2023-10-31 DIAGNOSIS — M05.79 RHEUMATOID ARTHRITIS WITH RHEUMATOID FACTOR OF MULTIPLE SITES WITHOUT ORGAN OR SYSTEMS INVOLVEMENT (HCC): ICD-10-CM

## 2023-10-31 DIAGNOSIS — J45.40 MODERATE PERSISTENT ASTHMA, UNCOMPLICATED: ICD-10-CM

## 2023-10-31 DIAGNOSIS — R73.9 HYPERGLYCEMIA, UNSPECIFIED: ICD-10-CM

## 2023-10-31 DIAGNOSIS — M79.641 BILATERAL HAND PAIN: ICD-10-CM

## 2023-10-31 DIAGNOSIS — M79.642 BILATERAL HAND PAIN: ICD-10-CM

## 2023-10-31 LAB
ALBUMIN SERPL-MCNC: 4 G/DL (ref 3.4–5)
ALBUMIN/GLOB SERPL: 1.1 (ref 0.8–1.7)
ALP SERPL-CCNC: 108 U/L (ref 45–117)
ALT SERPL-CCNC: 24 U/L (ref 13–56)
ANION GAP SERPL CALC-SCNC: 1 MMOL/L (ref 3–18)
AST SERPL-CCNC: 19 U/L (ref 10–38)
BASOPHILS # BLD: 0.1 K/UL (ref 0–0.1)
BASOPHILS NFR BLD: 1 % (ref 0–2)
BILIRUB SERPL-MCNC: 0.5 MG/DL (ref 0.2–1)
BUN SERPL-MCNC: 11 MG/DL (ref 7–18)
BUN/CREAT SERPL: 15 (ref 12–20)
CALCIUM SERPL-MCNC: 9.5 MG/DL (ref 8.5–10.1)
CHLORIDE SERPL-SCNC: 111 MMOL/L (ref 100–111)
CO2 SERPL-SCNC: 30 MMOL/L (ref 21–32)
CREAT SERPL-MCNC: 0.71 MG/DL (ref 0.6–1.3)
CRP SERPL-MCNC: <0.3 MG/DL (ref 0–0.3)
DIFFERENTIAL METHOD BLD: ABNORMAL
EOSINOPHIL # BLD: 0.2 K/UL (ref 0–0.4)
EOSINOPHIL NFR BLD: 4 % (ref 0–5)
ERYTHROCYTE [DISTWIDTH] IN BLOOD BY AUTOMATED COUNT: 13.5 % (ref 11.6–14.5)
EST. AVERAGE GLUCOSE BLD GHB EST-MCNC: 103 MG/DL
GLOBULIN SER CALC-MCNC: 3.5 G/DL (ref 2–4)
GLUCOSE SERPL-MCNC: 103 MG/DL (ref 74–99)
HBA1C MFR BLD: 5.2 % (ref 4.2–5.6)
HCT VFR BLD AUTO: 45.3 % (ref 35–45)
HGB BLD-MCNC: 14.2 G/DL (ref 12–16)
IMM GRANULOCYTES # BLD AUTO: 0 K/UL (ref 0–0.04)
IMM GRANULOCYTES NFR BLD AUTO: 0 % (ref 0–0.5)
LYMPHOCYTES # BLD: 1.5 K/UL (ref 0.9–3.6)
LYMPHOCYTES NFR BLD: 26 % (ref 21–52)
MCH RBC QN AUTO: 29.6 PG (ref 24–34)
MCHC RBC AUTO-ENTMCNC: 31.3 G/DL (ref 31–37)
MCV RBC AUTO: 94.6 FL (ref 78–100)
MONOCYTES # BLD: 0.5 K/UL (ref 0.05–1.2)
MONOCYTES NFR BLD: 8 % (ref 3–10)
NEUTS SEG # BLD: 3.5 K/UL (ref 1.8–8)
NEUTS SEG NFR BLD: 61 % (ref 40–73)
NRBC # BLD: 0 K/UL (ref 0–0.01)
NRBC BLD-RTO: 0 PER 100 WBC
PLATELET # BLD AUTO: 242 K/UL (ref 135–420)
PMV BLD AUTO: 9.9 FL (ref 9.2–11.8)
POTASSIUM SERPL-SCNC: 4.7 MMOL/L (ref 3.5–5.5)
PROT SERPL-MCNC: 7.5 G/DL (ref 6.4–8.2)
RBC # BLD AUTO: 4.79 M/UL (ref 4.2–5.3)
SODIUM SERPL-SCNC: 142 MMOL/L (ref 136–145)
WBC # BLD AUTO: 5.8 K/UL (ref 4.6–13.2)

## 2023-10-31 PROCEDURE — 36415 COLL VENOUS BLD VENIPUNCTURE: CPT

## 2023-10-31 PROCEDURE — 83036 HEMOGLOBIN GLYCOSYLATED A1C: CPT

## 2023-10-31 PROCEDURE — 85025 COMPLETE CBC W/AUTO DIFF WBC: CPT

## 2023-10-31 PROCEDURE — 80053 COMPREHEN METABOLIC PANEL: CPT

## 2023-10-31 PROCEDURE — 86140 C-REACTIVE PROTEIN: CPT

## 2023-11-07 ENCOUNTER — OFFICE VISIT (OUTPATIENT)
Age: 68
End: 2023-11-07
Payer: MEDICARE

## 2023-11-07 VITALS
RESPIRATION RATE: 16 BRPM | HEIGHT: 67 IN | WEIGHT: 170.2 LBS | OXYGEN SATURATION: 100 % | DIASTOLIC BLOOD PRESSURE: 80 MMHG | SYSTOLIC BLOOD PRESSURE: 144 MMHG | TEMPERATURE: 98.1 F | HEART RATE: 70 BPM | BODY MASS INDEX: 26.71 KG/M2

## 2023-11-07 DIAGNOSIS — R73.9 HYPERGLYCEMIA, UNSPECIFIED: ICD-10-CM

## 2023-11-07 DIAGNOSIS — M79.642 BILATERAL HAND PAIN: Primary | ICD-10-CM

## 2023-11-07 DIAGNOSIS — E78.5 HYPERLIPIDEMIA, UNSPECIFIED HYPERLIPIDEMIA TYPE: ICD-10-CM

## 2023-11-07 DIAGNOSIS — M05.79 RHEUMATOID ARTHRITIS WITH RHEUMATOID FACTOR OF MULTIPLE SITES WITHOUT ORGAN OR SYSTEMS INVOLVEMENT (HCC): ICD-10-CM

## 2023-11-07 DIAGNOSIS — M79.641 BILATERAL HAND PAIN: Primary | ICD-10-CM

## 2023-11-07 DIAGNOSIS — I10 ESSENTIAL (PRIMARY) HYPERTENSION: ICD-10-CM

## 2023-11-07 DIAGNOSIS — J45.40 MODERATE PERSISTENT ASTHMA, UNCOMPLICATED: ICD-10-CM

## 2023-11-07 PROCEDURE — 99214 OFFICE O/P EST MOD 30 MIN: CPT | Performed by: INTERNAL MEDICINE

## 2023-11-07 PROCEDURE — G8417 CALC BMI ABV UP PARAM F/U: HCPCS | Performed by: INTERNAL MEDICINE

## 2023-11-07 PROCEDURE — 3017F COLORECTAL CA SCREEN DOC REV: CPT | Performed by: INTERNAL MEDICINE

## 2023-11-07 PROCEDURE — 3078F DIAST BP <80 MM HG: CPT | Performed by: INTERNAL MEDICINE

## 2023-11-07 PROCEDURE — G8427 DOCREV CUR MEDS BY ELIG CLIN: HCPCS | Performed by: INTERNAL MEDICINE

## 2023-11-07 PROCEDURE — G8483 FLU IMM NO ADMIN DOC REA: HCPCS | Performed by: INTERNAL MEDICINE

## 2023-11-07 PROCEDURE — 1123F ACP DISCUSS/DSCN MKR DOCD: CPT | Performed by: INTERNAL MEDICINE

## 2023-11-07 PROCEDURE — G8399 PT W/DXA RESULTS DOCUMENT: HCPCS | Performed by: INTERNAL MEDICINE

## 2023-11-07 PROCEDURE — 1090F PRES/ABSN URINE INCON ASSESS: CPT | Performed by: INTERNAL MEDICINE

## 2023-11-07 PROCEDURE — 1036F TOBACCO NON-USER: CPT | Performed by: INTERNAL MEDICINE

## 2023-11-07 PROCEDURE — 99211 OFF/OP EST MAY X REQ PHY/QHP: CPT

## 2023-11-07 PROCEDURE — 3074F SYST BP LT 130 MM HG: CPT | Performed by: INTERNAL MEDICINE

## 2023-11-07 SDOH — ECONOMIC STABILITY: FOOD INSECURITY: WITHIN THE PAST 12 MONTHS, THE FOOD YOU BOUGHT JUST DIDN'T LAST AND YOU DIDN'T HAVE MONEY TO GET MORE.: NEVER TRUE

## 2023-11-07 SDOH — ECONOMIC STABILITY: INCOME INSECURITY: HOW HARD IS IT FOR YOU TO PAY FOR THE VERY BASICS LIKE FOOD, HOUSING, MEDICAL CARE, AND HEATING?: NOT HARD AT ALL

## 2023-11-07 SDOH — ECONOMIC STABILITY: FOOD INSECURITY: WITHIN THE PAST 12 MONTHS, YOU WORRIED THAT YOUR FOOD WOULD RUN OUT BEFORE YOU GOT MONEY TO BUY MORE.: NEVER TRUE

## 2023-11-07 ASSESSMENT — PATIENT HEALTH QUESTIONNAIRE - PHQ9
SUM OF ALL RESPONSES TO PHQ QUESTIONS 1-9: 0
2. FEELING DOWN, DEPRESSED OR HOPELESS: 0
SUM OF ALL RESPONSES TO PHQ9 QUESTIONS 1 & 2: 0
1. LITTLE INTEREST OR PLEASURE IN DOING THINGS: 0

## 2023-11-07 NOTE — PROGRESS NOTES
[facility]:  11/14/2023, MRN: [id number]      Jose Alejandro Calix is a 70 y.o. female and presents to clinic for Follow-up (Pt reports Dr. Yañez Getting would like copies of her labs from visit  )      Subjective: The pt is a 69 yo female with h/o HTN, GERD with French's esophagus (followed by ), DJD, lumbar post laminectomy syndrome s/p spinal cord stimulator surgery (followed by Nancy Talbert), cervical spinal stenosis, prediabetes (resolved), asthma (followed by , Pulmonology and ), HLD, lichens sclerosus, chronic sinusitis, vitamin B12 deficiency, and rheumatoid arthritis (Followed by ), prediabetes, vitamin D deficiency. 1.  Bilateral hand pain: In the setting of RA and osteoarthritis. Reported at her last visit is worsening. At that time, I placed a referral to Dr. Phillip Reed with orthopedics. Today she reports: she had injections along her thumbs with . Sx improved temporarily. Thumb braces were recommended. No surgical intervention was recommended. She was recommended by  to see an orthopedist. She is scheduled to see a [facility] orthopedist in January. \"6/15/2023 3 views of bilateral hands is significant for degenerative changes especially at the thumb CMC joints consistent with Eaton stage III-IV. \" - per 's note 6/15/23. 2.  Prediabetes/Hyperglycemia: Her A1c is 5.2 and normal per October labs. 3.  RA: Treated with Orencia per Dr. Rimma Gil at 250 mg injections. October labs show that her CRP is undetectable. Her creatinine is 0.71.    4.  Hypertension and Hyperlipidemia: Today she reports: her BP is much less than it is in our office today vs at other apts. Blood pressure is 144/80. She is on losartan 25 mg daily. LFTs for her October labs are normal. 116/74 is her BP at her infusion. Her last lipid panel in April was normal.    5.  Asthma: SOB/cough symptoms: none.   Taking Symbicort, Singulair 10 mg daily, Flonase, Zyrtec 10 mg

## 2023-11-07 NOTE — PATIENT INSTRUCTIONS
Latest Reference Range & Units 10/31/23 08:18   Sodium 136 - 145 mmol/L 142   Potassium 3.5 - 5.5 mmol/L 4.7   Chloride 100 - 111 mmol/L 111   CO2 21 - 32 mmol/L 30   BUN,BUNPL 7.0 - 18 MG/DL 11   Creatinine 0.6 - 1.3 MG/DL 0.71   Bun/Cre Ratio 12 - 20   15   Anion Gap 3.0 - 18 mmol/L 1 (L)   Est, Glom Filt Rate >60 ml/min/1.73m2 >60   Glucose, Random 74 - 99 mg/dL 103 (H)   CALCIUM, SERUM, 679534 8.5 - 10.1 MG/DL 9.5   ALBUMIN/GLOBULIN RATIO 0.8 - 1.7   1.1   Total Protein 6.4 - 8.2 g/dL 7.5   CRP 0 - 0.3 mg/dL <0.3   Albumin 3.4 - 5.0 g/dL 4.0   Globulin 2.0 - 4.0 g/dL 3.5   Alk Phos 45 - 117 U/L 108   ALT 13 - 56 U/L 24   AST 10 - 38 U/L 19   BILIRUBIN TOTAL 0.2 - 1.0 MG/DL 0.5   Hemoglobin A1C 4.2 - 5.6 % 5.2   eAG (mg/dL) mg/dL 103   WBC 4.6 - 13.2 K/uL 5.8   RBC 4.20 - 5.30 M/uL 4.79   Hemoglobin Quant 12.0 - 16.0 g/dL 14.2   Hematocrit 35.0 - 45.0 % 45.3 (H)   MCV 78.0 - 100.0 FL 94.6   MCH 24.0 - 34.0 PG 29.6   MCHC 31.0 - 37.0 g/dL 31.3   MPV 9.2 - 11.8 FL 9.9   RDW 11.6 - 14.5 % 13.5   Platelet Count 517 - 420 K/uL 242   Neutrophils % 40 - 73 % 61   Lymphocyte % 21 - 52 % 26   Monocytes % 3 - 10 % 8   Eosinophils % 0 - 5 % 4   Basophils % 0 - 2 % 1   Neutrophils Absolute 1.8 - 8.0 K/UL 3.5   Lymphocytes Absolute 0.9 - 3.6 K/UL 1.5   Monocytes Absolute 0.05 - 1.2 K/UL 0.5   Eosinophils Absolute 0.0 - 0.4 K/UL 0.2   Basophils Absolute 0.0 - 0.1 K/UL 0.1   Differential Type -   AUTOMATED   Immature Granulocytes 0.0 - 0.5 % 0   Nucleated Red Blood Cells 0  WBC  0.00 - 0.01 K/uL 0.0  0.00   Absolute Immature Granulocyte 0.00 - 0.04 K/UL 0.0   (L): Data is abnormally low  (H): Data is abnormally high

## 2023-11-14 ASSESSMENT — ENCOUNTER SYMPTOMS
COUGH: 0
SORE THROAT: 0
SHORTNESS OF BREATH: 0
BLOOD IN STOOL: 0
ANAL BLEEDING: 0
ABDOMINAL PAIN: 0
EYE PAIN: 0
BACK PAIN: 1

## 2023-11-15 ENCOUNTER — TELEPHONE (OUTPATIENT)
Age: 68
End: 2023-11-15

## 2023-11-15 NOTE — TELEPHONE ENCOUNTER
----- Message from Danelle Serrano MD sent at 11/14/2023  8:29 PM EST -----  Regarding: Faxing of note/labs to GI  Please fax her note and October labs to  - her GI doctor.     Thanks,  Dr. Danelle Serrano  [facility] [address] [place] [address]  Phone: [phone]  Fax: [phone]

## 2023-12-07 ENCOUNTER — HOSPITAL ENCOUNTER (OUTPATIENT)
Facility: HOSPITAL | Age: 68
Discharge: HOME | End: 2023-12-07
Attending: INTERNAL MEDICINE
Payer: MEDICARE

## 2023-12-07 VITALS — BODY MASS INDEX: 26.65 KG/M2 | HEIGHT: 67 IN

## 2023-12-07 DIAGNOSIS — Z12.31 SCREENING MAMMOGRAM FOR HIGH-RISK PATIENT: ICD-10-CM

## 2023-12-07 PROCEDURE — 77063 BREAST TOMOSYNTHESIS BI: CPT

## 2024-02-08 ENCOUNTER — OFFICE VISIT (OUTPATIENT)
Age: 69
End: 2024-02-08
Payer: MEDICARE

## 2024-02-08 VITALS — RESPIRATION RATE: 16 BRPM | BODY MASS INDEX: 24.77 KG/M2 | HEIGHT: 70 IN

## 2024-02-08 DIAGNOSIS — Z96.651 PRESENCE OF RIGHT ARTIFICIAL KNEE JOINT: Primary | ICD-10-CM

## 2024-02-08 DIAGNOSIS — Z96.652 PRESENCE OF LEFT ARTIFICIAL KNEE JOINT: ICD-10-CM

## 2024-02-08 PROCEDURE — 1123F ACP DISCUSS/DSCN MKR DOCD: CPT | Performed by: PHYSICIAN ASSISTANT

## 2024-02-08 PROCEDURE — G8399 PT W/DXA RESULTS DOCUMENT: HCPCS | Performed by: PHYSICIAN ASSISTANT

## 2024-02-08 PROCEDURE — 1036F TOBACCO NON-USER: CPT | Performed by: PHYSICIAN ASSISTANT

## 2024-02-08 PROCEDURE — G8420 CALC BMI NORM PARAMETERS: HCPCS | Performed by: PHYSICIAN ASSISTANT

## 2024-02-08 PROCEDURE — 99213 OFFICE O/P EST LOW 20 MIN: CPT | Performed by: PHYSICIAN ASSISTANT

## 2024-02-08 PROCEDURE — 1090F PRES/ABSN URINE INCON ASSESS: CPT | Performed by: PHYSICIAN ASSISTANT

## 2024-02-08 PROCEDURE — 3017F COLORECTAL CA SCREEN DOC REV: CPT | Performed by: PHYSICIAN ASSISTANT

## 2024-02-08 PROCEDURE — G8483 FLU IMM NO ADMIN DOC REA: HCPCS | Performed by: PHYSICIAN ASSISTANT

## 2024-02-08 PROCEDURE — G8428 CUR MEDS NOT DOCUMENT: HCPCS | Performed by: PHYSICIAN ASSISTANT

## 2024-02-08 PROCEDURE — 73562 X-RAY EXAM OF KNEE 3: CPT | Performed by: PHYSICIAN ASSISTANT

## 2024-02-08 NOTE — PROGRESS NOTES
Patient: Esther Leonard                MRN: [id number]       SSN: [id number]  YOB: 1955        AGE: 68 y.o.        SEX: female  Body mass index is 24.77 kg/m².    PCP: Sandra Matos MD  02/08/24      This office note has been dictated.      REVIEW OF SYSTEMS:  Constitutional: Negative for fever, chills, weight loss and malaise/fatigue.   HENT: Negative.    Eyes: Negative.    Respiratory: Negative.   Cardiovascular: Negative.   Gastrointestinal: No bowel incontinence or constipation.  Genitourinary: No bladder incontinence or saddle anesthesia.  Skin: Negative.   Neurological: Negative.    Endo/Heme/Allergies: Negative.    Psychiatric/Behavioral: Negative.  Musculoskeletal: As per HPI above.     Past Medical History:   Diagnosis Date    Adjacent segment disease with spinal stenosis 6/22/2018    Allergic rhinitis     Anemia 2008    intermittant since then    Asthma     Back pain     French's esophagus with esophagitis     Cervical radiculopathy     Chronic sinusitis 5/15/2012    DNS (deviated nasal septum)     Empyema (HCC)     Foraminal stenosis of cervical region     C4-C5    GERD (gastroesophageal reflux disease)     Dr Shah    Hx MRSA infection 8/11/2014    Hypertension     Hypertriglyceridemia     Insulin resistance 4/9/2012    Left knee pain     Lichen planus     Liver disease     LOPEZ-per pt, followed by Dr Connell    Menopause     Age 46    MRSA (methicillin resistant Staphylococcus aureus) infection 2008    left lung    Nausea & vomiting     Restless leg syndrome     Rheumatoid arthritis (HCC)     Spinal cord stimulator status 2016    Spinal stenosis of cervical region     C4-C5 and C5-C6    TMJ syndrome     Wears glasses     and contacts         Current Outpatient Medications:     vitamin B-12 (CYANOCOBALAMIN) 1000 MCG tablet, take 1 tablet by mouth once daily, Disp: 90 tablet, Rfl: 1    FEROSUL 325 (65 Fe) MG tablet, take 1 tablet by mouth once daily before breakfast,

## 2024-02-27 ENCOUNTER — HOSPITAL ENCOUNTER (OUTPATIENT)
Facility: HOSPITAL | Age: 69
Discharge: HOME | End: 2024-03-01
Attending: PODIATRIST
Payer: MEDICARE

## 2024-02-27 DIAGNOSIS — D48.118 DESMOID TUMOR OF OTHER SITE: ICD-10-CM

## 2024-02-27 PROCEDURE — 73718 MRI LOWER EXTREMITY W/O DYE: CPT

## 2024-03-21 ENCOUNTER — OFFICE VISIT (OUTPATIENT)
Age: 69
End: 2024-03-21
Payer: MEDICARE

## 2024-03-21 VITALS
RESPIRATION RATE: 18 BRPM | HEIGHT: 70 IN | SYSTOLIC BLOOD PRESSURE: 107 MMHG | DIASTOLIC BLOOD PRESSURE: 72 MMHG | BODY MASS INDEX: 24.57 KG/M2 | WEIGHT: 171.6 LBS | TEMPERATURE: 98.1 F | HEART RATE: 70 BPM | OXYGEN SATURATION: 97 %

## 2024-03-21 DIAGNOSIS — M79.672 LEFT FOOT PAIN: ICD-10-CM

## 2024-03-21 DIAGNOSIS — M79.641 BILATERAL HAND PAIN: Primary | ICD-10-CM

## 2024-03-21 DIAGNOSIS — M79.642 BILATERAL HAND PAIN: Primary | ICD-10-CM

## 2024-03-21 DIAGNOSIS — M05.79 RHEUMATOID ARTHRITIS INVOLVING MULTIPLE SITES WITH POSITIVE RHEUMATOID FACTOR (HCC): ICD-10-CM

## 2024-03-21 DIAGNOSIS — J45.40 MODERATE PERSISTENT ASTHMA, UNCOMPLICATED: ICD-10-CM

## 2024-03-21 DIAGNOSIS — H53.8 BLURRY VISION: ICD-10-CM

## 2024-03-21 DIAGNOSIS — R73.9 HYPERGLYCEMIA, UNSPECIFIED: ICD-10-CM

## 2024-03-21 PROCEDURE — 1090F PRES/ABSN URINE INCON ASSESS: CPT | Performed by: INTERNAL MEDICINE

## 2024-03-21 PROCEDURE — 1036F TOBACCO NON-USER: CPT | Performed by: INTERNAL MEDICINE

## 2024-03-21 PROCEDURE — 99211 OFF/OP EST MAY X REQ PHY/QHP: CPT | Performed by: INTERNAL MEDICINE

## 2024-03-21 PROCEDURE — 1123F ACP DISCUSS/DSCN MKR DOCD: CPT | Performed by: INTERNAL MEDICINE

## 2024-03-21 PROCEDURE — G8420 CALC BMI NORM PARAMETERS: HCPCS | Performed by: INTERNAL MEDICINE

## 2024-03-21 PROCEDURE — 3074F SYST BP LT 130 MM HG: CPT | Performed by: INTERNAL MEDICINE

## 2024-03-21 PROCEDURE — G8427 DOCREV CUR MEDS BY ELIG CLIN: HCPCS | Performed by: INTERNAL MEDICINE

## 2024-03-21 PROCEDURE — G8399 PT W/DXA RESULTS DOCUMENT: HCPCS | Performed by: INTERNAL MEDICINE

## 2024-03-21 PROCEDURE — 3017F COLORECTAL CA SCREEN DOC REV: CPT | Performed by: INTERNAL MEDICINE

## 2024-03-21 PROCEDURE — 99214 OFFICE O/P EST MOD 30 MIN: CPT | Performed by: INTERNAL MEDICINE

## 2024-03-21 PROCEDURE — G8483 FLU IMM NO ADMIN DOC REA: HCPCS | Performed by: INTERNAL MEDICINE

## 2024-03-21 PROCEDURE — 3078F DIAST BP <80 MM HG: CPT | Performed by: INTERNAL MEDICINE

## 2024-03-21 ASSESSMENT — PATIENT HEALTH QUESTIONNAIRE - PHQ9
SUM OF ALL RESPONSES TO PHQ QUESTIONS 1-9: 0
1. LITTLE INTEREST OR PLEASURE IN DOING THINGS: NOT AT ALL
2. FEELING DOWN, DEPRESSED OR HOPELESS: NOT AT ALL
SUM OF ALL RESPONSES TO PHQ QUESTIONS 1-9: 0
SUM OF ALL RESPONSES TO PHQ9 QUESTIONS 1 & 2: 0

## 2024-03-21 NOTE — PROGRESS NOTES
Esther Leonard presents today for   Chief Complaint   Patient presents with    Pre-op Exam           \"Have you been to the ER, urgent care clinic since your last visit?  Hospitalized since your last visit?\"    NO    “Have you seen or consulted any other health care providers outside of [facility] since your last visit?”    YES - When: approximately 1  weeks ago.  Where and Why: Allergy- Allergies f/u.

## 2024-03-21 NOTE — PROGRESS NOTES
INTERNISTS OF [facility]:   Preoperative Evaluation    Date of Exam: 03/27/24    MRN: [id number]    Esther Leonard  Is a 68 y.o.  female  who presents for preoperative evaluation and management.     Chief Complaint   Patient presents with    Pre-op Exam     Surgery- (4/17) Dr.Roger Dickey @ [facility] ([facility]. [place])       Subjective:   The pt is a 67 yo female with h/o HTN, GERD with French's esophagus (followed by ), DJD, lumbar post laminectomy syndrome s/p spinal cord stimulator surgery (followed by ), cervical spinal stenosis, prediabetes (resolved), asthma (followed by , Pulmonology and ), HLD, lichens sclerosus, chronic sinusitis, left foot ganglion cyst, vitamin B12 deficiency, and rheumatoid arthritis (Followed by ), prediabetes, vitamin D deficiency.    1.  Bilateral hand pain and RA: Secondary to osteoarthritis.  She has rheumatoid arthritis.  +Treatment with Orencia by Dr. Hull.  Today she reports: +Left hand thumb pain is still present. +Right trigger finger related pain improved s/p her injection.     [facility] 1/9/24: \"She like to undergo surgery for the left thumb CMC arthritis.  Discussed risk and benefits we discussed total arthroplasty.  She signed informed consent. The patient understands the risk of an injection and elected to undergo this in the office. Therefore we injected the right  ring finger with 0.5cc of Kenalog-40 + 0.5cc 2% Lidocaine. \"    2.  Blurred vision: Today she reports: she has had blurry vision that has worsened since the holidays. +Cataracts.    3.  Asthma: Treated previously with Symbicort, Singulair 10 mg daily, and albuterol as needed.  Albuterol use: rare.  Using Flonase and Zyrtec.  She continues to get allergy shots. She met with a new allergist who had her get PFTs. She is to c/w allergy shots. She was placed on Trelegy vs Symbicort. +Singulair 10mg daily.  She met with

## 2024-03-27 ASSESSMENT — ENCOUNTER SYMPTOMS
EYE PAIN: 0
SORE THROAT: 0
BLOOD IN STOOL: 0
COUGH: 0
ABDOMINAL PAIN: 0
ANAL BLEEDING: 0
SHORTNESS OF BREATH: 0

## 2024-04-08 ENCOUNTER — HOSPITAL ENCOUNTER (OUTPATIENT)
Facility: HOSPITAL | Age: 69
Setting detail: SPECIMEN
Discharge: HOME | End: 2024-04-11
Payer: MEDICARE

## 2024-04-08 DIAGNOSIS — J45.40 MODERATE PERSISTENT ASTHMA, UNCOMPLICATED: ICD-10-CM

## 2024-04-08 DIAGNOSIS — I10 ESSENTIAL (PRIMARY) HYPERTENSION: ICD-10-CM

## 2024-04-08 DIAGNOSIS — R73.9 HYPERGLYCEMIA, UNSPECIFIED: ICD-10-CM

## 2024-04-08 DIAGNOSIS — E78.5 HYPERLIPIDEMIA, UNSPECIFIED HYPERLIPIDEMIA TYPE: ICD-10-CM

## 2024-04-08 DIAGNOSIS — M05.79 RHEUMATOID ARTHRITIS WITH RHEUMATOID FACTOR OF MULTIPLE SITES WITHOUT ORGAN OR SYSTEMS INVOLVEMENT (HCC): ICD-10-CM

## 2024-04-08 LAB
ALBUMIN SERPL-MCNC: 3.9 G/DL (ref 3.4–5)
ALBUMIN/GLOB SERPL: 1.2 (ref 0.8–1.7)
ALP SERPL-CCNC: 102 U/L (ref 45–117)
ALT SERPL-CCNC: 25 U/L (ref 13–56)
ANION GAP SERPL CALC-SCNC: 2 MMOL/L (ref 3–18)
AST SERPL-CCNC: 17 U/L (ref 10–38)
BASOPHILS # BLD: 0 K/UL (ref 0–0.1)
BASOPHILS NFR BLD: 0 % (ref 0–2)
BILIRUB SERPL-MCNC: 0.5 MG/DL (ref 0.2–1)
BUN SERPL-MCNC: 14 MG/DL (ref 7–18)
BUN/CREAT SERPL: 20 (ref 12–20)
CALCIUM SERPL-MCNC: 9.4 MG/DL (ref 8.5–10.1)
CHLORIDE SERPL-SCNC: 112 MMOL/L (ref 100–111)
CHOLEST SERPL-MCNC: 161 MG/DL
CO2 SERPL-SCNC: 29 MMOL/L (ref 21–32)
CREAT SERPL-MCNC: 0.69 MG/DL (ref 0.6–1.3)
CRP SERPL-MCNC: <0.3 MG/DL (ref 0–0.3)
DIFFERENTIAL METHOD BLD: ABNORMAL
EOSINOPHIL # BLD: 0.1 K/UL (ref 0–0.4)
EOSINOPHIL NFR BLD: 1 % (ref 0–5)
ERYTHROCYTE [DISTWIDTH] IN BLOOD BY AUTOMATED COUNT: 13.7 % (ref 11.6–14.5)
EST. AVERAGE GLUCOSE BLD GHB EST-MCNC: 100 MG/DL
GLOBULIN SER CALC-MCNC: 3.2 G/DL (ref 2–4)
GLUCOSE SERPL-MCNC: 95 MG/DL (ref 74–99)
HBA1C MFR BLD: 5.1 % (ref 4.2–5.6)
HCT VFR BLD AUTO: 44.7 % (ref 35–45)
HDLC SERPL-MCNC: 44 MG/DL (ref 40–60)
HDLC SERPL: 3.7 (ref 0–5)
HGB BLD-MCNC: 14 G/DL (ref 12–16)
IMM GRANULOCYTES # BLD AUTO: 0 K/UL (ref 0–0.04)
IMM GRANULOCYTES NFR BLD AUTO: 0 % (ref 0–0.5)
LDLC SERPL CALC-MCNC: 95.4 MG/DL (ref 0–100)
LIPID PANEL: NORMAL
LYMPHOCYTES # BLD: 1.4 K/UL (ref 0.9–3.6)
LYMPHOCYTES NFR BLD: 18 % (ref 21–52)
MCH RBC QN AUTO: 30.5 PG (ref 24–34)
MCHC RBC AUTO-ENTMCNC: 31.3 G/DL (ref 31–37)
MCV RBC AUTO: 97.4 FL (ref 78–100)
MONOCYTES # BLD: 0.5 K/UL (ref 0.05–1.2)
MONOCYTES NFR BLD: 6 % (ref 3–10)
NEUTS SEG # BLD: 6 K/UL (ref 1.8–8)
NEUTS SEG NFR BLD: 74 % (ref 40–73)
NRBC # BLD: 0 K/UL (ref 0–0.01)
NRBC BLD-RTO: 0 PER 100 WBC
PLATELET # BLD AUTO: 238 K/UL (ref 135–420)
PMV BLD AUTO: 9.8 FL (ref 9.2–11.8)
POTASSIUM SERPL-SCNC: 4.1 MMOL/L (ref 3.5–5.5)
PROT SERPL-MCNC: 7.1 G/DL (ref 6.4–8.2)
RBC # BLD AUTO: 4.59 M/UL (ref 4.2–5.3)
SODIUM SERPL-SCNC: 143 MMOL/L (ref 136–145)
TRIGL SERPL-MCNC: 108 MG/DL
VLDLC SERPL CALC-MCNC: 21.6 MG/DL
WBC # BLD AUTO: 8 K/UL (ref 4.6–13.2)

## 2024-04-08 PROCEDURE — 80061 LIPID PANEL: CPT

## 2024-04-08 PROCEDURE — 85025 COMPLETE CBC W/AUTO DIFF WBC: CPT

## 2024-04-08 PROCEDURE — 86140 C-REACTIVE PROTEIN: CPT

## 2024-04-08 PROCEDURE — 36415 COLL VENOUS BLD VENIPUNCTURE: CPT

## 2024-04-08 PROCEDURE — 80053 COMPREHEN METABOLIC PANEL: CPT

## 2024-04-08 PROCEDURE — 83036 HEMOGLOBIN GLYCOSYLATED A1C: CPT

## 2024-04-10 ENCOUNTER — TELEPHONE (OUTPATIENT)
Age: 69
End: 2024-04-10

## 2024-04-15 ENCOUNTER — OFFICE VISIT (OUTPATIENT)
Age: 69
End: 2024-04-15
Payer: MEDICARE

## 2024-04-15 VITALS
RESPIRATION RATE: 18 BRPM | HEART RATE: 79 BPM | DIASTOLIC BLOOD PRESSURE: 68 MMHG | OXYGEN SATURATION: 99 % | HEIGHT: 70 IN | SYSTOLIC BLOOD PRESSURE: 112 MMHG | WEIGHT: 170.8 LBS | BODY MASS INDEX: 24.45 KG/M2 | TEMPERATURE: 98.3 F

## 2024-04-15 DIAGNOSIS — H26.9 CATARACT, UNSPECIFIED CATARACT TYPE, UNSPECIFIED LATERALITY: ICD-10-CM

## 2024-04-15 DIAGNOSIS — M79.641 BILATERAL HAND PAIN: Primary | ICD-10-CM

## 2024-04-15 DIAGNOSIS — J45.40 MODERATE PERSISTENT ASTHMA, UNCOMPLICATED: ICD-10-CM

## 2024-04-15 DIAGNOSIS — R73.9 HYPERGLYCEMIA, UNSPECIFIED: ICD-10-CM

## 2024-04-15 DIAGNOSIS — H53.8 BLURRY VISION: ICD-10-CM

## 2024-04-15 DIAGNOSIS — M79.642 BILATERAL HAND PAIN: Primary | ICD-10-CM

## 2024-04-15 DIAGNOSIS — M05.79 RHEUMATOID ARTHRITIS INVOLVING MULTIPLE SITES WITH POSITIVE RHEUMATOID FACTOR (HCC): ICD-10-CM

## 2024-04-15 PROCEDURE — 1090F PRES/ABSN URINE INCON ASSESS: CPT | Performed by: INTERNAL MEDICINE

## 2024-04-15 PROCEDURE — G8399 PT W/DXA RESULTS DOCUMENT: HCPCS | Performed by: INTERNAL MEDICINE

## 2024-04-15 PROCEDURE — 3074F SYST BP LT 130 MM HG: CPT | Performed by: INTERNAL MEDICINE

## 2024-04-15 PROCEDURE — 1123F ACP DISCUSS/DSCN MKR DOCD: CPT | Performed by: INTERNAL MEDICINE

## 2024-04-15 PROCEDURE — 3078F DIAST BP <80 MM HG: CPT | Performed by: INTERNAL MEDICINE

## 2024-04-15 PROCEDURE — G8420 CALC BMI NORM PARAMETERS: HCPCS | Performed by: INTERNAL MEDICINE

## 2024-04-15 PROCEDURE — 3017F COLORECTAL CA SCREEN DOC REV: CPT | Performed by: INTERNAL MEDICINE

## 2024-04-15 PROCEDURE — 1036F TOBACCO NON-USER: CPT | Performed by: INTERNAL MEDICINE

## 2024-04-15 PROCEDURE — 99214 OFFICE O/P EST MOD 30 MIN: CPT | Performed by: INTERNAL MEDICINE

## 2024-04-15 PROCEDURE — G8427 DOCREV CUR MEDS BY ELIG CLIN: HCPCS | Performed by: INTERNAL MEDICINE

## 2024-04-15 ASSESSMENT — ENCOUNTER SYMPTOMS
SHORTNESS OF BREATH: 0
ANAL BLEEDING: 0
BACK PAIN: 1
COUGH: 0
SORE THROAT: 0
BLOOD IN STOOL: 0
EYE PAIN: 0
ABDOMINAL PAIN: 0

## 2024-04-15 NOTE — PROGRESS NOTES
Esther Leonard presents today for   Chief Complaint   Patient presents with    Pre-op Exam           \"Have you been to the ER, urgent care clinic since your last visit?  Hospitalized since your last visit?\"    NO    “Have you seen or consulted any other health care providers outside of [facility] since your last visit?”    YES - When: approximately 1  weeks ago.  Where and Why: Rheumatology f/u.

## 2024-04-15 NOTE — PROGRESS NOTES
INTERNISTS OF [facility]:   Preoperative Evaluation    Date of Exam: 04/15/24    MRN: [id number]    Esther Leonard  Is a 68 y.o.  female  who presents for preoperative evaluation and management.     Chief Complaint   Patient presents with    Pre-op Exam     Surgery 04/17/24-Dr. Darinel Dickey ([facility])       Subjective:   The pt is a 67 yo female with h/o HTN, GERD with French's esophagus (followed by ), DJD, lumbar post laminectomy syndrome s/p spinal cord stimulator surgery (followed by ), cervical spinal stenosis, prediabetes (resolved), asthma (followed by , Pulmonology and ), HLD, lichens sclerosus, chronic sinusitis, left foot ganglion cyst, vitamin B12 deficiency, and rheumatoid arthritis (Followed by ), prediabetes, vitamin D deficiency.    1. B/l Hand Pain and RA: She is scheduled for left thumb surgery with . She has worsening pain along her left thumb. No h/o trauma. Her RA is well controlled with recent labs showing an undetectable CRP.  Her RA is treated by  with Orencia. +H/o right trigger finger related pain that responded well to a therapeutic injection.     [facility] 1/9/24: \"She like to undergo surgery for the left thumb CMC arthritis.  Discussed risk and benefits we discussed total arthroplasty.  She signed informed consent. The patient understands the risk of an injection and elected to undergo this in the office. Therefore we injected the right  ring finger with 0.5cc of Kenalog-40 + 0.5cc 2% Lidocaine. \"    2. Blurred Vision: No eye pain. +Scheduled for cataract surgery. Blurry vision is worsening.    3. Asthma: Treated with Trelegy, singulair 10mg daily, and albuterol prn. No SOB sx. On allergy shots. Previously treated with Symbicort.     4. Prediabetes/Hyperglycemia Hx: Her A1C is normal per April labs.       General Information:  Procedure/Surgery: left thumb surgery and cataract surgery  Primary

## 2024-04-15 NOTE — PATIENT INSTRUCTIONS
Latest Reference Range & Units 04/08/24 11:00   Sodium 136 - 145 mmol/L 143   Potassium 3.5 - 5.5 mmol/L 4.1   Chloride 100 - 111 mmol/L 112 (H)   CO2 21 - 32 mmol/L 29   BUN,BUNPL 7.0 - 18 MG/DL 14   Creatinine 0.6 - 1.3 MG/DL 0.69   Bun/Cre Ratio 12 - 20   20   Anion Gap 3.0 - 18 mmol/L 2 (L)   Est, Glom Filt Rate >60 ml/min/1.73m2 >90   Glucose, Random 74 - 99 mg/dL 95   CALCIUM, SERUM, 012045 8.5 - 10.1 MG/DL 9.4   ALBUMIN/GLOBULIN RATIO 0.8 - 1.7   1.2   Total Protein 6.4 - 8.2 g/dL 7.1   LIPID PANEL -       CRP 0 - 0.3 mg/dL <0.3   Chol/HDL Ratio 0 - 5.0   3.7   Cholesterol, Total <200 MG/   HDL Cholesterol 40 - 60 MG/DL 44   LDL Calculated 0 - 100 MG/DL 95.4   Triglycerides <150 MG/   VLDL Cholesterol Calculated MG/DL 21.6   Albumin 3.4 - 5.0 g/dL 3.9   Globulin 2.0 - 4.0 g/dL 3.2   Alk Phos 45 - 117 U/L 102   ALT 13 - 56 U/L 25   AST 10 - 38 U/L 17   BILIRUBIN TOTAL 0.2 - 1.0 MG/DL 0.5   Hemoglobin A1C 4.2 - 5.6 % 5.1   eAG (mg/dL) mg/dL 100   WBC 4.6 - 13.2 K/uL 8.0   RBC 4.20 - 5.30 M/uL 4.59   Hemoglobin Quant 12.0 - 16.0 g/dL 14.0   Hematocrit 35.0 - 45.0 % 44.7   MCV 78.0 - 100.0 FL 97.4   MCH 24.0 - 34.0 PG 30.5   MCHC 31.0 - 37.0 g/dL 31.3   MPV 9.2 - 11.8 FL 9.8   RDW 11.6 - 14.5 % 13.7   Platelet Count 135 - 420 K/uL 238   Neutrophils/100 leukocytes 40 - 73 % 74 (H)   Lymphocyte % 21 - 52 % 18 (L)   Monocytes % 3 - 10 % 6   Eosinophils % 0 - 5 % 1   Basophils % 0 - 2 % 0   Neutrophils Absolute 1.8 - 8.0 K/UL 6.0   Lymphocytes Absolute 0.9 - 3.6 K/UL 1.4   Monocytes Absolute 0.05 - 1.2 K/UL 0.5   Eosinophils Absolute 0.0 - 0.4 K/UL 0.1   Basophils Absolute 0.0 - 0.1 K/UL 0.0   Differential Type -   AUTOMATED   Immature Granulocytes % 0.0 - 0.5 % 0   Nucleated Red Blood Cells 0  WBC  0.00 - 0.01 K/uL 0.0  0.00   Immature Granulocytes Absolute 0.00 - 0.04 K/UL 0.0   (H): Data is abnormally high  (L): Data is abnormally low

## 2024-04-16 ENCOUNTER — TELEPHONE (OUTPATIENT)
Age: 69
End: 2024-04-16

## 2024-04-16 NOTE — TELEPHONE ENCOUNTER
----- Message from Sandra Matos MD sent at 4/15/2024  9:24 PM EDT -----  Regarding: Faxing of notes and results to specialists  Please fax the office note (preop) from this wk to  - with Orthopedics - for her anticipated left thumb surgery. Please fax her April results to  - her rheumatologist. Please fax the preop note to her eye doctor for her upcoming cataract surgery.    Thanks,  Dr. Sandra Matos  Internists of [address], [address]  [place] [address]  Phone: [phone]  Fax: [phone]

## 2024-04-22 RX ORDER — LANOLIN ALCOHOL/MO/W.PET/CERES
1000 CREAM (GRAM) TOPICAL DAILY
Qty: 90 TABLET | Refills: 1 | Status: SHIPPED | OUTPATIENT
Start: 2024-04-22

## 2024-04-29 RX ORDER — LANOLIN ALCOHOL/MO/W.PET/CERES
1000 CREAM (GRAM) TOPICAL DAILY
Qty: 90 TABLET | Refills: 1 | Status: SHIPPED | OUTPATIENT
Start: 2024-04-29

## 2024-04-29 NOTE — TELEPHONE ENCOUNTER
Last OV: 04/15/24  Next OV: 10/24/24  Last RX: 04/22/24    Please refuse. Script recently filled w/ x1 refill.

## 2024-07-30 ENCOUNTER — HOSPITAL ENCOUNTER (OUTPATIENT)
Facility: HOSPITAL | Age: 69
Discharge: HOME | End: 2024-08-02
Payer: MEDICARE

## 2024-07-30 ENCOUNTER — TRANSCRIBE ORDERS (OUTPATIENT)
Facility: HOSPITAL | Age: 69
End: 2024-07-30

## 2024-07-30 DIAGNOSIS — M67.472 GANGLION, LEFT ANKLE AND FOOT: ICD-10-CM

## 2024-07-30 DIAGNOSIS — I10 ESSENTIAL HYPERTENSION, MALIGNANT: Primary | ICD-10-CM

## 2024-07-30 DIAGNOSIS — Z01.812 PRE-OPERATIVE LABORATORY EXAMINATION: ICD-10-CM

## 2024-07-30 DIAGNOSIS — D48.110 DESMOID TUMOR OF HEAD AND NECK REGION: Primary | ICD-10-CM

## 2024-07-30 DIAGNOSIS — Z01.812 PRE-OPERATIVE LABORATORY EXAMINATION: Primary | ICD-10-CM

## 2024-07-30 DIAGNOSIS — I10 ESSENTIAL HYPERTENSION, MALIGNANT: ICD-10-CM

## 2024-07-30 LAB
ANION GAP SERPL CALC-SCNC: 5 MMOL/L (ref 3–18)
APTT PPP: 30.1 SEC (ref 23–36.4)
BASOPHILS # BLD: 0.1 K/UL (ref 0–0.1)
BASOPHILS NFR BLD: 1 % (ref 0–2)
BUN SERPL-MCNC: 15 MG/DL (ref 7–18)
BUN/CREAT SERPL: 21 (ref 12–20)
CALCIUM SERPL-MCNC: 9.2 MG/DL (ref 8.5–10.1)
CHLORIDE SERPL-SCNC: 109 MMOL/L (ref 100–111)
CO2 SERPL-SCNC: 27 MMOL/L (ref 21–32)
CREAT SERPL-MCNC: 0.7 MG/DL (ref 0.6–1.3)
DIFFERENTIAL METHOD BLD: ABNORMAL
EKG ATRIAL RATE: 72 BPM
EKG DIAGNOSIS: NORMAL
EKG P AXIS: 68 DEGREES
EKG P-R INTERVAL: 130 MS
EKG Q-T INTERVAL: 396 MS
EKG QRS DURATION: 84 MS
EKG QTC CALCULATION (BAZETT): 433 MS
EKG R AXIS: -9 DEGREES
EKG T AXIS: 41 DEGREES
EKG VENTRICULAR RATE: 72 BPM
EOSINOPHIL # BLD: 0.2 K/UL (ref 0–0.4)
EOSINOPHIL NFR BLD: 2 % (ref 0–5)
ERYTHROCYTE [DISTWIDTH] IN BLOOD BY AUTOMATED COUNT: 13.6 % (ref 11.6–14.5)
GLUCOSE SERPL-MCNC: 108 MG/DL (ref 74–99)
HCT VFR BLD AUTO: 45.4 % (ref 35–45)
HGB BLD-MCNC: 14.6 G/DL (ref 12–16)
IMM GRANULOCYTES # BLD AUTO: 0 K/UL (ref 0–0.04)
IMM GRANULOCYTES NFR BLD AUTO: 0 % (ref 0–0.5)
INR PPP: 1 (ref 0.9–1.1)
LYMPHOCYTES # BLD: 1.9 K/UL (ref 0.9–3.6)
LYMPHOCYTES NFR BLD: 24 % (ref 21–52)
MCH RBC QN AUTO: 30.4 PG (ref 24–34)
MCHC RBC AUTO-ENTMCNC: 32.2 G/DL (ref 31–37)
MCV RBC AUTO: 94.4 FL (ref 78–100)
MONOCYTES # BLD: 0.6 K/UL (ref 0.05–1.2)
MONOCYTES NFR BLD: 7 % (ref 3–10)
NEUTS SEG # BLD: 5.1 K/UL (ref 1.8–8)
NEUTS SEG NFR BLD: 65 % (ref 40–73)
NRBC # BLD: 0 K/UL (ref 0–0.01)
NRBC BLD-RTO: 0 PER 100 WBC
PLATELET # BLD AUTO: 286 K/UL (ref 135–420)
PMV BLD AUTO: 9.7 FL (ref 9.2–11.8)
POTASSIUM SERPL-SCNC: 4.7 MMOL/L (ref 3.5–5.5)
PROTHROMBIN TIME: 13.1 SEC (ref 11.9–14.9)
RBC # BLD AUTO: 4.81 M/UL (ref 4.2–5.3)
SODIUM SERPL-SCNC: 141 MMOL/L (ref 136–145)
WBC # BLD AUTO: 7.9 K/UL (ref 4.6–13.2)

## 2024-07-30 PROCEDURE — 80048 BASIC METABOLIC PNL TOTAL CA: CPT

## 2024-07-30 PROCEDURE — 71046 X-RAY EXAM CHEST 2 VIEWS: CPT

## 2024-07-30 PROCEDURE — 93005 ELECTROCARDIOGRAM TRACING: CPT

## 2024-07-30 PROCEDURE — 93010 ELECTROCARDIOGRAM REPORT: CPT | Performed by: INTERNAL MEDICINE

## 2024-07-30 PROCEDURE — 85610 PROTHROMBIN TIME: CPT

## 2024-07-30 PROCEDURE — 85730 THROMBOPLASTIN TIME PARTIAL: CPT

## 2024-07-30 PROCEDURE — 85025 COMPLETE CBC W/AUTO DIFF WBC: CPT

## 2024-07-30 PROCEDURE — 36415 COLL VENOUS BLD VENIPUNCTURE: CPT

## 2024-08-15 RX ORDER — LOSARTAN POTASSIUM 50 MG/1
50 TABLET ORAL DAILY
COMMUNITY

## 2024-08-15 NOTE — PROGRESS NOTES
Instructions for your surgery at [facility]      Today's Date:  8/15/2024      Patient's Name:  Esther Leonard           Surgery Date:  08/26/2024              Please enter the main entrance of the hospital and check-in at the front security desk located in the lobby. They will direct you to the area to report for your surgery.     Do NOT eat or drink anything, including candy, gum, or ice chips after midnight prior to your surgery, unless you have specific instructions from your surgeon or anesthesia provider to do so.  Brush your teeth before coming to the hospital. You may swish with water, but do not swallow.  No smoking/Vaping/E-Cigarettes 24 hours prior to the day of surgery.  No alcohol 24 hours prior to the day of surgery.  No recreational drugs for one week prior to the day of surgery.  Bring Photo ID, Insurance information, and Co-pay if required on day of surgery.  Bring in pertinent legal documents, such as, Medical Power of , DNR, Advance Directive, etc.  Leave all valuables, including money/purse, at home.  Remove all jewelry, including ALL body piercings, nail polish, acrylic nails, and makeup (including mascara); no lotions, powders, deodorant, or perfume/cologne/after shave on the skin.  Follow instruction for Hibiclens washes and CHG wipes from surgeon's office.   Glasses and dentures may be worn to the hospital. They must be removed prior to surgery. Please bring case/container for glasses or dentures.   Contact lenses should not be worn on day of surgery.   Call your doctor's office if symptoms of a cold or illness develop within 24-48 hours prior to your surgery.  Call your doctor's office if you have any questions concerning insurance or co-pays.  15. AN ADULT (relative or friend 18 years or older) MUST DRIVE YOU HOME AFTER YOUR SURGERY.  16. Please make arrangements for a responsible adult (18 years or older) to be with you for 24 hours after your

## 2024-08-20 ENCOUNTER — OFFICE VISIT (OUTPATIENT)
Facility: CLINIC | Age: 69
End: 2024-08-20

## 2024-08-20 VITALS
RESPIRATION RATE: 14 BRPM | WEIGHT: 174 LBS | OXYGEN SATURATION: 97 % | HEIGHT: 70 IN | HEART RATE: 73 BPM | TEMPERATURE: 98.1 F | BODY MASS INDEX: 24.91 KG/M2 | SYSTOLIC BLOOD PRESSURE: 111 MMHG | DIASTOLIC BLOOD PRESSURE: 72 MMHG

## 2024-08-20 DIAGNOSIS — M79.672 LEFT FOOT PAIN: Primary | ICD-10-CM

## 2024-08-20 DIAGNOSIS — J45.40 MODERATE PERSISTENT ASTHMA WITHOUT COMPLICATION: ICD-10-CM

## 2024-08-20 DIAGNOSIS — Z00.00 MEDICARE ANNUAL WELLNESS VISIT, SUBSEQUENT: ICD-10-CM

## 2024-08-20 DIAGNOSIS — I10 HYPERTENSION, UNSPECIFIED TYPE: ICD-10-CM

## 2024-08-20 DIAGNOSIS — Z71.89 ADVANCE CARE PLANNING: ICD-10-CM

## 2024-08-20 ASSESSMENT — ENCOUNTER SYMPTOMS
ABDOMINAL PAIN: 0
ANAL BLEEDING: 0
COUGH: 0
EYE PAIN: 0
BLOOD IN STOOL: 0
SORE THROAT: 0
SHORTNESS OF BREATH: 0

## 2024-08-20 ASSESSMENT — PATIENT HEALTH QUESTIONNAIRE - PHQ9
SUM OF ALL RESPONSES TO PHQ QUESTIONS 1-9: 0
SUM OF ALL RESPONSES TO PHQ QUESTIONS 1-9: 0
1. LITTLE INTEREST OR PLEASURE IN DOING THINGS: NOT AT ALL
SUM OF ALL RESPONSES TO PHQ9 QUESTIONS 1 & 2: 0
2. FEELING DOWN, DEPRESSED OR HOPELESS: NOT AT ALL
SUM OF ALL RESPONSES TO PHQ QUESTIONS 1-9: 0
SUM OF ALL RESPONSES TO PHQ QUESTIONS 1-9: 0

## 2024-08-20 ASSESSMENT — LIFESTYLE VARIABLES
HOW OFTEN DO YOU HAVE A DRINK CONTAINING ALCOHOL: NEVER
HOW MANY STANDARD DRINKS CONTAINING ALCOHOL DO YOU HAVE ON A TYPICAL DAY: PATIENT DOES NOT DRINK

## 2024-08-20 NOTE — ACP (ADVANCE CARE PLANNING)
Advance Care Planning     General Advance Care Planning (ACP) Conversation    Date of Conversation: 8/20/24    Conducted with: Patient with Decision Making Capacity    Healthcare Decision Maker:  Today we documented Decision Maker(s) consistent with ACP documents on file.    Content/Action Overview:  Has ACP document(s) on file - reflects the patient's care preferences    She has no updates to made to her pre-existing advance directive.    Length of Voluntary ACP Conversation in minutes:  <16 minutes (Non-Billable)    Sandra Matos MD

## 2024-08-20 NOTE — PROGRESS NOTES
Esther Leonard is a 69 y.o. female (: 1955) presenting to address:    Chief Complaint   Patient presents with    Medicare AWV    Pre-op Exam     Left foot cyst removal- Dr. Edmund Mcclain       Vitals:    24 1112   BP: 111/72   Pulse: 73   Resp: 14   Temp: 98.1 °F (36.7 °C)   SpO2: 97%       \"Have you been to the ER, urgent care clinic since your last visit?  Hospitalized since your last visit?\"    NO    “Have you seen or consulted any other health care providers outside of  since your last visit?”    YES - When: approximately 1  weeks ago.  Where and Why: GI for endoscopy .    “Have you had a colorectal cancer screening such as a colonoscopy/FIT/Cologuard?    Yes pt completed last week 2024 awaiting results.    Date of last Colonoscopy: 2021  No cologuard on file  No FIT/FOBT on file   No flexible sigmoidoscopy on file              
Medications    Medication Sig Taking? Authorizing Provider   losartan (COZAAR) 50 MG tablet Take 1 tablet by mouth daily Yes ProviderAlvaro MD   vitamin B-12 (CYANOCOBALAMIN) 1000 MCG tablet Take 1 tablet by mouth daily Yes Sandra Matos MD   FEROSUL 325 (65 Fe) MG tablet take 1 tablet by mouth once daily before breakfast Yes Sandra Matos MD   albuterol sulfate HFA (PROVENTIL;VENTOLIN;PROAIR) 108 (90 Base) MCG/ACT inhaler Inhale 1 puff into the lungs every 6 hours as needed Yes ProviderAlvaro MD   acetaminophen (TYLENOL) 325 MG tablet Take 1 tablet by mouth every 4 hours as needed Yes Provider, MD Alvaro   abatacept (ORENCIA) 250 MG injection Infuse intravenously Monthly Yes Provider, MD Alvaro   Multiple Vitamins-Minerals (CENTRUM SILVER 50+WOMEN PO) Take 1 tablet by mouth daily Yes ProviderAlvaro MD   KRILL OIL OMEGA-3 PO Take by mouth daily Yes ProviderAlvaro MD   Cholecalciferol (VITAMIN D3 PO) Take 1 tablet by mouth daily Yes Provider, MD Alvaro   clobetasol (TEMOVATE) 0.05 % ointment Apply 1 each topically daily apply a thin layer Yes Provider, MD Alvaro   dexlansoprazole (DEXILANT) 60 MG CPDR delayed release capsule Take 1 capsule by mouth daily Yes Provider, MD Alvaro   montelukast (SINGULAIR) 10 MG tablet Take 1 tablet by mouth nightly Yes ProviderAlvaro MD   triamcinolone (NASACORT) 55 MCG/ACT nasal inhaler 2 sprays by Nasal route daily Yes ProviderAlvaro MD   budesonide-formoterol (SYMBICORT) 160-4.5 MCG/ACT AERO Inhale 2 puffs into the lungs 2 times daily Yes ProviderAlvaro MD   ascorbic acid (VITAMIN C) 500 MG tablet Take 1 tablet by mouth daily Yes ProviderAlvaro MD   Cetirizine HCl 10 MG CAPS Take 10 mg by mouth daily Yes ProviderAlvaro MD   estradiol (ESTRACE) 0.1 MG/GM vaginal cream Place 2 g vaginally daily  Provider, Historical, MD       CareTeam (Including outside providers/suppliers regularly

## 2024-08-23 ENCOUNTER — ANESTHESIA EVENT (OUTPATIENT)
Facility: HOSPITAL | Age: 69
End: 2024-08-23
Payer: MEDICARE

## 2024-08-26 ENCOUNTER — ANESTHESIA (OUTPATIENT)
Facility: HOSPITAL | Age: 69
End: 2024-08-26
Payer: MEDICARE

## 2024-08-26 ENCOUNTER — HOSPITAL ENCOUNTER (OUTPATIENT)
Facility: HOSPITAL | Age: 69
Setting detail: OUTPATIENT SURGERY
Discharge: HOME | End: 2024-08-26
Attending: PODIATRIST | Admitting: PODIATRIST
Payer: MEDICARE

## 2024-08-26 VITALS
BODY MASS INDEX: 25.05 KG/M2 | OXYGEN SATURATION: 99 % | RESPIRATION RATE: 16 BRPM | DIASTOLIC BLOOD PRESSURE: 81 MMHG | SYSTOLIC BLOOD PRESSURE: 155 MMHG | TEMPERATURE: 97.7 F | WEIGHT: 175 LBS | HEART RATE: 63 BPM | HEIGHT: 70 IN

## 2024-08-26 PROCEDURE — 6370000000 HC RX 637 (ALT 250 FOR IP): Performed by: NURSE ANESTHETIST, CERTIFIED REGISTERED

## 2024-08-26 PROCEDURE — 2580000003 HC RX 258: Performed by: NURSE ANESTHETIST, CERTIFIED REGISTERED

## 2024-08-26 PROCEDURE — 2500000003 HC RX 250 WO HCPCS: Performed by: NURSE ANESTHETIST, CERTIFIED REGISTERED

## 2024-08-26 PROCEDURE — 3700000001 HC ADD 15 MINUTES (ANESTHESIA): Performed by: PODIATRIST

## 2024-08-26 PROCEDURE — 88304 TISSUE EXAM BY PATHOLOGIST: CPT

## 2024-08-26 PROCEDURE — 6370000000 HC RX 637 (ALT 250 FOR IP): Performed by: PODIATRIST

## 2024-08-26 PROCEDURE — 2500000003 HC RX 250 WO HCPCS: Performed by: PODIATRIST

## 2024-08-26 PROCEDURE — 6360000002 HC RX W HCPCS: Performed by: PODIATRIST

## 2024-08-26 PROCEDURE — 7100000001 HC PACU RECOVERY - ADDTL 15 MIN: Performed by: PODIATRIST

## 2024-08-26 PROCEDURE — 3600000002 HC SURGERY LEVEL 2 BASE: Performed by: PODIATRIST

## 2024-08-26 PROCEDURE — 7100000010 HC PHASE II RECOVERY - FIRST 15 MIN: Performed by: PODIATRIST

## 2024-08-26 PROCEDURE — 3600000012 HC SURGERY LEVEL 2 ADDTL 15MIN: Performed by: PODIATRIST

## 2024-08-26 PROCEDURE — 2709999900 HC NON-CHARGEABLE SUPPLY: Performed by: PODIATRIST

## 2024-08-26 PROCEDURE — 2580000003 HC RX 258: Performed by: PODIATRIST

## 2024-08-26 PROCEDURE — 6360000002 HC RX W HCPCS: Performed by: NURSE ANESTHETIST, CERTIFIED REGISTERED

## 2024-08-26 PROCEDURE — 7100000000 HC PACU RECOVERY - FIRST 15 MIN: Performed by: PODIATRIST

## 2024-08-26 PROCEDURE — 3700000000 HC ANESTHESIA ATTENDED CARE: Performed by: PODIATRIST

## 2024-08-26 PROCEDURE — 7100000011 HC PHASE II RECOVERY - ADDTL 15 MIN: Performed by: PODIATRIST

## 2024-08-26 RX ORDER — ONDANSETRON 2 MG/ML
4 INJECTION INTRAMUSCULAR; INTRAVENOUS
Status: DISCONTINUED | OUTPATIENT
Start: 2024-08-26 | End: 2024-08-26 | Stop reason: HOSPADM

## 2024-08-26 RX ORDER — SODIUM CHLORIDE, SODIUM LACTATE, POTASSIUM CHLORIDE, CALCIUM CHLORIDE 600; 310; 30; 20 MG/100ML; MG/100ML; MG/100ML; MG/100ML
INJECTION, SOLUTION INTRAVENOUS CONTINUOUS
Status: DISCONTINUED | OUTPATIENT
Start: 2024-08-26 | End: 2024-08-26 | Stop reason: HOSPADM

## 2024-08-26 RX ORDER — LIDOCAINE HYDROCHLORIDE 20 MG/ML
INJECTION, SOLUTION EPIDURAL; INFILTRATION; INTRACAUDAL; PERINEURAL PRN
Status: DISCONTINUED | OUTPATIENT
Start: 2024-08-26 | End: 2024-08-26 | Stop reason: SDUPTHER

## 2024-08-26 RX ORDER — OXYCODONE AND ACETAMINOPHEN 5; 325 MG/1; MG/1
1 TABLET ORAL ONCE
Status: COMPLETED | OUTPATIENT
Start: 2024-08-26 | End: 2024-08-26

## 2024-08-26 RX ORDER — ONDANSETRON 2 MG/ML
INJECTION INTRAMUSCULAR; INTRAVENOUS PRN
Status: DISCONTINUED | OUTPATIENT
Start: 2024-08-26 | End: 2024-08-26 | Stop reason: SDUPTHER

## 2024-08-26 RX ORDER — SODIUM CHLORIDE 9 MG/ML
INJECTION, SOLUTION INTRAVENOUS PRN
Status: DISCONTINUED | OUTPATIENT
Start: 2024-08-26 | End: 2024-08-26 | Stop reason: HOSPADM

## 2024-08-26 RX ORDER — PROPOFOL 10 MG/ML
INJECTION, EMULSION INTRAVENOUS CONTINUOUS PRN
Status: DISCONTINUED | OUTPATIENT
Start: 2024-08-26 | End: 2024-08-26 | Stop reason: SDUPTHER

## 2024-08-26 RX ORDER — FENTANYL CITRATE 50 UG/ML
50 INJECTION, SOLUTION INTRAMUSCULAR; INTRAVENOUS EVERY 5 MIN PRN
Status: DISCONTINUED | OUTPATIENT
Start: 2024-08-26 | End: 2024-08-26 | Stop reason: HOSPADM

## 2024-08-26 RX ORDER — NALOXONE HYDROCHLORIDE 0.4 MG/ML
INJECTION, SOLUTION INTRAMUSCULAR; INTRAVENOUS; SUBCUTANEOUS PRN
Status: DISCONTINUED | OUTPATIENT
Start: 2024-08-26 | End: 2024-08-26 | Stop reason: HOSPADM

## 2024-08-26 RX ORDER — SODIUM CHLORIDE 0.9 % (FLUSH) 0.9 %
5-40 SYRINGE (ML) INJECTION EVERY 12 HOURS SCHEDULED
Status: DISCONTINUED | OUTPATIENT
Start: 2024-08-26 | End: 2024-08-26 | Stop reason: HOSPADM

## 2024-08-26 RX ORDER — LIDOCAINE HYDROCHLORIDE 10 MG/ML
INJECTION, SOLUTION EPIDURAL; INFILTRATION; INTRACAUDAL; PERINEURAL PRN
Status: DISCONTINUED | OUTPATIENT
Start: 2024-08-26 | End: 2024-08-26 | Stop reason: ALTCHOICE

## 2024-08-26 RX ORDER — SODIUM CHLORIDE 0.9 % (FLUSH) 0.9 %
5-40 SYRINGE (ML) INJECTION PRN
Status: DISCONTINUED | OUTPATIENT
Start: 2024-08-26 | End: 2024-08-26 | Stop reason: HOSPADM

## 2024-08-26 RX ORDER — FAMOTIDINE 20 MG/1
20 TABLET, FILM COATED ORAL ONCE
Status: COMPLETED | OUTPATIENT
Start: 2024-08-26 | End: 2024-08-26

## 2024-08-26 RX ORDER — MAGNESIUM SULFATE HEPTAHYDRATE 500 MG/ML
INJECTION, SOLUTION INTRAMUSCULAR; INTRAVENOUS PRN
Status: DISCONTINUED | OUTPATIENT
Start: 2024-08-26 | End: 2024-08-26 | Stop reason: SDUPTHER

## 2024-08-26 RX ORDER — LIDOCAINE HYDROCHLORIDE 10 MG/ML
1 INJECTION, SOLUTION EPIDURAL; INFILTRATION; INTRACAUDAL; PERINEURAL
Status: DISCONTINUED | OUTPATIENT
Start: 2024-08-26 | End: 2024-08-26 | Stop reason: HOSPADM

## 2024-08-26 RX ORDER — DEXAMETHASONE SODIUM PHOSPHATE 4 MG/ML
INJECTION, SOLUTION INTRA-ARTICULAR; INTRALESIONAL; INTRAMUSCULAR; INTRAVENOUS; SOFT TISSUE PRN
Status: DISCONTINUED | OUTPATIENT
Start: 2024-08-26 | End: 2024-08-26 | Stop reason: SDUPTHER

## 2024-08-26 RX ORDER — FENTANYL CITRATE 50 UG/ML
25 INJECTION, SOLUTION INTRAMUSCULAR; INTRAVENOUS EVERY 5 MIN PRN
Status: DISCONTINUED | OUTPATIENT
Start: 2024-08-26 | End: 2024-08-26 | Stop reason: HOSPADM

## 2024-08-26 RX ADMIN — FENTANYL CITRATE 25 MCG: 50 INJECTION, SOLUTION INTRAMUSCULAR; INTRAVENOUS at 07:29

## 2024-08-26 RX ADMIN — ONDANSETRON 4 MG: 2 INJECTION INTRAMUSCULAR; INTRAVENOUS at 07:29

## 2024-08-26 RX ADMIN — FAMOTIDINE 20 MG: 20 TABLET ORAL at 06:16

## 2024-08-26 RX ADMIN — LIDOCAINE HYDROCHLORIDE 100 MG: 20 INJECTION, SOLUTION EPIDURAL; INFILTRATION; INTRACAUDAL; PERINEURAL at 07:29

## 2024-08-26 RX ADMIN — DEXAMETHASONE SODIUM PHOSPHATE 4 MG: 4 INJECTION INTRA-ARTICULAR; INTRALESIONAL; INTRAMUSCULAR; INTRAVENOUS; SOFT TISSUE at 07:29

## 2024-08-26 RX ADMIN — MAGNESIUM SULFATE HEPTAHYDRATE 1 G: 500 INJECTION, SOLUTION INTRAMUSCULAR; INTRAVENOUS at 07:29

## 2024-08-26 RX ADMIN — FENTANYL CITRATE 25 MCG: 50 INJECTION, SOLUTION INTRAMUSCULAR; INTRAVENOUS at 07:47

## 2024-08-26 RX ADMIN — PROPOFOL 50 MG: 10 INJECTION, EMULSION INTRAVENOUS at 07:28

## 2024-08-26 RX ADMIN — SODIUM CHLORIDE, POTASSIUM CHLORIDE, SODIUM LACTATE AND CALCIUM CHLORIDE: 600; 310; 30; 20 INJECTION, SOLUTION INTRAVENOUS at 06:16

## 2024-08-26 RX ADMIN — WATER 2000 MG: 1 INJECTION INTRAMUSCULAR; INTRAVENOUS; SUBCUTANEOUS at 07:32

## 2024-08-26 RX ADMIN — PROPOFOL 120 MCG/KG/MIN: 10 INJECTION, EMULSION INTRAVENOUS at 07:29

## 2024-08-26 RX ADMIN — OXYCODONE HYDROCHLORIDE AND ACETAMINOPHEN 1 TABLET: 5; 325 TABLET ORAL at 09:21

## 2024-08-26 ASSESSMENT — PAIN DESCRIPTION - ORIENTATION
ORIENTATION: LEFT
ORIENTATION: LEFT

## 2024-08-26 ASSESSMENT — PAIN SCALES - GENERAL
PAINLEVEL_OUTOF10: 4
PAINLEVEL_OUTOF10: 3
PAINLEVEL_OUTOF10: 0
PAINLEVEL_OUTOF10: 4
PAINLEVEL_OUTOF10: 0

## 2024-08-26 ASSESSMENT — PAIN - FUNCTIONAL ASSESSMENT
PAIN_FUNCTIONAL_ASSESSMENT: ACTIVITIES ARE NOT PREVENTED
PAIN_FUNCTIONAL_ASSESSMENT: ACTIVITIES ARE NOT PREVENTED
PAIN_FUNCTIONAL_ASSESSMENT: 0-10

## 2024-08-26 ASSESSMENT — PAIN DESCRIPTION - PAIN TYPE: TYPE: SURGICAL PAIN

## 2024-08-26 ASSESSMENT — PAIN DESCRIPTION - LOCATION
LOCATION: FOOT
LOCATION: FOOT

## 2024-08-26 ASSESSMENT — PAIN DESCRIPTION - DESCRIPTORS
DESCRIPTORS: ACHING;SORE
DESCRIPTORS: ACHING;SORE

## 2024-08-26 NOTE — PERIOP NOTE
Patient /Family /Designee has been informed that [facility] is not responsible for patient belongings per policy and the signed [facility] Patient Agreement document.  Personal items should be sent home or checked in with security.  Patient /Family /Designee selected the following action:                            [x]  Send personal items home with a family member or friend                                                 []  Check in personal items with security, excluding clothing                            []  Maintain personal items at the bedside, against recommendation                                 by Casey Johnson [facility]                                   ** If patient /family /designee chooses to maintain personal items at the bedside,                                      Complete the patient belongings inventory in the EMR.

## 2024-08-26 NOTE — H&P
Update History & Physical    The patient's History and Physical of August 26, surgery was reviewed with the patient and I examined the patient. There was no change. The surgical site was confirmed by the patient and me.     Plan: The risks, benefits, expected outcome, and alternative to the recommended procedure have been discussed with the patient. Patient understands and wants to proceed with the procedure.     Electronically signed by Edmund Tate DPM on 8/26/2024 at 7:17 AM

## 2024-08-26 NOTE — BRIEF OP NOTE
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]     * Ganglion cyst of left foot [M67.472]    Post-Op Diagnosis: Same       Procedure(s):  LEFT FOOT EXCISION TUMOR, EXCISION GANGLION    Surgeon(s):  Edmund Tate DPM    Assistant:  Surgical Assistant: Fredo Garcia    Anesthesia: Monitor Anesthesia Care    Estimated Blood Loss (mL): Minimal    Complications: None    Specimens:   ID Type Source Tests Collected by Time Destination   A : ganglion cyst left foot Tissue Cyst SURGICAL PATHOLOGY Edmund Tate DPM 8/26/2024 0755        Implants:  * No implants in log *      Drains: * No LDAs found *    Findings:  Infection Present At Time Of Surgery (PATOS) (choose all levels that have infection present):  No infection present  Other Findings: GANGLION CYST  This procedure was not performed to treat primary cutaneous melanoma through wide local excision    Electronically signed by Edmund Tate DPM on 8/26/2024 at 8:09 AM

## 2024-08-26 NOTE — ANESTHESIA POSTPROCEDURE EVALUATION
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

## 2024-08-26 NOTE — ANESTHESIA PRE PROCEDURE
Department of Anesthesiology  Preprocedure Note       Name:  Esther Leonard   Age:  69 y.o.  :  1955                                          MRN:  [id number]         Date:  2024      Surgeon: Surgeon(s):  Edmund Tate DPM    Procedure: Procedure(s):  LEFT FOOT EXCISION TUMOR, EXCISION GANGLION    Medications prior to admission:   Prior to Admission medications    Medication Sig Start Date End Date Taking? Authorizing Provider   losartan (COZAAR) 50 MG tablet Take 1 tablet by mouth daily   Yes Alvaro Lazo MD   vitamin B-12 (CYANOCOBALAMIN) 1000 MCG tablet Take 1 tablet by mouth daily 24  Yes Sandra Matos MD   FEROSUL 325 (65 Fe) MG tablet take 1 tablet by mouth once daily before breakfast 23  Yes Sandra Matos MD   albuterol sulfate HFA (PROVENTIL;VENTOLIN;PROAIR) 108 (90 Base) MCG/ACT inhaler Inhale 1 puff into the lungs every 6 hours as needed   Yes Alvaro Lazo MD   acetaminophen (TYLENOL) 325 MG tablet Take 1 tablet by mouth every 4 hours as needed   Yes ProviderAlvaro MD   abatacept (ORENCIA) 250 MG injection Infuse intravenously Monthly   Yes ProviderAlvaro MD   Multiple Vitamins-Minerals (CENTRUM SILVER 50+WOMEN PO) Take 1 tablet by mouth daily   Yes Alvaro Lazo MD   KRILL OIL OMEGA-3 PO Take by mouth daily   Yes ProviderAlvaro MD   Cholecalciferol (VITAMIN D3 PO) Take 1 tablet by mouth daily   Yes Alvaro Lazo MD   clobetasol (TEMOVATE) 0.05 % ointment Apply 1 each topically daily apply a thin layer 12/3/20  Yes Alvaro Lazo MD   dexlansoprazole (DEXILANT) 60 MG CPDR delayed release capsule Take 1 capsule by mouth daily 22  Yes Alvaro Lazo MD   montelukast (SINGULAIR) 10 MG tablet Take 1 tablet by mouth nightly 22  Yes Alvaro Lazo MD   triamcinolone (NASACORT) 55 MCG/ACT nasal inhaler 2 sprays by Nasal route daily   Yes Alvaro Lazo MD

## 2024-08-26 NOTE — DISCHARGE INSTRUCTIONS
BEDREST WITH ICE AND ELEVATION FIRST 24 HOURS   USE BATHROOM WITH WALKER AND  PARTIAL WEIGHT ON HEEL.     PARTIAL HEEL WITH WALKER AND ACTIVITY AS TOLERATED AFTER 24 HOURS.     DISCHARGE WHEN STABLE AND GIVE ONE PERCOCET AT DISCHARGE     CALL OFFICE IF UNSURE OF FOLLOW UP VISIT     PAIN MEDICATION AT [facility]       DISCHARGE SUMMARY from Nurse    PATIENT INSTRUCTIONS:    After general anesthesia or intravenous sedation, for 24 hours or while taking prescription Narcotics:  Limit your activities  Do not drive and operate hazardous machinery  Do not make important personal or business decisions  Do  not drink alcoholic beverages  If you have not urinated within 8 hours after discharge, please contact your surgeon on call.    Report the following to your surgeon:  Excessive pain, swelling, redness or odor of or around the surgical area  Temperature over 100.5  Nausea and vomiting lasting longer than 4 hours or if unable to take medications  Any signs of decreased circulation or nerve impairment to extremity: change in color, persistent  numbness, tingling, coldness or increase pain  Any questions      These are general instructions for a healthy lifestyle:    No smoking/ No tobacco products/ Avoid exposure to second hand smoke  Surgeon General's Warning:  Quitting smoking now greatly reduces serious risk to your health.    Obesity, smoking, and sedentary lifestyle greatly increases your risk for illness    A healthy diet, regular physical exercise & weight monitoring are important for maintaining a healthy lifestyle    You may be retaining fluid if you have a history of heart failure or if you experience any of the following symptoms:  Weight gain of 3 pounds or more overnight or 5 pounds in a week, increased swelling in our hands or feet or shortness of breath while lying flat in bed.  Please call your doctor as soon as you notice any of these symptoms; do not wait until your next office visit.        The

## 2024-09-03 ENCOUNTER — PATIENT MESSAGE (OUTPATIENT)
Facility: CLINIC | Age: 69
End: 2024-09-03

## 2024-09-04 RX ORDER — LOSARTAN POTASSIUM 50 MG/1
50 TABLET ORAL DAILY
Qty: 90 TABLET | Refills: 2 | Status: SHIPPED | OUTPATIENT
Start: 2024-09-04

## 2024-10-17 ENCOUNTER — HOSPITAL ENCOUNTER (OUTPATIENT)
Facility: HOSPITAL | Age: 69
Setting detail: SPECIMEN
Discharge: HOME | End: 2024-10-20
Payer: MEDICARE

## 2024-10-17 DIAGNOSIS — R73.9 HYPERGLYCEMIA, UNSPECIFIED: ICD-10-CM

## 2024-10-17 DIAGNOSIS — M05.79 RHEUMATOID ARTHRITIS INVOLVING MULTIPLE SITES WITH POSITIVE RHEUMATOID FACTOR (HCC): ICD-10-CM

## 2024-10-17 DIAGNOSIS — J45.40 MODERATE PERSISTENT ASTHMA, UNCOMPLICATED: ICD-10-CM

## 2024-10-17 LAB
ALBUMIN SERPL-MCNC: 4.2 G/DL (ref 3.4–5)
ALBUMIN/GLOB SERPL: 1.2 (ref 0.8–1.7)
ALP SERPL-CCNC: 96 U/L (ref 45–117)
ALT SERPL-CCNC: 24 U/L (ref 13–56)
ANION GAP SERPL CALC-SCNC: 2 MMOL/L (ref 3–18)
AST SERPL-CCNC: 18 U/L (ref 10–38)
BASOPHILS # BLD: 0.1 K/UL (ref 0–0.1)
BASOPHILS NFR BLD: 1 % (ref 0–2)
BILIRUB SERPL-MCNC: 0.6 MG/DL (ref 0.2–1)
BUN SERPL-MCNC: 14 MG/DL (ref 7–18)
BUN/CREAT SERPL: 19 (ref 12–20)
CALCIUM SERPL-MCNC: 9.9 MG/DL (ref 8.5–10.1)
CHLORIDE SERPL-SCNC: 111 MMOL/L (ref 100–111)
CO2 SERPL-SCNC: 28 MMOL/L (ref 21–32)
CREAT SERPL-MCNC: 0.73 MG/DL (ref 0.6–1.3)
CRP SERPL-MCNC: <0.3 MG/DL (ref 0–0.3)
DIFFERENTIAL METHOD BLD: ABNORMAL
EOSINOPHIL # BLD: 0.2 K/UL (ref 0–0.4)
EOSINOPHIL NFR BLD: 2 % (ref 0–5)
ERYTHROCYTE [DISTWIDTH] IN BLOOD BY AUTOMATED COUNT: 14.5 % (ref 11.6–14.5)
EST. AVERAGE GLUCOSE BLD GHB EST-MCNC: 105 MG/DL
GLOBULIN SER CALC-MCNC: 3.4 G/DL (ref 2–4)
GLUCOSE SERPL-MCNC: 97 MG/DL (ref 74–99)
HBA1C MFR BLD: 5.3 % (ref 4.2–5.6)
HCT VFR BLD AUTO: 47.4 % (ref 35–45)
HGB BLD-MCNC: 14.2 G/DL (ref 12–16)
IMM GRANULOCYTES # BLD AUTO: 0 K/UL (ref 0–0.04)
IMM GRANULOCYTES NFR BLD AUTO: 0 % (ref 0–0.5)
LYMPHOCYTES # BLD: 1.7 K/UL (ref 0.9–3.6)
LYMPHOCYTES NFR BLD: 16 % (ref 21–52)
MCH RBC QN AUTO: 29.1 PG (ref 24–34)
MCHC RBC AUTO-ENTMCNC: 30 G/DL (ref 31–37)
MCV RBC AUTO: 97.1 FL (ref 78–100)
MONOCYTES # BLD: 0.8 K/UL (ref 0.05–1.2)
MONOCYTES NFR BLD: 8 % (ref 3–10)
NEUTS SEG # BLD: 7.5 K/UL (ref 1.8–8)
NEUTS SEG NFR BLD: 73 % (ref 40–73)
NRBC # BLD: 0 K/UL (ref 0–0.01)
NRBC BLD-RTO: 0 PER 100 WBC
PLATELET # BLD AUTO: 289 K/UL (ref 135–420)
PMV BLD AUTO: 9.7 FL (ref 9.2–11.8)
POTASSIUM SERPL-SCNC: 4.8 MMOL/L (ref 3.5–5.5)
PROT SERPL-MCNC: 7.6 G/DL (ref 6.4–8.2)
RBC # BLD AUTO: 4.88 M/UL (ref 4.2–5.3)
SODIUM SERPL-SCNC: 141 MMOL/L (ref 136–145)
WBC # BLD AUTO: 10.4 K/UL (ref 4.6–13.2)

## 2024-10-17 PROCEDURE — 80053 COMPREHEN METABOLIC PANEL: CPT

## 2024-10-17 PROCEDURE — 36415 COLL VENOUS BLD VENIPUNCTURE: CPT

## 2024-10-17 PROCEDURE — 86140 C-REACTIVE PROTEIN: CPT

## 2024-10-17 PROCEDURE — 85025 COMPLETE CBC W/AUTO DIFF WBC: CPT

## 2024-10-17 PROCEDURE — 83036 HEMOGLOBIN GLYCOSYLATED A1C: CPT

## 2024-10-24 ENCOUNTER — OFFICE VISIT (OUTPATIENT)
Facility: CLINIC | Age: 69
End: 2024-10-24

## 2024-10-24 VITALS
OXYGEN SATURATION: 97 % | HEIGHT: 70 IN | RESPIRATION RATE: 16 BRPM | WEIGHT: 174 LBS | SYSTOLIC BLOOD PRESSURE: 120 MMHG | TEMPERATURE: 98.2 F | HEART RATE: 72 BPM | BODY MASS INDEX: 24.91 KG/M2 | DIASTOLIC BLOOD PRESSURE: 78 MMHG

## 2024-10-24 DIAGNOSIS — Z12.31 ENCOUNTER FOR SCREENING MAMMOGRAM FOR BREAST CANCER: ICD-10-CM

## 2024-10-24 DIAGNOSIS — R73.9 HYPERGLYCEMIA, UNSPECIFIED: ICD-10-CM

## 2024-10-24 DIAGNOSIS — J45.40 MODERATE PERSISTENT ASTHMA WITHOUT COMPLICATION: ICD-10-CM

## 2024-10-24 DIAGNOSIS — K21.9 GASTROESOPHAGEAL REFLUX DISEASE, UNSPECIFIED WHETHER ESOPHAGITIS PRESENT: ICD-10-CM

## 2024-10-24 DIAGNOSIS — D64.9 ANEMIA, UNSPECIFIED TYPE: ICD-10-CM

## 2024-10-24 DIAGNOSIS — I10 HYPERTENSION, UNSPECIFIED TYPE: Primary | ICD-10-CM

## 2024-10-24 NOTE — PROGRESS NOTES
Esther Leonard is a 69 y.o. female (: 1955) presenting to address:    Chief Complaint   Patient presents with    6 Month Follow-Up       Vitals:    10/24/24 0954   BP: 120/78   Pulse: 72   Resp: 16   Temp: 98.2 °F (36.8 °C)   SpO2: 97%       \"Have you been to the ER, urgent care clinic since your last visit?  Hospitalized since your last visit?\"    NO    “Have you seen or consulted any other health care providers outside of [facility] since your last visit?”    YES - When: approximately 1 months ago.  Where and Why: Rheumatology .     Podiatry- 10/2/2024

## 2024-10-24 NOTE — PROGRESS NOTES
INTERNISTS OF [facility]:  10/31/2024, MRN: [id number]      Esther Leonard is a 69 y.o. female and presents to clinic for 6 Month Follow-Up      Subjective:   The pt is a 68 yo female with h/o HTN, GERD with French's esophagus (followed by ), DJD, lumbar post laminectomy syndrome s/p spinal cord stimulator surgery (followed by ), cervical spinal stenosis, prediabetes (resolved), asthma (followed by , Pulmonology and ), HLD, lichens sclerosus, chronic sinusitis, left foot ganglion cyst, vitamin B12 deficiency, and rheumatoid arthritis (Followed by ), vitamin D deficiency.     1. Asthma: Taking: Symbicort 160-4.5 mcg 2 puffs twice daily, singulair 10mg daily, and albuterol prn. No SOB/cough.     2. HTN: BP is 120/78. On losartan 50mg daily.     3. Prediabetes: Resolved. No abnormal A1Cs since 2/23.    4. GERD/French's Esophagus and Anemia:  S/p an EGD and colonoscopy. 2 polyps were removed. She was told to repeat her colonoscopy in 5 yrs and her EGD in 3 yrs. On dexilant 60mg daily.     5. Health Maintenance:  - She had her flu shot  - She had her RSV last November.   - She is scheduled for her Covid shot.  - She had her left foot surgery. She is doing aqua therapy.  - She continues to take orencia 250mg monthly for RA, which she states is adequately controlled.      Patient Active Problem List    Diagnosis Date Noted    Osteopenia 05/10/2023    Hip arthritis 05/13/2022    Arthritis of left knee 02/03/2020    Adjacent segment disease with spinal stenosis 06/22/2018    Lichen sclerosus et atrophicus 05/17/2018    French esophagus 11/16/2017    Rheumatoid arthritis involving multiple sites (HCC) 02/15/2017    Arthritis, lumbar spine 04/22/2016    Moderate persistent asthma without complication 03/03/2016    Lumbar post-laminectomy syndrome 12/10/2015    S/P cervical spinal fusion 08/13/2014    GERD (gastroesophageal reflux disease) 08/11/2014    Cervical stenosis of

## 2024-10-31 ASSESSMENT — ENCOUNTER SYMPTOMS
SORE THROAT: 0
COUGH: 0
EYE PAIN: 0
ANAL BLEEDING: 0
SHORTNESS OF BREATH: 0
ABDOMINAL PAIN: 0
BLOOD IN STOOL: 0

## 2024-12-18 ENCOUNTER — HOSPITAL ENCOUNTER (OUTPATIENT)
Facility: HOSPITAL | Age: 69
Discharge: HOME | End: 2024-12-21
Attending: INTERNAL MEDICINE
Payer: MEDICARE

## 2024-12-18 VITALS — BODY MASS INDEX: 24.34 KG/M2 | WEIGHT: 170 LBS | HEIGHT: 70 IN

## 2024-12-18 DIAGNOSIS — Z12.31 ENCOUNTER FOR SCREENING MAMMOGRAM FOR BREAST CANCER: ICD-10-CM

## 2024-12-18 PROCEDURE — 77063 BREAST TOMOSYNTHESIS BI: CPT

## 2025-01-02 ENCOUNTER — OFFICE VISIT (OUTPATIENT)
Age: 70
End: 2025-01-02

## 2025-01-02 VITALS
TEMPERATURE: 97.3 F | HEART RATE: 86 BPM | RESPIRATION RATE: 18 BRPM | OXYGEN SATURATION: 96 % | BODY MASS INDEX: 25.02 KG/M2 | SYSTOLIC BLOOD PRESSURE: 123 MMHG | WEIGHT: 174.4 LBS | DIASTOLIC BLOOD PRESSURE: 75 MMHG

## 2025-01-02 DIAGNOSIS — D72.19 PERIPHERAL EOSINOPHILIA: ICD-10-CM

## 2025-01-02 DIAGNOSIS — J45.20 INTERMITTENT ASTHMA WITHOUT COMPLICATION, UNSPECIFIED ASTHMA SEVERITY: Primary | ICD-10-CM

## 2025-01-02 ASSESSMENT — ENCOUNTER SYMPTOMS
SHORTNESS OF BREATH: 1
CHOKING: 0
NAUSEA: 0
COLOR CHANGE: 0
BLOOD IN STOOL: 0
CHEST TIGHTNESS: 0
TROUBLE SWALLOWING: 0
COUGH: 0
EYE DISCHARGE: 0
WHEEZING: 1
ABDOMINAL PAIN: 0
STRIDOR: 0
VOICE CHANGE: 0
PHOTOPHOBIA: 0
EYE ITCHING: 0
SINUS PRESSURE: 0
VOMITING: 0
APNEA: 0
EYE PAIN: 0
EYE REDNESS: 0

## 2025-01-02 NOTE — PROGRESS NOTES
Esther Leonard presents today for   Chief Complaint   Patient presents with    New Patient     Last seen  2023 for Moderate persistent asthma without complication       Is someone accompanying this pt? No    Is the patient using any DME equipment during OV? No    -DME Company NA    Depression Screenin/20/2024    11:15 AM   PHQ-9 Questionaire   Little interest or pleasure in doing things 0   Feeling down, depressed, or hopeless 0   PHQ-9 Total Score 0       Learning Needs Questionnaire:     No question data found.      Fall Risk:         2024    11:14 AM 2023    11:32 AM 2023     2:23 PM 2023     2:30 PM   Fall Risk   Do you feel unsteady or are you worried about falling?  no no no no   2 or more falls in past year? no no no no   Fall with injury in past year? no no no no        Abuse Screening:          No data to display                  Coordination of Care:    1. Have you been to the ER, urgent care clinic since your last visit? Hospitalized since your last visit? No    2. Have you seen or consulted any other health care providers outside of the [facility] since your last visit? Include any pap smears or colon screening. No    Medication list has been update per patient.        
Content: Thought content normal.         Judgment: Judgment normal.                        An electronic signature was used to authenticate this note.    --Charmaine Concepcion MD

## 2025-01-15 ENCOUNTER — PATIENT MESSAGE (OUTPATIENT)
Facility: CLINIC | Age: 70
End: 2025-01-15

## 2025-01-17 ENCOUNTER — TELEPHONE (OUTPATIENT)
Facility: CLINIC | Age: 70
End: 2025-01-17

## 2025-01-18 SDOH — HEALTH STABILITY: PHYSICAL HEALTH: ON AVERAGE, HOW MANY MINUTES DO YOU ENGAGE IN EXERCISE AT THIS LEVEL?: 100 MIN

## 2025-01-18 SDOH — HEALTH STABILITY: PHYSICAL HEALTH: ON AVERAGE, HOW MANY DAYS PER WEEK DO YOU ENGAGE IN MODERATE TO STRENUOUS EXERCISE (LIKE A BRISK WALK)?: 3 DAYS

## 2025-01-20 SDOH — ECONOMIC STABILITY: INCOME INSECURITY: IN THE LAST 12 MONTHS, WAS THERE A TIME WHEN YOU WERE NOT ABLE TO PAY THE MORTGAGE OR RENT ON TIME?: NO

## 2025-01-20 SDOH — ECONOMIC STABILITY: FOOD INSECURITY: WITHIN THE PAST 12 MONTHS, YOU WORRIED THAT YOUR FOOD WOULD RUN OUT BEFORE YOU GOT MONEY TO BUY MORE.: NEVER TRUE

## 2025-01-20 SDOH — ECONOMIC STABILITY: TRANSPORTATION INSECURITY
IN THE PAST 12 MONTHS, HAS THE LACK OF TRANSPORTATION KEPT YOU FROM MEDICAL APPOINTMENTS OR FROM GETTING MEDICATIONS?: NO

## 2025-01-20 SDOH — ECONOMIC STABILITY: TRANSPORTATION INSECURITY
IN THE PAST 12 MONTHS, HAS LACK OF TRANSPORTATION KEPT YOU FROM MEETINGS, WORK, OR FROM GETTING THINGS NEEDED FOR DAILY LIVING?: NO

## 2025-01-20 SDOH — ECONOMIC STABILITY: FOOD INSECURITY: WITHIN THE PAST 12 MONTHS, THE FOOD YOU BOUGHT JUST DIDN'T LAST AND YOU DIDN'T HAVE MONEY TO GET MORE.: NEVER TRUE

## 2025-01-21 ENCOUNTER — OFFICE VISIT (OUTPATIENT)
Age: 70
End: 2025-01-21

## 2025-01-21 VITALS — HEIGHT: 70 IN | WEIGHT: 176 LBS | BODY MASS INDEX: 25.2 KG/M2

## 2025-01-21 DIAGNOSIS — M19.011 PRIMARY OSTEOARTHRITIS OF RIGHT SHOULDER: Primary | ICD-10-CM

## 2025-01-21 DIAGNOSIS — M25.511 RIGHT SHOULDER PAIN, UNSPECIFIED CHRONICITY: ICD-10-CM

## 2025-01-21 RX ORDER — LIDOCAINE HYDROCHLORIDE 10 MG/ML
3 INJECTION, SOLUTION INFILTRATION; PERINEURAL ONCE
Status: COMPLETED | OUTPATIENT
Start: 2025-01-21 | End: 2025-01-21

## 2025-01-21 RX ORDER — TRIAMCINOLONE ACETONIDE 40 MG/ML
40 INJECTION, SUSPENSION INTRA-ARTICULAR; INTRAMUSCULAR ONCE
Status: COMPLETED | OUTPATIENT
Start: 2025-01-21 | End: 2025-01-21

## 2025-01-21 RX ADMIN — LIDOCAINE HYDROCHLORIDE 3 ML: 10 INJECTION, SOLUTION INFILTRATION; PERINEURAL at 09:11

## 2025-01-21 RX ADMIN — TRIAMCINOLONE ACETONIDE 40 MG: 40 INJECTION, SUSPENSION INTRA-ARTICULAR; INTRAMUSCULAR at 09:12

## 2025-01-21 NOTE — PROGRESS NOTES
[facility] AMBULATORY OFFICE NOTE      Patient: Esther Leonard                MRN: [id number]       SSN: [id number]  YOB: 1955        AGE: 69 y.o.        SEX: female  Body mass index is 25.25 kg/m².    PCP: Sandra Matos MD  01/21/25    CHIEF COMPLAINT: Right shoulder pain    HPI: Esther Leonard is a 69 y.o. female patient who returns today for her right shoulder.  She had rotator cuff surgery in 2019 and did well up until last month.  She says when she was having her arm position for a mammogram she felt a sharp pain in her shoulder as it was being position.  She been having some pain ever since.  She is also been noticing some popping.  She has not had any treatment for it.  The pain is in the shoulder area and occasionally radiates down towards the deltoid.    Past Medical History:   Diagnosis Date    Adjacent segment disease with spinal stenosis 6/22/2018    Allergic rhinitis     Anemia 2008    intermittant since then    Asthma     Back pain     French's esophagus with esophagitis     Cervical radiculopathy     Chronic sinusitis 5/15/2012    DNS (deviated nasal septum)     Empyema (HCC)     Foraminal stenosis of cervical region     C4-C5    GERD (gastroesophageal reflux disease)     Dr Shah    Hx MRSA infection 8/11/2014    Hypertension     Hypertriglyceridemia     Insulin resistance 4/9/2012    Left knee pain     Lichen planus     Liver disease     LOPEZ-per pt, followed by Dr Connell    Menopause     Age 46    MRSA (methicillin resistant Staphylococcus aureus) infection 2008    left lung    Nausea & vomiting     Restless leg syndrome     Rheumatoid arthritis (HCC)     Spinal cord stimulator status 2016    Spinal stenosis of cervical region     C4-C5 and C5-C6    TMJ syndrome     Wears glasses     and contacts       Family History   Problem Relation Age of Onset    Cancer Sister 33        CA, uterus    Cancer Daughter     Stroke Maternal

## 2025-01-22 ENCOUNTER — TELEMEDICINE (OUTPATIENT)
Facility: CLINIC | Age: 70
End: 2025-01-22

## 2025-01-22 ENCOUNTER — PATIENT MESSAGE (OUTPATIENT)
Facility: CLINIC | Age: 70
End: 2025-01-22

## 2025-01-22 DIAGNOSIS — I10 HYPERTENSION, UNSPECIFIED TYPE: ICD-10-CM

## 2025-01-22 DIAGNOSIS — I70.90 CALCIFICATION OF ARTERY: Primary | ICD-10-CM

## 2025-01-22 DIAGNOSIS — R94.31 ABNORMAL ELECTROCARDIOGRAM (ECG) (EKG): ICD-10-CM

## 2025-01-22 DIAGNOSIS — J45.40 MODERATE PERSISTENT ASTHMA WITHOUT COMPLICATION: ICD-10-CM

## 2025-01-22 DIAGNOSIS — M05.79 RHEUMATOID ARTHRITIS INVOLVING MULTIPLE SITES WITH POSITIVE RHEUMATOID FACTOR (HCC): ICD-10-CM

## 2025-01-22 DIAGNOSIS — R73.9 HYPERGLYCEMIA, UNSPECIFIED: ICD-10-CM

## 2025-01-22 DIAGNOSIS — E78.5 HYPERLIPIDEMIA, UNSPECIFIED HYPERLIPIDEMIA TYPE: ICD-10-CM

## 2025-01-22 PROCEDURE — G8399 PT W/DXA RESULTS DOCUMENT: HCPCS | Performed by: INTERNAL MEDICINE

## 2025-01-22 PROCEDURE — 3017F COLORECTAL CA SCREEN DOC REV: CPT | Performed by: INTERNAL MEDICINE

## 2025-01-22 PROCEDURE — 1123F ACP DISCUSS/DSCN MKR DOCD: CPT | Performed by: INTERNAL MEDICINE

## 2025-01-22 PROCEDURE — 1090F PRES/ABSN URINE INCON ASSESS: CPT | Performed by: INTERNAL MEDICINE

## 2025-01-22 PROCEDURE — 99214 OFFICE O/P EST MOD 30 MIN: CPT | Performed by: INTERNAL MEDICINE

## 2025-01-22 PROCEDURE — G8428 CUR MEDS NOT DOCUMENT: HCPCS | Performed by: INTERNAL MEDICINE

## 2025-01-22 RX ORDER — BUDESONIDE, GLYCOPYRROLATE, AND FORMOTEROL FUMARATE 160; 9; 4.8 UG/1; UG/1; UG/1
AEROSOL, METERED RESPIRATORY (INHALATION)
COMMUNITY
Start: 2025-01-09

## 2025-01-22 SDOH — ECONOMIC STABILITY: FOOD INSECURITY: WITHIN THE PAST 12 MONTHS, YOU WORRIED THAT YOUR FOOD WOULD RUN OUT BEFORE YOU GOT MONEY TO BUY MORE.: NEVER TRUE

## 2025-01-22 SDOH — ECONOMIC STABILITY: FOOD INSECURITY: WITHIN THE PAST 12 MONTHS, THE FOOD YOU BOUGHT JUST DIDN'T LAST AND YOU DIDN'T HAVE MONEY TO GET MORE.: NEVER TRUE

## 2025-01-22 ASSESSMENT — PATIENT HEALTH QUESTIONNAIRE - PHQ9
1. LITTLE INTEREST OR PLEASURE IN DOING THINGS: NOT AT ALL
SUM OF ALL RESPONSES TO PHQ QUESTIONS 1-9: 0
SUM OF ALL RESPONSES TO PHQ9 QUESTIONS 1 & 2: 0
SUM OF ALL RESPONSES TO PHQ QUESTIONS 1-9: 0
2. FEELING DOWN, DEPRESSED OR HOPELESS: NOT AT ALL

## 2025-01-22 NOTE — PROGRESS NOTES
Esther Leonard presents today for No chief complaint on file.          \"Have you been to the ER, urgent care clinic since your last visit?  Hospitalized since your last visit?\"    NO    “Have you seen or consulted any other health care providers outside of [facility] since your last visit?”     rheumatology

## 2025-01-22 NOTE — PROGRESS NOTES
Esther Leonard is a 69 y.o. female who was seen by synchronous (real-time) audio-video technology on 1/22/2025.        Assessment & Plan:   1. RA, HTN, HLD Hx, Prediabetes Hx, and Calcifications: Calcifications seen on imaging are nonspecific.  Her RA, hyperlipidemia, and hypertension are well-controlled.  Her prediabetes has resolved.  - She will continue with her Orencia 250 mg monthly per her rheumatologist.  - She will continue with losartan 50 mg daily and a heart healthy diet.  - Ordering a heart scan to assess her coronary calcium score.    2.  Asthma: Stable per patient history.  - She will continue with Breztri 160 - 9 - 4.8 mcg 2 inhalations twice daily per pulmonology recommendations.  - Okay to use albuterol as needed.  - Okay to continue with Singulair 10 mg daily.       Diagnosis Orders   1. Calcification of artery  CT CARDIAC CALCIUM SCORING      2. Rheumatoid arthritis involving multiple sites with positive rheumatoid factor (HCC)  CT CARDIAC CALCIUM SCORING      3. Hypertension, unspecified type  CT CARDIAC CALCIUM SCORING      4. Hyperglycemia, unspecified  CT CARDIAC CALCIUM SCORING      5. Hyperlipidemia, unspecified hyperlipidemia type  CT CARDIAC CALCIUM SCORING      6. Abnormal electrocardiogram (ECG) (EKG)  CT CARDIAC CALCIUM SCORING              Lab review: labs are reviewed in the EHR     I have discussed the diagnosis with the patient and the intended plan as seen in the above orders. I have discussed medication side effects and warnings with the patient as well. I have reviewed the plan of care with the patient, accepted their input and they are in agreement with the treatment goals. All questions were answered. The patient understands the plan of care. Pt instructed if symptoms worsen to call the office or report to the ED for continued care.  Greater than 50% of the visit time was spent in counseling and/or coordination of care.     Voice recognition was used to generate this

## 2025-01-29 ENCOUNTER — HOSPITAL ENCOUNTER (OUTPATIENT)
Facility: HOSPITAL | Age: 70
Discharge: HOME | End: 2025-02-01
Attending: INTERNAL MEDICINE
Payer: MEDICARE

## 2025-01-29 DIAGNOSIS — E78.5 HYPERLIPIDEMIA, UNSPECIFIED HYPERLIPIDEMIA TYPE: ICD-10-CM

## 2025-01-29 DIAGNOSIS — R94.31 ABNORMAL ELECTROCARDIOGRAM (ECG) (EKG): ICD-10-CM

## 2025-01-29 DIAGNOSIS — I70.90 CALCIFICATION OF ARTERY: ICD-10-CM

## 2025-01-29 DIAGNOSIS — R73.9 HYPERGLYCEMIA, UNSPECIFIED: ICD-10-CM

## 2025-01-29 DIAGNOSIS — I10 HYPERTENSION, UNSPECIFIED TYPE: ICD-10-CM

## 2025-01-29 DIAGNOSIS — M05.79 RHEUMATOID ARTHRITIS INVOLVING MULTIPLE SITES WITH POSITIVE RHEUMATOID FACTOR (HCC): ICD-10-CM

## 2025-01-29 PROCEDURE — 75571 CT HRT W/O DYE W/CA TEST: CPT

## 2025-01-31 ENCOUNTER — TELEPHONE (OUTPATIENT)
Facility: CLINIC | Age: 70
End: 2025-01-31

## 2025-01-31 DIAGNOSIS — R93.1 ABNORMAL SCREENING CARDIAC CT: Primary | ICD-10-CM

## 2025-01-31 NOTE — TELEPHONE ENCOUNTER
No further action required.      : I spoke with her about her results.  I will place a referral to cardiology for a check up given her coronary calcium score 333 mostly along her left circumflex and LAD arteries in the setting of RA (higher CVD risk).

## 2025-03-27 ENCOUNTER — OFFICE VISIT (OUTPATIENT)
Age: 70
End: 2025-03-27

## 2025-03-27 DIAGNOSIS — Z96.652 PRESENCE OF LEFT ARTIFICIAL KNEE JOINT: ICD-10-CM

## 2025-03-27 DIAGNOSIS — Z96.651 PRESENCE OF RIGHT ARTIFICIAL KNEE JOINT: Primary | ICD-10-CM

## 2025-03-27 NOTE — PROGRESS NOTES
Patient: Esther Leonard                MRN: [id number]       SSN: [id number]  YOB: 1955        AGE: 69 y.o.        SEX: female  There is no height or weight on file to calculate BMI.    PCP: Sandra Matos MD  03/27/25      This office note has been dictated.      REVIEW OF SYSTEMS:  Constitutional: Negative for fever, chills, weight loss and malaise/fatigue.   HENT: Negative.    Eyes: Negative.    Respiratory: Negative.   Cardiovascular: Negative.   Gastrointestinal: No bowel incontinence or constipation.  Genitourinary: No bladder incontinence or saddle anesthesia.  Skin: Negative.   Neurological: Negative.    Endo/Heme/Allergies: Negative.    Psychiatric/Behavioral: Negative.  Musculoskeletal: As per HPI above.     Past Medical History:   Diagnosis Date    Adjacent segment disease with spinal stenosis 6/22/2018    Allergic rhinitis     Anemia 2008    intermittant since then    Asthma     Back pain     French's esophagus with esophagitis     Cervical radiculopathy     Chronic sinusitis 5/15/2012    DNS (deviated nasal septum)     Empyema (HCC)     Foraminal stenosis of cervical region     C4-C5    GERD (gastroesophageal reflux disease)     Dr Shah    Hx MRSA infection 8/11/2014    Hypertension     Hypertriglyceridemia     Insulin resistance 4/9/2012    Left knee pain     Lichen planus     Liver disease     LOPEZ-per pt, followed by Dr Connell    Menopause     Age 46    MRSA (methicillin resistant Staphylococcus aureus) infection 2008    left lung    Nausea & vomiting     Restless leg syndrome     Rheumatoid arthritis (HCC)     Spinal cord stimulator status 2016    Spinal stenosis of cervical region     C4-C5 and C5-C6    TMJ syndrome     Wears glasses     and contacts         Current Outpatient Medications:     BREZTRI AEROSPHERE 160-9-4.8 MCG/ACT AERO, USE 2 INHALATIONS ORALLY   TWICE DAILY AS DIRECTED,   RINSE MOUTH OUT AFTER USE, Disp: , Rfl:     losartan (COZAAR) 50 MG

## 2025-04-07 RX ORDER — LOSARTAN POTASSIUM 50 MG/1
50 TABLET ORAL DAILY
Qty: 90 TABLET | Refills: 2 | Status: SHIPPED | OUTPATIENT
Start: 2025-04-07

## 2025-04-17 ENCOUNTER — HOSPITAL ENCOUNTER (OUTPATIENT)
Facility: HOSPITAL | Age: 70
Setting detail: SPECIMEN
Discharge: HOME | End: 2025-04-20
Payer: MEDICARE

## 2025-04-17 DIAGNOSIS — I10 HYPERTENSION, UNSPECIFIED TYPE: ICD-10-CM

## 2025-04-17 DIAGNOSIS — D64.9 ANEMIA, UNSPECIFIED TYPE: ICD-10-CM

## 2025-04-17 DIAGNOSIS — R73.9 HYPERGLYCEMIA, UNSPECIFIED: ICD-10-CM

## 2025-04-17 LAB
ALBUMIN SERPL-MCNC: 3.9 G/DL (ref 3.4–5)
ALBUMIN/GLOB SERPL: 1.1 (ref 0.8–1.7)
ALP SERPL-CCNC: 99 U/L (ref 45–117)
ALT SERPL-CCNC: 25 U/L (ref 13–56)
ANION GAP SERPL CALC-SCNC: 3 MMOL/L (ref 3–18)
AST SERPL-CCNC: 17 U/L (ref 10–38)
BASOPHILS # BLD: 0.06 K/UL (ref 0–0.1)
BASOPHILS NFR BLD: 1 % (ref 0–2)
BILIRUB SERPL-MCNC: 0.6 MG/DL (ref 0.2–1)
BUN SERPL-MCNC: 12 MG/DL (ref 7–18)
BUN/CREAT SERPL: 16 (ref 12–20)
CALCIUM SERPL-MCNC: 9.7 MG/DL (ref 8.5–10.1)
CHLORIDE SERPL-SCNC: 109 MMOL/L (ref 100–111)
CO2 SERPL-SCNC: 28 MMOL/L (ref 21–32)
CREAT SERPL-MCNC: 0.76 MG/DL (ref 0.6–1.3)
DIFFERENTIAL METHOD BLD: ABNORMAL
EOSINOPHIL # BLD: 0.13 K/UL (ref 0–0.4)
EOSINOPHIL NFR BLD: 2.1 % (ref 0–5)
ERYTHROCYTE [DISTWIDTH] IN BLOOD BY AUTOMATED COUNT: 13.5 % (ref 11.6–14.5)
EST. AVERAGE GLUCOSE BLD GHB EST-MCNC: 100 MG/DL
FERRITIN SERPL-MCNC: 162 NG/ML (ref 8–388)
GLOBULIN SER CALC-MCNC: 3.4 G/DL (ref 2–4)
GLUCOSE SERPL-MCNC: 93 MG/DL (ref 74–99)
HBA1C MFR BLD: 5.1 % (ref 4.2–5.6)
HCT VFR BLD AUTO: 46.1 % (ref 35–45)
HGB BLD-MCNC: 14.2 G/DL (ref 12–16)
IMM GRANULOCYTES # BLD AUTO: 0.02 K/UL (ref 0–0.04)
IMM GRANULOCYTES NFR BLD AUTO: 0.3 % (ref 0–0.5)
IRON SATN MFR SERPL: 22 % (ref 20–50)
IRON SERPL-MCNC: 68 UG/DL (ref 50–175)
LYMPHOCYTES # BLD: 1.66 K/UL (ref 0.9–3.6)
LYMPHOCYTES NFR BLD: 26.6 % (ref 21–52)
MCH RBC QN AUTO: 30.4 PG (ref 24–34)
MCHC RBC AUTO-ENTMCNC: 30.8 G/DL (ref 31–37)
MCV RBC AUTO: 98.7 FL (ref 78–100)
MONOCYTES # BLD: 0.54 K/UL (ref 0.05–1.2)
MONOCYTES NFR BLD: 8.6 % (ref 3–10)
NEUTS SEG # BLD: 3.84 K/UL (ref 1.8–8)
NEUTS SEG NFR BLD: 61.4 % (ref 40–73)
NRBC # BLD: 0 K/UL (ref 0–0.01)
NRBC BLD-RTO: 0 PER 100 WBC
PLATELET # BLD AUTO: 242 K/UL (ref 135–420)
PMV BLD AUTO: 9.7 FL (ref 9.2–11.8)
POTASSIUM SERPL-SCNC: 4.4 MMOL/L (ref 3.5–5.5)
PROT SERPL-MCNC: 7.3 G/DL (ref 6.4–8.2)
RBC # BLD AUTO: 4.67 M/UL (ref 4.2–5.3)
SODIUM SERPL-SCNC: 140 MMOL/L (ref 136–145)
TIBC SERPL-MCNC: 312 UG/DL (ref 250–450)
WBC # BLD AUTO: 6.3 K/UL (ref 4.6–13.2)

## 2025-04-17 PROCEDURE — 80053 COMPREHEN METABOLIC PANEL: CPT

## 2025-04-17 PROCEDURE — 85025 COMPLETE CBC W/AUTO DIFF WBC: CPT

## 2025-04-17 PROCEDURE — 36415 COLL VENOUS BLD VENIPUNCTURE: CPT

## 2025-04-17 PROCEDURE — 82728 ASSAY OF FERRITIN: CPT

## 2025-04-17 PROCEDURE — 83540 ASSAY OF IRON: CPT

## 2025-04-17 PROCEDURE — 83036 HEMOGLOBIN GLYCOSYLATED A1C: CPT

## 2025-04-17 PROCEDURE — 83550 IRON BINDING TEST: CPT

## 2025-04-24 ENCOUNTER — OFFICE VISIT (OUTPATIENT)
Facility: CLINIC | Age: 70
End: 2025-04-24
Payer: MEDICARE

## 2025-04-24 VITALS
SYSTOLIC BLOOD PRESSURE: 129 MMHG | OXYGEN SATURATION: 96 % | HEART RATE: 69 BPM | WEIGHT: 176 LBS | TEMPERATURE: 97.6 F | HEIGHT: 70 IN | RESPIRATION RATE: 18 BRPM | DIASTOLIC BLOOD PRESSURE: 76 MMHG | BODY MASS INDEX: 25.2 KG/M2

## 2025-04-24 DIAGNOSIS — M05.79 RHEUMATOID ARTHRITIS INVOLVING MULTIPLE SITES WITH POSITIVE RHEUMATOID FACTOR (HCC): Primary | ICD-10-CM

## 2025-04-24 DIAGNOSIS — K21.9 GASTROESOPHAGEAL REFLUX DISEASE, UNSPECIFIED WHETHER ESOPHAGITIS PRESENT: ICD-10-CM

## 2025-04-24 DIAGNOSIS — J45.40 MODERATE PERSISTENT ASTHMA WITHOUT COMPLICATION: ICD-10-CM

## 2025-04-24 DIAGNOSIS — R73.9 HYPERGLYCEMIA, UNSPECIFIED: ICD-10-CM

## 2025-04-24 DIAGNOSIS — I10 HYPERTENSION, UNSPECIFIED TYPE: ICD-10-CM

## 2025-04-24 DIAGNOSIS — E56.9 VITAMIN DEFICIENCY: ICD-10-CM

## 2025-04-24 PROCEDURE — 1090F PRES/ABSN URINE INCON ASSESS: CPT | Performed by: INTERNAL MEDICINE

## 2025-04-24 PROCEDURE — 3074F SYST BP LT 130 MM HG: CPT | Performed by: INTERNAL MEDICINE

## 2025-04-24 PROCEDURE — 1036F TOBACCO NON-USER: CPT | Performed by: INTERNAL MEDICINE

## 2025-04-24 PROCEDURE — 3078F DIAST BP <80 MM HG: CPT | Performed by: INTERNAL MEDICINE

## 2025-04-24 PROCEDURE — G8427 DOCREV CUR MEDS BY ELIG CLIN: HCPCS | Performed by: INTERNAL MEDICINE

## 2025-04-24 PROCEDURE — G8419 CALC BMI OUT NRM PARAM NOF/U: HCPCS | Performed by: INTERNAL MEDICINE

## 2025-04-24 PROCEDURE — 3017F COLORECTAL CA SCREEN DOC REV: CPT | Performed by: INTERNAL MEDICINE

## 2025-04-24 PROCEDURE — 99214 OFFICE O/P EST MOD 30 MIN: CPT | Performed by: INTERNAL MEDICINE

## 2025-04-24 PROCEDURE — G8399 PT W/DXA RESULTS DOCUMENT: HCPCS | Performed by: INTERNAL MEDICINE

## 2025-04-24 PROCEDURE — 1123F ACP DISCUSS/DSCN MKR DOCD: CPT | Performed by: INTERNAL MEDICINE

## 2025-04-24 RX ORDER — PREDNISONE 10 MG/1
10 TABLET ORAL DAILY
COMMUNITY
Start: 2025-04-22

## 2025-04-24 NOTE — PROGRESS NOTES
[facility]:  4/30/2025, MRN: [id number]      Esther Leonard is a 69 y.o. female and presents to clinic for Hypertension (6 month follow up)      Subjective:   The pt is a 68 yo female with h/o HTN, GERD with French's esophagus (followed by ), DJD, lumbar post laminectomy syndrome s/p spinal cord stimulator surgery (followed by ), cervical spinal stenosis, prediabetes (resolved), asthma (followed by , Pulmonology and ), HLD, lichens sclerosus, chronic sinusitis, left foot ganglion cyst, vitamin B12 deficiency, and rheumatoid arthritis (Followed by ), and vitamin D deficiency.     1.  RA: Treated with prednisone 10mg daily for a recent flare up along her thumb. Taking: Orencia 200 mg monthly per her rheumatologist.  Today she reports: had improvement in her sx after her thumb injection but had worsening of pain just recently.  believes the worsening right thumb pain is from a RA flare up.    2/12/25 Orthopedics Note: \"We agree to undergo steroid injection to the right thumb cmc and right ring finger a1\"    Orthopedics Note 3/27/25: \"1 status post right total hip replacement, #2 status post bilateral knee replacements, #3 left hip advancing arthritis. At this point, we discussed treatment options.  The patient is doing quite well.  She will continue activities as tolerated.  She will continue with a home exercise program to maintain her mobility and range of motion.  Will see her back in a year for reevaluation and repeat x-rays.  We will also obtain x-rays of her hips upon her return\"    2.  Hypertension/Prediabetes: Blood pressure is 129/76.  On losartan 50 mg daily. Recent labs show that her CMP is unremarkable.  Her A1C per recent labs this month was WNL. She is scheduled soon with Cardiology.    1/29/25 Heart Scan:  There is a large amount of coronary calcification present in both the left anterior descending artery and her left circumflex

## 2025-04-24 NOTE — PROGRESS NOTES
Esther Leonard presents today for   Chief Complaint   Patient presents with    Hypertension     6 month follow up           \"Have you been to the ER, urgent care clinic since your last visit?  Hospitalized since your last visit?\"    NO    “Have you seen or consulted any other health care providers outside of [facility] since your last visit?”    NO

## 2025-04-30 ASSESSMENT — ENCOUNTER SYMPTOMS
SHORTNESS OF BREATH: 0
EYE PAIN: 0
ANAL BLEEDING: 0
BLOOD IN STOOL: 0
ABDOMINAL PAIN: 0
SORE THROAT: 0
COUGH: 0

## 2025-05-13 ENCOUNTER — OFFICE VISIT (OUTPATIENT)
Age: 70
End: 2025-05-13
Payer: MEDICARE

## 2025-05-13 VITALS
DIASTOLIC BLOOD PRESSURE: 65 MMHG | BODY MASS INDEX: 25.34 KG/M2 | HEART RATE: 85 BPM | SYSTOLIC BLOOD PRESSURE: 109 MMHG | WEIGHT: 177 LBS | HEIGHT: 70 IN | OXYGEN SATURATION: 96 %

## 2025-05-13 DIAGNOSIS — I25.10 CORONARY ARTERY DISEASE DUE TO CALCIFIED CORONARY LESION: Primary | ICD-10-CM

## 2025-05-13 DIAGNOSIS — I10 PRIMARY HYPERTENSION: ICD-10-CM

## 2025-05-13 DIAGNOSIS — I25.84 CORONARY ARTERY DISEASE DUE TO CALCIFIED CORONARY LESION: Primary | ICD-10-CM

## 2025-05-13 DIAGNOSIS — E78.5 DYSLIPIDEMIA: ICD-10-CM

## 2025-05-13 PROCEDURE — 99204 OFFICE O/P NEW MOD 45 MIN: CPT | Performed by: INTERNAL MEDICINE

## 2025-05-13 PROCEDURE — G8399 PT W/DXA RESULTS DOCUMENT: HCPCS | Performed by: INTERNAL MEDICINE

## 2025-05-13 PROCEDURE — 1123F ACP DISCUSS/DSCN MKR DOCD: CPT | Performed by: INTERNAL MEDICINE

## 2025-05-13 PROCEDURE — 3078F DIAST BP <80 MM HG: CPT | Performed by: INTERNAL MEDICINE

## 2025-05-13 PROCEDURE — 1160F RVW MEDS BY RX/DR IN RCRD: CPT | Performed by: INTERNAL MEDICINE

## 2025-05-13 PROCEDURE — 1090F PRES/ABSN URINE INCON ASSESS: CPT | Performed by: INTERNAL MEDICINE

## 2025-05-13 PROCEDURE — G8419 CALC BMI OUT NRM PARAM NOF/U: HCPCS | Performed by: INTERNAL MEDICINE

## 2025-05-13 PROCEDURE — 3074F SYST BP LT 130 MM HG: CPT | Performed by: INTERNAL MEDICINE

## 2025-05-13 PROCEDURE — 3017F COLORECTAL CA SCREEN DOC REV: CPT | Performed by: INTERNAL MEDICINE

## 2025-05-13 PROCEDURE — 1159F MED LIST DOCD IN RCRD: CPT | Performed by: INTERNAL MEDICINE

## 2025-05-13 PROCEDURE — G8427 DOCREV CUR MEDS BY ELIG CLIN: HCPCS | Performed by: INTERNAL MEDICINE

## 2025-05-13 PROCEDURE — 93000 ELECTROCARDIOGRAM COMPLETE: CPT | Performed by: INTERNAL MEDICINE

## 2025-05-13 PROCEDURE — 1036F TOBACCO NON-USER: CPT | Performed by: INTERNAL MEDICINE

## 2025-05-13 RX ORDER — CALCIUM CARBONATE 500(1250)
500 TABLET ORAL 2 TIMES DAILY
COMMUNITY

## 2025-05-13 RX ORDER — ATORVASTATIN CALCIUM 10 MG/1
10 TABLET, FILM COATED ORAL DAILY
Qty: 90 TABLET | Refills: 3 | Status: SHIPPED | OUTPATIENT
Start: 2025-05-13

## 2025-05-13 ASSESSMENT — ENCOUNTER SYMPTOMS
GASTROINTESTINAL NEGATIVE: 1
EYES NEGATIVE: 1
RESPIRATORY NEGATIVE: 1

## 2025-05-13 NOTE — PROGRESS NOTES
Esther Leonard is a 69 y.o. year old female.    5/13/2025 seen as a new patient for abnormal cardiac calcium score.  Last seen by a cardiologist little over 3 years ago for abnormal EKG when she was judged to be doing well and follow up was given as needed.  Coronary calcium score was 333.  Denies any chest pain dyspnea edema dizziness or palpitations.  EKG has shown poor R wave progression for many years.  She is very active and exercises on a regular basis.  Doing 3 water classes a week in addition to gardening and all the household chores.  She also goes hiking.          Review of Systems   Constitutional: Negative.    HENT: Negative.     Eyes: Negative.    Respiratory: Negative.     Cardiovascular: Negative.    Gastrointestinal: Negative.    Endocrine: Negative.    Genitourinary: Negative.    Musculoskeletal: Negative.    Neurological: Negative.    Psychiatric/Behavioral: Negative.     All other systems reviewed and are negative.        Physical Exam  Vitals and nursing note reviewed.   Constitutional:       Appearance: Normal appearance.   HENT:      Head: Normocephalic and atraumatic.      Nose: Nose normal.   Eyes:      Conjunctiva/sclera: Conjunctivae normal.   Cardiovascular:      Rate and Rhythm: Normal rate and regular rhythm.      Pulses: Normal pulses.      Heart sounds: Normal heart sounds.   Pulmonary:      Effort: Pulmonary effort is normal.      Breath sounds: Normal breath sounds.   Abdominal:      General: Bowel sounds are normal.      Palpations: Abdomen is soft.   Musculoskeletal:         General: Normal range of motion.      Right lower leg: No edema.      Left lower leg: No edema.   Skin:     General: Skin is warm and dry.   Neurological:      General: No focal deficit present.      Mental Status: She is alert and oriented to person, place, and time.   Psychiatric:         Mood and Affect: Mood normal.         Behavior: Behavior normal.        Allergies   Allergen Reactions    Adhesive

## 2025-07-01 ENCOUNTER — HOSPITAL ENCOUNTER (OUTPATIENT)
Age: 70
Discharge: HOME | End: 2025-07-01
Payer: MEDICARE

## 2025-07-01 DIAGNOSIS — E78.5 DYSLIPIDEMIA: ICD-10-CM

## 2025-07-01 DIAGNOSIS — I10 PRIMARY HYPERTENSION: ICD-10-CM

## 2025-07-01 LAB
ALBUMIN SERPL-MCNC: 3.8 G/DL (ref 3.4–5)
ALBUMIN/GLOB SERPL: 1.2 (ref 0.8–1.7)
ALP SERPL-CCNC: 87 U/L (ref 45–117)
ALT SERPL-CCNC: 23 U/L (ref 10–35)
AST SERPL-CCNC: 22 U/L (ref 10–38)
BILIRUB DIRECT SERPL-MCNC: 0.3 MG/DL (ref 0–0.2)
BILIRUB SERPL-MCNC: 0.6 MG/DL (ref 0.2–1)
CHOLEST SERPL-MCNC: 120 MG/DL
GLOBULIN SER CALC-MCNC: 3.1 G/DL (ref 2–4)
LDLC SERPL CALC-MCNC: 58 MG/DL (ref 0–100)
PROT SERPL-MCNC: 7 G/DL (ref 6.4–8.2)
TRIGL SERPL-MCNC: 82 MG/DL (ref 0–150)
VLDLC SERPL CALC-MCNC: 16 MG/DL

## 2025-07-01 PROCEDURE — 36415 COLL VENOUS BLD VENIPUNCTURE: CPT

## 2025-07-01 PROCEDURE — 80061 LIPID PANEL: CPT

## 2025-07-01 PROCEDURE — 80076 HEPATIC FUNCTION PANEL: CPT

## 2025-07-22 ENCOUNTER — CLINICAL DOCUMENTATION (OUTPATIENT)
Age: 70
End: 2025-07-22

## 2025-07-22 ENCOUNTER — OFFICE VISIT (OUTPATIENT)
Age: 70
End: 2025-07-22
Payer: MEDICARE

## 2025-07-22 VITALS
WEIGHT: 174.2 LBS | RESPIRATION RATE: 18 BRPM | HEART RATE: 78 BPM | TEMPERATURE: 97 F | SYSTOLIC BLOOD PRESSURE: 117 MMHG | DIASTOLIC BLOOD PRESSURE: 68 MMHG | BODY MASS INDEX: 25 KG/M2 | OXYGEN SATURATION: 96 %

## 2025-07-22 DIAGNOSIS — J44.89 ASTHMA-COPD OVERLAP SYNDROME (HCC): Primary | ICD-10-CM

## 2025-07-22 DIAGNOSIS — D72.19 PERIPHERAL EOSINOPHILIA: ICD-10-CM

## 2025-07-22 DIAGNOSIS — Z91.09 ENVIRONMENTAL ALLERGIES: ICD-10-CM

## 2025-07-22 PROCEDURE — 99214 OFFICE O/P EST MOD 30 MIN: CPT | Performed by: INTERNAL MEDICINE

## 2025-07-22 PROCEDURE — 3023F SPIROM DOC REV: CPT | Performed by: INTERNAL MEDICINE

## 2025-07-22 PROCEDURE — 1160F RVW MEDS BY RX/DR IN RCRD: CPT | Performed by: INTERNAL MEDICINE

## 2025-07-22 PROCEDURE — G8427 DOCREV CUR MEDS BY ELIG CLIN: HCPCS | Performed by: INTERNAL MEDICINE

## 2025-07-22 PROCEDURE — 1036F TOBACCO NON-USER: CPT | Performed by: INTERNAL MEDICINE

## 2025-07-22 PROCEDURE — 3074F SYST BP LT 130 MM HG: CPT | Performed by: INTERNAL MEDICINE

## 2025-07-22 PROCEDURE — G8419 CALC BMI OUT NRM PARAM NOF/U: HCPCS | Performed by: INTERNAL MEDICINE

## 2025-07-22 PROCEDURE — G8399 PT W/DXA RESULTS DOCUMENT: HCPCS | Performed by: INTERNAL MEDICINE

## 2025-07-22 PROCEDURE — 1090F PRES/ABSN URINE INCON ASSESS: CPT | Performed by: INTERNAL MEDICINE

## 2025-07-22 PROCEDURE — 1123F ACP DISCUSS/DSCN MKR DOCD: CPT | Performed by: INTERNAL MEDICINE

## 2025-07-22 PROCEDURE — 1126F AMNT PAIN NOTED NONE PRSNT: CPT | Performed by: INTERNAL MEDICINE

## 2025-07-22 PROCEDURE — 1159F MED LIST DOCD IN RCRD: CPT | Performed by: INTERNAL MEDICINE

## 2025-07-22 PROCEDURE — 3017F COLORECTAL CA SCREEN DOC REV: CPT | Performed by: INTERNAL MEDICINE

## 2025-07-22 PROCEDURE — 3078F DIAST BP <80 MM HG: CPT | Performed by: INTERNAL MEDICINE

## 2025-07-22 ASSESSMENT — ENCOUNTER SYMPTOMS
STRIDOR: 0
BLOOD IN STOOL: 0
TROUBLE SWALLOWING: 0
SINUS PRESSURE: 0
EYE REDNESS: 0
COUGH: 0
EYE DISCHARGE: 0
SHORTNESS OF BREATH: 1
EYE PAIN: 0
EYE ITCHING: 0
VOICE CHANGE: 0
ABDOMINAL PAIN: 0
VOMITING: 0
COLOR CHANGE: 0
APNEA: 0
WHEEZING: 1
PHOTOPHOBIA: 0
CHOKING: 0
CHEST TIGHTNESS: 0
NAUSEA: 0

## 2025-07-22 NOTE — PROGRESS NOTES
Esther Leonard (:  1955) is a 70 y.o. female,Established patient, here for evaluation of the following chief complaint(s):  Follow-up (Intermittent asthma without complication, unspecified asthma severity)         Assessment & Plan  Asthma-COPD overlap syndrome (HCC)       Orders:    Alpha-1-Antitrypsin w Phenotype; Future    Environmental allergies            Peripheral eosinophilia          Pt with subjective response to triple therapy despite her prior objections.  Continue Brextri and Albuterol at same doses.  Check A1AT with phenotype as pt with low smoking exposure and severity as well as presence of emphysema on imaging is concerning.   Called Dr. Rushing's office to obtain lab results, left VM.  Will call pt with results as further intervention may depend on these.   Importance of up to date immunizations stressed to pt.     Return in about 6 months (around 2026).       Subjective   Patient is a non smoker previously followed by Dr. LESTER Wood who reports doing well with her asthma control.-She continues to use Singulair, Symbicort   Does not need much rescue medications. Concern was expressed re: FEV1 <50 on last madhav from , and pt was switched to Breztri after discussion with Dr. Rushing and after PFT's done in his office. Per pt, she also had bloodwork for \"a hereditary cause of asthma/COPD\" however unable to access that data.   She has not had any interval exacerbations requiring systemic steroids or ED visits.. At baseline, pt is dyspneic walking up 2 flights of stairs but exercises regularly doing pool aerobics.     On Dexilant for French's esophagus-follows with GI. She has been on Orencia infusions for her rheumatoid arthritis. She has been experiencing worsening deformation of her hand-thumb with significant pain and is scheduled to see a hand surgeon  Denies wheezing. Occasional sneezing. Feels better overall with no recent set backs. No cough, congestion or wheezing. She

## 2025-07-22 NOTE — PROGRESS NOTES
Called Dr. Tray Frank's office to obtain information in regards to a lab order  wanted to order but first wanted to reassure that  hadn't already done this. Unable to reach anyone at Dr. Frank's office [facility] to return my call.

## 2025-07-22 NOTE — PROGRESS NOTES
Esther Leonard presents today for   Chief Complaint   Patient presents with    Follow-up     Intermittent asthma without complication, unspecified asthma severity       Is someone accompanying this pt? No    Is the patient using any DME equipment during OV? No    -DME Company NA    Depression Screenin/22/2025     1:37 PM   PHQ-9 Questionaire   Little interest or pleasure in doing things 0   Feeling down, depressed, or hopeless 0   PHQ-9 Total Score 0       Learning Needs Questionnaire:     No question data found.      Fall Risk:         2024    11:14 AM 2023    11:32 AM 2023     2:23 PM 2023     2:30 PM   Fall Risk   Do you feel unsteady or are you worried about falling?  no no no no   2 or more falls in past year? no no no no   Fall with injury in past year? no no no no        Abuse Screening:          No data to display                  Coordination of Care:    1. Have you been to the ER, urgent care clinic since your last visit? Hospitalized since your last visit? No    2. Have you seen or consulted any other health care providers outside of the [facility] since your last visit? Include any pap smears or colon screening. Rheumatology, OBGYN, Allergist    Medication list has been update per patient.

## 2025-07-25 ENCOUNTER — HOSPITAL ENCOUNTER (OUTPATIENT)
Age: 70
Discharge: HOME | End: 2025-07-25
Payer: MEDICARE

## 2025-07-25 DIAGNOSIS — J44.89 ASTHMA-COPD OVERLAP SYNDROME (HCC): ICD-10-CM

## 2025-07-25 PROCEDURE — 36415 COLL VENOUS BLD VENIPUNCTURE: CPT

## 2025-07-25 PROCEDURE — 82103 ALPHA-1-ANTITRYPSIN TOTAL: CPT

## 2025-07-25 PROCEDURE — 82104 ALPHA-1-ANTITRYPSIN PHENO: CPT

## 2025-07-30 LAB
A1AT PHENOTYP SERPL IFE: NORMAL
A1AT SERPL-MCNC: 157 MG/DL (ref 101–187)

## (undated) DEVICE — SUTURE ABSORBABLE ANTIBACT 1-0 CT-1 24 IN STRATAFIX PDS + SXPP1A443

## (undated) DEVICE — SKIN MARKER,REGULAR TIP WITH RULER AND LABELS: Brand: DEVON

## (undated) DEVICE — NEEDLE HYPO 18GA L1.5IN PNK S STL HUB POLYPR SHLD REG BVL

## (undated) DEVICE — 3M™ STERI-DRAPE™ INSTRUMENT POUCH 1018: Brand: STERI-DRAPE™

## (undated) DEVICE — BOWL AND CEMENT CARTRIDGE WITH BREAKAWAY FEMORAL NOZZLE: Brand: ACM

## (undated) DEVICE — UNDERCAST PADDING: Brand: DEROYAL

## (undated) DEVICE — 4-PORT MANIFOLD: Brand: NEPTUNE 2

## (undated) DEVICE — LIMB HOLDER, WRIST/ANKLE: Brand: DEROYAL

## (undated) DEVICE — BIPOLAR SEALER 23-112-1 AQM 6.0: Brand: AQUAMANTYS ®

## (undated) DEVICE — Device: Brand: JELCO

## (undated) DEVICE — BLADE RMFG DBL RECIP DBL SD --

## (undated) DEVICE — KIT CLN UP BON SECOURS MARYV

## (undated) DEVICE — BLANKET WRM AD W50XL85.8IN PACU FULL BODY FORC AIR

## (undated) DEVICE — SYRINGE MED 30ML STD CLR PLAS LUERLOCK TIP N CTRL DISP

## (undated) DEVICE — SHOE POSTOP M MAN 9-11 UNIV FOAM TRICOT SEMI FLX SKID

## (undated) DEVICE — SUTURE VCRL + SZ 0 L27IN ABSRB UD CT-1 L36MM 1/2 CIR TAPR VCP260H

## (undated) DEVICE — STRYKER PERFORMANCE SERIES SAGITTAL BLADE: Brand: STRYKER PERFORMANCE SERIES

## (undated) DEVICE — BANDAGE,GAUZE,BULKEE II,4.5"X4.1YD,STRL: Brand: MEDLINE

## (undated) DEVICE — NEEDLE 25GA X 1.5 IN ECLIPSE

## (undated) DEVICE — Device

## (undated) DEVICE — THREE-QUARTER SHEET: Brand: CONVERTORS

## (undated) DEVICE — ZIMMER® STERILE DISPOSABLE TOURNIQUET CUFF WITH PLC, DUAL PORT, SINGLE BLADDER, 34 IN. (86 CM)

## (undated) DEVICE — STOCKING COMPR L L16-18IN LNG 19MMHG ANK 9-10IN CALF

## (undated) DEVICE — SUTURE VCRL SZ 0 L18IN ABSRB UD POLYGLACTIN 910 BRAID TIE J912G

## (undated) DEVICE — PREP SKN CHLRAPRP APL 26ML STR --

## (undated) DEVICE — Z DISCONTINUED USE 2220190 SUTURE VICRYL SZ 3-0 L27IN ABSRB UD L26MM SH 1/2 CIR J416H

## (undated) DEVICE — PACK PROCEDURE SURG TOT HIP BSHR LF

## (undated) DEVICE — SUTURE MONOCRYL SZ 4-0 L27IN ABSRB UD L24MM PS-1 3/8 CIR PRIM Y935H

## (undated) DEVICE — LIQUIBAND RAPID ADHESIVE 36/CS 0.8ML: Brand: MEDLINE

## (undated) DEVICE — SNAP KOVER: Brand: UNBRANDED

## (undated) DEVICE — SUTURE VCRL SZ 0 L36IN ABSRB UD L36MM CT-1 1/2 CIR J946H

## (undated) DEVICE — SYSTEM SKIN CLSR 22CM DERMBND PRINEO

## (undated) DEVICE — SUTURE VCRL SZ 1 L27IN ABSRB VLT CTX L48MM 1 2 CIR SGL ARMED J365H

## (undated) DEVICE — MASTISOL ADHESIVE LIQ 2/3ML

## (undated) DEVICE — TAPE,CLOTH/SILK,CURAD,3"X10YD,LF,40/CS: Brand: CURAD

## (undated) DEVICE — GOWN,REINFORCED,POLY,AURORA,XXLARGE,STR: Brand: MEDLINE

## (undated) DEVICE — STOCKINETTE,IMPERVIOUS,12X48,STERILE: Brand: MEDLINE

## (undated) DEVICE — INTENDED FOR TISSUE SEPARATION, AND OTHER PROCEDURES THAT REQUIRE A SHARP SURGICAL BLADE TO PUNCTURE OR CUT.: Brand: BARD-PARKER ® CARBON RIB-BACK BLADES

## (undated) DEVICE — DRAPE TWL SURG 16X26IN BLU ORB04] ALLCARE INC]

## (undated) DEVICE — DRAPE,EXTREMITY,89X128,STERILE: Brand: MEDLINE

## (undated) DEVICE — REM POLYHESIVE ADULT PATIENT RETURN ELECTRODE: Brand: VALLEYLAB

## (undated) DEVICE — HANDPIECE SET WITH HIGH FLOW TIP AND SUCTION TUBE: Brand: INTERPULSE

## (undated) DEVICE — (D)PACK ICE DISP -- DISC BY MFR

## (undated) DEVICE — WRAP COMPR W4INXL5YD NONSTERILE TAN SELF ADH COBAN

## (undated) DEVICE — SYR LR LCK 1ML GRAD NSAF 30ML --

## (undated) DEVICE — INTENDED FOR TISSUE SEPARATION, AND OTHER PROCEDURES THAT REQUIRE A SHARP SURGICAL BLADE TO PUNCTURE OR CUT.: Brand: BARD-PARKER ® STAINLESS STEEL BLADES

## (undated) DEVICE — SYRINGE MED 3ML NDL 22GA L1 1/2IN REG BVL SFGLDE

## (undated) DEVICE — DRSG AQUACEL SURG 3.5 X 10IN -- CONVERT TO ITEM 370183

## (undated) DEVICE — GAUZE,SPONGE,4"X4",16PLY,STRL,LF,10/TRAY: Brand: MEDLINE

## (undated) DEVICE — DRESSING HYDROFIBER AQUACEL AG ADVANTAGE 3.5X14 IN

## (undated) DEVICE — SUTURE MCRYL + SZ 2-0 L36IN ABSRB UD CT-1 L36MM 1/2 CIR MCP945H

## (undated) DEVICE — SOLUTION IRRIG 3000ML 0.9% SOD CHL FLX CONT 0797208] ICU MEDICAL INC]

## (undated) DEVICE — 3M™ STERI-DRAPE™ U-DRAPE 1015: Brand: STERI-DRAPE™

## (undated) DEVICE — ZIMMER® STERILE DISPOSABLE TOURNIQUET CUFF WITH PROTECTIVE SLEEVE AND PLC, DUAL PORT, SINGLE BLADDER, 18 IN. (46 CM)

## (undated) DEVICE — SUTURE MCRYL SZ 2-0 L36IN ABSRB UD L36MM CT-1 1/2 CIR Y945H

## (undated) DEVICE — 450 ML BOTTLE OF 0.05% CHLORHEXIDINE GLUCONATE IN 99.95% STERILE WATER FOR IRRIGATION, USP AND APPLICATOR.: Brand: IRRISEPT ANTIMICROBIAL WOUND LAVAGE

## (undated) DEVICE — KIT OR TURNOVER

## (undated) DEVICE — ELECTRODE PT RET AD L9FT HI MOIST COND ADH HYDRGEL CORDED

## (undated) DEVICE — SUTURE VCRL SZ 2 L27IN ABSRB VLT L65MM TP-1 1/2 CIR J649G

## (undated) DEVICE — INTENDED FOR TISSUE SEPARATION, AND OTHER PROCEDURES THAT REQUIRE A SHARP SURGICAL BLADE TO PUNCTURE OR CUT.: Brand: BARD-PARKER SAFETY BLADES SIZE 10, STERILE

## (undated) DEVICE — SOLUTION IV 1000ML 0.9% SOD CHL

## (undated) DEVICE — SUTURE FIBERWIRE SZ 2 W/ TAPERED NEEDLE BLUE L38IN NONABSORB BLU L26.5MM 1/2 CIRCLE AR7200

## (undated) DEVICE — BANDAGE,CAMO GREEN,CURAD,25CT, 3/4"X3": Brand: CURAD

## (undated) DEVICE — DECANTER BAG 9": Brand: MEDLINE INDUSTRIES, INC.

## (undated) DEVICE — 3 ML SYRINGE WITH HYPODERMIC SAFETY NEEDLE: Brand: MAGELLAN

## (undated) DEVICE — SPIROMETER INCENT 2500ML W ONE W VLV

## (undated) DEVICE — STRIP SKIN CLSR 1/4INX1.5IN REINF WND NYL CURAD

## (undated) DEVICE — BANDAGE COMPR W6INXL5YD WHT BGE POLY COT M E WRP WV HK AND

## (undated) DEVICE — SHEET, T, LAPAROTOMY, STERILE: Brand: MEDLINE

## (undated) DEVICE — DISPOSABLE TOURNIQUET CUFF SINGLE BLADDER, DUAL PORT AND QUICK CONNECT CONNECTOR: Brand: COLOR CUFF

## (undated) DEVICE — PREP SKN PREVAIL 40ML APPL --

## (undated) DEVICE — NEEDLE SPNL 22GA L3.5IN BLK HUB S STL REG WALL FIT STYL W/

## (undated) DEVICE — SUTURE MCRYL SZ 4-0 L27IN ABSRB UD L24MM PS-1 3/8 CIR PRIM Y935H

## (undated) DEVICE — PACK SURG BSHR TOT KNEE LF

## (undated) DEVICE — GOWN ,SIRUS ,NONREINFORCED 4XL: Brand: MEDLINE

## (undated) DEVICE — PACK PROCEDURE SURG MAJ W/ BASIN LF

## (undated) DEVICE — ICE BAG INSERTS

## (undated) DEVICE — SUTURE VCRL + SZ 2 L27IN ABSRB UD TP-1 L65MM 1/2 CIR TAPR VCP849G

## (undated) DEVICE — SOFT SILICONE HYDROCELLULAR SACRUM DRESSING WITH LOCK AWAY LAYER: Brand: ALLEVYN LIFE SACRUM (LARGE) PACK OF 10

## (undated) DEVICE — SYRINGE MED 10ML LUERLOCK TIP W/O SFTY DISP

## (undated) DEVICE — Z DISCONTINUED USE 2744636  DRESSING AQUACEL 14 IN ALG W3.5XL14IN POLYUR FLM CVR W/ HYDRCOLL

## (undated) DEVICE — HOOD WITH PEEL AWAY FACE SHIELD: Brand: T7PLUS

## (undated) DEVICE — GLOVE ORTHO 8   MSG9480

## (undated) DEVICE — BANDAGE COBAN 4 IN COMPR W4INXL5YD FOAM COHESIVE QUIK STK SELF ADH SFT

## (undated) DEVICE — APPLICATOR MEDICATED 26 CC SOLUTION HI LT ORNG CHLORAPREP

## (undated) DEVICE — Z DISCONTINUED BY MEDLINE USE 2711682 TRAY SKIN PREP DRY W/ PREM GLV

## (undated) DEVICE — SUTURE VICRYL SZ 4-0 L18IN ABSRB UD L19MM PC-5 3/8 CIR J823G

## (undated) DEVICE — DRAPE XR C ARM 41X74IN LF --

## (undated) DEVICE — GENESIS PIN AND DRILL SET: Brand: GENESIS

## (undated) DEVICE — PILLOW POS W15XH6XL22IN RASPBERRY FOAM ABD W/ STRP DISP FOR

## (undated) DEVICE — T5 HOOD WITH PEEL AWAY FACE SHIELD

## (undated) DEVICE — SMARTSLEEVE SURGICAL GOWN, 3XL LONG: Brand: CONVERTORS

## (undated) DEVICE — PREMIUM DRY TRAY LF: Brand: MEDLINE INDUSTRIES, INC.